# Patient Record
Sex: MALE | Race: WHITE | Employment: OTHER | URBAN - NONMETROPOLITAN AREA
[De-identification: names, ages, dates, MRNs, and addresses within clinical notes are randomized per-mention and may not be internally consistent; named-entity substitution may affect disease eponyms.]

---

## 2017-01-04 RX ORDER — POTASSIUM CHLORIDE 750 MG/1
TABLET, EXTENDED RELEASE ORAL
Qty: 90 TABLET | Refills: 2 | Status: SHIPPED | OUTPATIENT
Start: 2017-01-04 | End: 2017-02-08 | Stop reason: ALTCHOICE

## 2017-02-08 ENCOUNTER — OFFICE VISIT (OUTPATIENT)
Dept: INTERNAL MEDICINE | Age: 77
End: 2017-02-08

## 2017-02-08 VITALS
DIASTOLIC BLOOD PRESSURE: 74 MMHG | SYSTOLIC BLOOD PRESSURE: 134 MMHG | HEIGHT: 69 IN | BODY MASS INDEX: 32.51 KG/M2 | WEIGHT: 219.5 LBS

## 2017-02-08 DIAGNOSIS — E78.49 OTHER HYPERLIPIDEMIA: Chronic | ICD-10-CM

## 2017-02-08 DIAGNOSIS — I25.83 CORONARY ARTERY DISEASE DUE TO LIPID RICH PLAQUE: Primary | Chronic | ICD-10-CM

## 2017-02-08 DIAGNOSIS — C67.9 MALIGNANT NEOPLASM OF URINARY BLADDER, UNSPECIFIED SITE (HCC): ICD-10-CM

## 2017-02-08 DIAGNOSIS — I10 ESSENTIAL HYPERTENSION: Chronic | ICD-10-CM

## 2017-02-08 DIAGNOSIS — E03.9 ACQUIRED HYPOTHYROIDISM: Chronic | ICD-10-CM

## 2017-02-08 DIAGNOSIS — Z86.79 S/P AAA REPAIR: ICD-10-CM

## 2017-02-08 DIAGNOSIS — Z98.890 S/P AAA REPAIR: ICD-10-CM

## 2017-02-08 DIAGNOSIS — N18.30 CKD (CHRONIC KIDNEY DISEASE) STAGE 3, GFR 30-59 ML/MIN (HCC): ICD-10-CM

## 2017-02-08 DIAGNOSIS — Z23 NEED FOR PNEUMOCOCCAL VACCINATION: ICD-10-CM

## 2017-02-08 DIAGNOSIS — I25.10 CORONARY ARTERY DISEASE DUE TO LIPID RICH PLAQUE: Primary | Chronic | ICD-10-CM

## 2017-02-08 DIAGNOSIS — I73.9 CLAUDICATION (HCC): ICD-10-CM

## 2017-02-08 PROCEDURE — G0009 ADMIN PNEUMOCOCCAL VACCINE: HCPCS | Performed by: INTERNAL MEDICINE

## 2017-02-08 PROCEDURE — 99213 OFFICE O/P EST LOW 20 MIN: CPT | Performed by: INTERNAL MEDICINE

## 2017-02-08 PROCEDURE — 93000 ELECTROCARDIOGRAM COMPLETE: CPT | Performed by: INTERNAL MEDICINE

## 2017-02-08 PROCEDURE — 90670 PCV13 VACCINE IM: CPT | Performed by: INTERNAL MEDICINE

## 2017-02-08 RX ORDER — NITROGLYCERIN 0.4 MG/1
0.4 TABLET SUBLINGUAL EVERY 5 MIN PRN
Qty: 25 TABLET | Refills: 1 | Status: SHIPPED | OUTPATIENT
Start: 2017-02-08 | End: 2019-05-14 | Stop reason: SDUPTHER

## 2017-02-08 RX ORDER — ATORVASTATIN CALCIUM 40 MG/1
TABLET, FILM COATED ORAL
Qty: 30 TABLET | Refills: 11 | Status: SHIPPED | OUTPATIENT
Start: 2017-02-08 | End: 2018-02-06 | Stop reason: SDUPTHER

## 2017-02-23 ENCOUNTER — TELEPHONE (OUTPATIENT)
Dept: INTERNAL MEDICINE | Age: 77
End: 2017-02-23

## 2017-03-13 RX ORDER — LEVOTHYROXINE SODIUM 137 UG/1
TABLET ORAL
Qty: 90 TABLET | Refills: 3 | Status: SHIPPED | OUTPATIENT
Start: 2017-03-13 | End: 2017-04-10 | Stop reason: SDUPTHER

## 2017-04-10 ENCOUNTER — OFFICE VISIT (OUTPATIENT)
Dept: FAMILY MEDICINE CLINIC | Age: 77
End: 2017-04-10

## 2017-04-10 VITALS
WEIGHT: 218.4 LBS | RESPIRATION RATE: 20 BRPM | TEMPERATURE: 97.6 F | BODY MASS INDEX: 34.21 KG/M2 | DIASTOLIC BLOOD PRESSURE: 64 MMHG | SYSTOLIC BLOOD PRESSURE: 134 MMHG | HEART RATE: 80 BPM

## 2017-04-10 DIAGNOSIS — H65.03 BILATERAL ACUTE SEROUS OTITIS MEDIA, RECURRENCE NOT SPECIFIED: Primary | ICD-10-CM

## 2017-04-10 PROCEDURE — 99213 OFFICE O/P EST LOW 20 MIN: CPT | Performed by: FAMILY MEDICINE

## 2017-04-10 RX ORDER — GUAIFENESIN 600 MG/1
600 TABLET, EXTENDED RELEASE ORAL 2 TIMES DAILY
Qty: 20 TABLET | Refills: 0 | Status: SHIPPED | OUTPATIENT
Start: 2017-04-10 | End: 2017-05-08 | Stop reason: ALTCHOICE

## 2017-04-11 PROBLEM — Z95.828 S/P FEMORAL-FEMORAL BYPASS SURGERY: Status: ACTIVE | Noted: 2017-04-11

## 2017-04-17 ENCOUNTER — PROCEDURE VISIT (OUTPATIENT)
Dept: UROLOGY | Age: 77
End: 2017-04-17

## 2017-04-17 VITALS
DIASTOLIC BLOOD PRESSURE: 72 MMHG | BODY MASS INDEX: 34.69 KG/M2 | SYSTOLIC BLOOD PRESSURE: 140 MMHG | WEIGHT: 221 LBS | HEIGHT: 67 IN

## 2017-04-17 DIAGNOSIS — C67.9 MALIGNANT NEOPLASM OF URINARY BLADDER, UNSPECIFIED SITE (HCC): Primary | ICD-10-CM

## 2017-04-17 LAB
BILIRUBIN URINE: NEGATIVE
BLOOD URINE, POC: NEGATIVE
CHARACTER, URINE: CLEAR
COLOR, URINE: YELLOW
GLUCOSE URINE: NEGATIVE MG/DL
KETONES, URINE: NEGATIVE
LEUKOCYTE CLUMPS, URINE: NORMAL
NITRITE, URINE: NEGATIVE
PH, URINE: 6
PROTEIN, URINE: NEGATIVE MG/DL
SPECIFIC GRAVITY, URINE: 1.01 (ref 1–1.03)
UROBILINOGEN, URINE: 0.2 EU/DL

## 2017-04-17 PROCEDURE — 52000 CYSTOURETHROSCOPY: CPT | Performed by: UROLOGY

## 2017-04-17 PROCEDURE — 81003 URINALYSIS AUTO W/O SCOPE: CPT | Performed by: UROLOGY

## 2017-04-17 PROCEDURE — 99999 PR OFFICE/OUTPT VISIT,PROCEDURE ONLY: CPT | Performed by: UROLOGY

## 2017-04-24 ENCOUNTER — TELEPHONE (OUTPATIENT)
Dept: FAMILY MEDICINE CLINIC | Age: 77
End: 2017-04-24

## 2017-04-24 DIAGNOSIS — H91.90 HEARING LOSS, UNSPECIFIED LATERALITY: Primary | ICD-10-CM

## 2017-04-26 ASSESSMENT — ENCOUNTER SYMPTOMS
NAUSEA: 0
SHORTNESS OF BREATH: 0
SINUS PRESSURE: 0
RHINORRHEA: 0
EYE PAIN: 0
ABDOMINAL DISTENTION: 0
COUGH: 0
ABDOMINAL PAIN: 0
DIARRHEA: 0
SORE THROAT: 0
CONSTIPATION: 0

## 2017-04-27 ENCOUNTER — OFFICE VISIT (OUTPATIENT)
Dept: AUDIOLOGY | Age: 77
End: 2017-04-27

## 2017-04-27 DIAGNOSIS — H90.5 SENSORINEURAL HEARING LOSS OF RIGHT EAR: Primary | ICD-10-CM

## 2017-04-27 DIAGNOSIS — H90.72 MIXED HEARING LOSS OF LEFT EAR: ICD-10-CM

## 2017-05-08 ENCOUNTER — OFFICE VISIT (OUTPATIENT)
Dept: OTOLARYNGOLOGY | Age: 77
End: 2017-05-08

## 2017-05-08 VITALS
BODY MASS INDEX: 34.64 KG/M2 | HEART RATE: 92 BPM | TEMPERATURE: 97.9 F | HEIGHT: 67 IN | SYSTOLIC BLOOD PRESSURE: 128 MMHG | DIASTOLIC BLOOD PRESSURE: 70 MMHG | WEIGHT: 220.7 LBS | RESPIRATION RATE: 18 BRPM

## 2017-05-08 DIAGNOSIS — H91.93 HEARING LOSS, BILATERAL: Primary | ICD-10-CM

## 2017-05-08 PROCEDURE — 99203 OFFICE O/P NEW LOW 30 MIN: CPT | Performed by: OTOLARYNGOLOGY

## 2017-05-08 ASSESSMENT — ENCOUNTER SYMPTOMS
RECTAL PAIN: 0
WHEEZING: 0
PHOTOPHOBIA: 0
SORE THROAT: 0
EYE ITCHING: 0
CHOKING: 1
VOICE CHANGE: 1
FACIAL SWELLING: 0
RHINORRHEA: 0
BLOOD IN STOOL: 0
SINUS PRESSURE: 0
COLOR CHANGE: 0
STRIDOR: 0
TROUBLE SWALLOWING: 0
CONSTIPATION: 0
VOMITING: 0
DIARRHEA: 0
APNEA: 1
BACK PAIN: 1
SHORTNESS OF BREATH: 1
ABDOMINAL PAIN: 0
ABDOMINAL DISTENTION: 0
NAUSEA: 0
EYE PAIN: 0
EYE DISCHARGE: 0
COUGH: 1
CHEST TIGHTNESS: 0
EYE REDNESS: 0
ANAL BLEEDING: 0

## 2017-05-16 ENCOUNTER — OFFICE VISIT (OUTPATIENT)
Dept: AUDIOLOGY | Age: 77
End: 2017-05-16

## 2017-05-16 DIAGNOSIS — H90.3 SENSORINEURAL HEARING LOSS, BILATERAL: Primary | ICD-10-CM

## 2017-05-30 ENCOUNTER — OFFICE VISIT (OUTPATIENT)
Dept: AUDIOLOGY | Age: 77
End: 2017-05-30

## 2017-05-30 DIAGNOSIS — H90.3 SENSORINEURAL HEARING LOSS, BILATERAL: Primary | ICD-10-CM

## 2017-06-06 PROBLEM — I72.4 PSEUDOANEURYSM OF RIGHT FEMORAL ARTERY (HCC): Status: ACTIVE | Noted: 2017-06-06

## 2017-06-14 ENCOUNTER — OFFICE VISIT (OUTPATIENT)
Dept: AUDIOLOGY | Age: 77
End: 2017-06-14

## 2017-06-14 DIAGNOSIS — H90.3 SENSORINEURAL HEARING LOSS, BILATERAL: Primary | ICD-10-CM

## 2017-06-20 RX ORDER — GABAPENTIN 300 MG/1
CAPSULE ORAL
Qty: 270 CAPSULE | Refills: 1 | Status: SHIPPED | OUTPATIENT
Start: 2017-06-20 | End: 2018-02-06 | Stop reason: ALTCHOICE

## 2017-07-03 ENCOUNTER — OFFICE VISIT (OUTPATIENT)
Dept: AUDIOLOGY | Age: 77
End: 2017-07-03

## 2017-07-03 DIAGNOSIS — H90.3 SENSORINEURAL HEARING LOSS, BILATERAL: Primary | ICD-10-CM

## 2017-07-27 ENCOUNTER — HOSPITAL ENCOUNTER (OUTPATIENT)
Dept: AUDIOLOGY | Age: 77
Discharge: HOME | End: 2017-07-27
Payer: MEDICARE

## 2017-07-27 PROCEDURE — 9990000010 HC NO CHARGE VISIT: Performed by: AUDIOLOGIST

## 2017-08-14 ENCOUNTER — TELEPHONE (OUTPATIENT)
Dept: INTERNAL MEDICINE CLINIC | Age: 77
End: 2017-08-14

## 2017-08-14 ENCOUNTER — HOSPITAL ENCOUNTER (OUTPATIENT)
Age: 77
Discharge: HOME | End: 2017-08-14
Payer: MEDICARE

## 2017-08-14 ENCOUNTER — OFFICE VISIT (OUTPATIENT)
Dept: INTERNAL MEDICINE CLINIC | Age: 77
End: 2017-08-14
Payer: MEDICARE

## 2017-08-14 VITALS
BODY MASS INDEX: 31.4 KG/M2 | HEIGHT: 69 IN | DIASTOLIC BLOOD PRESSURE: 70 MMHG | SYSTOLIC BLOOD PRESSURE: 130 MMHG | WEIGHT: 212 LBS | HEART RATE: 80 BPM

## 2017-08-14 DIAGNOSIS — M48.061 SPINAL STENOSIS OF LUMBAR REGION: ICD-10-CM

## 2017-08-14 DIAGNOSIS — I73.9 CLAUDICATION (HCC): ICD-10-CM

## 2017-08-14 DIAGNOSIS — I73.9 PAD (PERIPHERAL ARTERY DISEASE) (HCC): ICD-10-CM

## 2017-08-14 DIAGNOSIS — I25.10 CORONARY ARTERY DISEASE INVOLVING NATIVE CORONARY ARTERY OF NATIVE HEART WITHOUT ANGINA PECTORIS: Primary | Chronic | ICD-10-CM

## 2017-08-14 DIAGNOSIS — E03.9 ACQUIRED HYPOTHYROIDISM: Chronic | ICD-10-CM

## 2017-08-14 DIAGNOSIS — I10 ESSENTIAL HYPERTENSION: Chronic | ICD-10-CM

## 2017-08-14 DIAGNOSIS — N18.30 CKD (CHRONIC KIDNEY DISEASE) STAGE 3, GFR 30-59 ML/MIN (HCC): ICD-10-CM

## 2017-08-14 LAB — TSH SERPL DL<=0.05 MIU/L-ACNC: 3.23 UIU/ML (ref 0.4–4.2)

## 2017-08-14 PROCEDURE — 84443 ASSAY THYROID STIM HORMONE: CPT

## 2017-08-14 PROCEDURE — 36415 COLL VENOUS BLD VENIPUNCTURE: CPT

## 2017-08-14 PROCEDURE — 99213 OFFICE O/P EST LOW 20 MIN: CPT | Performed by: INTERNAL MEDICINE

## 2017-08-14 RX ORDER — LEVOTHYROXINE SODIUM 137 UG/1
137 TABLET ORAL DAILY
Qty: 90 TABLET | Refills: 3 | Status: SHIPPED | OUTPATIENT
Start: 2017-08-14 | End: 2018-04-23 | Stop reason: SDUPTHER

## 2017-08-14 RX ORDER — FUROSEMIDE 40 MG/1
40 TABLET ORAL DAILY
Qty: 90 TABLET | Refills: 3 | Status: SHIPPED | OUTPATIENT
Start: 2017-08-14 | End: 2018-08-13 | Stop reason: SDUPTHER

## 2017-08-14 RX ORDER — POTASSIUM CHLORIDE 750 MG/1
10 TABLET, EXTENDED RELEASE ORAL DAILY
Qty: 90 TABLET | Refills: 3 | Status: SHIPPED | OUTPATIENT
Start: 2017-08-14 | End: 2018-04-23 | Stop reason: SDUPTHER

## 2017-09-19 ENCOUNTER — HOSPITAL ENCOUNTER (OUTPATIENT)
Dept: AUDIOLOGY | Age: 77
Discharge: HOME | End: 2017-09-19

## 2017-09-19 PROCEDURE — 9990000010 HC NO CHARGE VISIT: Performed by: AUDIOLOGIST

## 2017-10-09 ENCOUNTER — HOSPITAL ENCOUNTER (OUTPATIENT)
Age: 77
Discharge: HOME | End: 2017-10-09
Payer: MEDICARE

## 2017-10-09 ENCOUNTER — HOSPITAL ENCOUNTER (OUTPATIENT)
Dept: CT IMAGING | Age: 77
Discharge: HOME | End: 2017-10-09
Payer: MEDICARE

## 2017-10-09 ENCOUNTER — HOSPITAL ENCOUNTER (OUTPATIENT)
Dept: INTERVENTIONAL RADIOLOGY/VASCULAR | Age: 77
Discharge: HOME | End: 2017-10-09
Payer: MEDICARE

## 2017-10-09 DIAGNOSIS — I71.40 ABDOMINAL AORTIC ANEURYSM (AAA) WITHOUT RUPTURE: ICD-10-CM

## 2017-10-09 DIAGNOSIS — I10 ESSENTIAL HYPERTENSION: Chronic | ICD-10-CM

## 2017-10-09 DIAGNOSIS — I73.9 PAD (PERIPHERAL ARTERY DISEASE) (HCC): ICD-10-CM

## 2017-10-09 DIAGNOSIS — I73.9 CLAUDICATION (HCC): ICD-10-CM

## 2017-10-09 DIAGNOSIS — Z95.828 S/P FEMORAL-FEMORAL BYPASS SURGERY: ICD-10-CM

## 2017-10-09 LAB
BUN BLDV-MCNC: 14 MG/DL (ref 7–22)
CREAT SERPL-MCNC: 1 MG/DL (ref 0.4–1.2)
GFR SERPL CREATININE-BSD FRML MDRD: 72 ML/MIN/1.73M2
POC CREATININE WHOLE BLOOD: 1.2 MG/DL (ref 0.5–1.2)

## 2017-10-09 PROCEDURE — 36415 COLL VENOUS BLD VENIPUNCTURE: CPT

## 2017-10-09 PROCEDURE — 93926 LOWER EXTREMITY STUDY: CPT

## 2017-10-09 PROCEDURE — 82565 ASSAY OF CREATININE: CPT

## 2017-10-09 PROCEDURE — 6360000004 HC RX CONTRAST MEDICATION: Performed by: THORACIC SURGERY (CARDIOTHORACIC VASCULAR SURGERY)

## 2017-10-09 PROCEDURE — 72191 CT ANGIOGRAPH PELV W/O&W/DYE: CPT

## 2017-10-09 PROCEDURE — 84520 ASSAY OF UREA NITROGEN: CPT

## 2017-10-09 RX ADMIN — IOPAMIDOL 95 ML: 755 INJECTION, SOLUTION INTRAVENOUS at 10:20

## 2017-10-16 RX ORDER — ATORVASTATIN CALCIUM 40 MG/1
TABLET, FILM COATED ORAL
Qty: 90 TABLET | Refills: 3 | Status: SHIPPED | OUTPATIENT
Start: 2017-10-16 | End: 2018-04-23 | Stop reason: SDUPTHER

## 2017-10-23 ENCOUNTER — PROCEDURE VISIT (OUTPATIENT)
Dept: UROLOGY | Age: 77
End: 2017-10-23
Payer: MEDICARE

## 2017-10-23 VITALS
HEIGHT: 66 IN | BODY MASS INDEX: 33.77 KG/M2 | DIASTOLIC BLOOD PRESSURE: 80 MMHG | WEIGHT: 210.1 LBS | SYSTOLIC BLOOD PRESSURE: 158 MMHG

## 2017-10-23 DIAGNOSIS — Z85.51 HX OF BLADDER CANCER: Primary | ICD-10-CM

## 2017-10-23 LAB
BILIRUBIN URINE: NEGATIVE
BLOOD URINE, POC: NEGATIVE
CHARACTER, URINE: CLEAR
COLOR, URINE: YELLOW
GLUCOSE URINE: NEGATIVE MG/DL
KETONES, URINE: NEGATIVE
LEUKOCYTE CLUMPS, URINE: NEGATIVE
NITRITE, URINE: NEGATIVE
PH, URINE: 6
PROTEIN, URINE: NEGATIVE MG/DL
SPECIFIC GRAVITY, URINE: 1.01 (ref 1–1.03)
UROBILINOGEN, URINE: 0.2 EU/DL

## 2017-10-23 PROCEDURE — 99999 PR OFFICE/OUTPT VISIT,PROCEDURE ONLY: CPT | Performed by: UROLOGY

## 2017-10-23 PROCEDURE — 81003 URINALYSIS AUTO W/O SCOPE: CPT | Performed by: UROLOGY

## 2017-10-23 PROCEDURE — 52000 CYSTOURETHROSCOPY: CPT | Performed by: UROLOGY

## 2017-10-24 NOTE — PROGRESS NOTES
Mr. Faith Robles was seen in follow up for surveillance cystoscopy. This was completed today without difficulty    Past Medical History:   Diagnosis Date    AAA (abdominal aortic aneurysm) ([address]     BCG ROXANE 5/21/2014    BPH (benign prostatic hypertrophy)     CAD (coronary artery disease)     Carotid artery stenosis     Chronic back pain     Chronic kidney disease     Congenital absence of one kidney     History of lumbar fusion 3/15    mid lower back down    Hyperlipidemia     Hypertension     Hypothyroidism     Obesity     TIA (transient ischemic attack) 1/2008    Unspecified cerebral artery occlusion with cerebral infarction     TIA       Past Surgical History:   Procedure Laterality Date    ABDOMINAL AORTIC ANEURYSM REPAIR  3/2010    Shireen    ABDOMINAL AORTIC ANEURYSM REPAIR  7/24/15    Elmyra Snooks    COLONOSCOPY  12/21/11    RINESMIT    CORONARY ANGIOPLASTY WITH STENT PLACEMENT  2003    2 stents    FEMORAL-FEMORAL BYPASS GRAFT  03/09/2017    Dr. Anthony Valdez    arm    KNEE ARTHROSCOPY  12/2008    meniscus tear    LUMBAR LAMINECTOMY  3/2016    [facility]--Dr. Crason [place]    OTHER SURGICAL HISTORY  4/14/15    melanoma removal from right chest    OTHER SURGICAL HISTORY  5/20/2016    I and D, exicison of posterior neck cyst    SINUS SURGERY      SKIN CANCER EXCISION  10/2016    Dr Shantelle Rg  1990's    TURP  04/16/2014    See note of Dr. Sina Baumgarten for OR procedure details       Current Outpatient Prescriptions on File Prior to Visit   Medication Sig Dispense Refill    atorvastatin (LIPITOR) 40 MG tablet TAKE 1 TABLET BY MOUTH EVERY DAY AT BEDTIME 90 tablet 3    furosemide (LASIX) 40 MG tablet Take 1 tablet by mouth daily TAKE 1 TABLET BY MOUTH DAILY.  90 tablet 3    potassium chloride (KLOR-CON M) 10 MEQ extended release tablet Take 1 tablet by mouth daily 90 tablet 3    levothyroxine (SYNTHROID) 137 MCG tablet Take 1 tablet by mouth daily 90 tablet 3    metoprolol tartrate (LOPRESSOR) 25 MG tablet Take 1 tablet by mouth 2 times daily 180 tablet 3    gabapentin (NEURONTIN) 300 MG capsule TAKE 1 CAPSULE BY MOUTH 3 TIMES DAILY 270 capsule 1    atorvastatin (LIPITOR) 40 MG tablet Take one tab po daily at bed time 30 tablet 11    nitroGLYCERIN (NITROSTAT) 0.4 MG SL tablet Place 1 tablet under the tongue every 5 minutes as needed for Chest pain 25 tablet 1    Elastic Bandages & Supports (JOBST KNEE HIGH COMPRESSION SM) MISC 1 each by Does not apply route daily as needed 1 each 0    Handicap Placard MISC Cannot walk more than 200 feet without stopping to rest or without assistance. Lifetime x 5 years. 1 each 0    aspirin 81 MG tablet Take 81 mg by mouth daily. No current facility-administered medications on file prior to visit. No Known Allergies    Family History   Problem Relation Age of Onset    Diabetes Mother     Cancer Mother     Arthritis Mother     Asthma Father     Heart Disease Father     Cancer Father     Stroke Sister     Heart Disease Sister     Cancer Sister     Stroke Brother     Heart Disease Brother     Cancer Brother        Social History     Social History    Marital status:      Spouse name: N/A    Number of children: N/A    Years of education: N/A     Occupational History    Not on file. Social History Main Topics    Smoking status: Former Smoker     Packs/day: 1.00     Types: Cigarettes     Quit date: 4/1/1979    Smokeless tobacco: Never Used    Alcohol use No    Drug use: No    Sexual activity: Not on file     Other Topics Concern    Not on file     Social History Narrative    No narrative on file       Review of Systems  No problems with ears, nose or throat. No problems with eyes. No chest pain, shortness of breath, abdominal pain, extremity pain or weakness, and no neurological deficits. No rashes. No swollen glands or lymph nodes.  symptoms per HPI.   The remainder of the review of symptoms is negative. Exam  General: alert and oriented. Cooperative. HENT: Normocephalic, Atraumatic. Eyes: No scleral icterus, mucous membranes moist.  Respiratory: Effort normal.   Skin: No rashes or obvious lesions. Labs    Results for POC orders placed in visit on 10/23/17   POCT Urinalysis No Micro (Auto)   Result Value Ref Range    Glucose, Ur Negative NEGATIVE mg/dl    Bilirubin Urine Negative     Ketones, Urine Negative NEGATIVE    Specific Gravity, Urine 1.015 1.002 - 1.03    Blood, UA POC Negative NEGATIVE    pH, Urine 6.00 5.0 - 9.0    Protein, Urine Negative NEGATIVE mg/dl    Urobilinogen, Urine 0.20 0.0 - 1.0 eu/dl    Nitrite, Urine Negative NEGATIVE    Leukocyte Clumps, Urine Negative NEGATIVE    Color, Urine Yellow [id number]    Character, Urine Clear [id number]       Lab Results   Component Value Date    CREATININE 1.0 10/09/2017    BUN 14 10/09/2017     06/02/2017    K 3.6 06/02/2017    CL 97 (L) 06/02/2017    CO2 24 06/02/2017       Lab Results   Component Value Date    PSA 0.70 07/25/2011    PSA 0.83 10/31/2008       Cystoscopy  After obtaining informed consent and prepping the urethral meatus, a 16-Vietnamese flexible cystoscope was passed per urethra into the bladder. The urethra was evaluated on the way in and then again on the way out and was found to be normal.  The prostate was moderately obstructing. The bladder was evaluated in its endoscopic entirety and found to be normal.  There were no tumors, stones, ulcers or foreign bodies. There were no mucosal abnormalities. The ureteral orifices were seen and were normal.  The scope was removed. The patient tolerated the procedure and there were no complications. A dose of 500 mg ciprofloxacin was given for the procedure. Impression: normal cystoscopy        Plan:  Normal cystoscopy today. Follow up in 6 months for next surveillance cystoscopy.

## 2018-01-08 RX ORDER — POTASSIUM CHLORIDE 750 MG/1
TABLET, EXTENDED RELEASE ORAL
Qty: 90 TABLET | Refills: 3 | Status: SHIPPED | OUTPATIENT
Start: 2018-01-08 | End: 2019-01-13 | Stop reason: SDUPTHER

## 2018-01-09 ENCOUNTER — HOSPITAL ENCOUNTER (OUTPATIENT)
Dept: AUDIOLOGY | Age: 78
Discharge: HOME | End: 2018-01-09

## 2018-01-09 PROCEDURE — 9990000010 HC NO CHARGE VISIT: Performed by: AUDIOLOGIST

## 2018-02-06 ENCOUNTER — OFFICE VISIT (OUTPATIENT)
Dept: INTERNAL MEDICINE CLINIC | Age: 78
End: 2018-02-06
Payer: MEDICARE

## 2018-02-06 VITALS
HEART RATE: 62 BPM | DIASTOLIC BLOOD PRESSURE: 72 MMHG | BODY MASS INDEX: 33.59 KG/M2 | SYSTOLIC BLOOD PRESSURE: 138 MMHG | HEIGHT: 67 IN | WEIGHT: 214 LBS

## 2018-02-06 DIAGNOSIS — I10 ESSENTIAL HYPERTENSION: Chronic | ICD-10-CM

## 2018-02-06 DIAGNOSIS — H35.30 MACULAR DEGENERATION: ICD-10-CM

## 2018-02-06 DIAGNOSIS — I73.9 CLAUDICATION (HCC): ICD-10-CM

## 2018-02-06 DIAGNOSIS — I25.10 CORONARY ARTERY DISEASE INVOLVING NATIVE CORONARY ARTERY OF NATIVE HEART WITHOUT ANGINA PECTORIS: Primary | Chronic | ICD-10-CM

## 2018-02-06 DIAGNOSIS — E78.2 MIXED HYPERLIPIDEMIA: Chronic | ICD-10-CM

## 2018-02-06 DIAGNOSIS — Z95.828 S/P FEMORAL-FEMORAL BYPASS SURGERY: ICD-10-CM

## 2018-02-06 PROCEDURE — 99214 OFFICE O/P EST MOD 30 MIN: CPT | Performed by: INTERNAL MEDICINE

## 2018-02-06 RX ORDER — ATORVASTATIN CALCIUM 40 MG/1
TABLET, FILM COATED ORAL
Qty: 30 TABLET | Refills: 11 | Status: SHIPPED | OUTPATIENT
Start: 2018-02-06 | End: 2019-04-06 | Stop reason: SDUPTHER

## 2018-02-06 ASSESSMENT — PATIENT HEALTH QUESTIONNAIRE - PHQ9
SUM OF ALL RESPONSES TO PHQ QUESTIONS 1-9: 1
1. LITTLE INTEREST OR PLEASURE IN DOING THINGS: 0
SUM OF ALL RESPONSES TO PHQ9 QUESTIONS 1 & 2: 1
2. FEELING DOWN, DEPRESSED OR HOPELESS: 1

## 2018-02-06 NOTE — PROGRESS NOTES
YOB: 1940    Chief Complaint   Patient presents with    Coronary Artery Disease     6 month follow up    Hypertension    Hyperlipidemia    Other     ankle swelling bilateral-off and on     NEW SXS: none; no chest pain. Had fem/pop bypass 3/17. Still has pain in buttocks when walks    Has spinal stenosis, had spinal operation. Exsmoker,     Had aortic stent graft; had endo leak repair    Hx RCA stent 2003    This patient presents for medical evaluation. This patient is followed regularly by Dr. Ashly Alvarez. Patient Active Problem List   Diagnosis    CAD (coronary artery disease)    Hyperlipidemia    Hypertension    Hypothyroidism    Claudication ([facility]    CKD (chronic kidney disease) stage 3, GFR 30-59 ml/min    BCG ROXANE    Bladder cancer (HCC)    Subcutaneous cyst    S/P femoral-femoral bypass surgery    Pseudoaneurysm of right femoral artery (HCC)    PAD (peripheral artery disease) (HCC)    Spinal stenosis of lumbar region    Macular degeneration       Current Outpatient Prescriptions   Medication Sig Dispense Refill    atorvastatin (LIPITOR) 40 MG tablet Take one tab po daily at bed time 30 tablet 11    KLOR-CON M10 10 MEQ extended release tablet TAKE 1 TABLET BY MOUTH DAILY 90 tablet 3    atorvastatin (LIPITOR) 40 MG tablet TAKE 1 TABLET BY MOUTH EVERY DAY AT BEDTIME 90 tablet 3    furosemide (LASIX) 40 MG tablet Take 1 tablet by mouth daily TAKE 1 TABLET BY MOUTH DAILY. 90 tablet 3    potassium chloride (KLOR-CON M) 10 MEQ extended release tablet Take 1 tablet by mouth daily 90 tablet 3    levothyroxine (SYNTHROID) 137 MCG tablet Take 1 tablet by mouth daily 90 tablet 3    metoprolol tartrate (LOPRESSOR) 25 MG tablet Take 1 tablet by mouth 2 times daily 180 tablet 3    nitroGLYCERIN (NITROSTAT) 0.4 MG SL tablet Place 1 tablet under the tongue every 5 minutes as needed for Chest pain 25 tablet 1    aspirin 81 MG tablet Take 81 mg by mouth daily.       Elastic Bandages & Supports (JOBST KNEE HIGH COMPRESSION SM) MISC 1 each by Does not apply route daily as needed 1 each 0    [facility] Cannot walk more than 200 feet without stopping to rest or without assistance. Lifetime x 5 years. 1 each 0     No current facility-administered medications for this visit. No Known Allergies    I have reviewed the patient's medications, as well as, their past medical, social, and family history as appropriate. Review of Systems - General ROS: negative  Psychological ROS: negative  Hematological and Lymphatic ROS: No history of blood clots or bleeding disorder. Respiratory ROS: occas sob  Cardiovascular ROS: negative for - chest pain  Gastrointestinal ROS: no abdominal pain, change in bowel habits, or black or bloody stools  Genito-Urinary ROS: HX BLADDER CA; SEES ANTWAN  Musculoskeletal ROS: spinal stenosis  Neurological ROS: no TIA or stroke symptoms  MAY HAVE NEUROLOGIC CLAUDICATION  Dermatological ROS: HAD SKIN CANCER REMOVED    Blood pressure 138/72, pulse 62, height 5' 7\" (1.702 m), weight 214 lb (97.1 kg). Physical Examination: General appearance - alert, well appearing, and in no distress and overweight  Mental status - alert, oriented to person, place, and time  Neck - supple, no significant adenopathy, no JVD, or carotid bruits  Chest - clear to auscultation, no wheezes, rales or rhonchi, symmetric air entry  Heart - normal rate, regular rhythm, normal S1, S2, no murmurs, rubs, clicks or gallops  Abdomen - soft, nontender, nondistended, no masses or organomegaly  protuberant  Neurological - alert, oriented, normal speech, no focal findings or movement disorder noted  Musculoskeletal - no joint tenderness, deformity or swelling  Extremities - legs puffy, do not pit; lower legs red, chronic;  DP RIGHT NP; PT [id number] BY DOPPLER; DP L 128, PT L 130  Skin - legs red; slow return color when blanched         1.  Coronary artery disease involving native coronary artery of native heart without angina pectoris     2. Mixed hyperlipidemia  LDL Cholesterol, Direct   3. Essential hypertension     4. Claudication ([address]     5. S/P femoral-femoral bypass surgery     6. Macular degeneration         Orders Placed This Encounter   Procedures    LDL Cholesterol, Direct     Standing Status:   Future     Standing Expiration Date:   2/6/2019        IMP/PLAN:  1. CAD stable  2.hyperlipemia- will ck ldl  3. PAD, s/p bypass; dopplers close to arm pressures  4.chronic recurrent leg edema- try support hose        Will schedule follow up appointment in 6m. Continue medications at current doses. Signed By:  Patricia Riojas M.D.

## 2018-02-12 ENCOUNTER — HOSPITAL ENCOUNTER (OUTPATIENT)
Age: 78
Discharge: HOME | End: 2018-02-12
Payer: MEDICARE

## 2018-02-12 DIAGNOSIS — E78.2 MIXED HYPERLIPIDEMIA: Chronic | ICD-10-CM

## 2018-02-12 LAB — LDL CHOLESTEROL DIRECT: 112.38 MG/DL

## 2018-02-12 PROCEDURE — 83721 ASSAY OF BLOOD LIPOPROTEIN: CPT

## 2018-02-12 PROCEDURE — 36415 COLL VENOUS BLD VENIPUNCTURE: CPT

## 2018-02-14 ENCOUNTER — TELEPHONE (OUTPATIENT)
Dept: INTERNAL MEDICINE CLINIC | Age: 78
End: 2018-02-14

## 2018-02-14 NOTE — TELEPHONE ENCOUNTER
----- Message from Dorinda Boss MD sent at 2/12/2018  4:13 PM EST -----  Tell pt labs ok   May give values

## 2018-02-14 NOTE — TELEPHONE ENCOUNTER
Notified pts wife, after confirming she is on his hippa list, that LDL is 112 and can continue meds as is. She verbalizes understanding.

## 2018-03-13 ENCOUNTER — HOSPITAL ENCOUNTER (OUTPATIENT)
Dept: AUDIOLOGY | Age: 78
Discharge: HOME | End: 2018-03-13

## 2018-03-13 PROCEDURE — 9990000010 HC NO CHARGE VISIT: Performed by: AUDIOLOGIST

## 2018-03-13 NOTE — PROGRESS NOTES
HEARING AID PROBLEM: The patient states that he is not hearing well, especially since he changed the hearing aid tubing/domes. His hearing aids were not inserted properly at all today when he came in. They were just hanging on his ears and the domes/tubing were not inserted into the ear canal. He must have grabbed the previously used 4 d + tubing, rather than the 4 d tubing that we switched to. Also, he had large closed domes, but it sounded like he previously had large open domes. Changed the tubing to the 4 d, binaurally, today with large open domes. He reported satisfaction. Gave him 4 extra sets of 4 d tubing with large open domes attached. Advised him to get rid of any tubing he had at home so the 4 d + would not get accidentally used again. A listening check of the hearing aids revealed normal output. Otoscopy was WNL- removed slight cerumen from the left EAC due to patient reporting a \"liquid\" that he will notice after wearing the hearing aid for awhile. He complained of some \"echo\" today and still extreme discomfort when multiple people are talking. Decreased G80 and MPO in P1 and P2. Counseled on possible recruitment. Also, when he said that he can watch TV on number 22 (but his son and wife can watch it on volume #8), I explained that he will not be able to hear as soft as those with normal hearing. ..even with his hearing aids. He expressed understanding.

## 2018-03-19 RX ORDER — LEVOTHYROXINE SODIUM 137 UG/1
TABLET ORAL
Qty: 90 TABLET | Refills: 3 | Status: SHIPPED | OUTPATIENT
Start: 2018-03-19 | End: 2019-03-09 | Stop reason: SDUPTHER

## 2018-04-23 ENCOUNTER — PROCEDURE VISIT (OUTPATIENT)
Dept: UROLOGY | Age: 78
End: 2018-04-23
Payer: MEDICARE

## 2018-04-23 VITALS
HEIGHT: 69 IN | SYSTOLIC BLOOD PRESSURE: 136 MMHG | BODY MASS INDEX: 31.49 KG/M2 | WEIGHT: 212.6 LBS | DIASTOLIC BLOOD PRESSURE: 74 MMHG

## 2018-04-23 DIAGNOSIS — R33.9 INCOMPLETE BLADDER EMPTYING: ICD-10-CM

## 2018-04-23 DIAGNOSIS — Z85.51 HISTORY OF BLADDER CANCER: Primary | ICD-10-CM

## 2018-04-23 LAB
BILIRUBIN URINE: NEGATIVE
BLOOD URINE, POC: NORMAL
CHARACTER, URINE: CLEAR
COLOR, URINE: YELLOW
GLUCOSE URINE: NEGATIVE MG/DL
KETONES, URINE: NEGATIVE
LEUKOCYTE CLUMPS, URINE: NEGATIVE
NITRITE, URINE: NEGATIVE
PH, URINE: 6.5
POST VOID RESIDUAL (PVR): 136 ML
PROTEIN, URINE: NEGATIVE MG/DL
SPECIFIC GRAVITY, URINE: 1.01 (ref 1–1.03)
UROBILINOGEN, URINE: 0.2 EU/DL

## 2018-04-23 PROCEDURE — 51798 US URINE CAPACITY MEASURE: CPT | Performed by: UROLOGY

## 2018-04-23 PROCEDURE — 99999 PR OFFICE/OUTPT VISIT,PROCEDURE ONLY: CPT | Performed by: UROLOGY

## 2018-04-23 PROCEDURE — 81003 URINALYSIS AUTO W/O SCOPE: CPT | Performed by: UROLOGY

## 2018-04-23 PROCEDURE — 52000 CYSTOURETHROSCOPY: CPT | Performed by: UROLOGY

## 2018-05-14 ENCOUNTER — HOSPITAL ENCOUNTER (OUTPATIENT)
Dept: GENERAL RADIOLOGY | Age: 78
Discharge: HOME | End: 2018-05-14
Payer: MEDICARE

## 2018-05-14 ENCOUNTER — TELEPHONE (OUTPATIENT)
Dept: FAMILY MEDICINE CLINIC | Age: 78
End: 2018-05-14

## 2018-05-14 ENCOUNTER — HOSPITAL ENCOUNTER (OUTPATIENT)
Age: 78
Discharge: HOME | End: 2018-05-14
Payer: MEDICARE

## 2018-05-14 ENCOUNTER — OFFICE VISIT (OUTPATIENT)
Dept: FAMILY MEDICINE CLINIC | Age: 78
End: 2018-05-14
Payer: MEDICARE

## 2018-05-14 VITALS
DIASTOLIC BLOOD PRESSURE: 80 MMHG | OXYGEN SATURATION: 93 % | SYSTOLIC BLOOD PRESSURE: 138 MMHG | TEMPERATURE: 98 F | WEIGHT: 207 LBS | RESPIRATION RATE: 24 BRPM | HEIGHT: 67 IN | BODY MASS INDEX: 32.49 KG/M2 | HEART RATE: 104 BPM

## 2018-05-14 DIAGNOSIS — R06.02 SOB (SHORTNESS OF BREATH): ICD-10-CM

## 2018-05-14 DIAGNOSIS — J40 BRONCHITIS: ICD-10-CM

## 2018-05-14 DIAGNOSIS — J40 BRONCHITIS: Primary | ICD-10-CM

## 2018-05-14 PROCEDURE — 99213 OFFICE O/P EST LOW 20 MIN: CPT | Performed by: NURSE PRACTITIONER

## 2018-05-14 PROCEDURE — 71046 X-RAY EXAM CHEST 2 VIEWS: CPT

## 2018-05-14 PROCEDURE — 96372 THER/PROPH/DIAG INJ SC/IM: CPT | Performed by: NURSE PRACTITIONER

## 2018-05-14 RX ORDER — DEXTROMETHORPHAN HYDROBROMIDE AND PROMETHAZINE HYDROCHLORIDE 15; 6.25 MG/5ML; MG/5ML
5 SYRUP ORAL 3 TIMES DAILY PRN
Qty: 120 ML | Refills: 0 | Status: SHIPPED | OUTPATIENT
Start: 2018-05-14 | End: 2018-05-21

## 2018-05-14 RX ORDER — BENZONATATE 200 MG/1
100 CAPSULE ORAL 3 TIMES DAILY PRN
Qty: 30 CAPSULE | Refills: 0 | Status: SHIPPED | OUTPATIENT
Start: 2018-05-14 | End: 2018-05-14 | Stop reason: CLARIF

## 2018-05-14 RX ORDER — PREDNISONE 10 MG/1
10 TABLET ORAL DAILY
Qty: 7 TABLET | Refills: 0 | Status: SHIPPED | OUTPATIENT
Start: 2018-05-14 | End: 2018-05-21

## 2018-05-14 RX ORDER — CEFUROXIME AXETIL 250 MG/1
250 TABLET ORAL 2 TIMES DAILY
Qty: 20 TABLET | Refills: 0 | Status: SHIPPED | OUTPATIENT
Start: 2018-05-14 | End: 2018-05-24

## 2018-05-14 RX ORDER — METHYLPREDNISOLONE ACETATE 80 MG/ML
80 INJECTION, SUSPENSION INTRA-ARTICULAR; INTRALESIONAL; INTRAMUSCULAR; SOFT TISSUE ONCE
Status: COMPLETED | OUTPATIENT
Start: 2018-05-14 | End: 2018-05-14

## 2018-05-14 RX ADMIN — METHYLPREDNISOLONE ACETATE 80 MG: 80 INJECTION, SUSPENSION INTRA-ARTICULAR; INTRALESIONAL; INTRAMUSCULAR; SOFT TISSUE at 10:24

## 2018-05-25 ENCOUNTER — APPOINTMENT (OUTPATIENT)
Dept: GENERAL RADIOLOGY | Age: 78
DRG: 194 | End: 2018-05-25
Payer: MEDICARE

## 2018-05-25 ENCOUNTER — HOSPITAL ENCOUNTER (INPATIENT)
Age: 78
LOS: 2 days | Discharge: HOME | DRG: 194 | End: 2018-05-27
Attending: INTERNAL MEDICINE | Admitting: FAMILY MEDICINE
Payer: MEDICARE

## 2018-05-25 ENCOUNTER — APPOINTMENT (OUTPATIENT)
Dept: CT IMAGING | Age: 78
DRG: 194 | End: 2018-05-25
Payer: MEDICARE

## 2018-05-25 DIAGNOSIS — J18.9 PNEUMONIA DUE TO ORGANISM: Primary | ICD-10-CM

## 2018-05-25 PROBLEM — R06.02 SOB (SHORTNESS OF BREATH): Status: ACTIVE | Noted: 2018-05-25

## 2018-05-25 LAB
ALBUMIN SERPL-MCNC: 4 G/DL (ref 3.5–5.1)
ALLEN TEST: POSITIVE
ALP BLD-CCNC: 110 U/L (ref 38–126)
ALT SERPL-CCNC: 27 U/L (ref 11–66)
ANION GAP SERPL CALCULATED.3IONS-SCNC: 12 MEQ/L (ref 8–16)
AST SERPL-CCNC: 23 U/L (ref 5–40)
BASE EXCESS (CALCULATED): 4.1 MMOL/L (ref -2.5–2.5)
BASOPHILS # BLD: 0.4 %
BASOPHILS ABSOLUTE: 0.1 THOU/MM3 (ref 0–0.1)
BILIRUB SERPL-MCNC: 1 MG/DL (ref 0.3–1.2)
BILIRUBIN DIRECT: < 0.2 MG/DL (ref 0–0.3)
BUN BLDV-MCNC: 18 MG/DL (ref 7–22)
CALCIUM SERPL-MCNC: 9.2 MG/DL (ref 8.5–10.5)
CHLORIDE BLD-SCNC: 96 MEQ/L (ref 98–111)
CO2: 31 MEQ/L (ref 23–33)
COLLECTED BY:: ABNORMAL
CREAT SERPL-MCNC: 0.9 MG/DL (ref 0.4–1.2)
D-DIMER QUANTITATIVE: 3563 NG/ML FEU (ref 0–500)
DEVICE: ABNORMAL
EKG ATRIAL RATE: 65 BPM
EKG P AXIS: -6 DEGREES
EKG P-R INTERVAL: 144 MS
EKG Q-T INTERVAL: 398 MS
EKG QRS DURATION: 82 MS
EKG QTC CALCULATION (BAZETT): 413 MS
EKG R AXIS: -4 DEGREES
EKG T AXIS: 18 DEGREES
EKG VENTRICULAR RATE: 65 BPM
EOSINOPHIL # BLD: 1.3 %
EOSINOPHILS ABSOLUTE: 0.2 THOU/MM3 (ref 0–0.4)
GFR SERPL CREATININE-BSD FRML MDRD: 82 ML/MIN/1.73M2
GLUCOSE BLD-MCNC: 112 MG/DL (ref 70–108)
HCO3: 29 MMOL/L (ref 23–28)
HCT VFR BLD CALC: 49.9 % (ref 42–52)
HEMOGLOBIN: 16.8 GM/DL (ref 14–18)
LACTIC ACID: 1.7 MMOL/L (ref 0.5–2.2)
LYMPHOCYTES # BLD: 21.8 %
LYMPHOCYTES ABSOLUTE: 2.9 THOU/MM3 (ref 1–4.8)
MCH RBC QN AUTO: 31 PG (ref 27–31)
MCHC RBC AUTO-ENTMCNC: 33.7 GM/DL (ref 33–37)
MCV RBC AUTO: 92.1 FL (ref 80–94)
MONOCYTES # BLD: 8 %
MONOCYTES ABSOLUTE: 1.1 THOU/MM3 (ref 0.4–1.3)
NUCLEATED RED BLOOD CELLS: 0 /100 WBC
O2 SATURATION: 94 %
OSMOLALITY CALCULATION: 280.2 MOSMOL/KG (ref 275–300)
PCO2: 43 MMHG (ref 35–45)
PDW BLD-RTO: 14.4 % (ref 11.5–14.5)
PH BLOOD GAS: 7.44 (ref 7.35–7.45)
PLATELET # BLD: 235 THOU/MM3 (ref 130–400)
PMV BLD AUTO: 7.8 FL (ref 7.4–10.4)
PO2: 68 MMHG (ref 71–104)
POTASSIUM SERPL-SCNC: 4.4 MEQ/L (ref 3.5–5.2)
PRO-BNP: 122.2 PG/ML (ref 0–1800)
RBC # BLD: 5.42 MILL/MM3 (ref 4.7–6.1)
SEG NEUTROPHILS: 68.5 %
SEGMENTED NEUTROPHILS ABSOLUTE COUNT: 9 THOU/MM3 (ref 1.8–7.7)
SODIUM BLD-SCNC: 139 MEQ/L (ref 135–145)
SOURCE, BLOOD GAS: ABNORMAL
TOTAL PROTEIN: 6.8 G/DL (ref 6.1–8)
TROPONIN T: < 0.01 NG/ML
WBC # BLD: 13.2 THOU/MM3 (ref 4.8–10.8)

## 2018-05-25 PROCEDURE — 6360000002 HC RX W HCPCS: Performed by: INTERNAL MEDICINE

## 2018-05-25 PROCEDURE — G0378 HOSPITAL OBSERVATION PER HR: HCPCS

## 2018-05-25 PROCEDURE — 36415 COLL VENOUS BLD VENIPUNCTURE: CPT

## 2018-05-25 PROCEDURE — 71046 X-RAY EXAM CHEST 2 VIEWS: CPT

## 2018-05-25 PROCEDURE — 87040 BLOOD CULTURE FOR BACTERIA: CPT

## 2018-05-25 PROCEDURE — 83605 ASSAY OF LACTIC ACID: CPT

## 2018-05-25 PROCEDURE — 6360000002 HC RX W HCPCS: Performed by: FAMILY MEDICINE

## 2018-05-25 PROCEDURE — 2580000003 HC RX 258: Performed by: FAMILY MEDICINE

## 2018-05-25 PROCEDURE — 87899 AGENT NOS ASSAY W/OPTIC: CPT

## 2018-05-25 PROCEDURE — 85379 FIBRIN DEGRADATION QUANT: CPT

## 2018-05-25 PROCEDURE — 82248 BILIRUBIN DIRECT: CPT

## 2018-05-25 PROCEDURE — 84484 ASSAY OF TROPONIN QUANT: CPT

## 2018-05-25 PROCEDURE — 82803 BLOOD GASES ANY COMBINATION: CPT

## 2018-05-25 PROCEDURE — 99285 EMERGENCY DEPT VISIT HI MDM: CPT

## 2018-05-25 PROCEDURE — 2580000003 HC RX 258: Performed by: INTERNAL MEDICINE

## 2018-05-25 PROCEDURE — 1200000003 HC TELEMETRY R&B

## 2018-05-25 PROCEDURE — 87449 NOS EACH ORGANISM AG IA: CPT

## 2018-05-25 PROCEDURE — 80053 COMPREHEN METABOLIC PANEL: CPT

## 2018-05-25 PROCEDURE — 85025 COMPLETE CBC W/AUTO DIFF WBC: CPT

## 2018-05-25 PROCEDURE — 93005 ELECTROCARDIOGRAM TRACING: CPT | Performed by: INTERNAL MEDICINE

## 2018-05-25 PROCEDURE — 36600 WITHDRAWAL OF ARTERIAL BLOOD: CPT

## 2018-05-25 PROCEDURE — 83880 ASSAY OF NATRIURETIC PEPTIDE: CPT

## 2018-05-25 PROCEDURE — 96374 THER/PROPH/DIAG INJ IV PUSH: CPT

## 2018-05-25 PROCEDURE — 71275 CT ANGIOGRAPHY CHEST: CPT

## 2018-05-25 PROCEDURE — 99223 1ST HOSP IP/OBS HIGH 75: CPT | Performed by: FAMILY MEDICINE

## 2018-05-25 PROCEDURE — 6360000004 HC RX CONTRAST MEDICATION: Performed by: INTERNAL MEDICINE

## 2018-05-25 PROCEDURE — 6370000000 HC RX 637 (ALT 250 FOR IP): Performed by: FAMILY MEDICINE

## 2018-05-25 PROCEDURE — 94640 AIRWAY INHALATION TREATMENT: CPT

## 2018-05-25 RX ORDER — LEVOTHYROXINE SODIUM 137 UG/1
137 TABLET ORAL DAILY
Status: DISCONTINUED | OUTPATIENT
Start: 2018-05-26 | End: 2018-05-27 | Stop reason: HOSPADM

## 2018-05-25 RX ORDER — NITROGLYCERIN 0.4 MG/1
0.4 TABLET SUBLINGUAL EVERY 5 MIN PRN
Status: DISCONTINUED | OUTPATIENT
Start: 2018-05-25 | End: 2018-05-27 | Stop reason: HOSPADM

## 2018-05-25 RX ORDER — ASPIRIN 81 MG/1
81 TABLET, CHEWABLE ORAL DAILY
Status: DISCONTINUED | OUTPATIENT
Start: 2018-05-25 | End: 2018-05-27 | Stop reason: HOSPADM

## 2018-05-25 RX ORDER — FAMOTIDINE 20 MG/1
20 TABLET, FILM COATED ORAL 2 TIMES DAILY
Status: DISCONTINUED | OUTPATIENT
Start: 2018-05-25 | End: 2018-05-27 | Stop reason: HOSPADM

## 2018-05-25 RX ORDER — SODIUM CHLORIDE 0.9 % (FLUSH) 0.9 %
10 SYRINGE (ML) INJECTION EVERY 12 HOURS SCHEDULED
Status: DISCONTINUED | OUTPATIENT
Start: 2018-05-25 | End: 2018-05-27 | Stop reason: HOSPADM

## 2018-05-25 RX ORDER — FUROSEMIDE 40 MG/1
40 TABLET ORAL DAILY
Status: DISCONTINUED | OUTPATIENT
Start: 2018-05-26 | End: 2018-05-27 | Stop reason: HOSPADM

## 2018-05-25 RX ORDER — ATORVASTATIN CALCIUM 40 MG/1
40 TABLET, FILM COATED ORAL NIGHTLY
Status: DISCONTINUED | OUTPATIENT
Start: 2018-05-25 | End: 2018-05-27 | Stop reason: HOSPADM

## 2018-05-25 RX ORDER — ALBUTEROL SULFATE 2.5 MG/3ML
2.5 SOLUTION RESPIRATORY (INHALATION)
Status: DISCONTINUED | OUTPATIENT
Start: 2018-05-25 | End: 2018-05-27 | Stop reason: HOSPADM

## 2018-05-25 RX ORDER — TRAMADOL HYDROCHLORIDE 50 MG/1
50 TABLET ORAL EVERY 6 HOURS PRN
Status: DISCONTINUED | OUTPATIENT
Start: 2018-05-25 | End: 2018-05-27 | Stop reason: HOSPADM

## 2018-05-25 RX ORDER — SODIUM CHLORIDE 0.9 % (FLUSH) 0.9 %
10 SYRINGE (ML) INJECTION PRN
Status: DISCONTINUED | OUTPATIENT
Start: 2018-05-25 | End: 2018-05-27 | Stop reason: HOSPADM

## 2018-05-25 RX ORDER — ONDANSETRON 2 MG/ML
4 INJECTION INTRAMUSCULAR; INTRAVENOUS EVERY 6 HOURS PRN
Status: DISCONTINUED | OUTPATIENT
Start: 2018-05-25 | End: 2018-05-27 | Stop reason: HOSPADM

## 2018-05-25 RX ORDER — POTASSIUM CHLORIDE 750 MG/1
10 TABLET, FILM COATED, EXTENDED RELEASE ORAL DAILY
Status: DISCONTINUED | OUTPATIENT
Start: 2018-05-26 | End: 2018-05-27 | Stop reason: HOSPADM

## 2018-05-25 RX ADMIN — ASPIRIN 81 MG: 81 TABLET, CHEWABLE ORAL at 21:18

## 2018-05-25 RX ADMIN — ATORVASTATIN CALCIUM 40 MG: 40 TABLET, FILM COATED ORAL at 21:18

## 2018-05-25 RX ADMIN — ALBUTEROL SULFATE 2.5 MG: 2.5 SOLUTION RESPIRATORY (INHALATION) at 21:20

## 2018-05-25 RX ADMIN — Medication 10 ML: at 21:16

## 2018-05-25 RX ADMIN — IOPAMIDOL 80 ML: 755 INJECTION, SOLUTION INTRAVENOUS at 12:04

## 2018-05-25 RX ADMIN — ALBUTEROL SULFATE 2.5 MG: 2.5 SOLUTION RESPIRATORY (INHALATION) at 17:07

## 2018-05-25 RX ADMIN — METOPROLOL TARTRATE 25 MG: 25 TABLET, FILM COATED ORAL at 21:18

## 2018-05-25 RX ADMIN — CEFTRIAXONE 1 G: 1 INJECTION, POWDER, FOR SOLUTION INTRAMUSCULAR; INTRAVENOUS at 14:26

## 2018-05-25 RX ADMIN — FAMOTIDINE 20 MG: 20 TABLET ORAL at 21:16

## 2018-05-25 RX ADMIN — ENOXAPARIN SODIUM 40 MG: 40 INJECTION, SOLUTION INTRAVENOUS; SUBCUTANEOUS at 17:45

## 2018-05-25 RX ADMIN — AZITHROMYCIN MONOHYDRATE 500 MG: 500 INJECTION, POWDER, LYOPHILIZED, FOR SOLUTION INTRAVENOUS at 17:45

## 2018-05-25 ASSESSMENT — PAIN SCALES - GENERAL
PAINLEVEL_OUTOF10: 0
PAINLEVEL_OUTOF10: 0
PAINLEVEL_OUTOF10: 6

## 2018-05-25 ASSESSMENT — ENCOUNTER SYMPTOMS
DIARRHEA: 0
SHORTNESS OF BREATH: 1
NAUSEA: 0
ABDOMINAL PAIN: 0
COUGH: 1
EYE DISCHARGE: 0
SORE THROAT: 0
BACK PAIN: 0
VOMITING: 0
WHEEZING: 0
EYE REDNESS: 0
RHINORRHEA: 0

## 2018-05-25 ASSESSMENT — PAIN DESCRIPTION - PAIN TYPE
TYPE: ACUTE PAIN
TYPE: ACUTE PAIN

## 2018-05-25 ASSESSMENT — PAIN DESCRIPTION - LOCATION
LOCATION: CHEST
LOCATION: CHEST

## 2018-05-25 ASSESSMENT — PAIN DESCRIPTION - ONSET: ONSET: ON-GOING

## 2018-05-25 ASSESSMENT — PAIN DESCRIPTION - DESCRIPTORS: DESCRIPTORS: ACHING;HEAVINESS

## 2018-05-25 ASSESSMENT — PAIN DESCRIPTION - ORIENTATION: ORIENTATION: RIGHT;LEFT;UPPER

## 2018-05-26 PROBLEM — J15.9 BACTERIAL PNEUMONIA: Status: ACTIVE | Noted: 2018-05-26

## 2018-05-26 PROBLEM — J18.9 PNEUMONIA DUE TO ORGANISM: Status: ACTIVE | Noted: 2018-05-26

## 2018-05-26 LAB
ANION GAP SERPL CALCULATED.3IONS-SCNC: 12 MEQ/L (ref 8–16)
BUN BLDV-MCNC: 16 MG/DL (ref 7–22)
CALCIUM SERPL-MCNC: 8.9 MG/DL (ref 8.5–10.5)
CHLORIDE BLD-SCNC: 100 MEQ/L (ref 98–111)
CO2: 30 MEQ/L (ref 23–33)
CREAT SERPL-MCNC: 1 MG/DL (ref 0.4–1.2)
GFR SERPL CREATININE-BSD FRML MDRD: 72 ML/MIN/1.73M2
GLUCOSE BLD-MCNC: 96 MG/DL (ref 70–108)
HCT VFR BLD CALC: 49.1 % (ref 42–52)
HEMOGLOBIN: 16.2 GM/DL (ref 14–18)
MCH RBC QN AUTO: 30.7 PG (ref 27–31)
MCHC RBC AUTO-ENTMCNC: 33 GM/DL (ref 33–37)
MCV RBC AUTO: 93.2 FL (ref 80–94)
PDW BLD-RTO: 13.8 % (ref 11.5–14.5)
PLATELET # BLD: 197 THOU/MM3 (ref 130–400)
PMV BLD AUTO: 7.9 FL (ref 7.4–10.4)
POTASSIUM REFLEX MAGNESIUM: 4.7 MEQ/L (ref 3.5–5.2)
PROCALCITONIN: 0.05 NG/ML (ref 0.01–0.09)
RBC # BLD: 5.27 MILL/MM3 (ref 4.7–6.1)
SODIUM BLD-SCNC: 142 MEQ/L (ref 135–145)
WBC # BLD: 12.2 THOU/MM3 (ref 4.8–10.8)

## 2018-05-26 PROCEDURE — 92610 EVALUATE SWALLOWING FUNCTION: CPT

## 2018-05-26 PROCEDURE — 2580000003 HC RX 258: Performed by: FAMILY MEDICINE

## 2018-05-26 PROCEDURE — G8997 SWALLOW GOAL STATUS: HCPCS

## 2018-05-26 PROCEDURE — 6370000000 HC RX 637 (ALT 250 FOR IP): Performed by: INTERNAL MEDICINE

## 2018-05-26 PROCEDURE — 84145 PROCALCITONIN (PCT): CPT

## 2018-05-26 PROCEDURE — 1200000003 HC TELEMETRY R&B

## 2018-05-26 PROCEDURE — APPSS180 APP SPLIT SHARED TIME > 60 MINUTES: Performed by: NURSE PRACTITIONER

## 2018-05-26 PROCEDURE — 6360000002 HC RX W HCPCS: Performed by: FAMILY MEDICINE

## 2018-05-26 PROCEDURE — 6370000000 HC RX 637 (ALT 250 FOR IP): Performed by: FAMILY MEDICINE

## 2018-05-26 PROCEDURE — 93010 ELECTROCARDIOGRAM REPORT: CPT | Performed by: INTERNAL MEDICINE

## 2018-05-26 PROCEDURE — G8996 SWALLOW CURRENT STATUS: HCPCS

## 2018-05-26 PROCEDURE — 94640 AIRWAY INHALATION TREATMENT: CPT

## 2018-05-26 PROCEDURE — 80048 BASIC METABOLIC PNL TOTAL CA: CPT

## 2018-05-26 PROCEDURE — G8998 SWALLOW D/C STATUS: HCPCS

## 2018-05-26 PROCEDURE — 99233 SBSQ HOSP IP/OBS HIGH 50: CPT | Performed by: INTERNAL MEDICINE

## 2018-05-26 PROCEDURE — 85027 COMPLETE CBC AUTOMATED: CPT

## 2018-05-26 PROCEDURE — 99223 1ST HOSP IP/OBS HIGH 75: CPT | Performed by: INTERNAL MEDICINE

## 2018-05-26 PROCEDURE — 36415 COLL VENOUS BLD VENIPUNCTURE: CPT

## 2018-05-26 RX ORDER — PREDNISONE 20 MG/1
40 TABLET ORAL DAILY
Status: DISCONTINUED | OUTPATIENT
Start: 2018-05-26 | End: 2018-05-27 | Stop reason: HOSPADM

## 2018-05-26 RX ORDER — OXYCODONE HYDROCHLORIDE AND ACETAMINOPHEN 5; 325 MG/1; MG/1
1 TABLET ORAL EVERY 4 HOURS PRN
Status: DISCONTINUED | OUTPATIENT
Start: 2018-05-26 | End: 2018-05-27 | Stop reason: HOSPADM

## 2018-05-26 RX ADMIN — PREDNISONE 40 MG: 20 TABLET ORAL at 16:29

## 2018-05-26 RX ADMIN — ALBUTEROL SULFATE 2.5 MG: 2.5 SOLUTION RESPIRATORY (INHALATION) at 09:10

## 2018-05-26 RX ADMIN — AZITHROMYCIN MONOHYDRATE 500 MG: 500 INJECTION, POWDER, LYOPHILIZED, FOR SOLUTION INTRAVENOUS at 17:15

## 2018-05-26 RX ADMIN — METOPROLOL TARTRATE 25 MG: 25 TABLET, FILM COATED ORAL at 09:45

## 2018-05-26 RX ADMIN — METOPROLOL TARTRATE 25 MG: 25 TABLET, FILM COATED ORAL at 20:24

## 2018-05-26 RX ADMIN — Medication 10 ML: at 09:46

## 2018-05-26 RX ADMIN — ENOXAPARIN SODIUM 40 MG: 40 INJECTION, SOLUTION INTRAVENOUS; SUBCUTANEOUS at 09:48

## 2018-05-26 RX ADMIN — LEVOTHYROXINE SODIUM 137 MCG: 137 TABLET ORAL at 06:13

## 2018-05-26 RX ADMIN — FAMOTIDINE 20 MG: 20 TABLET ORAL at 20:24

## 2018-05-26 RX ADMIN — CEFTRIAXONE SODIUM 1 G: 1 INJECTION, POWDER, FOR SOLUTION INTRAMUSCULAR; INTRAVENOUS at 16:28

## 2018-05-26 RX ADMIN — POTASSIUM CHLORIDE 10 MEQ: 750 TABLET, FILM COATED, EXTENDED RELEASE ORAL at 09:46

## 2018-05-26 RX ADMIN — ATORVASTATIN CALCIUM 40 MG: 40 TABLET, FILM COATED ORAL at 20:24

## 2018-05-26 RX ADMIN — FUROSEMIDE 40 MG: 40 TABLET ORAL at 09:45

## 2018-05-26 RX ADMIN — Medication 10 ML: at 20:24

## 2018-05-26 RX ADMIN — FAMOTIDINE 20 MG: 20 TABLET ORAL at 09:49

## 2018-05-26 RX ADMIN — Medication 10 ML: at 16:28

## 2018-05-26 ASSESSMENT — PAIN SCALES - GENERAL
PAINLEVEL_OUTOF10: 0
PAINLEVEL_OUTOF10: 0

## 2018-05-27 VITALS
BODY MASS INDEX: 30.26 KG/M2 | DIASTOLIC BLOOD PRESSURE: 78 MMHG | WEIGHT: 199.7 LBS | HEIGHT: 68 IN | RESPIRATION RATE: 18 BRPM | OXYGEN SATURATION: 91 % | SYSTOLIC BLOOD PRESSURE: 136 MMHG | HEART RATE: 92 BPM | TEMPERATURE: 98.7 F

## 2018-05-27 LAB
ALBUMIN SERPL-MCNC: 3.9 G/DL (ref 3.5–5.1)
ALP BLD-CCNC: 101 U/L (ref 38–126)
ALT SERPL-CCNC: 21 U/L (ref 11–66)
ANION GAP SERPL CALCULATED.3IONS-SCNC: 14 MEQ/L (ref 8–16)
AST SERPL-CCNC: 19 U/L (ref 5–40)
BASOPHILS # BLD: 0.2 %
BASOPHILS ABSOLUTE: 0 THOU/MM3 (ref 0–0.1)
BILIRUB SERPL-MCNC: 1.2 MG/DL (ref 0.3–1.2)
BUN BLDV-MCNC: 19 MG/DL (ref 7–22)
CALCIUM SERPL-MCNC: 9.1 MG/DL (ref 8.5–10.5)
CHLORIDE BLD-SCNC: 99 MEQ/L (ref 98–111)
CO2: 27 MEQ/L (ref 23–33)
CREAT SERPL-MCNC: 1 MG/DL (ref 0.4–1.2)
EOSINOPHIL # BLD: 0 %
EOSINOPHILS ABSOLUTE: 0 THOU/MM3 (ref 0–0.4)
GFR SERPL CREATININE-BSD FRML MDRD: 72 ML/MIN/1.73M2
GLUCOSE BLD-MCNC: 115 MG/DL (ref 70–108)
HCT VFR BLD CALC: 52 % (ref 42–52)
HEMOGLOBIN: 17.1 GM/DL (ref 14–18)
LYMPHOCYTES # BLD: 16.4 %
LYMPHOCYTES ABSOLUTE: 2.8 THOU/MM3 (ref 1–4.8)
MAGNESIUM: 2.3 MG/DL (ref 1.6–2.4)
MCH RBC QN AUTO: 30.6 PG (ref 27–31)
MCHC RBC AUTO-ENTMCNC: 33 GM/DL (ref 33–37)
MCV RBC AUTO: 92.9 FL (ref 80–94)
MONOCYTES # BLD: 5.3 %
MONOCYTES ABSOLUTE: 0.9 THOU/MM3 (ref 0.4–1.3)
NUCLEATED RED BLOOD CELLS: 0 /100 WBC
PDW BLD-RTO: 14.1 % (ref 11.5–14.5)
PHOSPHORUS: 3.3 MG/DL (ref 2.4–4.7)
PLATELET # BLD: 224 THOU/MM3 (ref 130–400)
PMV BLD AUTO: 8.3 FL (ref 7.4–10.4)
POTASSIUM SERPL-SCNC: 4.7 MEQ/L (ref 3.5–5.2)
RBC # BLD: 5.59 MILL/MM3 (ref 4.7–6.1)
SEG NEUTROPHILS: 78.1 %
SEGMENTED NEUTROPHILS ABSOLUTE COUNT: 13.1 THOU/MM3 (ref 1.8–7.7)
SODIUM BLD-SCNC: 140 MEQ/L (ref 135–145)
TOTAL PROTEIN: 6.8 G/DL (ref 6.1–8)
WBC # BLD: 16.8 THOU/MM3 (ref 4.8–10.8)

## 2018-05-27 PROCEDURE — 80053 COMPREHEN METABOLIC PANEL: CPT

## 2018-05-27 PROCEDURE — 6360000002 HC RX W HCPCS: Performed by: FAMILY MEDICINE

## 2018-05-27 PROCEDURE — 85025 COMPLETE CBC W/AUTO DIFF WBC: CPT

## 2018-05-27 PROCEDURE — 83735 ASSAY OF MAGNESIUM: CPT

## 2018-05-27 PROCEDURE — 84100 ASSAY OF PHOSPHORUS: CPT

## 2018-05-27 PROCEDURE — 99232 SBSQ HOSP IP/OBS MODERATE 35: CPT | Performed by: INTERNAL MEDICINE

## 2018-05-27 PROCEDURE — 2580000003 HC RX 258: Performed by: FAMILY MEDICINE

## 2018-05-27 PROCEDURE — 94640 AIRWAY INHALATION TREATMENT: CPT

## 2018-05-27 PROCEDURE — 87205 SMEAR GRAM STAIN: CPT

## 2018-05-27 PROCEDURE — 6370000000 HC RX 637 (ALT 250 FOR IP): Performed by: INTERNAL MEDICINE

## 2018-05-27 PROCEDURE — 6370000000 HC RX 637 (ALT 250 FOR IP): Performed by: FAMILY MEDICINE

## 2018-05-27 PROCEDURE — 36415 COLL VENOUS BLD VENIPUNCTURE: CPT

## 2018-05-27 PROCEDURE — 99239 HOSP IP/OBS DSCHRG MGMT >30: CPT | Performed by: INTERNAL MEDICINE

## 2018-05-27 RX ORDER — PREDNISONE 20 MG/1
40 TABLET ORAL DAILY
Qty: 10 TABLET | Refills: 0 | Status: SHIPPED | OUTPATIENT
Start: 2018-05-28 | End: 2018-06-01

## 2018-05-27 RX ORDER — ALBUTEROL SULFATE 90 UG/1
2 AEROSOL, METERED RESPIRATORY (INHALATION) EVERY 6 HOURS PRN
Qty: 1 INHALER | Refills: 0 | Status: SHIPPED | OUTPATIENT
Start: 2018-05-27 | End: 2018-10-24

## 2018-05-27 RX ORDER — CEFUROXIME AXETIL 250 MG/1
250 TABLET ORAL 2 TIMES DAILY
Qty: 16 TABLET | Refills: 0 | Status: SHIPPED | OUTPATIENT
Start: 2018-05-27 | End: 2018-05-27

## 2018-05-27 RX ORDER — CEFUROXIME AXETIL 250 MG/1
250 TABLET ORAL 2 TIMES DAILY
Qty: 12 TABLET | Refills: 0 | Status: SHIPPED | OUTPATIENT
Start: 2018-05-27 | End: 2018-06-02

## 2018-05-27 RX ORDER — DOXYCYCLINE HYCLATE 100 MG
100 TABLET ORAL 2 TIMES DAILY
Qty: 14 TABLET | Refills: 0 | Status: SHIPPED | OUTPATIENT
Start: 2018-05-27 | End: 2018-05-27 | Stop reason: HOSPADM

## 2018-05-27 RX ORDER — AZITHROMYCIN 250 MG/1
250 TABLET, FILM COATED ORAL DAILY
Qty: 7 TABLET | Refills: 0 | Status: SHIPPED | OUTPATIENT
Start: 2018-05-27 | End: 2018-05-27

## 2018-05-27 RX ORDER — AZITHROMYCIN 250 MG/1
250 TABLET, FILM COATED ORAL DAILY
Qty: 3 TABLET | Refills: 0 | Status: SHIPPED | OUTPATIENT
Start: 2018-05-27 | End: 2018-05-30

## 2018-05-27 RX ORDER — AMOXICILLIN AND CLAVULANATE POTASSIUM 500; 125 MG/1; MG/1
1 TABLET, FILM COATED ORAL 3 TIMES DAILY
Qty: 21 TABLET | Refills: 0 | Status: SHIPPED | OUTPATIENT
Start: 2018-05-27 | End: 2018-05-27 | Stop reason: HOSPADM

## 2018-05-27 RX ORDER — ONDANSETRON 4 MG/1
4 TABLET, ORALLY DISINTEGRATING ORAL EVERY 8 HOURS PRN
Qty: 15 TABLET | Refills: 0 | Status: SHIPPED | OUTPATIENT
Start: 2018-05-27 | End: 2018-06-01

## 2018-05-27 RX ADMIN — Medication 10 ML: at 09:42

## 2018-05-27 RX ADMIN — METOPROLOL TARTRATE 25 MG: 25 TABLET, FILM COATED ORAL at 09:41

## 2018-05-27 RX ADMIN — POTASSIUM CHLORIDE 10 MEQ: 750 TABLET, FILM COATED, EXTENDED RELEASE ORAL at 09:41

## 2018-05-27 RX ADMIN — PREDNISONE 40 MG: 20 TABLET ORAL at 09:41

## 2018-05-27 RX ADMIN — ENOXAPARIN SODIUM 40 MG: 40 INJECTION, SOLUTION INTRAVENOUS; SUBCUTANEOUS at 09:41

## 2018-05-27 RX ADMIN — FAMOTIDINE 20 MG: 20 TABLET ORAL at 09:41

## 2018-05-27 RX ADMIN — LEVOTHYROXINE SODIUM 137 MCG: 137 TABLET ORAL at 06:37

## 2018-05-27 RX ADMIN — ALBUTEROL SULFATE 2.5 MG: 2.5 SOLUTION RESPIRATORY (INHALATION) at 07:49

## 2018-05-27 RX ADMIN — FUROSEMIDE 40 MG: 40 TABLET ORAL at 09:41

## 2018-05-27 ASSESSMENT — PAIN SCALES - GENERAL
PAINLEVEL_OUTOF10: 0

## 2018-05-28 LAB
LEGIONELLA URINARY AG: NEGATIVE
STREP PNEUMO AG, UR: NEGATIVE

## 2018-05-29 ENCOUNTER — TELEPHONE (OUTPATIENT)
Dept: FAMILY MEDICINE CLINIC | Age: 78
End: 2018-05-29

## 2018-05-29 ASSESSMENT — ENCOUNTER SYMPTOMS
GASTROINTESTINAL NEGATIVE: 1
SHORTNESS OF BREATH: 1
EYES NEGATIVE: 1
COUGH: 1
ALLERGIC/IMMUNOLOGIC NEGATIVE: 1

## 2018-05-30 LAB — BLOOD CULTURE, ROUTINE: NORMAL

## 2018-05-31 LAB — BLOOD CULTURE, ROUTINE: NORMAL

## 2018-06-01 ENCOUNTER — OFFICE VISIT (OUTPATIENT)
Dept: FAMILY MEDICINE CLINIC | Age: 78
End: 2018-06-01
Payer: MEDICARE

## 2018-06-01 VITALS
RESPIRATION RATE: 24 BRPM | SYSTOLIC BLOOD PRESSURE: 126 MMHG | BODY MASS INDEX: 32.18 KG/M2 | HEIGHT: 67 IN | WEIGHT: 205 LBS | HEART RATE: 72 BPM | DIASTOLIC BLOOD PRESSURE: 70 MMHG | TEMPERATURE: 98.1 F

## 2018-06-01 DIAGNOSIS — R09.1 PLEURISY WITHOUT EFFUSION: ICD-10-CM

## 2018-06-01 DIAGNOSIS — J18.9 PNEUMONIA DUE TO ORGANISM: Primary | ICD-10-CM

## 2018-06-01 PROCEDURE — 1111F DSCHRG MED/CURRENT MED MERGE: CPT | Performed by: NURSE PRACTITIONER

## 2018-06-01 PROCEDURE — 99495 TRANSJ CARE MGMT MOD F2F 14D: CPT | Performed by: NURSE PRACTITIONER

## 2018-06-18 ASSESSMENT — ENCOUNTER SYMPTOMS
SHORTNESS OF BREATH: 1
GASTROINTESTINAL NEGATIVE: 1

## 2018-06-21 ENCOUNTER — HOSPITAL ENCOUNTER (OUTPATIENT)
Dept: GENERAL RADIOLOGY | Age: 78
Discharge: HOME | End: 2018-06-21
Payer: MEDICARE

## 2018-06-21 ENCOUNTER — HOSPITAL ENCOUNTER (OUTPATIENT)
Age: 78
Discharge: HOME | End: 2018-06-21
Payer: MEDICARE

## 2018-06-21 DIAGNOSIS — J18.9 PNEUMONIA DUE TO ORGANISM: ICD-10-CM

## 2018-06-21 PROCEDURE — 71046 X-RAY EXAM CHEST 2 VIEWS: CPT

## 2018-08-13 ENCOUNTER — OFFICE VISIT (OUTPATIENT)
Dept: INTERNAL MEDICINE CLINIC | Age: 78
End: 2018-08-13
Payer: MEDICARE

## 2018-08-13 VITALS
WEIGHT: 204.5 LBS | DIASTOLIC BLOOD PRESSURE: 60 MMHG | OXYGEN SATURATION: 96 % | SYSTOLIC BLOOD PRESSURE: 146 MMHG | BODY MASS INDEX: 32.1 KG/M2 | HEIGHT: 67 IN | HEART RATE: 70 BPM

## 2018-08-13 DIAGNOSIS — I25.10 CORONARY ARTERY DISEASE INVOLVING NATIVE CORONARY ARTERY OF NATIVE HEART WITHOUT ANGINA PECTORIS: Primary | Chronic | ICD-10-CM

## 2018-08-13 DIAGNOSIS — N18.30 CKD (CHRONIC KIDNEY DISEASE) STAGE 3, GFR 30-59 ML/MIN (HCC): ICD-10-CM

## 2018-08-13 DIAGNOSIS — I10 ESSENTIAL HYPERTENSION: ICD-10-CM

## 2018-08-13 DIAGNOSIS — I73.9 CLAUDICATION (HCC): ICD-10-CM

## 2018-08-13 DIAGNOSIS — I73.9 PAD (PERIPHERAL ARTERY DISEASE) (HCC): ICD-10-CM

## 2018-08-13 PROCEDURE — 99213 OFFICE O/P EST LOW 20 MIN: CPT | Performed by: INTERNAL MEDICINE

## 2018-08-13 RX ORDER — AMOXICILLIN 500 MG/1
2000 CAPSULE ORAL
Status: ON HOLD | COMMUNITY
End: 2018-11-09

## 2018-08-13 RX ORDER — FUROSEMIDE 40 MG/1
40 TABLET ORAL DAILY
Qty: 90 TABLET | Refills: 3 | Status: SHIPPED | OUTPATIENT
Start: 2018-08-13 | End: 2019-03-15 | Stop reason: ALTCHOICE

## 2018-08-13 NOTE — PROGRESS NOTES
YOB: 1940    Chief Complaint   Patient presents with    Coronary Artery Disease     6 month follow up    Hyperlipidemia    Other     PAD     NEW SXS: none; no chest pain. Had fem/pop bypass 3/17. Still has pain in buttocks when walks    Has spinal stenosis, had spinal operation. Exsmoker,     Had aortic stent graft; had endo leak repair    Hx RCA stent 2003    His walking is limited to 50ft; legs hurt diffusely; no buttock pain. Gets sob with bending over    This patient presents for medical evaluation. This patient is followed regularly by Dr. Rodney Machado. Patient Active Problem List   Diagnosis    CAD (coronary artery disease)    Hyperlipidemia    Essential hypertension    Hypothyroidism    Claudication (HCC)    CKD (chronic kidney disease) stage 3, GFR 30-59 ml/min    BCG ROXANE    Bladder cancer (HCC)    Subcutaneous cyst    S/P femoral-femoral bypass surgery    Pseudoaneurysm of right femoral artery (HCC)    [address] ([facility]ipheral [facility] disease) ([facility]    Spinal stenosis of lumbar region    Macular degeneration    Pneumonia due to organism    Pleurisy without effusion    Abnormal CT of the chest       Current Outpatient Prescriptions   Medication Sig Dispense Refill    amoxicillin (AMOXIL) 500 MG capsule Take 2,000 mg by mouth One hour prior to dental appt      furosemide (LASIX) 40 MG tablet Take 1 tablet by mouth daily TAKE 1 TABLET BY MOUTH DAILY.  90 tablet 3    metoprolol tartrate (LOPRESSOR) 25 MG tablet Take 1 tablet by mouth 2 times daily 180 tablet 3    albuterol sulfate HFA (PROAIR HFA) 108 (90 Base) MCG/ACT inhaler Inhale 2 puffs into the lungs every 6 hours as needed for Wheezing 1 Inhaler 0    levothyroxine (SYNTHROID) 137 MCG tablet TAKE 1 TABLET BY MOUTH DAILY 90 tablet 3    atorvastatin (LIPITOR) 40 MG tablet Take one tab po daily at bed time 30 tablet 11    KLOR-CON M10 10 MEQ extended release tablet TAKE 1 TABLET BY MOUTH DAILY 90 tablet 3  nitroGLYCERIN (NITROSTAT) 0.4 MG SL tablet Place 1 tablet under the tongue every 5 minutes as needed for Chest pain 25 tablet 1    aspirin 81 MG tablet Take 81 mg by mouth daily.  Elastic Bandages & Supports (JOBST KNEE HIGH COMPRESSION SM) MISC 1 each by Does not apply route daily as needed 1 each 0    Handicap Placard MISC Cannot walk more than 200 feet without stopping to rest or without assistance. Lifetime x 5 years. 1 each 0     No current facility-administered medications for this visit. No Known Allergies    I have reviewed the patient's medications, as well as, their past medical, social, and family history as appropriate. Review of Systems - General ROS: negative  Psychological ROS: negative  Hematological and Lymphatic ROS: No history of blood clots or bleeding disorder. Respiratory ROS: occas sob  Cardiovascular ROS: negative for - chest pain  Gastrointestinal ROS: no abdominal pain, change in bowel habits, or black or bloody stools  Genito-Urinary ROS: HX BLADDER CA; SEES ANTWAN  Musculoskeletal ROS: spinal stenosis  Neurological ROS: no TIA or stroke symptoms  MAY HAVE NEUROLOGIC CLAUDICATION  Dermatological ROS: HAD SKIN CANCER REMOVED    Blood pressure (!) 146/60, pulse 70, height 5' 7.01\" (1.702 m), weight 204 lb 8 oz (92.8 kg), SpO2 96 %.     Physical Examination: General appearance - alert, well appearing, and in no distress and overweight  Mental status - alert, oriented to person, place, and time  Neck - supple, no significant adenopathy, no JVD, or carotid bruits  Chest - clear to auscultation, no wheezes, rales or rhonchi, symmetric air entry  Heart - normal rate, regular rhythm, normal S1, S2, no murmurs, rubs, clicks or gallops  Abdomen - soft, nontender, nondistended, no masses or organomegaly  protuberant  Neurological - alert, oriented, normal speech, no focal findings or movement disorder noted  Musculoskeletal - no joint tenderness, deformity or swelling  Extremities - legs puffy, do not pit; lower legs red, chronic;  Distal pulses not palpable  Skin - legs red; slow return color when blanched          Diagnosis Orders   1. Coronary artery disease involving native coronary artery of native heart without angina pectoris     2. CKD (chronic kidney disease) stage 3, GFR 30-59 ml/min     3. Claudication ([facility]     4. Essential hypertension     5. PAD (peripheral artery disease) ([facility] [facility]         No orders of the defined types were placed in this encounter. IMP/PLAN:  1. CAD stable  2.hyperlipemia-stable  3. PAD, s/p bypass; dopplers close to arm pressures  4.chronic recurrent leg edema-  5. Advised to contact Dr Anna Marie Pringle for fu  6. Suspect leg sxs may be due to spinal stenosis    Will schedule follow up appointment in 6m. Continue medications at current doses. Signed By:  Melanie Sidhu M.D.

## 2018-09-18 NOTE — PROGRESS NOTES
[facility]                                                 Pulmonary and sleep medicine clinic initial follow up note. Patient: Shahida Le  : 1940      [place]: Shahida Le is a 66 y. o.oldmale came for further evaluation regarding his abnormal chest Xray. He was recently admitted and discharged from [facility] for left lower lobe pneumonia. He supposed to have a follow up chest Xray to check his pneumonia before today's clinic visit. He forgot to have the chest Xray and came for follow up. He is currently not using any oxygen supplementation at rest, exercise or during sleep/at night time. No recent hospitalizations or emergency room visits after his discharge from [facility] ([facility] He  denies to TB exposure in the past.  He denies any history of exposure to patients with tuberculosis in the past. He denies any recent travel history to endemic places to tuberculosis. He denies any history of pulmonary diseases I.e bronchial asthma, COPD or pulmonary embolism in the past.      Sleeping habits:  Time to go to bed: 9:00  To 9:30    PM  Time to wake up: 6:00        AM    Sleep History:  Pt with history of: snoring and/or hypertension  Morning headache:No,   Dryness of mouth in the morning:No  Hx of snoring:Yes  Witnessed apneas:Yes  Excessive day time sleepiness:Yes- rarely.  See below for [place] score  Hypnogogic Hallucinations:NO  Hypnopompic Hallucinations:NO  Symptoms suggestive of Restless leg syndrome:NO  History of Seizures:NO  Sleep Walking:NO  Sleep Talking:NO  Sleep paralysis: NO  Cataplexy: NO      [place] Sleepiness Score:   Sitting and readin  Watching TV:0  Sitting inactive in a public place:0  Being a passenger in a motor vehicle for an hour or more:0  Lying down in the afternoon:3  Sitting and talking to someone:0  Sitting quietly after lunch (no alcohol):0  Stopped for a few minutes in traffic while drivin  Total Score:3    Neck Circumference - 18.5    Mallampati - 4      Social History:  Occupation:  He is current working: No  Type of profession: retired. He worked for [employer]. Retired: Yes. History of tobacco smoking:Yes  Amount of tobacco smokin.0 PPD. Years of tobacco smokin.                                    Quit smoking: Yes. Quit QHUN:6452   Current smoker: No.     . History of recreational or IV drug use in the past:No     History of exposure to coal mines/coal dust: No  History of exposure to foundry dust/welding: No  History of exposure to quarry/silica/sandblasting: No  History of exposure to asbestos/working with breaks/ships: No  History of exposure to farm dust: No  History of recent travel to long distances: No  History of exposure to birds, pigeons, or chickens in the past:No  Pet animals at home:No    History of pulmonary embolism in the past: No            History of DVT in the past:No            Review of Systems:   General/Constitutional:he lost `10lbs of weight in the last 2months with normal appetite. No fever or chills. HENT: Negative. Eyes: Negative. Upper respiratory tract: No nasal stuffiness or post nasal drip. Lower respiratory tract/ lungs: No cough or sputum production. No hemoptysis. Cardiovascular: No palpitations or chest pain. Gastrointestinal: No nausea or vomiting. Neurological: No focal neurologiacal weakness. Extremities: No edema. Musculoskeletal: No complaints. Genitourinary: No complaints. Hematological: Negative. Psychiatric/Behavioral: Negative. Skin: No itching.       Current Medications:      Past Medical History:   Diagnosis Date    AAA [facility]abdominal aortic aneurysm) ([facility] [address]     BCG ROXANE 2014    BPH (benign prostatic hypertrophy)     CAD (coronary artery disease)     Carotid artery stenosis     Chronic back pain     Chronic kidney disease     Congenital absence of one kidney     History of lumbar fusion 3/15    mid lower back down    Hyperlipidemia     Hypertension     Hypothyroidism     Obesity     TIA (transient ischemic attack) 1/2008    Unspecified cerebral artery occlusion with cerebral infarction     TIA       Past Surgical History:   Procedure Laterality Date    ABDOMINAL AORTIC ANEURYSM REPAIR  3/2010    Shireen    ABDOMINAL AORTIC ANEURYSM REPAIR  7/24/15    Carlos Alberto Rojas    COLONOSCOPY  12/21/11    MOE    CORONARY ANGIOPLASTY WITH STENT PLACEMENT  2003    2 stents    FEMORAL-FEMORAL BYPASS GRAFT  03/09/2017    Dr. Mane [place]    arm    KNEE ARTHROSCOPY  12/2008    meniscus tear    LUMBAR LAMINECTOMY  3/2016    [facility]--Dr. Felder [address] OTHER SURGICAL HISTORY  4/14/15    melanoma removal from right chest    OTHER SURGICAL HISTORY  5/20/2016    I and D, exicison of posterior neck cyst    SINUS SURGERY      SKIN CANCER EXCISION  10/2016    Dr Luis Quiñonez  1990's    TURP  04/16/2014    See note of Dr. Saul Russell for OR procedure details       No Known Allergies    Current Outpatient Prescriptions   Medication Sig Dispense Refill    amoxicillin (AMOXIL) 500 MG capsule Take 2,000 mg by mouth One hour prior to dental appt      furosemide (LASIX) 40 MG tablet Take 1 tablet by mouth daily TAKE 1 TABLET BY MOUTH DAILY.  90 tablet 3    metoprolol tartrate (LOPRESSOR) 25 MG tablet Take 1 tablet by mouth 2 times daily 180 tablet 3    albuterol sulfate HFA (PROAIR HFA) 108 (90 Base) MCG/ACT inhaler Inhale 2 puffs into the lungs every 6 hours as needed for Wheezing 1 Inhaler 0    levothyroxine (SYNTHROID) 137 MCG tablet TAKE 1 TABLET BY MOUTH DAILY 90 tablet 3    atorvastatin (LIPITOR) 40 MG tablet Take one tab po daily at bed time 30 tablet 11    KLOR-CON M10 10 MEQ extended release tablet TAKE 1 TABLET BY MOUTH DAILY 90 tablet 3    nitroGLYCERIN (NITROSTAT) 0.4 MG SL tablet Place 1 tablet under the tongue every 5 minutes as needed for None.      Echocardiogram:  Echo                              Assessment:  -Left lower lobe pneumonia due to CAP vs atypical pneumonia. S/p Therapy with antibiotics- clinically improved. -Hx of chronic tobacco smoking. He quit smoking in 1986.  -Hx of Left side pleuritic chest pain 2/2 infectious process bacterial infection vs viral -Improved  -CHF with grade 1 diastolic dysfunction. He follows with Dr. Bela Kumar.  -CAD (coronary artery disease) S/p Stents placement.  -Hypertension. -TIA (transient ischemic attack). -Hypothyroidism.  -Snoring and witnessed apneas, increased neck size and Class IV mallampati airway- need to evaluate for sleep apnea due to associated co morbidities.  -Hx of AAA repair. Recommendations/Plan:  - Schedule patient for nocturnal polysomnogram (Sleep study) at [facility] sleep lab. Patient educated to not to drive any motor vehicles or operate heavy equipment until sleep symptoms are under control. Patient verbalizes understanding. Patient to follow with my clinic at [address] [place] [address] 1week after sleep study.  -I had a discussion with patient regarding avialable treatment options for his sleep disorder breathing including but not limited to CPAP titration in the sleep lab Vs.Dental appliance placement with referral to a local dentist Vs other available surgical options including Uvulopalatopharyngoplasty, maxillomandibular ostomy and tracheostomy as last option. At the end of discussion, he is not decided on his   treatment if he found to have obstructive sleep apnea at this time. - Patient to have chest X-ray PA and Lateral views in 1 month to follow abnormal chest X-ray with hx of left lower lobe pneumonia. Chest Xray need to be done 2days before clinic visit.  -Schedule patient for full pulmonary function tests before clinic visit. - Schedule patient for follow up with my clinic in 1month with recommended tests. Patient advised to make early appointment if needed.    -He was advised to

## 2018-09-25 ENCOUNTER — OFFICE VISIT (OUTPATIENT)
Dept: PULMONOLOGY | Age: 78
End: 2018-09-25
Payer: MEDICARE

## 2018-09-25 ENCOUNTER — TELEPHONE (OUTPATIENT)
Dept: SLEEP CENTER | Age: 78
End: 2018-09-25

## 2018-09-25 VITALS
HEIGHT: 67 IN | SYSTOLIC BLOOD PRESSURE: 125 MMHG | TEMPERATURE: 97.1 F | OXYGEN SATURATION: 95 % | WEIGHT: 203.8 LBS | DIASTOLIC BLOOD PRESSURE: 73 MMHG | HEART RATE: 78 BPM | BODY MASS INDEX: 31.99 KG/M2

## 2018-09-25 DIAGNOSIS — I10 ESSENTIAL HYPERTENSION: ICD-10-CM

## 2018-09-25 DIAGNOSIS — G45.8 OTHER SPECIFIED TRANSIENT CEREBRAL ISCHEMIAS: ICD-10-CM

## 2018-09-25 DIAGNOSIS — R06.83 SNORING: ICD-10-CM

## 2018-09-25 DIAGNOSIS — R06.81 APNEA: ICD-10-CM

## 2018-09-25 DIAGNOSIS — I25.10 CORONARY ARTERY DISEASE INVOLVING NATIVE CORONARY ARTERY OF NATIVE HEART WITHOUT ANGINA PECTORIS: ICD-10-CM

## 2018-09-25 DIAGNOSIS — Z72.0 TOBACCO ABUSE: Primary | ICD-10-CM

## 2018-09-25 DIAGNOSIS — G47.30 SLEEP APNEA, UNSPECIFIED TYPE: ICD-10-CM

## 2018-09-25 PROCEDURE — 99215 OFFICE O/P EST HI 40 MIN: CPT | Performed by: INTERNAL MEDICINE

## 2018-10-01 ENCOUNTER — HOSPITAL ENCOUNTER (OUTPATIENT)
Dept: PULMONOLOGY | Age: 78
Discharge: HOME | End: 2018-10-01
Payer: MEDICARE

## 2018-10-01 ENCOUNTER — HOSPITAL ENCOUNTER (OUTPATIENT)
Age: 78
Discharge: HOME | End: 2018-10-01
Payer: MEDICARE

## 2018-10-01 ENCOUNTER — HOSPITAL ENCOUNTER (OUTPATIENT)
Dept: GENERAL RADIOLOGY | Age: 78
Discharge: HOME | End: 2018-10-01
Payer: MEDICARE

## 2018-10-01 DIAGNOSIS — G45.8 OTHER SPECIFIED TRANSIENT CEREBRAL ISCHEMIAS: ICD-10-CM

## 2018-10-01 DIAGNOSIS — R06.83 SNORING: ICD-10-CM

## 2018-10-01 DIAGNOSIS — Z72.0 TOBACCO ABUSE: ICD-10-CM

## 2018-10-01 DIAGNOSIS — R06.81 APNEA: ICD-10-CM

## 2018-10-01 DIAGNOSIS — G47.30 SLEEP APNEA, UNSPECIFIED TYPE: ICD-10-CM

## 2018-10-01 DIAGNOSIS — I10 ESSENTIAL HYPERTENSION: ICD-10-CM

## 2018-10-01 DIAGNOSIS — I25.10 CORONARY ARTERY DISEASE INVOLVING NATIVE CORONARY ARTERY OF NATIVE HEART WITHOUT ANGINA PECTORIS: ICD-10-CM

## 2018-10-01 PROCEDURE — 71046 X-RAY EXAM CHEST 2 VIEWS: CPT

## 2018-10-01 PROCEDURE — 94060 EVALUATION OF WHEEZING: CPT

## 2018-10-01 PROCEDURE — 94729 DIFFUSING CAPACITY: CPT

## 2018-10-01 PROCEDURE — 94726 PLETHYSMOGRAPHY LUNG VOLUMES: CPT

## 2018-10-17 ENCOUNTER — OFFICE VISIT (OUTPATIENT)
Dept: PULMONOLOGY | Age: 78
End: 2018-10-17
Payer: MEDICARE

## 2018-10-17 VITALS
DIASTOLIC BLOOD PRESSURE: 70 MMHG | BODY MASS INDEX: 31.99 KG/M2 | SYSTOLIC BLOOD PRESSURE: 139 MMHG | OXYGEN SATURATION: 92 % | HEART RATE: 84 BPM | WEIGHT: 203.8 LBS | HEIGHT: 67 IN | TEMPERATURE: 97.1 F

## 2018-10-17 DIAGNOSIS — Z87.891 FORMER SMOKER, STOPPED SMOKING IN DISTANT PAST: ICD-10-CM

## 2018-10-17 DIAGNOSIS — J43.2 CENTRILOBULAR EMPHYSEMA (HCC): Primary | ICD-10-CM

## 2018-10-17 DIAGNOSIS — G47.30 SLEEP APNEA, UNSPECIFIED TYPE: ICD-10-CM

## 2018-10-17 PROCEDURE — 99214 OFFICE O/P EST MOD 30 MIN: CPT | Performed by: NURSE PRACTITIONER

## 2018-10-17 ASSESSMENT — ENCOUNTER SYMPTOMS
COUGH: 0
CHEST TIGHTNESS: 0
SHORTNESS OF BREATH: 0
ALLERGIC/IMMUNOLOGIC NEGATIVE: 1
NAUSEA: 0
BACK PAIN: 0
DIARRHEA: 0
EYES NEGATIVE: 1
STRIDOR: 0
WHEEZING: 0

## 2018-10-17 NOTE — PROGRESS NOTES
discussion he refused to go for the sleep test at this time. He verbalizes understanding of consequences of his decision including non diagnosis of obstructive sleep apnea resulting in increased morbidity and mortality with cerebro and cardiovascular events including death.  He verbalizes understanding.    -diffusion capacity decreased and c/w emphysema which is seen on last CT chest 5/2018.  -patient feels good, feels that he has other more pressing health issues to focus us, will follow up PRN     Will see Dread kahn PRN   Electronically signed by DEMAR Llanos CNP on 10/17/2018 at 1:02 PM  10/17/2018

## 2018-10-22 ENCOUNTER — PROCEDURE VISIT (OUTPATIENT)
Dept: UROLOGY | Age: 78
End: 2018-10-22
Payer: MEDICARE

## 2018-10-22 ENCOUNTER — TELEPHONE (OUTPATIENT)
Dept: UROLOGY | Age: 78
End: 2018-10-22

## 2018-10-22 VITALS
DIASTOLIC BLOOD PRESSURE: 88 MMHG | WEIGHT: 204.3 LBS | SYSTOLIC BLOOD PRESSURE: 138 MMHG | BODY MASS INDEX: 32.07 KG/M2 | HEIGHT: 67 IN

## 2018-10-22 DIAGNOSIS — Z01.818 PREOP TESTING: ICD-10-CM

## 2018-10-22 DIAGNOSIS — C67.9 MALIGNANT NEOPLASM OF URINARY BLADDER, UNSPECIFIED SITE (HCC): Primary | ICD-10-CM

## 2018-10-22 LAB
BILIRUBIN URINE: NEGATIVE
BLOOD URINE, POC: ABNORMAL
CHARACTER, URINE: CLEAR
COLOR, URINE: YELLOW
GLUCOSE URINE: NEGATIVE MG/DL
KETONES, URINE: NEGATIVE
LEUKOCYTE CLUMPS, URINE: NEGATIVE
NITRITE, URINE: NEGATIVE
PH, URINE: 7
PROTEIN, URINE: ABNORMAL MG/DL
SPECIFIC GRAVITY, URINE: 1.02 (ref 1–1.03)
UROBILINOGEN, URINE: 0.2 EU/DL

## 2018-10-22 PROCEDURE — 81003 URINALYSIS AUTO W/O SCOPE: CPT | Performed by: UROLOGY

## 2018-10-22 PROCEDURE — 52000 CYSTOURETHROSCOPY: CPT | Performed by: UROLOGY

## 2018-10-22 PROCEDURE — 99999 PR OFFICE/OUTPT VISIT,PROCEDURE ONLY: CPT | Performed by: UROLOGY

## 2018-10-22 NOTE — PROGRESS NOTES
Mr. Octaviano Sandifer was seen in follow up for surveillance cystoscopy. This was completed today without difficulty    Past Medical History:   Diagnosis Date    AAA (abdominal aortic aneurysm) ([address]     BCG ROXANE 5/21/2014    BPH (benign prostatic hypertrophy)     CAD (coronary artery disease)     Carotid artery stenosis     Chronic back pain     Chronic kidney disease     Congenital absence of one kidney     History of lumbar fusion 3/15    mid lower back down    Hyperlipidemia     Hypertension     Hypothyroidism     Obesity     TIA (transient ischemic attack) 1/2008    Unspecified cerebral artery occlusion with cerebral infarction     TIA       Past Surgical History:   Procedure Laterality Date    ABDOMINAL AORTIC ANEURYSM REPAIR  3/2010    Shireen    ABDOMINAL AORTIC ANEURYSM REPAIR  7/24/15    Almonte Cookey    COLONOSCOPY  12/21/11    RINESMIT    CORONARY ANGIOPLASTY WITH STENT PLACEMENT  2003    2 stents    FEMORAL-FEMORAL BYPASS GRAFT  03/09/2017    Dr. Gali Melton    arm    KNEE ARTHROSCOPY  12/2008    meniscus tear    LUMBAR LAMINECTOMY  3/2016    [facility]--Dr. Theo Pearce    OTHER SURGICAL HISTORY  4/14/15    melanoma removal from right chest    OTHER SURGICAL HISTORY  5/20/2016    I and D, exicison of posterior neck cyst    SINUS SURGERY      SKIN CANCER EXCISION  10/2016    Dr Baker Grade  1990's    TURP  04/16/2014    See note of Dr. Mirna Collazo for OR procedure details       Current Outpatient Prescriptions on File Prior to Visit   Medication Sig Dispense Refill    amoxicillin (AMOXIL) 500 MG capsule Take 2,000 mg by mouth One hour prior to dental appt      furosemide (LASIX) 40 MG tablet Take 1 tablet by mouth daily TAKE 1 TABLET BY MOUTH DAILY.  90 tablet 3    metoprolol tartrate (LOPRESSOR) 25 MG tablet Take 1 tablet by mouth 2 times daily 180 tablet 3    albuterol sulfate HFA (PROAIR HFA) 108 (90 Base) MCG/ACT inhaler Inhale 2 puffs into the lungs every 6

## 2018-10-23 ENCOUNTER — TELEPHONE (OUTPATIENT)
Dept: UROLOGY | Age: 78
End: 2018-10-23

## 2018-10-23 NOTE — TELEPHONE ENCOUNTER
SURGERY [address]  [address] [place] [address] [address] QING HOLLIDAY AM OFF[place] II.MICHELLE, [address]      Phone *[phone] *[phone]   Surgical Scheduling Direct Line Phone *[phone] Fax *[phone]      Jn Vernon 1940 male    [address] [facility]   Marital Status:          Home Phone: [phone]      Cell Phone:    No relevant phone numbers on file. Surgeon: Dr. Allison Monge  Surgery Date: 11-  Time: Yocasta Dick    Procedure: Transurethral resection of bladder tumor    Diagnosis: Bladder tumor/ History of bladder cancer     Important Medical History: In [facility]    Special Inst/Equip:     CPT Codes:    99542  Latex Allergy:  No     Cardiac Device:  No     Anesthesia:  Anesthesiologist (General/Spinal)          Admission Type:  Same Day                             Admit Prior to Day of Surgery: No    Case Location:  Main OR           Preadmission Testing:Phone Call              PAT Date and Time:______________________________________________________    PAT Confirmation #: ______________________________________________________    Post Op Visit: ___________________________________________________________    Need Preop Cardiac Clearance: No    Does Patient have Cardiologist/physician?      Clearance per Dr. Philip Anaya    Surgery Confirmation #: __________________________________________________    Mylinda Bring: ________________________   Date: __________________________     Insurance Company Name: [facility]

## 2018-10-24 ENCOUNTER — OFFICE VISIT (OUTPATIENT)
Dept: INTERNAL MEDICINE CLINIC | Age: 78
End: 2018-10-24
Payer: MEDICARE

## 2018-10-24 VITALS
DIASTOLIC BLOOD PRESSURE: 60 MMHG | SYSTOLIC BLOOD PRESSURE: 114 MMHG | BODY MASS INDEX: 32.49 KG/M2 | HEIGHT: 67 IN | OXYGEN SATURATION: 97 % | WEIGHT: 207 LBS

## 2018-10-24 DIAGNOSIS — Z01.818 PRE-OP TESTING: ICD-10-CM

## 2018-10-24 DIAGNOSIS — I10 ESSENTIAL HYPERTENSION: ICD-10-CM

## 2018-10-24 DIAGNOSIS — I25.10 CORONARY ARTERY DISEASE INVOLVING NATIVE CORONARY ARTERY OF NATIVE HEART WITHOUT ANGINA PECTORIS: Primary | Chronic | ICD-10-CM

## 2018-10-24 PROCEDURE — 99213 OFFICE O/P EST LOW 20 MIN: CPT | Performed by: INTERNAL MEDICINE

## 2018-10-24 PROCEDURE — 93000 ELECTROCARDIOGRAM COMPLETE: CPT | Performed by: INTERNAL MEDICINE

## 2018-10-27 LAB
ABSOLUTE BASO #: 0.1 K/UL (ref 0–0.1)
ABSOLUTE EOS #: 0.3 K/UL (ref 0.1–0.4)
ABSOLUTE LYMPH #: 3.5 K/UL (ref 0.8–5.2)
ABSOLUTE MONO #: 0.9 K/UL (ref 0.1–0.9)
ABSOLUTE NEUT #: 5.2 K/UL (ref 1.3–9.1)
ANION GAP SERPL CALCULATED.3IONS-SCNC: 12 MEQ/L (ref 10–19)
APPEARANCE: CLEAR
BASOPHILS RELATIVE PERCENT: 0.7 %
BILIRUBIN: NEGATIVE
BUN BLDV-MCNC: 16 MG/DL (ref 8–23)
CALCIUM SERPL-MCNC: 9.4 MG/DL (ref 8.5–10.5)
CHLORIDE BLD-SCNC: 101 MEQ/L (ref 95–107)
CO2: 30 MEQ/L (ref 19–31)
COLOR: YELLOW
CREAT SERPL-MCNC: 1.2 MG/DL (ref 0.8–1.4)
EGFR AFRICAN AMERICAN: 66.7 ML/MIN/1.73 M2
EGFR IF NONAFRICAN AMERICAN: 57.6 ML/MIN/1.73 M2
EOSINOPHILS RELATIVE PERCENT: 3.3 %
GLUCOSE BLD-MCNC: NEGATIVE MG/DL
GLUCOSE: 91 MG/DL (ref 70–99)
HCT VFR BLD CALC: 51.1 % (ref 41.4–51)
HEMOGLOBIN: 16.7 G/DL (ref 13.8–17)
KETONES, URINE: NEGATIVE
LEUKOCYTE ESTERASE, URINE: NEGATIVE
LYMPHOCYTE %: 34.8 %
MCH RBC QN AUTO: 29.3 PG (ref 27–34)
MCHC RBC AUTO-ENTMCNC: 32.7 G/DL (ref 31–36)
MCV RBC AUTO: 89.6 FL (ref 80–100)
MONOCYTES # BLD: 9.3 %
NEUTROPHILS RELATIVE PERCENT: 51.6 %
NITRITE, URINE: NEGATIVE
OCCULT BLOOD,URINE: NEGATIVE
PDW BLD-RTO: 12.8 % (ref 10.8–14.8)
PH: 6.5 (ref 5–9)
PLATELETS: 257 K/UL (ref 150–450)
POTASSIUM SERPL-SCNC: 4.8 MEQ/L (ref 3.5–5.4)
PROTEIN, URINE: NEGATIVE
RBC: 5.7 M/UL (ref 4–5.5)
SODIUM BLD-SCNC: 143 MEQ/L (ref 135–146)
SP GRAVITY MISCELLANEOUS: 1.01 (ref 1–1.03)
UROBILINOGEN, URINE: NORMAL
WBC: 10.1 K/UL (ref 3.7–10.8)

## 2018-10-30 ENCOUNTER — TELEPHONE (OUTPATIENT)
Dept: UROLOGY | Age: 78
End: 2018-10-30

## 2018-11-01 NOTE — PROGRESS NOTES
In preparation for their surgical procedure above patient was screened for Obstructive Sleep Apnea (CHICHI) using the STOP-Bang Questionnaire by the Pre-Admission Testing department. This is a pre-surgical screening tool for patient safety and serves as a recommendation, this WILL NOT cause cancellation of surgery. STOP-Bang Questionnaire  * Do you currently see a pulmonologist?  No     If yes STOP, do not complete. }.    1.  Do you snore loudly (able to be heard in the next room)? Yes    2. Do you often feel tired or sleepy during the daytime? No       3. Has anyone ever told you that you stop breathing during your sleep? No    4. Do you have or are you being treated for high blood pressure? Yes      5. BMI more than 35? BMI (Calculated): 31.4        No    6. Age over 48 years? 66 y.o. Yes    7. Neck Circumference greater than 17 inches for male or 16 inches for female? Measured           (visits only)            Not Applicable    8. Gender Male? Yes      TOTAL SCORE: 4    CHICHI - Low Risk : Yes to 0 - 2 questions  CHICHI - Intermediate Risk : Yes to 3 - 4 questions  CHICHI - High Risk : Yes to 5 - 8 questions    Adapted from:   STOP Questionnaire: A Tool to Screen Patients for Obstructive Sleep Apnea   [facility] Keane[employer] Anali Harrell M.B.B.S., Asim Fraire M.D., Eula Pulido. Venessa Perez, Ph.D., Edy Anderson M.B.B.S., Bigg Draper M.Sc., Hi Carmona M.D., Suman Mccauley. [facility] Lamb[facility]    Anesthesiology 2008; 739:533-90 Copyright 2008, the [address] of Anesthesiologists, [facility].   ----------------------------------------------------------------------------------------------------------------

## 2018-11-05 NOTE — H&P
Social History Narrative    No narrative on file            Review of Systems  No problems with ears, nose or throat. No problems with eyes. No chest pain, shortness of breath, abdominal pain, extremity pain or weakness, and no neurological deficits. No rashes. No swollen glands or lymph nodes.  symptoms per HPI. The remainder of the review of symptoms is negative.     Exam  General: alert and oriented. Cooperative. HENT: Normocephalic, Atraumatic. Eyes: No scleral icterus, mucous membranes moist.  Respiratory: Effort normal.   Skin: No rashes or obvious lesions.     Labs     Results for POC orders placed in visit on 10/22/18   POCT Urinalysis No Micro (Auto)   Result Value Ref Range     Glucose, Ur Negative NEGATIVE mg/dl     Bilirubin Urine Negative       Ketones, Urine Negative NEGATIVE     Specific Gravity, Urine 1.020 1.002 - 1.03     Blood, UA POC Small (A) NEGATIVE     pH, Urine 7.00 5.0 - 9.0     Protein, Urine Trace (A) NEGATIVE mg/dl     Urobilinogen, Urine 0.20 0.0 - 1.0 eu/dl     Nitrite, Urine Negative NEGATIVE     Leukocyte Clumps, Urine Negative NEGATIVE     Color, Urine Yellow YELLOW-STR     Character, Urine Clear CLR-SL.[id number]               Lab Results   Component Value Date     CREATININE 1.0 05/27/2018     BUN 19 05/27/2018      05/27/2018     K 4.7 05/27/2018     CL 99 05/27/2018     CO2 27 05/27/2018               Lab Results   Component Value Date     PSA 0.70 07/25/2011     PSA 0.83 10/31/2008         Cystoscopy  After obtaining informed consent and prepping the urethral meatus, a 16-Danish flexible cystoscope was passed per urethra into the bladder. The urethra was evaluated on the way in and then again on the way out and was found to be normal.  The prostate was moderately  obstructing. The bladder was evaluated in its endoscopic entirety and found to have a large bladder tumor. There were no stones, ulcers or foreign bodies. There were no mucosal abnormalities.   The

## 2018-11-08 ENCOUNTER — ANESTHESIA (OUTPATIENT)
Dept: OPERATING ROOM | Age: 78
End: 2018-11-08
Payer: MEDICARE

## 2018-11-08 ENCOUNTER — HOSPITAL ENCOUNTER (OUTPATIENT)
Age: 78
Discharge: HOME | End: 2018-11-09
Attending: UROLOGY | Admitting: UROLOGY
Payer: MEDICARE

## 2018-11-08 ENCOUNTER — ANESTHESIA EVENT (OUTPATIENT)
Dept: OPERATING ROOM | Age: 78
End: 2018-11-08
Payer: MEDICARE

## 2018-11-08 VITALS
SYSTOLIC BLOOD PRESSURE: 107 MMHG | DIASTOLIC BLOOD PRESSURE: 62 MMHG | OXYGEN SATURATION: 97 % | RESPIRATION RATE: 1 BRPM

## 2018-11-08 DIAGNOSIS — C67.9 MALIGNANT NEOPLASM OF URINARY BLADDER, UNSPECIFIED SITE (HCC): Primary | ICD-10-CM

## 2018-11-08 PROBLEM — D49.4 BLADDER TUMOR: Status: ACTIVE | Noted: 2018-11-08

## 2018-11-08 LAB — POTASSIUM SERPL-SCNC: 3.9 MEQ/L (ref 3.5–5.2)

## 2018-11-08 PROCEDURE — 36415 COLL VENOUS BLD VENIPUNCTURE: CPT

## 2018-11-08 PROCEDURE — 52240 CYSTOSCOPY AND TREATMENT: CPT | Performed by: UROLOGY

## 2018-11-08 PROCEDURE — 6360000002 HC RX W HCPCS: Performed by: ANESTHESIOLOGY

## 2018-11-08 PROCEDURE — 3600000013 HC SURGERY LEVEL 3 ADDTL 15MIN: Performed by: UROLOGY

## 2018-11-08 PROCEDURE — 7100000001 HC PACU RECOVERY - ADDTL 15 MIN: Performed by: UROLOGY

## 2018-11-08 PROCEDURE — 2500000003 HC RX 250 WO HCPCS: Performed by: ANESTHESIOLOGY

## 2018-11-08 PROCEDURE — 2580000003 HC RX 258

## 2018-11-08 PROCEDURE — 6360000002 HC RX W HCPCS: Performed by: NURSE ANESTHETIST, CERTIFIED REGISTERED

## 2018-11-08 PROCEDURE — 3700000000 HC ANESTHESIA ATTENDED CARE: Performed by: UROLOGY

## 2018-11-08 PROCEDURE — 3700000001 HC ADD 15 MINUTES (ANESTHESIA): Performed by: UROLOGY

## 2018-11-08 PROCEDURE — 6360000002 HC RX W HCPCS: Performed by: NURSE PRACTITIONER

## 2018-11-08 PROCEDURE — 3600000003 HC SURGERY LEVEL 3 BASE: Performed by: UROLOGY

## 2018-11-08 PROCEDURE — 2709999900 HC NON-CHARGEABLE SUPPLY

## 2018-11-08 PROCEDURE — 2500000003 HC RX 250 WO HCPCS: Performed by: NURSE ANESTHETIST, CERTIFIED REGISTERED

## 2018-11-08 PROCEDURE — 88307 TISSUE EXAM BY PATHOLOGIST: CPT

## 2018-11-08 PROCEDURE — 6360000002 HC RX W HCPCS

## 2018-11-08 PROCEDURE — 7100000000 HC PACU RECOVERY - FIRST 15 MIN: Performed by: UROLOGY

## 2018-11-08 PROCEDURE — 2709999900 HC NON-CHARGEABLE SUPPLY: Performed by: UROLOGY

## 2018-11-08 PROCEDURE — 6370000000 HC RX 637 (ALT 250 FOR IP): Performed by: NURSE PRACTITIONER

## 2018-11-08 PROCEDURE — 84132 ASSAY OF SERUM POTASSIUM: CPT

## 2018-11-08 RX ORDER — CIPROFLOXACIN 500 MG/1
500 TABLET, FILM COATED ORAL 2 TIMES DAILY
Qty: 10 TABLET | Refills: 0 | Status: SHIPPED | OUTPATIENT
Start: 2018-11-08 | End: 2018-11-13

## 2018-11-08 RX ORDER — FENTANYL CITRATE 50 UG/ML
50 INJECTION, SOLUTION INTRAMUSCULAR; INTRAVENOUS EVERY 5 MIN PRN
Status: DISCONTINUED | OUTPATIENT
Start: 2018-11-08 | End: 2018-11-08 | Stop reason: HOSPADM

## 2018-11-08 RX ORDER — KETOROLAC TROMETHAMINE 30 MG/ML
15 INJECTION, SOLUTION INTRAMUSCULAR; INTRAVENOUS EVERY 6 HOURS PRN
Status: ACTIVE | OUTPATIENT
Start: 2018-11-08 | End: 2018-11-08

## 2018-11-08 RX ORDER — PROPOFOL 10 MG/ML
INJECTION, EMULSION INTRAVENOUS PRN
Status: DISCONTINUED | OUTPATIENT
Start: 2018-11-08 | End: 2018-11-08 | Stop reason: SDUPTHER

## 2018-11-08 RX ORDER — LIDOCAINE HYDROCHLORIDE 20 MG/ML
INJECTION, SOLUTION INFILTRATION; PERINEURAL PRN
Status: DISCONTINUED | OUTPATIENT
Start: 2018-11-08 | End: 2018-11-08 | Stop reason: SDUPTHER

## 2018-11-08 RX ORDER — CIPROFLOXACIN 2 MG/ML
400 INJECTION, SOLUTION INTRAVENOUS
Status: COMPLETED | OUTPATIENT
Start: 2018-11-08 | End: 2018-11-08

## 2018-11-08 RX ORDER — ATROPA BELLADONNA AND OPIUM 16.2; 3 MG/1; MG/1
30 SUPPOSITORY RECTAL EVERY 8 HOURS PRN
Status: DISCONTINUED | OUTPATIENT
Start: 2018-11-08 | End: 2018-11-09 | Stop reason: HOSPADM

## 2018-11-08 RX ORDER — HYDROCODONE BITARTRATE AND ACETAMINOPHEN 5; 325 MG/1; MG/1
1 TABLET ORAL EVERY 4 HOURS PRN
Status: DISCONTINUED | OUTPATIENT
Start: 2018-11-08 | End: 2018-11-09 | Stop reason: HOSPADM

## 2018-11-08 RX ORDER — MORPHINE SULFATE 2 MG/ML
2 INJECTION, SOLUTION INTRAMUSCULAR; INTRAVENOUS
Status: DISCONTINUED | OUTPATIENT
Start: 2018-11-08 | End: 2018-11-09 | Stop reason: HOSPADM

## 2018-11-08 RX ORDER — MORPHINE SULFATE 2 MG/ML
4 INJECTION, SOLUTION INTRAMUSCULAR; INTRAVENOUS
Status: DISCONTINUED | OUTPATIENT
Start: 2018-11-08 | End: 2018-11-09 | Stop reason: HOSPADM

## 2018-11-08 RX ORDER — SODIUM CHLORIDE 9 MG/ML
INJECTION, SOLUTION INTRAVENOUS CONTINUOUS
Status: DISCONTINUED | OUTPATIENT
Start: 2018-11-08 | End: 2018-11-09

## 2018-11-08 RX ORDER — OXYBUTYNIN CHLORIDE 5 MG/1
5 TABLET ORAL 3 TIMES DAILY
Status: DISCONTINUED | OUTPATIENT
Start: 2018-11-09 | End: 2018-11-09 | Stop reason: HOSPADM

## 2018-11-08 RX ORDER — ONDANSETRON 2 MG/ML
4 INJECTION INTRAMUSCULAR; INTRAVENOUS
Status: DISCONTINUED | OUTPATIENT
Start: 2018-11-08 | End: 2018-11-08 | Stop reason: HOSPADM

## 2018-11-08 RX ORDER — ONDANSETRON 2 MG/ML
4 INJECTION INTRAMUSCULAR; INTRAVENOUS EVERY 6 HOURS PRN
Status: DISCONTINUED | OUTPATIENT
Start: 2018-11-08 | End: 2018-11-09 | Stop reason: HOSPADM

## 2018-11-08 RX ORDER — HYDROCODONE BITARTRATE AND ACETAMINOPHEN 5; 325 MG/1; MG/1
2 TABLET ORAL EVERY 4 HOURS PRN
Status: DISCONTINUED | OUTPATIENT
Start: 2018-11-08 | End: 2018-11-09 | Stop reason: HOSPADM

## 2018-11-08 RX ORDER — OXYBUTYNIN CHLORIDE 5 MG/1
5 TABLET ORAL 3 TIMES DAILY PRN
Qty: 30 TABLET | Refills: 0 | Status: SHIPPED | OUTPATIENT
Start: 2018-11-08 | End: 2018-12-13

## 2018-11-08 RX ORDER — LABETALOL HYDROCHLORIDE 5 MG/ML
5 INJECTION, SOLUTION INTRAVENOUS EVERY 10 MIN PRN
Status: DISCONTINUED | OUTPATIENT
Start: 2018-11-08 | End: 2018-11-08 | Stop reason: HOSPADM

## 2018-11-08 RX ORDER — FENTANYL CITRATE 50 UG/ML
INJECTION, SOLUTION INTRAMUSCULAR; INTRAVENOUS PRN
Status: DISCONTINUED | OUTPATIENT
Start: 2018-11-08 | End: 2018-11-08 | Stop reason: SDUPTHER

## 2018-11-08 RX ORDER — ACETAMINOPHEN 325 MG/1
650 TABLET ORAL EVERY 4 HOURS PRN
Status: DISCONTINUED | OUTPATIENT
Start: 2018-11-08 | End: 2018-11-09 | Stop reason: HOSPADM

## 2018-11-08 RX ORDER — HYDROCODONE BITARTRATE AND ACETAMINOPHEN 5; 325 MG/1; MG/1
1 TABLET ORAL EVERY 4 HOURS PRN
Qty: 10 TABLET | Refills: 0 | Status: SHIPPED | OUTPATIENT
Start: 2018-11-08 | End: 2018-11-11

## 2018-11-08 RX ORDER — MEPERIDINE HYDROCHLORIDE 25 MG/ML
12.5 INJECTION INTRAMUSCULAR; INTRAVENOUS; SUBCUTANEOUS EVERY 5 MIN PRN
Status: DISCONTINUED | OUTPATIENT
Start: 2018-11-08 | End: 2018-11-08 | Stop reason: HOSPADM

## 2018-11-08 RX ORDER — ONDANSETRON 2 MG/ML
INJECTION INTRAMUSCULAR; INTRAVENOUS PRN
Status: DISCONTINUED | OUTPATIENT
Start: 2018-11-08 | End: 2018-11-08 | Stop reason: SDUPTHER

## 2018-11-08 RX ADMIN — ATROPA BELLADONNA AND OPIUM 30 MG: 16.2; 3 SUPPOSITORY RECTAL at 19:07

## 2018-11-08 RX ADMIN — CIPROFLOXACIN 400 MG: 2 INJECTION, SOLUTION INTRAVENOUS at 18:10

## 2018-11-08 RX ADMIN — FENTANYL CITRATE 25 MCG: 50 INJECTION INTRAMUSCULAR; INTRAVENOUS at 18:25

## 2018-11-08 RX ADMIN — ONDANSETRON HYDROCHLORIDE 4 MG: 4 INJECTION, SOLUTION INTRAMUSCULAR; INTRAVENOUS at 18:27

## 2018-11-08 RX ADMIN — LIDOCAINE HYDROCHLORIDE 60 MG: 20 INJECTION, SOLUTION INFILTRATION; PERINEURAL at 18:00

## 2018-11-08 RX ADMIN — FENTANYL CITRATE 25 MCG: 50 INJECTION INTRAMUSCULAR; INTRAVENOUS at 18:17

## 2018-11-08 RX ADMIN — PROPOFOL 150 MG: 10 INJECTION, EMULSION INTRAVENOUS at 18:00

## 2018-11-08 RX ADMIN — LABETALOL 20 MG/4 ML (5 MG/ML) INTRAVENOUS SYRINGE 5 MG: at 18:53

## 2018-11-08 RX ADMIN — SODIUM CHLORIDE: 9 INJECTION, SOLUTION INTRAVENOUS at 14:05

## 2018-11-08 RX ADMIN — FENTANYL CITRATE 50 MCG: 50 INJECTION INTRAMUSCULAR; INTRAVENOUS at 19:11

## 2018-11-08 RX ADMIN — FENTANYL CITRATE 50 MCG: 50 INJECTION INTRAMUSCULAR; INTRAVENOUS at 19:25

## 2018-11-08 RX ADMIN — FENTANYL CITRATE 25 MCG: 50 INJECTION INTRAMUSCULAR; INTRAVENOUS at 18:10

## 2018-11-08 RX ADMIN — PROPOFOL 50 MG: 10 INJECTION, EMULSION INTRAVENOUS at 18:02

## 2018-11-08 RX ADMIN — LABETALOL 20 MG/4 ML (5 MG/ML) INTRAVENOUS SYRINGE 5 MG: at 19:03

## 2018-11-08 RX ADMIN — MORPHINE SULFATE 4 MG: 2 INJECTION, SOLUTION INTRAMUSCULAR; INTRAVENOUS at 21:22

## 2018-11-08 RX ADMIN — FENTANYL CITRATE 25 MCG: 50 INJECTION INTRAMUSCULAR; INTRAVENOUS at 18:40

## 2018-11-08 ASSESSMENT — PULMONARY FUNCTION TESTS
PIF_VALUE: 2
PIF_VALUE: 2
PIF_VALUE: 15
PIF_VALUE: 2
PIF_VALUE: 13
PIF_VALUE: 1
PIF_VALUE: 3
PIF_VALUE: 2
PIF_VALUE: 9
PIF_VALUE: 0
PIF_VALUE: 21
PIF_VALUE: 4
PIF_VALUE: 24
PIF_VALUE: 2
PIF_VALUE: 26
PIF_VALUE: 14
PIF_VALUE: 20
PIF_VALUE: 2
PIF_VALUE: 3
PIF_VALUE: 15
PIF_VALUE: 1
PIF_VALUE: 2
PIF_VALUE: 2
PIF_VALUE: 1
PIF_VALUE: 1
PIF_VALUE: 13
PIF_VALUE: 2
PIF_VALUE: 2
PIF_VALUE: 0
PIF_VALUE: 0
PIF_VALUE: 3
PIF_VALUE: 2
PIF_VALUE: 14
PIF_VALUE: 0
PIF_VALUE: 13
PIF_VALUE: 14
PIF_VALUE: 2
PIF_VALUE: 0
PIF_VALUE: 3
PIF_VALUE: 13
PIF_VALUE: 2
PIF_VALUE: 1
PIF_VALUE: 2
PIF_VALUE: 2

## 2018-11-08 ASSESSMENT — PAIN SCALES - GENERAL
PAINLEVEL_OUTOF10: 7
PAINLEVEL_OUTOF10: 0
PAINLEVEL_OUTOF10: 7
PAINLEVEL_OUTOF10: 10

## 2018-11-08 ASSESSMENT — PAIN DESCRIPTION - LOCATION
LOCATION: ABDOMEN
LOCATION: ABDOMEN

## 2018-11-08 ASSESSMENT — PAIN DESCRIPTION - PAIN TYPE
TYPE: SURGICAL PAIN
TYPE: SURGICAL PAIN

## 2018-11-08 NOTE — INTERVAL H&P NOTE
[address] [address]  History and Physical Update    Pt Name: Yelena Stubbs  MRN: [id number]  YOB: 1940  Date of evaluation: 11/8/2018    [] I have examined the patient and reviewed the H&P/Consult and there are no changes to the patient or plans. [x] I have examined the patient and reviewed the H&P/Consult and have noted the following changes: No changes per patient.         Dmitriy Garcia MD  Electronically signed 11/8/2018 at 2:29 PM

## 2018-11-08 NOTE — ANESTHESIA PRE PROCEDURE
Department of Anesthesiology  Preprocedure Note       Name:  Nirmal Lee   Age:  66 y.o.  :  1940                                          MRN:  [id number]         Date:  2018      Surgeon: Leticia Joya):  Eliza Turcios MD    Procedure: TURBT (N/A Bladder)    Medications prior to admission:   Prior to Admission medications    Medication Sig Start Date End Date Taking? Authorizing Provider   ciprofloxacin (CIPRO) 500 MG tablet Take 1 tablet by mouth 2 times daily for 5 days 18 Yes DEMAR Merrill CNP   oxybutynin (DITROPAN) 5 MG tablet Take 1 tablet by mouth 3 times daily as needed (stent pain, bladder spasms) 18  Yes DEMAR Merrill CNP   HYDROcodone-acetaminophen (NORCO) 5-325 MG per tablet Take 1 tablet by mouth every 4 hours as needed for Pain for up to 3 days. . 18 Yes DEMAR Merrill CNP   amoxicillin (AMOXIL) 500 MG capsule Take 2,000 mg by mouth One hour prior to dental appt   Yes Historical Provider, MD   furosemide (LASIX) 40 MG tablet Take 1 tablet by mouth daily TAKE 1 TABLET BY MOUTH DAILY. 18  Yes Jared Rosa MD   metoprolol tartrate (LOPRESSOR) 25 MG tablet Take 1 tablet by mouth 2 times daily 18  Yes Jared Rosa MD   levothyroxine (SYNTHROID) 137 MCG tablet TAKE 1 TABLET BY MOUTH DAILY 3/19/18  Yes Jared Rosa MD   atorvastatin (LIPITOR) 40 MG tablet Take one tab po daily at bed time 18 Yes Jared Rosa MD   KLOR-CON M10 10 MEQ extended release tablet TAKE 1 TABLET BY MOUTH DAILY 18  Yes Jared Rosa MD   nitroGLYCERIN (NITROSTAT) 0.4 MG SL tablet Place 1 tablet under the tongue every 5 minutes as needed for Chest pain 17  Yes Jared Rosa MD   aspirin 81 MG tablet Take 81 mg by mouth daily.    Yes Historical Provider, MD   Elastic Bandages & Supports (JOBST KNEE HIGH COMPRESSION SM) MISC 1 each by Does not apply route daily as needed 16   Buzzruddy BeatDEMAR CNP   Handicap Placard

## 2018-11-09 ENCOUNTER — APPOINTMENT (OUTPATIENT)
Dept: ULTRASOUND IMAGING | Age: 78
End: 2018-11-09
Attending: UROLOGY
Payer: MEDICARE

## 2018-11-09 ENCOUNTER — TELEPHONE (OUTPATIENT)
Dept: FAMILY MEDICINE CLINIC | Age: 78
End: 2018-11-09

## 2018-11-09 VITALS
TEMPERATURE: 96.8 F | BODY MASS INDEX: 31.71 KG/M2 | HEIGHT: 67 IN | OXYGEN SATURATION: 93 % | RESPIRATION RATE: 18 BRPM | HEART RATE: 99 BPM | SYSTOLIC BLOOD PRESSURE: 137 MMHG | WEIGHT: 202 LBS | DIASTOLIC BLOOD PRESSURE: 63 MMHG

## 2018-11-09 PROBLEM — N48.89 PAIN IN PENIS: Status: ACTIVE | Noted: 2018-11-09

## 2018-11-09 LAB
ALBUMIN SERPL-MCNC: 3.5 G/DL (ref 3.5–5.1)
ALP BLD-CCNC: 100 U/L (ref 38–126)
ALT SERPL-CCNC: 19 U/L (ref 11–66)
ANION GAP SERPL CALCULATED.3IONS-SCNC: 12 MEQ/L (ref 8–16)
AST SERPL-CCNC: 24 U/L (ref 5–40)
BILIRUB SERPL-MCNC: 1.6 MG/DL (ref 0.3–1.2)
BUN BLDV-MCNC: 14 MG/DL (ref 7–22)
CALCIUM SERPL-MCNC: 8.6 MG/DL (ref 8.5–10.5)
CHLORIDE BLD-SCNC: 104 MEQ/L (ref 98–111)
CO2: 23 MEQ/L (ref 23–33)
CREAT SERPL-MCNC: 0.9 MG/DL (ref 0.4–1.2)
ERYTHROCYTE [DISTWIDTH] IN BLOOD BY AUTOMATED COUNT: 13.2 % (ref 11.5–14.5)
ERYTHROCYTE [DISTWIDTH] IN BLOOD BY AUTOMATED COUNT: 45.3 FL (ref 35–45)
GFR SERPL CREATININE-BSD FRML MDRD: 81 ML/MIN/1.73M2
GLUCOSE BLD-MCNC: 105 MG/DL (ref 70–108)
HCT VFR BLD CALC: 47.7 % (ref 42–52)
HEMOGLOBIN: 15.6 GM/DL (ref 14–18)
MCH RBC QN AUTO: 30.6 PG (ref 26–33)
MCHC RBC AUTO-ENTMCNC: 32.7 GM/DL (ref 32.2–35.5)
MCV RBC AUTO: 93.7 FL (ref 80–94)
PLATELET # BLD: 190 THOU/MM3 (ref 130–400)
PMV BLD AUTO: 9.7 FL (ref 9.4–12.4)
POTASSIUM SERPL-SCNC: 4.6 MEQ/L (ref 3.5–5.2)
RBC # BLD: 5.09 MILL/MM3 (ref 4.7–6.1)
SODIUM BLD-SCNC: 139 MEQ/L (ref 135–145)
TOTAL PROTEIN: 6.1 G/DL (ref 6.1–8)
TROPONIN T: < 0.01 NG/ML
WBC # BLD: 13.7 THOU/MM3 (ref 4.8–10.8)

## 2018-11-09 PROCEDURE — 84484 ASSAY OF TROPONIN QUANT: CPT

## 2018-11-09 PROCEDURE — 99217 PR OBSERVATION CARE DISCHARGE MANAGEMENT: CPT | Performed by: NURSE PRACTITIONER

## 2018-11-09 PROCEDURE — 6370000000 HC RX 637 (ALT 250 FOR IP): Performed by: NURSE PRACTITIONER

## 2018-11-09 PROCEDURE — 2580000003 HC RX 258: Performed by: NURSE PRACTITIONER

## 2018-11-09 PROCEDURE — 76775 US EXAM ABDO BACK WALL LIM: CPT

## 2018-11-09 PROCEDURE — 2709999900 HC NON-CHARGEABLE SUPPLY

## 2018-11-09 PROCEDURE — 51700 IRRIGATION OF BLADDER: CPT

## 2018-11-09 PROCEDURE — 2580000003 HC RX 258

## 2018-11-09 PROCEDURE — 6360000002 HC RX W HCPCS: Performed by: NURSE PRACTITIONER

## 2018-11-09 PROCEDURE — 80053 COMPREHEN METABOLIC PANEL: CPT

## 2018-11-09 PROCEDURE — 6370000000 HC RX 637 (ALT 250 FOR IP): Performed by: FAMILY MEDICINE

## 2018-11-09 PROCEDURE — 85027 COMPLETE CBC AUTOMATED: CPT

## 2018-11-09 PROCEDURE — 36415 COLL VENOUS BLD VENIPUNCTURE: CPT

## 2018-11-09 PROCEDURE — 51798 US URINE CAPACITY MEASURE: CPT

## 2018-11-09 PROCEDURE — 99204 OFFICE O/P NEW MOD 45 MIN: CPT | Performed by: FAMILY MEDICINE

## 2018-11-09 RX ORDER — FUROSEMIDE 40 MG/1
40 TABLET ORAL DAILY
Status: DISCONTINUED | OUTPATIENT
Start: 2018-11-09 | End: 2018-11-09 | Stop reason: HOSPADM

## 2018-11-09 RX ORDER — NITROGLYCERIN 0.4 MG/1
0.4 TABLET SUBLINGUAL EVERY 5 MIN PRN
Status: DISCONTINUED | OUTPATIENT
Start: 2018-11-09 | End: 2018-11-09 | Stop reason: HOSPADM

## 2018-11-09 RX ORDER — NITROGLYCERIN 0.4 MG/1
0.4 TABLET SUBLINGUAL EVERY 5 MIN PRN
Status: DISCONTINUED | OUTPATIENT
Start: 2018-11-09 | End: 2018-11-09

## 2018-11-09 RX ORDER — LEVOTHYROXINE SODIUM 137 UG/1
137 TABLET ORAL DAILY
Status: DISCONTINUED | OUTPATIENT
Start: 2018-11-09 | End: 2018-11-09 | Stop reason: HOSPADM

## 2018-11-09 RX ORDER — POTASSIUM CHLORIDE 750 MG/1
10 TABLET, FILM COATED, EXTENDED RELEASE ORAL ONCE
Status: COMPLETED | OUTPATIENT
Start: 2018-11-09 | End: 2018-11-09

## 2018-11-09 RX ORDER — ATORVASTATIN CALCIUM 40 MG/1
40 TABLET, FILM COATED ORAL NIGHTLY
Status: DISCONTINUED | OUTPATIENT
Start: 2018-11-09 | End: 2018-11-09 | Stop reason: HOSPADM

## 2018-11-09 RX ADMIN — NITROGLYCERIN 0.4 MG: 0.4 TABLET, ORALLY DISINTEGRATING SUBLINGUAL at 06:48

## 2018-11-09 RX ADMIN — LEVOTHYROXINE SODIUM 137 MCG: 137 TABLET ORAL at 08:43

## 2018-11-09 RX ADMIN — HYDROCODONE BITARTRATE AND ACETAMINOPHEN 1 TABLET: 5; 325 TABLET ORAL at 11:25

## 2018-11-09 RX ADMIN — OXYBUTYNIN CHLORIDE 5 MG: 5 TABLET ORAL at 00:17

## 2018-11-09 RX ADMIN — SODIUM CHLORIDE: 9 INJECTION, SOLUTION INTRAVENOUS at 03:41

## 2018-11-09 RX ADMIN — FUROSEMIDE 40 MG: 40 TABLET ORAL at 11:26

## 2018-11-09 RX ADMIN — OXYBUTYNIN CHLORIDE 5 MG: 5 TABLET ORAL at 14:07

## 2018-11-09 RX ADMIN — ATROPA BELLADONNA AND OPIUM 30 MG: 16.2; 3 SUPPOSITORY RECTAL at 05:15

## 2018-11-09 RX ADMIN — CEFTRIAXONE SODIUM 1 G: 1 INJECTION, POWDER, FOR SOLUTION INTRAMUSCULAR; INTRAVENOUS at 08:45

## 2018-11-09 RX ADMIN — OXYBUTYNIN CHLORIDE 5 MG: 5 TABLET ORAL at 08:44

## 2018-11-09 RX ADMIN — MORPHINE SULFATE 2 MG: 2 INJECTION, SOLUTION INTRAMUSCULAR; INTRAVENOUS at 03:17

## 2018-11-09 RX ADMIN — METOPROLOL TARTRATE 25 MG: 25 TABLET, FILM COATED ORAL at 08:43

## 2018-11-09 RX ADMIN — POTASSIUM CHLORIDE 10 MEQ: 750 TABLET, FILM COATED, EXTENDED RELEASE ORAL at 11:27

## 2018-11-09 ASSESSMENT — PAIN DESCRIPTION - ORIENTATION
ORIENTATION: LOWER
ORIENTATION: LOWER

## 2018-11-09 ASSESSMENT — PAIN SCALES - GENERAL
PAINLEVEL_OUTOF10: 5
PAINLEVEL_OUTOF10: 5
PAINLEVEL_OUTOF10: 3
PAINLEVEL_OUTOF10: 0
PAINLEVEL_OUTOF10: 7
PAINLEVEL_OUTOF10: 3

## 2018-11-09 ASSESSMENT — PAIN DESCRIPTION - PROGRESSION
CLINICAL_PROGRESSION: GRADUALLY WORSENING

## 2018-11-09 ASSESSMENT — PAIN DESCRIPTION - FREQUENCY
FREQUENCY: INTERMITTENT
FREQUENCY: INTERMITTENT

## 2018-11-09 ASSESSMENT — PAIN DESCRIPTION - LOCATION
LOCATION: ABDOMEN
LOCATION: ABDOMEN

## 2018-11-09 ASSESSMENT — PAIN DESCRIPTION - DESCRIPTORS
DESCRIPTORS: SHARP
DESCRIPTORS: SHARP

## 2018-11-09 ASSESSMENT — PAIN DESCRIPTION - PAIN TYPE
TYPE: SURGICAL PAIN

## 2018-11-09 NOTE — CONSULTS
Consult    Patient:  Elliott Ordaz  YOB: 1940  Date of Service: 11/9/2018  MRN: [id number]   Acct:  [de-identified]   Primary Care Physician: Tyrone Almanza MD    Chief Complaint: Bladder spasm and pain    History of Present Illness:   History obtained from the patient and bedside RN. The patient is a 66 y.o. malewith H/O CAD S/P angioplasty with stent, AAA S/P repair, Hypertension, Hyperlipidemia, Hypothyroidism and bladder tumor S/P  Trans Urethra Resection of Bladder Tumor post op day #1 who presents with bladder spasm. He is currently under care of urology team. Patient has been getting CBI since last night due to several small lots he was passing. Since he started complaining of spasm the CBI has been stopped and he was given Morphine and Ditropan but he has had no relief. His BP has been climbing and he was diaphoretic. Patient has had no fever or cough but he is short of breath and tachycardic since his spasm. He has no other complaints.  Hospitalist were consulted to investigate and manage source of patient's discomfort      Past Medical History:        Diagnosis Date    AAA (abdominal aortic aneurysm) ([address]     BCG ROXANE 5/21/2014    BPH (benign prostatic hypertrophy)     CAD (coronary artery disease)     Carotid artery stenosis     Chronic back pain     Chronic kidney disease     Congenital absence of one kidney     History of lumbar fusion 3/15    mid lower back down    Hyperlipidemia     Hypertension     Hypothyroidism     Obesity     TIA (transient ischemic attack) 1/2008    Unspecified cerebral artery occlusion with cerebral infarction     TIA       Past Surgical History:        Procedure Laterality Date    ABDOMINAL [facility]    ABDOMINAL AORTIC ANEURYSM REPAIR  7/24/15    Kiera Good    COLON[facility]    CORONARY ANGIOPLASTY WITH STENT PLACEMENT  2003    2 stents    FEMORAL-FEMORAL BYPASS GRAFT  03/09/2017    Dr. Nathan Woods

## 2018-11-09 NOTE — PROGRESS NOTES
Admitted to [address] room 66. Alert and oriented x4. Belongings with patient. Wife at bedside. IV fluids running and catheter inserted. Patient c/o of urinary fullness and discomfort.

## 2018-11-09 NOTE — PROGRESS NOTES
Discharge teaching and instructions for diagnosis/procedure of turbt completed with patient using teachback method. AVS reviewed. Dynahex soap with home going incision care sheet given. Unused medications, especially pain medications, should be disposed to ensure medications do not end up being misused in the future, as well as helping to ensure drugs are not impacting the environment. [facility] [employer] and many local police agencies have medication take-back bins to properly destroy these medications. Please see the site https://apps. [employer]. [place].gov/pubdispsearch/spring/main?execution=e2s1 for additional take-back bins located in your area. Printed prescriptions given to patient. Patient voiced understanding regarding prescriptions, follow up appointments, and care of self at home.  Discharged in a wheelchair to  home with support per family and home health care

## 2018-11-11 NOTE — BRIEF OP NOTE
Brief Postoperative Note  ______________________________________________________________    Patient: Glo Martel  YOB: 1940  MRN: [id number]  Date of Procedure: 11/8/2018    Pre-Op Diagnosis: BLADDER TUMOR/HX BLADDER CANCER    Post-Op Diagnosis: Same       Procedure(s):  TURBT    Anesthesia: General    Surgeon(s):  Lolly Dupont MD    Staff:  Scrub Person First: Alina Ybarra  Scrub Person Second: Jonathan Solitario     Estimated Blood Loss: 25 cc    Complications: None    Specimens:   ID Type Source Tests Collected by Time Destination   A : Bladder tumor Tissue Bladder SURGICAL PATHOLOGY Lolly Dupont MD 11/8/2018 1824        Implants:  * No implants in log *      Drains:   Urethral Catheter Non-latex 18 fr (Active)   Catheter Indications Perioperative use in selected surgeries including but not limited to urologic, pelvic or need for intraoperative monitoring of urinary output due to prolonged surgery, large volume infusion or need for diuretic therapy in surgery 11/9/2018 12:00 PM   Site Assessment Pink 11/9/2018 12:00 PM   Urine Color Yellow 11/9/2018 12:00 PM   Urine Appearance Clots 11/9/2018 12:00 PM   Output (mL) 100 mL 11/8/2018  7:45 PM       Findings: large tumor anterior wall to right side, resection bed 6 x 7 cm    Lolly Dupont MD

## 2018-11-19 ENCOUNTER — OFFICE VISIT (OUTPATIENT)
Dept: UROLOGY | Age: 78
End: 2018-11-19
Payer: MEDICARE

## 2018-11-19 VITALS
WEIGHT: 197.3 LBS | BODY MASS INDEX: 30.97 KG/M2 | DIASTOLIC BLOOD PRESSURE: 76 MMHG | SYSTOLIC BLOOD PRESSURE: 124 MMHG | HEIGHT: 67 IN

## 2018-11-19 DIAGNOSIS — F41.9 ANXIETY: ICD-10-CM

## 2018-11-19 DIAGNOSIS — D49.4 BLADDER TUMOR: Primary | ICD-10-CM

## 2018-11-19 PROCEDURE — 99215 OFFICE O/P EST HI 40 MIN: CPT | Performed by: UROLOGY

## 2018-11-19 ASSESSMENT — ENCOUNTER SYMPTOMS
NAUSEA: 0
EYE PAIN: 0
EYE REDNESS: 0
SHORTNESS OF BREATH: 0
FACIAL SWELLING: 0
BACK PAIN: 0
CHEST TIGHTNESS: 0
ABDOMINAL PAIN: 0
COLOR CHANGE: 0

## 2018-11-20 ENCOUNTER — TELEPHONE (OUTPATIENT)
Dept: UROLOGY | Age: 78
End: 2018-11-20

## 2018-11-20 RX ORDER — ALPRAZOLAM 0.5 MG/1
0.5 TABLET ORAL 3 TIMES DAILY PRN
Qty: 20 TABLET | Refills: 0 | Status: SHIPPED | OUTPATIENT
Start: 2018-11-20 | End: 2018-12-20

## 2018-11-20 NOTE — TELEPHONE ENCOUNTER
Patients wife Yonas (on hippa) calling in for patient. She said they were seen yesterday afternoon by Dr Kathy Donaldson and they were supposed to get a prescription for an anxiety medication. She said they were given a written RX, and didn't really look at it until they were at the pharmacy, but it was a different patient's prescription. If possible she was asking for the medication to be sent in to [facility] on [address]. Please call her to advise, and to let her know what to do with the incorrect prescription.

## 2018-12-05 ENCOUNTER — HOSPITAL ENCOUNTER (OUTPATIENT)
Dept: INTERVENTIONAL RADIOLOGY/VASCULAR | Age: 78
Discharge: HOME | End: 2018-12-05
Payer: MEDICARE

## 2018-12-05 ENCOUNTER — HOSPITAL ENCOUNTER (OUTPATIENT)
Dept: CT IMAGING | Age: 78
Discharge: HOME | End: 2018-12-05
Payer: MEDICARE

## 2018-12-05 DIAGNOSIS — I71.40 ABDOMINAL AORTIC ANEURYSM WITHOUT RUPTURE: ICD-10-CM

## 2018-12-05 DIAGNOSIS — I73.9 PVD (PERIPHERAL VASCULAR DISEASE) (HCC): ICD-10-CM

## 2018-12-05 PROCEDURE — 74174 CTA ABD&PLVS W/CONTRAST: CPT

## 2018-12-05 PROCEDURE — 6360000004 HC RX CONTRAST MEDICATION: Performed by: THORACIC SURGERY (CARDIOTHORACIC VASCULAR SURGERY)

## 2018-12-05 PROCEDURE — 93926 LOWER EXTREMITY STUDY: CPT

## 2018-12-05 RX ADMIN — IOPAMIDOL 100 ML: 755 INJECTION, SOLUTION INTRAVENOUS at 15:39

## 2018-12-06 ENCOUNTER — TELEPHONE (OUTPATIENT)
Dept: UROLOGY | Age: 78
End: 2018-12-06

## 2018-12-06 DIAGNOSIS — C67.9 MALIGNANT NEOPLASM OF URINARY BLADDER, UNSPECIFIED SITE (HCC): Primary | ICD-10-CM

## 2018-12-07 ENCOUNTER — TELEPHONE (OUTPATIENT)
Dept: UROLOGY | Age: 78
End: 2018-12-07

## 2018-12-13 ENCOUNTER — OFFICE VISIT (OUTPATIENT)
Dept: ONCOLOGY | Age: 78
End: 2018-12-13
Payer: MEDICARE

## 2018-12-13 ENCOUNTER — HOSPITAL ENCOUNTER (OUTPATIENT)
Dept: INFUSION THERAPY | Age: 78
Discharge: HOME | End: 2018-12-13
Payer: MEDICARE

## 2018-12-13 VITALS
DIASTOLIC BLOOD PRESSURE: 76 MMHG | WEIGHT: 194.6 LBS | OXYGEN SATURATION: 96 % | SYSTOLIC BLOOD PRESSURE: 141 MMHG | HEART RATE: 70 BPM | TEMPERATURE: 97.2 F | BODY MASS INDEX: 30.54 KG/M2 | RESPIRATION RATE: 18 BRPM | HEIGHT: 67 IN

## 2018-12-13 DIAGNOSIS — C67.1 MALIGNANT NEOPLASM OF DOME OF BLADDER (HCC): ICD-10-CM

## 2018-12-13 DIAGNOSIS — C67.9 MALIGNANT NEOPLASM OF URINARY BLADDER, UNSPECIFIED SITE (HCC): Primary | ICD-10-CM

## 2018-12-13 PROCEDURE — 99211 OFF/OP EST MAY X REQ PHY/QHP: CPT

## 2018-12-13 PROCEDURE — 99205 OFFICE O/P NEW HI 60 MIN: CPT | Performed by: INTERNAL MEDICINE

## 2018-12-18 ENCOUNTER — TELEPHONE (OUTPATIENT)
Dept: UROLOGY | Age: 78
End: 2018-12-18

## 2018-12-18 DIAGNOSIS — C67.9 MALIGNANT NEOPLASM OF URINARY BLADDER, UNSPECIFIED SITE (HCC): ICD-10-CM

## 2018-12-18 DIAGNOSIS — C67.1 MALIGNANT NEOPLASM OF DOME OF URINARY BLADDER (HCC): Primary | ICD-10-CM

## 2018-12-20 ENCOUNTER — HOSPITAL ENCOUNTER (OUTPATIENT)
Dept: INFUSION THERAPY | Age: 78
Discharge: HOME | End: 2018-12-20
Payer: MEDICARE

## 2018-12-20 ENCOUNTER — HOSPITAL ENCOUNTER (OUTPATIENT)
Dept: PET IMAGING | Age: 78
Discharge: HOME | End: 2018-12-20
Payer: MEDICARE

## 2018-12-20 ENCOUNTER — OFFICE VISIT (OUTPATIENT)
Dept: ONCOLOGY | Age: 78
End: 2018-12-20
Payer: MEDICARE

## 2018-12-20 VITALS
DIASTOLIC BLOOD PRESSURE: 73 MMHG | SYSTOLIC BLOOD PRESSURE: 128 MMHG | OXYGEN SATURATION: 95 % | WEIGHT: 201.6 LBS | HEIGHT: 67 IN | HEART RATE: 74 BPM | BODY MASS INDEX: 31.64 KG/M2 | RESPIRATION RATE: 18 BRPM | TEMPERATURE: 96.8 F

## 2018-12-20 DIAGNOSIS — C67.9 MALIGNANT NEOPLASM OF URINARY BLADDER, UNSPECIFIED SITE (HCC): ICD-10-CM

## 2018-12-20 DIAGNOSIS — C67.1 MALIGNANT NEOPLASM OF DOME OF URINARY BLADDER (HCC): ICD-10-CM

## 2018-12-20 DIAGNOSIS — C67.9 MALIGNANT NEOPLASM OF URINARY BLADDER, UNSPECIFIED SITE (HCC): Primary | ICD-10-CM

## 2018-12-20 DIAGNOSIS — C67.1 MALIGNANT NEOPLASM OF DOME OF BLADDER (HCC): ICD-10-CM

## 2018-12-20 PROCEDURE — 99214 OFFICE O/P EST MOD 30 MIN: CPT | Performed by: INTERNAL MEDICINE

## 2018-12-20 PROCEDURE — 3430000000 HC RX DIAGNOSTIC RADIOPHARMACEUTICAL: Performed by: INTERNAL MEDICINE

## 2018-12-20 PROCEDURE — A9552 F18 FDG: HCPCS | Performed by: INTERNAL MEDICINE

## 2018-12-20 PROCEDURE — 99211 OFF/OP EST MAY X REQ PHY/QHP: CPT

## 2018-12-20 PROCEDURE — 78815 PET IMAGE W/CT SKULL-THIGH: CPT

## 2018-12-20 RX ORDER — FLUDEOXYGLUCOSE F 18 200 MCI/ML
10 INJECTION, SOLUTION INTRAVENOUS
Status: COMPLETED | OUTPATIENT
Start: 2018-12-20 | End: 2018-12-20

## 2018-12-20 RX ADMIN — FLUDEOXYGLUCOSE F 18 14.43 MILLICURIE: 200 INJECTION, SOLUTION INTRAVENOUS at 10:27

## 2018-12-28 ENCOUNTER — HOSPITAL ENCOUNTER (OUTPATIENT)
Dept: INFUSION THERAPY | Age: 78
Discharge: HOME | End: 2018-12-28
Payer: MEDICARE

## 2018-12-28 PROCEDURE — 99212 OFFICE O/P EST SF 10 MIN: CPT

## 2018-12-28 NOTE — PLAN OF CARE
Problem: Intellectual/Education/Knowledge Deficit  Intervention: Verbal/written education provided  Chemotherapy Teaching     What is Chemotherapy   Drug action [x]   Method of Administration [x]   Handouts given []     Side Effects  Nausea/vomiting [x]   Diarrhea [x]   Fatigue [x]   Signs / Symptoms of infection [x]   Neutropenia [x]   Thrombocytopenia [x]   Alopecia [x]   neuropathy [x]   [place] diet &  the importance of fluids [x]       Micellaneous  Importance of nutrition [x]   Importance of oral hygiene [x]   When to call the MD [x]   Monitoring labs [x]   Use of supportive services [x]     Explanation of Drug Regimen / Frequency  Cisplatin and gemzar     Comments  Verbalized understanding to drug,action,side effects and when to call MD       Goal: Teaching initiated upon admission  Outcome: Met This Shift  Patient verbalizes understanding to verbal information given on gemzar and cisplatin,action and possible side effects. Aware to call MD if develop complications. Problem: Discharge Planning  Intervention: Discharge to appropriate level of care  Discuss discharge instructions, follow ups and when to call doctor. Goal: Knowledge of discharge instructions  Knowledge of discharge instructions    Outcome: Met This Shift  Verbalized understanding of discharge instructions, follow ups and when to call doctor    Comments: Care plan reviewed with patient. Patient verbalized understanding of the plan of care and contribute to goal setting.

## 2018-12-28 NOTE — PROGRESS NOTES
Patient and family instructed on chemotherapy specifically the drugs gemzar and cisplatin and  chemotherapy regimen. [employer] folder along with the following - chemotherapy drug information sheets,chemotherapy side effects handouts,Understanding your blood counts, [place] diet,Importance to hydration,when to call physician sheet,reduce risk infection sheet,bleeding precaution,neutropenia precaution,Chemotherapy and You and Eating Hints books was included and reviewed. All questions answered satisfactory and support given. Distress Screening  Survey completed. Patient navigator explained to patient and informed that she may be calling him/her if needs assistance. Patient given a tour of facility. Approximately 70 minutes spent with patient and family.

## 2018-12-31 RX ORDER — CLINDAMYCIN PHOSPHATE 600 MG/50ML
600 INJECTION INTRAVENOUS
Status: CANCELLED | OUTPATIENT
Start: 2018-12-31

## 2018-12-31 RX ORDER — SODIUM CHLORIDE 450 MG/100ML
INJECTION, SOLUTION INTRAVENOUS CONTINUOUS
Status: CANCELLED | OUTPATIENT
Start: 2018-12-31

## 2018-12-31 RX ORDER — FENTANYL CITRATE 50 UG/ML
50 INJECTION, SOLUTION INTRAMUSCULAR; INTRAVENOUS ONCE
Status: CANCELLED | OUTPATIENT
Start: 2018-12-31 | End: 2018-12-31

## 2018-12-31 RX ORDER — MIDAZOLAM HYDROCHLORIDE 1 MG/ML
1 INJECTION INTRAMUSCULAR; INTRAVENOUS ONCE
Status: CANCELLED | OUTPATIENT
Start: 2018-12-31 | End: 2018-12-31

## 2019-01-01 ASSESSMENT — ENCOUNTER SYMPTOMS
DIARRHEA: 0
COLOR CHANGE: 0
NAUSEA: 0
ABDOMINAL DISTENTION: 0
SORE THROAT: 0
EYE DISCHARGE: 0
CONSTIPATION: 0
SHORTNESS OF BREATH: 0
ABDOMINAL PAIN: 0
BLOOD IN STOOL: 0
TROUBLE SWALLOWING: 0
COUGH: 0
FACIAL SWELLING: 0
CHEST TIGHTNESS: 0
RECTAL PAIN: 0
WHEEZING: 0
VOMITING: 0
BACK PAIN: 0

## 2019-01-02 ENCOUNTER — HOSPITAL ENCOUNTER (OUTPATIENT)
Dept: INTERVENTIONAL RADIOLOGY/VASCULAR | Age: 79
Discharge: HOME | End: 2019-01-02
Payer: MEDICARE

## 2019-01-02 VITALS
OXYGEN SATURATION: 94 % | SYSTOLIC BLOOD PRESSURE: 153 MMHG | HEART RATE: 72 BPM | RESPIRATION RATE: 16 BRPM | TEMPERATURE: 97.8 F | DIASTOLIC BLOOD PRESSURE: 71 MMHG

## 2019-01-02 DIAGNOSIS — C67.9 MALIGNANT NEOPLASM OF URINARY BLADDER, UNSPECIFIED SITE (HCC): ICD-10-CM

## 2019-01-02 LAB
ERYTHROCYTE [DISTWIDTH] IN BLOOD BY AUTOMATED COUNT: 13.7 % (ref 11.5–14.5)
ERYTHROCYTE [DISTWIDTH] IN BLOOD BY AUTOMATED COUNT: 46.1 FL (ref 35–45)
HCT VFR BLD CALC: 50.8 % (ref 42–52)
HEMOGLOBIN: 17 GM/DL (ref 14–18)
MCH RBC QN AUTO: 30.6 PG (ref 26–33)
MCHC RBC AUTO-ENTMCNC: 33.5 GM/DL (ref 32.2–35.5)
MCV RBC AUTO: 91.5 FL (ref 80–94)
PLATELET # BLD: 201 THOU/MM3 (ref 130–400)
PMV BLD AUTO: 9.4 FL (ref 9.4–12.4)
RBC # BLD: 5.55 MILL/MM3 (ref 4.7–6.1)
WBC # BLD: 9.8 THOU/MM3 (ref 4.8–10.8)

## 2019-01-02 PROCEDURE — 2709999900 HC NON-CHARGEABLE SUPPLY

## 2019-01-02 PROCEDURE — C1788 PORT, INDWELLING, IMP: HCPCS

## 2019-01-02 PROCEDURE — 2580000003 HC RX 258: Performed by: RADIOLOGY

## 2019-01-02 PROCEDURE — 2500000003 HC RX 250 WO HCPCS: Performed by: RADIOLOGY

## 2019-01-02 PROCEDURE — 36561 INSERT TUNNELED CV CATH: CPT

## 2019-01-02 PROCEDURE — 76937 US GUIDE VASCULAR ACCESS: CPT

## 2019-01-02 PROCEDURE — 2500000003 HC RX 250 WO HCPCS

## 2019-01-02 PROCEDURE — 77001 FLUOROGUIDE FOR VEIN DEVICE: CPT

## 2019-01-02 PROCEDURE — C1894 INTRO/SHEATH, NON-LASER: HCPCS

## 2019-01-02 PROCEDURE — 6360000002 HC RX W HCPCS

## 2019-01-02 PROCEDURE — 36415 COLL VENOUS BLD VENIPUNCTURE: CPT

## 2019-01-02 PROCEDURE — 85027 COMPLETE CBC AUTOMATED: CPT

## 2019-01-02 RX ORDER — MIDAZOLAM HYDROCHLORIDE 1 MG/ML
1 INJECTION INTRAMUSCULAR; INTRAVENOUS ONCE
Status: COMPLETED | OUTPATIENT
Start: 2019-01-02 | End: 2019-01-02

## 2019-01-02 RX ORDER — HEPARIN SODIUM (PORCINE) LOCK FLUSH IV SOLN 100 UNIT/ML 100 UNIT/ML
500 SOLUTION INTRAVENOUS ONCE
Status: COMPLETED | OUTPATIENT
Start: 2019-01-02 | End: 2019-01-02

## 2019-01-02 RX ORDER — SODIUM CHLORIDE 450 MG/100ML
INJECTION, SOLUTION INTRAVENOUS CONTINUOUS
Status: DISCONTINUED | OUTPATIENT
Start: 2019-01-02 | End: 2019-01-03 | Stop reason: HOSPADM

## 2019-01-02 RX ORDER — CLINDAMYCIN PHOSPHATE 600 MG/50ML
600 INJECTION INTRAVENOUS
Status: COMPLETED | OUTPATIENT
Start: 2019-01-02 | End: 2019-01-02

## 2019-01-02 RX ORDER — FENTANYL CITRATE 50 UG/ML
50 INJECTION, SOLUTION INTRAMUSCULAR; INTRAVENOUS ONCE
Status: COMPLETED | OUTPATIENT
Start: 2019-01-02 | End: 2019-01-02

## 2019-01-02 RX ADMIN — MIDAZOLAM HYDROCHLORIDE 1 MG: 1 INJECTION INTRAMUSCULAR; INTRAVENOUS at 10:26

## 2019-01-02 RX ADMIN — HEPARIN SODIUM (PORCINE) LOCK FLUSH IV SOLN 100 UNIT/ML 500 UNITS: 100 SOLUTION at 10:41

## 2019-01-02 RX ADMIN — FENTANYL CITRATE 50 MCG: 50 INJECTION, SOLUTION INTRAMUSCULAR; INTRAVENOUS at 10:27

## 2019-01-02 RX ADMIN — CLINDAMYCIN PHOSPHATE 600 MG: 600 INJECTION, SOLUTION INTRAVENOUS at 09:58

## 2019-01-02 RX ADMIN — SODIUM CHLORIDE 50000 UNITS: 0.9 IRRIGANT IRRIGATION at 10:42

## 2019-01-02 RX ADMIN — SODIUM CHLORIDE: 4.5 INJECTION, SOLUTION INTRAVENOUS at 09:58

## 2019-01-02 ASSESSMENT — PAIN SCALES - GENERAL
PAINLEVEL_OUTOF10: 0

## 2019-01-08 DIAGNOSIS — C67.9 MALIGNANT NEOPLASM OF URINARY BLADDER, UNSPECIFIED SITE (HCC): Primary | ICD-10-CM

## 2019-01-09 ENCOUNTER — HOSPITAL ENCOUNTER (OUTPATIENT)
Dept: INFUSION THERAPY | Age: 79
Discharge: HOME | End: 2019-01-09
Payer: MEDICARE

## 2019-01-09 ENCOUNTER — OFFICE VISIT (OUTPATIENT)
Dept: ONCOLOGY | Age: 79
End: 2019-01-09
Payer: MEDICARE

## 2019-01-09 VITALS
OXYGEN SATURATION: 94 % | SYSTOLIC BLOOD PRESSURE: 137 MMHG | BODY MASS INDEX: 31.86 KG/M2 | HEART RATE: 68 BPM | WEIGHT: 203 LBS | RESPIRATION RATE: 16 BRPM | HEIGHT: 67 IN | TEMPERATURE: 98.1 F | DIASTOLIC BLOOD PRESSURE: 68 MMHG

## 2019-01-09 VITALS
RESPIRATION RATE: 16 BRPM | HEIGHT: 67 IN | DIASTOLIC BLOOD PRESSURE: 72 MMHG | TEMPERATURE: 96.3 F | HEART RATE: 62 BPM | BODY MASS INDEX: 31.86 KG/M2 | WEIGHT: 203 LBS | OXYGEN SATURATION: 95 % | SYSTOLIC BLOOD PRESSURE: 148 MMHG

## 2019-01-09 DIAGNOSIS — C67.9 MALIGNANT NEOPLASM OF URINARY BLADDER, UNSPECIFIED SITE (HCC): ICD-10-CM

## 2019-01-09 DIAGNOSIS — N48.89 PAIN IN PENIS: ICD-10-CM

## 2019-01-09 DIAGNOSIS — C67.9 MALIGNANT NEOPLASM OF URINARY BLADDER, UNSPECIFIED SITE (HCC): Primary | ICD-10-CM

## 2019-01-09 DIAGNOSIS — Z51.11 ENCOUNTER FOR CHEMOTHERAPY MANAGEMENT: ICD-10-CM

## 2019-01-09 DIAGNOSIS — D49.4 BLADDER TUMOR: ICD-10-CM

## 2019-01-09 DIAGNOSIS — N18.30 CKD (CHRONIC KIDNEY DISEASE) STAGE 3, GFR 30-59 ML/MIN (HCC): ICD-10-CM

## 2019-01-09 LAB
ALBUMIN SERPL-MCNC: 3.8 G/DL (ref 3.5–5.1)
ALP BLD-CCNC: 113 U/L (ref 38–126)
ALT SERPL-CCNC: 26 U/L (ref 11–66)
AST SERPL-CCNC: 27 U/L (ref 5–40)
BILIRUB SERPL-MCNC: 1.2 MG/DL (ref 0.3–1.2)
BILIRUBIN DIRECT: < 0.2 MG/DL (ref 0–0.3)
BUN, WHOLE BLOOD: 15 MG/DL (ref 8–26)
CHLORIDE, WHOLE BLOOD: 99 MEQ/L (ref 98–109)
CREATININE, WHOLE BLOOD: 1.1 MG/DL (ref 0.5–1.2)
GFR, ESTIMATED: 69 ML/MIN/1.73M2
GLUCOSE, WHOLE BLOOD: 77 MG/DL (ref 70–108)
HCT VFR BLD CALC: 47.9 % (ref 42–52)
HEMOGLOBIN: 16 GM/DL (ref 14–18)
IONIZED CALCIUM, WHOLE BLOOD: 1.19 MMOL/L (ref 1.12–1.32)
MCH RBC QN AUTO: 29.9 PG (ref 27–31)
MCHC RBC AUTO-ENTMCNC: 33.4 GM/DL (ref 33–37)
MCV RBC AUTO: 90 FL (ref 80–94)
PDW BLD-RTO: 11.9 % (ref 11.5–14.5)
PLATELET # BLD: 170 THOU/MM3 (ref 130–400)
PMV BLD AUTO: 6.9 FL (ref 7.4–10.4)
POTASSIUM, WHOLE BLOOD: 3.9 MEQ/L (ref 3.5–4.9)
RBC # BLD: 5.36 MILL/MM3 (ref 4.7–6.1)
SEG NEUTROPHILS: 58 % (ref 43–75)
SEGMENTED NEUTROPHILS ABSOLUTE COUNT: 6.4 THOU/MM3 (ref 1.8–7.7)
SODIUM, WHOLE BLOOD: 139 MEQ/L (ref 138–146)
TOTAL CO2, WHOLE BLOOD: 28 MEQ/L (ref 23–33)
TOTAL PROTEIN: 6.3 G/DL (ref 6.1–8)
WBC # BLD: 11 THOU/MM3 (ref 4.8–10.8)

## 2019-01-09 PROCEDURE — 85027 COMPLETE CBC AUTOMATED: CPT

## 2019-01-09 PROCEDURE — 96377 APPLICATON ON-BODY INJECTOR: CPT

## 2019-01-09 PROCEDURE — 96417 CHEMO IV INFUS EACH ADDL SEQ: CPT

## 2019-01-09 PROCEDURE — 96413 CHEMO IV INFUSION 1 HR: CPT

## 2019-01-09 PROCEDURE — 99214 OFFICE O/P EST MOD 30 MIN: CPT | Performed by: INTERNAL MEDICINE

## 2019-01-09 PROCEDURE — 96375 TX/PRO/DX INJ NEW DRUG ADDON: CPT

## 2019-01-09 PROCEDURE — 96415 CHEMO IV INFUSION ADDL HR: CPT

## 2019-01-09 PROCEDURE — 96366 THER/PROPH/DIAG IV INF ADDON: CPT

## 2019-01-09 PROCEDURE — 80076 HEPATIC FUNCTION PANEL: CPT

## 2019-01-09 PROCEDURE — 2580000003 HC RX 258: Performed by: INTERNAL MEDICINE

## 2019-01-09 PROCEDURE — G0463 HOSPITAL OUTPT CLINIC VISIT: HCPCS

## 2019-01-09 PROCEDURE — 36591 DRAW BLOOD OFF VENOUS DEVICE: CPT

## 2019-01-09 PROCEDURE — 6360000002 HC RX W HCPCS: Performed by: INTERNAL MEDICINE

## 2019-01-09 PROCEDURE — 96367 TX/PROPH/DG ADDL SEQ IV INF: CPT

## 2019-01-09 PROCEDURE — 80047 BASIC METABLC PNL IONIZED CA: CPT

## 2019-01-09 PROCEDURE — 2709999900 HC NON-CHARGEABLE SUPPLY

## 2019-01-09 PROCEDURE — 36415 COLL VENOUS BLD VENIPUNCTURE: CPT

## 2019-01-09 RX ORDER — HEPARIN SODIUM (PORCINE) LOCK FLUSH IV SOLN 100 UNIT/ML 100 UNIT/ML
500 SOLUTION INTRAVENOUS PRN
Status: CANCELLED | OUTPATIENT
Start: 2019-01-23

## 2019-01-09 RX ORDER — PALONOSETRON 0.05 MG/ML
0.25 INJECTION, SOLUTION INTRAVENOUS ONCE
Status: CANCELLED | OUTPATIENT
Start: 2019-01-09

## 2019-01-09 RX ORDER — METHYLPREDNISOLONE SODIUM SUCCINATE 125 MG/2ML
125 INJECTION, POWDER, LYOPHILIZED, FOR SOLUTION INTRAMUSCULAR; INTRAVENOUS ONCE
Status: CANCELLED | OUTPATIENT
Start: 2019-01-23 | End: 2019-01-23

## 2019-01-09 RX ORDER — HEPARIN SODIUM (PORCINE) LOCK FLUSH IV SOLN 100 UNIT/ML 100 UNIT/ML
500 SOLUTION INTRAVENOUS PRN
Status: CANCELLED | OUTPATIENT
Start: 2019-01-09

## 2019-01-09 RX ORDER — SODIUM CHLORIDE 9 MG/ML
INJECTION, SOLUTION INTRAVENOUS ONCE
Status: CANCELLED | OUTPATIENT
Start: 2019-01-09 | End: 2019-01-09

## 2019-01-09 RX ORDER — DIPHENHYDRAMINE HYDROCHLORIDE 50 MG/ML
50 INJECTION INTRAMUSCULAR; INTRAVENOUS ONCE
Status: CANCELLED | OUTPATIENT
Start: 2019-01-23 | End: 2019-01-23

## 2019-01-09 RX ORDER — SODIUM CHLORIDE 0.9 % (FLUSH) 0.9 %
10 SYRINGE (ML) INJECTION PRN
Status: CANCELLED | OUTPATIENT
Start: 2019-01-23

## 2019-01-09 RX ORDER — SODIUM CHLORIDE 0.9 % (FLUSH) 0.9 %
5 SYRINGE (ML) INJECTION PRN
Status: CANCELLED | OUTPATIENT
Start: 2019-01-09

## 2019-01-09 RX ORDER — SODIUM CHLORIDE 9 MG/ML
INJECTION, SOLUTION INTRAVENOUS ONCE
Status: COMPLETED | OUTPATIENT
Start: 2019-01-09 | End: 2019-01-09

## 2019-01-09 RX ORDER — SODIUM CHLORIDE 0.9 % (FLUSH) 0.9 %
20 SYRINGE (ML) INJECTION PRN
Status: CANCELLED | OUTPATIENT
Start: 2019-01-09

## 2019-01-09 RX ORDER — SODIUM CHLORIDE 9 MG/ML
INJECTION, SOLUTION INTRAVENOUS CONTINUOUS
Status: CANCELLED | OUTPATIENT
Start: 2019-01-23

## 2019-01-09 RX ORDER — HEPARIN SODIUM (PORCINE) LOCK FLUSH IV SOLN 100 UNIT/ML 100 UNIT/ML
500 SOLUTION INTRAVENOUS PRN
Status: DISCONTINUED | OUTPATIENT
Start: 2019-01-09 | End: 2019-01-10 | Stop reason: HOSPADM

## 2019-01-09 RX ORDER — SODIUM CHLORIDE 9 MG/ML
INJECTION, SOLUTION INTRAVENOUS ONCE
Status: CANCELLED | OUTPATIENT
Start: 2019-01-23 | End: 2019-01-23

## 2019-01-09 RX ORDER — PALONOSETRON 0.05 MG/ML
0.25 INJECTION, SOLUTION INTRAVENOUS ONCE
Status: COMPLETED | OUTPATIENT
Start: 2019-01-09 | End: 2019-01-09

## 2019-01-09 RX ORDER — 0.9 % SODIUM CHLORIDE 0.9 %
10 VIAL (ML) INJECTION ONCE
Status: CANCELLED | OUTPATIENT
Start: 2019-01-09 | End: 2019-01-09

## 2019-01-09 RX ORDER — SODIUM CHLORIDE 0.9 % (FLUSH) 0.9 %
5 SYRINGE (ML) INJECTION PRN
Status: CANCELLED | OUTPATIENT
Start: 2019-01-23

## 2019-01-09 RX ORDER — LIDOCAINE AND PRILOCAINE 25; 25 MG/G; MG/G
CREAM TOPICAL
Qty: 30 G | Refills: 2 | Status: SHIPPED | OUTPATIENT
Start: 2019-01-09 | End: 2019-01-11 | Stop reason: SDUPTHER

## 2019-01-09 RX ORDER — DIPHENHYDRAMINE HYDROCHLORIDE 50 MG/ML
50 INJECTION INTRAMUSCULAR; INTRAVENOUS ONCE
Status: CANCELLED | OUTPATIENT
Start: 2019-01-09 | End: 2019-01-09

## 2019-01-09 RX ORDER — SODIUM CHLORIDE 0.9 % (FLUSH) 0.9 %
10 SYRINGE (ML) INJECTION PRN
Status: CANCELLED | OUTPATIENT
Start: 2019-01-09

## 2019-01-09 RX ORDER — PROCHLORPERAZINE MALEATE 5 MG/1
5 TABLET ORAL EVERY 6 HOURS PRN
Qty: 60 TABLET | Refills: 2 | Status: SHIPPED | OUTPATIENT
Start: 2019-01-09 | End: 2019-04-24 | Stop reason: ALTCHOICE

## 2019-01-09 RX ORDER — DEXAMETHASONE 4 MG/1
8 TABLET ORAL
Qty: 6 TABLET | Refills: 3 | Status: SHIPPED | OUTPATIENT
Start: 2019-01-10 | End: 2019-02-06 | Stop reason: SDUPTHER

## 2019-01-09 RX ORDER — SODIUM CHLORIDE 0.9 % (FLUSH) 0.9 %
10 SYRINGE (ML) INJECTION PRN
Status: DISCONTINUED | OUTPATIENT
Start: 2019-01-09 | End: 2019-01-10 | Stop reason: HOSPADM

## 2019-01-09 RX ORDER — SODIUM CHLORIDE 9 MG/ML
INJECTION, SOLUTION INTRAVENOUS CONTINUOUS
Status: CANCELLED | OUTPATIENT
Start: 2019-01-09

## 2019-01-09 RX ORDER — 0.9 % SODIUM CHLORIDE 0.9 %
10 VIAL (ML) INJECTION ONCE
Status: CANCELLED | OUTPATIENT
Start: 2019-01-23 | End: 2019-01-23

## 2019-01-09 RX ORDER — METHYLPREDNISOLONE SODIUM SUCCINATE 125 MG/2ML
125 INJECTION, POWDER, LYOPHILIZED, FOR SOLUTION INTRAMUSCULAR; INTRAVENOUS ONCE
Status: CANCELLED | OUTPATIENT
Start: 2019-01-09 | End: 2019-01-09

## 2019-01-09 RX ORDER — SODIUM CHLORIDE 0.9 % (FLUSH) 0.9 %
20 SYRINGE (ML) INJECTION PRN
Status: DISCONTINUED | OUTPATIENT
Start: 2019-01-09 | End: 2019-01-10 | Stop reason: HOSPADM

## 2019-01-09 RX ADMIN — Medication 10 ML: at 10:05

## 2019-01-09 RX ADMIN — Medication 20 ML: at 10:06

## 2019-01-09 RX ADMIN — Medication 500 UNITS: at 19:02

## 2019-01-09 RX ADMIN — Medication 10 ML: at 19:02

## 2019-01-09 RX ADMIN — POTASSIUM CHLORIDE: 2 INJECTION, SOLUTION, CONCENTRATE INTRAVENOUS at 18:01

## 2019-01-09 RX ADMIN — POTASSIUM CHLORIDE: 2 INJECTION, SOLUTION, CONCENTRATE INTRAVENOUS at 12:10

## 2019-01-09 RX ADMIN — PEGFILGRASTIM 6 MG: KIT SUBCUTANEOUS at 18:07

## 2019-01-09 RX ADMIN — SODIUM CHLORIDE: 9 INJECTION, SOLUTION INTRAVENOUS at 12:02

## 2019-01-09 RX ADMIN — PALONOSETRON 0.25 MG: 0.05 INJECTION, SOLUTION INTRAVENOUS at 12:03

## 2019-01-09 RX ADMIN — MANNITOL: 250 INJECTION, SOLUTION INTRAVENOUS at 15:00

## 2019-01-09 RX ADMIN — DEXAMETHASONE SODIUM PHOSPHATE 12 MG: 4 INJECTION, SOLUTION INTRAMUSCULAR; INTRAVENOUS at 13:50

## 2019-01-09 RX ADMIN — SODIUM CHLORIDE 150 MG: 9 INJECTION, SOLUTION INTRAVENOUS at 13:15

## 2019-01-09 RX ADMIN — GEMCITABINE 2000 MG: 38 INJECTION INTRAVENOUS at 14:24

## 2019-01-09 ASSESSMENT — ENCOUNTER SYMPTOMS
CONSTIPATION: 0
SHORTNESS OF BREATH: 0
BACK PAIN: 0
ABDOMINAL PAIN: 0
DIARRHEA: 0
NAUSEA: 0
WHEEZING: 0
SORE THROAT: 0
VOMITING: 0
CHEST TIGHTNESS: 0
ABDOMINAL DISTENTION: 0
WHEEZING: 0
COLOR CHANGE: 0
CHEST TIGHTNESS: 0
BLOOD IN STOOL: 0
ABDOMINAL DISTENTION: 0
BACK PAIN: 0
NAUSEA: 0
RECTAL PAIN: 0
ABDOMINAL PAIN: 0
EYE DISCHARGE: 0
VOMITING: 0
TROUBLE SWALLOWING: 0
FACIAL SWELLING: 0
SHORTNESS OF BREATH: 0
EYE DISCHARGE: 0
DIARRHEA: 0
FACIAL SWELLING: 0
COUGH: 0
RECTAL PAIN: 0
TROUBLE SWALLOWING: 0
CONSTIPATION: 0
COLOR CHANGE: 0
BLOOD IN STOOL: 0
SORE THROAT: 0
COUGH: 0

## 2019-01-11 DIAGNOSIS — C67.9 MALIGNANT NEOPLASM OF URINARY BLADDER, UNSPECIFIED SITE (HCC): Primary | ICD-10-CM

## 2019-01-13 ASSESSMENT — ENCOUNTER SYMPTOMS
TROUBLE SWALLOWING: 0
DIARRHEA: 0
BACK PAIN: 0
COUGH: 0
BLOOD IN STOOL: 0
COLOR CHANGE: 0
VOMITING: 0
WHEEZING: 0
EYE DISCHARGE: 0
SORE THROAT: 0
SHORTNESS OF BREATH: 0
CONSTIPATION: 0
ABDOMINAL PAIN: 0
RECTAL PAIN: 0
CHEST TIGHTNESS: 0
NAUSEA: 0
FACIAL SWELLING: 0
ABDOMINAL DISTENTION: 0

## 2019-01-14 ENCOUNTER — HOSPITAL ENCOUNTER (OUTPATIENT)
Dept: INFUSION THERAPY | Age: 79
Discharge: HOME | End: 2019-01-14
Payer: MEDICARE

## 2019-01-14 ENCOUNTER — OFFICE VISIT (OUTPATIENT)
Dept: ONCOLOGY | Age: 79
End: 2019-01-14
Payer: MEDICARE

## 2019-01-14 VITALS
OXYGEN SATURATION: 95 % | BODY MASS INDEX: 31.45 KG/M2 | WEIGHT: 200.4 LBS | TEMPERATURE: 97.9 F | SYSTOLIC BLOOD PRESSURE: 108 MMHG | HEART RATE: 66 BPM | DIASTOLIC BLOOD PRESSURE: 58 MMHG | RESPIRATION RATE: 16 BRPM | HEIGHT: 67 IN

## 2019-01-14 VITALS
DIASTOLIC BLOOD PRESSURE: 63 MMHG | WEIGHT: 200.4 LBS | BODY MASS INDEX: 31.45 KG/M2 | HEIGHT: 67 IN | HEART RATE: 67 BPM | SYSTOLIC BLOOD PRESSURE: 126 MMHG | OXYGEN SATURATION: 98 % | RESPIRATION RATE: 16 BRPM | TEMPERATURE: 98 F

## 2019-01-14 DIAGNOSIS — N48.89 PAIN IN PENIS: ICD-10-CM

## 2019-01-14 DIAGNOSIS — R11.0 NAUSEA: ICD-10-CM

## 2019-01-14 DIAGNOSIS — E86.0 DEHYDRATION: ICD-10-CM

## 2019-01-14 DIAGNOSIS — C67.9 MALIGNANT NEOPLASM OF URINARY BLADDER, UNSPECIFIED SITE (HCC): ICD-10-CM

## 2019-01-14 DIAGNOSIS — D49.4 BLADDER TUMOR: ICD-10-CM

## 2019-01-14 DIAGNOSIS — N18.30 CKD (CHRONIC KIDNEY DISEASE) STAGE 3, GFR 30-59 ML/MIN (HCC): ICD-10-CM

## 2019-01-14 DIAGNOSIS — T45.1X5A CHEMOTHERAPY INDUCED NAUSEA AND VOMITING: ICD-10-CM

## 2019-01-14 DIAGNOSIS — C67.9 MALIGNANT NEOPLASM OF URINARY BLADDER, UNSPECIFIED SITE (HCC): Primary | ICD-10-CM

## 2019-01-14 DIAGNOSIS — R11.2 CHEMOTHERAPY INDUCED NAUSEA AND VOMITING: ICD-10-CM

## 2019-01-14 LAB
ALBUMIN SERPL-MCNC: 4 G/DL (ref 3.5–5.1)
ALP BLD-CCNC: 185 U/L (ref 38–126)
ALT SERPL-CCNC: 99 U/L (ref 11–66)
AST SERPL-CCNC: 54 U/L (ref 5–40)
BILIRUB SERPL-MCNC: 1.3 MG/DL (ref 0.3–1.2)
BILIRUBIN DIRECT: < 0.2 MG/DL (ref 0–0.3)
BUN, WHOLE BLOOD: 26 MG/DL (ref 8–26)
CHLORIDE, WHOLE BLOOD: 93 MEQ/L (ref 98–109)
CREATININE, WHOLE BLOOD: 1 MG/DL (ref 0.5–1.2)
GFR, ESTIMATED: 77 ML/MIN/1.73M2
GLUCOSE, WHOLE BLOOD: 74 MG/DL (ref 70–108)
HCT VFR BLD CALC: 50.1 % (ref 42–52)
HEMOGLOBIN: 17 GM/DL (ref 14–18)
IONIZED CALCIUM, WHOLE BLOOD: 1.11 MMOL/L (ref 1.12–1.32)
MCH RBC QN AUTO: 30.6 PG (ref 27–31)
MCHC RBC AUTO-ENTMCNC: 33.9 GM/DL (ref 33–37)
MCV RBC AUTO: 90 FL (ref 80–94)
PDW BLD-RTO: 11.8 % (ref 11.5–14.5)
PLATELET # BLD: 125 THOU/MM3 (ref 130–400)
PMV BLD AUTO: 7.9 FL (ref 7.4–10.4)
POTASSIUM, WHOLE BLOOD: 3.3 MEQ/L (ref 3.5–4.9)
RBC # BLD: 5.54 MILL/MM3 (ref 4.7–6.1)
SEGMENTED NEUTROPHILS ABSOLUTE COUNT: 30.2 THOU/MM3 (ref 1.8–7.7)
SODIUM, WHOLE BLOOD: 135 MEQ/L (ref 138–146)
TOTAL CO2, WHOLE BLOOD: 29 MEQ/L (ref 23–33)
TOTAL PROTEIN: 6.8 G/DL (ref 6.1–8)
WBC # BLD: 36.9 THOU/MM3 (ref 4.8–10.8)

## 2019-01-14 PROCEDURE — 80047 BASIC METABLC PNL IONIZED CA: CPT

## 2019-01-14 PROCEDURE — 85027 COMPLETE CBC AUTOMATED: CPT

## 2019-01-14 PROCEDURE — 80076 HEPATIC FUNCTION PANEL: CPT

## 2019-01-14 PROCEDURE — 36591 DRAW BLOOD OFF VENOUS DEVICE: CPT

## 2019-01-14 PROCEDURE — 99213 OFFICE O/P EST LOW 20 MIN: CPT | Performed by: INTERNAL MEDICINE

## 2019-01-14 PROCEDURE — 2709999900 HC NON-CHARGEABLE SUPPLY

## 2019-01-14 PROCEDURE — 96365 THER/PROPH/DIAG IV INF INIT: CPT

## 2019-01-14 PROCEDURE — G0463 HOSPITAL OUTPT CLINIC VISIT: HCPCS

## 2019-01-14 PROCEDURE — 6360000002 HC RX W HCPCS: Performed by: INTERNAL MEDICINE

## 2019-01-14 PROCEDURE — 2580000003 HC RX 258: Performed by: INTERNAL MEDICINE

## 2019-01-14 PROCEDURE — 96375 TX/PRO/DX INJ NEW DRUG ADDON: CPT

## 2019-01-14 RX ORDER — SODIUM CHLORIDE 0.9 % (FLUSH) 0.9 %
20 SYRINGE (ML) INJECTION PRN
Status: CANCELLED | OUTPATIENT
Start: 2019-01-14

## 2019-01-14 RX ORDER — SODIUM CHLORIDE 0.9 % (FLUSH) 0.9 %
10 SYRINGE (ML) INJECTION PRN
Status: DISCONTINUED | OUTPATIENT
Start: 2019-01-14 | End: 2019-01-15 | Stop reason: HOSPADM

## 2019-01-14 RX ORDER — POTASSIUM CHLORIDE 750 MG/1
TABLET, EXTENDED RELEASE ORAL
Qty: 90 TABLET | Refills: 3 | Status: SHIPPED | OUTPATIENT
Start: 2019-01-14 | End: 2020-01-13

## 2019-01-14 RX ORDER — SODIUM CHLORIDE 0.9 % (FLUSH) 0.9 %
20 SYRINGE (ML) INJECTION PRN
Status: DISCONTINUED | OUTPATIENT
Start: 2019-01-14 | End: 2019-01-15 | Stop reason: HOSPADM

## 2019-01-14 RX ORDER — LIDOCAINE AND PRILOCAINE 25; 25 MG/G; MG/G
30 CREAM TOPICAL PRN
Qty: 30 G | Refills: 3 | Status: SHIPPED | OUTPATIENT
Start: 2019-01-14 | End: 2019-03-15

## 2019-01-14 RX ORDER — ONDANSETRON 2 MG/ML
4 INJECTION INTRAMUSCULAR; INTRAVENOUS ONCE
Status: COMPLETED | OUTPATIENT
Start: 2019-01-14 | End: 2019-01-14

## 2019-01-14 RX ORDER — HEPARIN SODIUM (PORCINE) LOCK FLUSH IV SOLN 100 UNIT/ML 100 UNIT/ML
500 SOLUTION INTRAVENOUS PRN
Status: DISCONTINUED | OUTPATIENT
Start: 2019-01-14 | End: 2019-01-15 | Stop reason: HOSPADM

## 2019-01-14 RX ORDER — SODIUM CHLORIDE 0.9 % (FLUSH) 0.9 %
10 SYRINGE (ML) INJECTION PRN
Status: CANCELLED | OUTPATIENT
Start: 2019-01-14

## 2019-01-14 RX ORDER — HEPARIN SODIUM (PORCINE) LOCK FLUSH IV SOLN 100 UNIT/ML 100 UNIT/ML
500 SOLUTION INTRAVENOUS PRN
Status: CANCELLED | OUTPATIENT
Start: 2019-01-14

## 2019-01-14 RX ADMIN — Medication 10 ML: at 11:32

## 2019-01-14 RX ADMIN — ONDANSETRON 4 MG: 2 INJECTION INTRAMUSCULAR; INTRAVENOUS at 13:05

## 2019-01-14 RX ADMIN — Medication 500 UNITS: at 14:13

## 2019-01-14 RX ADMIN — POTASSIUM CHLORIDE: 2 INJECTION, SOLUTION, CONCENTRATE INTRAVENOUS at 13:07

## 2019-01-14 RX ADMIN — Medication 10 ML: at 14:13

## 2019-01-14 RX ADMIN — Medication 20 ML: at 11:33

## 2019-01-14 RX ADMIN — Medication 10 ML: at 13:04

## 2019-01-14 NOTE — PROGRESS NOTES
[facility]  Ambulated of unit to examination room for appointment with Dr. Lizy Christiansen accompanied by spouse escorted by [facility]

## 2019-01-14 NOTE — PROGRESS NOTES
Patient assessed for the following post hydration fluids/ IV zofran:    Dizziness   No  Lightheadedness  No      Acute nausea/vomiting No  Headache   No  Chest pain/pressure  No  Rash/itching   No  Shortness of breath  No      Patient tolerated hydration fluids without any complications. States stomach feels better after zofran. Had few sips water and up to bathroom to urinate x1. Last vital signs:   /63   Pulse 67   Temp 98 °F (36.7 °C) (Oral)   Resp 16   Ht 5' 7\" (1.702 m)   Wt 200 lb 6.4 oz (90.9 kg)   SpO2 98%   BMI 31.39 kg/m²         Patient instructed if experience any of the above symptoms following today's infusion,he/she is to notify MD immediately or go to the emergency department. Discharge instructions given to patient. Verbalizes understanding. Ambulated off unit per self with belongings accompanied by spouse.

## 2019-01-14 NOTE — PLAN OF CARE
Problem: Infection - Central Venous Catheter-Associated Bloodstream Infection:  Intervention: EDUCATE PATIENT ABOUT SIGNS AND SYMPTOMS OF INFECTION  Instructed to monitor for signs/symptoms of infection at [facility] and call MD if problems develop. Goal: Absence of central venous catheter-associated bloodstream infection  Outcome: Met This Shift  Mediport site with no redness or warmth. Skin over port site intact with no signs of breakdown noted. Patient verbalizes signs/symptoms of port infection and when to notify the physician. Problem: Musculor/Skeletal Functional Status  Intervention: Fall precautions  Verbalized understanding of fall prevention to ask for assistance with ambulation. Call light within reach. ambulates with cane. Goal: Absence of falls  Outcome: Met This Shift  No falls occurred with visit today. Problem: Discharge Planning  Intervention: Discharge to appropriate level of care  Discuss understanding of discharge instructions,follow-up appointments, and when to call the physician. Goal: Knowledge of discharge instructions  Knowledge of discharge instructions     Outcome: Met This Shift  Verbalized understanding of discharge instructions, follow-up appointments, and when to call the physician. Problem: Intellectual/Education/Knowledge Deficit  Intervention: Verbal/written education provided  Administered 500ml . 9Ns 20meq K+ over 1 hour. Stressed importance to drinking fluids. Informed IV zofran for nausea - take medication at home if need. Goal: Teaching initiated upon admission  Outcome: Met This Shift  Will continue to drink more fluids at home. Had few sips water and to to bathroom to urinate x1. Comments: Care plan reviewed with patient and spouse. Patient and spouse verbalize understanding of the plan of care and contribute to goal setting.

## 2019-01-15 DIAGNOSIS — C67.9 MALIGNANT NEOPLASM OF URINARY BLADDER, UNSPECIFIED SITE (HCC): Primary | ICD-10-CM

## 2019-01-16 ENCOUNTER — OFFICE VISIT (OUTPATIENT)
Dept: ONCOLOGY | Age: 79
End: 2019-01-16
Payer: MEDICARE

## 2019-01-16 ENCOUNTER — HOSPITAL ENCOUNTER (OUTPATIENT)
Dept: INFUSION THERAPY | Age: 79
Discharge: HOME | End: 2019-01-16
Payer: MEDICARE

## 2019-01-16 VITALS
HEIGHT: 67 IN | BODY MASS INDEX: 31.01 KG/M2 | DIASTOLIC BLOOD PRESSURE: 68 MMHG | TEMPERATURE: 98.7 F | HEART RATE: 85 BPM | OXYGEN SATURATION: 95 % | SYSTOLIC BLOOD PRESSURE: 136 MMHG | RESPIRATION RATE: 16 BRPM | WEIGHT: 197.6 LBS

## 2019-01-16 VITALS
DIASTOLIC BLOOD PRESSURE: 74 MMHG | HEART RATE: 70 BPM | RESPIRATION RATE: 16 BRPM | OXYGEN SATURATION: 96 % | SYSTOLIC BLOOD PRESSURE: 138 MMHG | TEMPERATURE: 97.9 F | BODY MASS INDEX: 31.01 KG/M2 | HEIGHT: 67 IN | WEIGHT: 197.6 LBS

## 2019-01-16 DIAGNOSIS — G63 POLYNEUROPATHY ASSOCIATED WITH UNDERLYING DISEASE (HCC): ICD-10-CM

## 2019-01-16 DIAGNOSIS — I73.9 CLAUDICATION (HCC): ICD-10-CM

## 2019-01-16 DIAGNOSIS — M48.061 SPINAL STENOSIS OF LUMBAR REGION WITHOUT NEUROGENIC CLAUDICATION: ICD-10-CM

## 2019-01-16 DIAGNOSIS — N18.30 CKD (CHRONIC KIDNEY DISEASE) STAGE 3, GFR 30-59 ML/MIN (HCC): ICD-10-CM

## 2019-01-16 DIAGNOSIS — N48.89 PAIN IN PENIS: ICD-10-CM

## 2019-01-16 DIAGNOSIS — C67.9 MALIGNANT NEOPLASM OF URINARY BLADDER, UNSPECIFIED SITE (HCC): Primary | ICD-10-CM

## 2019-01-16 DIAGNOSIS — D49.4 BLADDER TUMOR: ICD-10-CM

## 2019-01-16 DIAGNOSIS — C67.9 MALIGNANT NEOPLASM OF URINARY BLADDER, UNSPECIFIED SITE (HCC): ICD-10-CM

## 2019-01-16 DIAGNOSIS — E87.6 HYPOKALEMIA: ICD-10-CM

## 2019-01-16 LAB
ALBUMIN SERPL-MCNC: 3.5 G/DL (ref 3.5–5.1)
ALP BLD-CCNC: 200 U/L (ref 38–126)
ALT SERPL-CCNC: 60 U/L (ref 11–66)
AST SERPL-CCNC: 25 U/L (ref 5–40)
BILIRUB SERPL-MCNC: 1 MG/DL (ref 0.3–1.2)
BILIRUBIN DIRECT: < 0.2 MG/DL (ref 0–0.3)
BUN, WHOLE BLOOD: 22 MG/DL (ref 8–26)
CHLORIDE, WHOLE BLOOD: 92 MEQ/L (ref 98–109)
CREATININE, WHOLE BLOOD: 1 MG/DL (ref 0.5–1.2)
GFR, ESTIMATED: 77 ML/MIN/1.73M2
GLUCOSE, WHOLE BLOOD: 120 MG/DL (ref 70–108)
HCT VFR BLD CALC: 48 % (ref 42–52)
HEMOGLOBIN: 16.1 GM/DL (ref 14–18)
IONIZED CALCIUM, WHOLE BLOOD: 1.17 MMOL/L (ref 1.12–1.32)
MCH RBC QN AUTO: 30.2 PG (ref 27–31)
MCHC RBC AUTO-ENTMCNC: 33.5 GM/DL (ref 33–37)
MCV RBC AUTO: 90 FL (ref 80–94)
PDW BLD-RTO: 11.5 % (ref 11.5–14.5)
PLATELET # BLD: 70 THOU/MM3 (ref 130–400)
PMV BLD AUTO: 7.8 FL (ref 7.4–10.4)
POTASSIUM, WHOLE BLOOD: 3.3 MEQ/L (ref 3.5–4.9)
RBC # BLD: 5.32 MILL/MM3 (ref 4.7–6.1)
SEGMENTED NEUTROPHILS ABSOLUTE COUNT: 24 THOU/MM3 (ref 1.8–7.7)
SODIUM, WHOLE BLOOD: 134 MEQ/L (ref 138–146)
TOTAL CO2, WHOLE BLOOD: 28 MEQ/L (ref 23–33)
TOTAL PROTEIN: 6.1 G/DL (ref 6.1–8)
WBC # BLD: 30.2 THOU/MM3 (ref 4.8–10.8)

## 2019-01-16 PROCEDURE — 36591 DRAW BLOOD OFF VENOUS DEVICE: CPT

## 2019-01-16 PROCEDURE — 99214 OFFICE O/P EST MOD 30 MIN: CPT | Performed by: INTERNAL MEDICINE

## 2019-01-16 PROCEDURE — 96365 THER/PROPH/DIAG IV INF INIT: CPT

## 2019-01-16 PROCEDURE — 36415 COLL VENOUS BLD VENIPUNCTURE: CPT

## 2019-01-16 PROCEDURE — 2580000003 HC RX 258: Performed by: INTERNAL MEDICINE

## 2019-01-16 PROCEDURE — 2709999900 HC NON-CHARGEABLE SUPPLY

## 2019-01-16 PROCEDURE — 80047 BASIC METABLC PNL IONIZED CA: CPT

## 2019-01-16 PROCEDURE — 6360000002 HC RX W HCPCS: Performed by: INTERNAL MEDICINE

## 2019-01-16 PROCEDURE — 85027 COMPLETE CBC AUTOMATED: CPT

## 2019-01-16 PROCEDURE — 80076 HEPATIC FUNCTION PANEL: CPT

## 2019-01-16 PROCEDURE — G0463 HOSPITAL OUTPT CLINIC VISIT: HCPCS

## 2019-01-16 RX ORDER — SODIUM CHLORIDE 0.9 % (FLUSH) 0.9 %
20 SYRINGE (ML) INJECTION PRN
Status: CANCELLED | OUTPATIENT
Start: 2019-01-16

## 2019-01-16 RX ORDER — SODIUM CHLORIDE 0.9 % (FLUSH) 0.9 %
10 SYRINGE (ML) INJECTION PRN
Status: DISCONTINUED | OUTPATIENT
Start: 2019-01-16 | End: 2019-01-17 | Stop reason: HOSPADM

## 2019-01-16 RX ORDER — HEPARIN SODIUM (PORCINE) LOCK FLUSH IV SOLN 100 UNIT/ML 100 UNIT/ML
500 SOLUTION INTRAVENOUS PRN
Status: DISCONTINUED | OUTPATIENT
Start: 2019-01-16 | End: 2019-01-17 | Stop reason: HOSPADM

## 2019-01-16 RX ORDER — SODIUM CHLORIDE 0.9 % (FLUSH) 0.9 %
10 SYRINGE (ML) INJECTION PRN
Status: CANCELLED | OUTPATIENT
Start: 2019-01-16

## 2019-01-16 RX ORDER — SODIUM CHLORIDE 0.9 % (FLUSH) 0.9 %
20 SYRINGE (ML) INJECTION PRN
Status: DISCONTINUED | OUTPATIENT
Start: 2019-01-16 | End: 2019-01-17 | Stop reason: HOSPADM

## 2019-01-16 RX ORDER — HEPARIN SODIUM (PORCINE) LOCK FLUSH IV SOLN 100 UNIT/ML 100 UNIT/ML
500 SOLUTION INTRAVENOUS PRN
Status: CANCELLED | OUTPATIENT
Start: 2019-01-16

## 2019-01-16 RX ORDER — PREGABALIN 25 MG/1
25 CAPSULE ORAL 3 TIMES DAILY
Qty: 60 CAPSULE | Refills: 3 | Status: SHIPPED | OUTPATIENT
Start: 2019-01-16 | End: 2019-03-11 | Stop reason: SDUPTHER

## 2019-01-16 RX ADMIN — Medication 20 ML: at 08:46

## 2019-01-16 RX ADMIN — Medication 10 ML: at 11:37

## 2019-01-16 RX ADMIN — Medication 10 ML: at 08:45

## 2019-01-16 RX ADMIN — Medication 500 UNITS: at 11:37

## 2019-01-16 RX ADMIN — POTASSIUM CHLORIDE: 2 INJECTION, SOLUTION, CONCENTRATE INTRAVENOUS at 10:35

## 2019-01-16 ASSESSMENT — ENCOUNTER SYMPTOMS
CHEST TIGHTNESS: 0
EYE DISCHARGE: 0
RECTAL PAIN: 0
VOMITING: 0
FACIAL SWELLING: 0
NAUSEA: 0
BACK PAIN: 0
TROUBLE SWALLOWING: 0
COLOR CHANGE: 0
ABDOMINAL PAIN: 0
ABDOMINAL DISTENTION: 0
DIARRHEA: 0
SHORTNESS OF BREATH: 0
WHEEZING: 0
BLOOD IN STOOL: 0
CONSTIPATION: 0
SORE THROAT: 0
COUGH: 0

## 2019-01-16 NOTE — PROGRESS NOTES
Patient assessed for the following post hydration:    Dizziness   No  Lightheadedness  No     Acute nausea/vomiting No  Headache   No  Chest pain/pressure  No  Rash/itching   No  Shortness of breath  No    Patient tolerated . 9NS 500ml with 20meq potassium infusion over 1hour without any complications. Labs drawn per mediport. No po taken; void x2    Last vital signs:   /74   Pulse 70   Temp 97.9 °F (36.6 °C) (Oral)   Resp 16   Ht 5' 7\" (1.702 m)   Wt 197 lb 9.6 oz (89.6 kg)   SpO2 96%   BMI 30.95 kg/m²     Patient instructed if experience any of the above symptoms following today's infusion,he/she is to notify MD immediately or go to the emergency department. Discharge instructions given to patient. Verbalizes understanding. Ambulated off unit per self with cane with spouse with belongings.

## 2019-01-16 NOTE — PROGRESS NOTES
Labs drawn via central venous catheter, then patient escorted to exam room accompanied by Gretta Segovia

## 2019-01-16 NOTE — PLAN OF CARE
Problem: Musculor/Skeletal Functional Status  Intervention: Provide standby assistance  Discussed the need to use the call light for assistance when getting up to ambulate. Patient uses a cane to assist with ambulation. Call light within reach. Goal: Absence of falls  Outcome: Met This Shift  Free from falls while in O.P. Oncology. Problem: Infection - Central Venous Catheter-Associated Bloodstream Infection:  Intervention: EDUCATE PATIENT ABOUT SIGNS AND SYMPTOMS OF INFECTION  Discussed mediport maintenance, infection prevention, and when to call the physician. Labs drawn. Goal: Absence of central venous catheter-associated bloodstream infection  Outcome: Met This Shift  Mediport site with no redness or warmth. Skin over port intact with no signs of breakdown noted. Patient verbalizes signs/symptoms of port infection and when to notify the physician. Problem: Intellectual/Education/Knowledge Deficit  Intervention: Verbal/written education provided  Patient received . 9NS 500ml with potassium 20meq IV infusion over 1 hour. Patient drank 0 while in OP oncology. Encouraged patient to drink 48 to 64 ounces of fluids everyday. Goal: Teaching initiated upon admission  Outcome: Met This Shift  Verbalizes understanding to signs and symptoms of dehydration and when to call the Physician. Problem: Discharge Planning  Intervention: Discharge to appropriate level of care  Provide discharge instructions. Goal: Knowledge of discharge instructions  Knowledge of discharge instructions     Outcome: Met This Shift  Verbalize understanding of discharge instructions, follow up appointments, and when to call Physician. Comments: Care plan reviewed with patient and spouse. Patient and spouse verbalize understanding of the plan of care and contribute to goal setting.

## 2019-01-22 DIAGNOSIS — C67.9 MALIGNANT NEOPLASM OF URINARY BLADDER, UNSPECIFIED SITE (HCC): Primary | ICD-10-CM

## 2019-01-23 ENCOUNTER — HOSPITAL ENCOUNTER (OUTPATIENT)
Dept: INFUSION THERAPY | Age: 79
Discharge: HOME | End: 2019-01-23
Payer: MEDICARE

## 2019-01-23 ENCOUNTER — OFFICE VISIT (OUTPATIENT)
Dept: ONCOLOGY | Age: 79
End: 2019-01-23
Payer: MEDICARE

## 2019-01-23 VITALS
WEIGHT: 197.4 LBS | BODY MASS INDEX: 30.98 KG/M2 | SYSTOLIC BLOOD PRESSURE: 128 MMHG | HEART RATE: 70 BPM | HEIGHT: 67 IN | TEMPERATURE: 98.5 F | RESPIRATION RATE: 18 BRPM | DIASTOLIC BLOOD PRESSURE: 63 MMHG | OXYGEN SATURATION: 98 %

## 2019-01-23 VITALS
SYSTOLIC BLOOD PRESSURE: 124 MMHG | DIASTOLIC BLOOD PRESSURE: 61 MMHG | TEMPERATURE: 98.4 F | RESPIRATION RATE: 16 BRPM | HEART RATE: 66 BPM | HEIGHT: 67 IN | WEIGHT: 197.4 LBS | OXYGEN SATURATION: 94 % | BODY MASS INDEX: 30.98 KG/M2

## 2019-01-23 DIAGNOSIS — M48.061 SPINAL STENOSIS OF LUMBAR REGION WITHOUT NEUROGENIC CLAUDICATION: ICD-10-CM

## 2019-01-23 DIAGNOSIS — Z51.11 CHEMOTHERAPY MANAGEMENT, ENCOUNTER FOR: ICD-10-CM

## 2019-01-23 DIAGNOSIS — C67.9 MALIGNANT NEOPLASM OF URINARY BLADDER, UNSPECIFIED SITE (HCC): ICD-10-CM

## 2019-01-23 DIAGNOSIS — D49.4 BLADDER TUMOR: ICD-10-CM

## 2019-01-23 DIAGNOSIS — G63 POLYNEUROPATHY ASSOCIATED WITH UNDERLYING DISEASE (HCC): ICD-10-CM

## 2019-01-23 DIAGNOSIS — C67.9 MALIGNANT NEOPLASM OF URINARY BLADDER, UNSPECIFIED SITE (HCC): Primary | ICD-10-CM

## 2019-01-23 DIAGNOSIS — N18.30 CKD (CHRONIC KIDNEY DISEASE) STAGE 3, GFR 30-59 ML/MIN (HCC): ICD-10-CM

## 2019-01-23 DIAGNOSIS — E86.0 DEHYDRATION: ICD-10-CM

## 2019-01-23 DIAGNOSIS — N48.89 PAIN IN PENIS: ICD-10-CM

## 2019-01-23 LAB
ALBUMIN SERPL-MCNC: 3.5 G/DL (ref 3.5–5.1)
ALP BLD-CCNC: 161 U/L (ref 38–126)
ALT SERPL-CCNC: 25 U/L (ref 11–66)
AST SERPL-CCNC: 25 U/L (ref 5–40)
BILIRUB SERPL-MCNC: 0.4 MG/DL (ref 0.3–1.2)
BILIRUBIN DIRECT: < 0.2 MG/DL (ref 0–0.3)
BUN, WHOLE BLOOD: 12 MG/DL (ref 8–26)
CHLORIDE, WHOLE BLOOD: 97 MEQ/L (ref 98–109)
CREATININE, WHOLE BLOOD: 1 MG/DL (ref 0.5–1.2)
GFR, ESTIMATED: 77 ML/MIN/1.73M2
GLUCOSE, WHOLE BLOOD: 93 MG/DL (ref 70–108)
HCT VFR BLD CALC: 44.9 % (ref 42–52)
HEMOGLOBIN: 15.2 GM/DL (ref 14–18)
IONIZED CALCIUM, WHOLE BLOOD: 1.15 MMOL/L (ref 1.12–1.32)
MCH RBC QN AUTO: 30.2 PG (ref 27–31)
MCHC RBC AUTO-ENTMCNC: 33.9 GM/DL (ref 33–37)
MCV RBC AUTO: 89 FL (ref 80–94)
PDW BLD-RTO: 12 % (ref 11.5–14.5)
PLATELET # BLD: 245 THOU/MM3 (ref 130–400)
PMV BLD AUTO: 7 FL (ref 7.4–10.4)
POTASSIUM, WHOLE BLOOD: 3.6 MEQ/L (ref 3.5–4.9)
RBC # BLD: 5.04 MILL/MM3 (ref 4.7–6.1)
SEGMENTED NEUTROPHILS ABSOLUTE COUNT: 11.3 THOU/MM3 (ref 1.8–7.7)
SODIUM, WHOLE BLOOD: 138 MEQ/L (ref 138–146)
TOTAL CO2, WHOLE BLOOD: 27 MEQ/L (ref 23–33)
TOTAL PROTEIN: 6.2 G/DL (ref 6.1–8)
WBC # BLD: 15.7 THOU/MM3 (ref 4.8–10.8)

## 2019-01-23 PROCEDURE — 80076 HEPATIC FUNCTION PANEL: CPT

## 2019-01-23 PROCEDURE — 2580000003 HC RX 258: Performed by: INTERNAL MEDICINE

## 2019-01-23 PROCEDURE — 99214 OFFICE O/P EST MOD 30 MIN: CPT | Performed by: INTERNAL MEDICINE

## 2019-01-23 PROCEDURE — 96367 TX/PROPH/DG ADDL SEQ IV INF: CPT

## 2019-01-23 PROCEDURE — 85027 COMPLETE CBC AUTOMATED: CPT

## 2019-01-23 PROCEDURE — 80047 BASIC METABLC PNL IONIZED CA: CPT

## 2019-01-23 PROCEDURE — 36591 DRAW BLOOD OFF VENOUS DEVICE: CPT

## 2019-01-23 PROCEDURE — 2709999900 HC NON-CHARGEABLE SUPPLY

## 2019-01-23 PROCEDURE — 96413 CHEMO IV INFUSION 1 HR: CPT

## 2019-01-23 PROCEDURE — 6360000002 HC RX W HCPCS: Performed by: INTERNAL MEDICINE

## 2019-01-23 PROCEDURE — G0463 HOSPITAL OUTPT CLINIC VISIT: HCPCS

## 2019-01-23 RX ORDER — HEPARIN SODIUM (PORCINE) LOCK FLUSH IV SOLN 100 UNIT/ML 100 UNIT/ML
500 SOLUTION INTRAVENOUS PRN
Status: DISCONTINUED | OUTPATIENT
Start: 2019-01-23 | End: 2019-01-24 | Stop reason: HOSPADM

## 2019-01-23 RX ORDER — SODIUM CHLORIDE 9 MG/ML
INJECTION, SOLUTION INTRAVENOUS CONTINUOUS
Status: CANCELLED | OUTPATIENT
Start: 2019-01-23

## 2019-01-23 RX ORDER — SODIUM CHLORIDE 0.9 % (FLUSH) 0.9 %
10 SYRINGE (ML) INJECTION PRN
Status: CANCELLED | OUTPATIENT
Start: 2019-01-23

## 2019-01-23 RX ORDER — 0.9 % SODIUM CHLORIDE 0.9 %
10 VIAL (ML) INJECTION ONCE
Status: CANCELLED | OUTPATIENT
Start: 2019-01-23 | End: 2019-01-23

## 2019-01-23 RX ORDER — SODIUM CHLORIDE 0.9 % (FLUSH) 0.9 %
20 SYRINGE (ML) INJECTION PRN
Status: DISCONTINUED | OUTPATIENT
Start: 2019-01-23 | End: 2019-01-24 | Stop reason: HOSPADM

## 2019-01-23 RX ORDER — SODIUM CHLORIDE 0.9 % (FLUSH) 0.9 %
20 SYRINGE (ML) INJECTION PRN
Status: CANCELLED | OUTPATIENT
Start: 2019-01-23

## 2019-01-23 RX ORDER — SODIUM CHLORIDE 0.9 % (FLUSH) 0.9 %
5 SYRINGE (ML) INJECTION PRN
Status: CANCELLED | OUTPATIENT
Start: 2019-01-23

## 2019-01-23 RX ORDER — SODIUM CHLORIDE 0.9 % (FLUSH) 0.9 %
10 SYRINGE (ML) INJECTION PRN
Status: DISCONTINUED | OUTPATIENT
Start: 2019-01-23 | End: 2019-01-24 | Stop reason: HOSPADM

## 2019-01-23 RX ORDER — SODIUM CHLORIDE 9 MG/ML
INJECTION, SOLUTION INTRAVENOUS ONCE
Status: CANCELLED | OUTPATIENT
Start: 2019-01-23 | End: 2019-01-23

## 2019-01-23 RX ORDER — HEPARIN SODIUM (PORCINE) LOCK FLUSH IV SOLN 100 UNIT/ML 100 UNIT/ML
500 SOLUTION INTRAVENOUS PRN
Status: CANCELLED | OUTPATIENT
Start: 2019-01-23

## 2019-01-23 RX ORDER — SODIUM CHLORIDE 9 MG/ML
INJECTION, SOLUTION INTRAVENOUS ONCE
Status: COMPLETED | OUTPATIENT
Start: 2019-01-23 | End: 2019-01-23

## 2019-01-23 RX ORDER — PALONOSETRON 0.05 MG/ML
0.25 INJECTION, SOLUTION INTRAVENOUS ONCE
Status: CANCELLED | OUTPATIENT
Start: 2019-01-23

## 2019-01-23 RX ORDER — DIPHENHYDRAMINE HYDROCHLORIDE 50 MG/ML
50 INJECTION INTRAMUSCULAR; INTRAVENOUS ONCE
Status: CANCELLED | OUTPATIENT
Start: 2019-01-23 | End: 2019-01-23

## 2019-01-23 RX ORDER — METHYLPREDNISOLONE SODIUM SUCCINATE 125 MG/2ML
125 INJECTION, POWDER, LYOPHILIZED, FOR SOLUTION INTRAMUSCULAR; INTRAVENOUS ONCE
Status: CANCELLED | OUTPATIENT
Start: 2019-01-23 | End: 2019-01-23

## 2019-01-23 RX ADMIN — GEMCITABINE 2000 MG: 38 INJECTION INTRAVENOUS at 14:26

## 2019-01-23 RX ADMIN — SODIUM CHLORIDE: 9 INJECTION, SOLUTION INTRAVENOUS at 14:00

## 2019-01-23 RX ADMIN — Medication 20 ML: at 12:01

## 2019-01-23 RX ADMIN — Medication 500 UNITS: at 15:26

## 2019-01-23 RX ADMIN — Medication 10 ML: at 14:00

## 2019-01-23 RX ADMIN — DEXAMETHASONE SODIUM PHOSPHATE 12 MG: 4 INJECTION, SOLUTION INTRAMUSCULAR; INTRAVENOUS at 14:02

## 2019-01-23 RX ADMIN — Medication 10 ML: at 12:00

## 2019-01-23 RX ADMIN — Medication 10 ML: at 15:26

## 2019-01-23 NOTE — PROGRESS NOTES
Pt tolerated lab draw via mediport without issues. Pt ambulated off unit to exam room for appointment with Dr. Ashley Martinez accompanied by wife and LPN.

## 2019-01-23 NOTE — PLAN OF CARE
Problem: Intellectual/Education/Knowledge Deficit  Intervention: Verbal/written education provided  Chemotherapy Teaching     What is Chemotherapy   Drug action [x]   Method of Administration [x]   Handouts given []     Side Effects  Nausea/vomiting [x]   Diarrhea [x]   Fatigue [x]   Signs / Symptoms of infection [x]   Neutropenia [x]   Thrombocytopenia [x]   Alopecia [x]   neuropathy [x]   [place] diet &  the importance of fluids [x]       Micellaneous  Importance of nutrition [x]   Importance of oral hygiene [x]   When to call the MD [x]   Monitoring labs [x]   Use of supportive services []     Explanation of Drug Regimen / Frequency  gemzar     Comments  Verbalized understanding to drug,action,side effects and when to call MD       Goal: Teaching initiated upon admission  Outcome: Met This Shift  Patient verbalizes understanding to verbal information given on gemzar, including action and possible side effects. Aware to call MD if develop complications. Problem: Discharge Planning  Intervention: Discharge to appropriate level of care  Discharge instructions given to pt and reviewed. Follow up appointments discussed. Goal: Knowledge of discharge instructions  Knowledge of discharge instructions     Outcome: Met This Shift  Patient verbalizes understanding of discharge instructions, follow up appointment, and when to call physician if needed    Problem: Musculor/Skeletal Functional Status  Intervention: Provide standby assistance  Fall risks assessed. Precautions reviewed with pt. Call light within reach. Pt ambulated with stand by assist with cane during outpatient visit. Goal: Absence of falls  Outcome: Met This Shift  Pt free of falls this visit. Problem: Infection - Central Venous Catheter-Associated Bloodstream Infection:  Intervention: EDUCATE PATIENT ABOUT SIGNS AND SYMPTOMS OF INFECTION  Discuss port maintenance, infection prevention, signs of infection, and when to call the doctor.      Goal: Absence of central venous catheter-associated bloodstream infection  Outcome: Met This Shift  Mediport site with no redness or warmth. Skin over port site intact with no signs of breakdown noted. Patient verbalizes signs/symptoms of port infection and when to notify the physician. Comments: Care plan reviewed with patient and wife. Patient and wife verbalize understanding of the plan of care and contribute to goal setting.

## 2019-01-23 NOTE — PROGRESS NOTES
Patient assessed for the following post chemotherapy:    Dizziness   No  Lightheadedness  No      Acute nausea/vomiting No  Headache   No  Chest pain/pressure  No  Rash/itching   No  Shortness of breath  No    Patient kept for 20 minutes observation post infusion chemotherapy. Patient tolerated chemotherapy treatment gemzar without any complications. Last vital signs:   /63   Pulse 70   Temp 98.5 °F (36.9 °C) (Oral)   Resp 18   Ht 5' 7\" (1.702 m)   Wt 197 lb 6.4 oz (89.5 kg)   SpO2 98%   BMI 30.92 kg/m²       Patient instructed if experience any of the above symptoms following today's infusion, he is to notify MD immediately or go to the emergency department. Discharge instructions given to patient. Verbalizes understanding. Ambulated off unit with cane accompanied by wife with belongings.

## 2019-01-23 NOTE — ONCOLOGY
Chemotherapy Administration    Pre-assessment Data: Antineoplastic Agents  Other:   See toxicity flow sheet for assessment [x]     Physician Notification of Concerns Related to Chemotherapy Administration:   Physician Notified Vernida Ene / Time of Notification      Interventions:   Lab work assessed  [x]   Height / Weight verified for dose [x]   Current MAR reviewed [x]   Emergency drugs available as appropriate [x]   Anaphylaxis assessment completed [x]   Pre-medications administered as ordered [x]   Blood return noted upon initiation of chemotherapy [x]   Blood return noted each 1-2ml of a vesicant medication if given IV push []   Blood return noted each 2-3ml of a non-vesicant medication if given IV push []   Monitor for signs / symptoms of hypersensitivity reaction [x]   Chemotherapy orders (drug/dose/rate) verified by 2 Chemo certified RNs [x]   Monitor IV site and blood return throughout the infusion of the medication [x]   Document IV site checks on the IV assessment form [x]   Document chemotherapy teaching on the Patient Education tab [x]   Document patient verbalizes understanding of medications being administered [x]   If IV infiltration, see ONS Guidelines []   Other:      []

## 2019-01-24 ENCOUNTER — HOSPITAL ENCOUNTER (OUTPATIENT)
Dept: INFUSION THERAPY | Age: 79
Discharge: HOME | End: 2019-01-24
Payer: MEDICARE

## 2019-01-24 VITALS
OXYGEN SATURATION: 95 % | SYSTOLIC BLOOD PRESSURE: 135 MMHG | WEIGHT: 199.8 LBS | HEART RATE: 77 BPM | RESPIRATION RATE: 18 BRPM | TEMPERATURE: 98.7 F | DIASTOLIC BLOOD PRESSURE: 65 MMHG | BODY MASS INDEX: 31.29 KG/M2

## 2019-01-24 DIAGNOSIS — C67.9 MALIGNANT NEOPLASM OF URINARY BLADDER, UNSPECIFIED SITE (HCC): ICD-10-CM

## 2019-01-24 PROCEDURE — 96413 CHEMO IV INFUSION 1 HR: CPT

## 2019-01-24 PROCEDURE — 96415 CHEMO IV INFUSION ADDL HR: CPT

## 2019-01-24 PROCEDURE — 96366 THER/PROPH/DIAG IV INF ADDON: CPT

## 2019-01-24 PROCEDURE — 96377 APPLICATON ON-BODY INJECTOR: CPT

## 2019-01-24 PROCEDURE — 6360000002 HC RX W HCPCS: Performed by: INTERNAL MEDICINE

## 2019-01-24 PROCEDURE — 96367 TX/PROPH/DG ADDL SEQ IV INF: CPT

## 2019-01-24 PROCEDURE — 2709999900 HC NON-CHARGEABLE SUPPLY

## 2019-01-24 PROCEDURE — 96375 TX/PRO/DX INJ NEW DRUG ADDON: CPT

## 2019-01-24 PROCEDURE — 2580000003 HC RX 258: Performed by: INTERNAL MEDICINE

## 2019-01-24 PROCEDURE — 96361 HYDRATE IV INFUSION ADD-ON: CPT

## 2019-01-24 PROCEDURE — 96360 HYDRATION IV INFUSION INIT: CPT

## 2019-01-24 RX ORDER — 0.9 % SODIUM CHLORIDE 0.9 %
10 VIAL (ML) INJECTION ONCE
Status: CANCELLED | OUTPATIENT
Start: 2019-01-24 | End: 2019-01-24

## 2019-01-24 RX ORDER — SODIUM CHLORIDE 0.9 % (FLUSH) 0.9 %
5 SYRINGE (ML) INJECTION PRN
Status: CANCELLED | OUTPATIENT
Start: 2019-01-24

## 2019-01-24 RX ORDER — SODIUM CHLORIDE 9 MG/ML
INJECTION, SOLUTION INTRAVENOUS ONCE
Status: COMPLETED | OUTPATIENT
Start: 2019-01-24 | End: 2019-01-24

## 2019-01-24 RX ORDER — METHYLPREDNISOLONE SODIUM SUCCINATE 125 MG/2ML
125 INJECTION, POWDER, LYOPHILIZED, FOR SOLUTION INTRAMUSCULAR; INTRAVENOUS ONCE
Status: CANCELLED | OUTPATIENT
Start: 2019-01-24 | End: 2019-01-24

## 2019-01-24 RX ORDER — SODIUM CHLORIDE 0.9 % (FLUSH) 0.9 %
10 SYRINGE (ML) INJECTION PRN
Status: DISCONTINUED | OUTPATIENT
Start: 2019-01-24 | End: 2019-01-25 | Stop reason: HOSPADM

## 2019-01-24 RX ORDER — HEPARIN SODIUM (PORCINE) LOCK FLUSH IV SOLN 100 UNIT/ML 100 UNIT/ML
500 SOLUTION INTRAVENOUS PRN
Status: DISCONTINUED | OUTPATIENT
Start: 2019-01-24 | End: 2019-01-25 | Stop reason: HOSPADM

## 2019-01-24 RX ORDER — DIPHENHYDRAMINE HYDROCHLORIDE 50 MG/ML
50 INJECTION INTRAMUSCULAR; INTRAVENOUS ONCE
Status: CANCELLED | OUTPATIENT
Start: 2019-01-24 | End: 2019-01-24

## 2019-01-24 RX ORDER — PALONOSETRON 0.05 MG/ML
0.25 INJECTION, SOLUTION INTRAVENOUS ONCE
Status: COMPLETED | OUTPATIENT
Start: 2019-01-24 | End: 2019-01-24

## 2019-01-24 RX ORDER — SODIUM CHLORIDE 9 MG/ML
INJECTION, SOLUTION INTRAVENOUS CONTINUOUS
Status: CANCELLED | OUTPATIENT
Start: 2019-01-24

## 2019-01-24 RX ADMIN — SODIUM CHLORIDE 150 MG: 9 INJECTION, SOLUTION INTRAVENOUS at 12:32

## 2019-01-24 RX ADMIN — SODIUM CHLORIDE: 9 INJECTION, SOLUTION INTRAVENOUS at 10:30

## 2019-01-24 RX ADMIN — MAGNESIUM SULFATE HEPTAHYDRATE: 500 INJECTION, SOLUTION INTRAMUSCULAR; INTRAVENOUS at 15:19

## 2019-01-24 RX ADMIN — DEXAMETHASONE SODIUM PHOSPHATE 12 MG: 4 INJECTION, SOLUTION INTRAMUSCULAR; INTRAVENOUS at 11:48

## 2019-01-24 RX ADMIN — Medication 20 ML: at 10:30

## 2019-01-24 RX ADMIN — Medication 500 UNITS: at 16:26

## 2019-01-24 RX ADMIN — MAGNESIUM SULFATE HEPTAHYDRATE: 500 INJECTION, SOLUTION INTRAMUSCULAR; INTRAVENOUS at 10:38

## 2019-01-24 RX ADMIN — MANNITOL: 250 INJECTION, SOLUTION INTRAVENOUS at 13:14

## 2019-01-24 RX ADMIN — PEGFILGRASTIM 6 MG: KIT SUBCUTANEOUS at 15:21

## 2019-01-24 RX ADMIN — PALONOSETRON 0.25 MG: 0.05 INJECTION, SOLUTION INTRAVENOUS at 12:30

## 2019-01-24 RX ADMIN — Medication 10 ML: at 16:26

## 2019-01-24 ASSESSMENT — PAIN DESCRIPTION - PROGRESSION: CLINICAL_PROGRESSION: NOT CHANGED

## 2019-01-24 ASSESSMENT — PAIN SCALES - GENERAL: PAINLEVEL_OUTOF10: 1

## 2019-01-24 ASSESSMENT — PAIN DESCRIPTION - ORIENTATION: ORIENTATION: RIGHT;LEFT

## 2019-01-24 ASSESSMENT — PAIN DESCRIPTION - LOCATION: LOCATION: LEG

## 2019-01-24 ASSESSMENT — PAIN DESCRIPTION - FREQUENCY: FREQUENCY: INTERMITTENT

## 2019-01-24 ASSESSMENT — PAIN DESCRIPTION - ONSET: ONSET: ON-GOING

## 2019-01-24 ASSESSMENT — PAIN DESCRIPTION - PAIN TYPE: TYPE: CHRONIC PAIN

## 2019-01-24 ASSESSMENT — PAIN DESCRIPTION - DESCRIPTORS: DESCRIPTORS: ACHING

## 2019-01-24 NOTE — PROGRESS NOTES
Patient assessed for the following post chemotherapy:    Dizziness   No  Lightheadedness  No      Acute nausea/vomiting No  Headache   No  Chest pain/pressure  No  Rash/itching   No  Shortness of breath  No    Patient kept for 20 minutes observation post infusion chemotherapy. Patient tolerated chemotherapy treatment Cisplatin without any complications. Last vital signs:   /65   Pulse 77   Temp 98.7 °F (37.1 °C) (Oral)   Resp 18   Wt 199 lb 12.8 oz (90.6 kg)   SpO2 95%   BMI 31.29 kg/m²     Patient instructed if experience any of the above symptoms following today's infusion,he is to notify MD immediately or go to the emergency department. Discharge instructions given to patient. Verbalizes understanding. Ambulated off unit per self/cane, accompanied by spouse with belongings.

## 2019-01-24 NOTE — PLAN OF CARE
Problem: Pain:  Intervention: Promote participation in pain management plan  Patient encouraged to take prescribed pain medications and call Physician if pain is not controlled. Goal: Control of chronic pain  Control of chronic pain   Outcome: Met This Shift  Patient rating pain a 1 out of 10 using JHOAN scale, Pain located in legs. Problem: Intellectual/Education/Knowledge Deficit  Intervention: Verbal/written education provided  Chemotherapy Teaching     What is Chemotherapy   Drug action [x]   Method of Administration [x]   Handouts given []     Side Effects  Nausea/vomiting [x]   Diarrhea [x]   Fatigue [x]   Signs / Symptoms of infection [x]   Neutropenia [x]   Thrombocytopenia [x]   Alopecia [x]   neuropathy [x]   [place] diet &  the importance of fluids [x]       Micellaneous  Importance of nutrition [x]   Importance of oral hygiene [x]   When to call the MD [x]   Monitoring labs [x]   Use of supportive services []     Explanation of Drug Regimen / Frequency  Cisplatin     Comments  Verbalized understanding to drug,action,side effects and when to call MD       Goal: Teaching initiated upon admission  Outcome: Met This Shift  Patient verbalizes understanding to verbal information given on Cisplatin ,action and possible side effects. Aware to call MD if develop complications. Problem: Discharge Planning  Intervention: Discharge to appropriate level of care  Discuss understanding of discharge instructions,follow-up appointments, and when to call the physician. Goal: Knowledge of discharge instructions  Knowledge of discharge instructions     Outcome: Met This Shift  Verbalized understanding of discharge instructions, follow-up appointments, and when to call the physician.     Problem: Infection - Central Venous Catheter-Associated Bloodstream Infection:  Intervention: EDUCATE PATIENT ABOUT SIGNS AND SYMPTOMS OF INFECTION  Discuss port maintenance, infection prevention, signs and when to call physician. Goal: Absence of central venous catheter-associated bloodstream infection  Outcome: Met This Shift  Mediport site with no redness or warmth. Skin over port site intact with no signs of breakdown noted. Patient verbalizes signs/symptoms of port infection and when to notify the physician. Comments: Care plan reviewed with patient and spouse. Patient and spouse verbalize understanding of the plan of care and contribute to goal setting.

## 2019-01-24 NOTE — PROGRESS NOTES
On pro neulasta applied as charted. Instructed that after approximately 27 hours,  On-body injector will beep to signal that delivery will begin in 2 minutes. When the dose delivery starts it will take about 45 minutes to complete and during this time, the On-body injector will flash a green light. A series of clicks may be heard. A beep will sound when the dose delivery is complete. Instructed to check to see the status light is Solid Green or has switched off,  dose is complete. Instructed to  look for black line next to EMPTY indicator and place into plastic disposable container. If during the administration of drug, the adhesive becomes wet or leaks instructed to call physician immediately. Patient verbalized understanding.

## 2019-01-24 NOTE — ONCOLOGY
Chemotherapy Administration    Pre-assessment Data: Antineoplastic Agents  Other:   See toxicity flow sheet for assessment [x]     Physician Notification of Concerns Related to Chemotherapy Administration:   Physician Notified Kyle Shields / Time of Notification      Interventions:   Lab work assessed  [x]   Height / Weight verified for dose [x]   Current MAR reviewed [x]   Emergency drugs available as appropriate [x]   Anaphylaxis assessment completed [x]   Pre-medications administered as ordered [x]   Blood return noted upon initiation of chemotherapy [x]   Blood return noted each 1-2ml of a vesicant medication if given IV push []   Blood return noted each 2-3ml of a non-vesicant medication if given IV push []   Monitor for signs / symptoms of hypersensitivity reaction [x]   Chemotherapy orders (drug/dose/rate) verified by 2 Chemo certified RNs [x]   Monitor IV site and blood return throughout the infusion of the medication [x]   Document IV site checks on the IV assessment form [x]   Document chemotherapy teaching on the Patient Education tab [x]   Document patient verbalizes understanding of medications being administered [x]   If IV infiltration, see ONS Guidelines []   Other:     Cisplatin []

## 2019-01-25 ENCOUNTER — HOSPITAL ENCOUNTER (OUTPATIENT)
Dept: INFUSION THERAPY | Age: 79
Discharge: HOME | End: 2019-01-25
Payer: MEDICARE

## 2019-01-25 VITALS
DIASTOLIC BLOOD PRESSURE: 67 MMHG | HEART RATE: 58 BPM | TEMPERATURE: 97.5 F | OXYGEN SATURATION: 98 % | RESPIRATION RATE: 18 BRPM | SYSTOLIC BLOOD PRESSURE: 149 MMHG

## 2019-01-25 DIAGNOSIS — D49.4 BLADDER TUMOR: ICD-10-CM

## 2019-01-25 DIAGNOSIS — C67.9 MALIGNANT NEOPLASM OF URINARY BLADDER, UNSPECIFIED SITE (HCC): ICD-10-CM

## 2019-01-25 DIAGNOSIS — N48.89 PAIN IN PENIS: ICD-10-CM

## 2019-01-25 DIAGNOSIS — N18.30 CKD (CHRONIC KIDNEY DISEASE) STAGE 3, GFR 30-59 ML/MIN (HCC): ICD-10-CM

## 2019-01-25 PROCEDURE — 96360 HYDRATION IV INFUSION INIT: CPT

## 2019-01-25 PROCEDURE — 2709999900 HC NON-CHARGEABLE SUPPLY

## 2019-01-25 PROCEDURE — 6360000002 HC RX W HCPCS: Performed by: INTERNAL MEDICINE

## 2019-01-25 PROCEDURE — 2580000003 HC RX 258: Performed by: INTERNAL MEDICINE

## 2019-01-25 RX ORDER — 0.9 % SODIUM CHLORIDE 0.9 %
500 INTRAVENOUS SOLUTION INTRAVENOUS ONCE
Status: COMPLETED | OUTPATIENT
Start: 2019-01-25 | End: 2019-01-25

## 2019-01-25 RX ORDER — HEPARIN SODIUM (PORCINE) LOCK FLUSH IV SOLN 100 UNIT/ML 100 UNIT/ML
500 SOLUTION INTRAVENOUS PRN
Status: CANCELLED | OUTPATIENT
Start: 2019-01-25

## 2019-01-25 RX ORDER — SODIUM CHLORIDE 0.9 % (FLUSH) 0.9 %
20 SYRINGE (ML) INJECTION PRN
Status: CANCELLED | OUTPATIENT
Start: 2019-01-25

## 2019-01-25 RX ORDER — 0.9 % SODIUM CHLORIDE 0.9 %
500 INTRAVENOUS SOLUTION INTRAVENOUS ONCE
Status: CANCELLED
Start: 2019-01-25 | End: 2019-01-25

## 2019-01-25 RX ORDER — SODIUM CHLORIDE 0.9 % (FLUSH) 0.9 %
10 SYRINGE (ML) INJECTION PRN
Status: CANCELLED | OUTPATIENT
Start: 2019-01-25

## 2019-01-25 RX ORDER — SODIUM CHLORIDE 0.9 % (FLUSH) 0.9 %
10 SYRINGE (ML) INJECTION PRN
Status: DISCONTINUED | OUTPATIENT
Start: 2019-01-25 | End: 2019-01-26 | Stop reason: HOSPADM

## 2019-01-25 RX ORDER — HEPARIN SODIUM (PORCINE) LOCK FLUSH IV SOLN 100 UNIT/ML 100 UNIT/ML
500 SOLUTION INTRAVENOUS PRN
Status: DISCONTINUED | OUTPATIENT
Start: 2019-01-25 | End: 2019-01-26 | Stop reason: HOSPADM

## 2019-01-25 RX ADMIN — Medication 10 ML: at 14:00

## 2019-01-25 RX ADMIN — Medication 10 ML: at 12:55

## 2019-01-25 RX ADMIN — SODIUM CHLORIDE 500 ML: 9 INJECTION, SOLUTION INTRAVENOUS at 12:55

## 2019-01-25 RX ADMIN — Medication 500 UNITS: at 14:00

## 2019-01-25 NOTE — PLAN OF CARE
Problem: Intellectual/Education/Knowledge Deficit  Intervention: Verbal/written education provided  Administer 500ml . 9Ns over 1 hour. Goal: Teaching initiated upon admission  Outcome: Met This Shift  patient understands importance to drinking fluids and monitor for signs of dehydration ands call MD. Had nothing to drink or eat at unit- ate at home prior. Up to bathroom x1 to urinate. Problem: Discharge Planning  Intervention: Discharge to appropriate level of care  Discuss understanding of discharge instructions,follow-up appointments, and when to call the physician. Goal: Knowledge of discharge instructions  Knowledge of discharge instructions     Outcome: Met This Shift  Verbalized understanding of discharge instructions, follow-up appointments, and when to call the physician. Problem: Infection - Central Venous Catheter-Associated Bloodstream Infection:  Intervention: EDUCATE PATIENT ABOUT SIGNS AND SYMPTOMS OF INFECTION  Instructed to monitor for signs/symptoms of infection at mediport and call MD if problems develop. Goal: Absence of central venous catheter-associated bloodstream infection  Outcome: Met This Shift  Mediport site with no redness or warmth. Skin over port site intact with no signs of breakdown noted. Patient verbalizes signs/symptoms of port infection and when to notify the physician. Comments: Care plan reviewed with patient and spouse. Patient and spouse verbalize understanding of the plan of care and contribute to goal setting.

## 2019-01-25 NOTE — PROGRESS NOTES
Patient assessed for the following post hydration infusion:    Dizziness   No  Lightheadedness  No      Acute nausea/vomiting No  Headache   No  Chest pain/pressure  No  Rash/itching   No  Shortness of breath  No        Patient tolerated hydration fluids without any complications. Last vital signs:   BP (!) 149/67   Pulse 58   Temp 97.5 °F (36.4 °C) (Oral)   Resp 18   SpO2 98%     Patient  Nothing to eat or drink during infusion. Ate at home prior. Up to bathroom x1 to urinate. Patient instructed if experience any of the above symptoms following today's infusion,he/she is to notify MD immediately or go to the emergency department. Discharge instructions given to patient. Verbalizes understanding. Ambulated off unit per self with belongings.

## 2019-02-02 ASSESSMENT — ENCOUNTER SYMPTOMS
CHEST TIGHTNESS: 0
DIARRHEA: 0
BLOOD IN STOOL: 0
EYE DISCHARGE: 0
ABDOMINAL PAIN: 0
NAUSEA: 0
SHORTNESS OF BREATH: 0
WHEEZING: 0
RECTAL PAIN: 0
COUGH: 0
SORE THROAT: 0
TROUBLE SWALLOWING: 0
CONSTIPATION: 0
ABDOMINAL DISTENTION: 0
BACK PAIN: 0
FACIAL SWELLING: 0
COLOR CHANGE: 0
VOMITING: 0

## 2019-02-06 ENCOUNTER — HOSPITAL ENCOUNTER (OUTPATIENT)
Dept: INFUSION THERAPY | Age: 79
Discharge: HOME | End: 2019-02-06
Payer: MEDICARE

## 2019-02-06 ENCOUNTER — OFFICE VISIT (OUTPATIENT)
Dept: ONCOLOGY | Age: 79
End: 2019-02-06
Payer: MEDICARE

## 2019-02-06 VITALS
RESPIRATION RATE: 18 BRPM | DIASTOLIC BLOOD PRESSURE: 70 MMHG | WEIGHT: 200.2 LBS | SYSTOLIC BLOOD PRESSURE: 125 MMHG | OXYGEN SATURATION: 97 % | HEART RATE: 70 BPM | TEMPERATURE: 97.7 F | HEIGHT: 67 IN | BODY MASS INDEX: 31.42 KG/M2

## 2019-02-06 VITALS
OXYGEN SATURATION: 95 % | HEART RATE: 66 BPM | HEIGHT: 67 IN | DIASTOLIC BLOOD PRESSURE: 61 MMHG | WEIGHT: 200.2 LBS | TEMPERATURE: 98.3 F | SYSTOLIC BLOOD PRESSURE: 108 MMHG | RESPIRATION RATE: 18 BRPM | BODY MASS INDEX: 31.42 KG/M2

## 2019-02-06 DIAGNOSIS — R53.81 PHYSICAL DECONDITIONING: Primary | ICD-10-CM

## 2019-02-06 DIAGNOSIS — G62.9 PERIPHERAL POLYNEUROPATHY: ICD-10-CM

## 2019-02-06 DIAGNOSIS — Z51.11 CHEMOTHERAPY MANAGEMENT, ENCOUNTER FOR: ICD-10-CM

## 2019-02-06 DIAGNOSIS — C67.9 MALIGNANT NEOPLASM OF URINARY BLADDER, UNSPECIFIED SITE (HCC): ICD-10-CM

## 2019-02-06 DIAGNOSIS — N18.30 CKD (CHRONIC KIDNEY DISEASE) STAGE 3, GFR 30-59 ML/MIN (HCC): ICD-10-CM

## 2019-02-06 DIAGNOSIS — N48.89 PAIN IN PENIS: ICD-10-CM

## 2019-02-06 DIAGNOSIS — D49.4 BLADDER TUMOR: ICD-10-CM

## 2019-02-06 LAB
ALBUMIN SERPL-MCNC: 3.7 G/DL (ref 3.5–5.1)
ALP BLD-CCNC: 245 U/L (ref 38–126)
ALT SERPL-CCNC: 25 U/L (ref 11–66)
AST SERPL-CCNC: 24 U/L (ref 5–40)
BASOPHILS # BLD: 0.7 %
BASOPHILS ABSOLUTE: 0.1 THOU/MM3 (ref 0–0.1)
BILIRUB SERPL-MCNC: 0.4 MG/DL (ref 0.3–1.2)
BILIRUBIN DIRECT: < 0.2 MG/DL (ref 0–0.3)
BUN, WHOLE BLOOD: 15 MG/DL (ref 8–26)
CHLORIDE, WHOLE BLOOD: 98 MEQ/L (ref 98–109)
CREATININE, WHOLE BLOOD: 0.9 MG/DL (ref 0.5–1.2)
EOSINOPHIL # BLD: 0.3 %
EOSINOPHILS ABSOLUTE: 0.1 THOU/MM3 (ref 0–0.4)
GFR, ESTIMATED: 86 ML/MIN/1.73M2
GLUCOSE, WHOLE BLOOD: 109 MG/DL (ref 70–108)
HCT VFR BLD CALC: 43.5 % (ref 42–52)
HEMOGLOBIN: 14.7 GM/DL (ref 14–18)
IMMATURE GRANS (ABS): 0.39 THOU/MM3 (ref 0–0.07)
IMMATURE GRANULOCYTES: 1.8 %
IONIZED CALCIUM, WHOLE BLOOD: 1.16 MMOL/L (ref 1.12–1.32)
LYMPHOCYTES # BLD: 12.3 %
LYMPHOCYTES ABSOLUTE: 2.6 THOU/MM3 (ref 1–4.8)
MCH RBC QN AUTO: 30.3 PG (ref 27–31)
MCHC RBC AUTO-ENTMCNC: 33.9 GM/DL (ref 33–37)
MCV RBC AUTO: 90 FL (ref 80–94)
MONOCYTES # BLD: 7.2 %
MONOCYTES ABSOLUTE: 1.6 THOU/MM3 (ref 0.4–1.3)
NUCLEATED RED BLOOD CELLS: 0 /100 WBC
PDW BLD-RTO: 12.1 % (ref 11.5–14.5)
PLATELET # BLD: 102 THOU/MM3 (ref 130–400)
PMV BLD AUTO: 7.6 FL (ref 7.4–10.4)
POTASSIUM, WHOLE BLOOD: 3.6 MEQ/L (ref 3.5–4.9)
RBC # BLD: 4.85 MILL/MM3 (ref 4.7–6.1)
SEG NEUTROPHILS: 77.7 %
SEGMENTED NEUTROPHILS ABSOLUTE COUNT: 16.7 THOU/MM3 (ref 1.8–7.7)
SODIUM, WHOLE BLOOD: 138 MEQ/L (ref 138–146)
TOTAL CO2, WHOLE BLOOD: 28 MEQ/L (ref 23–33)
TOTAL PROTEIN: 6.2 G/DL (ref 6.1–8)
WBC # BLD: 22.4 THOU/MM3 (ref 4.8–10.8)

## 2019-02-06 PROCEDURE — 80076 HEPATIC FUNCTION PANEL: CPT

## 2019-02-06 PROCEDURE — 96413 CHEMO IV INFUSION 1 HR: CPT

## 2019-02-06 PROCEDURE — 6360000002 HC RX W HCPCS: Performed by: INTERNAL MEDICINE

## 2019-02-06 PROCEDURE — 96377 APPLICATON ON-BODY INJECTOR: CPT

## 2019-02-06 PROCEDURE — 96417 CHEMO IV INFUS EACH ADDL SEQ: CPT

## 2019-02-06 PROCEDURE — 96375 TX/PRO/DX INJ NEW DRUG ADDON: CPT

## 2019-02-06 PROCEDURE — 99214 OFFICE O/P EST MOD 30 MIN: CPT | Performed by: INTERNAL MEDICINE

## 2019-02-06 PROCEDURE — 85025 COMPLETE CBC W/AUTO DIFF WBC: CPT

## 2019-02-06 PROCEDURE — 36591 DRAW BLOOD OFF VENOUS DEVICE: CPT

## 2019-02-06 PROCEDURE — 2709999900 HC NON-CHARGEABLE SUPPLY

## 2019-02-06 PROCEDURE — 96367 TX/PROPH/DG ADDL SEQ IV INF: CPT

## 2019-02-06 PROCEDURE — 80047 BASIC METABLC PNL IONIZED CA: CPT

## 2019-02-06 PROCEDURE — 2580000003 HC RX 258: Performed by: INTERNAL MEDICINE

## 2019-02-06 PROCEDURE — 96366 THER/PROPH/DIAG IV INF ADDON: CPT

## 2019-02-06 PROCEDURE — G0463 HOSPITAL OUTPT CLINIC VISIT: HCPCS

## 2019-02-06 PROCEDURE — 96415 CHEMO IV INFUSION ADDL HR: CPT

## 2019-02-06 RX ORDER — SODIUM CHLORIDE 0.9 % (FLUSH) 0.9 %
10 SYRINGE (ML) INJECTION PRN
Status: DISCONTINUED | OUTPATIENT
Start: 2019-02-06 | End: 2019-02-07 | Stop reason: HOSPADM

## 2019-02-06 RX ORDER — SODIUM CHLORIDE 0.9 % (FLUSH) 0.9 %
5 SYRINGE (ML) INJECTION PRN
Status: CANCELLED | OUTPATIENT
Start: 2019-02-06

## 2019-02-06 RX ORDER — SODIUM CHLORIDE 9 MG/ML
INJECTION, SOLUTION INTRAVENOUS CONTINUOUS
Status: CANCELLED | OUTPATIENT
Start: 2019-02-06

## 2019-02-06 RX ORDER — 0.9 % SODIUM CHLORIDE 0.9 %
10 VIAL (ML) INJECTION ONCE
Status: CANCELLED | OUTPATIENT
Start: 2019-02-06 | End: 2019-02-06

## 2019-02-06 RX ORDER — PALONOSETRON 0.05 MG/ML
0.25 INJECTION, SOLUTION INTRAVENOUS ONCE
Status: CANCELLED | OUTPATIENT
Start: 2019-02-20

## 2019-02-06 RX ORDER — HEPARIN SODIUM (PORCINE) LOCK FLUSH IV SOLN 100 UNIT/ML 100 UNIT/ML
500 SOLUTION INTRAVENOUS PRN
Status: DISCONTINUED | OUTPATIENT
Start: 2019-02-06 | End: 2019-02-07 | Stop reason: HOSPADM

## 2019-02-06 RX ORDER — SODIUM CHLORIDE 9 MG/ML
INJECTION, SOLUTION INTRAVENOUS ONCE
Status: COMPLETED | OUTPATIENT
Start: 2019-02-06 | End: 2019-02-06

## 2019-02-06 RX ORDER — DIPHENHYDRAMINE HYDROCHLORIDE 50 MG/ML
50 INJECTION INTRAMUSCULAR; INTRAVENOUS ONCE
Status: CANCELLED | OUTPATIENT
Start: 2019-02-06 | End: 2019-02-06

## 2019-02-06 RX ORDER — SODIUM CHLORIDE 0.9 % (FLUSH) 0.9 %
5 SYRINGE (ML) INJECTION PRN
Status: CANCELLED | OUTPATIENT
Start: 2019-02-20

## 2019-02-06 RX ORDER — HEPARIN SODIUM (PORCINE) LOCK FLUSH IV SOLN 100 UNIT/ML 100 UNIT/ML
500 SOLUTION INTRAVENOUS PRN
Status: CANCELLED | OUTPATIENT
Start: 2019-02-06

## 2019-02-06 RX ORDER — DIPHENHYDRAMINE HYDROCHLORIDE 50 MG/ML
50 INJECTION INTRAMUSCULAR; INTRAVENOUS ONCE
Status: CANCELLED | OUTPATIENT
Start: 2019-02-20 | End: 2019-02-20

## 2019-02-06 RX ORDER — SODIUM CHLORIDE 9 MG/ML
INJECTION, SOLUTION INTRAVENOUS ONCE
Status: CANCELLED | OUTPATIENT
Start: 2019-02-20 | End: 2019-02-20

## 2019-02-06 RX ORDER — DEXAMETHASONE 4 MG/1
8 TABLET ORAL
Qty: 6 TABLET | Refills: 3 | Status: SHIPPED | OUTPATIENT
Start: 2019-02-06 | End: 2019-02-09

## 2019-02-06 RX ORDER — SODIUM CHLORIDE 0.9 % (FLUSH) 0.9 %
10 SYRINGE (ML) INJECTION PRN
Status: CANCELLED | OUTPATIENT
Start: 2019-02-20

## 2019-02-06 RX ORDER — HEPARIN SODIUM (PORCINE) LOCK FLUSH IV SOLN 100 UNIT/ML 100 UNIT/ML
500 SOLUTION INTRAVENOUS PRN
Status: CANCELLED | OUTPATIENT
Start: 2019-02-20

## 2019-02-06 RX ORDER — SODIUM CHLORIDE 9 MG/ML
INJECTION, SOLUTION INTRAVENOUS CONTINUOUS
Status: CANCELLED | OUTPATIENT
Start: 2019-02-20

## 2019-02-06 RX ORDER — SODIUM CHLORIDE 0.9 % (FLUSH) 0.9 %
10 SYRINGE (ML) INJECTION PRN
Status: CANCELLED | OUTPATIENT
Start: 2019-02-06

## 2019-02-06 RX ORDER — SODIUM CHLORIDE 0.9 % (FLUSH) 0.9 %
20 SYRINGE (ML) INJECTION PRN
Status: DISCONTINUED | OUTPATIENT
Start: 2019-02-06 | End: 2019-02-07 | Stop reason: HOSPADM

## 2019-02-06 RX ORDER — PALONOSETRON 0.05 MG/ML
0.25 INJECTION, SOLUTION INTRAVENOUS ONCE
Status: COMPLETED | OUTPATIENT
Start: 2019-02-06 | End: 2019-02-06

## 2019-02-06 RX ORDER — METHYLPREDNISOLONE SODIUM SUCCINATE 125 MG/2ML
125 INJECTION, POWDER, LYOPHILIZED, FOR SOLUTION INTRAMUSCULAR; INTRAVENOUS ONCE
Status: CANCELLED | OUTPATIENT
Start: 2019-02-20 | End: 2019-02-20

## 2019-02-06 RX ORDER — SODIUM CHLORIDE 0.9 % (FLUSH) 0.9 %
20 SYRINGE (ML) INJECTION PRN
Status: CANCELLED | OUTPATIENT
Start: 2019-02-06

## 2019-02-06 RX ORDER — METHYLPREDNISOLONE SODIUM SUCCINATE 125 MG/2ML
125 INJECTION, POWDER, LYOPHILIZED, FOR SOLUTION INTRAMUSCULAR; INTRAVENOUS ONCE
Status: CANCELLED | OUTPATIENT
Start: 2019-02-06 | End: 2019-02-06

## 2019-02-06 RX ORDER — 0.9 % SODIUM CHLORIDE 0.9 %
10 VIAL (ML) INJECTION ONCE
Status: CANCELLED | OUTPATIENT
Start: 2019-02-20 | End: 2019-02-20

## 2019-02-06 RX ADMIN — PEGFILGRASTIM 6 MG: KIT SUBCUTANEOUS at 14:36

## 2019-02-06 RX ADMIN — MAGNESIUM SULFATE HEPTAHYDRATE: 500 INJECTION, SOLUTION INTRAMUSCULAR; INTRAVENOUS at 14:30

## 2019-02-06 RX ADMIN — Medication 20 ML: at 08:06

## 2019-02-06 RX ADMIN — SODIUM CHLORIDE 150 MG: 9 INJECTION, SOLUTION INTRAVENOUS at 10:50

## 2019-02-06 RX ADMIN — SODIUM CHLORIDE: 9 INJECTION, SOLUTION INTRAVENOUS at 09:40

## 2019-02-06 RX ADMIN — DEXAMETHASONE SODIUM PHOSPHATE 12 MG: 4 INJECTION, SOLUTION INTRA-ARTICULAR; INTRALESIONAL; INTRAMUSCULAR; INTRAVENOUS; SOFT TISSUE at 11:30

## 2019-02-06 RX ADMIN — GEMCITABINE 2000 MG: 38 INJECTION INTRAVENOUS at 11:50

## 2019-02-06 RX ADMIN — MANNITOL: 250 INJECTION, SOLUTION INTRAVENOUS at 12:25

## 2019-02-06 RX ADMIN — Medication 500 UNITS: at 15:33

## 2019-02-06 RX ADMIN — MAGNESIUM SULFATE HEPTAHYDRATE: 500 INJECTION, SOLUTION INTRAMUSCULAR; INTRAVENOUS at 09:45

## 2019-02-06 RX ADMIN — Medication 10 ML: at 08:05

## 2019-02-06 RX ADMIN — Medication 10 ML: at 15:33

## 2019-02-06 RX ADMIN — PALONOSETRON 0.25 MG: 0.05 INJECTION, SOLUTION INTRAVENOUS at 11:27

## 2019-02-06 ASSESSMENT — ENCOUNTER SYMPTOMS
BLOOD IN STOOL: 0
FACIAL SWELLING: 0
RECTAL PAIN: 0
CHEST TIGHTNESS: 0
WHEEZING: 0
DIARRHEA: 0
CONSTIPATION: 0
COUGH: 0
VOMITING: 0
BACK PAIN: 0
COLOR CHANGE: 0
SORE THROAT: 0
NAUSEA: 0
ABDOMINAL DISTENTION: 0
ABDOMINAL PAIN: 0
TROUBLE SWALLOWING: 0
SHORTNESS OF BREATH: 0
EYE DISCHARGE: 0

## 2019-02-06 NOTE — PLAN OF CARE
Problem: Intellectual/Education/Knowledge Deficit  Intervention: Verbal/written education provided  Chemotherapy Teaching     What is Chemotherapy   Drug action [x]   Method of Administration [x]   Handouts given []     Side Effects  Nausea/vomiting [x]   Diarrhea [x]   Fatigue [x]   Signs / Symptoms of infection [x]   Neutropenia [x]   Thrombocytopenia [x]   Alopecia [x]   neuropathy [x]   [place] diet &  the importance of fluids [x]       Micellaneous  Importance of nutrition [x]   Importance of oral hygiene [x]   When to call the MD [x]   Monitoring labs [x]   Use of supportive services []     Explanation of Drug Regimen / Frequency  Gemzar, cisplatin, and neulasta on-pro     Comments  Verbalized understanding to drug,action,side effects and when to call MD       Goal: Teaching initiated upon admission  Outcome: Met This Shift  Patient verbalizes understanding to verbal information given on gemzar, cisplatin, and neulasta on-pro,action and possible side effects. Aware to call MD if develop complications. Problem: Discharge Planning  Intervention: Discharge to appropriate level of care  Provide discharge instructions. Goal: Knowledge of discharge instructions  Knowledge of discharge instructions     Outcome: Met This Shift  Verbalize understanding of discharge instructions, follow up appointments, and when to call Physician. Problem: Infection - Central Venous Catheter-Associated Bloodstream Infection:  Intervention: EDUCATE PATIENT ABOUT SIGNS AND SYMPTOMS OF INFECTION  Discussed mediport maintenance, infection prevention, and when to call the physician. Labs drawn. Goal: Absence of central venous catheter-associated bloodstream infection  Outcome: Met This Shift  Mediport site with no redness or warmth. Skin over port intact with no signs of breakdown noted. Patient verbalizes signs/symptoms of port infection and when to notify the physician.      Problem: Musculor/Skeletal Functional Status  Intervention: Provide standby assistance  Discussed the need to use the call light for assistance when getting up to ambulate. Patient uses a cane to assist with ambulation. Call light within reach. Goal: Absence of falls  Outcome: Met This Shift  Free from falls while in O.P. Oncology. Comments: Care plan reviewed with patient and spouse. Patient and spouse verbalize understanding of the plan of care and contribute to goal setting.

## 2019-02-06 NOTE — ONCOLOGY
Chemotherapy Administration    Pre-assessment Data: Antineoplastic Agents  Other:   See toxicity flow sheet for assessment [x]     Physician Notification of Concerns Related to Chemotherapy Administration:   Physician Notified Criss De Oliveira / Time of Notification Dr Jess Tinajero seen today.      Interventions:   Lab work assessed  [x]   Height / Weight verified for dose [x]   Current MAR reviewed [x]   Emergency drugs available as appropriate [x]   Anaphylaxis assessment completed [x]   Pre-medications administered as ordered [x]   Blood return noted upon initiation of chemotherapy [x]   Blood return noted each 1-2ml of a vesicant medication if given IV push []   Blood return noted each 2-3ml of a non-vesicant medication if given IV push []   Monitor for signs / symptoms of hypersensitivity reaction [x]   Chemotherapy orders (drug/dose/rate) verified by 2 Chemo certified RNs [x]   Monitor IV site and blood return throughout the infusion of the medication [x]   Document IV site checks on the IV assessment form [x]   Document chemotherapy teaching on the Patient Education tab [x]   Document patient verbalizes understanding of medications being administered [x]   If IV infiltration, see ONS Guidelines []   Other: gemzar, cisplatin, and neulasta on-pro     [x]

## 2019-02-06 NOTE — PROGRESS NOTES
Patient assessed for the following post chemotherapy:    Dizziness   No  Lightheadedness  No     Acute nausea/vomiting No  Headache   No  Chest pain/pressure  No  Rash/itching   No  Shortness of breath  No    Patient kept for 20 minutes observation post infusion chemotherapy. Patient tolerated chemotherapy treatment gemzar, cisplatin, and neulasta on-pro without any complications. Labs drawn per mediport. Last vital signs:   /70   Pulse 70   Temp 97.7 °F (36.5 °C) (Oral)   Resp 18   Ht 5' 7\" (1.702 m)   Wt 200 lb 3.2 oz (90.8 kg)   SpO2 97%   BMI 31.36 kg/m²     Patient instructed if experience any of the above symptoms following today's infusion and neulasta, he/she is to notify MD immediately or go to the emergency department. Discharge instructions given to patient. Verbalizes understanding. Ambulated off unit per self with cane and with spouse with belongings.

## 2019-02-14 ENCOUNTER — HOSPITAL ENCOUNTER (OUTPATIENT)
Dept: PHYSICAL THERAPY | Age: 79
Setting detail: THERAPIES SERIES
Discharge: HOME | End: 2019-02-14
Payer: MEDICARE

## 2019-02-14 PROCEDURE — 97162 PT EVAL MOD COMPLEX 30 MIN: CPT

## 2019-02-19 DIAGNOSIS — C67.9 MALIGNANT NEOPLASM OF URINARY BLADDER, UNSPECIFIED SITE (HCC): Primary | ICD-10-CM

## 2019-02-20 ENCOUNTER — HOSPITAL ENCOUNTER (OUTPATIENT)
Dept: INFUSION THERAPY | Age: 79
Discharge: HOME | End: 2019-02-20
Payer: MEDICARE

## 2019-02-20 ENCOUNTER — OFFICE VISIT (OUTPATIENT)
Dept: ONCOLOGY | Age: 79
End: 2019-02-20
Payer: MEDICARE

## 2019-02-20 VITALS
WEIGHT: 201.6 LBS | HEIGHT: 67 IN | TEMPERATURE: 98 F | RESPIRATION RATE: 18 BRPM | HEART RATE: 67 BPM | SYSTOLIC BLOOD PRESSURE: 116 MMHG | DIASTOLIC BLOOD PRESSURE: 59 MMHG | OXYGEN SATURATION: 94 % | BODY MASS INDEX: 31.64 KG/M2

## 2019-02-20 VITALS
TEMPERATURE: 98 F | DIASTOLIC BLOOD PRESSURE: 63 MMHG | HEART RATE: 70 BPM | RESPIRATION RATE: 18 BRPM | SYSTOLIC BLOOD PRESSURE: 143 MMHG | OXYGEN SATURATION: 95 %

## 2019-02-20 DIAGNOSIS — N48.89 PAIN IN PENIS: ICD-10-CM

## 2019-02-20 DIAGNOSIS — D49.4 BLADDER TUMOR: ICD-10-CM

## 2019-02-20 DIAGNOSIS — Z51.11 CHEMOTHERAPY MANAGEMENT, ENCOUNTER FOR: ICD-10-CM

## 2019-02-20 DIAGNOSIS — N18.30 CKD (CHRONIC KIDNEY DISEASE) STAGE 3, GFR 30-59 ML/MIN (HCC): ICD-10-CM

## 2019-02-20 DIAGNOSIS — C67.9 MALIGNANT NEOPLASM OF URINARY BLADDER, UNSPECIFIED SITE (HCC): Primary | ICD-10-CM

## 2019-02-20 DIAGNOSIS — G62.9 PERIPHERAL POLYNEUROPATHY: ICD-10-CM

## 2019-02-20 DIAGNOSIS — C67.9 MALIGNANT NEOPLASM OF URINARY BLADDER, UNSPECIFIED SITE (HCC): ICD-10-CM

## 2019-02-20 LAB
ALBUMIN SERPL-MCNC: 3.5 G/DL (ref 3.5–5.1)
ALP BLD-CCNC: 188 U/L (ref 38–126)
ALT SERPL-CCNC: 24 U/L (ref 11–66)
AST SERPL-CCNC: 22 U/L (ref 5–40)
BILIRUB SERPL-MCNC: 0.5 MG/DL (ref 0.3–1.2)
BILIRUBIN DIRECT: < 0.2 MG/DL (ref 0–0.3)
BUN, WHOLE BLOOD: 9 MG/DL (ref 8–26)
CHLORIDE, WHOLE BLOOD: 97 MEQ/L (ref 98–109)
CREATININE, WHOLE BLOOD: 0.9 MG/DL (ref 0.5–1.2)
GFR, ESTIMATED: 86 ML/MIN/1.73M2
GLUCOSE, WHOLE BLOOD: 129 MG/DL (ref 70–108)
HCT VFR BLD CALC: 39.1 % (ref 42–52)
HEMOGLOBIN: 13.3 GM/DL (ref 14–18)
IONIZED CALCIUM, WHOLE BLOOD: 1.09 MMOL/L (ref 1.12–1.32)
MCH RBC QN AUTO: 30.3 PG (ref 27–31)
MCHC RBC AUTO-ENTMCNC: 34.1 GM/DL (ref 33–37)
MCV RBC AUTO: 89 FL (ref 80–94)
PDW BLD-RTO: 12 % (ref 11.5–14.5)
PLATELET # BLD: 224 THOU/MM3 (ref 130–400)
PMV BLD AUTO: 6.8 FL (ref 7.4–10.4)
POTASSIUM, WHOLE BLOOD: 3.6 MEQ/L (ref 3.5–4.9)
RBC # BLD: 4.41 MILL/MM3 (ref 4.7–6.1)
SEG NEUTROPHILS: 74 % (ref 43–75)
SEGMENTED NEUTROPHILS ABSOLUTE COUNT: 10.3 THOU/MM3 (ref 1.8–7.7)
SODIUM, WHOLE BLOOD: 138 MEQ/L (ref 138–146)
TOTAL CO2, WHOLE BLOOD: 30 MEQ/L (ref 23–33)
TOTAL PROTEIN: 5.9 G/DL (ref 6.1–8)
WBC # BLD: 13.9 THOU/MM3 (ref 4.8–10.8)

## 2019-02-20 PROCEDURE — 85027 COMPLETE CBC AUTOMATED: CPT

## 2019-02-20 PROCEDURE — 99214 OFFICE O/P EST MOD 30 MIN: CPT | Performed by: INTERNAL MEDICINE

## 2019-02-20 PROCEDURE — 6360000002 HC RX W HCPCS: Performed by: INTERNAL MEDICINE

## 2019-02-20 PROCEDURE — 96366 THER/PROPH/DIAG IV INF ADDON: CPT

## 2019-02-20 PROCEDURE — 96375 TX/PRO/DX INJ NEW DRUG ADDON: CPT

## 2019-02-20 PROCEDURE — 96417 CHEMO IV INFUS EACH ADDL SEQ: CPT

## 2019-02-20 PROCEDURE — 2709999900 HC NON-CHARGEABLE SUPPLY

## 2019-02-20 PROCEDURE — 96367 TX/PROPH/DG ADDL SEQ IV INF: CPT

## 2019-02-20 PROCEDURE — 80047 BASIC METABLC PNL IONIZED CA: CPT

## 2019-02-20 PROCEDURE — 96377 APPLICATON ON-BODY INJECTOR: CPT

## 2019-02-20 PROCEDURE — 2580000003 HC RX 258: Performed by: INTERNAL MEDICINE

## 2019-02-20 PROCEDURE — 96413 CHEMO IV INFUSION 1 HR: CPT

## 2019-02-20 PROCEDURE — 80076 HEPATIC FUNCTION PANEL: CPT

## 2019-02-20 PROCEDURE — 36591 DRAW BLOOD OFF VENOUS DEVICE: CPT

## 2019-02-20 PROCEDURE — G0463 HOSPITAL OUTPT CLINIC VISIT: HCPCS

## 2019-02-20 PROCEDURE — 96415 CHEMO IV INFUSION ADDL HR: CPT

## 2019-02-20 RX ORDER — SODIUM CHLORIDE 0.9 % (FLUSH) 0.9 %
10 SYRINGE (ML) INJECTION PRN
Status: DISCONTINUED | OUTPATIENT
Start: 2019-02-20 | End: 2019-02-21 | Stop reason: HOSPADM

## 2019-02-20 RX ORDER — HEPARIN SODIUM (PORCINE) LOCK FLUSH IV SOLN 100 UNIT/ML 100 UNIT/ML
500 SOLUTION INTRAVENOUS PRN
Status: CANCELLED | OUTPATIENT
Start: 2019-02-20

## 2019-02-20 RX ORDER — PALONOSETRON 0.05 MG/ML
0.25 INJECTION, SOLUTION INTRAVENOUS ONCE
Status: COMPLETED | OUTPATIENT
Start: 2019-02-20 | End: 2019-02-20

## 2019-02-20 RX ORDER — SODIUM CHLORIDE 0.9 % (FLUSH) 0.9 %
20 SYRINGE (ML) INJECTION PRN
Status: DISCONTINUED | OUTPATIENT
Start: 2019-02-20 | End: 2019-02-21 | Stop reason: HOSPADM

## 2019-02-20 RX ORDER — SODIUM CHLORIDE 0.9 % (FLUSH) 0.9 %
20 SYRINGE (ML) INJECTION PRN
Status: CANCELLED | OUTPATIENT
Start: 2019-02-20

## 2019-02-20 RX ORDER — SODIUM CHLORIDE 9 MG/ML
INJECTION, SOLUTION INTRAVENOUS ONCE
Status: COMPLETED | OUTPATIENT
Start: 2019-02-20 | End: 2019-02-20

## 2019-02-20 RX ORDER — SODIUM CHLORIDE 0.9 % (FLUSH) 0.9 %
10 SYRINGE (ML) INJECTION PRN
Status: CANCELLED | OUTPATIENT
Start: 2019-02-20

## 2019-02-20 RX ORDER — HEPARIN SODIUM (PORCINE) LOCK FLUSH IV SOLN 100 UNIT/ML 100 UNIT/ML
500 SOLUTION INTRAVENOUS PRN
Status: DISCONTINUED | OUTPATIENT
Start: 2019-02-20 | End: 2019-02-21 | Stop reason: HOSPADM

## 2019-02-20 RX ADMIN — Medication 20 ML: at 15:53

## 2019-02-20 RX ADMIN — Medication 20 ML: at 08:26

## 2019-02-20 RX ADMIN — Medication 10 ML: at 09:35

## 2019-02-20 RX ADMIN — Medication 10 ML: at 08:25

## 2019-02-20 RX ADMIN — PEGFILGRASTIM 6 MG: KIT SUBCUTANEOUS at 14:45

## 2019-02-20 RX ADMIN — GEMCITABINE 2000 MG: 38 INJECTION INTRAVENOUS at 11:50

## 2019-02-20 RX ADMIN — MAGNESIUM SULFATE HEPTAHYDRATE: 500 INJECTION, SOLUTION INTRAMUSCULAR; INTRAVENOUS at 14:42

## 2019-02-20 RX ADMIN — SODIUM CHLORIDE: 9 INJECTION, SOLUTION INTRAVENOUS at 09:35

## 2019-02-20 RX ADMIN — Medication 500 UNITS: at 15:53

## 2019-02-20 RX ADMIN — DEXAMETHASONE SODIUM PHOSPHATE 12 MG: 4 INJECTION, SOLUTION INTRA-ARTICULAR; INTRALESIONAL; INTRAMUSCULAR; INTRAVENOUS; SOFT TISSUE at 10:41

## 2019-02-20 RX ADMIN — PALONOSETRON 0.25 MG: 0.05 INJECTION, SOLUTION INTRAVENOUS at 09:37

## 2019-02-20 RX ADMIN — CISPLATIN: 100 INJECTION, SOLUTION INTRAVENOUS at 12:30

## 2019-02-20 RX ADMIN — SODIUM CHLORIDE 150 MG: 9 INJECTION, SOLUTION INTRAVENOUS at 11:02

## 2019-02-20 RX ADMIN — MAGNESIUM SULFATE HEPTAHYDRATE: 500 INJECTION, SOLUTION INTRAMUSCULAR; INTRAVENOUS at 09:40

## 2019-02-20 ASSESSMENT — ENCOUNTER SYMPTOMS
DIARRHEA: 0
COUGH: 0
NAUSEA: 0
BLOOD IN STOOL: 0
SHORTNESS OF BREATH: 0
RECTAL PAIN: 0
EYE DISCHARGE: 0
WHEEZING: 0
BACK PAIN: 0
TROUBLE SWALLOWING: 0
SORE THROAT: 0
CONSTIPATION: 0
COLOR CHANGE: 0
VOMITING: 0
CHEST TIGHTNESS: 0
FACIAL SWELLING: 0
ABDOMINAL DISTENTION: 0
ABDOMINAL PAIN: 0

## 2019-02-20 NOTE — PROGRESS NOTES
Lab specimen obtained from [facility] without any problems. Ambulated off unit per self to examination room for appointment with Dr. Felecia Gowers accompanied by spouse escorted by [facility].

## 2019-02-20 NOTE — PROGRESS NOTES
Patient assessed for the following post chemotherapy:    Dizziness   No  Lightheadedness  No      Acute nausea/vomiting No  Headache   No  Chest pain/pressure  No  Rash/itching   No  Shortness of breath  No    Patient kept for 20 minutes observation post infusion chemotherapy. Patient tolerated chemotherapy treatment Gemzar/cisplatin without any complications. Last vital signs:   BP (!) 143/63   Pulse 70   Temp 98 °F (36.7 °C) (Oral)   Resp 18   SpO2 95%     Attending physician notified of elevated BP after chemo, orders received: {YES - okay to discharge  Patient instructed if experience any of the above symptoms following today's infusion,he/she is to notify MD immediately or go to the emergency department. Discharge instructions given to patient. Verbalizes understanding. Ambulated off unit per self with belongings.

## 2019-02-20 NOTE — PROGRESS NOTES
Patient and spouse instructed After approximately 27 hours, your On-body injector will beep to let you know your dose delivery will begin in 2 minutes. When the dose delivery starts it will take about 45 minutes to complete. During this time, the On-body injector will flash a green light. You may hear a series of clicks, this is Okay. A beep will sound when the dose delivery is complete. Check to see the status light is Solid Green or has switched off, this means dose is complete. Grab edge of adhesive pad. Slowly peel off the On-body injector and look for black line next to EMPTY indicator. Place into plastic disposable container. If during the administration of drug, the adhesive becomes wet or you notice dripping - call physician immediately.

## 2019-02-20 NOTE — ONCOLOGY
Chemotherapy Administration    Pre-assessment Data: Antineoplastic Agents  Other:   See toxicity flow sheet for assessment [x]     Physician Notification of Concerns Related to Chemotherapy Administration:   Physician Notified Kristopher Phoenix / Time of Notification      Interventions:   Lab work assessed  [x]   Height / Weight verified for dose [x]   Current MAR reviewed [x]   Emergency drugs available as appropriate [x]   Anaphylaxis assessment completed [x]   Pre-medications administered as ordered [x]   Blood return noted upon initiation of chemotherapy [x]   Blood return noted each 1-2ml of a vesicant medication if given IV push []   Blood return noted each 2-3ml of a non-vesicant medication if given IV push []   Monitor for signs / symptoms of hypersensitivity reaction [x]   Chemotherapy orders (drug/dose/rate) verified by 2 Chemo certified RNs [x]   Monitor IV site and blood return throughout the infusion of the medication [x]   Document IV site checks on the IV assessment form [x]   Document chemotherapy teaching on the Patient Education tab [x]   Document patient verbalizes understanding of medications being administered [x]   If IV infiltration, see ONS Guidelines []   Other:      []

## 2019-02-20 NOTE — PLAN OF CARE
Problem: Infection - Central Venous Catheter-Associated Bloodstream Infection:  Intervention: EDUCATE PATIENT ABOUT SIGNS AND SYMPTOMS OF INFECTION  Instructed to monitor for signs/symptoms of infection at mediport and call MD if problems develop. Goal: Absence of central venous catheter-associated bloodstream infection  Outcome: Met This Shift  Mediport site with no redness or warmth. Skin over port site intact with no signs of breakdown noted. Patient verbalizes signs/symptoms of port infection and when to notify the physician. Problem: Musculor/Skeletal Functional Status  Intervention: Fall precautions  Verbalized understanding of fall prevention to ask for assistance with ambulation. Call light within reach. Ambulates with cane    Goal: Absence of falls  Outcome: Met This Shift  No falls occurred with visit today. Problem: Discharge Planning  Intervention: Discharge to appropriate level of care  Discuss understanding of discharge instructions,follow-up appointments, and when to call the physician. Goal: Knowledge of discharge instructions  Knowledge of discharge instructions     Outcome: Met This Shift  Verbalized understanding of discharge instructions, follow-up appointments, and when to call the physician.     Problem: Intellectual/Education/Knowledge Deficit  Intervention: Verbal/written education provided  Chemotherapy Teaching     What is Chemotherapy   Drug action [x]   Method of Administration [x]   Handouts given []     Side Effects  Nausea/vomiting [x]   Diarrhea [x]   Fatigue [x]   Signs / Symptoms of infection [x]   Neutropenia [x]   Thrombocytopenia [x]   Alopecia [x]   neuropathy [x]   [place]hin diet &  the importance of fluids [x]       Micellaneous  Importance of nutrition [x]   Importance of oral hygiene [x]   When to call the MD [x]   Monitoring labs [x]   Use of supportive services []     Explanation of Drug Regimen / Frequency  Cisplatin/Gemzar- [id number]     Comments  Verbalized understanding to drug,action,side effects and when to call MD       Goal: Teaching initiated upon admission  Outcome: Met This Shift  Patient verbalizes understanding to verbal information given on Gemzar/Cisplatin,action and possible side effects. Aware to call MD if develop complications. Comments: Care plan reviewed with patient and spouse. Patient and spouse verbalize understanding of the plan of care and contribute to goal setting.

## 2019-02-26 ENCOUNTER — HOSPITAL ENCOUNTER (OUTPATIENT)
Dept: PHYSICAL THERAPY | Age: 79
Setting detail: THERAPIES SERIES
Discharge: HOME | End: 2019-02-26
Payer: MEDICARE

## 2019-02-26 PROCEDURE — 97110 THERAPEUTIC EXERCISES: CPT

## 2019-02-26 PROCEDURE — 97112 NEUROMUSCULAR REEDUCATION: CPT

## 2019-03-01 ENCOUNTER — HOSPITAL ENCOUNTER (OUTPATIENT)
Dept: PHYSICAL THERAPY | Age: 79
Setting detail: THERAPIES SERIES
Discharge: HOME | End: 2019-03-01
Payer: MEDICARE

## 2019-03-01 PROCEDURE — 97110 THERAPEUTIC EXERCISES: CPT

## 2019-03-05 ENCOUNTER — HOSPITAL ENCOUNTER (OUTPATIENT)
Dept: PHYSICAL THERAPY | Age: 79
Setting detail: THERAPIES SERIES
Discharge: HOME | End: 2019-03-05
Payer: MEDICARE

## 2019-03-05 DIAGNOSIS — C67.9 MALIGNANT NEOPLASM OF URINARY BLADDER, UNSPECIFIED SITE (HCC): Primary | ICD-10-CM

## 2019-03-05 PROCEDURE — 97110 THERAPEUTIC EXERCISES: CPT

## 2019-03-05 PROCEDURE — 97112 NEUROMUSCULAR REEDUCATION: CPT

## 2019-03-05 RX ORDER — DIPHENHYDRAMINE HYDROCHLORIDE 50 MG/ML
50 INJECTION INTRAMUSCULAR; INTRAVENOUS ONCE
Status: CANCELLED | OUTPATIENT
Start: 2019-03-06 | End: 2019-03-06

## 2019-03-05 RX ORDER — 0.9 % SODIUM CHLORIDE 0.9 %
10 VIAL (ML) INJECTION ONCE
Status: CANCELLED | OUTPATIENT
Start: 2019-03-20 | End: 2019-03-20

## 2019-03-05 RX ORDER — SODIUM CHLORIDE 0.9 % (FLUSH) 0.9 %
10 SYRINGE (ML) INJECTION PRN
Status: CANCELLED | OUTPATIENT
Start: 2019-03-20

## 2019-03-05 RX ORDER — SODIUM CHLORIDE 0.9 % (FLUSH) 0.9 %
5 SYRINGE (ML) INJECTION PRN
Status: CANCELLED | OUTPATIENT
Start: 2019-03-06

## 2019-03-05 RX ORDER — SODIUM CHLORIDE 0.9 % (FLUSH) 0.9 %
10 SYRINGE (ML) INJECTION PRN
Status: CANCELLED | OUTPATIENT
Start: 2019-03-06

## 2019-03-05 RX ORDER — PALONOSETRON 0.05 MG/ML
0.25 INJECTION, SOLUTION INTRAVENOUS ONCE
Status: CANCELLED | OUTPATIENT
Start: 2019-03-06

## 2019-03-05 RX ORDER — SODIUM CHLORIDE 9 MG/ML
INJECTION, SOLUTION INTRAVENOUS CONTINUOUS
Status: CANCELLED | OUTPATIENT
Start: 2019-03-06

## 2019-03-05 RX ORDER — METHYLPREDNISOLONE SODIUM SUCCINATE 125 MG/2ML
125 INJECTION, POWDER, LYOPHILIZED, FOR SOLUTION INTRAMUSCULAR; INTRAVENOUS ONCE
Status: CANCELLED | OUTPATIENT
Start: 2019-03-06 | End: 2019-03-06

## 2019-03-05 RX ORDER — SODIUM CHLORIDE 9 MG/ML
INJECTION, SOLUTION INTRAVENOUS ONCE
Status: CANCELLED | OUTPATIENT
Start: 2019-03-06 | End: 2019-03-06

## 2019-03-05 RX ORDER — PALONOSETRON 0.05 MG/ML
0.25 INJECTION, SOLUTION INTRAVENOUS ONCE
Status: CANCELLED | OUTPATIENT
Start: 2019-03-20

## 2019-03-05 RX ORDER — DIPHENHYDRAMINE HYDROCHLORIDE 50 MG/ML
50 INJECTION INTRAMUSCULAR; INTRAVENOUS ONCE
Status: CANCELLED | OUTPATIENT
Start: 2019-03-20 | End: 2019-03-20

## 2019-03-05 RX ORDER — SODIUM CHLORIDE 0.9 % (FLUSH) 0.9 %
5 SYRINGE (ML) INJECTION PRN
Status: CANCELLED | OUTPATIENT
Start: 2019-03-20

## 2019-03-05 RX ORDER — SODIUM CHLORIDE 9 MG/ML
INJECTION, SOLUTION INTRAVENOUS ONCE
Status: CANCELLED | OUTPATIENT
Start: 2019-03-20 | End: 2019-03-20

## 2019-03-05 RX ORDER — 0.9 % SODIUM CHLORIDE 0.9 %
10 VIAL (ML) INJECTION ONCE
Status: CANCELLED | OUTPATIENT
Start: 2019-03-06 | End: 2019-03-06

## 2019-03-05 RX ORDER — HEPARIN SODIUM (PORCINE) LOCK FLUSH IV SOLN 100 UNIT/ML 100 UNIT/ML
500 SOLUTION INTRAVENOUS PRN
Status: CANCELLED | OUTPATIENT
Start: 2019-03-20

## 2019-03-05 RX ORDER — SODIUM CHLORIDE 9 MG/ML
INJECTION, SOLUTION INTRAVENOUS CONTINUOUS
Status: CANCELLED | OUTPATIENT
Start: 2019-03-20

## 2019-03-05 RX ORDER — METHYLPREDNISOLONE SODIUM SUCCINATE 125 MG/2ML
125 INJECTION, POWDER, LYOPHILIZED, FOR SOLUTION INTRAMUSCULAR; INTRAVENOUS ONCE
Status: CANCELLED | OUTPATIENT
Start: 2019-03-20 | End: 2019-03-20

## 2019-03-05 RX ORDER — HEPARIN SODIUM (PORCINE) LOCK FLUSH IV SOLN 100 UNIT/ML 100 UNIT/ML
500 SOLUTION INTRAVENOUS PRN
Status: CANCELLED | OUTPATIENT
Start: 2019-03-06

## 2019-03-05 ASSESSMENT — ENCOUNTER SYMPTOMS
TROUBLE SWALLOWING: 0
ABDOMINAL DISTENTION: 0
CHEST TIGHTNESS: 0
CONSTIPATION: 0
SHORTNESS OF BREATH: 0
BACK PAIN: 0
COUGH: 0
COLOR CHANGE: 0
SORE THROAT: 0
RECTAL PAIN: 0
VOMITING: 0
ABDOMINAL PAIN: 0
NAUSEA: 0
WHEEZING: 0
BLOOD IN STOOL: 0
FACIAL SWELLING: 0
DIARRHEA: 0
EYE DISCHARGE: 0

## 2019-03-06 ENCOUNTER — HOSPITAL ENCOUNTER (OUTPATIENT)
Dept: INFUSION THERAPY | Age: 79
Discharge: HOME | End: 2019-03-06
Payer: MEDICARE

## 2019-03-06 ENCOUNTER — OFFICE VISIT (OUTPATIENT)
Dept: ONCOLOGY | Age: 79
End: 2019-03-06
Payer: MEDICARE

## 2019-03-06 VITALS
OXYGEN SATURATION: 97 % | HEART RATE: 74 BPM | DIASTOLIC BLOOD PRESSURE: 68 MMHG | HEIGHT: 67 IN | TEMPERATURE: 97.8 F | WEIGHT: 200.8 LBS | SYSTOLIC BLOOD PRESSURE: 139 MMHG | BODY MASS INDEX: 31.52 KG/M2 | RESPIRATION RATE: 18 BRPM

## 2019-03-06 VITALS
BODY MASS INDEX: 31.52 KG/M2 | TEMPERATURE: 97.5 F | SYSTOLIC BLOOD PRESSURE: 115 MMHG | HEART RATE: 63 BPM | RESPIRATION RATE: 18 BRPM | OXYGEN SATURATION: 97 % | WEIGHT: 200.8 LBS | HEIGHT: 67 IN | DIASTOLIC BLOOD PRESSURE: 59 MMHG

## 2019-03-06 DIAGNOSIS — Z51.11 CHEMOTHERAPY MANAGEMENT, ENCOUNTER FOR: ICD-10-CM

## 2019-03-06 DIAGNOSIS — C67.9 MALIGNANT NEOPLASM OF URINARY BLADDER, UNSPECIFIED SITE (HCC): Primary | ICD-10-CM

## 2019-03-06 DIAGNOSIS — G62.9 PERIPHERAL POLYNEUROPATHY: ICD-10-CM

## 2019-03-06 DIAGNOSIS — C67.9 MALIGNANT NEOPLASM OF URINARY BLADDER, UNSPECIFIED SITE (HCC): ICD-10-CM

## 2019-03-06 DIAGNOSIS — D49.4 BLADDER TUMOR: ICD-10-CM

## 2019-03-06 DIAGNOSIS — N18.30 CKD (CHRONIC KIDNEY DISEASE) STAGE 3, GFR 30-59 ML/MIN (HCC): ICD-10-CM

## 2019-03-06 DIAGNOSIS — N48.89 PAIN IN PENIS: Primary | ICD-10-CM

## 2019-03-06 LAB
ALBUMIN SERPL-MCNC: 3.7 G/DL (ref 3.5–5.1)
ALP BLD-CCNC: 253 U/L (ref 38–126)
ALT SERPL-CCNC: 26 U/L (ref 11–66)
AST SERPL-CCNC: 25 U/L (ref 5–40)
BILIRUB SERPL-MCNC: 0.4 MG/DL (ref 0.3–1.2)
BILIRUBIN DIRECT: < 0.2 MG/DL (ref 0–0.3)
BUN, WHOLE BLOOD: 11 MG/DL (ref 8–26)
CHLORIDE, WHOLE BLOOD: 98 MEQ/L (ref 98–109)
CREATININE, WHOLE BLOOD: 0.9 MG/DL (ref 0.5–1.2)
GFR, ESTIMATED: 86 ML/MIN/1.73M2
GLUCOSE, WHOLE BLOOD: 106 MG/DL (ref 70–108)
HCT VFR BLD CALC: 37.8 % (ref 42–52)
HEMOGLOBIN: 12.9 GM/DL (ref 14–18)
IONIZED CALCIUM, WHOLE BLOOD: 1.16 MMOL/L (ref 1.12–1.32)
MCH RBC QN AUTO: 30.6 PG (ref 27–31)
MCHC RBC AUTO-ENTMCNC: 34.1 GM/DL (ref 33–37)
MCV RBC AUTO: 90 FL (ref 80–94)
PDW BLD-RTO: 12.3 % (ref 11.5–14.5)
PLATELET # BLD: 113 THOU/MM3 (ref 130–400)
PMV BLD AUTO: 7.7 FL (ref 7.4–10.4)
POTASSIUM, WHOLE BLOOD: 3.7 MEQ/L (ref 3.5–4.9)
RBC # BLD: 4.21 MILL/MM3 (ref 4.7–6.1)
SEGMENTED NEUTROPHILS ABSOLUTE COUNT: 23 THOU/MM3 (ref 1.8–7.7)
SODIUM, WHOLE BLOOD: 138 MEQ/L (ref 138–146)
TOTAL CO2, WHOLE BLOOD: 27 MEQ/L (ref 23–33)
TOTAL PROTEIN: 5.9 G/DL (ref 6.1–8)
WBC # BLD: 29.5 THOU/MM3 (ref 4.8–10.8)

## 2019-03-06 PROCEDURE — 6360000002 HC RX W HCPCS: Performed by: INTERNAL MEDICINE

## 2019-03-06 PROCEDURE — 96367 TX/PROPH/DG ADDL SEQ IV INF: CPT

## 2019-03-06 PROCEDURE — 36591 DRAW BLOOD OFF VENOUS DEVICE: CPT

## 2019-03-06 PROCEDURE — 2580000003 HC RX 258: Performed by: INTERNAL MEDICINE

## 2019-03-06 PROCEDURE — 96375 TX/PRO/DX INJ NEW DRUG ADDON: CPT

## 2019-03-06 PROCEDURE — 96366 THER/PROPH/DIAG IV INF ADDON: CPT

## 2019-03-06 PROCEDURE — 96417 CHEMO IV INFUS EACH ADDL SEQ: CPT

## 2019-03-06 PROCEDURE — 85027 COMPLETE CBC AUTOMATED: CPT

## 2019-03-06 PROCEDURE — 80047 BASIC METABLC PNL IONIZED CA: CPT

## 2019-03-06 PROCEDURE — 96415 CHEMO IV INFUSION ADDL HR: CPT

## 2019-03-06 PROCEDURE — G0463 HOSPITAL OUTPT CLINIC VISIT: HCPCS

## 2019-03-06 PROCEDURE — 96413 CHEMO IV INFUSION 1 HR: CPT

## 2019-03-06 PROCEDURE — 2709999900 HC NON-CHARGEABLE SUPPLY

## 2019-03-06 PROCEDURE — 80076 HEPATIC FUNCTION PANEL: CPT

## 2019-03-06 PROCEDURE — 96377 APPLICATON ON-BODY INJECTOR: CPT

## 2019-03-06 PROCEDURE — 99214 OFFICE O/P EST MOD 30 MIN: CPT | Performed by: INTERNAL MEDICINE

## 2019-03-06 RX ORDER — SODIUM CHLORIDE 9 MG/ML
INJECTION, SOLUTION INTRAVENOUS ONCE
Status: COMPLETED | OUTPATIENT
Start: 2019-03-06 | End: 2019-03-06

## 2019-03-06 RX ORDER — DEXAMETHASONE 4 MG/1
2 TABLET ORAL
Refills: 3 | COMMUNITY
Start: 2019-03-01 | End: 2019-04-11

## 2019-03-06 RX ORDER — SODIUM CHLORIDE 0.9 % (FLUSH) 0.9 %
20 SYRINGE (ML) INJECTION PRN
Status: DISCONTINUED | OUTPATIENT
Start: 2019-03-06 | End: 2019-03-07 | Stop reason: HOSPADM

## 2019-03-06 RX ORDER — SODIUM CHLORIDE 0.9 % (FLUSH) 0.9 %
10 SYRINGE (ML) INJECTION PRN
Status: DISCONTINUED | OUTPATIENT
Start: 2019-03-06 | End: 2019-03-07 | Stop reason: HOSPADM

## 2019-03-06 RX ORDER — HEPARIN SODIUM (PORCINE) LOCK FLUSH IV SOLN 100 UNIT/ML 100 UNIT/ML
500 SOLUTION INTRAVENOUS PRN
Status: CANCELLED | OUTPATIENT
Start: 2019-03-06

## 2019-03-06 RX ORDER — SODIUM CHLORIDE 0.9 % (FLUSH) 0.9 %
10 SYRINGE (ML) INJECTION PRN
Status: CANCELLED | OUTPATIENT
Start: 2019-03-06

## 2019-03-06 RX ORDER — PALONOSETRON 0.05 MG/ML
0.25 INJECTION, SOLUTION INTRAVENOUS ONCE
Status: COMPLETED | OUTPATIENT
Start: 2019-03-06 | End: 2019-03-06

## 2019-03-06 RX ORDER — HEPARIN SODIUM (PORCINE) LOCK FLUSH IV SOLN 100 UNIT/ML 100 UNIT/ML
500 SOLUTION INTRAVENOUS PRN
Status: DISCONTINUED | OUTPATIENT
Start: 2019-03-06 | End: 2019-03-07 | Stop reason: HOSPADM

## 2019-03-06 RX ORDER — SODIUM CHLORIDE 0.9 % (FLUSH) 0.9 %
20 SYRINGE (ML) INJECTION PRN
Status: CANCELLED | OUTPATIENT
Start: 2019-03-06

## 2019-03-06 RX ADMIN — POTASSIUM CHLORIDE: 2 INJECTION, SOLUTION, CONCENTRATE INTRAVENOUS at 09:30

## 2019-03-06 RX ADMIN — SODIUM CHLORIDE 150 MG: 9 INJECTION, SOLUTION INTRAVENOUS at 10:59

## 2019-03-06 RX ADMIN — PALONOSETRON 0.25 MG: 0.05 INJECTION, SOLUTION INTRAVENOUS at 10:37

## 2019-03-06 RX ADMIN — Medication 10 ML: at 15:39

## 2019-03-06 RX ADMIN — GEMCITABINE 2000 MG: 38 INJECTION INTRAVENOUS at 11:39

## 2019-03-06 RX ADMIN — Medication 20 ML: at 08:01

## 2019-03-06 RX ADMIN — Medication 10 ML: at 08:00

## 2019-03-06 RX ADMIN — CISPLATIN: 50 INJECTION, SOLUTION INTRAVENOUS at 12:20

## 2019-03-06 RX ADMIN — DEXAMETHASONE SODIUM PHOSPHATE 12 MG: 4 INJECTION, SOLUTION INTRA-ARTICULAR; INTRALESIONAL; INTRAMUSCULAR; INTRAVENOUS; SOFT TISSUE at 10:38

## 2019-03-06 RX ADMIN — Medication 10 ML: at 09:29

## 2019-03-06 RX ADMIN — POTASSIUM CHLORIDE: 2 INJECTION, SOLUTION, CONCENTRATE INTRAVENOUS at 14:30

## 2019-03-06 RX ADMIN — Medication 500 UNITS: at 15:39

## 2019-03-06 RX ADMIN — PEGFILGRASTIM 6 MG: KIT SUBCUTANEOUS at 15:06

## 2019-03-06 RX ADMIN — SODIUM CHLORIDE: 9 INJECTION, SOLUTION INTRAVENOUS at 09:29

## 2019-03-06 NOTE — PLAN OF CARE
Problem: Intellectual/Education/Knowledge Deficit  Goal: Teaching initiated upon admission  Note:   Patient verbalizes understanding to verbal information given on Gemzar/Cisplatin,action and possible side effects. Aware to call MD if develop complications. Intervention: Verbal/written education provided  Note:   Chemotherapy Teaching     What is Chemotherapy   Drug action ? Method of Administration ? Handouts given ? Side Effects  Nausea/vomiting ? Diarrhea ? Fatigue ? Signs / Symptoms of infection ? Neutropenia ? Thrombocytopenia ? Alopecia ? neuropathy ? [place] diet &  the importance of fluids ? Micellaneous  Importance of nutrition ? Importance of oral hygiene ? When to call the MD ?   Monitoring labs ? Use of supportive services ? Explanation of Drug Regimen / Frequency  Cisplatin/Gemzar- C3D1/ neulasta injector     Comments  Verbalized understanding to drug,action,side effects and when to call MD         Problem: Discharge Planning  Goal: Knowledge of discharge instructions  Description  Knowledge of discharge instructions     Note:   Verbalized understanding of discharge instructions, follow-up appointments, and when to call the physician. Intervention: Discharge to appropriate level of care  Note:   Discuss understanding of discharge instructions,follow-up appointments, and when to call the physician. Problem: Musculor/Skeletal Functional Status  Goal: Absence of falls  Note:   No falls occurred with visit today. Intervention: Provide standby assistance  Note:   Verbalized understanding of fall prevention to ask for assistance with ambulation. Call light within reach. Ambulates with cane     Problem: Infection - Central Venous Catheter-Associated Bloodstream Infection:  Goal: Absence of central venous catheter-associated bloodstream infection  Note:   Mediport site with no redness or warmth. Skin over port site intact with no signs of breakdown noted. Patient verbalizes signs/symptoms of port infection and when to notify the physician. Intervention: EDUCATE PATIENT ABOUT SIGNS AND SYMPTOMS OF INFECTION  Note:   Instructed to monitor for signs/symptoms of infection at [address] [facility] and call MD if problems develop. Care plan reviewed with patient and spouse. Patient and spouse verbalize understanding of the plan of care and contribute to goal setting.

## 2019-03-06 NOTE — PROGRESS NOTES
Name: Eloisa Jackson  : 1940  MRN: [id number]    Oncology Navigation Follow-Up Note    Contact Type:  Medical Oncology- Urothelial Bladder Cancer    Subjective: Due for Chemo    Objective: WBC 29.5  HGB 12.9  Plt 113  BUN 11 Crea 0.9    Using Cane for mobility. Current with ONC Rehab  Peripheral Neuropathy is \"some improved\" with Lyrica, but notes an increase in the josé miguel foot pain x1 week after chemo, but then lessens. +Fatigue beginning on day 2-3 of Chemo cycle, & lasts for approx 1 week. Red rash to josé miguel hands; denies itching. +Swelling BLE  Reports an increase in Hearing Loss. ONC RECOMMENDATIONS:  Proceed with Cycle 5/6 Gemzar & Cisplatin today (dose reduced, as previous). Use Hydrocortisone Cream to José Miguel Hand rash  Audiology evaluation not until AFTER Chemotherapy Cycles are complete. Assistance Needed: Spouse assist prn. Pt remains independent. Receptive to Advanced Care Planning / Palliative Care:  deferred    Referrals: Current with Onc Rehab    Education: Reinforced ONC POC. Notes: Cycle 5 Gemzar & Cisplatin    [place] following to assist if needed.

## 2019-03-06 NOTE — PROGRESS NOTES
Patient assessed for the following post chemotherapy:    Dizziness   No  Lightheadedness  No      Acute nausea/vomiting No  Headache   No  Chest pain/pressure  No  Rash/itching   No  Shortness of breath  No    Patient kept for 20 minutes observation post infusion chemotherapy. Patient tolerated chemotherapy treatment Gemzar/Cisplatin without any complications. Last vital signs:   /68   Pulse 74   Temp 97.8 °F (36.6 °C) (Oral)   Resp 18   Ht 5' 7\" (1.702 m)   Wt 200 lb 12.8 oz (91.1 kg)   SpO2 97%   BMI 31.45 kg/m²         Patient instructed if experience any of the above symptoms following today's infusion,he/she is to notify MD immediately or go to the emergency department. Discharge instructions given to patient. Verbalizes understanding. Ambulated off unit per self with belongings accompanied by spouse.

## 2019-03-06 NOTE — PROGRESS NOTES
[facility] without any problems. Ambulated off unit per self to examination room for appointment with Dr. Jess Tinajero accompanied by spouse escorted by [facility].

## 2019-03-11 ENCOUNTER — HOSPITAL ENCOUNTER (EMERGENCY)
Age: 79
Discharge: HOME | End: 2019-03-12
Attending: FAMILY MEDICINE
Payer: MEDICARE

## 2019-03-11 DIAGNOSIS — I73.9 CLAUDICATION (HCC): ICD-10-CM

## 2019-03-11 DIAGNOSIS — N39.0 URINARY TRACT INFECTION WITH HEMATURIA, SITE UNSPECIFIED: Primary | ICD-10-CM

## 2019-03-11 DIAGNOSIS — G63 POLYNEUROPATHY ASSOCIATED WITH UNDERLYING DISEASE (HCC): ICD-10-CM

## 2019-03-11 DIAGNOSIS — M48.061 SPINAL STENOSIS OF LUMBAR REGION WITHOUT NEUROGENIC CLAUDICATION: ICD-10-CM

## 2019-03-11 DIAGNOSIS — C67.9 MALIGNANT NEOPLASM OF URINARY BLADDER, UNSPECIFIED SITE (HCC): ICD-10-CM

## 2019-03-11 DIAGNOSIS — R31.9 URINARY TRACT INFECTION WITH HEMATURIA, SITE UNSPECIFIED: Primary | ICD-10-CM

## 2019-03-11 LAB
BACTERIA: ABNORMAL /HPF
BILIRUBIN URINE: NEGATIVE
BLOOD, URINE: ABNORMAL
CASTS 2: ABNORMAL /LPF
CASTS UA: ABNORMAL /LPF
CHARACTER, URINE: ABNORMAL
COLOR: YELLOW
CRYSTALS, UA: ABNORMAL
EPITHELIAL CELLS, UA: ABNORMAL /HPF
GLUCOSE URINE: NEGATIVE MG/DL
KETONES, URINE: NEGATIVE
LEUKOCYTE ESTERASE, URINE: ABNORMAL
MISCELLANEOUS 2: ABNORMAL
MUCUS: ABNORMAL
NITRITE, URINE: POSITIVE
PH UA: 6 (ref 5–9)
PROTEIN UA: 30
RBC URINE: ABNORMAL /HPF
RENAL EPITHELIAL, UA: ABNORMAL
SPECIFIC GRAVITY, URINE: 1.02 (ref 1–1.03)
UROBILINOGEN, URINE: 1 EU/DL (ref 0–1)
WBC UA: > 100 /HPF
YEAST: ABNORMAL

## 2019-03-11 PROCEDURE — 36415 COLL VENOUS BLD VENIPUNCTURE: CPT

## 2019-03-11 PROCEDURE — 81001 URINALYSIS AUTO W/SCOPE: CPT

## 2019-03-11 PROCEDURE — 87077 CULTURE AEROBIC IDENTIFY: CPT

## 2019-03-11 PROCEDURE — 80048 BASIC METABOLIC PNL TOTAL CA: CPT

## 2019-03-11 PROCEDURE — 87086 URINE CULTURE/COLONY COUNT: CPT

## 2019-03-11 PROCEDURE — 96365 THER/PROPH/DIAG IV INF INIT: CPT

## 2019-03-11 PROCEDURE — 87186 SC STD MICRODIL/AGAR DIL: CPT

## 2019-03-11 PROCEDURE — 85025 COMPLETE CBC W/AUTO DIFF WBC: CPT

## 2019-03-11 PROCEDURE — 99283 EMERGENCY DEPT VISIT LOW MDM: CPT

## 2019-03-11 PROCEDURE — 87184 SC STD DISK METHOD PER PLATE: CPT

## 2019-03-11 RX ORDER — LEVOTHYROXINE SODIUM 137 UG/1
TABLET ORAL
Qty: 90 TABLET | Refills: 3 | Status: SHIPPED | OUTPATIENT
Start: 2019-03-11 | End: 2020-03-02

## 2019-03-11 RX ORDER — PREGABALIN 25 MG/1
25 CAPSULE ORAL 3 TIMES DAILY
Qty: 270 CAPSULE | Refills: 0 | Status: SHIPPED | OUTPATIENT
Start: 2019-03-11 | End: 2019-04-03 | Stop reason: SDUPTHER

## 2019-03-11 ASSESSMENT — ENCOUNTER SYMPTOMS
VOMITING: 0
EYE REDNESS: 0
RHINORRHEA: 0
ABDOMINAL PAIN: 0
WHEEZING: 0
COUGH: 0
SORE THROAT: 0
DIARRHEA: 0
EYE DISCHARGE: 0
BACK PAIN: 0
NAUSEA: 0
SHORTNESS OF BREATH: 0

## 2019-03-12 ENCOUNTER — APPOINTMENT (OUTPATIENT)
Dept: PHYSICAL THERAPY | Age: 79
End: 2019-03-12
Payer: MEDICARE

## 2019-03-12 VITALS
HEIGHT: 67 IN | SYSTOLIC BLOOD PRESSURE: 126 MMHG | BODY MASS INDEX: 31.08 KG/M2 | TEMPERATURE: 99.1 F | DIASTOLIC BLOOD PRESSURE: 68 MMHG | WEIGHT: 198 LBS | RESPIRATION RATE: 18 BRPM | HEART RATE: 80 BPM | OXYGEN SATURATION: 94 %

## 2019-03-12 LAB
ANION GAP SERPL CALCULATED.3IONS-SCNC: 13 MEQ/L (ref 8–16)
BASOPHILS # BLD: 0 %
BASOPHILS ABSOLUTE: 0 THOU/MM3 (ref 0–0.1)
BUN BLDV-MCNC: 19 MG/DL (ref 7–22)
CALCIUM SERPL-MCNC: 8.7 MG/DL (ref 8.5–10.5)
CHLORIDE BLD-SCNC: 94 MEQ/L (ref 98–111)
CO2: 29 MEQ/L (ref 23–33)
CREAT SERPL-MCNC: 1 MG/DL (ref 0.4–1.2)
DIFFERENTIAL TYPE: ABNORMAL
DOHLE BODIES: PRESENT
EOSINOPHIL # BLD: 0 %
EOSINOPHILS ABSOLUTE: 0 THOU/MM3 (ref 0–0.4)
ERYTHROCYTE [DISTWIDTH] IN BLOOD BY AUTOMATED COUNT: 17.4 % (ref 11.5–14.5)
ERYTHROCYTE [DISTWIDTH] IN BLOOD BY AUTOMATED COUNT: 56.3 FL (ref 35–45)
GFR SERPL CREATININE-BSD FRML MDRD: 72 ML/MIN/1.73M2
GLUCOSE BLD-MCNC: 105 MG/DL (ref 70–108)
HCT VFR BLD CALC: 37.4 % (ref 42–52)
HEMOGLOBIN: 12.5 GM/DL (ref 14–18)
IMMATURE GRANS (ABS): 1.36 THOU/MM3 (ref 0–0.07)
IMMATURE GRANULOCYTES: 2.6 %
INR BLD: 1.07 (ref 0.85–1.13)
LACTIC ACID: 1.2 MMOL/L (ref 0.5–2.2)
LYMPHOCYTES # BLD: 5.1 %
LYMPHOCYTES ABSOLUTE: 2.7 THOU/MM3 (ref 1–4.8)
MCH RBC QN AUTO: 31.1 PG (ref 26–33)
MCHC RBC AUTO-ENTMCNC: 33.4 GM/DL (ref 32.2–35.5)
MCV RBC AUTO: 93 FL (ref 80–94)
MONOCYTES # BLD: 1.5 %
MONOCYTES ABSOLUTE: 0.8 THOU/MM3 (ref 0.4–1.3)
NUCLEATED RED BLOOD CELLS: 0 /100 WBC
OSMOLALITY CALCULATION: 274.6 MOSMOL/KG (ref 275–300)
PATHOLOGIST REVIEW: ABNORMAL
PLATELET # BLD: 146 THOU/MM3 (ref 130–400)
PLATELET ESTIMATE: ADEQUATE
PMV BLD AUTO: 9.3 FL (ref 9.4–12.4)
POIKILOCYTES: ABNORMAL
POTASSIUM SERPL-SCNC: 4.5 MEQ/L (ref 3.5–5.2)
PROCALCITONIN: 0.27 NG/ML (ref 0.01–0.09)
RBC # BLD: 4.02 MILL/MM3 (ref 4.7–6.1)
SCAN OF BLOOD SMEAR: NORMAL
SEG NEUTROPHILS: 90.8 %
SEGMENTED NEUTROPHILS ABSOLUTE COUNT: 47.4 THOU/MM3 (ref 1.8–7.7)
SODIUM BLD-SCNC: 136 MEQ/L (ref 135–145)
TOXIC GRANULATION: PRESENT
WBC # BLD: 52.2 THOU/MM3 (ref 4.8–10.8)

## 2019-03-12 PROCEDURE — 96366 THER/PROPH/DIAG IV INF ADDON: CPT

## 2019-03-12 PROCEDURE — 2580000003 HC RX 258: Performed by: FAMILY MEDICINE

## 2019-03-12 PROCEDURE — 84145 PROCALCITONIN (PCT): CPT

## 2019-03-12 PROCEDURE — 85610 PROTHROMBIN TIME: CPT

## 2019-03-12 PROCEDURE — 36415 COLL VENOUS BLD VENIPUNCTURE: CPT

## 2019-03-12 PROCEDURE — 87040 BLOOD CULTURE FOR BACTERIA: CPT

## 2019-03-12 PROCEDURE — 6360000002 HC RX W HCPCS: Performed by: FAMILY MEDICINE

## 2019-03-12 PROCEDURE — 83605 ASSAY OF LACTIC ACID: CPT

## 2019-03-12 RX ORDER — CIPROFLOXACIN 500 MG/1
500 TABLET, FILM COATED ORAL 2 TIMES DAILY
Qty: 20 TABLET | Refills: 0 | Status: SHIPPED | OUTPATIENT
Start: 2019-03-12 | End: 2019-03-22

## 2019-03-12 RX ADMIN — CEFTRIAXONE SODIUM 1 G: 1 INJECTION, POWDER, FOR SOLUTION INTRAMUSCULAR; INTRAVENOUS at 00:35

## 2019-03-13 LAB
ORGANISM: ABNORMAL
URINE CULTURE REFLEX: ABNORMAL

## 2019-03-15 ENCOUNTER — HOSPITAL ENCOUNTER (OUTPATIENT)
Dept: PHYSICAL THERAPY | Age: 79
Setting detail: THERAPIES SERIES
Discharge: HOME | End: 2019-03-15
Payer: MEDICARE

## 2019-03-15 ENCOUNTER — OFFICE VISIT (OUTPATIENT)
Dept: FAMILY MEDICINE CLINIC | Age: 79
End: 2019-03-15
Payer: MEDICARE

## 2019-03-15 VITALS
BODY MASS INDEX: 29.91 KG/M2 | SYSTOLIC BLOOD PRESSURE: 125 MMHG | WEIGHT: 191 LBS | TEMPERATURE: 97.7 F | RESPIRATION RATE: 16 BRPM | HEART RATE: 72 BPM | DIASTOLIC BLOOD PRESSURE: 71 MMHG

## 2019-03-15 DIAGNOSIS — N30.01 ACUTE CYSTITIS WITH HEMATURIA: Primary | ICD-10-CM

## 2019-03-15 DIAGNOSIS — C67.9 MALIGNANT NEOPLASM OF URINARY BLADDER, UNSPECIFIED SITE (HCC): ICD-10-CM

## 2019-03-15 PROCEDURE — 99213 OFFICE O/P EST LOW 20 MIN: CPT | Performed by: FAMILY MEDICINE

## 2019-03-15 RX ORDER — PROMETHAZINE HYDROCHLORIDE 50 MG/ML
50 INJECTION, SOLUTION INTRAMUSCULAR; INTRAVENOUS EVERY 6 HOURS PRN
COMMUNITY
End: 2019-04-24 | Stop reason: ALTCHOICE

## 2019-03-15 ASSESSMENT — PATIENT HEALTH QUESTIONNAIRE - PHQ9
2. FEELING DOWN, DEPRESSED OR HOPELESS: 2
1. LITTLE INTEREST OR PLEASURE IN DOING THINGS: 0
SUM OF ALL RESPONSES TO PHQ QUESTIONS 1-9: 2
SUM OF ALL RESPONSES TO PHQ9 QUESTIONS 1 & 2: 2
SUM OF ALL RESPONSES TO PHQ QUESTIONS 1-9: 2

## 2019-03-16 ASSESSMENT — ENCOUNTER SYMPTOMS
DIARRHEA: 0
SORE THROAT: 0
EYE PAIN: 0
NAUSEA: 0
ABDOMINAL PAIN: 0
SINUS PRESSURE: 0
COUGH: 0
ABDOMINAL DISTENTION: 0
SHORTNESS OF BREATH: 0
RHINORRHEA: 0
CONSTIPATION: 0

## 2019-03-17 LAB
BLOOD CULTURE, ROUTINE: NORMAL
BLOOD CULTURE, ROUTINE: NORMAL

## 2019-03-19 ENCOUNTER — HOSPITAL ENCOUNTER (OUTPATIENT)
Dept: PHYSICAL THERAPY | Age: 79
Setting detail: THERAPIES SERIES
Discharge: HOME | End: 2019-03-19
Payer: MEDICARE

## 2019-03-19 DIAGNOSIS — C67.9 MALIGNANT NEOPLASM OF URINARY BLADDER, UNSPECIFIED SITE (HCC): Primary | ICD-10-CM

## 2019-03-19 PROCEDURE — 97110 THERAPEUTIC EXERCISES: CPT

## 2019-03-20 ENCOUNTER — OFFICE VISIT (OUTPATIENT)
Dept: ONCOLOGY | Age: 79
End: 2019-03-20
Payer: MEDICARE

## 2019-03-20 ENCOUNTER — HOSPITAL ENCOUNTER (OUTPATIENT)
Dept: INFUSION THERAPY | Age: 79
Discharge: HOME | End: 2019-03-20
Payer: MEDICARE

## 2019-03-20 VITALS
SYSTOLIC BLOOD PRESSURE: 135 MMHG | WEIGHT: 199.2 LBS | HEIGHT: 67 IN | BODY MASS INDEX: 31.27 KG/M2 | TEMPERATURE: 98 F | HEART RATE: 72 BPM | DIASTOLIC BLOOD PRESSURE: 72 MMHG | RESPIRATION RATE: 18 BRPM | OXYGEN SATURATION: 95 %

## 2019-03-20 VITALS
OXYGEN SATURATION: 95 % | DIASTOLIC BLOOD PRESSURE: 56 MMHG | HEART RATE: 66 BPM | TEMPERATURE: 98.5 F | SYSTOLIC BLOOD PRESSURE: 115 MMHG | RESPIRATION RATE: 18 BRPM | WEIGHT: 199.2 LBS | HEIGHT: 67 IN | BODY MASS INDEX: 31.27 KG/M2

## 2019-03-20 DIAGNOSIS — G63 POLYNEUROPATHY ASSOCIATED WITH UNDERLYING DISEASE (HCC): ICD-10-CM

## 2019-03-20 DIAGNOSIS — C67.9 MALIGNANT NEOPLASM OF URINARY BLADDER, UNSPECIFIED SITE (HCC): ICD-10-CM

## 2019-03-20 DIAGNOSIS — N18.30 CKD (CHRONIC KIDNEY DISEASE) STAGE 3, GFR 30-59 ML/MIN (HCC): ICD-10-CM

## 2019-03-20 DIAGNOSIS — C67.9 MALIGNANT NEOPLASM OF URINARY BLADDER, UNSPECIFIED SITE (HCC): Primary | ICD-10-CM

## 2019-03-20 DIAGNOSIS — Z51.11 ENCOUNTER FOR CHEMOTHERAPY MANAGEMENT: Primary | ICD-10-CM

## 2019-03-20 DIAGNOSIS — N48.89 PAIN IN PENIS: ICD-10-CM

## 2019-03-20 DIAGNOSIS — D49.4 BLADDER TUMOR: ICD-10-CM

## 2019-03-20 DIAGNOSIS — R31.9 URINARY TRACT INFECTION WITH HEMATURIA, SITE UNSPECIFIED: ICD-10-CM

## 2019-03-20 DIAGNOSIS — N39.0 URINARY TRACT INFECTION WITH HEMATURIA, SITE UNSPECIFIED: ICD-10-CM

## 2019-03-20 LAB
BASOPHILS # BLD: 0.7 %
BASOPHILS ABSOLUTE: 0.1 THOU/MM3 (ref 0–0.1)
EOSINOPHIL # BLD: 1.1 %
EOSINOPHILS ABSOLUTE: 0.2 THOU/MM3 (ref 0–0.4)
HCT VFR BLD CALC: 35.7 % (ref 42–52)
HEMOGLOBIN: 12.1 GM/DL (ref 14–18)
IMMATURE GRANS (ABS): 0.48 THOU/MM3 (ref 0–0.07)
IMMATURE GRANULOCYTES: 2.4 %
LYMPHOCYTES # BLD: 12.4 %
LYMPHOCYTES ABSOLUTE: 2.6 THOU/MM3 (ref 1–4.8)
MCH RBC QN AUTO: 30.9 PG (ref 27–31)
MCHC RBC AUTO-ENTMCNC: 33.8 GM/DL (ref 33–37)
MCV RBC AUTO: 91 FL (ref 80–94)
MONOCYTES # BLD: 4.6 %
MONOCYTES ABSOLUTE: 1 THOU/MM3 (ref 0.4–1.3)
NUCLEATED RED BLOOD CELLS: 0 /100 WBC
PDW BLD-RTO: 13.9 % (ref 11.5–14.5)
PLATELET # BLD: 206 THOU/MM3 (ref 130–400)
PMV BLD AUTO: 7.2 FL (ref 7.4–10.4)
RBC # BLD: 3.91 MILL/MM3 (ref 4.7–6.1)
SEG NEUTROPHILS: 78.8 %
SEGMENTED NEUTROPHILS ABSOLUTE COUNT: 16.5 THOU/MM3 (ref 1.8–7.7)
WBC # BLD: 21 THOU/MM3 (ref 4.8–10.8)

## 2019-03-20 PROCEDURE — 96375 TX/PRO/DX INJ NEW DRUG ADDON: CPT

## 2019-03-20 PROCEDURE — 2709999900 HC NON-CHARGEABLE SUPPLY

## 2019-03-20 PROCEDURE — 96367 TX/PROPH/DG ADDL SEQ IV INF: CPT

## 2019-03-20 PROCEDURE — 96366 THER/PROPH/DIAG IV INF ADDON: CPT

## 2019-03-20 PROCEDURE — 96377 APPLICATON ON-BODY INJECTOR: CPT

## 2019-03-20 PROCEDURE — 96417 CHEMO IV INFUS EACH ADDL SEQ: CPT

## 2019-03-20 PROCEDURE — 2580000003 HC RX 258: Performed by: INTERNAL MEDICINE

## 2019-03-20 PROCEDURE — G0463 HOSPITAL OUTPT CLINIC VISIT: HCPCS

## 2019-03-20 PROCEDURE — 99214 OFFICE O/P EST MOD 30 MIN: CPT | Performed by: INTERNAL MEDICINE

## 2019-03-20 PROCEDURE — 85025 COMPLETE CBC W/AUTO DIFF WBC: CPT

## 2019-03-20 PROCEDURE — 96415 CHEMO IV INFUSION ADDL HR: CPT

## 2019-03-20 PROCEDURE — 36591 DRAW BLOOD OFF VENOUS DEVICE: CPT

## 2019-03-20 PROCEDURE — 6360000002 HC RX W HCPCS: Performed by: INTERNAL MEDICINE

## 2019-03-20 PROCEDURE — 96413 CHEMO IV INFUSION 1 HR: CPT

## 2019-03-20 RX ORDER — SODIUM CHLORIDE 9 MG/ML
INJECTION, SOLUTION INTRAVENOUS ONCE
Status: COMPLETED | OUTPATIENT
Start: 2019-03-20 | End: 2019-03-20

## 2019-03-20 RX ORDER — SODIUM CHLORIDE 0.9 % (FLUSH) 0.9 %
10 SYRINGE (ML) INJECTION PRN
Status: DISCONTINUED | OUTPATIENT
Start: 2019-03-20 | End: 2019-03-21 | Stop reason: HOSPADM

## 2019-03-20 RX ORDER — PALONOSETRON 0.05 MG/ML
0.25 INJECTION, SOLUTION INTRAVENOUS ONCE
Status: COMPLETED | OUTPATIENT
Start: 2019-03-20 | End: 2019-03-20

## 2019-03-20 RX ORDER — SODIUM CHLORIDE 0.9 % (FLUSH) 0.9 %
20 SYRINGE (ML) INJECTION PRN
Status: CANCELLED | OUTPATIENT
Start: 2019-03-20

## 2019-03-20 RX ORDER — HEPARIN SODIUM (PORCINE) LOCK FLUSH IV SOLN 100 UNIT/ML 100 UNIT/ML
500 SOLUTION INTRAVENOUS PRN
Status: CANCELLED | OUTPATIENT
Start: 2019-03-20

## 2019-03-20 RX ORDER — SODIUM CHLORIDE 0.9 % (FLUSH) 0.9 %
10 SYRINGE (ML) INJECTION PRN
Status: CANCELLED | OUTPATIENT
Start: 2019-03-20

## 2019-03-20 RX ORDER — SODIUM CHLORIDE 0.9 % (FLUSH) 0.9 %
20 SYRINGE (ML) INJECTION PRN
Status: DISCONTINUED | OUTPATIENT
Start: 2019-03-20 | End: 2019-03-21 | Stop reason: HOSPADM

## 2019-03-20 RX ORDER — HEPARIN SODIUM (PORCINE) LOCK FLUSH IV SOLN 100 UNIT/ML 100 UNIT/ML
500 SOLUTION INTRAVENOUS PRN
Status: DISCONTINUED | OUTPATIENT
Start: 2019-03-20 | End: 2019-03-21 | Stop reason: HOSPADM

## 2019-03-20 RX ORDER — NITROFURANTOIN 25; 75 MG/1; MG/1
100 CAPSULE ORAL DAILY
Qty: 14 CAPSULE | Refills: 0 | Status: SHIPPED | OUTPATIENT
Start: 2019-03-20 | End: 2019-03-27

## 2019-03-20 RX ADMIN — PEGFILGRASTIM 6 MG: KIT SUBCUTANEOUS at 15:55

## 2019-03-20 RX ADMIN — Medication 10 ML: at 16:34

## 2019-03-20 RX ADMIN — SODIUM CHLORIDE 150 MG: 9 INJECTION, SOLUTION INTRAVENOUS at 11:10

## 2019-03-20 RX ADMIN — DEXAMETHASONE SODIUM PHOSPHATE 12 MG: 4 INJECTION, SOLUTION INTRAMUSCULAR; INTRAVENOUS at 11:55

## 2019-03-20 RX ADMIN — GEMCITABINE 2000 MG: 38 INJECTION INTRAVENOUS at 12:30

## 2019-03-20 RX ADMIN — Medication 500 UNITS: at 16:34

## 2019-03-20 RX ADMIN — PALONOSETRON 0.25 MG: 0.05 INJECTION, SOLUTION INTRAVENOUS at 12:21

## 2019-03-20 RX ADMIN — POTASSIUM CHLORIDE: 2 INJECTION, SOLUTION, CONCENTRATE INTRAVENOUS at 10:00

## 2019-03-20 RX ADMIN — POTASSIUM CHLORIDE: 2 INJECTION, SOLUTION, CONCENTRATE INTRAVENOUS at 15:30

## 2019-03-20 RX ADMIN — SODIUM CHLORIDE: 9 INJECTION, SOLUTION INTRAVENOUS at 09:58

## 2019-03-20 RX ADMIN — Medication 20 ML: at 08:21

## 2019-03-20 RX ADMIN — Medication 10 ML: at 08:20

## 2019-03-20 RX ADMIN — CISPLATIN: 100 INJECTION, SOLUTION INTRAVENOUS at 13:25

## 2019-03-20 ASSESSMENT — ENCOUNTER SYMPTOMS
CONSTIPATION: 0
FACIAL SWELLING: 0
SHORTNESS OF BREATH: 0
COUGH: 0
BACK PAIN: 0
COLOR CHANGE: 0
RECTAL PAIN: 0
SORE THROAT: 0
VOMITING: 0
ABDOMINAL DISTENTION: 0
CHEST TIGHTNESS: 0
WHEEZING: 0
TROUBLE SWALLOWING: 0
ABDOMINAL PAIN: 0
DIARRHEA: 0
EYE DISCHARGE: 0
NAUSEA: 0
BLOOD IN STOOL: 0

## 2019-03-20 NOTE — PLAN OF CARE
verbalizes signs/symptoms of port infection and when to notify the physician. Intervention: EDUCATE PATIENT ABOUT SIGNS AND SYMPTOMS OF INFECTION  Note:   Discussed mediport maintenance, infection prevention, and when to call the physician. Labs drawn. Care plan reviewed with patient and spouse. Patient and spouse verbalize understanding of the plan of care and contribute to goal setting.

## 2019-03-20 NOTE — PROGRESS NOTES
Patient assessed for the following post chemotherapy:    Dizziness   No  Lightheadedness  No     Acute nausea/vomiting No  Headache   No  Chest pain/pressure  No  Rash/itching   No  Shortness of breath  No    Patient kept for 20 minutes observation post infusion chemotherapy. Patient tolerated chemotherapy treatment gemzar and cisplatin without any complications. Labs drawn per mediport. neulasta on-pro applied    Last vital signs:   /72   Pulse 72   Temp 98 °F (36.7 °C) (Oral)   Resp 18   Ht 5' 7\" (1.702 m)   Wt 199 lb 3.2 oz (90.4 kg)   SpO2 95%   BMI 31.20 kg/m²         Patient instructed if experience any of the above symptoms following today's infusion and injection he/she is to notify MD immediately or go to the emergency department. Discharge instructions given to patient. Verbalizes understanding. Ambulated off unit per self with cane with spouse with belongings.

## 2019-03-20 NOTE — ONCOLOGY
Chemotherapy Administration    Pre-assessment Data: Antineoplastic Agents  Other:   See toxicity flow sheet for assessment [x]     Physician Notification of Concerns Related to Chemotherapy Administration:   Physician Notified Sera Showers / Time of Notification Dr Jacky Stanley seen today.      Interventions:   Lab work assessed  [x]   Height / Weight verified for dose [x]   Current MAR reviewed [x]   Emergency drugs available as appropriate [x]   Anaphylaxis assessment completed [x]   Pre-medications administered as ordered [x]   Blood return noted upon initiation of chemotherapy [x]   Blood return noted each 1-2ml of a vesicant medication if given IV push []   Blood return noted each 2-3ml of a non-vesicant medication if given IV push []   Monitor for signs / symptoms of hypersensitivity reaction [x]   Chemotherapy orders (drug/dose/rate) verified by 2 Chemo certified RNs [x]   Monitor IV site and blood return throughout the infusion of the medication [x]   Document IV site checks on the IV assessment form [x]   Document chemotherapy teaching on the Patient Education tab [x]   Document patient verbalizes understanding of medications being administered [x]   If IV infiltration, see ONS Guidelines []   Other:gemzar and cisplatin      [x]

## 2019-03-20 NOTE — PROGRESS NOTES
Labs drawn via central venous catheter, then patient escorted to exam room accompanied by Madelin Hinton

## 2019-03-25 ENCOUNTER — TELEPHONE (OUTPATIENT)
Dept: ONCOLOGY | Age: 79
End: 2019-03-25

## 2019-03-26 ENCOUNTER — HOSPITAL ENCOUNTER (OUTPATIENT)
Dept: PHYSICAL THERAPY | Age: 79
Setting detail: THERAPIES SERIES
Discharge: HOME | End: 2019-03-26
Payer: MEDICARE

## 2019-03-26 PROCEDURE — 97110 THERAPEUTIC EXERCISES: CPT

## 2019-03-29 ENCOUNTER — HOSPITAL ENCOUNTER (OUTPATIENT)
Dept: PHYSICAL THERAPY | Age: 79
Setting detail: THERAPIES SERIES
Discharge: HOME | End: 2019-03-29
Payer: MEDICARE

## 2019-03-29 PROCEDURE — 97110 THERAPEUTIC EXERCISES: CPT

## 2019-03-29 ASSESSMENT — PAIN SCALES - GENERAL: PAINLEVEL_OUTOF10: 4

## 2019-04-01 ENCOUNTER — HOSPITAL ENCOUNTER (OUTPATIENT)
Dept: PHYSICAL THERAPY | Age: 79
Setting detail: THERAPIES SERIES
Discharge: HOME | End: 2019-04-01
Payer: MEDICARE

## 2019-04-01 PROCEDURE — 97110 THERAPEUTIC EXERCISES: CPT

## 2019-04-01 NOTE — PROGRESS NOTES
[facility]  OUTPATIENT REHABILITATION  PHYSICAL THERAPY   ONCOLOGY REHABILITATION   DAILY NOTE  Time In: 1050  Time Out: 1130  Total timed code minutes: 40  Total treatment minutes: 40          Date: 2019  Patient Name: Justo Romero        CSN: [id number]   : 1940  (78 y.o.)  Gender: male   Referring Practitioner: Dr. Eddy Or  Diagnosis: R53.81 physical deconditioning, C67.9 malignant neoplasm of urinary bladder  Additional Pertinent Hx: Diagnosed with bladder cancer in  with TURBT and BCG therapy completed, 18 cancer recurred in bladder muscle, started Gemzar and cisplatin chemo 19; history of HTN, hypothyroidism, obesity, TIA, CAD with stent in , AAA, fem/fem bypass, carotid artery stenosis and lumbar fusion  Other (Comment): Per script: deconditioning from chemo    Insurance:  Medicare - Approved eval and 12 sessions thru 19; aquatics, modalities and massage approved; $25 copay/visit   Total # Visits Approved: 13 Total # Visits to Date: 8   Certification Date:  Progress Note Counter:8/10 for PN   Date of Physician Follow-Up: [facility] 4/3/19 Chart Reviewed: Yes     Subjective: Subjective: Pt reported he is glad chemo is down. Pt stated he is having increased problems with hearing and has noticed his balance is worse. Pt stated he was on the deck last week and wasn't thinking when stepped backwards and off deck landing on taillbone.      Pain: Pain Assessment  Patient Currently in Pain: Denies      Exercise/Equipment/Modalities Sets    Repetitions Comments   Nustep  L1 x5 minutes Seat 10, arms 13,(387 steps)   Biceps 2 15 Lime t-band   Triceps 2 15 Lime t-band   Horizontal shoulder abduction 2 15 Lime t-band   Marches 1 15 At bar   Heel/toes raises 1 15 At bar   Minisquats 1 15 At bar   3-way hip 1 15 At bar   Long arc quads 1 20 Yellow t-band   Hamstring curls 1 20 Yellow t-band   Hip abduction  1 20 Yellow t-band   Ball squeeze 1 20     Stretches at steps 3 15 seconds Hamstring and hip flexor   static balance on blue foam  2  45sec. Ft. Together, close SBA/CGA (unsteady)         Patient Education/Home Exercise Program:  Continue to increase activity at home and in the community gradually. Plan for reassessment after follow up with Dr. Gabby Russ    Response to Treatment:  Patient tolerated treatment well    Progression Toward Functional Goals: Progressing toward goals    Specific Interventions for Next Treatment:  Strengthening, balance, conditioning, gait    Plan: Continue established plan of care    Goals:  Short term goals  Time Frame for Short term goals: 6 weeks  Short term goal 1: Pt to demo bilateral hip strength improved from 3+/5 to 4+/5 for greater safety with transfers. Short term goal 2: Pt to demo bilateral knee strength improved from 4-/5 to 4+/5 for safety with walking. Short term goal 3: Pt to demo gait with least restrictive device >200 feet with appropriate foot clearance and step length for reduced risk of falling with Mod I.  Long term goals  Time Frame for Long term goals : 12 weeks  Long term goal 1: Pt to demo completion of 6-minute walk test with Nic RPE of 5 for improved participation in community activities. Long term goal 2: Pt to demo Brief Fatigue Inventory improved from 7.3 to <5 for greater ease with ADLs. Long term goal 3: Pt to demo Tinetti score improved from 6/28 to 15/28 for reduced risk of falling.          Young Logan PT, DPT, JONATAN [id number] 4/1/2019

## 2019-04-02 DIAGNOSIS — C67.9 MALIGNANT NEOPLASM OF URINARY BLADDER, UNSPECIFIED SITE (HCC): Primary | ICD-10-CM

## 2019-04-03 ENCOUNTER — OFFICE VISIT (OUTPATIENT)
Dept: ONCOLOGY | Age: 79
End: 2019-04-03
Payer: MEDICARE

## 2019-04-03 ENCOUNTER — HOSPITAL ENCOUNTER (OUTPATIENT)
Dept: INFUSION THERAPY | Age: 79
Discharge: HOME | End: 2019-04-03
Payer: MEDICARE

## 2019-04-03 VITALS
SYSTOLIC BLOOD PRESSURE: 150 MMHG | HEIGHT: 67 IN | RESPIRATION RATE: 18 BRPM | DIASTOLIC BLOOD PRESSURE: 75 MMHG | HEART RATE: 68 BPM | OXYGEN SATURATION: 95 % | BODY MASS INDEX: 31.2 KG/M2 | TEMPERATURE: 97.9 F

## 2019-04-03 VITALS
BODY MASS INDEX: 30.57 KG/M2 | HEIGHT: 67 IN | HEART RATE: 68 BPM | SYSTOLIC BLOOD PRESSURE: 150 MMHG | OXYGEN SATURATION: 95 % | DIASTOLIC BLOOD PRESSURE: 75 MMHG | TEMPERATURE: 97.9 F | RESPIRATION RATE: 18 BRPM | WEIGHT: 194.8 LBS

## 2019-04-03 DIAGNOSIS — N48.89 PAIN IN PENIS: Primary | ICD-10-CM

## 2019-04-03 DIAGNOSIS — C67.9 MALIGNANT NEOPLASM OF URINARY BLADDER, UNSPECIFIED SITE (HCC): ICD-10-CM

## 2019-04-03 DIAGNOSIS — N18.30 CKD (CHRONIC KIDNEY DISEASE) STAGE 3, GFR 30-59 ML/MIN (HCC): ICD-10-CM

## 2019-04-03 DIAGNOSIS — Z51.11 CHEMOTHERAPY MANAGEMENT, ENCOUNTER FOR: Primary | ICD-10-CM

## 2019-04-03 DIAGNOSIS — D49.4 BLADDER TUMOR: ICD-10-CM

## 2019-04-03 LAB
ALBUMIN SERPL-MCNC: 3.7 G/DL (ref 3.5–5.1)
ALP BLD-CCNC: 238 U/L (ref 38–126)
ALT SERPL-CCNC: 19 U/L (ref 11–66)
AST SERPL-CCNC: 19 U/L (ref 5–40)
BILIRUB SERPL-MCNC: 0.6 MG/DL (ref 0.3–1.2)
BILIRUBIN DIRECT: < 0.2 MG/DL (ref 0–0.3)
BUN, WHOLE BLOOD: 8 MG/DL (ref 8–26)
CHLORIDE, WHOLE BLOOD: 98 MEQ/L (ref 98–109)
CREATININE, WHOLE BLOOD: 0.8 MG/DL (ref 0.5–1.2)
GFR, ESTIMATED: > 90 ML/MIN/1.73M2
GLUCOSE, WHOLE BLOOD: 87 MG/DL (ref 70–108)
HCT VFR BLD CALC: 37.7 % (ref 42–52)
HEMOGLOBIN: 12.8 GM/DL (ref 14–18)
IONIZED CALCIUM, WHOLE BLOOD: 1.11 MMOL/L (ref 1.12–1.32)
MCH RBC QN AUTO: 31.3 PG (ref 27–31)
MCHC RBC AUTO-ENTMCNC: 33.8 GM/DL (ref 33–37)
MCV RBC AUTO: 93 FL (ref 80–94)
PDW BLD-RTO: 15.1 % (ref 11.5–14.5)
PLATELET # BLD: 145 THOU/MM3 (ref 130–400)
PMV BLD AUTO: 7.4 FL (ref 7.4–10.4)
POTASSIUM, WHOLE BLOOD: 3.7 MEQ/L (ref 3.5–4.9)
RBC # BLD: 4.08 MILL/MM3 (ref 4.7–6.1)
SEGMENTED NEUTROPHILS ABSOLUTE COUNT: 20.8 THOU/MM3 (ref 1.8–7.7)
SODIUM, WHOLE BLOOD: 137 MEQ/L (ref 138–146)
TOTAL CO2, WHOLE BLOOD: 27 MEQ/L (ref 23–33)
TOTAL PROTEIN: 5.8 G/DL (ref 6.1–8)
WBC # BLD: 28.7 THOU/MM3 (ref 4.8–10.8)

## 2019-04-03 PROCEDURE — 2580000003 HC RX 258: Performed by: INTERNAL MEDICINE

## 2019-04-03 PROCEDURE — 85027 COMPLETE CBC AUTOMATED: CPT

## 2019-04-03 PROCEDURE — 85049 AUTOMATED PLATELET COUNT: CPT

## 2019-04-03 PROCEDURE — 2709999900 HC NON-CHARGEABLE SUPPLY

## 2019-04-03 PROCEDURE — 99213 OFFICE O/P EST LOW 20 MIN: CPT | Performed by: INTERNAL MEDICINE

## 2019-04-03 PROCEDURE — 6360000002 HC RX W HCPCS: Performed by: INTERNAL MEDICINE

## 2019-04-03 PROCEDURE — 80047 BASIC METABLC PNL IONIZED CA: CPT

## 2019-04-03 PROCEDURE — 36591 DRAW BLOOD OFF VENOUS DEVICE: CPT

## 2019-04-03 PROCEDURE — 99211 OFF/OP EST MAY X REQ PHY/QHP: CPT

## 2019-04-03 PROCEDURE — 36415 COLL VENOUS BLD VENIPUNCTURE: CPT

## 2019-04-03 PROCEDURE — 80076 HEPATIC FUNCTION PANEL: CPT

## 2019-04-03 RX ORDER — PREGABALIN 50 MG/1
50 CAPSULE ORAL 3 TIMES DAILY
Qty: 270 CAPSULE | Refills: 3 | Status: SHIPPED | OUTPATIENT
Start: 2019-04-03 | End: 2019-06-24 | Stop reason: CLARIF

## 2019-04-03 RX ORDER — SODIUM CHLORIDE 0.9 % (FLUSH) 0.9 %
20 SYRINGE (ML) INJECTION PRN
Status: DISCONTINUED | OUTPATIENT
Start: 2019-04-03 | End: 2019-04-04 | Stop reason: HOSPADM

## 2019-04-03 RX ORDER — SODIUM CHLORIDE 0.9 % (FLUSH) 0.9 %
10 SYRINGE (ML) INJECTION PRN
Status: CANCELLED | OUTPATIENT
Start: 2019-04-03

## 2019-04-03 RX ORDER — SODIUM CHLORIDE 0.9 % (FLUSH) 0.9 %
10 SYRINGE (ML) INJECTION PRN
Status: DISCONTINUED | OUTPATIENT
Start: 2019-04-03 | End: 2019-04-04 | Stop reason: HOSPADM

## 2019-04-03 RX ORDER — HEPARIN SODIUM (PORCINE) LOCK FLUSH IV SOLN 100 UNIT/ML 100 UNIT/ML
500 SOLUTION INTRAVENOUS PRN
Status: DISCONTINUED | OUTPATIENT
Start: 2019-04-03 | End: 2019-04-04 | Stop reason: HOSPADM

## 2019-04-03 RX ORDER — SODIUM CHLORIDE 0.9 % (FLUSH) 0.9 %
20 SYRINGE (ML) INJECTION PRN
Status: CANCELLED | OUTPATIENT
Start: 2019-04-03

## 2019-04-03 RX ORDER — HEPARIN SODIUM (PORCINE) LOCK FLUSH IV SOLN 100 UNIT/ML 100 UNIT/ML
500 SOLUTION INTRAVENOUS PRN
Status: CANCELLED | OUTPATIENT
Start: 2019-04-03

## 2019-04-03 RX ADMIN — Medication 20 ML: at 12:11

## 2019-04-03 RX ADMIN — Medication 500 UNITS: at 13:37

## 2019-04-03 RX ADMIN — Medication 10 ML: at 13:37

## 2019-04-03 RX ADMIN — Medication 10 ML: at 12:10

## 2019-04-03 ASSESSMENT — ENCOUNTER SYMPTOMS
CHEST TIGHTNESS: 0
SORE THROAT: 0
COLOR CHANGE: 0
DIARRHEA: 0
BLOOD IN STOOL: 0
ABDOMINAL PAIN: 0
SHORTNESS OF BREATH: 0
NAUSEA: 0
COUGH: 0
WHEEZING: 0
BACK PAIN: 0
TROUBLE SWALLOWING: 0
RECTAL PAIN: 0
ABDOMINAL DISTENTION: 0
CONSTIPATION: 0
EYE DISCHARGE: 0
FACIAL SWELLING: 0
VOMITING: 0

## 2019-04-03 NOTE — PLAN OF CARE
Problem: Musculor/Skeletal Functional Status  Goal: Absence of falls  Outcome: Met This Shift  Note:   Free from falls while in O.P. Oncology. Intervention: Provide standby assistance  Note:   Discussed the need to use the call light for assistance when getting up to ambulate. Call light within reach. Problem: Discharge Planning  Goal: Knowledge of discharge instructions  Description  Knowledge of discharge instructions     Outcome: Met This Shift  Note:   Verbalize understanding of discharge instructions, follow up appointments, and when to call Physician. Intervention: Discharge to appropriate level of care  Note:   Provide discharge instructions. Problem: Infection - Central Venous Catheter-Associated Bloodstream Infection:  Goal: Absence of central venous catheter-associated bloodstream infection  Outcome: Met This Shift  Note:   Mediport site with no redness or warmth. Skin over port intact with no signs of breakdown noted. Patient verbalizes signs/symptoms of port infection and when to notify the physician. Intervention: EDUCATE PATIENT ABOUT SIGNS AND SYMPTOMS OF INFECTION  Note:   Discussed mediport maintenance, infection prevention, and when to call the physician. Lab draw. Care plan reviewed with patient and spouse. Patient and spouse verbalize understanding of the plan of care and contribute to goal setting.

## 2019-04-03 NOTE — PROGRESS NOTES
Patient assessed for the following post lab draw:    Dizziness   No  Lightheadedness  No     Acute nausea/vomiting No  Headache   No  Chest pain/pressure  No  Rash/itching   No  Shortness of breath  No    Patient tolerated lab draw per mediport without any complications. Last vital signs:   BP (!) 150/75   Pulse 68   Temp 97.9 °F (36.6 °C) (Oral)   Resp 18   Ht 5' 7\" (1.702 m)   SpO2 95%   BMI 31.20 kg/m²     Patient instructed if experience any of the above symptoms following today's lab draw, he/she is to notify MD immediately or go to the emergency department. Discharge instructions given to patient. Verbalizes understanding. Ambulated off unit per self with cane with spouse with belongings.

## 2019-04-03 NOTE — PROGRESS NOTES
[facility], [address] [facility] [address]  [address] [address]  Dept: [phone]  Dept Fax: [phone]: [phone]    Visit Date:4/3/2019     Bobby Dumont is a 78 y.o. male who presents today for:   Chief Complaint   Patient presents with    Follow-up     Bladder Cancer        HPI:   This is a 79-year-old patient with Hx of noninvasive high grade papillary urothelial carcinoma diagnosed in 2014. He underwent TURBT and BCG therapy. He was doing fine, regular surveillance cystoscopies were negative till most recent one in October 2018 showed a large bladder tumor. The patient underwent TURBT with transurethral resection On November 8, 2018. Final pathology showed:      High-grade invasive papillary urothelial carcinoma involving the      muscularis propria.   Flat carcinoma in situ. He has a hx of cad with RCA stent in 2003. He had a negative stress test in 2016. He currently expresses no sxs of angina. He can climb a flight of stairs without chest pain. .        He has a hx of other vascular disease with prior fem/fem bypass and AAA. He had peripheral vascular studies one year ago showing adequate flow. CTA of the abdomen and pelvis on December 5, 2018 showed:  1. Stable endovascularly repaired fusiform aneurysm of the infrarenal abdominal aorta without evidence of endoleak. 2. Occluded left common and external iliac arteries with opacification of the left common femoral artery from a patent bifemoral bypass graft. 3. Irregular thickening of the anterior right urinary bladder wall which may correspond to known malignancy. 4. Prior right nephrectomy. Patient started neoadjuvant chemotherapy with dose dense dose dense Gemzar and cisplatin on January 9, 2019 and completed on 03/20/2019.     Interim history on 04/03/2019:  The patient presents to the medical oncology clinic for follow up visit after he completed 3 cycles of neoadjuvant chemotherapy with Gemzar and cisplatin. Today, he denies having hematuria, he is afebrile. He reports gradually worsening hearing. Peripheral neuropathy in his toes is improved with Lyrica. The patient  Is undergoing physical therapy.   HPI   Past Medical History:   Diagnosis Date    AAA (abdominal aortic aneurysm) ([address]     BCG ROXANE 5/21/2014    BPH (benign prostatic hypertrophy)     CAD (coronary artery disease)     Carotid artery stenosis     Chronic back pain     Chronic kidney disease     Congenital absence of one kidney     History of lumbar fusion 3/15    mid lower back down    Hyperlipidemia     Hypertension     Hypothyroidism     Obesity     TIA (transient ischemic attack) 1/2008    Unspecified cerebral artery occlusion with cerebral infarction     TIA      Past Surgical History:   Procedure Laterality Date    ABDOMINAL AORTIC ANEURYSM REPAIR  3/2010    Shireen    ABDOMINAL AORTIC ANEURYSM REPAIR  7/24/15    Lequita Ogles    COLONOSCOPY  12/21/11    [facility]    CORONARY ANGIOPLASTY WITH STENT PLACEMENT  2003    2 stents    FEMORAL-FEMORAL BYPASS GRAFT  03/09/2017    Dr. Ahmet Coronel    arm    KNEE ARTHROSCOPY  12/2008    meniscus tear    LUMBAR LAMINECTOMY  3/2016    [facility]--Dr. Juan Turner    OTHER SURGICAL HISTORY  4/14/15    melanoma removal from right chest    OTHER SURGICAL HISTORY  5/20/2016    I and D, exicison of posterior neck cyst    [place] OFFICE/OUTPT VISIT,PROCEDURE ONLY N/A 11/8/2018    TURBT performed by Nikolai Garcia MD at [address]  10/2016    Dr Manuel Leach  1990's    TURP  04/16/2014    See note of Dr. Sandra Sheth for OR procedure details      Family History   Problem Relation Age of Onset    Diabetes Mother     Cancer Mother     Arthritis Mother     Asthma Father     Heart Disease Father     Cancer Father     Stroke Sister     Heart Disease Sister     Cancer Sister     Stroke Brother     Heart Disease Brother discharge. Left eye exhibits no discharge. No scleral icterus. Neck: Normal range of motion. Neck supple. No JVD present. No tracheal deviation present. No thyromegaly present. Cardiovascular: Normal rate and normal heart sounds. Exam reveals no gallop and no friction rub. No murmur heard. Pulmonary/Chest: Effort normal and breath sounds normal. No stridor. No respiratory distress. He has no wheezes. He has no rales. He exhibits no tenderness. Abdominal: Soft. Bowel sounds are normal. He exhibits no distension and no mass. There is no tenderness. There is no rebound. Musculoskeletal: Normal range of motion. He exhibits no edema. Good range of motion in all four extremities. Lymphadenopathy:     He has no cervical adenopathy. Neurological: He is alert and oriented to person, place, and time. He has normal reflexes. No cranial nerve deficit. He exhibits normal muscle tone. Skin: Skin is warm. No rash noted. No erythema. Psychiatric: He has a normal mood and affect. His behavior is normal. Judgment and thought content normal.   Vitals reviewed. BP (!) 150/75 (Site: Right Upper Arm, Position: Sitting, Cuff Size: Large Adult)   Pulse 68   Temp 97.9 °F (36.6 °C) (Oral)   Resp 18   Ht 5' 7.01\" (1.702 m)   Wt 194 lb 12.8 oz (88.4 kg)   SpO2 95%   BMI 30.50 kg/m²      ECOG status is 1. Imaging studies and labs:     Vl Dup Lower Extremity Arteries Left  Result Date: 12/5/2018   Mildly diminished ankle-brachial indices bilaterally. The femoral-femoral bypass graft is widely patent with good pulsatility throughout. There is no significant velocity alteration to suggest significant stenosis. Femorofemoral bypass graft is widely patent. **This report has been created using voice recognition software. It may contain minor errors which are inherent in voice recognition technology. ** Final report electronically signed by Dr. Maribel Franklin on 12/5/2018 6:13 PM    Cta Abdomen Pelvis W Mary Walker Contrast  Result Date: 12/5/2018    1. Stable endovascularly repaired fusiform aneurysm of the infrarenal abdominal aorta without evidence of endoleak. 2. Occluded left common and external iliac arteries with opacification of the left common femoral artery from a patent bifemoral bypass graft. 3. Irregular thickening of the anterior right urinary bladder wall which may correspond to known malignancy. 4. Prior right nephrectomy. Final report electronically signed by Dr. Mindy Jones on 12/5/2018 4:18 PM    Lab Results   Component Value Date    WBC 28.7 (H) 04/03/2019    HGB 12.8 (L) 04/03/2019    HCT 37.7 (L) 04/03/2019    MCV 93 04/03/2019     04/03/2019       Chemistry        Component Value Date/Time     (L) 04/03/2019 1210     03/11/2019 2330    K 3.7 04/03/2019 1210    K 4.5 03/11/2019 2330    K 4.7 05/26/2018 0551    CL 94 (L) 03/11/2019 2330    CO2 29 03/11/2019 2330    BUN 19 03/11/2019 2330    CREATININE 0.8 04/03/2019 1210    CREATININE 1.0 03/11/2019 2330        Component Value Date/Time    CALCIUM 8.7 03/11/2019 2330    ALKPHOS 238 (H) 04/03/2019 1210    AST 19 04/03/2019 1210    ALT 19 04/03/2019 1210    BILITOT 0.6 04/03/2019 1210    BILITOT NEGATIVE 10/26/2018 0935        PET scan on November 20, 2018 showed:  Left ureterectasis at the distal third and at the left ureterovesical junction levels without identifiable obstructing lesion. I do not identify specific evidence for residual or recurrent malignancy, but difficult to exclude.    No  evidence for distant metastatic disease         Assessment/Plan: 1. Muscle invasive bladder cancer. This is a 14-year-old patient with muscle invasive high-grade urothelial carcinoma of the bladder. I discuss with the patient the benefit of neoadjuvant chemotherapy.     Despite radical cystectomy, approximately one-half of patients with muscle-invasive urothelial bladder cancer involving the muscularis propria will develop metastatic disease within two years. Therefore the preferred management of patients with muscle-invasive bladder cancer consists of a multimodal approach comprising neoadjuvant chemotherapy followed by radical cystectomy. This approach is supported by  evidence that neoadjuvant cisplatin-based chemotherapy improves survival compared with locoregional treatment alone. A 2003 meta-analysis of 11 randomized trials that compared cisplatin-based neoadjuvant chemotherapy plus local therapy versus local therapy alone showed an improvement in overall survival (5-year OS 50 versus 45%) and a lower risk of recurrence (HR for recurrence 0.81). Choice of chemotherapy regimens include MVAC for healthy patients younger than 79years of age. In older patients and those unable to tolerate this combination due to medical comorbidities, gemcitabine plus cisplatin (GC) is a reasonable alternative. Platinum agents are associated with significant toxicity, which may increase the complication rate for older patients. Because of the toxicities associated with platinum-based treatment, investigators have defined criteria to be used in older patients that would help to define appropriate candidates for treatment. These criteria generally include:  Creatinine clearance >60 mL/min  [facility] Cooperative Oncology Group (ECOG) performance status ? 2  Less than grade 2 hearing loss or neuropathy  No evidence of congestive heart failure. Mr. Delisa Marcial has creatinine clearance > 60 mL/min. His current ECOG PS is 1, he doesn't have a hearing loss and no neuropathy and no evidence of congestive heart failure. He had PET scan on December 20, 2018 that showed no evidence of distant metastatic disease. 2.  Neoadjuvant chemotherapy with dose dense Gemzar and cisplatin. Cycle #1 given on January 9, 2019. Due to profound fatigue and worsening peripheral neuropathy cycle #1 day 15 of Gemzar/ Cisplatin given with  dose reduction of cisplatin to 35 mg/m² .   He completed 3 of Gemzar and cisplatin on 03/20/2019  3. Peripheral neuropathy in his legs. The patient has chronic peripheral neuropathy that got worse after chemo treatment. He tried gabapentin in the past.  Gabapentin didn't help him. Therefore he was placed on Lyrica 25 mg po TID. Nader Montague He reports improvement in peripheral neuropathy. The patient also has started physical therapy. Port flash every 4-6 weeks. He will return to see me 2 weeks after bladder surgery    . Diagnosis Orders   1. Chemotherapy management, encounter for     2. Malignant neoplasm of urinary bladder, unspecified site (HCC)  pregabalin (LYRICA) 50 MG capsule        Plan:   No follow-ups on file. Orders Placed:   No orders of the defined types were placed in this encounter. Medications Prescribed:   Orders Placed This Encounter   Medications    pregabalin (LYRICA) 50 MG capsule     Sig: Take 1 capsule by mouth 3 times daily for 90 days.      Dispense:  270 capsule     Refill:  3

## 2019-04-03 NOTE — PATIENT INSTRUCTIONS
1.  No treatments today, therapy completed  2. Please call Dr. Aldo Lu office to schedule follow up after neoadjuvant chemotherapy in the next 2 weeks  3. Port flash every 4-6 weeks  4.   Returns to see me 2 weeks after bladder surgery

## 2019-04-04 ENCOUNTER — TELEPHONE (OUTPATIENT)
Dept: ONCOLOGY | Age: 79
End: 2019-04-04

## 2019-04-08 ENCOUNTER — HOSPITAL ENCOUNTER (OUTPATIENT)
Dept: PHYSICAL THERAPY | Age: 79
Setting detail: THERAPIES SERIES
Discharge: HOME | End: 2019-04-08
Payer: MEDICARE

## 2019-04-08 PROCEDURE — 97110 THERAPEUTIC EXERCISES: CPT

## 2019-04-08 RX ORDER — ATORVASTATIN CALCIUM 40 MG/1
TABLET, FILM COATED ORAL
Qty: 30 TABLET | Refills: 11 | Status: SHIPPED | OUTPATIENT
Start: 2019-04-08 | End: 2019-06-17 | Stop reason: SDUPTHER

## 2019-04-08 NOTE — DISCHARGE SUMMARY
[facility]  OUTPATIENT REHABILITATION  PHYSICAL THERAPY   ONCOLOGY REHABILITATION   DISCHARGE NOTE  Time In: 1450  Time Out: 1330  Total timed code minutes: 40  Total treatment minutes: 40  Brief Fatigue Inventory  Score: 1    Date: 2019  Patient Name: Smita Gonzalez        CSN: [id number]   : 1940  (78 y.o.)  Gender: male   Referring Practitioner: Dr. Madison Amaya  Diagnosis: R53.81 physical deconditioning, C67.9 malignant neoplasm of urinary bladder  Additional Pertinent Hx: Diagnosed with bladder cancer in  with TURBT and BCG therapy completed, 18 cancer recurred in bladder muscle, started Gemzar and cisplatin chemo 19; history of HTN, hypothyroidism, obesity, TIA, CAD with stent in , AAA, fem/fem bypass, carotid artery stenosis and lumbar fusion  Other (Comment): Per script: deconditioning from chemo    Insurance:  Medicare - Approved eval and 12 sessions thru 19; aquatics, modalities and massage approved; $25 copay/visit   Total # Visits Approved: 13 Total # Visits to Date: 9   Certification Date:  Progress Note Counter:9/10 for PN   Date of Physician Follow-Up:   Chart Reviewed: Yes     Subjective: Subjective: To see cardiologist and surgeon on Thursday. Plans on discharge from therapy at this time due to upcoming surgery. Admits neuropathy in feet has improved mildly with increase in medication. Pain: Pain Assessment  Patient Currently in Pain: Denies    Manual Treatments/Provider Interaction: Strength at bilateral hips 4/5, knees 4+/5. Completed 6-minute walk test and Tinetti balance testing. Discussed progress to date and benefits of 4-wheeled walker vs. Quad cane. Adjusted pt's cane this date to a more appropriate height. Brief Fatigue Inventory   Throughout our lives, most of us have times when we feel very tired or fatigued. Have you felt unusually tired or fatigued in the last week?  Yes   Scale: 0 (No Fatigue)  <<<<>>>>  10 (As Bad as You Can Imagine) 1. How would you rate your fatigue (weariness, tiredness) right NOW? 0   2. How would you rate your USUAL level of fatigue during the past 24 hours? 0   3. How would you rate your WORST level of fatigue during the past 24 hours? 5   Scale: 0 (Does Not Interfere) <<<<>>>> 10 (Completely Interferes)   4. How would you rate how your fatigue has interfered with your GENERAL ACTIVITY in the past 24 hours? 4   5. How would you rate how your fatigue has interfered with your MOOD in the past 24 hours? 0   6. How would you rate how your fatigue has interfered with your WALKING ABILITY in the past 24 hours? 0   7. How would you rate how your fatigue has interfered with your NORMAL WORK (both outside and inside the home) in the past 24 hours? 0   8. How would you rate how your fatigue has interfered with your RELATIONS WITH OTHER PEOPLE in the past 24 hours? 0   9. How would you rate how your fatigue has interfered with your ENJOYMENT OF LIFE in the past 24 hours? 0   Total Score (sum of points for all 9 items/9): 1   Levels of Fatigue:  0 = None  1 -3 = Mild  4 - 6 = Moderate  7 - 10 = Severe Mild   *Adapted from MD [address] [place] [address]       Patient Education/Home Exercise Program:  Benefits of 4-ww vs. Cane. Follow up with PCP regarding right hip pain and limiting him with ambulation. Discharge today per pt's wishes with upcoming surgery and continue with HEP. Response to Treatment:  Patient tolerated treatment well    Progression Toward Functional Goals: Pt has made excellent progress towards goals with improved strength, balance, stamina and gait since starting therapy. Specific Interventions for Next Treatment:  n/a    Plan: Discharge    Goals:  Short term goals  Short term goal 1: Pt to demo bilateral hip strength improved from 3+/5 to 4+/5 for greater safety with transfers. - GOAL NOT MET. 4/5 bilaterally.   Short term goal 2: Pt to demo bilateral knee strength improved from 4-/5 to 4+/5 for safety with walking. - GOAL MET. 4+/5 bilaterally  Short term goal 3: Pt to demo gait with least restrictive device >200 feet with appropriate foot clearance and step length for reduced risk of falling with Mod I. - GOAL MET. Ambulated 475 feet this date with cane with Mod I/supervision. Discussed benefits of 4-wheeled walker as pt had to stop due to pain in right hip. Long term goals  Long term goal 1: Pt to demo completion of 6-minute walk test with Nic RPE of 5 for improved participation in community activities. - GOAL MET. Completed 6 minutes with 475 feet ambulated with straight cane and RPE of <5. Long term goal 2: Pt to demo Brief Fatigue Inventory improved from 7.3 to <5 for greater ease with ADLs. - GOAL MET. Scored 1 this date. Long term goal 3: Pt to demo Tinetti score improved from 6/28 to 15/28 for reduced risk of falling. - GOAL MET. 19/28 this date. Discussion of benefits of 4-wheeled walker.     PT G-Codes  Functional Assessment Tool Used: Brief Fatigue Inventory  Score: 1    Haroldo Argueta PT, DPT, [facility] [id number] 4/8/2019

## 2019-04-09 ENCOUNTER — HOSPITAL ENCOUNTER (OUTPATIENT)
Dept: AUDIOLOGY | Age: 79
Discharge: HOME | End: 2019-04-09

## 2019-04-09 PROCEDURE — 9990000010 HC NO CHARGE VISIT: Performed by: AUDIOLOGIST

## 2019-04-11 ENCOUNTER — TELEPHONE (OUTPATIENT)
Dept: UROLOGY | Age: 79
End: 2019-04-11

## 2019-04-11 ENCOUNTER — OFFICE VISIT (OUTPATIENT)
Dept: INTERNAL MEDICINE CLINIC | Age: 79
End: 2019-04-11
Payer: MEDICARE

## 2019-04-11 ENCOUNTER — OFFICE VISIT (OUTPATIENT)
Dept: UROLOGY | Age: 79
End: 2019-04-11
Payer: MEDICARE

## 2019-04-11 VITALS
HEART RATE: 70 BPM | SYSTOLIC BLOOD PRESSURE: 140 MMHG | HEIGHT: 67 IN | DIASTOLIC BLOOD PRESSURE: 70 MMHG | WEIGHT: 197.5 LBS | BODY MASS INDEX: 31 KG/M2

## 2019-04-11 VITALS
BODY MASS INDEX: 30.92 KG/M2 | SYSTOLIC BLOOD PRESSURE: 126 MMHG | HEIGHT: 67 IN | DIASTOLIC BLOOD PRESSURE: 70 MMHG | WEIGHT: 197 LBS

## 2019-04-11 DIAGNOSIS — D49.4 BLADDER TUMOR: Primary | ICD-10-CM

## 2019-04-11 DIAGNOSIS — Z01.818 PRE-OP TESTING: ICD-10-CM

## 2019-04-11 DIAGNOSIS — H91.93 BILATERAL HEARING LOSS, UNSPECIFIED HEARING LOSS TYPE: Primary | ICD-10-CM

## 2019-04-11 DIAGNOSIS — H91.09 OTOTOXIC HEARING LOSS, UNSPECIFIED LATERALITY: ICD-10-CM

## 2019-04-11 LAB
BILIRUBIN, POC: NORMAL
BLOOD URINE, POC: NORMAL
CLARITY, POC: CLEAR
COLOR, POC: YELLOW
GLUCOSE URINE, POC: NORMAL
KETONES, POC: NORMAL
LEUKOCYTE EST, POC: NORMAL
NITRITE, POC: NORMAL
PH, POC: 6.5
PROTEIN, POC: NORMAL
SPECIFIC GRAVITY, POC: 1.01
UROBILINOGEN, POC: 0.2

## 2019-04-11 PROCEDURE — 99214 OFFICE O/P EST MOD 30 MIN: CPT | Performed by: INTERNAL MEDICINE

## 2019-04-11 PROCEDURE — 81003 URINALYSIS AUTO W/O SCOPE: CPT | Performed by: UROLOGY

## 2019-04-11 PROCEDURE — 99214 OFFICE O/P EST MOD 30 MIN: CPT | Performed by: UROLOGY

## 2019-04-11 RX ORDER — VITAMIN B COMPLEX
1 CAPSULE ORAL DAILY
COMMUNITY

## 2019-04-11 ASSESSMENT — ENCOUNTER SYMPTOMS
SHORTNESS OF BREATH: 0
CHEST TIGHTNESS: 0
FACIAL SWELLING: 0
ABDOMINAL PAIN: 0
COLOR CHANGE: 0
VOMITING: 0
EYE REDNESS: 0

## 2019-04-11 NOTE — PROGRESS NOTES
Subjective:      Patient ID: Kary Issa 78 y.o. male 1940    Chief Complaint   Patient presents with    Bladder Cancer    Follow-up       Other   This is a new (muscle invasive bladder cancer) problem. The current episode started more than 1 month ago. The problem occurs constantly. The problem has been gradually improving. Pertinent negatives include no abdominal pain, chest pain, chills, fever, neck pain, rash or vomiting. Nothing aggravates the symptoms. Treatments tried: TURBT and neoadjuvant chemotherapy. The treatment provided mild relief.        Past Medical History:   Diagnosis Date    AAA (abdominal aortic aneurysm) ([facility] [address]     intravascular stent    BCG ROXANE 2014    BPH (benign prostatic hypertrophy)     CAD (coronary artery disease)     Carotid artery stenosis     Chronic back pain     Chronic kidney disease     Congenital absence of one kidney     absent right kidney    History of lumbar fusion 3/15    mid lower back down    Hyperlipidemia     Hypertension     Hypothyroidism     Obesity     TIA (transient ischemic attack) 2008    Unspecified cerebral artery occlusion with cerebral infarction     TIA       Social History     Socioeconomic History    Marital status:      Spouse name: Not on file    Number of children: Not on file    Years of education: Not on file    Highest education level: Not on file   Occupational History    Not on file   Social Needs    Financial resource strain: Not on file    Food insecurity:     Worry: Not on file     Inability: Not on file    Transportation needs:     Medical: Not on file     Non-medical: Not on file   Tobacco Use    Smoking status: Former Smoker     Packs/day: 1.00     Years: 24.00     Pack years: 24.00     Types: Cigarettes     Start date:      Last attempt to quit: 1979     Years since quittin.1    Smokeless tobacco: Never Used   Substance and Sexual Activity    Alcohol use: No     Alcohol/week: 0.0 oz  Drug use: No    Sexual activity: Not Currently   Lifestyle    Physical activity:     Days per week: Not on file     Minutes per session: Not on file    Stress: Not on file   Relationships    Social connections:     Talks on phone: Not on file     Gets together: Not on file     Attends Orthodoxy service: Not on file     Active member of club or organization: Not on file     Attends meetings of clubs or organizations: Not on file     Relationship status: Not on file    Intimate partner violence:     Fear of current or ex partner: Not on file     Emotionally abused: Not on file     Physically abused: Not on file     Forced sexual activity: Not on file   Other Topics Concern    Not on file   Social History Narrative    Not on file       Family History   Problem Relation Age of Onset    Diabetes Mother     Cancer Mother     Arthritis Mother     Asthma Father     Heart Disease Father     Cancer Father     Stroke Sister     Heart Disease Sister     Cancer Sister     Stroke Brother     Heart Disease Brother     Cancer Brother        Past Surgical History:   Procedure Laterality Date    ABDOMINAL AORTIC ANEURYSM REPAIR  3/2010    Lentz Peeling ABDOMINAL AORTIC ANEURYSM REPAIR  7/24/15    Hellena Covert    [facility]ONOSCOPY  12/21/[facility]    [facility]    CORONARY ANGIOPLASTY WITH STENT PLACEMENT  2003    2 stents    FEMORAL-FEMORAL BYPASS GRAFT  03/09/2017    Dr. Jc Anderson    arm    KNEE ARTHROSCOPY  12/2008    meniscus tear    LUMBAR LAMINECTOMY  3/2016    [facility] HISTORY  4/14/15    melanoma removal from right chest    OTHER SURGICAL HISTORY  5/20/2016    I and D, exicison of posterior neck cyst    CA OFFICE/OUTPT VISIT,PROCEDURE ONLY N/A 11/8/2018    TURBT performed by Rell Rodney MD at [facility] 5/1/2019    ROBOTIC RADICAL CYSTOPROSTATECTOMY performed by Rell Rodney MD at [address] cold intolerance and heat intolerance. Genitourinary: Negative for difficulty urinating, dysuria, frequency and urgency. Musculoskeletal: Negative for neck pain and neck stiffness. Skin: Negative for color change and rash. Allergic/Immunologic: Negative for environmental allergies and food allergies. Neurological: Negative for dizziness and light-headedness. Hematological: Does not bruise/bleed easily. /70   Ht 5' 7\" (1.702 m)   Wt 197 lb (89.4 kg)   BMI 30.85 kg/m²     Objective:   Physical Exam   Constitutional: He is oriented to person, place, and time. Vital signs are normal. He appears well-developed and well-nourished. He is cooperative. No distress. HENT:   Head: Normocephalic and atraumatic. Mouth/Throat: Oropharynx is clear and moist and mucous membranes are normal. No oropharyngeal exudate. Eyes: Pupils are equal, round, and reactive to light. EOM are normal. Right eye exhibits no discharge. Left eye exhibits no discharge. No scleral icterus. Neck: Trachea normal. No JVD present. No tracheal deviation present. Pulmonary/Chest: Effort normal. No respiratory distress. He has no wheezes. Abdominal: Soft. He exhibits no distension. There is no tenderness. There is no rebound and no CVA tenderness. Musculoskeletal: He exhibits no edema or tenderness. Lymphadenopathy:        Right: No supraclavicular adenopathy present. Left: No supraclavicular adenopathy present. Neurological: He is alert and oriented to person, place, and time. No cranial nerve deficit. Skin: Skin is warm and dry. He is not diaphoretic. Psychiatric: He has a normal mood and affect. His behavior is normal.   Nursing note and vitals reviewed.     Labs    Results for POC orders placed in visit on 04/11/19   POCT Urinalysis No Micro (Auto)   Result Value Ref Range    Color, UA yellow     Clarity, UA clear     Glucose, UA POC NEG     Bilirubin, UA NEG     Ketones, UA NEG     Spec Grav, UA 1.015 Blood, UA POC NEG     pH, UA 6.5     Protein, UA POC NEG     Urobilinogen, UA 0.2     Leukocytes, UA NEG     Nitrite, UA NEG        Lab Results   Component Value Date    CREATININE 1.1 05/11/2019    BUN 17 05/11/2019     05/11/2019    K 4.1 05/11/2019    CL 98 05/11/2019    CO2 29 05/11/2019       Lab Results   Component Value Date    PSA 0.70 07/25/2011    PSA 0.83 10/31/2008           Assessment:       Diagnosis Orders   1. Bladder tumor  POCT Urinalysis No Micro (Auto)    CT ABDOMEN PELVIS W WO CONTRAST Additional Contrast? None    External Referral To Wound Clinic       Mr. Fernanda Breaux presents today in follow-up for Bladder tumor [D49.4]. He has high-grade muscle invasive bladder cancer and has completed his neoadjuvant chemotherapy in preparation for radical cystectomy. We again discussed the procedure today. He is ready to proceed with robot-assisted laparoscopic radical cystoprostatectomy with ileal loop urinary diversion. We will schedule this in the near future. I have reviewed all notes sent along with this referral including notes from a recent visit to his primary care provider. These records demonstrated the following past medical history:  Past Medical History:   Diagnosis Date    AAA (abdominal aortic aneurysm) ([facility] [address]     intravascular stent    BCG ROXANE 5/21/2014    BPH (benign prostatic hypertrophy)     CAD (coronary artery disease)     Carotid artery stenosis     Chronic back pain     Chronic kidney disease     Congenital absence of one kidney     absent right kidney    History of lumbar fusion 3/15    mid lower back down    Hyperlipidemia     Hypertension     Hypothyroidism     Obesity     TIA (transient ischemic attack) 1/2008    Unspecified cerebral artery occlusion with cerebral infarction     TIA          Plan: We will schedule RAL radical cystoprostatectomy with ileal loop urinary diversion.     I described the procedure in detail and also described the associated risks and benefits at length. We discussed possible alternative therapies. We discussed the risks and benefits of not undergoing therapy. Linda Alejandra understands these risks and benefits and desires to proceed. He will be scheduled for the procedure in the very near future.

## 2019-04-11 NOTE — TELEPHONE ENCOUNTER
Patient here to schedule surgery. Patient has been cleared by Dr Abi Bowman. Surgery consent signed. All pre op testing done. Patient will need uranalysis done. Surgery date set for 5/1/19. Arrival of 6:00 am. Patient referred to stoma nurse. Surgery instructions mailed to patient. Will talk to [facility] regarding pre and post op drinks for the patient.

## 2019-04-11 NOTE — PROGRESS NOTES
YOB: 1940     Chief Complaint   Patient presents with    Pre-op Exam     This patient was referred to me by Dr. Steve Gomez for pre-op evaluation prior to  Removal of bladder which is scheduled for  ? at [facility]. The pt has a several yr hx of bladder tumor and recent evaluation has indicated it is invasive. He has had chemotherapy over the winter and removal of his bladder, prostate,and part of his intestine has been proposed. He is being seen for medical evaluation. He has a hx of remote PTCA of the RCA. He had a normal stress test in 2016. He is capable of walking up a flight of stairs without sob or chest pain. He has had an EVAR for an AAA. He does have difficulty walking with weakness in his legs. He denies a consistent pattern of chahal. .       Past Medical History:   Diagnosis Date    AAA (abdominal aortic aneurysm) ([address]     BCG ROXANE 5/21/2014    BPH (benign prostatic hypertrophy)     CAD (coronary artery disease)     Carotid artery stenosis     Chronic back pain     Chronic kidney disease     Congenital absence of one kidney     History of lumbar fusion 3/15    mid lower back down    Hyperlipidemia     Hypertension     Hypothyroidism     Obesity     TIA (transient ischemic attack) 1/2008    Unspecified cerebral artery occlusion with cerebral infarction     TIA       Past Surgical History:   Procedure Laterality Date    ABDOMINAL AORTIC ANEURYSM REPAIR  3/2010    Shireen    ABDOMINAL AORTIC ANEURYSM REPAIR  7/24/15    Eri Miu    [facility]    CORONARY ANGIOPLASTY WITH STENT PLACEMENT  2003    2 stents    FEMORAL-FEMORAL BYPASS GRAFT  03/09/2017    Dr. Lala Bass    arm    KNEE ARTHROSCOPY  12/2008    meniscus tear    LUMBAR LAMINECTOMY  3/2016    [facility]--Dr. Sharita Araujo    OTHER SURGICAL HISTORY  4/14/15    melanoma removal from right chest    OTHER SURGICAL HISTORY  5/20/2016    I and D, exicison of posterior neck mouth daily      docusate sodium (COLACE) 50 MG capsule Take 50 mg by mouth 2 times daily      atorvastatin (LIPITOR) 40 MG tablet TAKE 1 TABLET BY MOUTH EVERY DAY AT BEDTIME 30 tablet 11    pregabalin (LYRICA) 50 MG capsule Take 1 capsule by mouth 3 times daily for 90 days. 270 capsule 3    levothyroxine (SYNTHROID) 137 MCG tablet TAKE 1 TABLET BY MOUTH DAILY 90 tablet 3    KLOR-CON M10 10 MEQ extended release tablet TAKE 1 TABLET BY MOUTH DAILY 90 tablet 3    prochlorperazine (COMPAZINE) 5 MG tablet Take 1 tablet by mouth every 6 hours as needed for Nausea 60 tablet 2    metoprolol tartrate (LOPRESSOR) 25 MG tablet Take 1 tablet by mouth 2 times daily 180 tablet 3    nitroGLYCERIN (NITROSTAT) 0.4 MG SL tablet Place 1 tablet under the tongue every 5 minutes as needed for Chest pain 25 tablet 1    aspirin 81 MG tablet Take 81 mg by mouth daily.  promethazine (PHENERGAN) 50 MG/ML injection Infuse 50 mg intravenously every 6 hours as needed      Elastic Bandages & Supports (JOBST KNEE HIGH COMPRESSION SM) MISC 1 each by Does not apply route daily as needed 1 each 0    Handicap Placard MISC Cannot walk more than 200 feet without stopping to rest or without assistance. Lifetime x 5 years. 1 each 0     No current facility-administered medications for this visit. No Known Allergies    Review of Systems - General ROS: negative  Psychological ROS: negative  Hematological and Lymphatic ROS: No history of blood clots or bleeding disorder. Respiratory ROS: no cough, shortness of breath, or wheezing  Cardiovascular ROS: no chest pain or dyspnea on exertion  Gastrointestinal ROS: no abdominal pain, change in bowel habits, or black or bloody stools  Genito-Urinary ROS: see above  Musculoskeletal ROS: negative  Neurological ROS: no TIA or stroke symptoms  Dermatological ROS: negative    This patient has never experienced difficulty with general anesthetic.     Blood pressure (!) 140/70, pulse 70, height 5' 7.01\" (1.702 m), weight 197 lb 8 oz (89.6 kg). Physical Examination: General appearance - alert, well appearing, and in no distress  Mental status - alert, oriented to person, place, and time  Neck - Neck is supple, no JVD or carotid bruits. No thyromegaly or adenopathy. Chest - clear to auscultation, no wheezes, rales or rhonchi, symmetric air entry  Heart - normal rate, regular rhythm, normal S1, S2, no murmurs, rubs, clicks or gallops  Abdomen - soft, nontender, nondistended, no masses or organomegaly  Neurological - alert, oriented, normal speech, no focal findings or movement disorder noted  Musculoskeletal - no joint tenderness, deformity or swelling  Extremities - legs puffy, pulses nonpalpable  Skin - normal coloration and turgor, no rashes, no suspicious skin lesions noted    I have reviewed recent diagnostic testing including none available. Diagnosis Orders   1. Bilateral hearing loss, unspecified hearing loss type  Audiometry with tympanometry           Orders Placed This Encounter   Procedures    Audiometry with tympanometry     Standing Status:   Future     Standing Expiration Date:   4/11/2020     Scheduling Instructions:      Pt wants to do through [address] [place] [address]. Please fax order to  [phone]         presently the pt appears to be an adequate cardiovascular risk for surgery. He is to increase his metoprolol to 50 mg bid for cardioprotection. .    . Recommend routine DVT/PE prophylaxis as per surgery. Signed by:  Carie Ray M.D.

## 2019-04-12 ENCOUNTER — TELEPHONE (OUTPATIENT)
Dept: UROLOGY | Age: 79
End: 2019-04-12

## 2019-04-12 ENCOUNTER — HOSPITAL ENCOUNTER (OUTPATIENT)
Dept: AUDIOLOGY | Age: 79
Discharge: HOME | End: 2019-04-12
Payer: MEDICARE

## 2019-04-12 PROCEDURE — 92557 COMPREHENSIVE HEARING TEST: CPT | Performed by: AUDIOLOGIST

## 2019-04-12 PROCEDURE — 92567 TYMPANOMETRY: CPT | Performed by: AUDIOLOGIST

## 2019-04-12 NOTE — TELEPHONE ENCOUNTER
Spoke with Lakeisha Andrews at [address]. CPT 95961,22894 is authorized for a 5 day inpatient stay.  Authorization # [id number]

## 2019-04-12 NOTE — TELEPHONE ENCOUNTER
[address] [facility]   GRABIEL Will, [address], [address]  [phone]    START BOWEL PREP ON 4/30/19    Surgery Bowel Preparation    Purchase a 10 ounce bottle of Magnesium Citrate at your pharmacy and drink the afternoon before your scheduled surgery. Suggest drinking it chilled with Sprite or Ginger Ale. Start drinking approximately 2:00pm    Start a clear liquid diet (for dinner) the evening before surgery. You may have the following:   Water   Apple, grape or cranberry juice   Clear, fat free broth   Clear sodas (7-up, Sprite)   Plain gelatin (Jell-o)   Popsicles without bits of fruit or fruit pulp   Tea or coffee without milk or cream    Nothing to eat or drink after midnight before your scheduled surgery. Do a fleets enema the night prior to your surgery between 7:00pm and 8:00pm    Please contact our office at [phone] if you have any questions.

## 2019-04-12 NOTE — PROGRESS NOTES
ACCOUNT #: [de-identified]                                    AUDIOLOGICAL EVALUATION      REASON FOR TESTING:  The patient reports a decline in hearing ability since he started recent chemotherapy treatments. He states that he discussed this with his Oncologists, but that it was explained to him that it is a side effect of the chemotherapy. OTOSCOPY: Partially-occluding cerumen was safely removed with a lighted curette, bilaterally. AUDIOGRAM        Reliability: Good  Audiometer Used:  GSI-61    SPEECH AUDIOMETRY   Right Left Sound Field Aided   PTA 52 57     SRT 50 65     SAT       MASKING       % WRS   QUIET 88% 60%      25 dBSL 10 dBSL     %WRS   NOISE              MCL 75 dBHL 75 dBHL     UCL            Live Voice  []     Recorded  []     List   []     WORD RECOGNITION   RE    LE  []   []  Excellent    [x]   []  Good  []   [] Fair  []   [x] Poor  []   [] Very Poor    TYMPANOGRAMS  RE    LE  [x]   [x]  Normal compliance    []   []  Reduced mobility  []   [] Hyper mobility  [x]   [] Normal middle ear pressure  []   [x] Slight negative middle ear pressure  []   [] Positive middle ear pressure  []   [] Flat w/normal ECV  []   [] Flat w/large ECV  []   [] Patent PE tube  []   [] Non-Patent PE tube  []   [] Could Not Test      COMMENTS: Moderate, 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 Thresholds have declined at 250Hz-1KHz for both ears when compared to the previous audiogram. Speech discrimination ability has significantly declined for the left ear (88% on 4/27/17 audiogram to 60% today). Tympanometry revealed normal middle ear compliance for both ears with normal peak pressure for the right ear and slight negative middle ear pressure for the left ear. RECOMMENDATION(S):   1- Hearing thresholds should be monitored every three months or sooner if any changes are noted in hearing ability.  Counseled the patient and his wife regarding the decline in speech discrimination ability for the left ear. 2- Hearing aids were adjusted on this date according to the noted changes in hearing ability- verification testing was completed (see below). The probe tube may have been occluded with testing completed on the left ear. The hearing aid for the right ear is meeting target.

## 2019-04-12 NOTE — TELEPHONE ENCOUNTER
Robotic Surgery Scheduling Form   [facility] [address]    Phone * [phone]   Surgical Scheduling Direct line Phone * [phone]  Fax * [phone]      Sudha Edy      1940    male    Balbina Martinez  Dariela Parnell   Marital Status:     Home Phone: [phone]   Cell Phone:   Telephone Information:   Mobile [phone]              Surgeon: Dr Ashish Doss   Surgery Date:5/1/19 Time: 7:30 am     Procedure: Robot assisted laparoscopic radical cystoprostatectomy       Inpatient      Diagnosis: Bladder Ca    Important Medical History: In [facility]    Special Inst/Equip: ROBOT    CPT Codes: 60728,87768    Latex Allergy:   No Cardiac Device:  No    Case Location:  Main OR     Preadmission Testing: Phone Call  Visit    PAT Date and Time: ________________________________    PAT Confirmation #: _________________________________    Post Op Visit:  ______________________________________    Need Preop Cardiac Clearance:   No    Does patient have Cardiologist/physician?    Dr. Calhoun Severs cleared    Surgery Conformation #:  ______________________________________________    Leti Simpers: __________________________________ Date:____________________    Firefly:  No    Dual Console: No   Ultrasound:  No    Single Site: No    RNFA (colon resection only): No Assisting Surgeon: Carlos Manuel Hinton Name:  [employer]

## 2019-04-15 ENCOUNTER — TELEPHONE (OUTPATIENT)
Dept: AUDIOLOGY | Age: 79
End: 2019-04-15

## 2019-04-15 NOTE — TELEPHONE ENCOUNTER
Do you want the hearing test order addended? How do we know his hearing loss is from his chemotherapy?

## 2019-04-15 NOTE — TELEPHONE ENCOUNTER
Patient requested a hearing test from Dr. Willard Sidhu on 4-11-19 due to him having hearing loss due to chemotherapy. The patient asked to have that diagnosis on the order for the hearing test.  Can the order for the hearing test be addended? The ICD 10 code for ototoxic hearing loss is H91.09. Please advise.

## 2019-04-18 ENCOUNTER — TELEPHONE (OUTPATIENT)
Dept: UROLOGY | Age: 79
End: 2019-04-18

## 2019-04-18 NOTE — TELEPHONE ENCOUNTER
Called patient made appt for Wednesday to see Michelle Morrow. (lab visit if he has a co pay but I will need to talk to him next Tuesday or Wednesday so I can go over the ERAS protocol with him and give him the bag he needs) Voice understanding

## 2019-04-18 NOTE — TELEPHONE ENCOUNTER
Could you please put patient on for a visit with me (lab visit if he has a co pay but I will need to talk to him) next Tuesday or Wednesday so I can go over the ERAS protocol with him and give him the bag he needs? Thanks. Celestina--post op could you please order Ensure Clear for him? The goal is to start this POD 1.

## 2019-04-22 ENCOUNTER — NURSE ONLY (OUTPATIENT)
Dept: LAB | Age: 79
End: 2019-04-22

## 2019-04-22 ENCOUNTER — TELEPHONE (OUTPATIENT)
Dept: INTERNAL MEDICINE CLINIC | Age: 79
End: 2019-04-22

## 2019-04-22 DIAGNOSIS — D49.4 BLADDER TUMOR: ICD-10-CM

## 2019-04-22 DIAGNOSIS — Z01.818 PRE-OP TESTING: ICD-10-CM

## 2019-04-22 LAB
BACTERIA: ABNORMAL /HPF
BILIRUBIN URINE: NEGATIVE
BLOOD, URINE: NEGATIVE
CASTS 2: ABNORMAL /LPF
CASTS UA: ABNORMAL /LPF
CHARACTER, URINE: CLEAR
COLOR: YELLOW
CRYSTALS, UA: ABNORMAL
EPITHELIAL CELLS, UA: ABNORMAL /HPF
GLUCOSE URINE: NEGATIVE MG/DL
KETONES, URINE: NEGATIVE
LEUKOCYTE ESTERASE, URINE: ABNORMAL
MISCELLANEOUS 2: ABNORMAL
NITRITE, URINE: NEGATIVE
PH UA: 6 (ref 5–9)
PROTEIN UA: NEGATIVE
RBC URINE: ABNORMAL /HPF
RENAL EPITHELIAL, UA: ABNORMAL
SPECIFIC GRAVITY, URINE: 1.02 (ref 1–1.03)
UROBILINOGEN, URINE: 1 EU/DL (ref 0–1)
WBC UA: ABNORMAL /HPF
YEAST: ABNORMAL

## 2019-04-22 NOTE — TELEPHONE ENCOUNTER
Notified Dee (wife) to continue metoprolol 50 mg 2 times daily, that BPs look good and to be sure he takes the medication the day of surgery. She had no other questions.

## 2019-04-23 ENCOUNTER — HOSPITAL ENCOUNTER (OUTPATIENT)
Dept: CT IMAGING | Age: 79
Discharge: HOME | End: 2019-04-23
Payer: MEDICARE

## 2019-04-23 DIAGNOSIS — D49.4 BLADDER TUMOR: ICD-10-CM

## 2019-04-23 PROCEDURE — 6360000004 HC RX CONTRAST MEDICATION: Performed by: UROLOGY

## 2019-04-23 PROCEDURE — 74178 CT ABD&PLV WO CNTR FLWD CNTR: CPT

## 2019-04-23 RX ADMIN — IOPAMIDOL 85 ML: 755 INJECTION, SOLUTION INTRAVENOUS at 15:25

## 2019-04-24 ENCOUNTER — NURSE ONLY (OUTPATIENT)
Dept: UROLOGY | Age: 79
End: 2019-04-24

## 2019-04-24 DIAGNOSIS — C67.9 MALIGNANT NEOPLASM OF URINARY BLADDER, UNSPECIFIED SITE (HCC): ICD-10-CM

## 2019-04-24 DIAGNOSIS — D49.4 BLADDER TUMOR: Primary | ICD-10-CM

## 2019-04-24 PROCEDURE — 99999 PR OFFICE/OUTPT VISIT,PROCEDURE ONLY: CPT | Performed by: NURSE PRACTITIONER

## 2019-04-24 RX ORDER — CALCIUM POLYCARBOPHIL 625 MG 625 MG/1
625 TABLET ORAL DAILY
COMMUNITY
End: 2020-05-21

## 2019-04-24 NOTE — PROGRESS NOTES
In preparation for their surgical procedure above patient was screened for Obstructive Sleep Apnea (CHICHI) using the STOP-Bang Questionnaire by the Pre-Admission Testing department. This is a pre-surgical screening tool for patient safety and serves as a recommendation, this WILL NOT cause cancellation of surgery. STOP-Bang Questionnaire  * Do you currently see a pulmonologist?  No     If yes STOP, do not complete. }.    1.  Do you snore loudly (able to be heard in the next room)? Yes    2. Do you often feel tired or sleepy during the daytime? No       3. Has anyone ever told you that you stop breathing during your sleep? No    4. Do you have or are you being treated for high blood pressure? Yes      5. BMI more than 35? BMI (Calculated): 29.8        No    6. Age over 48 years? 78 y.o. Yes    7. Neck Circumference greater than 17 inches for male or 16 inches for female? Measured           (visits only)            Not Applicable    8. Gender Male? Yes      TOTAL SCORE: 4    CHICHI - Low Risk : Yes to 0 - 2 questions  CHICHI - Intermediate Risk : Yes to 3 - 4 questions  CHICHI - High Risk : Yes to 5 - 8 questions    Adapted from:   STOP Questionnaire: A Tool to Screen Patients for Obstructive Sleep Apnea   BRANDAN Ronquillo.C.P.C., Abhinav Farrar M.B.B.S., Chris Rodriges M.D., Alaina Clark. Dennys Coulter, Ph.D., BOBO Dixon.B.B.S., Farshad Blum, M.Sc., Janeth Pillai M.D., Dennisdo Hinds. ZEE Chavez.P.C.    Anesthesiology 2008; 743:320-47 Copyright 2008, the [address] of Anesthesiologists, [facility] 37.   ----------------------------------------------------------------------------------------------------------------

## 2019-04-24 NOTE — PROGRESS NOTES
NPO after midnight  [employer] and drivers license  Wear comfortable clean clothing  Do not bring jewelry   Shower night before and morning of surgery with a liquid antibacterial soap  Bring medications in original bottles  Follow all instructions given by your physician   needed at discharge  Call [phone] for any questions

## 2019-04-26 ENCOUNTER — TELEPHONE (OUTPATIENT)
Dept: UROLOGY | Age: 79
End: 2019-04-26

## 2019-04-26 ENCOUNTER — HOSPITAL ENCOUNTER (OUTPATIENT)
Dept: WOUND CARE | Age: 79
Discharge: HOME | End: 2019-04-26
Payer: MEDICARE

## 2019-04-26 VITALS
OXYGEN SATURATION: 98 % | HEART RATE: 67 BPM | RESPIRATION RATE: 18 BRPM | DIASTOLIC BLOOD PRESSURE: 68 MMHG | TEMPERATURE: 98.1 F | SYSTOLIC BLOOD PRESSURE: 125 MMHG

## 2019-04-26 DIAGNOSIS — Z71.89 ENCOUNTER FOR OSTOMY CARE EDUCATION: ICD-10-CM

## 2019-04-26 PROCEDURE — 99212 OFFICE O/P EST SF 10 MIN: CPT

## 2019-04-26 PROCEDURE — 99203 OFFICE O/P NEW LOW 30 MIN: CPT | Performed by: NURSE PRACTITIONER

## 2019-04-26 ASSESSMENT — PAIN SCALES - GENERAL: PAINLEVEL_OUTOF10: 0

## 2019-04-26 NOTE — TELEPHONE ENCOUNTER
Patient notified of surgery arrival time. Patient is to be at [address] [place] [address] Same day surgery by  6:00 am   on  5/1/19. Patient reminded to have nothing to eat or drink after midnight. Patient voiced understanding.

## 2019-04-26 NOTE — PROGRESS NOTES
[facility]  Ostomy Consult Note      NAME:  [facility] RECORD NUMBER:  [id number]  AGE: 78 y.o. GENDER:  male  :  1940  TODAY'S DATE:  2019    Subjective       Chief Complaint   Patient presents with    Other     urostomy marking         HISTORY of PRESENT ILLNESS HPI     Smita Gonzalez is a 78 y.o. male New patient referred by Dr. Lainey Guerrero, who presents today for ostomy/stoma evaluation. History of Ostomy Context: Patient is scheduled for a robotic assisted laparoscopic radical cystoprostatectomy with bilateral pelvic lymph node dissection and ileal conduit on 19 due to bladder cancer. His wife is present today for education.    Wound/Ulcer Pain Timing/Severity: none  Quality of pain: N/A  Severity:  0 / 10   Modifying Factors: None  Associated Signs/Symptoms: none    PAST MEDICAL HISTORY        Diagnosis Date    AAA [facility]abdominal aortic aneurysm) ([facility] [address]     BCG ROXANE 2014    BPH (benign prostatic hypertrophy)     CAD (coronary artery disease)     Carotid artery stenosis     Chronic back pain     Chronic kidney disease     Congenital absence of one kidney     History of lumbar fusion 3/15    mid lower back down    Hyperlipidemia     Hypertension     Hypothyroidism     Obesity     TIA (transient ischemic attack) 2008    Unspecified cerebral artery occlusion with cerebral infarction     TIA       PAST SURGICAL HISTORY    Past Surgical History:   Procedure Laterality Date    ABDOMINAL AORTIC ANEURYSM REPAIR  3/2010    Shireen    ABDOMINAL AORTIC ANEURYSM REPAIR  7/24/15    Merrill Garcia    COLONOSCOPY  11    MOE    CORONARY ANGIOPLASTY WITH STENT PLACEMENT      2 stents    FEMORAL-FEMORAL BYPASS GRAFT  2017    Dr. Murray Dural    arm    KNEE ARTHROSCOPY  2008    meniscus tear    LUMBAR LAMINECTOMY  3/2016    [facility]--Dr. Clifford Fuelling    OTHER SURGICAL HISTORY  4/14/15    melanoma removal from right chest    OTHER each by Does not apply route daily as needed 1 each 0    Handicap Placard MISC Cannot walk more than 200 feet without stopping to rest or without assistance. Lifetime x 5 years. 1 each 0    aspirin 81 MG tablet Take 81 mg by mouth daily.  nitroGLYCERIN (NITROSTAT) 0.4 MG SL tablet Place 1 tablet under the tongue every 5 minutes as needed for Chest pain 25 tablet 1     No current facility-administered medications on file prior to encounter.         REVIEW OF SYSTEMS    Constitutional: negative for chills, fevers and sweats  Eyes: negative for irritation and redness  Ears, nose, mouth, throat, and face: negative for hearing loss and hoarseness  Respiratory: negative for cough and shortness of breath  Cardiovascular: negative for chest pain  Gastrointestinal: negative for abdominal pain, nausea and vomiting  Integument/breast: negative for rash  Musculoskeletal:negative for muscle weakness  Neurological: negative for gait problems, memory problems and speech problems  Behavioral/Psych: positive for good mood    Objective    /68   Pulse 67   Temp 98.1 °F (36.7 °C) (Oral)   Resp 18   SpO2 98%     Wt Readings from Last 3 Encounters:   04/11/19 197 lb (89.4 kg)   04/11/19 197 lb 8 oz (89.6 kg)   04/03/19 194 lb 12.8 oz (88.4 kg)       PHYSICAL EXAM    General Appearance: alert and oriented to person, place and time  Skin: warm and dry  Head: normocephalic and atraumatic  Eyes: conjunctivae normal  ENT: hearing grossly normal bilaterally  Pulmonary/Chest: clear to auscultation bilaterally- no wheezes, rales or rhonchi, normal air movement, no respiratory distress  Cardiovascular: normal rate and regular rhythm  Abdomen: soft, non-tender and bowel sounds normal; stoma site marked to the right mid abdomen just above the level of the umbilicus within the rectus abdominus being sure to avoid his waistline and creases  Extremities: no cyanosis and no clubbing  Musculoskeletal: normal range of motion of extremities x 4  Neurologic: gait and coordination normal and speech normal    LABS       CBC:   Lab Results   Component Value Date    WBC 28.7 04/03/2019    HGB 12.8 04/03/2019    HCT 37.7 04/03/2019    MCV 93 04/03/2019     04/03/2019     BMP:   Lab Results   Component Value Date     04/03/2019     03/11/2019    K 3.7 04/03/2019    K 4.5 03/11/2019    K 4.7 05/26/2018    CL 94 03/11/2019    CO2 29 03/11/2019    PHOS 3.3 05/27/2018    BUN 19 03/11/2019    CREATININE 0.8 04/03/2019    CREATININE 1.0 03/11/2019     PT/INR:   Lab Results   Component Value Date    INR 1.07 03/12/2019     Prealbumin: No results found for: PREALBUMIN  Albumin:  Lab Results   Component Value Date    LABALBU 3.7 04/03/2019    LABALBU 4.1 05/19/2012     Sed Rate:No results found for: SEDRATE  Micro:   Lab Results   Component Value Date    BC No growth-preliminary  No growth   03/12/2019        Assessment     Encounter for ostomy care education    Patient Active Problem List   Diagnosis Code    Coronary artery disease involving native coronary artery without angina pectoris I25.10    Hyperlipidemia E78.5    Essential hypertension I10    Hypothyroidism E03.9    Claudication ([facility]) I73.9    CKD (chronic kidney disease) stage 3, GFR 30-59 ml/min ([facility]) N18.3    BCG ROXANE C67.9    Bladder cancer ([facility] [address] C67.9    Subcutaneous cyst L72.9    S/P femoral-femoral bypass surgery Z95.828    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  Reviewed basic ostomy care with the patient and his wife including: skin care, pouching options, bathing, how to obtain a urine sample, diet, use of nighttime drainage bag. He and his wife asked appropriate questions which were answered. Stoma site marked to the right mid abdomen just above the level of the umbilicus within the rectus abdominus being sure to avoid his waistline and creases. Antibiotics: No    Follow up: 2-3 weeks post op    Please see attached Discharge Instructions    Written patient dismissal instructions given to patient and signed by patient or POA.              Electronically signed by DEMAR Strauss CNP on 4/26/2019 at 10:07 AM

## 2019-04-26 NOTE — PLAN OF CARE
Pt presents to wound care clinic for ostomy marking for urostomy. Pt has planned surgical procedure on Wednesday at [address] [place] [address] with Araceli Branch. Pt accompanied with wife. Educated pt on urostomy anatomy, surgical procedure, pouching systems, urostomy care, what to expect after surgery and follow ups. Pt was marked with skin marker for stoma to right upper quadrant in standing and sitting positions. Tegaderm placed over site and extra tegaderm given to pt. Educational booklets and samples of ostomy pouches given to pt. Answered pt's questions. Informed pt to write down any additional questions and ostomy nurse will see after surgery for additional education and to answer any questions. Pt and pt wife escorted to lobby by nurse.

## 2019-05-01 ENCOUNTER — ANESTHESIA (OUTPATIENT)
Dept: OPERATING ROOM | Age: 79
DRG: 653 | End: 2019-05-01
Payer: MEDICARE

## 2019-05-01 ENCOUNTER — APPOINTMENT (OUTPATIENT)
Dept: GENERAL RADIOLOGY | Age: 79
DRG: 653 | End: 2019-05-01
Attending: UROLOGY
Payer: MEDICARE

## 2019-05-01 ENCOUNTER — HOSPITAL ENCOUNTER (INPATIENT)
Age: 79
LOS: 6 days | Discharge: HOME HEALTH | DRG: 653 | End: 2019-05-07
Attending: UROLOGY | Admitting: UROLOGY
Payer: MEDICARE

## 2019-05-01 ENCOUNTER — ANESTHESIA EVENT (OUTPATIENT)
Dept: OPERATING ROOM | Age: 79
DRG: 653 | End: 2019-05-01
Payer: MEDICARE

## 2019-05-01 VITALS
DIASTOLIC BLOOD PRESSURE: 82 MMHG | TEMPERATURE: 92.1 F | RESPIRATION RATE: 1 BRPM | SYSTOLIC BLOOD PRESSURE: 175 MMHG | OXYGEN SATURATION: 100 %

## 2019-05-01 DIAGNOSIS — C67.9 MALIGNANT NEOPLASM OF URINARY BLADDER, UNSPECIFIED SITE (HCC): Primary | ICD-10-CM

## 2019-05-01 LAB
ABO: NORMAL
ANTIBODY SCREEN: NORMAL
RH FACTOR: NORMAL

## 2019-05-01 PROCEDURE — 8E0W4CZ ROBOTIC ASSISTED PROCEDURE OF TRUNK REGION, PERCUTANEOUS ENDOSCOPIC APPROACH: ICD-10-PCS | Performed by: UROLOGY

## 2019-05-01 PROCEDURE — 3700000001 HC ADD 15 MINUTES (ANESTHESIA): Performed by: UROLOGY

## 2019-05-01 PROCEDURE — 86901 BLOOD TYPING SEROLOGIC RH(D): CPT

## 2019-05-01 PROCEDURE — 0VT04ZZ RESECTION OF PROSTATE, PERCUTANEOUS ENDOSCOPIC APPROACH: ICD-10-PCS | Performed by: UROLOGY

## 2019-05-01 PROCEDURE — 55866 LAPS SURG PRST8ECT RPBIC RAD: CPT | Performed by: PHYSICIAN ASSISTANT

## 2019-05-01 PROCEDURE — 0T170ZC BYPASS LEFT URETER TO ILEOCUTANEOUS, OPEN APPROACH: ICD-10-PCS | Performed by: UROLOGY

## 2019-05-01 PROCEDURE — 51999 UNLISTED LAPS PX BLADDER: CPT | Performed by: UROLOGY

## 2019-05-01 PROCEDURE — 2500000003 HC RX 250 WO HCPCS: Performed by: ANESTHESIOLOGY

## 2019-05-01 PROCEDURE — 55866 LAPS SURG PRST8ECT RPBIC RAD: CPT | Performed by: UROLOGY

## 2019-05-01 PROCEDURE — 1200000000 HC SEMI PRIVATE

## 2019-05-01 PROCEDURE — 2580000003 HC RX 258: Performed by: UROLOGY

## 2019-05-01 PROCEDURE — 2500000003 HC RX 250 WO HCPCS: Performed by: PHYSICIAN ASSISTANT

## 2019-05-01 PROCEDURE — 36415 COLL VENOUS BLD VENIPUNCTURE: CPT

## 2019-05-01 PROCEDURE — 2720000010 HC SURG SUPPLY STERILE: Performed by: UROLOGY

## 2019-05-01 PROCEDURE — 7100000000 HC PACU RECOVERY - FIRST 15 MIN: Performed by: UROLOGY

## 2019-05-01 PROCEDURE — 7100000001 HC PACU RECOVERY - ADDTL 15 MIN: Performed by: UROLOGY

## 2019-05-01 PROCEDURE — 07BC4ZZ EXCISION OF PELVIS LYMPHATIC, PERCUTANEOUS ENDOSCOPIC APPROACH: ICD-10-PCS | Performed by: UROLOGY

## 2019-05-01 PROCEDURE — 2709999900 HC NON-CHARGEABLE SUPPLY

## 2019-05-01 PROCEDURE — 3600000019 HC SURGERY ROBOT ADDTL 15MIN: Performed by: UROLOGY

## 2019-05-01 PROCEDURE — 2780000010 HC IMPLANT OTHER: Performed by: UROLOGY

## 2019-05-01 PROCEDURE — 6360000002 HC RX W HCPCS

## 2019-05-01 PROCEDURE — 2500000003 HC RX 250 WO HCPCS: Performed by: UROLOGY

## 2019-05-01 PROCEDURE — 88331 PATH CONSLTJ SURG 1 BLK 1SPC: CPT

## 2019-05-01 PROCEDURE — 86850 RBC ANTIBODY SCREEN: CPT

## 2019-05-01 PROCEDURE — 88307 TISSUE EXAM BY PATHOLOGIST: CPT

## 2019-05-01 PROCEDURE — 0TTB4ZZ RESECTION OF BLADDER, PERCUTANEOUS ENDOSCOPIC APPROACH: ICD-10-PCS | Performed by: UROLOGY

## 2019-05-01 PROCEDURE — 6370000000 HC RX 637 (ALT 250 FOR IP): Performed by: PHYSICIAN ASSISTANT

## 2019-05-01 PROCEDURE — 2709999900 HC NON-CHARGEABLE SUPPLY: Performed by: UROLOGY

## 2019-05-01 PROCEDURE — 88305 TISSUE EXAM BY PATHOLOGIST: CPT

## 2019-05-01 PROCEDURE — 51999 UNLISTED LAPS PX BLADDER: CPT | Performed by: PHYSICIAN ASSISTANT

## 2019-05-01 PROCEDURE — 88309 TISSUE EXAM BY PATHOLOGIST: CPT

## 2019-05-01 PROCEDURE — 74018 RADEX ABDOMEN 1 VIEW: CPT

## 2019-05-01 PROCEDURE — 3700000000 HC ANESTHESIA ATTENDED CARE: Performed by: UROLOGY

## 2019-05-01 PROCEDURE — 2580000003 HC RX 258: Performed by: PHYSICIAN ASSISTANT

## 2019-05-01 PROCEDURE — P9045 ALBUMIN (HUMAN), 5%, 250 ML: HCPCS

## 2019-05-01 PROCEDURE — 99999 PR OFFICE/OUTPT VISIT,PROCEDURE ONLY: CPT | Performed by: UROLOGY

## 2019-05-01 PROCEDURE — C1769 GUIDE WIRE: HCPCS | Performed by: UROLOGY

## 2019-05-01 PROCEDURE — 6360000002 HC RX W HCPCS: Performed by: UROLOGY

## 2019-05-01 PROCEDURE — 3600000009 HC SURGERY ROBOT BASE: Performed by: UROLOGY

## 2019-05-01 PROCEDURE — 6360000002 HC RX W HCPCS: Performed by: ANESTHESIOLOGY

## 2019-05-01 PROCEDURE — 86900 BLOOD TYPING SEROLOGIC ABO: CPT

## 2019-05-01 PROCEDURE — 2500000003 HC RX 250 WO HCPCS

## 2019-05-01 PROCEDURE — S2900 ROBOTIC SURGICAL SYSTEM: HCPCS | Performed by: UROLOGY

## 2019-05-01 PROCEDURE — 2580000003 HC RX 258

## 2019-05-01 DEVICE — Z DUP USE 2641840 CLIP INT L POLYMER LOK LIG HEM O LOK: Type: IMPLANTABLE DEVICE | Status: FUNCTIONAL

## 2019-05-01 RX ORDER — DEXAMETHASONE SODIUM PHOSPHATE 4 MG/ML
INJECTION, SOLUTION INTRA-ARTICULAR; INTRALESIONAL; INTRAMUSCULAR; INTRAVENOUS; SOFT TISSUE PRN
Status: DISCONTINUED | OUTPATIENT
Start: 2019-05-01 | End: 2019-05-01 | Stop reason: SDUPTHER

## 2019-05-01 RX ORDER — HYDROCODONE BITARTRATE AND ACETAMINOPHEN 5; 325 MG/1; MG/1
1 TABLET ORAL EVERY 4 HOURS PRN
Status: DISCONTINUED | OUTPATIENT
Start: 2019-05-01 | End: 2019-05-07 | Stop reason: HOSPADM

## 2019-05-01 RX ORDER — SODIUM CHLORIDE 0.9 % (FLUSH) 0.9 %
10 SYRINGE (ML) INJECTION EVERY 12 HOURS SCHEDULED
Status: DISCONTINUED | OUTPATIENT
Start: 2019-05-01 | End: 2019-05-07 | Stop reason: HOSPADM

## 2019-05-01 RX ORDER — MEPERIDINE HYDROCHLORIDE 25 MG/ML
12.5 INJECTION INTRAMUSCULAR; INTRAVENOUS; SUBCUTANEOUS EVERY 5 MIN PRN
Status: DISCONTINUED | OUTPATIENT
Start: 2019-05-01 | End: 2019-05-01 | Stop reason: HOSPADM

## 2019-05-01 RX ORDER — ONDANSETRON 2 MG/ML
INJECTION INTRAMUSCULAR; INTRAVENOUS PRN
Status: DISCONTINUED | OUTPATIENT
Start: 2019-05-01 | End: 2019-05-01 | Stop reason: SDUPTHER

## 2019-05-01 RX ORDER — MORPHINE SULFATE 2 MG/ML
2 INJECTION, SOLUTION INTRAMUSCULAR; INTRAVENOUS
Status: DISCONTINUED | OUTPATIENT
Start: 2019-05-01 | End: 2019-05-07 | Stop reason: HOSPADM

## 2019-05-01 RX ORDER — POTASSIUM CHLORIDE 750 MG/1
10 TABLET, FILM COATED, EXTENDED RELEASE ORAL DAILY
Status: DISCONTINUED | OUTPATIENT
Start: 2019-05-02 | End: 2019-05-05

## 2019-05-01 RX ORDER — LIDOCAINE HYDROCHLORIDE 20 MG/ML
INJECTION, SOLUTION INFILTRATION; PERINEURAL PRN
Status: DISCONTINUED | OUTPATIENT
Start: 2019-05-01 | End: 2019-05-01 | Stop reason: SDUPTHER

## 2019-05-01 RX ORDER — PROMETHAZINE HYDROCHLORIDE 25 MG/ML
12.5 INJECTION, SOLUTION INTRAMUSCULAR; INTRAVENOUS
Status: DISCONTINUED | OUTPATIENT
Start: 2019-05-01 | End: 2019-05-01 | Stop reason: HOSPADM

## 2019-05-01 RX ORDER — FENTANYL CITRATE 50 UG/ML
25 INJECTION, SOLUTION INTRAMUSCULAR; INTRAVENOUS EVERY 5 MIN PRN
Status: DISCONTINUED | OUTPATIENT
Start: 2019-05-01 | End: 2019-05-01 | Stop reason: HOSPADM

## 2019-05-01 RX ORDER — SODIUM CHLORIDE 0.9 % (FLUSH) 0.9 %
10 SYRINGE (ML) INJECTION PRN
Status: DISCONTINUED | OUTPATIENT
Start: 2019-05-01 | End: 2019-05-07 | Stop reason: HOSPADM

## 2019-05-01 RX ORDER — SODIUM CHLORIDE, SODIUM LACTATE, POTASSIUM CHLORIDE, CALCIUM CHLORIDE 600; 310; 30; 20 MG/100ML; MG/100ML; MG/100ML; MG/100ML
INJECTION, SOLUTION INTRAVENOUS CONTINUOUS PRN
Status: DISCONTINUED | OUTPATIENT
Start: 2019-05-01 | End: 2019-05-01 | Stop reason: SDUPTHER

## 2019-05-01 RX ORDER — HYDROMORPHONE HCL 110MG/55ML
PATIENT CONTROLLED ANALGESIA SYRINGE INTRAVENOUS PRN
Status: DISCONTINUED | OUTPATIENT
Start: 2019-05-01 | End: 2019-05-01 | Stop reason: SDUPTHER

## 2019-05-01 RX ORDER — ROCURONIUM BROMIDE 10 MG/ML
INJECTION, SOLUTION INTRAVENOUS PRN
Status: DISCONTINUED | OUTPATIENT
Start: 2019-05-01 | End: 2019-05-01 | Stop reason: SDUPTHER

## 2019-05-01 RX ORDER — HYDROCODONE BITARTRATE AND ACETAMINOPHEN 5; 325 MG/1; MG/1
2 TABLET ORAL EVERY 4 HOURS PRN
Status: DISCONTINUED | OUTPATIENT
Start: 2019-05-01 | End: 2019-05-07 | Stop reason: HOSPADM

## 2019-05-01 RX ORDER — SODIUM CHLORIDE 9 MG/ML
INJECTION, SOLUTION INTRAVENOUS CONTINUOUS
Status: DISCONTINUED | OUTPATIENT
Start: 2019-05-01 | End: 2019-05-01

## 2019-05-01 RX ORDER — BISACODYL 10 MG
10 SUPPOSITORY, RECTAL RECTAL DAILY
Status: DISCONTINUED | OUTPATIENT
Start: 2019-05-01 | End: 2019-05-07 | Stop reason: HOSPADM

## 2019-05-01 RX ORDER — LEVOTHYROXINE SODIUM 137 UG/1
137 TABLET ORAL DAILY
Status: DISCONTINUED | OUTPATIENT
Start: 2019-05-01 | End: 2019-05-07 | Stop reason: HOSPADM

## 2019-05-01 RX ORDER — ATROPA BELLADONNA AND OPIUM 16.2; 3 MG/1; MG/1
30 SUPPOSITORY RECTAL EVERY 8 HOURS PRN
Status: DISCONTINUED | OUTPATIENT
Start: 2019-05-01 | End: 2019-05-07 | Stop reason: HOSPADM

## 2019-05-01 RX ORDER — NITROGLYCERIN 0.4 MG/1
0.4 TABLET SUBLINGUAL EVERY 5 MIN PRN
Status: DISCONTINUED | OUTPATIENT
Start: 2019-05-01 | End: 2019-05-07 | Stop reason: HOSPADM

## 2019-05-01 RX ORDER — FENTANYL CITRATE 50 UG/ML
INJECTION, SOLUTION INTRAMUSCULAR; INTRAVENOUS PRN
Status: DISCONTINUED | OUTPATIENT
Start: 2019-05-01 | End: 2019-05-01 | Stop reason: SDUPTHER

## 2019-05-01 RX ORDER — PROPOFOL 10 MG/ML
INJECTION, EMULSION INTRAVENOUS PRN
Status: DISCONTINUED | OUTPATIENT
Start: 2019-05-01 | End: 2019-05-01 | Stop reason: SDUPTHER

## 2019-05-01 RX ORDER — SODIUM CHLORIDE 9 MG/ML
INJECTION, SOLUTION INTRAVENOUS CONTINUOUS PRN
Status: DISCONTINUED | OUTPATIENT
Start: 2019-05-01 | End: 2019-05-01 | Stop reason: SDUPTHER

## 2019-05-01 RX ORDER — ONDANSETRON 2 MG/ML
4 INJECTION INTRAMUSCULAR; INTRAVENOUS EVERY 6 HOURS PRN
Status: DISCONTINUED | OUTPATIENT
Start: 2019-05-01 | End: 2019-05-07 | Stop reason: HOSPADM

## 2019-05-01 RX ORDER — NEOSTIGMINE METHYLSULFATE 5 MG/5 ML
SYRINGE (ML) INTRAVENOUS PRN
Status: DISCONTINUED | OUTPATIENT
Start: 2019-05-01 | End: 2019-05-01 | Stop reason: SDUPTHER

## 2019-05-01 RX ORDER — BACITRACIN, NEOMYCIN, POLYMYXIN B 400; 3.5; 5 [USP'U]/G; MG/G; [USP'U]/G
OINTMENT TOPICAL 2 TIMES DAILY
Status: DISCONTINUED | OUTPATIENT
Start: 2019-05-01 | End: 2019-05-07 | Stop reason: HOSPADM

## 2019-05-01 RX ORDER — PREGABALIN 50 MG/1
50 CAPSULE ORAL 3 TIMES DAILY
Status: DISCONTINUED | OUTPATIENT
Start: 2019-05-01 | End: 2019-05-07 | Stop reason: HOSPADM

## 2019-05-01 RX ORDER — BUPIVACAINE HYDROCHLORIDE 5 MG/ML
INJECTION, SOLUTION EPIDURAL; INTRACAUDAL PRN
Status: DISCONTINUED | OUTPATIENT
Start: 2019-05-01 | End: 2019-05-01 | Stop reason: ALTCHOICE

## 2019-05-01 RX ORDER — SODIUM CHLORIDE 9 MG/ML
INJECTION, SOLUTION INTRAVENOUS CONTINUOUS
Status: DISCONTINUED | OUTPATIENT
Start: 2019-05-01 | End: 2019-05-03

## 2019-05-01 RX ORDER — GLYCOPYRROLATE 1 MG/5 ML
SYRINGE (ML) INTRAVENOUS PRN
Status: DISCONTINUED | OUTPATIENT
Start: 2019-05-01 | End: 2019-05-01 | Stop reason: SDUPTHER

## 2019-05-01 RX ORDER — EPHEDRINE SULFATE 50 MG/ML
INJECTION INTRAVENOUS PRN
Status: DISCONTINUED | OUTPATIENT
Start: 2019-05-01 | End: 2019-05-01 | Stop reason: SDUPTHER

## 2019-05-01 RX ORDER — LABETALOL HYDROCHLORIDE 5 MG/ML
5 INJECTION, SOLUTION INTRAVENOUS EVERY 10 MIN PRN
Status: DISCONTINUED | OUTPATIENT
Start: 2019-05-01 | End: 2019-05-01 | Stop reason: HOSPADM

## 2019-05-01 RX ORDER — MORPHINE SULFATE 4 MG/ML
4 INJECTION, SOLUTION INTRAMUSCULAR; INTRAVENOUS
Status: DISCONTINUED | OUTPATIENT
Start: 2019-05-01 | End: 2019-05-07 | Stop reason: HOSPADM

## 2019-05-01 RX ORDER — ALBUMIN, HUMAN INJ 5% 5 %
SOLUTION INTRAVENOUS PRN
Status: DISCONTINUED | OUTPATIENT
Start: 2019-05-01 | End: 2019-05-01 | Stop reason: SDUPTHER

## 2019-05-01 RX ORDER — FENTANYL CITRATE 50 UG/ML
50 INJECTION, SOLUTION INTRAMUSCULAR; INTRAVENOUS EVERY 5 MIN PRN
Status: DISCONTINUED | OUTPATIENT
Start: 2019-05-01 | End: 2019-05-01 | Stop reason: HOSPADM

## 2019-05-01 RX ORDER — MIDAZOLAM HYDROCHLORIDE 1 MG/ML
INJECTION INTRAMUSCULAR; INTRAVENOUS PRN
Status: DISCONTINUED | OUTPATIENT
Start: 2019-05-01 | End: 2019-05-01 | Stop reason: SDUPTHER

## 2019-05-01 RX ORDER — ATORVASTATIN CALCIUM 40 MG/1
40 TABLET, FILM COATED ORAL NIGHTLY
Status: DISCONTINUED | OUTPATIENT
Start: 2019-05-02 | End: 2019-05-07 | Stop reason: HOSPADM

## 2019-05-01 RX ORDER — FAMOTIDINE 20 MG/1
20 TABLET, FILM COATED ORAL 2 TIMES DAILY
Status: DISCONTINUED | OUTPATIENT
Start: 2019-05-01 | End: 2019-05-07 | Stop reason: HOSPADM

## 2019-05-01 RX ADMIN — FAMOTIDINE 20 MG: 20 TABLET ORAL at 20:30

## 2019-05-01 RX ADMIN — Medication 2 G: at 08:12

## 2019-05-01 RX ADMIN — EPHEDRINE SULFATE 25 MG: 50 INJECTION, SOLUTION INTRAVENOUS at 08:11

## 2019-05-01 RX ADMIN — HYDROMORPHONE HYDROCHLORIDE 0.5 MG: 2 INJECTION INTRAMUSCULAR; INTRAVENOUS; SUBCUTANEOUS at 10:04

## 2019-05-01 RX ADMIN — HYDROMORPHONE HYDROCHLORIDE 0.5 MG: 2 INJECTION INTRAMUSCULAR; INTRAVENOUS; SUBCUTANEOUS at 10:35

## 2019-05-01 RX ADMIN — ALBUMIN (HUMAN) 250 ML: 12.5 INJECTION, SOLUTION INTRAVENOUS at 08:01

## 2019-05-01 RX ADMIN — LABETALOL HYDROCHLORIDE 5 MG: 5 INJECTION, SOLUTION INTRAVENOUS at 13:32

## 2019-05-01 RX ADMIN — HYDROCODONE BITARTRATE AND ACETAMINOPHEN 1 TABLET: 5; 325 TABLET ORAL at 20:30

## 2019-05-01 RX ADMIN — SODIUM CHLORIDE: 9 INJECTION, SOLUTION INTRAVENOUS at 07:04

## 2019-05-01 RX ADMIN — ROCURONIUM BROMIDE 50 MG: 10 INJECTION INTRAVENOUS at 07:55

## 2019-05-01 RX ADMIN — PHENYLEPHRINE HYDROCHLORIDE 300 MCG: 10 INJECTION INTRAVENOUS at 08:02

## 2019-05-01 RX ADMIN — FENTANYL CITRATE 25 MCG: 50 INJECTION, SOLUTION INTRAMUSCULAR; INTRAVENOUS at 13:25

## 2019-05-01 RX ADMIN — HYDROMORPHONE HYDROCHLORIDE 0.5 MG: 2 INJECTION INTRAMUSCULAR; INTRAVENOUS; SUBCUTANEOUS at 11:50

## 2019-05-01 RX ADMIN — LABETALOL HYDROCHLORIDE 5 MG: 5 INJECTION, SOLUTION INTRAVENOUS at 13:17

## 2019-05-01 RX ADMIN — FENTANYL CITRATE 100 MCG: 50 INJECTION INTRAMUSCULAR; INTRAVENOUS at 08:31

## 2019-05-01 RX ADMIN — Medication 25 MG: at 20:31

## 2019-05-01 RX ADMIN — PROPOFOL 200 MG: 10 INJECTION, EMULSION INTRAVENOUS at 07:55

## 2019-05-01 RX ADMIN — FENTANYL CITRATE 25 MCG: 50 INJECTION, SOLUTION INTRAMUSCULAR; INTRAVENOUS at 13:19

## 2019-05-01 RX ADMIN — EPHEDRINE SULFATE 25 MG: 50 INJECTION, SOLUTION INTRAVENOUS at 08:06

## 2019-05-01 RX ADMIN — FENTANYL CITRATE 50 MCG: 50 INJECTION, SOLUTION INTRAMUSCULAR; INTRAVENOUS at 13:31

## 2019-05-01 RX ADMIN — LIDOCAINE HYDROCHLORIDE 60 MG: 20 INJECTION, SOLUTION INFILTRATION; PERINEURAL at 07:54

## 2019-05-01 RX ADMIN — ALBUMIN (HUMAN) 250 ML: 12.5 INJECTION, SOLUTION INTRAVENOUS at 09:46

## 2019-05-01 RX ADMIN — Medication 0.8 MG: at 12:26

## 2019-05-01 RX ADMIN — PREGABALIN 50 MG: 50 CAPSULE ORAL at 20:32

## 2019-05-01 RX ADMIN — SODIUM CHLORIDE: 9 INJECTION, SOLUTION INTRAVENOUS at 07:51

## 2019-05-01 RX ADMIN — DEXAMETHASONE SODIUM PHOSPHATE 8 MG: 4 INJECTION, SOLUTION INTRAMUSCULAR; INTRAVENOUS at 11:46

## 2019-05-01 RX ADMIN — SODIUM CHLORIDE: 9 INJECTION, SOLUTION INTRAVENOUS at 15:10

## 2019-05-01 RX ADMIN — CEFOTETAN DISODIUM 1 G: 1 INJECTION, POWDER, FOR SOLUTION INTRAMUSCULAR; INTRAVENOUS at 17:34

## 2019-05-01 RX ADMIN — ONDANSETRON HYDROCHLORIDE 4 MG: 4 INJECTION, SOLUTION INTRAMUSCULAR; INTRAVENOUS at 11:46

## 2019-05-01 RX ADMIN — FENTANYL CITRATE 100 MCG: 50 INJECTION INTRAMUSCULAR; INTRAVENOUS at 07:53

## 2019-05-01 RX ADMIN — ROCURONIUM BROMIDE 50 MG: 10 INJECTION INTRAVENOUS at 09:03

## 2019-05-01 RX ADMIN — HYDROMORPHONE HYDROCHLORIDE 0.5 MG: 2 INJECTION INTRAMUSCULAR; INTRAVENOUS; SUBCUTANEOUS at 09:22

## 2019-05-01 RX ADMIN — MIDAZOLAM HYDROCHLORIDE 2 MG: 1 INJECTION, SOLUTION INTRAMUSCULAR; INTRAVENOUS at 07:51

## 2019-05-01 RX ADMIN — Medication 5 MG: at 12:26

## 2019-05-01 RX ADMIN — SODIUM CHLORIDE, POTASSIUM CHLORIDE, SODIUM LACTATE AND CALCIUM CHLORIDE: 600; 310; 30; 20 INJECTION, SOLUTION INTRAVENOUS at 07:51

## 2019-05-01 ASSESSMENT — PULMONARY FUNCTION TESTS
PIF_VALUE: 34
PIF_VALUE: 18
PIF_VALUE: 35
PIF_VALUE: 18
PIF_VALUE: 34
PIF_VALUE: 34
PIF_VALUE: 20
PIF_VALUE: 20
PIF_VALUE: 33
PIF_VALUE: 34
PIF_VALUE: 20
PIF_VALUE: 19
PIF_VALUE: 34
PIF_VALUE: 33
PIF_VALUE: 20
PIF_VALUE: 33
PIF_VALUE: 34
PIF_VALUE: 34
PIF_VALUE: 20
PIF_VALUE: 34
PIF_VALUE: 34
PIF_VALUE: 20
PIF_VALUE: 34
PIF_VALUE: 20
PIF_VALUE: 34
PIF_VALUE: 32
PIF_VALUE: 20
PIF_VALUE: 1
PIF_VALUE: 1
PIF_VALUE: 20
PIF_VALUE: 21
PIF_VALUE: 34
PIF_VALUE: 19
PIF_VALUE: 18
PIF_VALUE: 34
PIF_VALUE: 20
PIF_VALUE: 34
PIF_VALUE: 28
PIF_VALUE: 19
PIF_VALUE: 18
PIF_VALUE: 29
PIF_VALUE: 1
PIF_VALUE: 0
PIF_VALUE: 34
PIF_VALUE: 27
PIF_VALUE: 34
PIF_VALUE: 34
PIF_VALUE: 22
PIF_VALUE: 32
PIF_VALUE: 20
PIF_VALUE: 19
PIF_VALUE: 20
PIF_VALUE: 21
PIF_VALUE: 34
PIF_VALUE: 18
PIF_VALUE: 19
PIF_VALUE: 19
PIF_VALUE: 34
PIF_VALUE: 20
PIF_VALUE: 17
PIF_VALUE: 20
PIF_VALUE: 18
PIF_VALUE: 1
PIF_VALUE: 34
PIF_VALUE: 20
PIF_VALUE: 26
PIF_VALUE: 34
PIF_VALUE: 18
PIF_VALUE: 20
PIF_VALUE: 20
PIF_VALUE: 19
PIF_VALUE: 20
PIF_VALUE: 32
PIF_VALUE: 20
PIF_VALUE: 33
PIF_VALUE: 20
PIF_VALUE: 19
PIF_VALUE: 20
PIF_VALUE: 20
PIF_VALUE: 26
PIF_VALUE: 20
PIF_VALUE: 19
PIF_VALUE: 0
PIF_VALUE: 20
PIF_VALUE: 20
PIF_VALUE: 21
PIF_VALUE: 34
PIF_VALUE: 32
PIF_VALUE: 34
PIF_VALUE: 32
PIF_VALUE: 20
PIF_VALUE: 34
PIF_VALUE: 34
PIF_VALUE: 20
PIF_VALUE: 2
PIF_VALUE: 34
PIF_VALUE: 1
PIF_VALUE: 20
PIF_VALUE: 28
PIF_VALUE: 20
PIF_VALUE: 20
PIF_VALUE: 34
PIF_VALUE: 20
PIF_VALUE: 34
PIF_VALUE: 18
PIF_VALUE: 20
PIF_VALUE: 17
PIF_VALUE: 20
PIF_VALUE: 20
PIF_VALUE: 2
PIF_VALUE: 34
PIF_VALUE: 34
PIF_VALUE: 2
PIF_VALUE: 35
PIF_VALUE: 27
PIF_VALUE: 19
PIF_VALUE: 34
PIF_VALUE: 0
PIF_VALUE: 35
PIF_VALUE: 21
PIF_VALUE: 34
PIF_VALUE: 19
PIF_VALUE: 20
PIF_VALUE: 33
PIF_VALUE: 19
PIF_VALUE: 20
PIF_VALUE: 20
PIF_VALUE: 21
PIF_VALUE: 33
PIF_VALUE: 20
PIF_VALUE: 34
PIF_VALUE: 18
PIF_VALUE: 1
PIF_VALUE: 20
PIF_VALUE: 19
PIF_VALUE: 20
PIF_VALUE: 33
PIF_VALUE: 19
PIF_VALUE: 33
PIF_VALUE: 32
PIF_VALUE: 33
PIF_VALUE: 33
PIF_VALUE: 34
PIF_VALUE: 28
PIF_VALUE: 34
PIF_VALUE: 18
PIF_VALUE: 19
PIF_VALUE: 28
PIF_VALUE: 33
PIF_VALUE: 21
PIF_VALUE: 18
PIF_VALUE: 33
PIF_VALUE: 20
PIF_VALUE: 27
PIF_VALUE: 34
PIF_VALUE: 32
PIF_VALUE: 34
PIF_VALUE: 18
PIF_VALUE: 19
PIF_VALUE: 19
PIF_VALUE: 34
PIF_VALUE: 33
PIF_VALUE: 34
PIF_VALUE: 19
PIF_VALUE: 19
PIF_VALUE: 20
PIF_VALUE: 19
PIF_VALUE: 34
PIF_VALUE: 20
PIF_VALUE: 20
PIF_VALUE: 19
PIF_VALUE: 18
PIF_VALUE: 20
PIF_VALUE: 33
PIF_VALUE: 34
PIF_VALUE: 20
PIF_VALUE: 17
PIF_VALUE: 20
PIF_VALUE: 33
PIF_VALUE: 34
PIF_VALUE: 33
PIF_VALUE: 20
PIF_VALUE: 20
PIF_VALUE: 32
PIF_VALUE: 20
PIF_VALUE: 32
PIF_VALUE: 34
PIF_VALUE: 0
PIF_VALUE: 34
PIF_VALUE: 19
PIF_VALUE: 34
PIF_VALUE: 33
PIF_VALUE: 33
PIF_VALUE: 19
PIF_VALUE: 21
PIF_VALUE: 20
PIF_VALUE: 33
PIF_VALUE: 33
PIF_VALUE: 17
PIF_VALUE: 34
PIF_VALUE: 19
PIF_VALUE: 28
PIF_VALUE: 20
PIF_VALUE: 34
PIF_VALUE: 18
PIF_VALUE: 20
PIF_VALUE: 20
PIF_VALUE: 33
PIF_VALUE: 19
PIF_VALUE: 34
PIF_VALUE: 32
PIF_VALUE: 20
PIF_VALUE: 21
PIF_VALUE: 33
PIF_VALUE: 20
PIF_VALUE: 33
PIF_VALUE: 19
PIF_VALUE: 33
PIF_VALUE: 19
PIF_VALUE: 34
PIF_VALUE: 33
PIF_VALUE: 34
PIF_VALUE: 19
PIF_VALUE: 34
PIF_VALUE: 20
PIF_VALUE: 34
PIF_VALUE: 30
PIF_VALUE: 34
PIF_VALUE: 20
PIF_VALUE: 34
PIF_VALUE: 20
PIF_VALUE: 34
PIF_VALUE: 20
PIF_VALUE: 20
PIF_VALUE: 32
PIF_VALUE: 18
PIF_VALUE: 20
PIF_VALUE: 18
PIF_VALUE: 28
PIF_VALUE: 34
PIF_VALUE: 18
PIF_VALUE: 20
PIF_VALUE: 33
PIF_VALUE: 34
PIF_VALUE: 20
PIF_VALUE: 33
PIF_VALUE: 21
PIF_VALUE: 34
PIF_VALUE: 34
PIF_VALUE: 0
PIF_VALUE: 19
PIF_VALUE: 18
PIF_VALUE: 18
PIF_VALUE: 34
PIF_VALUE: 33
PIF_VALUE: 35
PIF_VALUE: 20
PIF_VALUE: 21
PIF_VALUE: 34
PIF_VALUE: 20
PIF_VALUE: 0
PIF_VALUE: 33
PIF_VALUE: 20
PIF_VALUE: 34
PIF_VALUE: 20
PIF_VALUE: 18
PIF_VALUE: 18
PIF_VALUE: 20
PIF_VALUE: 33
PIF_VALUE: 34
PIF_VALUE: 2
PIF_VALUE: 20

## 2019-05-01 ASSESSMENT — PAIN DESCRIPTION - DIRECTION: RADIATING_TOWARDS: PENIS

## 2019-05-01 ASSESSMENT — PAIN DESCRIPTION - FREQUENCY: FREQUENCY: CONTINUOUS

## 2019-05-01 ASSESSMENT — PAIN DESCRIPTION - ORIENTATION: ORIENTATION: LOWER

## 2019-05-01 ASSESSMENT — PAIN SCALES - GENERAL
PAINLEVEL_OUTOF10: 3
PAINLEVEL_OUTOF10: 2
PAINLEVEL_OUTOF10: 2
PAINLEVEL_OUTOF10: 4
PAINLEVEL_OUTOF10: 6
PAINLEVEL_OUTOF10: 7
PAINLEVEL_OUTOF10: 3
PAINLEVEL_OUTOF10: 6
PAINLEVEL_OUTOF10: 0

## 2019-05-01 ASSESSMENT — PAIN DESCRIPTION - PROGRESSION: CLINICAL_PROGRESSION: NOT CHANGED

## 2019-05-01 ASSESSMENT — PAIN SCALES - WONG BAKER: WONGBAKER_NUMERICALRESPONSE: 0

## 2019-05-01 ASSESSMENT — PAIN DESCRIPTION - PAIN TYPE
TYPE: SURGICAL PAIN
TYPE: SURGICAL PAIN

## 2019-05-01 ASSESSMENT — PAIN - FUNCTIONAL ASSESSMENT
PAIN_FUNCTIONAL_ASSESSMENT: 0-10
PAIN_FUNCTIONAL_ASSESSMENT: ACTIVITIES ARE NOT PREVENTED

## 2019-05-01 ASSESSMENT — PAIN DESCRIPTION - LOCATION: LOCATION: ABDOMEN

## 2019-05-01 ASSESSMENT — PAIN DESCRIPTION - DESCRIPTORS: DESCRIPTORS: SORE

## 2019-05-01 ASSESSMENT — PAIN DESCRIPTION - ONSET: ONSET: GRADUAL

## 2019-05-01 NOTE — PROGRESS NOTES
RODRÍGUEZ hose applied bilaterally, patient has 2+ pedal edema, wife states he has this all the time. Illeal conduit attached to overnight bag.

## 2019-05-01 NOTE — PROGRESS NOTES
Patient arrived to [id number] [phone] by bed from surgery. Patient alert and oriented. States \"only a littler bit of pain\" -- did not give number. Denies nausea. NG present in right nares -- clamped. Nasal cannula at 2 liters, spo2 100%. 7 sites to abdomen with gauze and tegaderms dry and intact. Stoma dark red, small amount of red urine in bag -- white stent present. Pelvic drain from penis to overnight bag with scant bloody drainage in bag. 0.9NS infusing into left hand at 100ml, emptied upon arriving to unit, new bag hung. SCD's on bilaterally. Oriented to room and call system. Call light within reach. Care board updated and reviewed with patient and family. Bed alarm turned on.

## 2019-05-01 NOTE — INTERVAL H&P NOTE
[address] [address]  History and Physical Update    Pt Name: Andrew Chacon  MRN: [id number]  YOB: 1940  Date of evaluation: 5/1/2019    [] I have examined the patient and reviewed the H&P/Consult and there are no changes to the patient or plans. [x] I have examined the patient and reviewed the H&P/Consult and have noted the following changes: No changes per patient.         Benjie Romero MD  Electronically signed 5/1/2019 at 7:46 AM

## 2019-05-01 NOTE — BRIEF OP NOTE
Brief Postoperative Note  ______________________________________________________________    Patient: Madeleine Penny  YOB: 1940  MRN: [id number]  Date of Procedure: 5/1/2019    Pre-Op Diagnosis: BLADDER CANCER    Post-Op Diagnosis: Same       Procedure(s):  ROBOTIC ASSISTED LAPAROSCOPIC RADICAL CYSTOPROSTATECTOMY WITH BILATERAL PELVIC LYMPH NODE DISSECTION WITH CREATION OF ILEAL CONDUIT    Anesthesia: General    Surgeon(s):  Ashkan Grace MD    Assistant: Levy Garcia PA-C    Estimated Blood Loss (mL): 337 mL    Complications: None    Specimens:   ID Type Source Tests Collected by Time Destination   A : Prostate and Bladder Tissue Prostate SURGICAL PATHOLOGY Ashkan Grace MD 5/1/2019 2048    B : Right and Left Pelvic Lymph Node Tissue Lymph Node SURGICAL PATHOLOGY Ashkan Grace MD 5/1/2019 7684    C : Left Distal Ureter Tissue Ureter SURGICAL PATHOLOGY Ashkan Grace MD 5/1/2019 3651        Implants:  Implant Name Type Inv.  Item Serial No.  Lot No. LRB No. Used   CLIP LG INTERN HEM-O-NATHAN POLYMER ENDO Fastener CLIP LG INTERN HEM-[employer]-NATHAN POLYMER ENDO  [employer]  N/A 1         Drains:   Urostomy Ileal conduit RLQ (Active)       Urethral Catheter Non-latex 18 fr (Active)       Findings: See dictated operative note    Jesse Jamil PA-C  Date: 5/1/2019  Time: 12:58 PM

## 2019-05-01 NOTE — H&P
Mr. Franklyn Shah is a 72-year-old patient with Hx of noninvasive high grade papillary urothelial carcinoma diagnosed in 2014. He underwent TURBT and BCG therapy. Regular surveillance cystoscopies were negative till most recent one in October 2018 showed a large bladder tumor. The patient underwent TURBT with transurethral resection On November 8, 2018. Final pathology revealed high-grade invasive papillary urothelial carcinoma involving the muscularis propria, flat carcinoma in situ. He started neoadjuvant chemotherapy with Gemzar and Cisplatin on January 9, 2019. He completed Cycle #3 in March 2019.        Past Medical History:   Diagnosis Date    AAA [facility]abdominal aortic aneurysm) ([facility] [address]     BCG ROXANE 5/21/2014    BPH (benign prostatic hypertrophy)     CAD (coronary artery disease)     Carotid artery stenosis     Chronic back pain     Chronic kidney disease     Congenital absence of one kidney     History of lumbar fusion 3/15    mid lower back down    Hyperlipidemia     Hypertension     Hypothyroidism     Obesity     TIA (transient ischemic attack) 1/2008    Unspecified cerebral artery occlusion with cerebral infarction     TIA       Past Surgical History:   Procedure Laterality Date    ABDOMINAL AORTIC ANEURYSM REPAIR  3/2010    Shireen    ABDOMINAL AORTIC ANEURYSM REPAIR  7/24/15    Shayy Javier    COLON[facility]/21/[facility]    [facility]    CORONARY ANGIOPLASTY WITH STENT PLACEMENT  2003    2 stents    FEMORAL-FEMORAL BYPASS GRAFT  03/09/2017    Dr. Nico Lind    arm    KNEE ARTHROSCOPY  12/2008    meniscus tear    LUMBAR LAMINECTOMY  3/2016    [facility]--Dr. Mendoza Can    OTHER SURGICAL HISTORY  4/14/15    melanoma removal from right chest    OTHER SURGICAL HISTORY  5/20/2016    I and D, exicison of posterior neck cyst    [place] OFFICE/OUTPT VISIT,PROCEDURE ONLY N/A 11/8/2018    TURBT performed by Dang Bergeron MD at [address]  10/2016    Dr Pedro Fiore Occupational History    Not on file   Social Needs    Financial resource strain: Not on file    Food insecurity:     Worry: Not on file     Inability: Not on file    Transportation needs:     Medical: Not on file     Non-medical: Not on file   Tobacco Use    Smoking status: Former Smoker     Packs/day: 1.00     Years: 24.00     Pack years: 24.00     Types: Cigarettes     Start date:      Last attempt to quit: 1979     Years since quittin.1    Smokeless tobacco: Never Used   Substance and Sexual Activity    Alcohol use: No     Alcohol/week: 0.0 oz    Drug use: No    Sexual activity: Not Currently   Lifestyle    Physical activity:     Days per week: Not on file     Minutes per session: Not on file    Stress: Not on file   Relationships    Social connections:     Talks on phone: Not on file     Gets together: Not on file     Attends Rastafarian service: Not on file     Active member of club or organization: Not on file     Attends meetings of clubs or organizations: Not on file     Relationship status: Not on file    Intimate partner violence:     Fear of current or ex partner: Not on file     Emotionally abused: Not on file     Physically abused: Not on file     Forced sexual activity: Not on file   Other Topics Concern    Not on file   Social History Narrative    Not on file       Review of Systems  Constitutional: Negative for chills and fever. HENT: Positive for hearing loss. Negative for ear pain and facial swelling. Eyes: Negative for redness. Respiratory: Negative for chest tightness and shortness of breath. Cardiovascular: Positive for leg swelling (feet ). Negative for chest pain. Gastrointestinal: Negative for abdominal pain and vomiting. Endocrine: Negative for cold intolerance and heat intolerance. Genitourinary: Negative for difficulty urinating, dysuria, frequency and urgency. Musculoskeletal: Negative for neck pain and neck stiffness.    Skin: Negative for post-op blood clots or death. Patient understands and willingly accepts these risks. Ostomy nurse consultation. Medical/cardiac clearance.

## 2019-05-01 NOTE — ANESTHESIA POSTPROCEDURE EVALUATION
Department of Anesthesiology  Postprocedure Note    Patient: Randy Mendez  MRN: [id number]  YOB: 1940  Date of evaluation: 5/1/2019  Time:  1:43 PM     Procedure Summary     Date:  05/01/19 Room / Location:  [address] [place] [address]    Anesthesia Start:  3718 Anesthesia Stop:  5504    Procedure:  ROBOTIC RADICAL CYSTOPROSTATECTOMY (N/A Abdomen) Diagnosis:  (BLADDER CANCER)    Surgeon:  Jessica Levin MD Responsible Provider:  Deon Tellez DO    Anesthesia Type:  general ASA Status:  3          Anesthesia Type: general    Ishan Phase I: Ishan Score: 7    Ishan Phase II:      Last vitals: Reviewed and per EMR flowsheets.        Anesthesia Post Evaluation    Patient location during evaluation: PACU  Patient participation: complete - patient participated  Level of consciousness: awake and alert  Airway patency: patent  Nausea & Vomiting: no nausea and no vomiting  Complications: no  Cardiovascular status: hemodynamically stable  Respiratory status: acceptable  Hydration status: stable

## 2019-05-01 NOTE — ANESTHESIA PRE PROCEDURE
Department of Anesthesiology  Preprocedure Note       Name:  Andrew Chacon   Age:  78 y.o.  :  1940                                          MRN:  [id number]         Date:  2019      Surgeon: Aniket Mayfield):  Benjie Romero MD    Procedure: ROBOTIC RADICAL CYSTOPROSTATECTOMY (N/A Abdomen)    Medications prior to admission:   Prior to Admission medications    Medication Sig Start Date End Date Taking? Authorizing Provider   polycarbophil (FIBERCON) 625 MG tablet Take 625 mg by mouth daily   Yes Historical Provider, MD   b complex vitamins capsule Take 1 capsule by mouth daily   Yes Historical Provider, MD   atorvastatin (LIPITOR) 40 MG tablet TAKE 1 TABLET BY MOUTH EVERY DAY AT BEDTIME 19  Yes Nichol Ahn MD   pregabalin (LYRICA) 50 MG capsule Take 1 capsule by mouth 3 times daily for 90 days. 4/3/19 7/2/19 Yes Lauren Seo MD   levothyroxine (SYNTHROID) 137 MCG tablet TAKE 1 TABLET BY MOUTH DAILY 3/11/19  Yes Nichol Ahn MD   KLOR-CON M10 10 MEQ extended release tablet TAKE 1 TABLET BY MOUTH DAILY 19  Yes Nichol Ahn MD   metoprolol tartrate (LOPRESSOR) 25 MG tablet Take 1 tablet by mouth 2 times daily  Patient taking differently: Take 50 mg by mouth 2 times daily  18  Yes Nichol Ahn MD   Elastic Bandages & Supports (JOBST KNEE HIGH COMPRESSION SM) MISC 1 each by Does not apply route daily as needed 16  Yes Katiana Marks APRN - CNP   Handicap Placard [facility] Cannot walk more than 200 feet without stopping to rest or without assistance. Lifetime x 5 years. 11/11/15  Yes DEMAR Wynn - CNP   aspirin 81 MG tablet Take 81 mg by mouth daily.    Yes Historical Provider, MD   nitroGLYCERIN (NITROSTAT) 0.4 MG SL tablet Place 1 tablet under the tongue every 5 minutes as needed for Chest pain 17   Nichol Ahn MD       Current medications:    Current Facility-Administered Medications   Medication Dose Route Frequency Provider Last Rate Last Dose    0.9 % sodium chloride FEMORAL-FEMORAL BYPASS GRAFT  2017    Dr. Hinton Left    arm    KNEE ARTHROSCOPY  2008    meniscus tear    LUMBAR LAMINECTOMY  3/2016    [facility]--Dr. Abarca Room    OTHER SURGICAL HISTORY  4/14/15    melanoma removal from right chest    OTHER SURGICAL HISTORY  2016    I and D, exicison of posterior neck cyst    [place] OFFICE/OUTPT VISIT,PROCEDURE ONLY N/A 2018    TURBT performed by Shelbi Johnson MD at [address]  10/2016    Dr Lisbet Barba  1990's    TUNNELED VENOUS PORT PLACEMENT      TURP  2014    See note of Dr. Juliana Cook for OR procedure details       Social History:    Social History     Tobacco Use    Smoking status: Former Smoker     Packs/day: 1.00     Years: 24.00     Pack years: 24.00     Types: Cigarettes     Start date:      Last attempt to quit: 1979     Years since quittin.1    Smokeless tobacco: Never Used   Substance Use Topics    Alcohol use: No     Alcohol/week: 0.0 oz                                Counseling given: Not Answered      Vital Signs (Current):   Vitals:    19 1020 19 0622   BP:  (!) 124/59   Pulse:  65   Resp:  16   Temp:  98.2 °F (36.8 °C)   TempSrc:  Temporal   SpO2:  97%   Weight: 190 lb (86.2 kg) 195 lb 9.6 oz (88.7 kg)   Height: 5' 7\" (1.702 m) 5' 7\" (1.702 m)                                              BP Readings from Last 3 Encounters:   19 (!) 124/59   19 125/68   19 126/70       NPO Status: Time of last liquid consumption: 0500(water with med)                        Time of last solid consumption: 1900                        Date of last liquid consumption: 19                        Date of last solid food consumption: 19    BMI:   Wt Readings from Last 3 Encounters:   19 195 lb 9.6 oz (88.7 kg)   19 197 lb (89.4 kg)   19 197 lb 8 oz (89.6 kg)     Body mass index is 30.64 kg/m².     CBC:   Lab Results   Component Value Date    WBC 28.7 04/03/2019    RBC 4.08 04/03/2019    RBC 5.70 10/26/2018    HGB 12.8 04/03/2019    HCT 37.7 04/03/2019    MCV 93 04/03/2019    RDW 15.1 04/03/2019     04/03/2019       CMP:   Lab Results   Component Value Date     04/03/2019     03/11/2019    K 3.7 04/03/2019    K 4.5 03/11/2019    K 4.7 05/26/2018    CL 94 03/11/2019    CO2 29 03/11/2019    BUN 19 03/11/2019    CREATININE 0.8 04/03/2019    CREATININE 1.0 03/11/2019    LABGLOM 72 03/11/2019    GLUCOSE 105 03/11/2019    GLUCOSE 91 10/26/2018    PROT 5.8 04/03/2019    CALCIUM 8.7 03/11/2019    BILITOT 0.6 04/03/2019    BILITOT NEGATIVE 10/26/2018    ALKPHOS 238 04/03/2019    AST 19 04/03/2019    ALT 19 04/03/2019       POC Tests: No results for input(s): POCGLU, POCNA, POCK, POCCL, POCBUN, POCHEMO, POCHCT in the last 72 hours. Coags:   Lab Results   Component Value Date    INR 1.07 03/12/2019    APTT 36.2 03/07/2017       HCG (If Applicable): No results found for: PREGTESTUR, PREGSERUM, HCG, HCGQUANT     ABGs: No results found for: PHART, PO2ART, DJO1YZV, BSS9GIU, BEART, F0YXFEWY     Type & Screen (If Applicable):  Lab Results   Component Value Date    [address] POS 03/07/2017       Anesthesia Evaluation  Patient summary reviewed and Nursing notes reviewed no history of anesthetic complications:   Airway: Mallampati: II  TM distance: >3 FB   Neck ROM: full  Mouth opening: > = 3 FB Dental:    (+) partials      Pulmonary: breath sounds clear to auscultation  (+) pneumonia:                             Cardiovascular:    (+) hypertension:, CAD:,       ECG reviewed  Rhythm: regular  Rate: normal                    Neuro/Psych:   (+) CVA:, TIA,             GI/Hepatic/Renal:             Endo/Other:    (+) hypothyroidism::., .                 Abdominal:       Abdomen: soft. Vascular:   + PVD, aortic or cerebral, .                                      Anesthesia Plan      general     ASA 3       Induction: intravenous. MIPS: Postoperative opioids intended and Prophylactic antiemetics administered. Anesthetic plan and risks discussed with patient and spouse.       Plan discussed with CRNA, attending and surgical team.                  Spenser Garber DO   5/1/2019

## 2019-05-02 ENCOUNTER — APPOINTMENT (OUTPATIENT)
Dept: GENERAL RADIOLOGY | Age: 79
DRG: 653 | End: 2019-05-02
Attending: UROLOGY
Payer: MEDICARE

## 2019-05-02 LAB
ANION GAP SERPL CALCULATED.3IONS-SCNC: 11 MEQ/L (ref 8–16)
BASOPHILS # BLD: 0.1 %
BASOPHILS ABSOLUTE: 0 THOU/MM3 (ref 0–0.1)
BUN BLDV-MCNC: 17 MG/DL (ref 7–22)
CALCIUM SERPL-MCNC: 7.8 MG/DL (ref 8.5–10.5)
CHLORIDE BLD-SCNC: 111 MEQ/L (ref 98–111)
CO2: 20 MEQ/L (ref 23–33)
CREAT SERPL-MCNC: 1 MG/DL (ref 0.4–1.2)
EOSINOPHIL # BLD: 0 %
EOSINOPHILS ABSOLUTE: 0 THOU/MM3 (ref 0–0.4)
ERYTHROCYTE [DISTWIDTH] IN BLOOD BY AUTOMATED COUNT: 15.1 % (ref 11.5–14.5)
ERYTHROCYTE [DISTWIDTH] IN BLOOD BY AUTOMATED COUNT: 56.5 FL (ref 35–45)
GFR SERPL CREATININE-BSD FRML MDRD: 72 ML/MIN/1.73M2
GLUCOSE BLD-MCNC: 142 MG/DL (ref 70–108)
HCT VFR BLD CALC: 32 % (ref 42–52)
HEMOGLOBIN: 10.1 GM/DL (ref 14–18)
IMMATURE GRANS (ABS): 0.09 THOU/MM3 (ref 0–0.07)
IMMATURE GRANULOCYTES: 0.5 %
LYMPHOCYTES # BLD: 11.4 %
LYMPHOCYTES ABSOLUTE: 1.9 THOU/MM3 (ref 1–4.8)
MCH RBC QN AUTO: 31.8 PG (ref 26–33)
MCHC RBC AUTO-ENTMCNC: 31.6 GM/DL (ref 32.2–35.5)
MCV RBC AUTO: 100.6 FL (ref 80–94)
MONOCYTES # BLD: 9.5 %
MONOCYTES ABSOLUTE: 1.6 THOU/MM3 (ref 0.4–1.3)
NUCLEATED RED BLOOD CELLS: 0 /100 WBC
PLATELET # BLD: 159 THOU/MM3 (ref 130–400)
PMV BLD AUTO: 10.1 FL (ref 9.4–12.4)
POTASSIUM SERPL-SCNC: 4.5 MEQ/L (ref 3.5–5.2)
RBC # BLD: 3.18 MILL/MM3 (ref 4.7–6.1)
REASON FOR REJECTION: NORMAL
REJECTED TEST: NORMAL
SEG NEUTROPHILS: 78.5 %
SEGMENTED NEUTROPHILS ABSOLUTE COUNT: 13.2 THOU/MM3 (ref 1.8–7.7)
SODIUM BLD-SCNC: 142 MEQ/L (ref 135–145)
WBC # BLD: 16.8 THOU/MM3 (ref 4.8–10.8)

## 2019-05-02 PROCEDURE — 6370000000 HC RX 637 (ALT 250 FOR IP): Performed by: PHYSICIAN ASSISTANT

## 2019-05-02 PROCEDURE — 6360000002 HC RX W HCPCS: Performed by: PHYSICIAN ASSISTANT

## 2019-05-02 PROCEDURE — 2580000003 HC RX 258: Performed by: PHYSICIAN ASSISTANT

## 2019-05-02 PROCEDURE — 2580000003 HC RX 258: Performed by: NURSE PRACTITIONER

## 2019-05-02 PROCEDURE — 80048 BASIC METABOLIC PNL TOTAL CA: CPT

## 2019-05-02 PROCEDURE — 6360000002 HC RX W HCPCS: Performed by: NURSE PRACTITIONER

## 2019-05-02 PROCEDURE — 2500000003 HC RX 250 WO HCPCS: Performed by: PHYSICIAN ASSISTANT

## 2019-05-02 PROCEDURE — 71045 X-RAY EXAM CHEST 1 VIEW: CPT

## 2019-05-02 PROCEDURE — 85025 COMPLETE CBC W/AUTO DIFF WBC: CPT

## 2019-05-02 PROCEDURE — 99024 POSTOP FOLLOW-UP VISIT: CPT | Performed by: NURSE PRACTITIONER

## 2019-05-02 PROCEDURE — 6370000000 HC RX 637 (ALT 250 FOR IP): Performed by: NURSE PRACTITIONER

## 2019-05-02 PROCEDURE — APPNB45 APP NON BILLABLE 31-45 MINUTES: Performed by: NURSE PRACTITIONER

## 2019-05-02 PROCEDURE — 2709999900 HC NON-CHARGEABLE SUPPLY

## 2019-05-02 PROCEDURE — 1200000000 HC SEMI PRIVATE

## 2019-05-02 PROCEDURE — 36415 COLL VENOUS BLD VENIPUNCTURE: CPT

## 2019-05-02 RX ORDER — METOPROLOL TARTRATE 50 MG/1
50 TABLET, FILM COATED ORAL 2 TIMES DAILY
Status: DISCONTINUED | OUTPATIENT
Start: 2019-05-02 | End: 2019-05-07 | Stop reason: HOSPADM

## 2019-05-02 RX ADMIN — MORPHINE SULFATE 2 MG: 2 INJECTION, SOLUTION INTRAMUSCULAR; INTRAVENOUS at 21:36

## 2019-05-02 RX ADMIN — SODIUM CHLORIDE: 9 INJECTION, SOLUTION INTRAVENOUS at 22:34

## 2019-05-02 RX ADMIN — NALOXEGOL OXALATE 12.5 MG: 12.5 TABLET, FILM COATED ORAL at 08:32

## 2019-05-02 RX ADMIN — HYDROCODONE BITARTRATE AND ACETAMINOPHEN 1 TABLET: 5; 325 TABLET ORAL at 15:12

## 2019-05-02 RX ADMIN — CEFTRIAXONE SODIUM 1 G: 1 INJECTION, POWDER, FOR SOLUTION INTRAMUSCULAR; INTRAVENOUS at 15:37

## 2019-05-02 RX ADMIN — BACITRACIN ZINC NEOMYCIN SULFATE POLYMYXIN B SULFATE: 400; 3.5; 5 OINTMENT TOPICAL at 08:41

## 2019-05-02 RX ADMIN — HYDROCODONE BITARTRATE AND ACETAMINOPHEN 1 TABLET: 5; 325 TABLET ORAL at 06:12

## 2019-05-02 RX ADMIN — LEVOTHYROXINE SODIUM 137 MCG: 137 TABLET ORAL at 08:33

## 2019-05-02 RX ADMIN — BISACODYL 10 MG: 10 SUPPOSITORY RECTAL at 13:57

## 2019-05-02 RX ADMIN — FAMOTIDINE 20 MG: 20 TABLET ORAL at 19:17

## 2019-05-02 RX ADMIN — SODIUM CHLORIDE: 9 INJECTION, SOLUTION INTRAVENOUS at 00:07

## 2019-05-02 RX ADMIN — HYDROCODONE BITARTRATE AND ACETAMINOPHEN 1 TABLET: 5; 325 TABLET ORAL at 02:14

## 2019-05-02 RX ADMIN — METOPROLOL TARTRATE 50 MG: 50 TABLET, FILM COATED ORAL at 19:20

## 2019-05-02 RX ADMIN — METOPROLOL TARTRATE 50 MG: 50 TABLET, FILM COATED ORAL at 10:30

## 2019-05-02 RX ADMIN — BACITRACIN ZINC NEOMYCIN SULFATE POLYMYXIN B SULFATE: 400; 3.5; 5 OINTMENT TOPICAL at 19:19

## 2019-05-02 RX ADMIN — ATORVASTATIN CALCIUM 40 MG: 40 TABLET, FILM COATED ORAL at 19:21

## 2019-05-02 RX ADMIN — CEFOTETAN DISODIUM 1 G: 1 INJECTION, POWDER, FOR SOLUTION INTRAMUSCULAR; INTRAVENOUS at 04:11

## 2019-05-02 RX ADMIN — SODIUM CHLORIDE: 9 INJECTION, SOLUTION INTRAVENOUS at 15:06

## 2019-05-02 ASSESSMENT — PAIN SCALES - GENERAL
PAINLEVEL_OUTOF10: 1
PAINLEVEL_OUTOF10: 0
PAINLEVEL_OUTOF10: 3
PAINLEVEL_OUTOF10: 0
PAINLEVEL_OUTOF10: 3
PAINLEVEL_OUTOF10: 4
PAINLEVEL_OUTOF10: 0
PAINLEVEL_OUTOF10: 2
PAINLEVEL_OUTOF10: 6

## 2019-05-02 ASSESSMENT — PAIN DESCRIPTION - PAIN TYPE
TYPE: ACUTE PAIN;SURGICAL PAIN
TYPE: SURGICAL PAIN

## 2019-05-02 ASSESSMENT — PAIN DESCRIPTION - LOCATION
LOCATION: ABDOMEN
LOCATION: ABDOMEN

## 2019-05-02 ASSESSMENT — PAIN DESCRIPTION - PROGRESSION: CLINICAL_PROGRESSION: NOT CHANGED

## 2019-05-02 ASSESSMENT — PAIN DESCRIPTION - ONSET: ONSET: PROGRESSIVE

## 2019-05-02 ASSESSMENT — PAIN DESCRIPTION - FREQUENCY: FREQUENCY: INTERMITTENT

## 2019-05-02 ASSESSMENT — PAIN - FUNCTIONAL ASSESSMENT: PAIN_FUNCTIONAL_ASSESSMENT: ACTIVITIES ARE NOT PREVENTED

## 2019-05-02 ASSESSMENT — PAIN DESCRIPTION - ORIENTATION: ORIENTATION: LOWER

## 2019-05-02 NOTE — CARE COORDINATION
DISCHARGE BARRIERS  5/2/19, 11:39 AM    Reason for Referral: Arrange for [facility]  Mental Status: Alert and oriented. Decision Making: Patient makes his own decisions with the support of his wife and son. Family/Social/Home Environment: Patient resides in a 2 story home with his wife and son, who works day shift. Patient states his bedroom and half bath are located on first floor and shower is upstairs. Patient states since receiving chemo getting upstairs takes him a while and uses hand rail. Patient states he uses a cane at home and activity is limited. Wife does all the cooking, cleaning, laundry, and transportation. Current Services: Had [facility] back in Oct./Nov of 2018  Current Equipment: patient has a cane and shower chair. Payment Source: BC/BS Medicare. Concerns or Barriers to Discharge: None reported. Collabrative List of ECF/HH were provided: patient declined list.    Teach Back Method used with patient regarding care plan and discharge planning. Patient verbalized understanding of the plan of care and contribute to goal setting. Anticipated Needs/Discharge Plan: Discharge planned for possible Friday or Saturday. Home with wife and son and new referral to [facility] for RN only, declined therapy.     Electronically signed by KEVIN Duque on 5/2/2019 at 11:39 AM

## 2019-05-02 NOTE — PLAN OF CARE
Problem: Nutrition  Goal: Optimal nutrition therapy  5/2/2019 1015 by Phylicia Park RD, LD  Outcome: Ongoing  Nutrition Problem: Inadequate oral intake  Intervention: Food and/or Nutrient Delivery: Continue current diet, Start ONS  Nutritional Goals: Pt. will receive adequate nutrition (FL diet or greater) in next 1-4 days.

## 2019-05-02 NOTE — PLAN OF CARE
Problem: Falls - Risk of:  Goal: Will remain free from falls  Description  Will remain free from falls  5/2/2019 1445 by Malena Escalona RN  Outcome: Met This Shift  Note:   No falls noted this shift. Patient ambulates with x1 staff assistance without difficulty. Family member at bedside, spent the night. Bed kept in low position. Safe environment maintained. Bedside table & call light in reach. Uses call light appropriately when needing assistance. Fall band and sign posted for safety     Problem: Falls - Risk of:  Goal: Absence of physical injury  Description  Absence of physical injury  5/2/2019 0116 by Ashutosh Rg RN  Outcome: Met This Shift     Problem: Falls - Risk of:  Goal: Will remain free from falls  Description  Will remain free from falls  5/2/2019 1445 by Malena Escalona RN  Outcome: Met This Shift  Note:   No falls noted this shift. Patient ambulates with x1 staff assistance without difficulty. Family member at bedside, spent the night. Bed kept in low position. Safe environment maintained. Bedside table & call light in reach. Uses call light appropriately when needing assistance. Fall band and sign posted for safety     Problem: Respiratory  Goal: No pulmonary complications  9/5/4038 4600 by Malena Escalona RN  Outcome: Met This Shift  Note:   Lungs clear/diminished in bases. Enc to use incentive spirometer as instructed. No supplemental oxygen required as sats are 95% on room air     Problem: Respiratory  Goal: O2 Sat > 90%  5/2/2019 0116 by Ashutosh Rg RN  Outcome: Met This Shift     Problem: Pain Control  Goal: Maintain pain level at or below patient's acceptable level (or 5 if patient is unable to determine acceptable level)  5/2/2019 1445 by Malena Escalona RN  Outcome: Ongoing  Note:   Pain goal stated at 3. Post op pain at times is 4-5 when pt moves. It goes down to 0 when sitting. May have morphine or percocet for pain as needed.  Pain goal met at this time     Problem: Cardiovascular  Goal: No DVT, peripheral vascular complications  9/6/3926 7577 by David Coy RN  Outcome: Ongoing  Note:   Has generalized edema noted 2+ pitting to ankit feet and hands ar edematous as well. No chest pain, shortness of breath noted. Pcds for dvt prophylaxis and ambulatory as well. Problem: GI  Goal: No bowel complications  9/8/2420 1365 by David Coy RN  Outcome: Ongoing  Note:   Abdomen slightly distended. Starting to pass some gas. Suppository given today. Diminished bowel sounds. NG tube in place to right nares to be clamped. Problem:   Goal: Adequate urinary output  5/2/2019 1445 by David Coy RN  Outcome: Ongoing  Note:   Urine output 325 for 8 hours light red-tinged from ileoconduit     Problem: Nutrition  Goal: Optimal nutrition therapy  5/2/2019 1445 by David Coy RN  Outcome: Ongoing  Note:   Clear liquid diet as tolerated. Problem: Skin Integrity/Risk  Goal: Wound healing  5/2/2019 1445 by David Coy RN  Outcome: Ongoing  Note:   Abdominal dressings removed x 4. Far right surgical area redness from tape. All staples clean, dry and intact. Cleansed with chlorhexidine soap     Problem: Discharge Planning  Intervention: Interaction with patient/family and care team  Note:   Plans to return home with home health   Care plan reviewed with patient   Patient  verbalize understanding of the plan of care and contribute to goal setting.

## 2019-05-02 NOTE — CARE COORDINATION
19, 7:53 AM      Malvin Mandujano       Admitted from: OR   555 Hospital day: 1   Location: [facility]/058-A Reason for admit: Bladder cancer [facility]) [C67.9]   Status: Inpatient  Admit order signed?: yes  PMH:  has a past medical history of AAA (abdominal aortic aneurysm) ([address] BCG ROXANE, BPH (benign prostatic hypertrophy), CAD (coronary artery disease), Carotid artery stenosis, Chronic back pain, Chronic kidney disease, Congenital absence of one kidney, History of lumbar fusion, Hyperlipidemia, Hypertension, Hypothyroidism, Obesity, TIA (transient ischemic attack), and Unspecified cerebral artery occlusion with cerebral infarction. Procedure: 19  By Dr. Tory Currie CYSTOPROSTATECTOMY WITH BILATERAL PELVIC LYMPH NODE DISSECTION WITH CREATION OF ILEAL CONDUIT      Pertinent abnormal Imagin19  KUB  1. Free air is demonstrated within the peritoneal cavity. This is most consistent with recent abdominal surgery. However, recommend continued follow-up. 2. Enteric tube tip and distal side-port project over the gastric body. 3.  Indeterminate bowel gas pattern with indeterminate mildly distended gas-filled small bowel overlying the mid abdomen. 19  CXR  Resolved interstitial infiltrates compared to yesterday's KUB. Mild bibasilar atelectasis. Tiny left pleural effusion. Satisfactory positions of the NG tube and right jugular Port-A-Cath. Moderate gaseous distention of the stomach.       Medications:  Scheduled Meds:   metoprolol tartrate  50 mg Oral BID    cefTRIAXone (ROCEPHIN) IV  1 g Intravenous Q24H    atorvastatin  40 mg Oral Nightly    potassium chloride  10 mEq Oral Daily    levothyroxine  137 mcg Oral Daily    pregabalin  50 mg Oral TID    sodium chloride flush  10 mL Intravenous 2 times per day    famotidine  20 mg Oral BID    neomycin-bacitracin-polymyxin   Topical BID    bisacodyl  10 mg Rectal Daily    naloxegol  12.5 mg Oral QAM     Continuous Infusions:   sodium chloride 150 mL/hr at 05/02/19 0248      Pertinent Info/Orders/Treatment Plan: POD#1-  IVF, IPwound and ostomy consult for new ileal conduit, RODRÍGUEZ hose, pain and nausea control, strict I/O, N/G tube LIWS, supplemental oxygen, incision care to abdomen, pelvic drain, IV antibiotics, ice chips only, Dulcolax supp,Movantik, ambulate. Diet: DIET CLEAR LIQUID;  Dietary Nutrition Supplements: Clear Liquid Oral Supplement   Smoking status:  reports that he quit smoking about 40 years ago. His smoking use included cigarettes. He started smoking about 64 years ago. He has a 24.00 pack-year smoking history. He has never used smokeless tobacco.   PCP: Mark Villa MD  Readmission: no  Readmission Risk Score: 16%    Discharge Planning  Current Residence:  Private Residence  Living Arrangements:  Spouse/Significant Other   Support Systems:  Spouse/Significant Other  Current Services PTA:     Potential Assistance Needed:  Home Care  Potential Assistance Purchasing Medications:  No  Does patient want to participate in local refill/ meds to beds program?  No  Type of Home Care Services:  Nursing Services  Patient expects to be discharged to:  home with spouse  Expected Discharge date:  05/04/19  Follow Up Appointment: Best Day/ Time: Friday AM    Discharge Plan: Met with Zully Ontiveros; he lives home with his spouse. Patient does not drive, he has current PCP, is able to afford medicines at discharge, uses a cane, and  is interested in [facility] for new ileal conduit care and teaching. Patient and wife chose - who they had some time back. Will consult [facility].          Evaluation: yes

## 2019-05-02 NOTE — PROGRESS NOTES
Nutrition Assessment    Type and Reason for Visit: Initial, Consult(supplement recommendations - Patient to start Ensure Clear immediately post-op. Status post RAL Cystoprostatectomy with Ileal Loop Conduit.)    Nutrition Recommendations: Recommend advance diet as GI function allows. ONS initiated. Nutrition Assessment:   Pt. nutritionally compromised AEB inadequate oral intake (CL diet). At risk for further nutrition compromise r/t increased nutrient needs for post-operative healing; underlying medical condition (bladder cancer, chemotherapy (completed 3/19); hx CVA, CAD, CKD). Will send Ensure Clear TID as pt agreeable. Discussed appropriate use of ONS at discharge (drank Boost during chemotherapy; Ensure surgery drink BID for 5 days prior to OR). Malnutrition Assessment:  · Malnutrition Status:  At risk for malnutrition    Nutrition Risk Level: High    Nutrient Needs:  · Estimated Daily Total Kcal: 9758-4860 kcals (20-25 kcals/kgm wt of 89 kgm)  · Estimated Daily Protein (g): 80-94 gm (1.2-1.4 gm/kgm IBW of 67 kgm)    Nutrition Diagnosis:   · Problem: Inadequate oral intake  · Etiology: related to Acute injury/trauma, Alteration in GI function(surgery)     Signs and symptoms:  as evidenced by (CL diet)    Objective Information:  · Nutrition-Focused Physical Findings: NG tube in place - currently clamped; denies nausea; rates abdominal pain as 0-1; not passing gas per pt; Rx includes Zofran, Movantik  · Wound Type: (abdominal incision (5/1): ROBOTIC ASSISTED LAPAROSCOPIC RADICAL CYSTOPROSTATECTOMY WITH BILATERAL PELVIC LYMPH NODE DISSECTION WITH CREATION OF ILEAL CONDUIT)  · Current Nutrition Therapies:  · Oral Diet Orders: Clear Liquid   · Oral Diet intake: Unable to assess  · Oral Nutrition Supplement (ONS) Orders: Clear Liquid Oral Supplement(TID)  · ONS intake: (initiated)  · Anthropometric Measures:  · Ht: 5' 7\" (170.2 cm)   · Current Body Wt: 195 lb 9.6 oz (88.7 kg)(5/1, +2 edema)  · Admission Body Wt: 195 lb 9.6 oz (88.7 kg)(5/1, +2 edema )  · Usual Body Wt: (per pt fluctuates: 198-206#; lost weight during chemo (1/19-3/19); 2/6/19: 200# 3.2 oz)  · % Weight Change:  ,  -2.2% in 3 months (however difficult to eval with edema)  · Ideal Body Wt: 148 lb (67.1 kg),   · BMI Classification: BMI 30.0 - 34.9 Obese Class I    Nutrition Interventions:   Continue current diet, Start ONS  Continued Inpatient Monitoring, Education Initiated, Coordination of Care(Encouraged good nutrition at best efforts when diet advanced. Discussed appropriate use of ONS post-op.)    Nutrition Evaluation:   · Evaluation: Goals set   · Goals: Pt. will receive adequate nutrition (FL diet or greater) in next 1-4 days.     · Monitoring: Nutrition Progression, Meal Intake, Supplement Intake, Diet Tolerance, Skin Integrity, Weight, Pertinent Labs, Nausea or Vomiting, Patient/Family Education, Monitor Bowel Function      Electronically signed by Phylicia Park RD, LD on 5/2/19 at 10:16 AM    Contact Number: [phone]

## 2019-05-02 NOTE — PROGRESS NOTES
Patient c/o sharp pain 3/10 right side under breast area, worsening with inspiration Subcutaneous air noted on palpation. Resource nurse called to evaluate, call placed to [facility] with details and requesting return call. Kirill Rodriguez returning call, updated on right sided pain worsening with inspiration and subcutaneous air from bottom of rib cage on the right to the abdomen, slight abdominal distention with abdomen remaining soft, decreased urine output. Orders received.

## 2019-05-02 NOTE — PROGRESS NOTES
[address]  [address] [address]  Dept: [phone]  Loc: [phone]  Visit Date: 2019  Urology Progress Note    Chief Complaint: Bladder cancer    Subjective:   Patient is sitting upright in chair at side of bed, urostomy draining clear, light blood tinged urine, -flatus, -BM, pelvic drain via vann catheter draining moderate amount bloody drainage, ambulating with assistance, NG tube currently clamped, denies any nausea or vomiting. There are complaints of mild pain at this time which he states is well-controlled with oral pain medication. He has been chewing gum regularly.   Tolerating small amounts of clear liquids this AM.        Vitals:  /66   Pulse 100   Temp 98.5 °F (36.9 °C) (Oral)   Resp 18   Ht 5' 7\" (1.702 m)   Wt 195 lb 9.6 oz (88.7 kg)   SpO2 95%   BMI 30.64 kg/m²   Temp  Av.3 °F (34.1 °C)  Min: 73.9 °F (23.3 °C)  Max: 99 °F (37.2 °C)    Intake/Output Summary (Last 24 hours) at 2019 0854  Last data filed at 2019 0650  Gross per 24 hour   Intake 2855.14 ml   Output 1475 ml   Net 1380.14 ml       Social History     Socioeconomic History    Marital status:      Spouse name: Not on file    Number of children: Not on file    Years of education: Not on file    Highest education level: Not on file   Occupational History    Not on file   Social Needs    Financial resource strain: Not on file    Food insecurity:     Worry: Not on file     Inability: Not on file    Transportation needs:     Medical: Not on file     Non-medical: Not on file   Tobacco Use    Smoking status: Former Smoker     Packs/day: 1.00     Years: 24.00     Pack years: 24.00     Types: Cigarettes     Start date:      Last attempt to quit: 1979     Years since quittin.1    Smokeless tobacco: Never Used   Substance and Sexual Activity    Alcohol use: No     Alcohol/week: 0.0 oz    Drug use: No    Sexual activity: Not Currently   Lifestyle    Physical activity:     Days per week: Not on file     Minutes per session: Not on file    Stress: Not on file   Relationships    Social connections:     Talks on phone: Not on file     Gets together: Not on file     Attends Jain service: Not on file     Active member of club or organization: Not on file     Attends meetings of clubs or organizations: Not on file     Relationship status: Not on file    Intimate partner violence:     Fear of current or ex partner: Not on file     Emotionally abused: Not on file     Physically abused: Not on file     Forced sexual activity: Not on file   Other Topics Concern    Not on file   Social History Narrative    Not on file     Family History   Problem Relation Age of Onset    Diabetes Mother     Cancer Mother     Arthritis Mother     Asthma Father     Heart Disease Father     Cancer Father     Stroke Sister     Heart Disease Sister     Cancer Sister     Stroke Brother     Heart Disease Brother     Cancer Brother      No Known Allergies    Objective:    Constitutional: Alert and oriented times x3, no acute distress, and cooperative to examination with appropriate mood and affect. HEENT:   Head:         Normocephalic and atraumatic. Mucous membranes are normal.   Eyes:         EOM are normal. No scleral icterus. Nose:    The external appearance of the nose is normal  Ears: The ears appear normal to external inspection. Cardiovascular:       Normal rate, regular rhythm. Pulmonary/Chest:  Normal respiratory rate and rhthym. No use of accessory muscles. Lungs clear bilaterally. SubQ air noted on right lower chest to palpation. Abdominal:          Soft. Mild tenderness around incisional sites. Negative bowel sounds on auscultation. Incisions well-approximated and healing. Staples intact. Scant drainage. Urostomy stoma pink, moist with stent intact draining light blood tinged urine.   Output 1225 ml since surgery. Genitalia:    Pelvic drain vann catheter draining bright bloody drainage. Normal circumcised penis without erythema   Musculoskeletal:    Normal range of motion. He exhibits moderate 2+ edema to bilateral upper and lower extremities. Extremities:    No cyanosis, clubbing. 2+ edema present. Neurological:    Alert and oriented. Labs:  WBC:    Lab Results   Component Value Date    WBC 16.8 05/02/2019     Hemoglobin/Hematocrit:    Lab Results   Component Value Date    HGB 10.1 05/02/2019    HCT 32.0 05/02/2019     BMP:    Lab Results   Component Value Date     05/02/2019    K 4.5 05/02/2019    K 4.7 05/26/2018     05/02/2019    CO2 20 05/02/2019    BUN 17 05/02/2019    LABALBU 3.7 04/03/2019    LABALBU 4.1 05/19/2012    CREATININE 1.0 05/02/2019    CALCIUM 7.8 05/02/2019    LABGLOM 72 05/02/2019       Impression:    Muscle invasive bladder cancer  S/p robotic cystoprostatectomy    Plan:    POD # 1 ROBOTIC ASSISTED LAPAROSCOPIC RADICAL CYSTOPROSTATECTOMY WITH BILATERAL PELVIC LYMPH NODE DISSECTION WITH CREATION OF ILEAL CONDUIT    Encourage IS. Encourage ambulation. Merrill hose and SCDs intact while dependent  Gradual introduction of clear fluids today. May attempt small amount of Ensure clear  Rocephin 1 gm every 24 hour - WBC 16.8  Hbg stable 10.1   CBC, BMP w/Mag tomorrow  NG tube to right nares currently clamped. Leave clamped. If should develop nausea or vomiting, may return to LIWS. Output last 8 hours 150 ml. Ostomy draining 1225 ml since surgery. Notify if output less than 200 ml in 8 hours. Remove abdomen incision dressing and leave staples MARAH  Leave pelvic drain vann intact. Do not remove. Bilateral extremity edema. Decrease IVF to 100 ml/hr. Continue to monitor free subcutaneous air.   Notify if significantly worsening  OK for dulcolax suppository for bowel stimulation      DEMAR Berman  05/02/19 8:54 AM  Urology

## 2019-05-02 NOTE — PROGRESS NOTES
Abdomen appears more distended and firm. NG to low intermittent suction as ordered, immediately noted 150 ml brown secretions with sediment.

## 2019-05-02 NOTE — DISCHARGE INSTR - COC
Continuity of Care Form    Patient Name: Mario López   :  1940  MRN:  [id number]    Admit date:  2019  Discharge date:  ***    Code Status Order: Full Code   Advance Directives:   Advance Care Flowsheet Documentation     Date/Time Healthcare Directive Type of Healthcare Directive Copy in [address] Agent's Name Healthcare Agent's Phone Number    19 [phone]  Yes, patient has an advance directive for healthcare treatment  --  Yes, copy in chart -- -- --    19 1024  Yes, patient has an advance directive for healthcare treatment  Living will  No, copy requested from medical records -- -- --          Admitting Physician:  Petar Chinchilla MD  PCP: Lexi Apple MD    Discharging Nurse: [facility] Unit/Room#: 5E-58/058-A  Discharging Unit Phone Number: ***    Emergency Contact:   Extended Emergency Contact Information  Primary Emergency Contact: Sada Stein  Address: CELL [phone]           [address] [place] [address], [address] [place] [address] Phone: [phone]  Relation: Spouse  Secondary Emergency Contact: [address] [place] [address] Phone: [phone]  Relation: Child    Past Surgical History:  Past Surgical History:   Procedure Laterality Date    ABDOMINAL AORTIC ANEURYSM REPAIR  3/2010    Caryn Dooley ABDOMINAL AORTIC ANEURYSM REPAIR  7/24/15    Mariana Cue    COLONOSCOPY  11    [facility]    CORONARY ANGIOPLASTY WITH STENT PLACEMENT      2 stents    FEMORAL-FEMORAL BYPASS GRAFT  2017    Dr. Susy Gomez    arm    KNEE ARTHROSCOPY  2008    meniscus tear    LUMBAR LAMINECTOMY  3/2016    [facility]--Dr. Isabel Starr    OTHER SURGICAL HISTORY  4/14/15    melanoma removal from right chest    OTHER SURGICAL HISTORY  2016    I and D, exicison of posterior neck cyst    IN OFFICE/OUTPT VISIT,PROCEDURE ONLY N/A 2018    TURBT performed by Petar Chinchilla MD at [address] [place] [address] N/A 5/1/2019    ROBOTIC RADICAL CYSTOPROSTATECTOMY performed by Wallace Chu MD at [address] [facility], [facility] SKIN CANCER EXCISION  10/2016    Dr Savanna White  1990's    TUNNELED VENOUS PORT PLACEMENT      TURP  04/16/2014    See note of Dr. Chadwick Pham for OR procedure details       Immunization History:   Immunization History   Administered Date(s) Administered    Influenza Virus Vaccine 11/14/2011, 09/30/2013, 10/20/2015, 10/01/2016, 11/01/2017    Influenza Whole 10/01/2008    Influenza, High Dose (Fluzone 65 yrs and older) 10/02/2014    Pneumococcal 13-valent Conjugate ([id number]) 02/08/2017    Pneumococcal Conjugate 7-valent 01/01/2006       Active Problems:  Patient Active Problem List   Diagnosis Code    Coronary artery disease involving native coronary artery without angina pectoris I25.10    Hyperlipidemia E78.5    Essential hypertension I10    Hypothyroidism E03.9    Claudication ([facility]) I73.9    CKD (chronic kidney disease) stage 3, GFR 30-59 ml/min ([facility]) N18.3    BCG ROXANE C67.9    Bladder cancer ([facility] C67.9    Subcutaneous cyst L72.9    S/P femoral-femoral bypass surgery Z95.828    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    PAD (peripheral artery disease) ([facility] I73.9    Spinal stenosis of lumbar region M48.061    Macular degeneration H35.30    Pneumonia due to organism J18.9    Pleurisy without effusion R09.1    Abnormal CT of the chest R93.89    Bladder tumor D49.4    Pain in penis and bladder N48.89    Encounter for ostomy care education Z71.89       Isolation/Infection:   Isolation          No Isolation            Nurse Assessment:  Last Vital Signs: /66   Pulse 100   Temp 98.5 °F (36.9 °C) (Oral)   Resp 18   Ht 5' 7\" (1.702 m)   Wt 195 lb 9.6 oz (88.7 kg)   SpO2 95%   BMI 30.64 kg/m²     Last documented pain score (0-10 scale): Pain Level: 0  Last Weight:   Wt Readings from Last 1 Encounters: 19 195 lb 9.6 oz (88.7 kg)     Mental Status:  {IP PT MENTAL STATUS:39392}    IV Access:  { ROBYN IV ACCESS:527524516}    Nursing Mobility/ADLs:  Walking   {CHP DME UHAW:874985954}  Transfer  {CHP DME NREE:232307741}  Bathing  {CHP DME YPCF:260994613}  Dressing  {CHP DME NNPI:546292340}  Toileting  {CHP DME KDQ}  Feeding  {CHP DME MHFM:211477539}  Med Admin  {CHP DME KVNK:237658724}  Med Delivery   { ROBYN MED Delivery:697555746}    Wound Care Documentation and Therapy:        Elimination:  Continence:   · Bowel: {YES / JI:14538}  · Bladder: {YES / HH:38318}  Urinary Catheter: {Urinary Catheter:039288135}   Colostomy/Ileostomy/Ileal Conduit: {YES / ZT:35798}  Urostomy Ileal conduit RLQ-Stomal Appliance: 2 piece  Urostomy Ileal conduit RLQ-Stoma  Assessment: Moist, Red  Urostomy Ileal conduit RLQ-Peristomal Assessment: Intact    Date of Last BM: ***    Intake/Output Summary (Last 24 hours) at 2019 1147  Last data filed at 2019 0650  Gross per 24 hour   Intake 2855.14 ml   Output 1475 ml   Net 1380.14 ml     I/O last 3 completed shifts:   In: 2855.1 [P.O.:360; I.V.:2495.1]  Out: 3754 [Urine:1225; Emesis/NG output:150; Blood:100]    Safety Concerns:     508 0xdata Safety Concerns:359516911}    Impairments/Disabilities:      508 0xdata Impairments/Disabilities:939542144}    Nutrition Therapy:  Current Nutrition Therapy:   508 0xdata Diet List:495266034}    Routes of Feeding: {CHP DME Other Feedings:490829388}  Liquids: {Slp liquid thickness:73948}  Daily Fluid Restriction: {CHP DME Yes amt example:136902065}  Last Modified Barium Swallow with Video (Video Swallowing Test): {Done Not Done BGPN:486475283}    Treatments at the Time of Hospital Discharge:   Respiratory Treatments: ***  Oxygen Therapy:  {Therapy; copd oxygen:89477}  Ventilator:    {VERENA ZAMORANO Vent UOLB:700102748}    Rehab Therapies: {THERAPEUTIC INTERVENTION:8571683179}  Weight Bearing Status/Restrictions: {VERENA ZAMORANO Weight Bearin}  Other Medical Equipment (for information only, NOT a DME order):  {EQUIPMENT:508087257}  Other Treatments: ***    Patient's personal belongings (please select all that are sent with patient):  {CHP DME Belongings:240769525}    RN SIGNATURE:  {Esignature:492473053}    CASE MANAGEMENT/SOCIAL WORK SECTION    Inpatient Status Date: 5-1-19    Readmission Risk Assessment Score:  Readmission Risk              Risk of Unplanned Readmission:        16           Discharging to Facility/ Agency   · Name: [facility]  · Address:  · Phone: [phone]  · Fax:    Dialysis Facility (if applicable)   · Name:  · Address:  · Dialysis Schedule:  · Phone:  · Fax:    / signature: Electronically signed by KEVIN Pacheco on 5/2/19 at 11:48 AM    PHYSICIAN SECTION    Prognosis: {Prognosis:1749775609}    Condition at Discharge: [address] [place] [address] Patient Condition:563431064}    Rehab Potential (if transferring to Rehab): {Prognosis:5302821709}    Recommended Labs or Other Treatments After Discharge: ***    Physician Certification: I certify the above information and transfer of Bobby Dumont  is necessary for the continuing treatment of the diagnosis listed and that he requires {Admit to Appropriate Level of Care:46380} for {GREATER/LESS:557160908} 30 days.      Update Admission H&P: {CHP DME Changes in VKOOJ:681720553}    PHYSICIAN SIGNATURE:  {Esignature:410479800}

## 2019-05-03 LAB
ANION GAP SERPL CALCULATED.3IONS-SCNC: 11 MEQ/L (ref 8–16)
BUN BLDV-MCNC: 15 MG/DL (ref 7–22)
CALCIUM IONIZED: 1.13 MMOL/L (ref 1.12–1.32)
CALCIUM SERPL-MCNC: 7.9 MG/DL (ref 8.5–10.5)
CHLORIDE BLD-SCNC: 110 MEQ/L (ref 98–111)
CO2: 20 MEQ/L (ref 23–33)
CREAT SERPL-MCNC: 0.9 MG/DL (ref 0.4–1.2)
ERYTHROCYTE [DISTWIDTH] IN BLOOD BY AUTOMATED COUNT: 15.1 % (ref 11.5–14.5)
ERYTHROCYTE [DISTWIDTH] IN BLOOD BY AUTOMATED COUNT: 55.6 FL (ref 35–45)
GFR SERPL CREATININE-BSD FRML MDRD: 81 ML/MIN/1.73M2
GLUCOSE BLD-MCNC: 113 MG/DL (ref 70–108)
HCT VFR BLD CALC: 31.2 % (ref 42–52)
HEMOGLOBIN: 9.8 GM/DL (ref 14–18)
LV EF: 60 %
LVEF MODALITY: NORMAL
MCH RBC QN AUTO: 31.4 PG (ref 26–33)
MCHC RBC AUTO-ENTMCNC: 31.4 GM/DL (ref 32.2–35.5)
MCV RBC AUTO: 100 FL (ref 80–94)
PLATELET # BLD: 144 THOU/MM3 (ref 130–400)
PMV BLD AUTO: 9.8 FL (ref 9.4–12.4)
POTASSIUM REFLEX MAGNESIUM: 3.6 MEQ/L (ref 3.5–5.2)
PRO-BNP: 362.6 PG/ML (ref 0–1800)
RBC # BLD: 3.12 MILL/MM3 (ref 4.7–6.1)
SODIUM BLD-SCNC: 141 MEQ/L (ref 135–145)
WBC # BLD: 11 THOU/MM3 (ref 4.8–10.8)

## 2019-05-03 PROCEDURE — 1200000000 HC SEMI PRIVATE

## 2019-05-03 PROCEDURE — 2580000003 HC RX 258: Performed by: PHYSICIAN ASSISTANT

## 2019-05-03 PROCEDURE — 99024 POSTOP FOLLOW-UP VISIT: CPT | Performed by: NURSE PRACTITIONER

## 2019-05-03 PROCEDURE — 93306 TTE W/DOPPLER COMPLETE: CPT

## 2019-05-03 PROCEDURE — 2580000003 HC RX 258: Performed by: NURSE PRACTITIONER

## 2019-05-03 PROCEDURE — 6360000002 HC RX W HCPCS: Performed by: PHYSICIAN ASSISTANT

## 2019-05-03 PROCEDURE — 99231 SBSQ HOSP IP/OBS SF/LOW 25: CPT | Performed by: PHYSICIAN ASSISTANT

## 2019-05-03 PROCEDURE — 6370000000 HC RX 637 (ALT 250 FOR IP): Performed by: NURSE PRACTITIONER

## 2019-05-03 PROCEDURE — APPNB30 APP NON BILLABLE TIME 0-30 MINS: Performed by: NURSE PRACTITIONER

## 2019-05-03 PROCEDURE — 36415 COLL VENOUS BLD VENIPUNCTURE: CPT

## 2019-05-03 PROCEDURE — 6370000000 HC RX 637 (ALT 250 FOR IP): Performed by: PHYSICIAN ASSISTANT

## 2019-05-03 PROCEDURE — 85027 COMPLETE CBC AUTOMATED: CPT

## 2019-05-03 PROCEDURE — 2709999900 HC NON-CHARGEABLE SUPPLY

## 2019-05-03 PROCEDURE — 82330 ASSAY OF CALCIUM: CPT

## 2019-05-03 PROCEDURE — 80048 BASIC METABOLIC PNL TOTAL CA: CPT

## 2019-05-03 PROCEDURE — 6360000002 HC RX W HCPCS: Performed by: NURSE PRACTITIONER

## 2019-05-03 PROCEDURE — 83880 ASSAY OF NATRIURETIC PEPTIDE: CPT

## 2019-05-03 RX ORDER — FUROSEMIDE 10 MG/ML
40 INJECTION INTRAMUSCULAR; INTRAVENOUS 2 TIMES DAILY
Status: DISCONTINUED | OUTPATIENT
Start: 2019-05-03 | End: 2019-05-03

## 2019-05-03 RX ORDER — FUROSEMIDE 10 MG/ML
40 INJECTION INTRAMUSCULAR; INTRAVENOUS ONCE
Status: COMPLETED | OUTPATIENT
Start: 2019-05-03 | End: 2019-05-03

## 2019-05-03 RX ORDER — FUROSEMIDE 10 MG/ML
40 INJECTION INTRAMUSCULAR; INTRAVENOUS 2 TIMES DAILY
Status: DISCONTINUED | OUTPATIENT
Start: 2019-05-03 | End: 2019-05-04

## 2019-05-03 RX ADMIN — Medication 10 ML: at 09:00

## 2019-05-03 RX ADMIN — FUROSEMIDE 40 MG: 10 INJECTION, SOLUTION INTRAMUSCULAR; INTRAVENOUS at 06:17

## 2019-05-03 RX ADMIN — NALOXEGOL OXALATE 12.5 MG: 12.5 TABLET, FILM COATED ORAL at 08:58

## 2019-05-03 RX ADMIN — MORPHINE SULFATE 2 MG: 2 INJECTION, SOLUTION INTRAMUSCULAR; INTRAVENOUS at 02:39

## 2019-05-03 RX ADMIN — METOPROLOL TARTRATE 50 MG: 50 TABLET, FILM COATED ORAL at 22:22

## 2019-05-03 RX ADMIN — FUROSEMIDE 40 MG: 10 INJECTION, SOLUTION INTRAMUSCULAR; INTRAVENOUS at 17:35

## 2019-05-03 RX ADMIN — CEFTRIAXONE SODIUM 1 G: 1 INJECTION, POWDER, FOR SOLUTION INTRAMUSCULAR; INTRAVENOUS at 15:56

## 2019-05-03 RX ADMIN — Medication 10 ML: at 22:24

## 2019-05-03 RX ADMIN — SODIUM CHLORIDE: 9 INJECTION, SOLUTION INTRAVENOUS at 05:24

## 2019-05-03 RX ADMIN — LEVOTHYROXINE SODIUM 137 MCG: 137 TABLET ORAL at 08:57

## 2019-05-03 RX ADMIN — ONDANSETRON 4 MG: 2 INJECTION INTRAMUSCULAR; INTRAVENOUS at 05:50

## 2019-05-03 RX ADMIN — BISACODYL 10 MG: 10 SUPPOSITORY RECTAL at 08:56

## 2019-05-03 RX ADMIN — FAMOTIDINE 20 MG: 20 TABLET ORAL at 08:56

## 2019-05-03 RX ADMIN — HYDROCODONE BITARTRATE AND ACETAMINOPHEN 1 TABLET: 5; 325 TABLET ORAL at 13:41

## 2019-05-03 RX ADMIN — BACITRACIN ZINC NEOMYCIN SULFATE POLYMYXIN B SULFATE: 400; 3.5; 5 OINTMENT TOPICAL at 08:59

## 2019-05-03 RX ADMIN — BACITRACIN ZINC NEOMYCIN SULFATE POLYMYXIN B SULFATE: 400; 3.5; 5 OINTMENT TOPICAL at 22:22

## 2019-05-03 RX ADMIN — FAMOTIDINE 20 MG: 20 TABLET ORAL at 22:22

## 2019-05-03 RX ADMIN — METOPROLOL TARTRATE 50 MG: 50 TABLET, FILM COATED ORAL at 08:57

## 2019-05-03 ASSESSMENT — PAIN DESCRIPTION - LOCATION
LOCATION_2: THROAT
LOCATION: ABDOMEN

## 2019-05-03 ASSESSMENT — PAIN DESCRIPTION - INTENSITY: RATING_2: 3

## 2019-05-03 ASSESSMENT — PAIN SCALES - GENERAL
PAINLEVEL_OUTOF10: 0
PAINLEVEL_OUTOF10: 6
PAINLEVEL_OUTOF10: 0
PAINLEVEL_OUTOF10: 3
PAINLEVEL_OUTOF10: 0
PAINLEVEL_OUTOF10: 0

## 2019-05-03 ASSESSMENT — PAIN DESCRIPTION - ORIENTATION: ORIENTATION: LOWER

## 2019-05-03 ASSESSMENT — PAIN DESCRIPTION - DESCRIPTORS: DESCRIPTORS_2: SORE

## 2019-05-03 ASSESSMENT — PAIN DESCRIPTION - PAIN TYPE
TYPE_2: SURGICAL
TYPE: SURGICAL PAIN

## 2019-05-03 NOTE — PLAN OF CARE
Problem: Respiratory  Goal: No pulmonary complications  2/0/3469 5116 by Olegario Haines RN  Outcome: Met This Shift  Note:   Lungs clear/diminished in bases. Enc to use incentive spirometer as instructed. No supplemental oxygen required as sats are 95% on room air     Problem: Falls - Risk of:  Goal: Will remain free from falls  Description  Will remain free from falls  5/3/2019 0123 by Anant Taylor RN  Outcome: Ongoing  Note:   Bed alarm set. Patient has remained free from falls this shift. Patient is alert and oriented times four. Bed to lowest position with door open. Patient care items and call light in reach. Patient uses call light appropriately for assist. Will continue to monitor. Please see fall assessment. 5/2/2019 1445 by Olegario Haines RN  Outcome: Met This Shift  Note:   No falls noted this shift. Patient ambulates with x1 staff assistance without difficulty. Family member at bedside, spent the night. Bed kept in low position. Safe environment maintained. Bedside table & call light in reach. Uses call light appropriately when needing assistance. Fall band and sign posted for safety     Problem: Pain Control  Goal: Maintain pain level at or below patient's acceptable level (or 5 if patient is unable to determine acceptable level)  5/3/2019 0123 by Anant Taylor RN  Outcome: Ongoing  Flowsheets (Taken 5/3/2019 0123)  Patient's Stated Pain Goal: 3  Note:   Patient states his  pain level at a 6 out of 10 on a 0-10 pain rating scale. Patient states pain is relieved by medication. Applied warm blankets for comfort. Will continue to monitor. Pt. States pain is in good control with current medication. 5/2/2019 1445 by Olegario Haines RN  Outcome: Ongoing  Note:   Pain goal stated at 3. Post op pain at times is 4-5 when pt moves. It goes down to 0 when sitting. May have morphine or percocet for pain as needed.  Pain goal met at this time     Problem: Cardiovascular  Goal: No DVT, peripheral vascular complications  3/9/8758 8234 by Nelsy Moon RN  Outcome: Ongoing  Note:   RODRÍGUEZ hose and SCD's on patient. No signs or symptoms of DVT noted. 5/2/2019 1445 by Malena Escalona RN  Outcome: Ongoing  Note:   Has generalized edema noted 2+ pitting to ankit feet and hands ar edematous as well. No chest pain, shortness of breath noted. Pcds for dvt prophylaxis and ambulatory as well. Problem: Respiratory  Goal: O2 Sat > 90%  Outcome: Ongoing  Note:   Lungs clear diminished in bases. O2 > 90%. Encourage incentive spirometer use as well as cough and deep breathing. Problem: GI  Goal: No bowel complications  5/5/1138 5719 by Nelsy Moon RN  Outcome: Ongoing  Note:   NG tube clamped. Hypoactive bowel sounds x4 quadrants. One mucous stool. 5/2/2019 1445 by Malena Escalona RN  Outcome: Ongoing  Note:   Abdomen slightly distended. Starting to pass some gas. Suppository given today. Diminished bowel sounds. NG tube in place to right nares to be clamped. Problem:   Goal: Adequate urinary output  5/3/2019 0123 by Nelsy Moon RN  Outcome: Ongoing  Note:   Pelvic drain to vann bag. Ileo-conduit draining adequate amounts of xochitl colored urine. >30 ml's an hour. Vann care care given. 5/2/2019 1445 by Malena Escalona RN  Outcome: Ongoing  Note:   Urine output 325 for 8 hours light red-tinged from ileoconduit     Problem: Nutrition  Goal: Optimal nutrition therapy  5/2/2019 1445 by Malena Escalona RN  Outcome: Ongoing  Note:   Clear liquid diet as tolerated. Problem: Skin Integrity/Risk  Goal: Wound healing  5/3/2019 0123 by Nelsy Moon RN  Outcome: Ongoing  Note:   X7 surgical sites all clean, dry and intact. Medi-port side covered with clean dry dressing. Proper handwashing after bathroom use, when hands are soiled and before wound care. Vitals and labs monitored for signs and symptoms of infection. Patient compliant. Encouraged pt. To turn in bed. Encouraged to ambulate. Heels elevated. Encouraged fluids. Will continue to assess needs. No skin breakdown issues at this time. 5/2/2019 1445 by Og Reid RN  Outcome: Ongoing  Note:   Abdominal dressings removed x 4. Far right surgical area redness from tape. All staples clean, dry and intact. Cleansed with chlorhexidine soap     Problem: Discharge Planning  Intervention: Discharge to appropriate level of care  Note:   Patient plans to be discharged home. Patient informed of and included in their plan of care.

## 2019-05-03 NOTE — CONSULTS
Consult History & Physical      Patient:  Maria Pastor  YOB: 1940    MRN: [id number]     Acct: [de-identified]      Chief Complaint:  Edema     Date of Service: Pt seen/examined in consultation on 5/3/2019    History Of Present Illness:      78 y.o. male who we are asked to see/evaluate by Herb Caruso MD for medical management of fluid overload. Pt states that he started to fill up with fluid yesterday (5/2). He said that sometimes this happens at home and he has taken Lasix before. Lasix is not currently on his home med list. Pt is not aware if he has heart failure or not. Per chart review, last echo was 2012 with no online record available. Pt states that he sometimes get short of breath walking but this is a chronic issue. Pt denies SOB, chest pain, heart palpitations. During this admission, patient had a cystectomy. Pt has not felt short of breath to the point of needing supplemental oxygen. Pt is currently up 3L.      Past Medical History:        Diagnosis Date    AAA (abdominal aortic aneurysm) ([address]     intravascular stent    BCG ROXANE 5/21/2014    BPH (benign prostatic hypertrophy)     CAD (coronary artery disease)     Carotid artery stenosis     Chronic back pain     Chronic kidney disease     Congenital absence of one kidney     absent right kidney    History of lumbar fusion 3/15    mid lower back down    Hyperlipidemia     Hypertension     Hypothyroidism     Obesity     TIA (transient ischemic attack) 1/2008    Unspecified cerebral artery occlusion with cerebral infarction     TIA       Past Surgical History:        Procedure Laterality Date    ABDOMINAL AORTIC ANEURYSM REPAIR  3/2010    Shireen    ABDOMINAL AORTIC ANEURYSM REPAIR  7/24/15    Jaswinder Spurling    [facility]    CORONARY ANGIOPLASTY WITH STENT PLACEMENT  2003    2 stents    FEMORAL-FEMORAL BYPASS GRAFT  03/09/2017    Dr. Stewart Myers Right 1986    arm    KNEE ARTHROSCOPY 12/2008    meniscus tear    LUMBAR LAMINECTOMY  3/2016    [facility]--Dr. Barry Rosado    OTHER SURGICAL HISTORY  4/14/15    melanoma removal from right chest    OTHER SURGICAL HISTORY  5/20/2016    I and D, exicison of posterior neck cyst    DE OFFICE/OUTPT VISIT,PROCEDURE ONLY N/A 11/8/2018    TURBT performed by Cayetano Osler, MD at [facility] 5/1/2019    ROBOTIC RADICAL CYSTOPROSTATECTOMY performed by Cayetano Osler, MD at [address]  10/2016    Dr Sanket Light  1990's    TUNNELED VENOUS PORT PLACEMENT      TURP  04/16/2014    See note of Dr. Charles Spine for OR procedure details       Medications Prior to Admission:    Prior to Admission medications    Medication Sig Start Date End Date Taking? Authorizing Provider   polycarbophil (FIBERCON) 625 MG tablet Take 625 mg by mouth daily   Yes Historical Provider, MD   b complex vitamins capsule Take 1 capsule by mouth daily   Yes Historical Provider, MD   atorvastatin (LIPITOR) 40 MG tablet TAKE 1 TABLET BY MOUTH EVERY DAY AT BEDTIME 4/8/19  Yes Acacia Rodriguez MD   pregabalin (LYRICA) 50 MG capsule Take 1 capsule by mouth 3 times daily for 90 days. 4/3/19 7/2/19 Yes Autumn Root MD   levothyroxine (SYNTHROID) 137 MCG tablet TAKE 1 TABLET BY MOUTH DAILY 3/11/19  Yes Acacia Rodriguez MD   KLOR-CON M10 10 MEQ extended release tablet TAKE 1 TABLET BY MOUTH DAILY 1/14/19  Yes Acacia Rodriguez MD   metoprolol tartrate (LOPRESSOR) 25 MG tablet Take 1 tablet by mouth 2 times daily  Patient taking differently: Take 50 mg by mouth 2 times daily  8/13/18  Yes Acacia Rodriguez MD   Elastic Bandages & Supports (JOBST KNEE HIGH COMPRESSION SM) MISC 1 each by Does not apply route daily as needed 2/16/16  Yes DEMAR Alegre CNP   Handicap Placard MISC Cannot walk more than 200 feet without stopping to rest or without assistance. Lifetime x 5 years.  11/11/15  Yes DEMAR Alegre CNP   aspirin 81 MG tablet Take 81 mg by mouth daily. Yes Historical Provider, MD   nitroGLYCERIN (NITROSTAT) 0.4 MG SL tablet Place 1 tablet under the tongue every 5 minutes as needed for Chest pain 2/8/17   Tyra Hernandez MD       Allergies:  Patient has no known allergies. Social History:      The patient currently lives at home with wife. TOBACCO:   reports that he quit smoking about 40 years ago. His smoking use included cigarettes. He started smoking about 64 years ago. He has a 24.00 pack-year smoking history. He has never used smokeless tobacco.  ETOH:   reports that he does not drink alcohol. Family History:      Reviewed in detail and negative for DM, CAD, Cancer, CVA. Positive as follows:        Problem Relation Age of Onset    Diabetes Mother     Cancer Mother     Arthritis Mother     Asthma Father     Heart Disease Father     Cancer Father     Stroke Sister     Heart Disease Sister     Cancer Sister     Stroke Brother     Heart Disease Brother     Cancer Brother        Diet:  DIET CLEAR LIQUID;  Dietary Nutrition Supplements: Clear Liquid Oral Supplement    REVIEW OF SYSTEMS:   Pertinent positives as noted in the HPI. All other systems reviewed and negative. PHYSICAL EXAM:  BP (!) 142/66   Pulse 100   Temp 98.9 °F (37.2 °C) (Oral)   Resp 20   Ht 5' 7\" (1.702 m)   Wt 195 lb 9.6 oz (88.7 kg)   SpO2 98%   BMI 30.64 kg/m²      General appearance: No apparent distress, appears stated age and cooperative. HEENT: Normal cephalic, atraumatic without obvious deformity. Pupils equal, round, and reactive to light. Extra ocular muscles intact. Conjunctivae/corneas clear. Neck: Supple, with full range of motion. No jugular venous distention. Trachea midline. Respiratory:  Normal respiratory effort on RA. Clear to auscultation, bilaterally without Rales/Wheezes/Rhonchi. Cardiovascular: Regular rate and rhythm with normal S1/S2 without murmurs, rubs or gallops.   Abdomen: Soft, non-tender, the peritoneal cavity. This is most consistent with recent abdominal surgery. However, recommend continued follow-up. 2. Enteric tube tip and distal side-port project over the gastric body. 3.  Indeterminate bowel gas pattern with indeterminate mildly distended gas-filled small bowel overlying the mid abdomen. **This report has been created using voice recognition software. It may contain minor errors which are inherent in voice recognition technology. **      Final report electronically signed by Dr. Kings Villareal on 5/1/2019 3:54 PM            DVT prophylaxis: [] Lovenox                                 [x] SCDs                                 [] SQ Heparin                                 [] Encourage ambulation           [] Already on Anticoagulation      Code Status: Full Code    PT/OT Eval Status: Active and ongoing      ASSESSMENT & PLAN:    1. Fluid Overload 2/2 Unknown Etiology   - BNP was normal at ~350. Echo pending.    - CMP tomorrow to check Albumin level    - IV Lasix 40mg BID      2. CAD s/p PCI 2003   - Continue home medication: Metoprolol    - ASA is being held since surgery     3. Essential HTN    - Continue home medication: Metoprolol     4. HLD    - Continue home medication: Statin     5. Hypothyroidism    - Continue home medication: Synthroid     6. Peripheral Neuropathy in his legs    - Continue home medication: Lyrica     7. History of AAA and Fem-Fem Bypass      Thank you for the consultation.     Electronically signed by JIM Michaels on 5/3/2019 at 10:59 AM

## 2019-05-03 NOTE — CARE COORDINATION
5/3/19, 2:39 PM      [place] Draft day: 2  Location: [address] [place] [address]- Reason for admit: Bladder cancer [facility]) [C67.9]       Procedure: 05/01/19  By Dr. Emeka Caro CYSTOPROSTATECTOMY WITH BILATERAL PELVIC LYMPH NODE DISSECTION WITH CREATION OF ILEAL CONDUIT             Treatment Plan of Care: consult to IP hospitalist for fluid overload, K+ and Ca+ replacement, Rocephin IV continues, N/G intact, started clear liquids, advanced to full liquids, Pro-BNP, Echo, Lasix with daily weights and strict I/O, IS, ostomy RN assisting with new ileal conduit teaching, ileal conduit, afebrile. PCP: Ronni Dawn MD     Readmission Risk Score: 19%    Discharge Plan: plans home with spouse and new referral to [facility].

## 2019-05-03 NOTE — PROGRESS NOTES
Present to pt room for new urostomy education and pouching system change. Pt sitting in chair with wife to side. Urology CNP Natalie Silverio finishes assessment with pt. Asked CNP if okay to trim stent as noted to be very long in pouching system. Natalie Silverio states yes stent can be trimmed. Pt noted to have NG in place and plans to advance diet per CNP discussed. Towel placed under pt's current pouching system. Pt has drainage bag in place. Educated pt and wife how to open and close urostomy pouch as well as how to connect to drainage bag. Removed old pouching system. Tegaderm and 2x2 gauze over staples comes off with pouching system. Sites are dry and stable. Intact serous filled blisters noted to right side of abdomen probable from reaction to tegaderm. Areas intact and open to air. Pt stoma red, moist and protrudes slightly. Sutures intact to mucocutaneous junction. Cleansed stoma and raghavendra stomal skin with warm wash cloth and pat dry. Stoma measured between 7/8\" and 1\". Janna flat cut to fit 1 piece urostomy pouch applied. Will start without paste and see how well pouch stays intact, informed pt and wife that if needed or leaking, we will add paste. Educated pt and wife on how to change pouching system and raghavendra stomal skin care, how to order supplies, what to expect from home health, and different pouching sytem options. Hooked pt back up to drainage bag. Pt seems tearful after education. Encouraged pt that ostomy care will become more comfortable as he continues to learn and change his pouch. Pt did state that he has been reading urostomy educational information that he received at his stoma marking appt. Encouraged pt to continue to educate self and gave patient a few extra materials to read regarding urostomies in his free time. Pt call light in reach. Wife at bedside.  Will plan urostomy lesson on 5/6/19 at 11:00am.

## 2019-05-03 NOTE — PLAN OF CARE
Problem:   Goal: Adequate urinary output  Outcome: Ongoing  Note:   Patient has ileoconduit draining clear light red urine. Patient received lasix this am and returned large amounts urine.  Lasix given again this pm

## 2019-05-03 NOTE — PROGRESS NOTES
Urology Progress Note    Chief Complaint: Bladder Cancer    Subjective:     Patient is resting in chair, urostomy draining light blood tinged urine, +flatus, -BM, ambulating with assistance, tolerating clear liquid diet, did have some nausea early this morning. Pelvic drain with bloody drainage. There are complaints of pain from the NG tube at this time. Says his incisional pain is well controlled. Denies fever, chills,  chest pain, or shortness of breath. Negative Homans.             Vitals:  /63   Pulse 83   Temp 97.6 °F (36.4 °C) (Oral)   Resp 16   Ht 5' 7\" (1.702 m)   Wt 195 lb 9.6 oz (88.7 kg)   SpO2 97%   BMI 30.64 kg/m²   Temp  Av.4 °F (36.3 °C)  Min: 97.1 °F (36.2 °C)  Max: 98 °F (36.7 °C)    Intake/Output Summary (Last 24 hours) at 5/3/2019 0804  Last data filed at 5/3/2019 3374  Gross per 24 hour   Intake 4842.07 ml   Output 3100 ml   Net 1742.07 ml       Social History     Socioeconomic History    Marital status:      Spouse name: Not on file    Number of children: Not on file    Years of education: Not on file    Highest education level: Not on file   Occupational History    Not on file   Social Needs    Financial resource strain: Not on file    Food insecurity:     Worry: Not on file     Inability: Not on file    Transportation needs:     Medical: Not on file     Non-medical: Not on file   Tobacco Use    Smoking status: Former Smoker     Packs/day: 1.00     Years: 24.00     Pack years: 24.00     Types: Cigarettes     Start date:      Last attempt to quit: 1979     Years since quittin.1    Smokeless tobacco: Never Used   Substance and Sexual Activity    Alcohol use: No     Alcohol/week: 0.0 oz    Drug use: No    Sexual activity: Not Currently   Lifestyle    Physical activity:     Days per week: Not on file     Minutes per session: Not on file    Stress: Not on file   Relationships    Social connections:     Talks on phone: Not on file     Gets together: Not on file     Attends Jewish service: Not on file     Active member of club or organization: Not on file     Attends meetings of clubs or organizations: Not on file     Relationship status: Not on file    Intimate partner violence:     Fear of current or ex partner: Not on file     Emotionally abused: Not on file     Physically abused: Not on file     Forced sexual activity: Not on file   Other Topics Concern    Not on file   Social History Narrative    Not on file     Family History   Problem Relation Age of Onset    Diabetes Mother     Cancer Mother     Arthritis Mother     Asthma Father     Heart Disease Father     Cancer Father     Stroke Sister     Heart Disease Sister     Cancer Sister     Stroke Brother     Heart Disease Brother     Cancer Brother      No Known Allergies      Constitutional: Alert and oriented times x3, no acute distress, and cooperative to examination with appropriate mood and affect. HEENT:   Head:         Normocephalic and atraumatic. Mucous membranes are normal.   Eyes:         EOM are normal. No scleral icterus. Nose:    The external appearance of the nose is normal  Ears: The ears appear normal to external inspection. Cardiovascular:       Normal rate, regular rhythm. Pulmonary/Chest:  Normal respiratory rate and rhthym. No use of accessory muscles. Lungs clear bilaterally. SubQ air noted on right lower chest to palpation. Abdominal:          Soft. Generalized abdominal tenderness. Hypoactive bowel sounds. Abdominal incisions MARAH, healing, well approximated, staples intact, no redness or drainage noted. Urostomy bag intact, stoma beefy, red, and moist, stent in bag. One dressing, just below the ostomy bag, intact, with small amount of old drainage. Genitalia:    Pelvic drain vann catheter draining bright bloody drainage. Normal circumcised penis without erythema   Musculoskeletal:    Normal range of motion.    Extremities:    No cyanosis or clubbing present. Neurological:    Alert and oriented. Labs:  WBC:    Lab Results   Component Value Date    WBC 11.0 05/03/2019     Hemoglobin/Hematocrit:    Lab Results   Component Value Date    HGB 9.8 05/03/2019    HCT 31.2 05/03/2019     BMP:    Lab Results   Component Value Date     05/03/2019    K 3.6 05/03/2019     05/03/2019    CO2 20 05/03/2019    BUN 15 05/03/2019    LABALBU 3.7 04/03/2019    LABALBU 4.1 05/19/2012    CREATININE 0.9 05/03/2019    CALCIUM 7.9 05/03/2019    LABGLOM 81 05/03/2019       Impression:  Muscle invasive bladder cancer  S/p robotic cystoprostatectomy 5/1/19      Plan:  POD # 2 ROBOTIC ASSISTED LAPAROSCOPIC RADICAL CYSTOPROSTATECTOMY WITH BILATERAL PELVIC LYMPH NODE DISSECTION WITH CREATION OF ILEAL CONDUIT per Dr. Ashu Jamil.         WBC coming down, continue Rocephin   H&H stable  K replacement protocol  Ionized Ca+ this am, replacement protocol  Repeat labs in am  Increase ambulation  Encourage IS  Passing gas  NG clamped yesterday, tolerating clears  Advance to full liquids for lunch, if tolerates may possibly remove NG later this afternoon  Encourage gum chewing  Continue Dulcolax daily  Call Urology with an update later this afternoon     IFEANYI Contreras  05/03/19 8:04 AM  Urology

## 2019-05-03 NOTE — OP NOTE
Winter Gavin 60  RECORD OF OPERATION     PATIENT NAME: Smita Gonzalez               MEDICAL RECORD NO. [id number]                DATE OF PROCEDURE: 05/01/2019  SURGEON: Kevin Guerrero MD  PRIMARY CARE PHYSICIAN: Jovanny Altamirano MD        PREOPERATIVE DIAGNOSIS: muscle invasive bladder cancer     POSTOPERATIVE DIAGNOSIS: same     PROCEDURE PERFORMED: RAL radical cystoprostatectomy with extended lymph node dissection and ileal loop urinary diversion     SURGEON: Kevin Guerrero MD    ASSISTANT(S): Carolyne Mancia PA-C     ANESTHESIA: general     BLOOD LOSS:  100 cc     SPECIMENS: bladder and prostate and extended pelvic lymph nodes     COMPLICATIONS:  None immediately appreciated. DISCUSSION:  Poonam Covington is a 78y.o.-year-old male who has a diagnosis of muscle invasive bladder cancer. After a history and physical examination was performed, potential diagnostic and therapeutic modalities were discussed with the patient. Radical cystectomy was recommended and a discussion of the risks, possible complications, possible side effects, along with the anticipated benefits were reviewed. He was given the opportunity to ask questions. Once answered, informed consent was obtained. He was brought to the operating room on 05/01/2019 for this procedure. DESCRIPTION OF PROCEDURE: The patient was brought to the Operating Room and placed supine on the operating room table. After initiation of anesthesia, he was positioned on the beanbag and placed in a lithotomy position. He was well-padded and secured to the table with the beanbag and then his abdomen was clipped and cleaned. He was prepped and draped in the standard fashion for surgery. We began by obtaining pneumoperitoneum through a 1 cm supraumbilical incision. Once we had pneumoperitoneum up to 20 cm of water, a 10-12 port was placed through this incision and the abdomen examined.  Three robotic ports and two assistant ports were placed under direct visualization via the robotic camera. There was a robotic port on the left and 2 assistant ports on the left side. There were two robotic ports on the right side. With all ports in place, the camera was removed and the patient was placed in Trendelenburg. The robot was then wheeled in and docked and the procedure began after instruments were passed. Instrumentation included a Prograsp in the fourth arm, a scissor in the right and a [place] in the left. We began by dissecting out the left ureter. The left ureter was taken down to the level of the bladder. The ureter was quite dilated. The ureter was clipped at the bladder and again, slightly more proximally, and then the ureter was then cut and a full segment of ureter sent for frozen section. The ureter was dissected up and placed in the left abdomen to be transferred under the sigmoid mesentery at the time of the lymph node dissection. The distal ureter was clean. We then began on the posterior dissection. A space was made under the bladder. We dissected under the seminal vesicles and laterally cut the vas deferens bilaterally. Once we had gone as distally under the bladder as we could, we went lateral to the median umbilical ligaments on each side, dissecting down the pelvic side wall. At this point the bladder pedicles were left and these were stapled with vascular staples, two loads on each side. At this point the bladder was freed to the bladder neck and to the lateral aspects of the prostate. The urachus and the median umbilical ligaments were then cut as close to the umbilicus as we could get and the bladder was dropped from the abdominal wall by going lateral to the median umbilical ligaments. The prostate was isolated and the endopelvic fascia was opened, connecting back to our lateral bladder dissections. The dorsal vein was then sutured ligated after clearing both sides of the endopelvic fascia.  A 0 Vicryl on a CT 1 needle was used to place a figure-of-eight around the dorsal vein and this was then tied down with first a surgeon's knot and then 2 additional throws. The needle was cut and removed. The dorsal vein was cut with cautery current freeing up the underlying urethra and allowing the prostate to fall even further from the distal pelvis. This provided some length to the urethra and the urethra was carefully cut making sure that we were a few millimeters from the prostate. The cathter was located and clipped to prevent the balloon from deflating and then the catheter was cut, leaving the catheter balloon with a catheter stump to help with manipulation fo the apex of the prostate. The underlying rectourethralis muscle was then cut and the remaining prostate lateral connections were completely freed. The bladder and prostate was placed in the left abdomen awaiting removal through the later incision. We then turned out attention to a pelvic lymph node dissection, bilateral pelvic lymph node dissection was performed. Lymph nodes were taken from approximately the circumflex iliac vein and then down into the obturator fossa all the way up to almost the level of the bifurcation of the aorta. Pre-sacral nodes were also taken. An Endo-Catch bag was used for the lymph nodes. The EndoCatch bag was then transferred to our screw port, medial port site to facilitate removal.  We found the terminal ileum and placed two stay sutures to radha the location of our ileal loop segment. The ureter had a tie placed on it after first transferring the left ureter underneath the sigmoid colon mesentery. We clipped all the ties together to help facilitate finding everything for the open portion of the procedure. The robotic instruments were then all removed and the robot was undocked and wheeled away. The patient was taken out of Trendelenburg position.    An 5 cm incision was made below the umbilicus and the ureters and segment of there were no complications

## 2019-05-04 LAB
ALBUMIN SERPL-MCNC: 2.7 G/DL (ref 3.5–5.1)
ALP BLD-CCNC: 68 U/L (ref 38–126)
ALT SERPL-CCNC: 10 U/L (ref 11–66)
ANION GAP SERPL CALCULATED.3IONS-SCNC: 8 MEQ/L (ref 8–16)
AST SERPL-CCNC: 12 U/L (ref 5–40)
BILIRUB SERPL-MCNC: 1 MG/DL (ref 0.3–1.2)
BUN BLDV-MCNC: 12 MG/DL (ref 7–22)
CALCIUM SERPL-MCNC: 7.9 MG/DL (ref 8.5–10.5)
CHLORIDE BLD-SCNC: 107 MEQ/L (ref 98–111)
CO2: 25 MEQ/L (ref 23–33)
CREAT SERPL-MCNC: 0.9 MG/DL (ref 0.4–1.2)
ERYTHROCYTE [DISTWIDTH] IN BLOOD BY AUTOMATED COUNT: 14.6 % (ref 11.5–14.5)
ERYTHROCYTE [DISTWIDTH] IN BLOOD BY AUTOMATED COUNT: 52.4 FL (ref 35–45)
GFR SERPL CREATININE-BSD FRML MDRD: 81 ML/MIN/1.73M2
GLUCOSE BLD-MCNC: 97 MG/DL (ref 70–108)
HCT VFR BLD CALC: 29.2 % (ref 42–52)
HEMOGLOBIN: 9.6 GM/DL (ref 14–18)
MAGNESIUM: 1.8 MG/DL (ref 1.6–2.4)
MCH RBC QN AUTO: 32 PG (ref 26–33)
MCHC RBC AUTO-ENTMCNC: 32.9 GM/DL (ref 32.2–35.5)
MCV RBC AUTO: 97.3 FL (ref 80–94)
PLATELET # BLD: 142 THOU/MM3 (ref 130–400)
PMV BLD AUTO: 9.8 FL (ref 9.4–12.4)
POTASSIUM REFLEX MAGNESIUM: 3.3 MEQ/L (ref 3.5–5.2)
RBC # BLD: 3 MILL/MM3 (ref 4.7–6.1)
SODIUM BLD-SCNC: 140 MEQ/L (ref 135–145)
TOTAL PROTEIN: 4.9 G/DL (ref 6.1–8)
WBC # BLD: 10.9 THOU/MM3 (ref 4.8–10.8)

## 2019-05-04 PROCEDURE — 85027 COMPLETE CBC AUTOMATED: CPT

## 2019-05-04 PROCEDURE — 6370000000 HC RX 637 (ALT 250 FOR IP): Performed by: NURSE PRACTITIONER

## 2019-05-04 PROCEDURE — 99024 POSTOP FOLLOW-UP VISIT: CPT | Performed by: NURSE PRACTITIONER

## 2019-05-04 PROCEDURE — 36415 COLL VENOUS BLD VENIPUNCTURE: CPT

## 2019-05-04 PROCEDURE — 6360000002 HC RX W HCPCS: Performed by: PHYSICIAN ASSISTANT

## 2019-05-04 PROCEDURE — 83735 ASSAY OF MAGNESIUM: CPT

## 2019-05-04 PROCEDURE — 2580000003 HC RX 258: Performed by: PHYSICIAN ASSISTANT

## 2019-05-04 PROCEDURE — 6370000000 HC RX 637 (ALT 250 FOR IP): Performed by: PHYSICIAN ASSISTANT

## 2019-05-04 PROCEDURE — 80053 COMPREHEN METABOLIC PANEL: CPT

## 2019-05-04 PROCEDURE — 6370000000 HC RX 637 (ALT 250 FOR IP): Performed by: INTERNAL MEDICINE

## 2019-05-04 PROCEDURE — 2709999900 HC NON-CHARGEABLE SUPPLY

## 2019-05-04 PROCEDURE — 99232 SBSQ HOSP IP/OBS MODERATE 35: CPT | Performed by: INTERNAL MEDICINE

## 2019-05-04 PROCEDURE — 1200000000 HC SEMI PRIVATE

## 2019-05-04 RX ORDER — FUROSEMIDE 10 MG/ML
40 INJECTION INTRAMUSCULAR; INTRAVENOUS DAILY
Status: DISCONTINUED | OUTPATIENT
Start: 2019-05-05 | End: 2019-05-05

## 2019-05-04 RX ORDER — POTASSIUM CHLORIDE 20 MEQ/1
40 TABLET, EXTENDED RELEASE ORAL ONCE
Status: COMPLETED | OUTPATIENT
Start: 2019-05-04 | End: 2019-05-04

## 2019-05-04 RX ADMIN — PREGABALIN 50 MG: 50 CAPSULE ORAL at 15:15

## 2019-05-04 RX ADMIN — PREGABALIN 50 MG: 50 CAPSULE ORAL at 06:09

## 2019-05-04 RX ADMIN — FUROSEMIDE 40 MG: 10 INJECTION, SOLUTION INTRAMUSCULAR; INTRAVENOUS at 08:58

## 2019-05-04 RX ADMIN — NALOXEGOL OXALATE 12.5 MG: 12.5 TABLET, FILM COATED ORAL at 08:59

## 2019-05-04 RX ADMIN — PREGABALIN 50 MG: 50 CAPSULE ORAL at 00:45

## 2019-05-04 RX ADMIN — HYDROCODONE BITARTRATE AND ACETAMINOPHEN 1 TABLET: 5; 325 TABLET ORAL at 15:17

## 2019-05-04 RX ADMIN — FAMOTIDINE 20 MG: 20 TABLET ORAL at 20:11

## 2019-05-04 RX ADMIN — ATORVASTATIN CALCIUM 40 MG: 40 TABLET, FILM COATED ORAL at 00:45

## 2019-05-04 RX ADMIN — ATORVASTATIN CALCIUM 40 MG: 40 TABLET, FILM COATED ORAL at 20:11

## 2019-05-04 RX ADMIN — ONDANSETRON 4 MG: 2 INJECTION INTRAMUSCULAR; INTRAVENOUS at 07:07

## 2019-05-04 RX ADMIN — Medication 10 ML: at 07:08

## 2019-05-04 RX ADMIN — BACITRACIN ZINC NEOMYCIN SULFATE POLYMYXIN B SULFATE: 400; 3.5; 5 OINTMENT TOPICAL at 09:01

## 2019-05-04 RX ADMIN — LEVOTHYROXINE SODIUM 137 MCG: 137 TABLET ORAL at 06:09

## 2019-05-04 RX ADMIN — POTASSIUM CHLORIDE 40 MEQ: 20 TABLET, EXTENDED RELEASE ORAL at 08:58

## 2019-05-04 RX ADMIN — BACITRACIN ZINC NEOMYCIN SULFATE POLYMYXIN B SULFATE: 400; 3.5; 5 OINTMENT TOPICAL at 20:13

## 2019-05-04 RX ADMIN — FAMOTIDINE 20 MG: 20 TABLET ORAL at 08:58

## 2019-05-04 RX ADMIN — METOPROLOL TARTRATE 50 MG: 50 TABLET, FILM COATED ORAL at 09:01

## 2019-05-04 RX ADMIN — Medication 10 ML: at 08:59

## 2019-05-04 RX ADMIN — METOPROLOL TARTRATE 50 MG: 50 TABLET, FILM COATED ORAL at 20:12

## 2019-05-04 RX ADMIN — PREGABALIN 50 MG: 50 CAPSULE ORAL at 20:13

## 2019-05-04 ASSESSMENT — PAIN DESCRIPTION - PAIN TYPE: TYPE: ACUTE PAIN

## 2019-05-04 ASSESSMENT — PAIN SCALES - GENERAL
PAINLEVEL_OUTOF10: 4
PAINLEVEL_OUTOF10: 0

## 2019-05-04 ASSESSMENT — PAIN DESCRIPTION - LOCATION: LOCATION: BUTTOCKS

## 2019-05-04 NOTE — PLAN OF CARE
and wife. Patient and wife verbalize understanding of the plan of care and contribute to goal setting.

## 2019-05-04 NOTE — PROGRESS NOTES
Urology Progress Note    Chief Complaint: Bladder Cancer    Subjective: \"Im a little queasy. \"    Patient is resting in bed, urostomy draining blood tinged urine, pelvic drain with minimal output, +flatus, +small mucous BM, ambulating with assistance, tolerating full liquid diet. Having some nausea this morning, but it is improving with Zofran. There are complaints of no pain at this time. Patient denies chest pain, shortness of breath or leg pain. Patient still with bilateral lower extremity edema.           Vitals:  /63   Pulse 76   Temp 98 °F (36.7 °C)   Resp 16   Ht 5' 7\" (1.702 m)   Wt 195 lb 9.6 oz (88.7 kg)   SpO2 94%   BMI 30.64 kg/m²   Temp  Av.6 °F (37 °C)  Min: 98 °F (36.7 °C)  Max: 98.9 °F (37.2 °C)    Intake/Output Summary (Last 24 hours) at 2019 0806  Last data filed at 2019 0600  Gross per 24 hour   Intake 990 ml   Output 4160 ml   Net -3170 ml       Social History     Socioeconomic History    Marital status:      Spouse name: Not on file    Number of children: Not on file    Years of education: Not on file    Highest education level: Not on file   Occupational History    Not on file   Social Needs    Financial resource strain: Not on file    Food insecurity:     Worry: Not on file     Inability: Not on file    Transportation needs:     Medical: Not on file     Non-medical: Not on file   Tobacco Use    Smoking status: Former Smoker     Packs/day: 1.00     Years: 24.00     Pack years: 24.00     Types: Cigarettes     Start date:      Last attempt to quit: 1979     Years since quittin.1    Smokeless tobacco: Never Used   Substance and Sexual Activity    Alcohol use: No     Alcohol/week: 0.0 oz    Drug use: No    Sexual activity: Not Currently   Lifestyle    Physical activity:     Days per week: Not on file     Minutes per session: Not on file    Stress: Not on file   Relationships    Social connections:     Talks on phone: Not on file Gets together: Not on file     Attends Sabianist service: Not on file     Active member of club or organization: Not on file     Attends meetings of clubs or organizations: Not on file     Relationship status: Not on file    Intimate partner violence:     Fear of current or ex partner: Not on file     Emotionally abused: Not on file     Physically abused: Not on file     Forced sexual activity: Not on file   Other Topics Concern    Not on file   Social History Narrative    Not on file     Family History   Problem Relation Age of Onset    Diabetes Mother     Cancer Mother     Arthritis Mother     Asthma Father     Heart Disease Father     Cancer Father     Stroke Sister     Heart Disease Sister     Cancer Sister     Stroke Brother     Heart Disease Brother     Cancer Brother      No Known Allergies      Constitutional: Alert and oriented times x3, no acute distress, and cooperative to examination with appropriate mood and affect. HEENT:   Head:         Normocephalic and atraumatic. Mucous membranes are normal.   Eyes:         EOM are normal. No scleral icterus. Nose:    The external appearance of the nose is normal  Ears: The ears appear normal to external inspection. Cardiovascular:       Normal rate, regular rhythm. Pulmonary/Chest:  Normal respiratory rate and rhthym. No use of accessory muscles. Lungs clear bilaterally. Abdominal:          Soft. No tenderness. Hypoactive bowel sounds. Abdominal incisions MARAH, healing, well approximated, staples intact, no redness or drainage noted. Right lateral incision with tape reaction. Urostomy bag intact, stoma beefy, red, and moist, stent in bag. Musculoskeletal:    Normal range of motion. Bilateral lower extremity edema +2   Extremities:  Bilat. Lower extremity edema  Neurological:    Alert and oriented.      Labs:  WBC:    Lab Results   Component Value Date    WBC 10.9 05/04/2019     Hemoglobin/Hematocrit:    Lab Results Component Value Date    HGB 9.6 05/04/2019    HCT 29.2 05/04/2019     BMP:    Lab Results   Component Value Date     05/04/2019    K 3.3 05/04/2019     05/04/2019    CO2 25 05/04/2019    BUN 12 05/04/2019    LABALBU 2.7 05/04/2019    LABALBU 4.1 05/19/2012    CREATININE 0.9 05/04/2019    CALCIUM 7.9 05/04/2019    LABGLOM 81 05/04/2019       Impression:    Muscle invasive bladder cancer  Acute post op anemia  Hypokalemia  S/p robotic cystoprostatectomy 5/1/19      Plan:    POD # 3 ROBOTIC ASSISTED LAPAROSCOPIC RADICAL CYSTOPROSTATECTOMY WITH BILATERAL PELVIC LYMPH NODE DISSECTION WITH CREATION OF ILEAL CONDUIT per Dr. Carine Casiano on 5-1-19     Pelvic drain with 325, if continues to decrease, can remove tomorrow. Replace potassium today    Hold Lovenox, hgb 9.6    Nausea this morning, zofran is helping. Continue with full liquid diet, if nausea continues, will obtain KUB. Patient is passing flatus, small mucous BMs    Encourage ambulation, and gum chewing. Repeat labs tomorrow. Appreciate Hospitalist input.   DEMAR Lopez  05/04/19 8:06 AM  Urology

## 2019-05-04 NOTE — PROGRESS NOTES
Hospitalist Progress Note    Patient:  Mario López  YOB: 1940  MRN: [id number]   PCP: Lexi Apple MD         Acct: [de-identified]  Unit/Bed: [address] [place] [address]-    Date of Admission: 5/1/2019      ASSESSMENT     1. S/p robotic assisted laparoscopic radical cystoprostatectomy with bilateral pelvic node dissection with creation of ileal conduit on 5/1, management per Urology  2. Hypervolemia, Acute respiratory failure with hypoxia 2/2 post-op acute pulmonary edema  1. CXR showed increased pulmonary infiltrates  2. Required up to 10 liters of oxygen post-op  3. Echocardiogram showed EF of 60% with somewhat calcified aortic valve  4. Started on Lasix 40 mg IV bid  5. Transitioned to Lasix 40 mg IV daily on 5/4  6. Diuresing well  7. Now on RA  3. Hypokalemia  4. Uncontrolled HTN  1. Improved  5. Chronic co-morbidities  1. CAD   2. HLD  3. Essential HTN  4. Acquired hypothyroidism  5. PVD- hx of AA and fem/fem bypass  6. Peripheral neuropathy  7. BPH  8. Bladder cancer    PLAN     1. Decrease Lasix to 40 mg IV daily as patient has been diuresing a lot and   2. Continue to monitor daily weights  3. Possible that patient may have post-op ileus- so will continue to monitor closely  4. Replace electrolytes as necessary      Anticipated Discharge in : Per URology    Code Status: Full Code    Electronically signed by Maki Santiago MD on 5/4/2019 at 2:34 PM      Chief Complaint     Edema, post-op respiratory failure    SUBJECTIVE     The patient is a 78 y.o. [facility] male who was admitted for robotic assisted prostatectomy. We were consulted 2/2 evidence for hypervolemia post-op. - Patient slightly nauseous this a.m. States that he has mild abdominal pain when he coughs intermittently only. Denies CP or SOB  - Patient mentions that he used to take Lasix.  Furosemide 40 mg daily appears to have been on his medication list in May 2018    OBJECTIVE     Medications:  Reviewed    Infusion Medications   Scheduled Medications    potassium replacement protocol   Other RX Placeholder    [START ON 5/5/2019] furosemide  40 mg Intravenous Daily    calcium replacement protocol   Other RX Placeholder    metoprolol tartrate  50 mg Oral BID    atorvastatin  40 mg Oral Nightly    potassium chloride  10 mEq Oral Daily    levothyroxine  137 mcg Oral Daily    pregabalin  50 mg Oral TID    sodium chloride flush  10 mL Intravenous 2 times per day    famotidine  20 mg Oral BID    neomycin-bacitracin-polymyxin   Topical BID    bisacodyl  10 mg Rectal Daily    naloxegol  12.5 mg Oral QAM     PRN Meds: nitroGLYCERIN, sodium chloride flush, ondansetron, HYDROcodone 5 mg - acetaminophen **OR** HYDROcodone 5 mg - acetaminophen, morphine **OR** morphine, opium-belladonna    Ins and outs:      Intake/Output Summary (Last 24 hours) at 5/4/2019 1434  Last data filed at 5/4/2019 1131  Gross per 24 hour   Intake 990 ml   Output 5160 ml   Net -4170 ml       Physical Examination     BP (!) 106/59   Pulse 76   Temp 96.3 °F (35.7 °C) (Oral)   Resp 16   Ht 5' 7\" (1.702 m)   Wt 195 lb (88.5 kg)   SpO2 96%   BMI 30.54 kg/m²     General appearance: No apparent distress. Sitting in chair  HEENT: Extraocular motion intact. Neck: Supple  Respiratory:  CTA bilaterally without rales/wheezes/rhonchi. Cardiovascular: RRR with normal S1/S2. 2/6 LEAH. No rubs or gallops. Abdomen: Soft, mild distention. Abdominal binder in place  Musculoskeletal: Patient is moving extremities x 4 spontaneously  Neurologic: Grossly non focal. CN: II-XII intact  Psychiatric: Alert and oriented  Vascular: Dorsalis pedis palpable bilaterally. Radial pulses palpable bilaterally. Bilateral LE edema  Skin:  No visible rashes or lesions. : Velasquez catheter in place with bloody urine.      Labs     Recent Labs     05/02/19  0456 05/03/19  0530 05/04/19  0405   WBC 16.8* 11.0* 10.9*   HGB 10.1* 9.8* 9.6*   HCT 32.0* 31.2* 29.2*    144 142     Recent Labs (single Ap View)    Result Date: 5/1/2019  PROCEDURE: XR ABDOMEN (KUB) (SINGLE AP VIEW) CLINICAL INFORMATION: NG tube placement COMPARISON: CT dated 4/23/2019 TECHNIQUE:  AP supine single view  FINDINGS: There is an enteric tube present with the tip overlying the gastric body. The distal side port also projects over the gastric body the on the GE junction. There is an indeterminate gas-filled small bowel loop overlying the mid abdomen. There is free air within the abdomen which is compatible with recent abdominal surgery. There is chronic elevation of the left hemidiaphragm. 1. Free air is demonstrated within the peritoneal cavity. This is most consistent with recent abdominal surgery. However, recommend continued follow-up. 2. Enteric tube tip and distal side-port project over the gastric body. 3.  Indeterminate bowel gas pattern with indeterminate mildly distended gas-filled small bowel overlying the mid abdomen. **This report has been created using voice recognition software. It may contain minor errors which are inherent in voice recognition technology. ** Final report electronically signed by Dr. Walker Hays on 5/1/2019 3:54 PM    Xr Chest Portable    Result Date: 5/2/2019  PROCEDURE: XR CHEST PORTABLE CLINICAL INFORMATION: right sided chest discomfort and subcutaneous air. COMPARISON: Chest x-ray dated 10/1/2018 and the KUB dated 5/1/2019 TECHNIQUE: AP Portable chest xray FINDINGS: Lungs/pleura: The interstitial infiltrates throughout both lungs noted on yesterday's KUB have resolved. There is mild bibasilar atelectasis. There is no pneumonia. There is a tiny left pleural effusion blunting the left lateral costophrenic angle. There is no pneumothorax. Heart: Heart size is mildly accentuated likely due to the portable technique. Mediastinum/toro: No obvious mass or adenopathy. Skeleton: There is multilevel vertebral endplate spondylosis.  Lines/tubes/devices: Right jugular Port-A-Cath tip lies at the level of the central SVC. NG tube tip lies in the region of the proximal stomach in satisfactory position. Upper abdomen: There is moderate gaseous distention of the stomach. There is minimal subcutaneous emphysema along the lateral upper right abdominal wall, better seen on yesterday's KUB. Resolved interstitial infiltrates compared to yesterday's KUB. Mild bibasilar atelectasis. Tiny left pleural effusion. Satisfactory positions of the NG tube and right jugular Port-A-Cath. Moderate gaseous distention of the stomach. **This report has been created using voice recognition software. It may contain minor errors which are inherent in voice recognition technology. ** Final report electronically signed by Dr. Harris Young on 5/2/2019 3:11 AM      DVT prophylaxis: ? Per Urology                                 ? SQ Heparin                                 ? Encourage ambulation           ?  Already on Anticoagulation

## 2019-05-05 LAB
ANION GAP SERPL CALCULATED.3IONS-SCNC: 9 MEQ/L (ref 8–16)
BUN BLDV-MCNC: 13 MG/DL (ref 7–22)
CALCIUM SERPL-MCNC: 8 MG/DL (ref 8.5–10.5)
CHLORIDE BLD-SCNC: 104 MEQ/L (ref 98–111)
CO2: 26 MEQ/L (ref 23–33)
CREAT SERPL-MCNC: 0.9 MG/DL (ref 0.4–1.2)
ERYTHROCYTE [DISTWIDTH] IN BLOOD BY AUTOMATED COUNT: 14.4 % (ref 11.5–14.5)
ERYTHROCYTE [DISTWIDTH] IN BLOOD BY AUTOMATED COUNT: 51.8 FL (ref 35–45)
GFR SERPL CREATININE-BSD FRML MDRD: 81 ML/MIN/1.73M2
GLUCOSE BLD-MCNC: 101 MG/DL (ref 70–108)
HCT VFR BLD CALC: 27.8 % (ref 42–52)
HCT VFR BLD CALC: 29.5 % (ref 42–52)
HEMOGLOBIN: 8.9 GM/DL (ref 14–18)
HEMOGLOBIN: 9.8 GM/DL (ref 14–18)
MCH RBC QN AUTO: 31.3 PG (ref 26–33)
MCHC RBC AUTO-ENTMCNC: 32 GM/DL (ref 32.2–35.5)
MCV RBC AUTO: 97.9 FL (ref 80–94)
PLATELET # BLD: 144 THOU/MM3 (ref 130–400)
PMV BLD AUTO: 10 FL (ref 9.4–12.4)
POTASSIUM SERPL-SCNC: 3.6 MEQ/L (ref 3.5–5.2)
RBC # BLD: 2.84 MILL/MM3 (ref 4.7–6.1)
SODIUM BLD-SCNC: 139 MEQ/L (ref 135–145)
WBC # BLD: 10 THOU/MM3 (ref 4.8–10.8)

## 2019-05-05 PROCEDURE — 6370000000 HC RX 637 (ALT 250 FOR IP): Performed by: INTERNAL MEDICINE

## 2019-05-05 PROCEDURE — 2580000003 HC RX 258: Performed by: PHYSICIAN ASSISTANT

## 2019-05-05 PROCEDURE — 85014 HEMATOCRIT: CPT

## 2019-05-05 PROCEDURE — 85018 HEMOGLOBIN: CPT

## 2019-05-05 PROCEDURE — 80048 BASIC METABOLIC PNL TOTAL CA: CPT

## 2019-05-05 PROCEDURE — 1200000000 HC SEMI PRIVATE

## 2019-05-05 PROCEDURE — 99024 POSTOP FOLLOW-UP VISIT: CPT | Performed by: NURSE PRACTITIONER

## 2019-05-05 PROCEDURE — 85027 COMPLETE CBC AUTOMATED: CPT

## 2019-05-05 PROCEDURE — 6360000002 HC RX W HCPCS: Performed by: INTERNAL MEDICINE

## 2019-05-05 PROCEDURE — 6370000000 HC RX 637 (ALT 250 FOR IP): Performed by: PHYSICIAN ASSISTANT

## 2019-05-05 PROCEDURE — 99232 SBSQ HOSP IP/OBS MODERATE 35: CPT | Performed by: INTERNAL MEDICINE

## 2019-05-05 PROCEDURE — 36415 COLL VENOUS BLD VENIPUNCTURE: CPT

## 2019-05-05 PROCEDURE — APPNB30 APP NON BILLABLE TIME 0-30 MINS: Performed by: NURSE PRACTITIONER

## 2019-05-05 PROCEDURE — 6370000000 HC RX 637 (ALT 250 FOR IP): Performed by: NURSE PRACTITIONER

## 2019-05-05 RX ORDER — POLYETHYLENE GLYCOL 3350 17 G/17G
17 POWDER, FOR SOLUTION ORAL DAILY
Status: DISCONTINUED | OUTPATIENT
Start: 2019-05-05 | End: 2019-05-07 | Stop reason: HOSPADM

## 2019-05-05 RX ORDER — DOCUSATE SODIUM 100 MG/1
100 CAPSULE, LIQUID FILLED ORAL 2 TIMES DAILY
Status: DISCONTINUED | OUTPATIENT
Start: 2019-05-05 | End: 2019-05-07 | Stop reason: HOSPADM

## 2019-05-05 RX ORDER — FUROSEMIDE 40 MG/1
40 TABLET ORAL DAILY
Status: DISCONTINUED | OUTPATIENT
Start: 2019-05-06 | End: 2019-05-07 | Stop reason: HOSPADM

## 2019-05-05 RX ORDER — POTASSIUM CHLORIDE 20 MEQ/1
20 TABLET, EXTENDED RELEASE ORAL
Status: DISCONTINUED | OUTPATIENT
Start: 2019-05-06 | End: 2019-05-07 | Stop reason: HOSPADM

## 2019-05-05 RX ORDER — POTASSIUM CHLORIDE 20 MEQ/1
40 TABLET, EXTENDED RELEASE ORAL ONCE
Status: COMPLETED | OUTPATIENT
Start: 2019-05-05 | End: 2019-05-05

## 2019-05-05 RX ADMIN — Medication 10 ML: at 21:46

## 2019-05-05 RX ADMIN — Medication 10 ML: at 08:06

## 2019-05-05 RX ADMIN — FAMOTIDINE 20 MG: 20 TABLET ORAL at 08:04

## 2019-05-05 RX ADMIN — POTASSIUM CHLORIDE 10 MEQ: 750 TABLET, FILM COATED, EXTENDED RELEASE ORAL at 07:58

## 2019-05-05 RX ADMIN — FUROSEMIDE 40 MG: 10 INJECTION, SOLUTION INTRAMUSCULAR; INTRAVENOUS at 08:04

## 2019-05-05 RX ADMIN — PREGABALIN 50 MG: 50 CAPSULE ORAL at 21:45

## 2019-05-05 RX ADMIN — PREGABALIN 50 MG: 50 CAPSULE ORAL at 07:57

## 2019-05-05 RX ADMIN — PREGABALIN 50 MG: 50 CAPSULE ORAL at 14:14

## 2019-05-05 RX ADMIN — HYDROCODONE BITARTRATE AND ACETAMINOPHEN 1 TABLET: 5; 325 TABLET ORAL at 03:30

## 2019-05-05 RX ADMIN — HYDROCODONE BITARTRATE AND ACETAMINOPHEN 1 TABLET: 5; 325 TABLET ORAL at 17:41

## 2019-05-05 RX ADMIN — DOCUSATE SODIUM 100 MG: 100 CAPSULE, LIQUID FILLED ORAL at 21:44

## 2019-05-05 RX ADMIN — NALOXEGOL OXALATE 12.5 MG: 12.5 TABLET, FILM COATED ORAL at 07:56

## 2019-05-05 RX ADMIN — POTASSIUM CHLORIDE 40 MEQ: 20 TABLET, EXTENDED RELEASE ORAL at 14:17

## 2019-05-05 RX ADMIN — ATORVASTATIN CALCIUM 40 MG: 40 TABLET, FILM COATED ORAL at 21:44

## 2019-05-05 RX ADMIN — POLYETHYLENE GLYCOL 3350 17 G: 17 POWDER, FOR SOLUTION ORAL at 08:04

## 2019-05-05 RX ADMIN — BACITRACIN ZINC NEOMYCIN SULFATE POLYMYXIN B SULFATE: 400; 3.5; 5 OINTMENT TOPICAL at 07:56

## 2019-05-05 RX ADMIN — LEVOTHYROXINE SODIUM 137 MCG: 137 TABLET ORAL at 08:01

## 2019-05-05 RX ADMIN — METOPROLOL TARTRATE 50 MG: 50 TABLET, FILM COATED ORAL at 21:47

## 2019-05-05 RX ADMIN — FAMOTIDINE 20 MG: 20 TABLET ORAL at 21:44

## 2019-05-05 RX ADMIN — BACITRACIN ZINC NEOMYCIN SULFATE POLYMYXIN B SULFATE: 400; 3.5; 5 OINTMENT TOPICAL at 21:44

## 2019-05-05 RX ADMIN — DOCUSATE SODIUM 100 MG: 100 CAPSULE, LIQUID FILLED ORAL at 08:04

## 2019-05-05 ASSESSMENT — PAIN SCALES - GENERAL
PAINLEVEL_OUTOF10: 0
PAINLEVEL_OUTOF10: 4
PAINLEVEL_OUTOF10: 0
PAINLEVEL_OUTOF10: 4
PAINLEVEL_OUTOF10: 0
PAINLEVEL_OUTOF10: 1

## 2019-05-05 NOTE — PROGRESS NOTES
Urology Progress Note    Chief Complaint: bladder cancer    Subjective: \"Im feeling good. \"    Patient is resting in bed, pelvic drain with minimal bloody drainage, urostomy draining blood tinged urine with one stent in place, +flatus, +BM, ambulating with assistance, tolerating full liquid diet, denies any nausea or vomiting. There are complaints of mild pain at this time. Patient ambulated in the halls 3 times yesterday. He denies fever, chills, chest pain or shortness of breath.              Vitals:  BP (!) 107/58   Pulse 74   Temp 97.5 °F (36.4 °C) (Oral)   Resp 16   Ht 5' 7\" (1.702 m)   Wt 195 lb (88.5 kg)   SpO2 97%   BMI 30.54 kg/m²   Temp  Av.3 °F (36.3 °C)  Min: 96.3 °F (35.7 °C)  Max: 97.7 °F (36.5 °C)    Intake/Output Summary (Last 24 hours) at 2019 0648  Last data filed at 2019 0530  Gross per 24 hour   Intake 700 ml   Output 2175 ml   Net -1475 ml       Social History     Socioeconomic History    Marital status:      Spouse name: Not on file    Number of children: Not on file    Years of education: Not on file    Highest education level: Not on file   Occupational History    Not on file   Social Needs    Financial resource strain: Not on file    Food insecurity:     Worry: Not on file     Inability: Not on file    Transportation needs:     Medical: Not on file     Non-medical: Not on file   Tobacco Use    Smoking status: Former Smoker     Packs/day: 1.00     Years: 24.00     Pack years: 24.00     Types: Cigarettes     Start date:      Last attempt to quit: 1979     Years since quittin.1    Smokeless tobacco: Never Used   Substance and Sexual Activity    Alcohol use: No     Alcohol/week: 0.0 oz    Drug use: No    Sexual activity: Not Currently   Lifestyle    Physical activity:     Days per week: Not on file     Minutes per session: Not on file    Stress: Not on file   Relationships    Social connections:     Talks on phone: Not on file     Gets together: Not on file     Attends Gnosticist service: Not on file     Active member of club or organization: Not on file     Attends meetings of clubs or organizations: Not on file     Relationship status: Not on file    Intimate partner violence:     Fear of current or ex partner: Not on file     Emotionally abused: Not on file     Physically abused: Not on file     Forced sexual activity: Not on file   Other Topics Concern    Not on file   Social History Narrative    Not on file     Family History   Problem Relation Age of Onset    Diabetes Mother     Cancer Mother     Arthritis Mother     Asthma Father     Heart Disease Father     Cancer Father     Stroke Sister     Heart Disease Sister     Cancer Sister     Stroke Brother     Heart Disease Brother     Cancer Brother      No Known Allergies      Constitutional: Alert and oriented times x3, no acute distress, and cooperative to examination with appropriate mood and affect. HEENT:   Head:         Normocephalic and atraumatic. Mucous membranes are normal.   Eyes:         EOM are normal. No scleral icterus. Nose:    The external appearance of the nose is normal  Ears: The ears appear normal to external inspection. Cardiovascular:       Normal rate, regular rhythm. Pulmonary/Chest:  Normal respiratory rate and rhthym. No use of accessory muscles. Lungs clear bilaterally. Abdominal:          Soft. No tenderness. Active bowel sounds.  Abdominal incisions MARAH, healing, well approximated, staples intact, no redness or drainage noted.    Right lateral incision with tape reaction. Urostomy bag intact, stoma beefy, red, and moist, stent in bag.    Musculoskeletal:    Normal range of motion. +1 edema in bilateral hands and legs  Extremities:    +1 bilateral lower extremity edema present. Neurological:    Alert and oriented.      Labs:  WBC:    Lab Results   Component Value Date    WBC 10.0 05/05/2019     Hemoglobin/Hematocrit:    Lab Results Component Value Date    HGB 8.9 05/05/2019    HCT 27.8 05/05/2019     BMP:    Lab Results   Component Value Date     05/05/2019    K 3.6 05/05/2019    K 3.3 05/04/2019     05/05/2019    CO2 26 05/05/2019    BUN 13 05/05/2019    LABALBU 2.7 05/04/2019    LABALBU 4.1 05/19/2012    CREATININE 0.9 05/05/2019    CALCIUM 8.0 05/05/2019    LABGLOM 81 05/05/2019       Impression:    Muscle invasive bladder cancer  Acute post op anemia  Hypokalemia- resolved  S/p robotic cystoprostatectomy 5/1/19      Plan:    POD # 4 Ricardo Scriver Dr. Sandra Sheth on 5-1-19      Pelvic drain with 175 in last 24 hours. Ok to remove today.     Hold Lovenox, hgb 8.9, was 9.6. Recheck this afternoon. No nausea through the night. Tolerating full liquid diet yesterday. Had small bm yesterday. Passing flatus. Will start on soft diet.      Encourage ambulation, and gum chewing.     Discharge planning in 1-2 days.     DEMAR Valero  05/05/19 6:48 AM  Urology

## 2019-05-05 NOTE — PROGRESS NOTES
Hospitalist Progress Note    Patient:  Mario López  YOB: 1940  MRN: [id number]   PCP: Lexi Apple MD         Acct: [de-identified]  Unit/Bed: [address] [place] [address]-    Date of Admission: 5/1/2019      ASSESSMENT     1. S/p robotic assisted laparoscopic radical cystoprostatectomy with bilateral pelvic node dissection with creation of ileal conduit on 5/1, management per Urology  2. Hypervolemia, Acute respiratory failure with hypoxia 2/2 post-op acute pulmonary edema  1. CXR showed increased pulmonary infiltrates  2. Required up to 10 liters of oxygen post-op  3. Echocardiogram showed EF of 60% with somewhat calcified aortic valve  4. Started on Lasix 40 mg IV bid  5. Transitioned to Lasix 40 mg IV daily on 5/4  6. Will transition to Lasix 40 mg po daily starting tomorrow  7. Diuresing well  8. Now on RA  3. Hypokalemia  4. Uncontrolled HTN  1. Improved  5. Chronic co-morbidities  1. CAD   2. HLD  3. Essential HTN  4. Acquired hypothyroidism  5. PVD- hx of AA and fem/fem bypass  6. Peripheral neuropathy  7. BPH  8. Bladder cancer    PLAN     1. Transition to Lasix 40 mg po daily. Potassium supplementation to be ordered at the same time  2. Continue to monitor daily weights  3. Patient likely does not have post-op ileus because he tolerated a diet this am  4. Will continue to monitor  5. Replace electrolytes as necessary      Anticipated Discharge in : Per URology    Code Status: Full Code    Electronically signed by Maki Santiago MD on 5/5/2019 at 10:41 AM      Chief Complaint     Edema, post-op respiratory failure    SUBJECTIVE     The patient is a 78 [facility] who was admitted for robotic assisted prostatectomy. We were consulted 2/2 evidence for hypervolemia post-op.     - Patient tolerated a general diet this am  - Peripheral edema and respiratory status continues to improve  - Denies CP, SOB, abdominal pain  - Penile drain removed overnight   - WBC within normal limits    OBJECTIVE Medications:  Reviewed    Infusion Medications   Scheduled Medications    docusate sodium  100 mg Oral BID    polyethylene glycol  17 g Oral Daily    potassium chloride  40 mEq Oral Once    [START ON 5/6/2019] potassium chloride  20 mEq Oral Daily with breakfast    [START ON 5/6/2019] furosemide  40 mg Oral Daily    potassium replacement protocol   Other RX Placeholder    calcium replacement protocol   Other RX Placeholder    metoprolol tartrate  50 mg Oral BID    atorvastatin  40 mg Oral Nightly    potassium chloride  10 mEq Oral Daily    levothyroxine  137 mcg Oral Daily    pregabalin  50 mg Oral TID    sodium chloride flush  10 mL Intravenous 2 times per day    famotidine  20 mg Oral BID    neomycin-bacitracin-polymyxin   Topical BID    bisacodyl  10 mg Rectal Daily    naloxegol  12.5 mg Oral QAM     PRN Meds: nitroGLYCERIN, sodium chloride flush, ondansetron, HYDROcodone 5 mg - acetaminophen **OR** HYDROcodone 5 mg - acetaminophen, morphine **OR** morphine, opium-belladonna    Ins and outs:      Intake/Output Summary (Last 24 hours) at 5/5/2019 1041  Last data filed at 5/5/2019 0530  Gross per 24 hour   Intake 700 ml   Output 2175 ml   Net -1475 ml       Physical Examination     /73   Pulse 76   Temp 98.3 °F (36.8 °C) (Oral)   Resp 16   Ht 5' 7\" (1.702 m)   Wt 193 lb 12.8 oz (87.9 kg)   SpO2 95%   BMI 30.35 kg/m²     General appearance: No apparent distress. Sitting in chair  HEENT: Extraocular motion intact. Neck: Supple  Respiratory:  CTA bilaterally without rales/wheezes/rhonchi. Cardiovascular: RRR with normal S1/S2. 2/6 LEAH. No rubs or gallops. Abdomen: Soft, non-distended. Abdominal binder in place. Staples look C/D/I. Ostomy in place   Musculoskeletal: Patient is moving extremities x 4 spontaneously  Neurologic: Grossly non focal. CN: II-XII intact  Psychiatric: Alert and oriented  Vascular: Dorsalis pedis palpable bilaterally. Radial pulses palpable bilaterally. Bilateral LE edema improved  Skin:  No visible rashes or lesions. : Velasquez catheter in place with bloody urine. Labs     Recent Labs     05/03/19  0530 05/04/19  0405 05/05/19  0438   WBC 11.0* 10.9* 10.0   HGB 9.8* 9.6* 8.9*   HCT 31.2* 29.2* 27.8*    142 144     Recent Labs     05/03/19  0530 05/04/19  0405 05/05/19  0438    140 139   K 3.6 3.3* 3.6    107 104   CO2 20* 25 26   BUN 15 12 13   CREATININE 0.9 0.9 0.9   CALCIUM 7.9* 7.9* 8.0*     Recent Labs     05/04/19  0405   AST 12   ALT 10*   BILITOT 1.0   ALKPHOS 68     No results for input(s): INR in the last 72 hours. No results for input(s): Clarance Licona in the last 72 hours. Urinalysis:      Lab Results   Component Value Date    NITRU NEGATIVE 04/22/2019    WBCUA 5-10 04/22/2019    BACTERIA NONE 04/22/2019    RBCUA 3-5 04/22/2019    BLOODU NEGATIVE 04/22/2019    SPECGRAV 1.015 04/11/2019    SPECGRAV 1.015 03/07/2017    GLUCOSEU NEGATIVE 04/22/2019       Diagnostic imaging/procedures     Ct Abdomen Pelvis W Wo Contrast Additional Contrast? None    Result Date: 4/23/2019  CT ABDOMEN AND PELVIS WITHOUT AND WITH CONTRAST ENHANCEMENT: CLINICAL INFORMATION: Bladder tumor COMPARISON: 12/5/2018 TECHNIQUE: Multiple axial 5 mm images of the abdomen, pelvis, and lung bases were obtained before and after the administration of intravenous contrast material (ISOVUE). Postcontrast images were obtained at 2 minutes 8 minutes postinjection. Oral contrast material was not administered. Computer generated sagittal and coronal coronal images of the abdomen and pelvis were also reconstructed. ALL CT SCANS AT THIS FACILITY use dose modulation, iterative reconstruction, and/or weight-based dosing when appropriate to reduce radiation dose to as low as reasonably achievable. FINDINGS: Lung bases: Unremarkable. No infiltrates. No effusions. No lung nodules. Liver: Liver unremarkable.  Gallbladder unremarkable Pancreas, spleen and adrenal glands: recurrent malignancy or metastatic disease. **This report has been created using voice recognition software. It may contain minor errors which are inherent in voice recognition technology. ** Final report electronically signed by Dr. Anahi Vega on 4/23/2019 4:24 PM    Xr Abdomen (kub) (single Ap View)    Result Date: 5/1/2019  PROCEDURE: XR ABDOMEN (KUB) (SINGLE AP VIEW) CLINICAL INFORMATION: NG tube placement COMPARISON: CT dated 4/23/2019 TECHNIQUE:  AP supine single view  FINDINGS: There is an enteric tube present with the tip overlying the gastric body. The distal side port also projects over the gastric body the on the GE junction. There is an indeterminate gas-filled small bowel loop overlying the mid abdomen. There is free air within the abdomen which is compatible with recent abdominal surgery. There is chronic elevation of the left hemidiaphragm. 1. Free air is demonstrated within the peritoneal cavity. This is most consistent with recent abdominal surgery. However, recommend continued follow-up. 2. Enteric tube tip and distal side-port project over the gastric body. 3.  Indeterminate bowel gas pattern with indeterminate mildly distended gas-filled small bowel overlying the mid abdomen. **This report has been created using voice recognition software. It may contain minor errors which are inherent in voice recognition technology. ** Final report electronically signed by Dr. Lottie Valera on 5/1/2019 3:54 PM    Xr Chest Portable    Result Date: 5/2/2019  PROCEDURE: XR CHEST PORTABLE CLINICAL INFORMATION: right sided chest discomfort and subcutaneous air. COMPARISON: Chest x-ray dated 10/1/2018 and the KUB dated 5/1/2019 TECHNIQUE: AP Portable chest xray FINDINGS: Lungs/pleura: The interstitial infiltrates throughout both lungs noted on yesterday's KUB have resolved. There is mild bibasilar atelectasis. There is no pneumonia.  There is a tiny left pleural effusion blunting the left lateral costophrenic angle. There is no pneumothorax. Heart: Heart size is mildly accentuated likely due to the portable technique. Mediastinum/toro: No obvious mass or adenopathy. Skeleton: There is multilevel vertebral endplate spondylosis. Lines/tubes/devices: Right jugular Port-A-Cath tip lies at the level of the central SVC. NG tube tip lies in the region of the proximal stomach in satisfactory position. Upper abdomen: There is moderate gaseous distention of the stomach. There is minimal subcutaneous emphysema along the lateral upper right abdominal wall, better seen on yesterday's KUB. Resolved interstitial infiltrates compared to yesterday's KUB. Mild bibasilar atelectasis. Tiny left pleural effusion. Satisfactory positions of the NG tube and right jugular Port-A-Cath. Moderate gaseous distention of the stomach. **This report has been created using voice recognition software. It may contain minor errors which are inherent in voice recognition technology. ** Final report electronically signed by Dr. Ralph Wagner on 5/2/2019 3:11 AM      DVT prophylaxis: ? Per Urology                                 ? SQ Heparin                                 ? Encourage ambulation           ?  Already on Anticoagulation

## 2019-05-06 LAB
ANION GAP SERPL CALCULATED.3IONS-SCNC: 10 MEQ/L (ref 8–16)
BUN BLDV-MCNC: 16 MG/DL (ref 7–22)
CALCIUM SERPL-MCNC: 8.1 MG/DL (ref 8.5–10.5)
CHLORIDE BLD-SCNC: 103 MEQ/L (ref 98–111)
CO2: 26 MEQ/L (ref 23–33)
CREAT SERPL-MCNC: 0.9 MG/DL (ref 0.4–1.2)
GFR SERPL CREATININE-BSD FRML MDRD: 81 ML/MIN/1.73M2
GLUCOSE BLD-MCNC: 91 MG/DL (ref 70–108)
HCT VFR BLD CALC: 28.3 % (ref 42–52)
HEMOGLOBIN: 9.3 GM/DL (ref 14–18)
POTASSIUM SERPL-SCNC: 3.5 MEQ/L (ref 3.5–5.2)
SODIUM BLD-SCNC: 139 MEQ/L (ref 135–145)

## 2019-05-06 PROCEDURE — 99024 POSTOP FOLLOW-UP VISIT: CPT | Performed by: NURSE PRACTITIONER

## 2019-05-06 PROCEDURE — 85018 HEMOGLOBIN: CPT

## 2019-05-06 PROCEDURE — 80048 BASIC METABOLIC PNL TOTAL CA: CPT

## 2019-05-06 PROCEDURE — 6370000000 HC RX 637 (ALT 250 FOR IP): Performed by: NURSE PRACTITIONER

## 2019-05-06 PROCEDURE — 6370000000 HC RX 637 (ALT 250 FOR IP): Performed by: INTERNAL MEDICINE

## 2019-05-06 PROCEDURE — 1200000000 HC SEMI PRIVATE

## 2019-05-06 PROCEDURE — 2709999900 HC NON-CHARGEABLE SUPPLY

## 2019-05-06 PROCEDURE — 85014 HEMATOCRIT: CPT

## 2019-05-06 PROCEDURE — 99232 SBSQ HOSP IP/OBS MODERATE 35: CPT | Performed by: INTERNAL MEDICINE

## 2019-05-06 PROCEDURE — APPNB30 APP NON BILLABLE TIME 0-30 MINS: Performed by: NURSE PRACTITIONER

## 2019-05-06 PROCEDURE — 6370000000 HC RX 637 (ALT 250 FOR IP): Performed by: PHYSICIAN ASSISTANT

## 2019-05-06 PROCEDURE — 36415 COLL VENOUS BLD VENIPUNCTURE: CPT

## 2019-05-06 RX ADMIN — POLYETHYLENE GLYCOL 3350 17 G: 17 POWDER, FOR SOLUTION ORAL at 09:53

## 2019-05-06 RX ADMIN — NALOXEGOL OXALATE 12.5 MG: 12.5 TABLET, FILM COATED ORAL at 09:56

## 2019-05-06 RX ADMIN — METOPROLOL TARTRATE 50 MG: 50 TABLET, FILM COATED ORAL at 09:57

## 2019-05-06 RX ADMIN — PREGABALIN 50 MG: 50 CAPSULE ORAL at 09:55

## 2019-05-06 RX ADMIN — DOCUSATE SODIUM 100 MG: 100 CAPSULE, LIQUID FILLED ORAL at 20:28

## 2019-05-06 RX ADMIN — BACITRACIN ZINC NEOMYCIN SULFATE POLYMYXIN B SULFATE: 400; 3.5; 5 OINTMENT TOPICAL at 09:57

## 2019-05-06 RX ADMIN — PREGABALIN 50 MG: 50 CAPSULE ORAL at 20:28

## 2019-05-06 RX ADMIN — METOPROLOL TARTRATE 50 MG: 50 TABLET, FILM COATED ORAL at 20:28

## 2019-05-06 RX ADMIN — LEVOTHYROXINE SODIUM 137 MCG: 137 TABLET ORAL at 07:27

## 2019-05-06 RX ADMIN — ATORVASTATIN CALCIUM 40 MG: 40 TABLET, FILM COATED ORAL at 20:28

## 2019-05-06 RX ADMIN — POTASSIUM CHLORIDE 20 MEQ: 20 TABLET, EXTENDED RELEASE ORAL at 09:53

## 2019-05-06 RX ADMIN — DOCUSATE SODIUM 100 MG: 100 CAPSULE, LIQUID FILLED ORAL at 09:53

## 2019-05-06 RX ADMIN — FAMOTIDINE 20 MG: 20 TABLET ORAL at 20:28

## 2019-05-06 RX ADMIN — FUROSEMIDE 40 MG: 40 TABLET ORAL at 10:00

## 2019-05-06 RX ADMIN — FAMOTIDINE 20 MG: 20 TABLET ORAL at 09:53

## 2019-05-06 RX ADMIN — HYDROCODONE BITARTRATE AND ACETAMINOPHEN 1 TABLET: 5; 325 TABLET ORAL at 15:15

## 2019-05-06 RX ADMIN — PREGABALIN 50 MG: 50 CAPSULE ORAL at 14:06

## 2019-05-06 ASSESSMENT — PAIN SCALES - GENERAL
PAINLEVEL_OUTOF10: 0
PAINLEVEL_OUTOF10: 4
PAINLEVEL_OUTOF10: 0
PAINLEVEL_OUTOF10: 0

## 2019-05-06 NOTE — PLAN OF CARE
Problem: Nutrition  Goal: Optimal nutrition therapy  5/6/2019 0906 by Mariana Jacobo RD, LD  Outcome: Ongoing  Nutrition Problem: Inadequate oral intake  Intervention: Food and/or Nutrient Delivery: Continue current diet, Modify current ONS  Nutritional Goals: Pt. will tolerate and consume 75% or more of meals during LOS.

## 2019-05-06 NOTE — PROGRESS NOTES
Urology Progress Note    Chief Complaint: bladder cancer    Subjective: \"im feeling good. \"    Patient is resting in chair, urostomy draining yellow urine, +flatus, +BM, ambulating with assistance, tolerating regular diet, denies any nausea or vomiting. There are complaints of no pain at this time. Patient had bm last night he reports. He denies chest pain, shortness of breath, or calf pain. Does have chronic numbness in feet.              Vitals:  BP (!) 146/68   Pulse 96   Temp 97.6 °F (36.4 °C) (Oral)   Resp 18   Ht 5' 7\" (1.702 m)   Wt 191 lb (86.6 kg)   SpO2 96%   BMI 29.91 kg/m²   Temp  Av °F (36.7 °C)  Min: 97.6 °F (36.4 °C)  Max: 98.4 °F (36.9 °C)    Intake/Output Summary (Last 24 hours) at 2019 1037  Last data filed at 2019 0600  Gross per 24 hour   Intake 100 ml   Output 200 ml   Net -100 ml       Social History     Socioeconomic History    Marital status:      Spouse name: Not on file    Number of children: Not on file    Years of education: Not on file    Highest education level: Not on file   Occupational History    Not on file   Social Needs    Financial resource strain: Not on file    Food insecurity:     Worry: Not on file     Inability: Not on file    Transportation needs:     Medical: Not on file     Non-medical: Not on file   Tobacco Use    Smoking status: Former Smoker     Packs/day: 1.00     Years: 24.00     Pack years: 24.00     Types: Cigarettes     Start date:      Last attempt to quit: 1979     Years since quittin.1    Smokeless tobacco: Never Used   Substance and Sexual Activity    Alcohol use: No     Alcohol/week: 0.0 oz    Drug use: No    Sexual activity: Not Currently   Lifestyle    Physical activity:     Days per week: Not on file     Minutes per session: Not on file    Stress: Not on file   Relationships    Social connections:     Talks on phone: Not on file     Gets together: Not on file     Attends Restorationism service: Not on file     Active member of club or organization: Not on file     Attends meetings of clubs or organizations: Not on file     Relationship status: Not on file    Intimate partner violence:     Fear of current or ex partner: Not on file     Emotionally abused: Not on file     Physically abused: Not on file     Forced sexual activity: Not on file   Other Topics Concern    Not on file   Social History Narrative    Not on file     Family History   Problem Relation Age of Onset    Diabetes Mother     Cancer Mother     Arthritis Mother     Asthma Father     Heart Disease Father     Cancer Father     Stroke Sister     Heart Disease Sister     Cancer Sister     Stroke Brother     Heart Disease Brother     Cancer Brother      No Known Allergies      Constitutional: Alert and oriented times x3, no acute distress, and cooperative to examination with appropriate mood and affect. HEENT:   Head:         Normocephalic and atraumatic. Mucous membranes are normal.   Eyes:         EOM are normal. No scleral icterus. Nose:    The external appearance of the nose is normal  Ears: The ears appear normal to external inspection. Cardiovascular:       Normal rate, regular rhythm. Pulmonary/Chest:  Normal respiratory rate and rhthym. No use of accessory muscles. Lungs clear bilaterally. Abdominal:          Soft. No tenderness. Active bowel sounds. Ras approximated, tape reaction at right sided abdominal incision. Blister noted. Minimal ecchymosis of left lower flank, and below midline incision.  Urostomy bag intact, stoma beefy, red, and moist, stent in bag, draining yellow urine.      Musculoskeletal:    Normal range of motion. Minimal edema to feet  Extremities:    No cyanosis, clubbing, or edema present. Neurological:    Alert and oriented.      Labs:  WBC:    Lab Results   Component Value Date    WBC 10.0 05/05/2019     Hemoglobin/Hematocrit:    Lab Results   Component Value Date    HGB 9.3 05/06/2019 HCT 28.3 05/06/2019     BMP:    Lab Results   Component Value Date     05/06/2019    K 3.5 05/06/2019    K 3.3 05/04/2019     05/06/2019    CO2 26 05/06/2019    BUN 16 05/06/2019    LABALBU 2.7 05/04/2019    LABALBU 4.1 05/19/2012    CREATININE 0.9 05/06/2019    CALCIUM 8.1 05/06/2019    LABGLOM 81 05/06/2019     Clinical Information: BLADDER CANCER    FINAL DIAGNOSIS:  A: Left distal ureter, biopsy:   Cross-section of ureter with no significant pathologic findings.   Negative for dysplasia and invasive malignancy. B: Prostate and bladder, radical cystoprostatectomy:   Bladder:    Stromal changes indicative of previous therapy effects.    One lymph node with no evidence of malignancy.  (0/1)    Overlying urothelial dysplasia.    Edematous mucosa with mixed inflammation and reactive changes.    Negative for residual invasive malignancy.   Prostate:    Nodular prostatic hyperplasia with acute and chronic inflammation.    Necrotizing granulomatous inflammation.    Negative for prostatic malignancy. C: Right and left pelvic lymph nodes, resection:   Fourteen lymph node with no evidence of malignancy.  (0/14)     (y)pT0, pN0        Impression:    Muscle invasive bladder cancer  Acute post op anemia  Hypokalemia- resolved  S/p robotic cystoprostatectomy 5/1/19       Plan:    POD # 5  Keenan You 5-1-19        Pathology was discussed today with patient and wife. [facility] tomorrow if patient still admitted. Ref. Range 5/3/2019 05:30 5/4/2019 04:05 5/5/2019 04:38 5/5/2019 15:45 5/6/2019 04:33   Hemoglobin Quant Latest Ref Range: 14.0 - 18.0 gm/dl 9.8 (L) 9.6 (L) 8.9 (L) 9.8 (L) 9.3 (L)        No nausea through the night. Tolerating regular diet.  Had good BM yesterday.     Encourage ambulation, and gum chewing.     Ok for discharge today or tomorrow morning if ok with hospitalist. Patient meeting with wound and ostomy nurse today. Home health referral for wound monitoring, and ostomy education and ostomy changes.        Follow up 5-9-19 with Dr. Laurita Card for staple removal    DEMAR Raygoza  05/06/19 10:37 AM  Urology

## 2019-05-06 NOTE — PROGRESS NOTES
Present to pt room for urostomy lesson and pouching system change. Pt wife in room with pt. Pt wife does hands on pouching system change while prompted by ostomy nurse. Pt stoma measures 1 inch. Pt wife does have trouble at this time with cutting of the flange, however, is certain that she will be able to better cut out pouches with her smaller curved scissors at home. Ostomy nurse pre cuts flange for pt and pre cuts additional pouch for home. Pt did not have any trouble with pouching system or leaking over weekend and chose to not use paste at this time. Sent paste with pt and wife and informed that it may be needed if noted to leak when pt is more active. Pt will be d/c with home health for additional assistance and ostomy teaching needs until wife feels more confident with pouching system change. Pt follows up in outpt wound ostomy clinic with Lisa Brown. Pt wife practices opening and closing pouch as well as applying drainage bag. Pt does not seem motivated at this time to do hands on with his ostomy pouching system changes. Pt laying in bed, call light in reach.

## 2019-05-06 NOTE — PLAN OF CARE
Problem: Falls - Risk of:  Goal: Will remain free from falls  Description  Will remain free from falls  5/6/2019 1143 by Olegario Haines RN  Outcome: Met This Shift  Note:   No falls noted this shift. Patient ambulates with x1 staff assistance without difficulty. Family member at bedside, spent the night. Bed kept in low position. Safe environment maintained. Bedside table & call light in reach. Uses call light appropriately when needing assistance. Fall band and sign posted for safety. Bed and chair alarms in place     Problem: Pain Control  Goal: Maintain pain level at or below patient's acceptable level (or 5 if patient is unable to determine acceptable level)  5/6/2019 1143 by Olegario Haines RN  Outcome: Met This Shift  Note:   Pain goal stated at 3. Pt denies any pain. May have norco or tylenol for pain as needed. Pain goal met at this time     Problem: Respiratory  Goal: No pulmonary complications  3/6/9618 3349 by Olegario Haines RN  Outcome: Met This Shift  Note:   Enc to continue using incentive spirometer as instructed. Lungs clear. diminished. No supplemental oxygen     Problem: GI  Goal: No bowel complications  3/1/4254 7042 by Olegario Haines RN  Outcome: Met This Shift  Note:   Abdomen soft, active bowel sounds, passing gas and had bowel movement today. On colace and miralax as well. Problem:   Goal: Adequate urinary output  5/6/2019 1143 by Olegario Haines RN  Outcome: Met This Shift  Note:   Ileoconduit draining maria elena colored urine. Enc to drink more water to help clear up urine     Problem: Nutrition  Goal: Optimal nutrition therapy  5/6/2019 1143 by Olegairo Haines RN  Outcome: Met This Shift  Note:   Low fiber diet and tolerates well. Eats % of meals     Problem: Pain:  Goal: Pain level will decrease  Description  Pain level will decrease  5/6/2019 1143 by Olegario Haines RN  Outcome: Met This Shift  Note:   Pain goal stated at 3. Pt denies any pain. May have norco for pain as needed.  Pain goal met at this time     Problem: Falls - Risk of:  Goal: Absence of physical injury  Description  Absence of physical injury  5/6/2019 0303 by Alaina Garcia RN  Outcome: Ongoing  Note:   Patient free from physical injury this shift     Problem: Cardiovascular  Goal: No DVT, peripheral vascular complications  7/6/7850 5953 by Mariam Angelucci, RN  Outcome: Ongoing  Note:   Swelling to feet continues, as this is chronic for patient. Hand are less edematous today. On lasix oral daily. Daily weights. No chest pain or shortness of breath. Up with assist to ambulate in halls     Problem: Skin Integrity/Risk  Goal: Wound healing  5/6/2019 1143 by Mariam Angelucci, RN  Outcome: Ongoing  Note:   Abdominal incisions x 7 with staples intact. Far right incisions with tape burns from tegaderm. No redness edema or drainage noted to incisions     Problem: Discharge Planning  Intervention: Interaction with patient/family and care team  Note:   Discharge home possibly tomorrow with home health   Care plan reviewed with patient   Patient  verbalize understanding of the plan of care and contribute to goal setting.

## 2019-05-06 NOTE — PROGRESS NOTES
Hospitalist Progress Note    Patient:  Oralia Busch  YOB: 1940  MRN: [id number]   PCP: Mandeep Wills MD         Acct: [de-identified]  Unit/Bed: [address] [place] [address]    Date of Admission: 5/1/2019      ASSESSMENT     1. S/p robotic assisted laparoscopic radical cystoprostatectomy with bilateral pelvic node dissection with creation of ileal conduit on 5/1, management per Urology  2. Hypervolemia, Acute respiratory failure with hypoxia 2/2 post-op acute pulmonary edema  1. CXR showed increased pulmonary infiltrates  2. Required up to 10 liters of oxygen post-op  3. Echocardiogram showed EF of 60% with somewhat calcified aortic valve  4. Started on Lasix 40 mg IV bid  5. Transitioned to Lasix 40 mg IV daily on 5/4  6. Will transition to Lasix 40 mg po daily starting tomorrow  7. Diuresing well  8. Now on RA  3. Hypokalemia  4. Uncontrolled HTN  1. Improved  5. Chronic co-morbidities  1. CAD   2. HLD  3. Essential HTN  4. Acquired hypothyroidism  5. PVD- hx of AA and fem/fem bypass  6. Peripheral neuropathy  7. BPH  8. Bladder cancer    PLAN     1. Continue to monitor urine output while on Lasix 40 mg daily  2. Continue to monitor daily weights  3. Continue to monitor hemoglobin  4. Ok to discharge from hospitalist standpoint when cleared from Urology stand point    Anticipated Discharge in : Per Urology    Code Status: Full Code    Electronically signed by Madhuri Bonilla MD on 5/6/2019 at 4:00 PM      Chief Complaint     Edema, post-op respiratory failure    SUBJECTIVE     The patient is a 78 y.o. [place] Justice yo male who was admitted for robotic assisted prostatectomy. We were consulted 2/2 evidence for hypervolemia post-op.     - Nurse concerned that urine output slowed down a little overnight, however patient continues to lose weight and 300 ml recorded over last 24 hours  -    OBJECTIVE     Medications:  Reviewed    Infusion Medications   Scheduled Medications    docusate sodium  100 mg Oral BID    polyethylene glycol  17 g Oral Daily    potassium chloride  20 mEq Oral Daily with breakfast    furosemide  40 mg Oral Daily    potassium replacement protocol   Other RX Placeholder    calcium replacement protocol   Other RX Placeholder    metoprolol tartrate  50 mg Oral BID    atorvastatin  40 mg Oral Nightly    levothyroxine  137 mcg Oral Daily    pregabalin  50 mg Oral TID    sodium chloride flush  10 mL Intravenous 2 times per day    famotidine  20 mg Oral BID    neomycin-bacitracin-polymyxin   Topical BID    bisacodyl  10 mg Rectal Daily    naloxegol  12.5 mg Oral QAM     PRN Meds: nitroGLYCERIN, sodium chloride flush, ondansetron, HYDROcodone 5 mg - acetaminophen **OR** HYDROcodone 5 mg - acetaminophen, morphine **OR** morphine, opium-belladonna    Ins and outs:      Intake/Output Summary (Last 24 hours) at 5/6/2019 1600  Last data filed at 5/6/2019 1412  Gross per 24 hour   Intake 450 ml   Output 501 ml   Net -51 ml       Physical Examination     BP (!) 120/57   Pulse 71   Temp 98.2 °F (36.8 °C) (Oral)   Resp 16   Ht 5' 7\" (1.702 m)   Wt 191 lb (86.6 kg)   SpO2 97%   BMI 29.91 kg/m²     General appearance: No apparent distress. Sitting in chair  HEENT: Extraocular motion intact. Neck: Supple  Respiratory:  CTA bilaterally without rales/wheezes/rhonchi. Cardiovascular: RRR with normal S1/S2. 2/6 LEAH. No rubs or gallops. Abdomen: Soft, non-distended. Staples look C/D/I. Ostomy in place   Musculoskeletal: Patient is moving extremities x 4 spontaneously  Neurologic: Grossly non focal. CN: II-XII intact  Psychiatric: Alert and oriented  Vascular: Dorsalis pedis palpable bilaterally. Radial pulses palpable bilaterally. Bilateral LE edema continues to improve  Skin:  No visible rashes or lesions. : Velasquez catheter in place with bloody urine.      Labs     Recent Labs     05/04/19  0405 05/05/19  0438 05/05/19  1545 05/06/19  0433   WBC 10.9* 10.0  --   --    HGB 9.6* 8.9* 9.8* 9.3*   HCT 29.2* 27.8* 29.5* 28.3*    144  --   --      Recent Labs     05/04/19  0405 05/05/19  0438 05/06/19  0433    139 139   K 3.3* 3.6 3.5    104 103   CO2 25 26 26   BUN 12 13 16   CREATININE 0.9 0.9 0.9   CALCIUM 7.9* 8.0* 8.1*     Recent Labs     05/04/19  0405   AST 12   ALT 10*   BILITOT 1.0   ALKPHOS 68     No results for input(s): INR in the last 72 hours. No results for input(s): Delcie Kimball in the last 72 hours. Urinalysis:      Lab Results   Component Value Date    NITRU NEGATIVE 04/22/2019    WBCUA 5-10 04/22/2019    BACTERIA NONE 04/22/2019    RBCUA 3-5 04/22/2019    BLOODU NEGATIVE 04/22/2019    SPECGRAV 1.015 04/11/2019    SPECGRAV 1.015 03/07/2017    GLUCOSEU NEGATIVE 04/22/2019       Diagnostic imaging/procedures     Ct Abdomen Pelvis W Wo Contrast Additional Contrast? None    Result Date: 4/23/2019  CT ABDOMEN AND PELVIS WITHOUT AND WITH CONTRAST ENHANCEMENT: CLINICAL INFORMATION: Bladder tumor COMPARISON: 12/5/2018 TECHNIQUE: Multiple axial 5 mm images of the abdomen, pelvis, and lung bases were obtained before and after the administration of intravenous contrast material (ISOVUE). Postcontrast images were obtained at 2 minutes 8 minutes postinjection. Oral contrast material was not administered. Computer generated sagittal and coronal coronal images of the abdomen and pelvis were also reconstructed. ALL CT SCANS AT THIS FACILITY use dose modulation, iterative reconstruction, and/or weight-based dosing when appropriate to reduce radiation dose to as low as reasonably achievable. FINDINGS: Lung bases: Unremarkable. No infiltrates. No effusions. No lung nodules. Liver: Liver unremarkable. Gallbladder unremarkable Pancreas, spleen and adrenal glands: Unremarkable Kidneys: There has been a prior right nephrectomy. There is a small benign-appearing cyst mid body left kidney posteriorly. No renal calculi are seen. There are a few vascular calcifications in the central aspect left kidney. 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 The aneurysm has not changed in size since the prior study. The celiac artery and SMA are widely patent. The left renal artery is also patent but somewhat obscured by artifacts from the endograft. No endoleak is seen. . The left iliac limb of the endograft is occluded and there is a right-2-left femorofemoral bypass graft. Bowel/Peritoneum: No abnormally dilated bowel loops are seen. There are a few diverticula in the sigmoid colon. No evidence for diverticulitis. No free air. No free fluid in the abdomen. No abnormally enlarged para-aortic lymph nodes are seen. Bones : Multilevel laminectomy defects are present lower lumbar spine. There is multifocal moderate disc space narrowing and degenerative disc disease throughout the lumbar spine as well as mild retrolisthesis L2 upon L3, L3 upon L4, and L4 upon L5. Mild  scoliotic curvatures are again seen in the lower thoracic and lumbar spine. Pelvis: Urinary bladder is unremarkable. No evidence for residual or recurrent mass in the bladder. There is mild persistent thickening of the wall the urinary bladder on the right side anteriorly, likely postsurgical. Appearance unchanged from prior study. Artifacts are present from embolization coils in both internal iliac arteries. 1. Mild stable thickening of the urinary bladder right side anteriorly, likely postsurgical. 2. Prior right nephrectomy. 7.3 cm fusiform aneurysm infrarenal abdominal aorta with endograft in place. No endoleak seen. Left iliac limb of the endograft is occluded. There is a femoral-femoral bypass graft which is presumably patent. 3. No evidence for residual recurrent malignancy or metastatic disease. **This report has been created using voice recognition software. It may contain minor errors which are inherent in voice recognition technology. ** Final report electronically signed by  Fawn Chetan on 4/23/2019 4:24 PM    Xr Abdomen (kub) (single Ap View)    Result Date: 5/1/2019  PROCEDURE: XR ABDOMEN (KUB) (SINGLE AP VIEW) CLINICAL INFORMATION: NG tube placement COMPARISON: CT dated 4/23/2019 TECHNIQUE:  AP supine single view  FINDINGS: There is an enteric tube present with the tip overlying the gastric body. The distal side port also projects over the gastric body the on the GE junction. There is an indeterminate gas-filled small bowel loop overlying the mid abdomen. There is free air within the abdomen which is compatible with recent abdominal surgery. There is chronic elevation of the left hemidiaphragm. 1. Free air is demonstrated within the peritoneal cavity. This is most consistent with recent abdominal surgery. However, recommend continued follow-up. 2. Enteric tube tip and distal side-port project over the gastric body. 3.  Indeterminate bowel gas pattern with indeterminate mildly distended gas-filled small bowel overlying the mid abdomen. **This report has been created using voice recognition software. It may contain minor errors which are inherent in voice recognition technology. ** Final report electronically signed by Dr. Fenton Boeck on 5/1/2019 3:54 PM    Xr Chest Portable    Result Date: 5/2/2019  PROCEDURE: XR CHEST PORTABLE CLINICAL INFORMATION: right sided chest discomfort and subcutaneous air. COMPARISON: Chest x-ray dated 10/1/2018 and the KUB dated 5/1/2019 TECHNIQUE: AP Portable chest xray FINDINGS: Lungs/pleura: The interstitial infiltrates throughout both lungs noted on yesterday's KUB have resolved. There is mild bibasilar atelectasis. There is no pneumonia. There is a tiny left pleural effusion blunting the left lateral costophrenic angle. There is no pneumothorax. Heart: Heart size is mildly accentuated likely due to the portable technique. Mediastinum/toro: No obvious mass or adenopathy. Skeleton: There is multilevel vertebral endplate spondylosis.

## 2019-05-06 NOTE — CARE COORDINATION
5/6/19, 3:18 PM      [place] Draft day: 5  Location: [address] [place] [address]- Reason for admit: Bladder cancer [facility]) [C67.9]       Procedure: 05/01/19  By Dr. Emeka Caro CYSTOPROSTATECTOMY WITH BILATERAL PELVIC LYMPH NODE DISSECTION WITH CREATION OF ILEAL CONDUIT            Treatment Plan of Care: POD #5, low fiber diet, ambulated in hallway, vann intact, on Lasix with daily weights, continue ostomy teaching for new ileal conduit, plans home Tuesday. PCP: Ronni Dawn MD  Readmission Risk Score: 20%  Discharge Plan: plans home with wife and new HH. SW assisting.

## 2019-05-06 NOTE — PLAN OF CARE
Problem: Falls - Risk of:  Goal: Will remain free from falls  Description  Will remain free from falls  Outcome: Ongoing  Note:   Patient free from falls this shift. Up with assistance, skid proof footwear, walker and gait belt     Problem: Falls - Risk of:  Goal: Absence of physical injury  Description  Absence of physical injury  Outcome: Ongoing  Note:   Patient free from physical injury this shift     Problem: Pain Control  Goal: Maintain pain level at or below patient's acceptable level (or 5 if patient is unable to determine acceptable level)  Outcome: Ongoing  Flowsheets (Taken 5/6/2019 0303)  Patient's Stated Pain Goal: 3  Note:   Patient is currently denying pain while resting in bed     Problem: Pain Control  Goal: Improvement in pain related behaviors BP/HR WNL  Outcome: Ongoing  Note:   Patient vital signs are within normal limits     Problem: Cardiovascular  Goal: No DVT, peripheral vascular complications  Outcome: Ongoing  Note:   Patient has no signs or symptoms of dvt present     Problem: Respiratory  Goal: No pulmonary complications  Outcome: Ongoing  Note:   Patient lungs are clear.  Oxygen saturation is 94% on room air     Problem: GI  Goal: No bowel complications  Outcome: Ongoing  Note:   Patient is passing gas, active bowel sounds     Problem:   Goal: Adequate urinary output  Outcome: Ongoing  Note:   Patient has adequate urinary out put in vann catheter     Problem:   Goal: No urinary complication  Outcome: Ongoing  Note:   Patient has pink tinged blood from urostomy     Problem: Nutrition  Goal: Optimal nutrition therapy  Outcome: Ongoing     Problem: Skin Integrity/Risk  Goal: Wound healing  Outcome: Ongoing  Note:   Surgical stab sites are well approximated and open to air with staples     Problem: Pain:  Goal: Pain level will decrease  Description  Pain level will decrease  Outcome: Ongoing     Problem: Pain:  Goal: Control of acute pain  Description  Control of acute pain  Outcome:

## 2019-05-06 NOTE — PROGRESS NOTES
Nutrition Assessment    Type and Reason for Visit: Reassess    Nutrition Recommendations: Continue diet as tolerated. Modified ONS to Ensure Surgery Immunonutrition shake BID. Nutrition Assessment:   Pt improving from a nutritional standpoint AEB tolerating diet with advancement. Remains at risk for further nutritional compromise r/t increased nutrient needs for post operative healing, underlying medical condition (bladded cancer, chemotherapy (completed 3/19); hx CVA, CAD, CKD). Will modify ONS to Ensure Immunonutrition Shake BID (pt dislikes Ensure Clear). Discussed appropriate home use of ONS. Malnutrition Assessment:  · Malnutrition Status: At risk for malnutrition    Nutrition Risk Level:  Moderate    Nutrient Needs:  · Estimated Daily Total Kcal: 7257-4673 kcals (20-25 kcals/kgm wt of 89 kgm)  · Estimated Daily Protein (g): 80-94 gm (1.2-1.4 gm/kgm IBW of 67 kgm)    Nutrition Diagnosis:   · Problem: Inadequate oral intake  · Etiology: related to Acute injury/trauma, Alteration in GI function(surgery)     Signs and symptoms:  as evidenced by Diet history of poor intake(since surgery)    Objective Information:  · Nutrition-Focused Physical Findings: 1 BM noted past 24 hours; denies nausea or abdominal pain; Rx includes Lasix, Glycolax  · Wound Type: (abdominal incision (5/1): ROBOTIC ASSISTED LAPAROSCOPIC RADICAL CYSTOPROSTATECTOMY WITH BILATERAL PELVIC LYMPH NODE DISSECTION WITH CREATION OF ILEAL CONDUIT)  · Current Nutrition Therapies:  · Oral Diet Orders: Low Fiber   · Oral Diet intake: Unable to assess  · Oral Nutrition Supplement (ONS) Orders: Clear Liquid Oral Supplement(TID)  · ONS intake: (drinking Ensure Clear but dislikes)  · Anthropometric Measures:  · Ht: 5' 7\" (170.2 cm)   · Current Body Wt: 191 lb (86.6 kg)(5/6, +1, +2 edema)  · Admission Body Wt: 195 lb 9.6 oz (88.7 kg)(5/1, +2 edema )  · Usual Body Wt: (per pt fluctuates: 198-206#; lost weight during chemo (1/19-3/19); 2/6/19: 200# 3.2 oz)  · % Weight Change:  ,  -2.2% in 3 months (however difficult to eval with edema)  · Ideal Body Wt: 148 lb (67.1 kg),   · BMI Classification: BMI 30.0 - 34.9 Obese Class I    Nutrition Interventions:   Continue current diet, Modify current ONS  Continued Inpatient Monitoring, Education Initiated, Coordination of Care(Encouraged good nutrition at best efforts for healing. Discussed appropriate use of ONS post-op.)    Nutrition Evaluation:   · Evaluation: Goals set   · Goals: Pt. will tolerate and consume 75% or more of meals during LOS.     · Monitoring: Nutrition Progression, Meal Intake, Supplement Intake, Diet Tolerance, Skin Integrity, Weight, Pertinent Labs, Nausea or Vomiting, Patient/Family Education, Monitor Bowel Function      Electronically signed by Wendy Burciaga RD, LD on 5/6/19 at 9:07 AM    Contact Number: [phone]

## 2019-05-07 ENCOUNTER — TELEPHONE (OUTPATIENT)
Dept: FAMILY MEDICINE CLINIC | Age: 79
End: 2019-05-07

## 2019-05-07 VITALS
BODY MASS INDEX: 29.91 KG/M2 | HEIGHT: 67 IN | SYSTOLIC BLOOD PRESSURE: 125 MMHG | HEART RATE: 72 BPM | DIASTOLIC BLOOD PRESSURE: 71 MMHG | OXYGEN SATURATION: 97 % | TEMPERATURE: 98 F | RESPIRATION RATE: 18 BRPM | WEIGHT: 190.6 LBS

## 2019-05-07 LAB
ANION GAP SERPL CALCULATED.3IONS-SCNC: 14 MEQ/L (ref 8–16)
BUN BLDV-MCNC: 17 MG/DL (ref 7–22)
CALCIUM SERPL-MCNC: 8.5 MG/DL (ref 8.5–10.5)
CHLORIDE BLD-SCNC: 101 MEQ/L (ref 98–111)
CO2: 25 MEQ/L (ref 23–33)
CREAT SERPL-MCNC: 1 MG/DL (ref 0.4–1.2)
ERYTHROCYTE [DISTWIDTH] IN BLOOD BY AUTOMATED COUNT: 13.8 % (ref 11.5–14.5)
ERYTHROCYTE [DISTWIDTH] IN BLOOD BY AUTOMATED COUNT: 49.4 FL (ref 35–45)
GFR SERPL CREATININE-BSD FRML MDRD: 72 ML/MIN/1.73M2
GLUCOSE BLD-MCNC: 105 MG/DL (ref 70–108)
HCT VFR BLD CALC: 32.4 % (ref 42–52)
HEMOGLOBIN: 10.3 GM/DL (ref 14–18)
MCH RBC QN AUTO: 31 PG (ref 26–33)
MCHC RBC AUTO-ENTMCNC: 31.8 GM/DL (ref 32.2–35.5)
MCV RBC AUTO: 97.6 FL (ref 80–94)
PLATELET # BLD: 215 THOU/MM3 (ref 130–400)
PMV BLD AUTO: 9.5 FL (ref 9.4–12.4)
POTASSIUM SERPL-SCNC: 3.4 MEQ/L (ref 3.5–5.2)
RBC # BLD: 3.32 MILL/MM3 (ref 4.7–6.1)
SODIUM BLD-SCNC: 140 MEQ/L (ref 135–145)
WBC # BLD: 9.8 THOU/MM3 (ref 4.8–10.8)

## 2019-05-07 PROCEDURE — 36415 COLL VENOUS BLD VENIPUNCTURE: CPT

## 2019-05-07 PROCEDURE — 99024 POSTOP FOLLOW-UP VISIT: CPT | Performed by: NURSE PRACTITIONER

## 2019-05-07 PROCEDURE — 6370000000 HC RX 637 (ALT 250 FOR IP): Performed by: INTERNAL MEDICINE

## 2019-05-07 PROCEDURE — 99232 SBSQ HOSP IP/OBS MODERATE 35: CPT | Performed by: INTERNAL MEDICINE

## 2019-05-07 PROCEDURE — 85027 COMPLETE CBC AUTOMATED: CPT

## 2019-05-07 PROCEDURE — 6370000000 HC RX 637 (ALT 250 FOR IP): Performed by: PHYSICIAN ASSISTANT

## 2019-05-07 PROCEDURE — 80048 BASIC METABOLIC PNL TOTAL CA: CPT

## 2019-05-07 RX ORDER — FUROSEMIDE 40 MG/1
40 TABLET ORAL DAILY
Qty: 90 TABLET | Refills: 1 | Status: SHIPPED | OUTPATIENT
Start: 2019-05-07 | End: 2019-05-14

## 2019-05-07 RX ORDER — HYDROCODONE BITARTRATE AND ACETAMINOPHEN 5; 325 MG/1; MG/1
1 TABLET ORAL EVERY 4 HOURS PRN
Qty: 12 TABLET | Refills: 0 | Status: SHIPPED | OUTPATIENT
Start: 2019-05-07 | End: 2019-05-10

## 2019-05-07 RX ORDER — PSEUDOEPHEDRINE HCL 30 MG
100 TABLET ORAL 2 TIMES DAILY
Qty: 30 CAPSULE | Refills: 0 | Status: SHIPPED | OUTPATIENT
Start: 2019-05-07 | End: 2020-11-19

## 2019-05-07 RX ORDER — FUROSEMIDE 40 MG/1
40 TABLET ORAL DAILY
Qty: 30 TABLET | Refills: 3 | Status: SHIPPED | OUTPATIENT
Start: 2019-05-07 | End: 2019-05-14 | Stop reason: SDUPTHER

## 2019-05-07 RX ADMIN — FUROSEMIDE 40 MG: 40 TABLET ORAL at 07:42

## 2019-05-07 RX ADMIN — NALOXEGOL OXALATE 12.5 MG: 12.5 TABLET, FILM COATED ORAL at 07:42

## 2019-05-07 RX ADMIN — LEVOTHYROXINE SODIUM 137 MCG: 137 TABLET ORAL at 07:39

## 2019-05-07 RX ADMIN — PREGABALIN 50 MG: 50 CAPSULE ORAL at 07:39

## 2019-05-07 RX ADMIN — POTASSIUM CHLORIDE 20 MEQ: 20 TABLET, EXTENDED RELEASE ORAL at 07:39

## 2019-05-07 RX ADMIN — FAMOTIDINE 20 MG: 20 TABLET ORAL at 07:43

## 2019-05-07 RX ADMIN — METOPROLOL TARTRATE 50 MG: 50 TABLET, FILM COATED ORAL at 07:39

## 2019-05-07 RX ADMIN — BACITRACIN ZINC NEOMYCIN SULFATE POLYMYXIN B SULFATE: 400; 3.5; 5 OINTMENT TOPICAL at 07:42

## 2019-05-07 RX ADMIN — HYDROCODONE BITARTRATE AND ACETAMINOPHEN 1 TABLET: 5; 325 TABLET ORAL at 07:43

## 2019-05-07 ASSESSMENT — PAIN DESCRIPTION - DESCRIPTORS
DESCRIPTORS: BURNING
DESCRIPTORS: BURNING

## 2019-05-07 ASSESSMENT — PAIN DESCRIPTION - PAIN TYPE
TYPE: ACUTE PAIN
TYPE: ACUTE PAIN

## 2019-05-07 ASSESSMENT — PAIN DESCRIPTION - PROGRESSION: CLINICAL_PROGRESSION: GRADUALLY IMPROVING

## 2019-05-07 ASSESSMENT — PAIN DESCRIPTION - ORIENTATION
ORIENTATION: RIGHT
ORIENTATION: RIGHT

## 2019-05-07 ASSESSMENT — PAIN DESCRIPTION - FREQUENCY
FREQUENCY: INTERMITTENT
FREQUENCY: INTERMITTENT

## 2019-05-07 ASSESSMENT — PAIN DESCRIPTION - ONSET
ONSET: GRADUAL
ONSET: ON-GOING

## 2019-05-07 ASSESSMENT — PAIN SCALES - GENERAL
PAINLEVEL_OUTOF10: 1
PAINLEVEL_OUTOF10: 4
PAINLEVEL_OUTOF10: 5
PAINLEVEL_OUTOF10: 4

## 2019-05-07 ASSESSMENT — PAIN DESCRIPTION - LOCATION
LOCATION: FOOT
LOCATION: FOOT

## 2019-05-07 ASSESSMENT — PAIN - FUNCTIONAL ASSESSMENT
PAIN_FUNCTIONAL_ASSESSMENT: ACTIVITIES ARE NOT PREVENTED
PAIN_FUNCTIONAL_ASSESSMENT: ACTIVITIES ARE NOT PREVENTED

## 2019-05-07 NOTE — PROGRESS NOTES
Hospitalist Progress Note    Patient:  Bobby Breech  YOB: 1940  MRN: [id number]   PCP: Haydee Ward MD         Acct: [de-identified]  Unit/Bed: [address] [place] [address]    Date of Admission: 5/1/2019      ASSESSMENT     1. S/p robotic assisted laparoscopic radical cystoprostatectomy with bilateral pelvic node dissection with creation of ileal conduit on 5/1, management per Urology  2. Hypervolemia, Acute respiratory failure with hypoxia 2/2 post-op acute pulmonary edema  1. CXR showed increased pulmonary infiltrates  2. Required up to 10 liters of oxygen post-op  3. Echocardiogram showed EF of 60% with somewhat calcified aortic valve  4. Started on Lasix 40 mg IV bid  5. Transitioned to Lasix 40 mg IV daily on 5/4  6. Will transition to Lasix 40 mg po daily starting tomorrow  7. Diuresing well  8. Now on RA  3. Hypokalemia  4. Uncontrolled HTN  1. Improved  5. Chronic co-morbidities  1. CAD   2. HLD  3. Essential HTN  4. Acquired hypothyroidism  5. PVD- hx of AA and fem/fem bypass  6. Peripheral neuropathy  7. BPH  8. Bladder cancer    PLAN     1. Discharge on Lasix 40 mg daily  2. BMP in 1 week  3. F/u with PCP in 1 week  4. Continue to monitor daily weights  5. OK to discharge from hospitalist standpoint    Anticipated Discharge in : Per Urology    Code Status: Prior    Electronically signed by Guillermina Quintana MD on 5/7/2019 at 6:35 PM      Chief Complaint     Edema, post-op respiratory failure    SUBJECTIVE     The patient is a 78 y.o. [place] yo male who was admitted for robotic assisted prostatectomy. We were consulted 2/2 evidence for hypervolemia post-op.     - Nurse concerned that urine output slowed down a little overnight, however patient continues to lose weight and 300 ml recorded over last 24 hours  -    OBJECTIVE     Medications:  Reviewed    Infusion Medications   Scheduled Medications     PRN Meds:     Ins and outs:      Intake/Output Summary (Last 24 hours) at 5/7/2019 7881  Last data filed at 5/7/2019 0344  Gross per 24 hour   Intake 450 ml   Output 1225 ml   Net -775 ml       Physical Examination     /71   Pulse 72   Temp 98 °F (36.7 °C) (Oral)   Resp 18   Ht 5' 7\" (1.702 m)   Wt 190 lb 9.6 oz (86.5 kg)   SpO2 97%   BMI 29.85 kg/m²     General appearance: No apparent distress. Sitting in chair  HEENT: Extraocular motion intact. Neck: Supple  Respiratory:  CTA bilaterally without rales/wheezes/rhonchi. Cardiovascular: RRR with normal S1/S2. 2/6 LEAH. No rubs or gallops. Abdomen: Soft, non-distended. Staples look C/D/I. Ostomy in place   Musculoskeletal: Patient is moving extremities x 4 spontaneously  Neurologic: Grossly non focal. CN: II-XII intact  Psychiatric: Alert and oriented  Vascular: Dorsalis pedis palpable bilaterally. Radial pulses palpable bilaterally. Bilateral LE edema continues to improve  Skin:  No visible rashes or lesions. : Velasquez catheter in place with bloody urine. Labs     Recent Labs     05/05/19  0438 05/05/19  1545 05/06/19  0433 05/07/19  0437   WBC 10.0  --   --  9.8   HGB 8.9* 9.8* 9.3* 10.3*   HCT 27.8* 29.5* 28.3* 32.4*     --   --  215     Recent Labs     05/05/19  0438 05/06/19  0433 05/07/19  0437    139 140   K 3.6 3.5 3.4*    103 101   CO2 26 26 25   BUN 13 16 17   CREATININE 0.9 0.9 1.0   CALCIUM 8.0* 8.1* 8.5     No results for input(s): AST, ALT, BILIDIR, BILITOT, ALKPHOS in the last 72 hours. No results for input(s): INR in the last 72 hours. No results for input(s): [place] in the last 72 hours.     Urinalysis:      Lab Results   Component Value Date    NITRU NEGATIVE 04/22/2019    WBCUA 5-10 04/22/2019    BACTERIA NONE 04/22/2019    RBCUA 3-5 04/22/2019    BLOODU NEGATIVE 04/22/2019    SPECGRAV 1.015 04/11/2019    SPECGRAV 1.015 03/07/2017    GLUCOSEU NEGATIVE 04/22/2019       Diagnostic imaging/procedures     Ct Abdomen Pelvis W Wo Contrast Additional Contrast? None    Result Date: 4/23/2019  CT ABDOMEN AND PELVIS WITHOUT AND WITH CONTRAST ENHANCEMENT: CLINICAL INFORMATION: Bladder tumor COMPARISON: 12/5/2018 TECHNIQUE: Multiple axial 5 mm images of the abdomen, pelvis, and lung bases were obtained before and after the administration of intravenous contrast material (ISOVUE). Postcontrast images were obtained at 2 minutes 8 minutes postinjection. Oral contrast material was not administered. Computer generated sagittal and coronal coronal images of the abdomen and pelvis were also reconstructed. ALL CT SCANS AT THIS FACILITY use dose modulation, iterative reconstruction, and/or weight-based dosing when appropriate to reduce radiation dose to as low as reasonably achievable. FINDINGS: Lung bases: Unremarkable. No infiltrates. No effusions. No lung nodules. Liver: Liver unremarkable. Gallbladder unremarkable Pancreas, spleen and adrenal glands: Unremarkable Kidneys: There has been a prior right nephrectomy. There is a small benign-appearing cyst mid body left kidney posteriorly. No renal calculi are seen. There are a few vascular calcifications in the central aspect left kidney. 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 The aneurysm has not changed in size since the prior study. The celiac artery and SMA are widely patent. The left renal artery is also patent but somewhat obscured by artifacts from the endograft. No endoleak is seen. . The left iliac limb of the endograft is occluded and there is a right-2-left femorofemoral bypass graft. Bowel/Peritoneum: No abnormally dilated bowel loops are seen. There are a few diverticula in the sigmoid colon. No evidence for diverticulitis. No free air. No free fluid in the abdomen. No abnormally enlarged para-aortic lymph nodes are seen. Bones : Multilevel laminectomy defects are present lower lumbar spine.  There is multifocal moderate disc space narrowing and degenerative disc disease throughout the lumbar spine as well as mild retrolisthesis L2 upon L3, L3 upon L4, and L4 upon L5. Mild  scoliotic curvatures are again seen in the lower thoracic and lumbar spine. Pelvis: Urinary bladder is unremarkable. No evidence for residual or recurrent mass in the bladder. There is mild persistent thickening of the wall the urinary bladder on the right side anteriorly, likely postsurgical. Appearance unchanged from prior study. Artifacts are present from embolization coils in both internal iliac arteries. 1. Mild stable thickening of the urinary bladder right side anteriorly, likely postsurgical. 2. Prior right nephrectomy. 7.3 cm fusiform aneurysm infrarenal abdominal aorta with endograft in place. No endoleak seen. Left iliac limb of the endograft is occluded. There is a femoral-femoral bypass graft which is presumably patent. 3. No evidence for residual recurrent malignancy or metastatic disease. **This report has been created using voice recognition software. It may contain minor errors which are inherent in voice recognition technology. ** Final report electronically signed by Dr. Maribel Franklin on 4/23/2019 4:24 PM    Xr Abdomen (kub) (single Ap View)    Result Date: 5/1/2019  PROCEDURE: XR ABDOMEN (KUB) (SINGLE AP VIEW) CLINICAL INFORMATION: NG tube placement COMPARISON: CT dated 4/23/2019 TECHNIQUE:  AP supine single view  FINDINGS: There is an enteric tube present with the tip overlying the gastric body. The distal side port also projects over the gastric body the on the GE junction. There is an indeterminate gas-filled small bowel loop overlying the mid abdomen. There is free air within the abdomen which is compatible with recent abdominal surgery. There is chronic elevation of the left hemidiaphragm. 1. Free air is demonstrated within the peritoneal cavity. This is most consistent with recent abdominal surgery. However, recommend continued follow-up.  2. Enteric tube tip and distal side-port project over the gastric body. 3.  Indeterminate bowel gas pattern with indeterminate mildly distended gas-filled small bowel overlying the mid abdomen. **This report has been created using voice recognition software. It may contain minor errors which are inherent in voice recognition technology. ** Final report electronically signed by Dr. Jessi Goncalves on 5/1/2019 3:54 PM    Xr Chest Portable    Result Date: 5/2/2019  PROCEDURE: XR CHEST PORTABLE CLINICAL INFORMATION: right sided chest discomfort and subcutaneous air. COMPARISON: Chest x-ray dated 10/1/2018 and the KUB dated 5/1/2019 TECHNIQUE: AP Portable chest xray FINDINGS: Lungs/pleura: The interstitial infiltrates throughout both lungs noted on yesterday's KUB have resolved. There is mild bibasilar atelectasis. There is no pneumonia. There is a tiny left pleural effusion blunting the left lateral costophrenic angle. There is no pneumothorax. Heart: Heart size is mildly accentuated likely due to the portable technique. Mediastinum/toro: No obvious mass or adenopathy. Skeleton: There is multilevel vertebral endplate spondylosis. Lines/tubes/devices: Right jugular Port-A-Cath tip lies at the level of the central SVC. NG tube tip lies in the region of the proximal stomach in satisfactory position. Upper abdomen: There is moderate gaseous distention of the stomach. There is minimal subcutaneous emphysema along the lateral upper right abdominal wall, better seen on yesterday's KUB. Resolved interstitial infiltrates compared to yesterday's KUB. Mild bibasilar atelectasis. Tiny left pleural effusion. Satisfactory positions of the NG tube and right jugular Port-A-Cath. Moderate gaseous distention of the stomach. **This report has been created using voice recognition software. It may contain minor errors which are inherent in voice recognition technology. ** Final report electronically signed by Dr. Beronica Hernandez on 5/2/2019 3:11 AM      DVT prophylaxis: ? Per Urology ? SQ Heparin                                 ? Encourage ambulation           ?  Already on Anticoagulation

## 2019-05-07 NOTE — PROGRESS NOTES
Discharge teaching and instructions for diagnosis/procedure of ileoconduit completed with patient using teachback method. AVS reviewed. Dynahex soap with home going incision care sheet given. Unused medications, especially pain medications, should be disposed to ensure medications do not end up being misused in the future, as well as helping to ensure drugs are not impacting the environment. [facility] [employer] and many local police agencies have medication take-back bins to properly destroy these medications. Please see the site https://apps. [employer]iversion. [employer].gov/pubdispsearch/spring/main?execution=e2s1 for additional take-back bins located in your area. Printed prescriptions given to patient. Patient voiced understanding regarding prescriptions, follow up appointments, and care of self at home.  Discharged in a wheelchair to  home with support per family

## 2019-05-07 NOTE — DISCHARGE SUMMARY
Urology Progress Note    Chief Complaint: bladder cancer    Subjective: \"The care has been great. I am ready to go home. \"    Patient is resting in chair, urostomy draining yellow urine, +flatus, +BM, ambulating with assistance, tolerating regular diet, denies any nausea or vomiting. He denies any pain at this time. He is feeling well. He denies chest pain, shortness of breath, or calf pain. Does have chronic numbness in feet.            Vitals:  /71   Pulse 72   Temp 98 °F (36.7 °C) (Oral)   Resp 18   Ht 5' 7\" (1.702 m)   Wt 190 lb 9.6 oz (86.5 kg)   SpO2 97%   BMI 29.85 kg/m²   Temp  Av.3 °F (36.8 °C)  Min: 97.6 °F (36.4 °C)  Max: 98.9 °F (37.2 °C)    Intake/Output Summary (Last 24 hours) at 2019 0831  Last data filed at 2019 0344  Gross per 24 hour   Intake 800 ml   Output 1527 ml   Net -727 ml       Social History     Socioeconomic History    Marital status:      Spouse name: Not on file    Number of children: Not on file    Years of education: Not on file    Highest education level: Not on file   Occupational History    Not on file   Social Needs    Financial resource strain: Not on file    Food insecurity:     Worry: Not on file     Inability: Not on file    Transportation needs:     Medical: Not on file     Non-medical: Not on file   Tobacco Use    Smoking status: Former Smoker     Packs/day: 1.00     Years: 24.00     Pack years: 24.00     Types: Cigarettes     Start date:      Last attempt to quit: 1979     Years since quittin.1    Smokeless tobacco: Never Used   Substance and Sexual Activity    Alcohol use: No     Alcohol/week: 0.0 oz    Drug use: No    Sexual activity: Not Currently   Lifestyle    Physical activity:     Days per week: Not on file     Minutes per session: Not on file    Stress: Not on file   Relationships    Social connections:     Talks on phone: Not on file     Gets together: Not on file     Attends [employer] service: Not on file     Active member of club or organization: Not on file     Attends meetings of clubs or organizations: Not on file     Relationship status: Not on file    Intimate partner violence:     Fear of current or ex partner: Not on file     Emotionally abused: Not on file     Physically abused: Not on file     Forced sexual activity: Not on file   Other Topics Concern    Not on file   Social History Narrative    Not on file     Family History   Problem Relation Age of Onset    Diabetes Mother     Cancer Mother     Arthritis Mother     Asthma Father     Heart Disease Father     Cancer Father     Stroke Sister     Heart Disease Sister     Cancer Sister     Stroke Brother     Heart Disease Brother     Cancer Brother      No Known Allergies      Constitutional: Alert and oriented times x3, no acute distress, and cooperative to examination with appropriate mood and affect. HEENT:   Head:         Normocephalic and atraumatic. Mucous membranes are normal.   Eyes:         EOM are normal. No scleral icterus. Nose:    The external appearance of the nose is normal  Ears: The ears appear normal to external inspection. Cardiovascular:       Normal rate, regular rhythm. Pulmonary/Chest:  Normal respiratory rate and rhthym. No use of accessory muscles. Lungs clear bilaterally. Abdominal:          Soft. No tenderness. Active bowel sounds. Ras approximated, tape reaction at right sided abdominal incision. Blister noted. Minimal ecchymosis of left lower flank, and below midline incision.  Urostomy bag intact, stoma beefy, red, and moist, stent in bag, draining yellow urine.    Musculoskeletal:    Normal range of motion. Minimal edema to feet  Extremities:    No cyanosis, clubbing, or edema present. Neurological:    Alert and oriented.      Labs:  WBC:    Lab Results   Component Value Date    WBC 9.8 05/07/2019     Hemoglobin/Hematocrit:    Lab Results   Component Value Date    HGB 10.3 2019    HCT 32.4 2019     BMP:    Lab Results   Component Value Date     2019    K 3.4 2019    K 3.3 2019     2019    CO2 25 2019    BUN 17 2019    LABALBU 2.7 2019    LABALBU 4.1 2012    CREATININE 1.0 2019    CALCIUM 8.5 2019    LABGLOM 72 2019     Clinical Information: BLADDER CANCER    FINAL DIAGNOSIS:  A: Left distal ureter, biopsy:   Cross-section of ureter with no significant pathologic findings.   Negative for dysplasia and invasive malignancy. B: Prostate and bladder, radical cystoprostatectomy:   Bladder:    Stromal changes indicative of previous therapy effects.    One lymph node with no evidence of malignancy.  (0/1)    Overlying urothelial dysplasia.    Edematous mucosa with mixed inflammation and reactive changes.    Negative for residual invasive malignancy.   Prostate:    Nodular prostatic hyperplasia with acute and chronic inflammation.    Necrotizing granulomatous inflammation.    Negative for prostatic malignancy. C: Right and left pelvic lymph nodes, resection:   Fourteen lymph node with no evidence of malignancy.  (0/14)     (y)pT0, pN0        Impression:    Muscle invasive bladder cancer  Acute post op anemia  Hypokalemia- resolved  S/p robotic cystoprostatectomy 19  Solitary Left Kidney       Plan:    POD # 6  ROBOTIC ASSISTED LAPAROSCOPIC RADICAL CYSTOPROSTATECTOMY WITH BILATERAL PELVIC Bri Chavira Dr. Joyce Mantilla 19        Start Aspirin 81 mg daily tomorrow. Dr. Araceli Menard can assess ecchymosis on Thursday and decide about Lovenox then. Hemoglobin improved today. Follow up 19 with Dr. Araceli Menard for staple removal    Eyal Vergara, APRN  19 8:31 AM  Urology        Patient Identification  Ingris Paul is a 78 y.o. male.   :  1940  Admit Date:  2019    Discharge date: 19 Disposition: home    Discharge Diagnoses:   Patient Active Problem List   Diagnosis    Coronary artery disease involving native heart    Hyperlipidemia    Essential hypertension    Hypothyroidism    Claudication (HCC)    CKD (chronic kidney disease) stage 3, GFR 30-59 ml/min (HCC)    BCG ROXANE    Bladder cancer (HCC)    Subcutaneous cyst    S/P femoral-femoral bypass surgery    Pseudoaneurysm of right femoral artery (HCC)    PVD (peripheral vascular disease) ([address]    Spinal stenosis of lumbar region    Macular degeneration    Pneumonia due to organism    Pleurisy without effusion    Abnormal CT of the chest    Bladder tumor    Pain in penis and bladder    Encounter for ostomy care education    Hypervolemia    Acute respiratory failure with hypoxia ([address]    Acute pulmonary edema ([address]    Hypokalemia    Uncontrolled hypertension    Peripheral polyneuropathy    Benign prostatic hyperplasia with lower urinary tract symptoms       Consults: Hospitalist, Dietary, Wound & Ostomy    Surgery: Samir Tinajero 5-1-19       Patient Instructions: Activity: no heavy lifting, pushing, pulling for 6 weeks, no driving while on analgesics. Diet: As tolerated  Follow-up with Dr. Chadwick Pham on 5/9/19 as scheduled. Start Aspirin 81 mg daily tomorrow. Hospital course: Patient underwent Samir Tinajero 5-1-19      with no complications. Post-operatively he remained stable. Patient is tolerating diet, urostomy draining clear/yellow urine, stoma beefy red, incisions well approximated with staples intact, pain is minimal, ambulating well with assistance, having flatus and BM.       Time spent for discharge: 42 minutes

## 2019-05-08 ENCOUNTER — TELEPHONE (OUTPATIENT)
Dept: FAMILY MEDICINE CLINIC | Age: 79
End: 2019-05-08

## 2019-05-08 NOTE — TELEPHONE ENCOUNTER
[facility] Trans[facility] Initial Follow Up Call    Call within 2 business days of discharge: Yes     Patient: Smita Gonzalez Patient : 1940   MRN: [id number]  Reason for Admission: robotic radical cystoprostatectomy, bladder cancer  Discharge Date: 19 RARS: Readmission Risk Score: 25       Spoke with: wife Dee-states he has filled the medications and taking as prescribed. States he is doing pretty good and denies any pain. Discharge department/facility: [facility]    Non-face-to-face services provided:  Confirmed 's appt below.      Follow Up  Future Appointments   Date Time Provider Devon Luevano   2019 10:15 AM Nancy Mead MD [facility] Urology [facility]   2019 10:20 AM Jovanny Altamirano MD [facility]   5/15/2019 11:40 AM Yusuf Ybarra MD Oncology [facility]   5/15/2019 12:40 PM STR OUT PT ONC INJ RM 11 [facility] OP ONC None   2019  1:00 PM DEMAR Randhawa - CNP [facility] WOUND None   2019  9:00 AM Mercedez Frye MD [facility]   2019  1:00 PM DEMAR Zavala - CNP [facility] WOUND None       Deo Bermudez RN

## 2019-05-09 ENCOUNTER — OFFICE VISIT (OUTPATIENT)
Dept: UROLOGY | Age: 79
End: 2019-05-09

## 2019-05-09 VITALS
HEIGHT: 67 IN | BODY MASS INDEX: 29.98 KG/M2 | DIASTOLIC BLOOD PRESSURE: 68 MMHG | WEIGHT: 191 LBS | SYSTOLIC BLOOD PRESSURE: 114 MMHG

## 2019-05-09 DIAGNOSIS — C67.9 MALIGNANT NEOPLASM OF URINARY BLADDER, UNSPECIFIED SITE (HCC): Primary | ICD-10-CM

## 2019-05-09 PROCEDURE — 99024 POSTOP FOLLOW-UP VISIT: CPT | Performed by: UROLOGY

## 2019-05-09 NOTE — PROGRESS NOTES
Mr. Jose Starks was seen in follow up after recent cystoprostatectomy. He is doing well. He is regaining his energy. His stoma looks healthy. He is having minimal pain.       Past Medical History:   Diagnosis Date    AAA (abdominal aortic aneurysm) ([address]     intravascular stent    BCG ROXANE 5/21/2014    BPH (benign prostatic hypertrophy)     CAD (coronary artery disease)     Carotid artery stenosis     Chronic back pain     Chronic kidney disease     Congenital absence of one kidney     absent right kidney    History of lumbar fusion 3/15    mid lower back down    Hyperlipidemia     Hypertension     Hypothyroidism     Obesity     TIA (transient ischemic attack) 1/2008    Unspecified cerebral artery occlusion with cerebral infarction     TIA       Past Surgical History:   Procedure Laterality Date    ABDOMINAL AORTIC ANEURYSM REPAIR  3/2010    Shireen    ABDOMINAL AORTIC ANEURYSM REPAIR  7/24/15    Albertine Knack    COLONOSCOPY  12/21/11    [facility]    CORONARY ANGIOPLASTY WITH STENT PLACEMENT  2003    2 stents    FEMORAL-FEMORAL BYPASS GRAFT  03/09/2017    Dr. Tafoya Medico    arm    KNEE ARTHROSCOPY  12/2008    meniscus tear    LUMBAR LAMINECTOMY  3/2016    [facility]--Dr. Galicia Books    OTHER SURGICAL HISTORY  4/14/15    melanoma removal from right chest    OTHER SURGICAL HISTORY  5/20/2016    I and D, exicison of posterior neck cyst    UT OFFICE/OUTPT VISIT,PROCEDURE ONLY N/A 11/8/2018    TURBT performed by Dionisio Townsend MD at [facility] 5/1/2019    ROBOTIC RADICAL CYSTOPROSTATECTOMY performed by Dionisio Townsend MD at [facility] [address]  10/2016    Dr Johnathan Lal  1990's    TUNNELED VENOUS PORT PLACEMENT      TURP  04/16/2014    See note of Dr. Jonah Younger for OR procedure details       Current Outpatient Medications on File Prior to Visit   Medication Sig Dispense Refill    docusate sodium (COLACE, DULCOLAX) 100 MG CAPS Take 100 mg by mouth 2 times daily 30 capsule 0    furosemide (LASIX) 40 MG tablet Take 1 tablet by mouth daily 90 tablet 1    furosemide (LASIX) 40 MG tablet Take 1 tablet by mouth daily 30 tablet 3    polycarbophil (FIBERCON) 625 MG tablet Take 625 mg by mouth daily      b complex vitamins capsule Take 1 capsule by mouth daily      atorvastatin (LIPITOR) 40 MG tablet TAKE 1 TABLET BY MOUTH EVERY DAY AT BEDTIME 30 tablet 11    pregabalin (LYRICA) 50 MG capsule Take 1 capsule by mouth 3 times daily for 90 days. 270 capsule 3    levothyroxine (SYNTHROID) 137 MCG tablet TAKE 1 TABLET BY MOUTH DAILY 90 tablet 3    KLOR-CON M10 10 MEQ extended release tablet TAKE 1 TABLET BY MOUTH DAILY 90 tablet 3    metoprolol tartrate (LOPRESSOR) 25 MG tablet Take 1 tablet by mouth 2 times daily (Patient taking differently: Take 50 mg by mouth 2 times daily ) 180 tablet 3    nitroGLYCERIN (NITROSTAT) 0.4 MG SL tablet Place 1 tablet under the tongue every 5 minutes as needed for Chest pain 25 tablet 1    Elastic Bandages & Supports (JOBST KNEE HIGH COMPRESSION SM) MISC 1 each by Does not apply route daily as needed 1 each 0    Handicap Placard MISC Cannot walk more than 200 feet without stopping to rest or without assistance. Lifetime x 5 years. 1 each 0    aspirin 81 MG tablet Take 81 mg by mouth daily.  HYDROcodone-acetaminophen (NORCO) 5-325 MG per tablet Take 1 tablet by mouth every 4 hours as needed for Pain for up to 3 days. 12 tablet 0     No current facility-administered medications on file prior to visit.         No Known Allergies    Family History   Problem Relation Age of Onset    Diabetes Mother     Cancer Mother     Arthritis Mother     Asthma Father     Heart Disease Father     Cancer Father     Stroke Sister     Heart Disease Sister     Cancer Sister     Stroke Brother     Heart Disease Brother     Cancer Brother        Social History     Socioeconomic History    Marital status:      Spouse name: Not on file    Number of children: Not on file    Years of education: Not on file    Highest education level: Not on file   Occupational History    Not on file   Social Needs    Financial resource strain: Not on file    Food insecurity:     Worry: Not on file     Inability: Not on file    Transportation needs:     Medical: Not on file     Non-medical: Not on file   Tobacco Use    Smoking status: Former Smoker     Packs/day: 1.00     Years: 24.00     Pack years: 24.00     Types: Cigarettes     Start date:      Last attempt to quit: 1979     Years since quittin.1    Smokeless tobacco: Never Used   Substance and Sexual Activity    Alcohol use: No     Alcohol/week: 0.0 oz    Drug use: No    Sexual activity: Not Currently   Lifestyle    Physical activity:     Days per week: Not on file     Minutes per session: Not on file    Stress: Not on file   Relationships    Social connections:     Talks on phone: Not on file     Gets together: Not on file     Attends [employer] service: Not on file     Active member of club or organization: Not on file     Attends meetings of clubs or organizations: Not on file     Relationship status: Not on file    Intimate partner violence:     Fear of current or ex partner: Not on file     Emotionally abused: Not on file     Physically abused: Not on file     Forced sexual activity: Not on file   Other Topics Concern    Not on file   Social History Narrative    Not on file       Review of Systems  No problems with ears, nose or throat. No problems with eyes. No chest pain, shortness of breath, abdominal pain, extremity pain or weakness, and no neurological deficits. No rashes. No swollen glands or lymph nodes.  symptoms per HPI. The remainder of the review of symptoms is negative. Exam  Constitutional: oriented to person, place, and time. Vital signs are normal. appears well-developed and well-nourished. cooperative. No distress. HENT:    Head: Normocephalic and atraumatic.    Mouth/Throat: Oropharynx is clear and moist and mucous membranes are normal. No oropharyngeal exudate. Eyes: EOM are normal. Pupils are equal, round, and reactive to light. Right eye exhibits no discharge. Left eye exhibits no discharge. No scleral icterus. Neck: Trachea normal. No JVD present. No tracheal deviation present. Cardiovascular: Normal rate and regular rhythm. Pulmonary/Chest: Effort normal. No respiratory distress. no wheezes. Abdominal: Soft. exhibits no distension. There is no tenderness. There is no rebound and no CVA tenderness. Musculoskeletal: no edema or tenderness. Lymphadenopathy:   Right: No supraclavicular adenopathy present. Left: No supraclavicular adenopathy present. Neurological: alert and oriented to person, place, and time. No cranial nerve deficit. Skin: Skin is warm and dry. not diaphoretic. Psychiatric: normal mood and affect. behavior is normal.   Nursing note and vitals reviewed. Labs    No results found for this visit on 05/09/19. Lab Results   Component Value Date    CREATININE 1.0 05/07/2019    BUN 17 05/07/2019     05/07/2019    K 3.4 (L) 05/07/2019     05/07/2019    CO2 25 05/07/2019       Lab Results   Component Value Date    PSA 0.70 07/25/2011    PSA 0.83 10/31/2008     PATHOLOGY REPORT                      ATTN: FACUNDO MONCADA                      REQ: Melanie Collado        Clinical Information: BLADDER CANCER    FINAL DIAGNOSIS:  A: Left distal ureter, biopsy:   Cross-section of ureter with no significant pathologic findings.   Negative for dysplasia and invasive malignancy. B: Prostate and bladder, radical cystoprostatectomy:   Bladder:    Stromal changes indicative of previous therapy effects.    One lymph node with no evidence of malignancy.   (0/1)    Overlying urothelial dysplasia.    Edematous mucosa with mixed inflammation and reactive changes.    Negative for residual

## 2019-05-11 ENCOUNTER — HOSPITAL ENCOUNTER (OUTPATIENT)
Age: 79
Setting detail: SPECIMEN
Discharge: HOME | End: 2019-05-11
Payer: MEDICARE

## 2019-05-11 LAB
ANION GAP SERPL CALCULATED.3IONS-SCNC: 14 MEQ/L (ref 8–16)
BUN BLDV-MCNC: 17 MG/DL (ref 7–22)
CALCIUM SERPL-MCNC: 9.1 MG/DL (ref 8.5–10.5)
CHLORIDE BLD-SCNC: 98 MEQ/L (ref 98–111)
CO2: 29 MEQ/L (ref 23–33)
CREAT SERPL-MCNC: 1.1 MG/DL (ref 0.4–1.2)
ERYTHROCYTE [DISTWIDTH] IN BLOOD BY AUTOMATED COUNT: 13.9 % (ref 11.5–14.5)
ERYTHROCYTE [DISTWIDTH] IN BLOOD BY AUTOMATED COUNT: 49.6 FL (ref 35–45)
GFR SERPL CREATININE-BSD FRML MDRD: 65 ML/MIN/1.73M2
GLUCOSE BLD-MCNC: 105 MG/DL (ref 70–108)
HCT VFR BLD CALC: 35.3 % (ref 42–52)
HEMOGLOBIN: 11.5 GM/DL (ref 14–18)
MCH RBC QN AUTO: 31.7 PG (ref 26–33)
MCHC RBC AUTO-ENTMCNC: 32.6 GM/DL (ref 32.2–35.5)
MCV RBC AUTO: 97.2 FL (ref 80–94)
PLATELET # BLD: 291 THOU/MM3 (ref 130–400)
PMV BLD AUTO: 9.7 FL (ref 9.4–12.4)
POTASSIUM SERPL-SCNC: 4.1 MEQ/L (ref 3.5–5.2)
RBC # BLD: 3.63 MILL/MM3 (ref 4.7–6.1)
SODIUM BLD-SCNC: 141 MEQ/L (ref 135–145)
WBC # BLD: 10.4 THOU/MM3 (ref 4.8–10.8)

## 2019-05-11 PROCEDURE — 85027 COMPLETE CBC AUTOMATED: CPT

## 2019-05-11 PROCEDURE — 80048 BASIC METABOLIC PNL TOTAL CA: CPT

## 2019-05-14 ENCOUNTER — OFFICE VISIT (OUTPATIENT)
Dept: FAMILY MEDICINE CLINIC | Age: 79
End: 2019-05-14
Payer: MEDICARE

## 2019-05-14 VITALS
WEIGHT: 186 LBS | DIASTOLIC BLOOD PRESSURE: 56 MMHG | SYSTOLIC BLOOD PRESSURE: 114 MMHG | HEART RATE: 68 BPM | TEMPERATURE: 97.6 F | BODY MASS INDEX: 29.13 KG/M2 | RESPIRATION RATE: 20 BRPM

## 2019-05-14 DIAGNOSIS — N18.30 CKD (CHRONIC KIDNEY DISEASE) STAGE 3, GFR 30-59 ML/MIN (HCC): ICD-10-CM

## 2019-05-14 DIAGNOSIS — I10 ESSENTIAL HYPERTENSION: ICD-10-CM

## 2019-05-14 DIAGNOSIS — E03.9 ACQUIRED HYPOTHYROIDISM: Chronic | ICD-10-CM

## 2019-05-14 DIAGNOSIS — C67.9 MALIGNANT NEOPLASM OF URINARY BLADDER, UNSPECIFIED SITE (HCC): Primary | ICD-10-CM

## 2019-05-14 DIAGNOSIS — G62.9 PERIPHERAL POLYNEUROPATHY: ICD-10-CM

## 2019-05-14 PROCEDURE — 99495 TRANSJ CARE MGMT MOD F2F 14D: CPT | Performed by: FAMILY MEDICINE

## 2019-05-14 PROCEDURE — 1111F DSCHRG MED/CURRENT MED MERGE: CPT | Performed by: FAMILY MEDICINE

## 2019-05-14 RX ORDER — NITROGLYCERIN 0.4 MG/1
0.4 TABLET SUBLINGUAL EVERY 5 MIN PRN
Qty: 25 TABLET | Refills: 1 | Status: SHIPPED | OUTPATIENT
Start: 2019-05-14

## 2019-05-15 ENCOUNTER — HOSPITAL ENCOUNTER (OUTPATIENT)
Dept: INFUSION THERAPY | Age: 79
Discharge: HOME | End: 2019-05-15
Payer: MEDICARE

## 2019-05-15 ENCOUNTER — OFFICE VISIT (OUTPATIENT)
Dept: ONCOLOGY | Age: 79
End: 2019-05-15
Payer: MEDICARE

## 2019-05-15 VITALS
HEIGHT: 67 IN | HEART RATE: 65 BPM | WEIGHT: 192 LBS | BODY MASS INDEX: 30.13 KG/M2 | DIASTOLIC BLOOD PRESSURE: 61 MMHG | OXYGEN SATURATION: 100 % | TEMPERATURE: 98.2 F | RESPIRATION RATE: 18 BRPM | SYSTOLIC BLOOD PRESSURE: 128 MMHG

## 2019-05-15 DIAGNOSIS — N48.89 PAIN IN PENIS: Primary | ICD-10-CM

## 2019-05-15 DIAGNOSIS — C67.9 MALIGNANT NEOPLASM OF URINARY BLADDER, UNSPECIFIED SITE (HCC): Primary | ICD-10-CM

## 2019-05-15 DIAGNOSIS — Z92.21 STATUS POST CHEMOTHERAPY, TIME SINCE GREATER THAN 12 WEEKS: ICD-10-CM

## 2019-05-15 DIAGNOSIS — C67.9 MALIGNANT NEOPLASM OF URINARY BLADDER, UNSPECIFIED SITE (HCC): ICD-10-CM

## 2019-05-15 DIAGNOSIS — N18.30 CKD (CHRONIC KIDNEY DISEASE) STAGE 3, GFR 30-59 ML/MIN (HCC): ICD-10-CM

## 2019-05-15 DIAGNOSIS — G63 POLYNEUROPATHY ASSOCIATED WITH UNDERLYING DISEASE (HCC): ICD-10-CM

## 2019-05-15 DIAGNOSIS — D49.4 BLADDER TUMOR: ICD-10-CM

## 2019-05-15 PROCEDURE — 99214 OFFICE O/P EST MOD 30 MIN: CPT | Performed by: INTERNAL MEDICINE

## 2019-05-15 PROCEDURE — 99213 OFFICE O/P EST LOW 20 MIN: CPT

## 2019-05-15 PROCEDURE — 6360000002 HC RX W HCPCS: Performed by: INTERNAL MEDICINE

## 2019-05-15 PROCEDURE — 2709999900 HC NON-CHARGEABLE SUPPLY

## 2019-05-15 PROCEDURE — 2580000003 HC RX 258: Performed by: INTERNAL MEDICINE

## 2019-05-15 RX ORDER — SODIUM CHLORIDE 0.9 % (FLUSH) 0.9 %
20 SYRINGE (ML) INJECTION PRN
Status: CANCELLED | OUTPATIENT
Start: 2019-05-15

## 2019-05-15 RX ORDER — SODIUM CHLORIDE 0.9 % (FLUSH) 0.9 %
20 SYRINGE (ML) INJECTION PRN
Status: DISCONTINUED | OUTPATIENT
Start: 2019-05-15 | End: 2019-05-16 | Stop reason: HOSPADM

## 2019-05-15 RX ORDER — SODIUM CHLORIDE 0.9 % (FLUSH) 0.9 %
10 SYRINGE (ML) INJECTION PRN
Status: CANCELLED | OUTPATIENT
Start: 2019-05-15

## 2019-05-15 RX ORDER — HEPARIN SODIUM (PORCINE) LOCK FLUSH IV SOLN 100 UNIT/ML 100 UNIT/ML
500 SOLUTION INTRAVENOUS PRN
Status: DISCONTINUED | OUTPATIENT
Start: 2019-05-15 | End: 2019-05-16 | Stop reason: HOSPADM

## 2019-05-15 RX ORDER — SODIUM CHLORIDE 0.9 % (FLUSH) 0.9 %
10 SYRINGE (ML) INJECTION PRN
Status: DISCONTINUED | OUTPATIENT
Start: 2019-05-15 | End: 2019-05-16 | Stop reason: HOSPADM

## 2019-05-15 RX ORDER — HEPARIN SODIUM (PORCINE) LOCK FLUSH IV SOLN 100 UNIT/ML 100 UNIT/ML
500 SOLUTION INTRAVENOUS PRN
Status: CANCELLED | OUTPATIENT
Start: 2019-05-15

## 2019-05-15 RX ADMIN — Medication 10 ML: at 12:34

## 2019-05-15 RX ADMIN — Medication 500 UNITS: at 12:35

## 2019-05-15 RX ADMIN — Medication 10 ML: at 12:35

## 2019-05-15 ASSESSMENT — ENCOUNTER SYMPTOMS
COUGH: 0
ABDOMINAL DISTENTION: 0
FACIAL SWELLING: 0
BACK PAIN: 0
BLOOD IN STOOL: 0
VOMITING: 0
CONSTIPATION: 0
DIARRHEA: 0
COLOR CHANGE: 0
WHEEZING: 0
ABDOMINAL PAIN: 0
SHORTNESS OF BREATH: 0
NAUSEA: 0
CHEST TIGHTNESS: 0
TROUBLE SWALLOWING: 0
SORE THROAT: 0
RECTAL PAIN: 0
EYE DISCHARGE: 0

## 2019-05-15 NOTE — PLAN OF CARE
Problem: Discharge Planning  Goal: Knowledge of discharge instructions  Description  Knowledge of discharge instructions     Outcome: Met This Shift  Note:   Verbalize understanding of discharge instructions, follow up appointments, and when to call Physician. Intervention: Discharge to appropriate level of care  Note:   Discuss understanding of discharge instructions, follow up appointments and when to call Physician. Problem: Infection - Central Venous Catheter-Associated Bloodstream Infection:  Goal: Absence of central venous catheter-associated bloodstream infection  Outcome: Met This Shift  Note:   Instructed to monitor for signs/symptoms of infection at medi-port site and call MD if problems develop. Intervention: EDUCATE PATIENT ABOUT SIGNS AND SYMPTOMS OF INFECTION  Note:   Mediport site with no redness or warmth. Skin over port site intact with no signs of breakdown noted. Patient verbalizes signs/symptoms of port infection and when to notify the physician. Care plan reviewed with patient and spouse. Patient and spouse verbalize understanding of the plan of care and contribute to goal setting.

## 2019-05-15 NOTE — PROGRESS NOTES
[facility] [address]  [address] [address]  Dept: [phone]  Dept Fax: [phone]: [phone]    Visit Date:5/15/2019     Ryan Lincoln is a 78 y.o. male who presents today for:   Chief Complaint   Patient presents with    Follow-up     Bladder Cancer        HPI:   This is a 77-year-old patient with Hx of noninvasive high grade papillary urothelial carcinoma diagnosed in 2014. He underwent TURBT and BCG therapy. He was doing fine, regular surveillance cystoscopies were negative till most recent one in October 2018 showed a large bladder tumor. The patient underwent TURBT with transurethral resection On November 8, 2018. Final pathology showed:      High-grade invasive papillary urothelial carcinoma involving the      muscularis propria.   Flat carcinoma in situ. He has a hx of cad with RCA stent in 2003. He had a negative stress test in 2016. He currently expresses no sxs of angina. He can climb a flight of stairs without chest pain. .        He has a hx of other vascular disease with prior fem/fem bypass and AAA. He had peripheral vascular studies one year ago showing adequate flow. CTA of the abdomen and pelvis on December 5, 2018 showed:  1. Stable endovascularly repaired fusiform aneurysm of the infrarenal abdominal aorta without evidence of endoleak. 2. Occluded left common and external iliac arteries with opacification of the left common femoral artery from a patent bifemoral bypass graft. 3. Irregular thickening of the anterior right urinary bladder wall which may correspond to known malignancy. 4. Prior right nephrectomy. Patient started neoadjuvant chemotherapy with dose dense dose dense Gemzar and cisplatin on January 9, 2019 and completed on 03/20/2019. Interim history on 05/15/2019:  The patient presents to the medical oncology clinic for follow up visit after bladder surgery.   Final pathology report showed  : Left distal ureter, biopsy:   Cross-section of ureter with no significant pathologic findings.   Negative for dysplasia and invasive malignancy. B: Prostate and bladder, radical cystoprostatectomy:   Bladder:    Stromal changes indicative of previous therapy effects.    One lymph node with no evidence of malignancy.  (0/1)    Overlying urothelial dysplasia.    Edematous mucosa with mixed inflammation and reactive changes.    Negative for residual invasive malignancy.   Prostate:    Nodular prostatic hyperplasia with acute and chronic inflammation.    Necrotizing granulomatous inflammation.    Negative for prostatic malignancy. C: Right and left pelvic lymph nodes, resection:   Fourteen lymph node with no evidence of malignancy.  (0/14)     (y)pT0, pN0he completed 3 cycles of neoadjuvant chemotherapy with Gemzar and cisplatin. Today, he denies having hematuria, he is afebrile. He reports that his eripheral neuropathy in his toes is improved with Lyrica.   HPI   Past Medical History:   Diagnosis Date    [address] (abdominal aortic aneurysm) ([facility] [address]     intravascular stent    BCG ROXANE 5/21/2014    BPH (benign prostatic hypertrophy)     CAD (coronary artery disease)     Carotid artery stenosis     Chronic back pain     Chronic kidney disease     Congenital absence of one kidney     absent right kidney    History of lumbar fusion 3/15    mid lower back down    Hyperlipidemia     Hypertension     Hypothyroidism     Obesity     TIA (transient ischemic attack) 1/2008    Unspecified cerebral artery occlusion with cerebral infarction     TIA      Past Surgical History:   Procedure Laterality Date    ABDOMINAL [facility]    ABDOMINAL AORTIC ANEURYSM REPAIR  7/24/15    [facility]    [facility]    CORONARY ANGIOPLASTY WITH STENT PLACEMENT  2003    2 stents    FEMORAL-FEMORAL BYPASS GRAFT  03/09/2017    Dr. Smita Nicholson    arm    KNEE ARTHROSCOPY  12/2008 meniscus tear    LUMBAR LAMINECTOMY  3/2016    [facility]--Dr. Felder N Jose Avcelena OTHER SURGICAL HISTORY  4/14/15    melanoma removal from right chest    OTHER SURGICAL HISTORY  2016    I and D, exicison of posterior neck cyst    [place] OFFICE/OUTPT VISIT,PROCEDURE ONLY N/A 2018    TURBT performed by Petar Chinchilla MD at [address] [place] [address] N/A 2019    ROBOTIC RADICAL CYSTOPROSTATECTOMY performed by Petar Chinchilla MD at [address]  10/2016    Dr Kiya Basilio  's    TUNNELED VENOUS PORT PLACEMENT      TURP  2014    See note of Dr. Angeli Jose for OR procedure details      Family History   Problem Relation Age of Onset    Diabetes Mother     Cancer Mother     Arthritis Mother     Asthma Father     Heart Disease Father     Cancer Father     Stroke Sister     Heart Disease Sister     Cancer Sister     Stroke Brother     Heart Disease Brother     Cancer Brother       Social History     Tobacco Use    Smoking status: Former Smoker     Packs/day: 1.00     Years: 24.00     Pack years: 24.00     Types: Cigarettes     Start date:      Last attempt to quit: 1979     Years since quittin.2    Smokeless tobacco: Never Used   Substance Use Topics    Alcohol use: No     Alcohol/week: 0.0 oz      Current Outpatient Medications   Medication Sig Dispense Refill    nitroGLYCERIN (NITROSTAT) 0.4 MG SL tablet Place 1 tablet under the tongue every 5 minutes as needed for Chest pain 25 tablet 1    docusate sodium (COLACE, DULCOLAX) 100 MG CAPS Take 100 mg by mouth 2 times daily 30 capsule 0    polycarbophil (FIBERCON) 625 MG tablet Take 625 mg by mouth daily      b complex vitamins capsule Take 1 capsule by mouth daily      pregabalin (LYRICA) 50 MG capsule Take 1 capsule by mouth 3 times daily for 90 days.  270 capsule 3    levothyroxine (SYNTHROID) 137 MCG tablet TAKE 1 TABLET BY MOUTH DAILY 90 tablet 3    KLOR-CON M10 diarrhea, nausea, rectal pain and vomiting. Endocrine: Negative for cold intolerance, polydipsia and polyuria. Genitourinary: Positive for difficulty urinating and frequency. Negative for decreased urine volume, dysuria, flank pain, hematuria and urgency. Musculoskeletal: Positive for arthralgias, joint swelling and myalgias. Negative for back pain, gait problem and neck stiffness. Skin: Negative for color change, rash and wound. Neurological: Negative for dizziness, tremors, seizures, speech difficulty, weakness, light-headedness, numbness and headaches. Hematological: Negative for adenopathy. Does not bruise/bleed easily. Psychiatric/Behavioral: Negative for confusion and sleep disturbance. The patient is nervous/anxious. Objective:   Physical Exam   Constitutional: He is oriented to person, place, and time. He appears well-developed and well-nourished. No distress. HENT:   Head: Normocephalic. Mouth/Throat: Oropharynx is clear and moist. No oropharyngeal exudate. Eyes: Pupils are equal, round, and reactive to light. EOM are normal. Right eye exhibits no discharge. Left eye exhibits no discharge. No scleral icterus. Neck: Normal range of motion. Neck supple. No JVD present. No tracheal deviation present. No thyromegaly present. Cardiovascular: Normal rate and normal heart sounds. Exam reveals no gallop and no friction rub. No murmur heard. Pulmonary/Chest: Effort normal and breath sounds normal. No stridor. No respiratory distress. He has no wheezes. He has no rales. He exhibits no tenderness. Abdominal: Soft. Bowel sounds are normal. He exhibits no distension and no mass. There is no tenderness. There is no rebound. Musculoskeletal: Normal range of motion. He exhibits no edema. Good range of motion in all four extremities. Lymphadenopathy:     He has no cervical adenopathy. Neurological: He is alert and oriented to person, place, and time. He has normal reflexes.  No cranial nerve deficit. He exhibits normal muscle tone. Skin: Skin is warm. No rash noted. No erythema. Psychiatric: He has a normal mood and affect. His behavior is normal. Judgment and thought content normal.   Vitals reviewed. /61 (Site: Left Upper Arm, Position: Sitting, Cuff Size: Large Adult)   Pulse 65   Temp 98.2 °F (36.8 °C) (Oral)   Resp 18   Ht 5' 7.01\" (1.702 m)   Wt 192 lb (87.1 kg)   SpO2 100%   BMI 30.06 kg/m²      ECOG status is 1. Imaging studies and labs:     Vl Dup Lower Extremity Arteries Left  Result Date: 12/5/2018   Mildly diminished ankle-brachial indices bilaterally. The femoral-femoral bypass graft is widely patent with good pulsatility throughout. There is no significant velocity alteration to suggest significant stenosis. Femorofemoral bypass graft is widely patent. **This report has been created using voice recognition software. It may contain minor errors which are inherent in voice recognition technology. ** Final report electronically signed by Dr. Maribel Franklin on 12/5/2018 6:13 PM    Cta Abdomen Pelvis W Wo Contrast  Result Date: 12/5/2018    1. Stable endovascularly repaired fusiform aneurysm of the infrarenal abdominal aorta without evidence of endoleak. 2. Occluded left common and external iliac arteries with opacification of the left common femoral artery from a patent bifemoral bypass graft. 3. Irregular thickening of the anterior right urinary bladder wall which may correspond to known malignancy. 4. Prior right nephrectomy.  Final report electronically signed by Dr. Elaina Nunes on 12/5/2018 4:18 PM    Lab Results   Component Value Date    WBC 10.4 05/11/2019    HGB 11.5 (L) 05/11/2019    HCT 35.3 (L) 05/11/2019    MCV 97.2 (H) 05/11/2019     05/11/2019       Chemistry        Component Value Date/Time     05/11/2019 0945    K 4.1 05/11/2019 0945    K 3.3 (L) 05/04/2019 0405    CL 98 05/11/2019 0945    CO2 29 05/11/2019 0945    BUN 17 05/11/2019 0945    CREATININE 1.1 05/11/2019 0945        Component Value Date/Time    CALCIUM 9.1 05/11/2019 0945    ALKPHOS 68 05/04/2019 0405    AST 12 05/04/2019 0405    ALT 10 (L) 05/04/2019 0405    BILITOT 1.0 05/04/2019 0405    BILITOT NEGATIVE 10/26/2018 0935        PET scan on November 20, 2018 showed:  Left ureterectasis at the distal third and at the left ureterovesical junction levels without identifiable obstructing lesion. I do not identify specific evidence for residual or recurrent malignancy, but difficult to exclude.    No  evidence for distant metastatic disease         Assessment/Plan: 1. Muscle invasive bladder cancer. This is a 70-year-old patient with muscle invasive high-grade urothelial carcinoma of the bladder. I discuss with the patient the benefit of neoadjuvant chemotherapy. Despite radical cystectomy, approximately one-half of patients with muscle-invasive urothelial bladder cancer involving the muscularis propria will develop metastatic disease within two years. Therefore the preferred management of patients with muscle-invasive bladder cancer consists of a multimodal approach comprising neoadjuvant chemotherapy followed by radical cystectomy. This approach is supported by  evidence that neoadjuvant cisplatin-based chemotherapy improves survival compared with locoregional treatment alone. A 2003 meta-analysis of 11 randomized trials that compared cisplatin-based neoadjuvant chemotherapy plus local therapy versus local therapy alone showed an improvement in overall survival (5-year OS 50 versus 45%) and a lower risk of recurrence (HR for recurrence 0.81). Choice of chemotherapy regimens include MVAC for healthy patients younger than 79years of age. In older patients and those unable to tolerate this combination due to medical comorbidities, gemcitabine plus cisplatin (GC) is a reasonable alternative.  Platinum agents are associated with significant toxicity, which may increase the complication rate for older patients. Because of the toxicities associated with platinum-based treatment, investigators have defined criteria to be used in older patients that would help to define appropriate candidates for treatment. These criteria generally include:  Creatinine clearance >60 mL/min  [place] Cooperative Oncology Group (ECOG) performance status ? 2  Less than grade 2 hearing loss or neuropathy  No evidence of congestive heart failure. Mr. Silver Aguila has creatinine clearance > 60 mL/min. His current ECOG PS is 1, he doesn't have a hearing loss and no neuropathy and no evidence of congestive heart failure. He had PET scan on December 20, 2018 that showed no evidence of distant metastatic disease. 2.  Neoadjuvant chemotherapy with dose dense Gemzar and cisplatin. Cycle #1 given on January 9, 2019. Due to profound fatigue and worsening peripheral neuropathy cycle #1 day 15 of Gemzar/ Cisplatin given with  dose reduction of cisplatin to 35 mg/m² . He completed 3 of Gemzar and cisplatin on 03/20/2019. On May 1, 2019 he underwent  Radical cystoprostatectomy showed complete pathologic response to neoadjuvant chemotherapy. He will continue further surveillance with Dr. Glenny Robles and his staff. 3. Peripheral neuropathy in his legs. The patient has chronic peripheral neuropathy that got worse after chemo treatment, stable on Lyrica 25 mg po TID. Ines Damon Diagnosis Orders   1. Malignant neoplasm of urinary bladder, unspecified site ([facility] [address]     2. Polyneuropathy associated with underlying disease ([facility] [address]     3. Status post chemotherapy, time since greater than 12 weeks          Plan:   Return if symptoms worsen or fail to improve. Orders Placed:   No orders of the defined types were placed in this encounter. Medications Prescribed:   No orders of the defined types were placed in this encounter.

## 2019-05-15 NOTE — PROGRESS NOTES
Patient tolerated medi-port flush only without any complications. Discharge instructions given to patient-verbalizes understanding. Ambulated off unit per self in stable condition accompanied by his spouse with belongings.

## 2019-05-16 ENCOUNTER — OFFICE VISIT (OUTPATIENT)
Dept: FAMILY MEDICINE CLINIC | Age: 79
End: 2019-05-16
Payer: MEDICARE

## 2019-05-16 VITALS
SYSTOLIC BLOOD PRESSURE: 105 MMHG | TEMPERATURE: 97.8 F | RESPIRATION RATE: 16 BRPM | WEIGHT: 195.7 LBS | HEART RATE: 66 BPM | BODY MASS INDEX: 30.64 KG/M2 | DIASTOLIC BLOOD PRESSURE: 58 MMHG

## 2019-05-16 DIAGNOSIS — R36.9 DRAINAGE FROM PENIS: ICD-10-CM

## 2019-05-16 DIAGNOSIS — Z90.79 STATUS POST RADICAL CYSTOPROSTATECTOMY: ICD-10-CM

## 2019-05-16 DIAGNOSIS — Z90.6 STATUS POST RADICAL CYSTOPROSTATECTOMY: ICD-10-CM

## 2019-05-16 DIAGNOSIS — Z85.51 HISTORY OF MALIGNANT NEOPLASM OF BLADDER: Primary | ICD-10-CM

## 2019-05-16 DIAGNOSIS — N50.89 SCROTAL SWELLING: ICD-10-CM

## 2019-05-16 PROBLEM — J18.9 PNEUMONIA DUE TO ORGANISM: Status: RESOLVED | Noted: 2018-05-26 | Resolved: 2019-05-16

## 2019-05-16 PROBLEM — Z95.828 S/P FEMORAL-FEMORAL BYPASS SURGERY: Status: RESOLVED | Noted: 2017-04-11 | Resolved: 2019-05-16

## 2019-05-16 PROCEDURE — 99214 OFFICE O/P EST MOD 30 MIN: CPT | Performed by: NURSE PRACTITIONER

## 2019-05-16 ASSESSMENT — ENCOUNTER SYMPTOMS
ABDOMINAL PAIN: 0
DIARRHEA: 0
STRIDOR: 0
COUGH: 0
RHINORRHEA: 0
COLOR CHANGE: 0
EYE DISCHARGE: 0
VOMITING: 0
BACK PAIN: 0
SORE THROAT: 0
WHEEZING: 0
SHORTNESS OF BREATH: 0
EYE PAIN: 0
CONSTIPATION: 0
NAUSEA: 0

## 2019-05-16 NOTE — PROGRESS NOTES
[address] [address]  Dept: [phone]  Dept Fax: [phone]: [phone]     Visit Date:  5/16/2019      Patient:  Andrew Chacon  YOB: 1940    HPI:     Chief Complaint   Patient presents with    Testicle Pain     left side, swollen, painful-just noticed ;s/p ROBOTIC ASSISTED LAPAROSCOPIC RADICAL CYSTOPROSTATECTOMY WITH BILATERAL PELVIC LYMPH NODE DISSECTION WITH CREATION OF ILEAL CONDUIT 5/1 with Parth Wellington       Patient is here after surgery 2 weeks ago by Dr. Parth Wellington for removal of his bladder and prostate due to bladder cancer. Patient now has a colostomy style urinary system which drains into a bag. Patient noticed today a sudden swelling of his scrotum with increased tenderness and pain and fluid dripping out of his penis. He is very concerned that something was wrong with the surgery and has not had any problem like this since he was discharged hospital.  He denies fever or constitutional symptoms of infection. He has not noticed any hematuria. His urine bag is been draining well and he has not been having any problem managing it.       Medications    Current Outpatient Medications:     metoprolol tartrate (LOPRESSOR) 25 MG tablet, Take 50 mg by mouth 2 times daily, Disp: , Rfl:     nitroGLYCERIN (NITROSTAT) 0.4 MG SL tablet, Place 1 tablet under the tongue every 5 minutes as needed for Chest pain, Disp: 25 tablet, Rfl: 1    docusate sodium (COLACE, DULCOLAX) 100 MG CAPS, Take 100 mg by mouth 2 times daily, Disp: 30 capsule, Rfl: 0    polycarbophil (FIBERCON) 625 MG tablet, Take 625 mg by mouth daily, Disp: , Rfl:     b complex vitamins capsule, Take 1 capsule by mouth daily, Disp: , Rfl:     atorvastatin (LIPITOR) 40 MG tablet, TAKE 1 TABLET BY MOUTH EVERY DAY AT BEDTIME, Disp: 30 tablet, Rfl: 11    pregabalin (LYRICA) 50 MG capsule, Take 1 capsule by mouth 3 times daily for 90 days., Disp: 270 capsule, Rfl: 3    levothyroxine (SYNTHROID) 137 MCG tablet, TAKE 1 TABLET BY MOUTH DAILY, Disp: 90 tablet, Rfl: 3    KLOR-CON M10 10 MEQ extended release tablet, TAKE 1 TABLET BY MOUTH DAILY, Disp: 90 tablet, Rfl: 3    Elastic Bandages & Supports (JOBST KNEE HIGH COMPRESSION SM) MISC, 1 each by Does not apply route daily as needed, Disp: 1 each, Rfl: 0    Handicap Placard MISC, Cannot walk more than 200 feet without stopping to rest or without assistance. Lifetime x 5 years. , Disp: 1 each, Rfl: 0    aspirin 81 MG tablet, Take 81 mg by mouth daily. , Disp: , Rfl:     The patient is allergic to tape [adhesive tape]. Past Medical History  Kalpesh Gross  has a past medical history of AAA (abdominal aortic aneurysm) ([facility] [address] BCG ROXANE, BPH (benign prostatic hypertrophy), CAD (coronary artery disease), Carotid artery stenosis, Chronic back pain, Chronic kidney disease, Congenital absence of one kidney, History of lumbar fusion, Hyperlipidemia, Hypertension, Hypothyroidism, Obesity, TIA (transient ischemic attack), and Unspecified cerebral artery occlusion with cerebral infarction. Subjective:      Review of Systems   Constitutional: Negative for activity change, appetite change, chills, fatigue and fever. HENT: Negative for congestion, ear pain, rhinorrhea and sore throat. Eyes: Negative for pain and discharge. Respiratory: Negative for cough, shortness of breath, wheezing and stridor. Cardiovascular: Negative for chest pain. Gastrointestinal: Negative for abdominal pain, constipation, diarrhea, nausea and vomiting. Genitourinary: Positive for scrotal swelling and testicular pain. Negative for decreased urine volume, discharge, dysuria, flank pain, frequency, genital sores, hematuria, penile pain, penile swelling and urgency. Musculoskeletal: Negative for arthralgias, back pain, myalgias and neck pain. Skin: Negative for color change and rash.    Allergic/Immunologic: Negative for immunocompromised state. Neurological: Negative for dizziness, weakness and headaches. Psychiatric/Behavioral: Negative. Objective:     BP (!) 105/58 (Site: Right Upper Arm)   Pulse 66   Temp 97.8 °F (36.6 °C) (Oral)   Resp 16   Wt 195 lb 11.2 oz (88.8 kg)   BMI 30.64 kg/m²     Physical Exam   Constitutional: He is oriented to person, place, and time. He appears well-developed and well-nourished. No distress. Eyes: Conjunctivae are normal. Right eye exhibits no discharge. Left eye exhibits no discharge. Cardiovascular: Normal rate. Pulmonary/Chest: Effort normal. No respiratory distress. Abdominal: Soft. Bowel sounds are normal. He exhibits no distension. There is tenderness. Genitourinary: No penile tenderness. Genitourinary Comments: Penis draining clear fluid   Musculoskeletal: Normal range of motion. He exhibits no edema or tenderness. Neurological: He is alert and oriented to person, place, and time. Skin: Skin is warm and dry. No rash noted. He is not diaphoretic. No erythema. No pallor. Psychiatric: He has a normal mood and affect. His behavior is normal. Judgment and thought content normal.   Nursing note and vitals reviewed. Assessment/Plan:      Lisa Jacob was seen today for testicle pain. Exam reveals the patient has a fairly constant dripping of clear drainage from his penis, with mild scrotal edema and tenderness. He is afebrile with no abdominal pain and his surgical sites look to be well healing. A call was placed to the urology office and I discussed the case with the nurse practitioner, who stated that this is a normal postoperative finding. She states this is not urine but serosanguineous fluid, and if we wish to check it we can send a specimen to the lab and look for creatinine. Otherwise the patient will continue to need to use pads or diapers to manage the fluid.     I also evaluated the patient carefully for exacerbation of his water retention and dependent edema problem, for which he has been on Lasix in the past.  He has currently been removed from Lasix postoperatively. He does have mild swelling of his hands, and left ankle, lungs are clear to auscultation. He states his Lasix 40 mg daily dose really tied him to the house for several hours a day with urination problem prior to his surgery. He states it is also nearly the same now when he takes Lasix, even though now he has a bag management system for his urine. After detailed discussion with the patient and his wife it was decided that we would put the patient back on Lasix 20 mg daily for the next several days and he would continue to monitor his drainage issue. If it continues we will collect a sample for creatinine. He is to come back to the office and for 5 days this problem persists or sooner if worsening. Diagnoses and all orders for this visit:    History of malignant neoplasm of bladder    Status post radical cystoprostatectomy    Scrotal swelling    Drainage from penis        No follow-ups on file. Patient instructions given andreviewed.         Electronically signed by DEMAR Alberts CNP on 5/17/2019 at 8:02 AM

## 2019-05-19 NOTE — PROGRESS NOTES
Post-Discharge Transitional Care Management Services or Hospital Follow Up      Maria Pastor   YOB: 1940    Date of Office Visit:  5/14/2019  Date of Hospital Admission: 5/1/19  Date of Hospital Discharge: 5/7/19  Risk of hospital readmission (high >=14%. Medium >=10%) :Readmission Risk Score: 18      Care management risk score Rising risk (score 2-5) and Complex Care (Scores >=6): 6     Non face to face  following discharge, date last encounter closed (first attempt may have been earlier): 5/8/2019  4:07 PM    Call initiated 2 business days of discharge: Yes    Patient Active Problem List   Diagnosis    Coronary artery disease involving native heart    Hyperlipidemia    Essential hypertension    Hypothyroidism    Claudication ([address]    CKD (chronic kidney disease) stage 3, GFR 30-59 ml/min ([facility])    BCG ROXANE    Bladder cancer ([address]    Subcutaneous cyst    Pseudoaneurysm of right femoral artery ([address]    PVD (peripheral vascular disease) ([address]    Spinal stenosis of lumbar region    Macular degeneration    Abnormal CT of the chest    Bladder tumor    Pain in penis and bladder    Encounter for ostomy care education    Peripheral polyneuropathy    History of malignant neoplasm of bladder       Allergies   Allergen Reactions    Tape [Adhesive Tape]        Medications listed as ordered at the time of discharge from hospital   [facility], [address] [place] [address] [address] Medication Instructions [place]:    Printed on:05/19/19 3563   Medication Information                      aspirin 81 MG tablet  Take 81 mg by mouth daily.              atorvastatin (LIPITOR) 40 MG tablet  TAKE 1 TABLET BY MOUTH EVERY DAY AT BEDTIME             b complex vitamins capsule  Take 1 capsule by mouth daily             docusate sodium (COLACE, DULCOLAX) 100 MG CAPS  Take 100 mg by mouth 2 times daily             Elastic Bandages & Supports (JOBST KNEE HIGH COMPRESSION SM) MISC  1 each by Does not apply route daily as needed Handicap Placard MISC  Cannot walk more than 200 feet without stopping to rest or without assistance. Lifetime x 5 years. KLOR-CON M10 10 MEQ extended release tablet  TAKE 1 TABLET BY MOUTH DAILY             levothyroxine (SYNTHROID) 137 MCG tablet  TAKE 1 TABLET BY MOUTH DAILY             metoprolol tartrate (LOPRESSOR) 25 MG tablet  Take 50 mg by mouth 2 times daily             nitroGLYCERIN (NITROSTAT) 0.4 MG SL tablet  Place 1 tablet under the tongue every 5 minutes as needed for Chest pain             polycarbophil (FIBERCON) 625 MG tablet  Take 625 mg by mouth daily             pregabalin (LYRICA) 50 MG capsule  Take 1 capsule by mouth 3 times daily for 90 days. Medications marked \"taking\" at this time  Outpatient Medications Marked as Taking for the 5/14/19 encounter (Office Visit) with Lizy Finley MD   Medication Sig Dispense Refill    metoprolol tartrate (LOPRESSOR) 25 MG tablet Take 50 mg by mouth 2 times daily      nitroGLYCERIN (NITROSTAT) 0.4 MG SL tablet Place 1 tablet under the tongue every 5 minutes as needed for Chest pain 25 tablet 1    docusate sodium (COLACE, DULCOLAX) 100 MG CAPS Take 100 mg by mouth 2 times daily 30 capsule 0    polycarbophil (FIBERCON) 625 MG tablet Take 625 mg by mouth daily      b complex vitamins capsule Take 1 capsule by mouth daily      atorvastatin (LIPITOR) 40 MG tablet TAKE 1 TABLET BY MOUTH EVERY DAY AT BEDTIME 30 tablet 11    pregabalin (LYRICA) 50 MG capsule Take 1 capsule by mouth 3 times daily for 90 days. 270 capsule 3    levothyroxine (SYNTHROID) 137 MCG tablet TAKE 1 TABLET BY MOUTH DAILY 90 tablet 3    KLOR-CON M10 10 MEQ extended release tablet TAKE 1 TABLET BY MOUTH DAILY 90 tablet 3    Elastic Bandages & Supports (JOBST KNEE HIGH COMPRESSION SM) MISC 1 each by Does not apply route daily as needed 1 each 0    Handicap Placard MISC Cannot walk more than 200 feet without stopping to rest or without assistance. Lifetime x 5 years. 1 each 0    aspirin 81 MG tablet Take 81 mg by mouth daily. Medications patient taking as of now reconciled against medications ordered at time of hospital discharge: Yes    Chief Complaint   Patient presents with    Follow[facility]Up from [facility] 5/1-5/7/19 s/p robotic radical cystoprostatectomy, bladder cancer. States he had chemo 3 months prior to surgery.  Medication Refill       History of Present illness - Follow up of Hospital diagnosis(es):    Diagnosis Orders   1. Malignant neoplasm of urinary bladder, unspecified site ([address]     2. Peripheral polyneuropathy     3. CKD (chronic kidney disease) stage 3, GFR 30-59 ml/min ([facility])     4. Acquired hypothyroidism     5. Essential hypertension           Inpatient course: Discharge summary reviewed- see chart. Interval history/Current status: stable    A comprehensive review of systems was negative except for what was noted in the HPI. Vitals:    05/14/19 1037   BP: (!) 114/56   Pulse: 68   Resp: 20   Temp: 97.6 °F (36.4 °C)   TempSrc: Oral   Weight: 186 lb (84.4 kg)     Body mass index is 29.13 kg/m².    Wt Readings from Last 3 Encounters:   05/16/19 195 lb 11.2 oz (88.8 kg)   05/15/19 192 lb (87.1 kg)   05/14/19 186 lb (84.4 kg)     BP Readings from Last 3 Encounters:   05/16/19 (!) 105/58   05/15/19 128/61   05/14/19 (!) 114/56        Physical Exam:  General Appearance: alert and oriented to person, place and time, well developed and well- nourished, in no acute distress  Skin: warm and dry, no rash or erythema  Head: normocephalic and atraumatic  Eyes: pupils equal, round, and reactive to light, extraocular eye movements intact, conjunctivae normal  ENT: tympanic membrane, external ear and ear canal normal bilaterally, nose without deformity, nasal mucosa and turbinates normal without polyps  Neck: supple and non-tender without mass, no thyromegaly or thyroid nodules, no cervical lymphadenopathy  Pulmonary/Chest: clear to

## 2019-05-22 ENCOUNTER — TELEPHONE (OUTPATIENT)
Dept: INTERNAL MEDICINE CLINIC | Age: 79
End: 2019-05-22

## 2019-05-22 ENCOUNTER — TELEPHONE (OUTPATIENT)
Dept: UROLOGY | Age: 79
End: 2019-05-22

## 2019-05-22 ENCOUNTER — HOSPITAL ENCOUNTER (OUTPATIENT)
Dept: WOUND CARE | Age: 79
Discharge: HOME | End: 2019-05-22
Payer: MEDICARE

## 2019-05-22 VITALS
HEIGHT: 67 IN | SYSTOLIC BLOOD PRESSURE: 137 MMHG | DIASTOLIC BLOOD PRESSURE: 65 MMHG | WEIGHT: 185 LBS | HEART RATE: 75 BPM | BODY MASS INDEX: 29.03 KG/M2 | TEMPERATURE: 98.1 F | OXYGEN SATURATION: 100 % | RESPIRATION RATE: 18 BRPM

## 2019-05-22 DIAGNOSIS — Z71.89 ENCOUNTER FOR OSTOMY CARE EDUCATION: ICD-10-CM

## 2019-05-22 DIAGNOSIS — Z43.6 ATTENTION TO UROSTOMY (HCC): ICD-10-CM

## 2019-05-22 DIAGNOSIS — D49.4 BLADDER TUMOR: ICD-10-CM

## 2019-05-22 DIAGNOSIS — Z85.51 HISTORY OF MALIGNANT NEOPLASM OF BLADDER: Primary | ICD-10-CM

## 2019-05-22 PROCEDURE — 2709999900 HC NON-CHARGEABLE SUPPLY

## 2019-05-22 PROCEDURE — 99213 OFFICE O/P EST LOW 20 MIN: CPT

## 2019-05-22 PROCEDURE — 99214 OFFICE O/P EST MOD 30 MIN: CPT | Performed by: NURSE PRACTITIONER

## 2019-05-22 RX ORDER — FUROSEMIDE 20 MG/1
40 TABLET ORAL DAILY
COMMUNITY
End: 2019-06-24 | Stop reason: ALTCHOICE

## 2019-05-22 ASSESSMENT — PAIN SCALES - GENERAL
PAINLEVEL_OUTOF10: 0
PAINLEVEL_OUTOF10: 6

## 2019-05-22 ASSESSMENT — PAIN DESCRIPTION - PROGRESSION: CLINICAL_PROGRESSION: NOT CHANGED

## 2019-05-22 ASSESSMENT — PAIN DESCRIPTION - DESCRIPTORS: DESCRIPTORS: DISCOMFORT

## 2019-05-22 ASSESSMENT — PAIN DESCRIPTION - ORIENTATION: ORIENTATION: LEFT

## 2019-05-22 ASSESSMENT — PAIN DESCRIPTION - PAIN TYPE: TYPE: ACUTE PAIN

## 2019-05-22 ASSESSMENT — PAIN DESCRIPTION - LOCATION: LOCATION: SCROTUM

## 2019-05-22 ASSESSMENT — PAIN DESCRIPTION - FREQUENCY: FREQUENCY: INTERMITTENT

## 2019-05-22 ASSESSMENT — PAIN DESCRIPTION - ONSET: ONSET: ON-GOING

## 2019-05-22 NOTE — PLAN OF CARE
Pt presented to wound clinic for urostomy post op follow up. Pt presented to wound clinic with towel wrapped around ostomy and c/o of leaking. Stoma appears red and intact. 1 stent present. Per [place] and Alex from Dr. Qamar Bee office Billie REYES can pull stent so bag will not have to be removed at appt tomorrow. Stent removed by Billie REYES. No complications noted. Next appt in 1 months. Faxed/emailed new orders to STR . Samples ordered through [facility]. Supplies Size Order #   [place] one piece urostomy bag convex Pre cut 7/8 inch   [id number]   Adapt Paste 2.1oz 24626   Adapt belt   7300   Bedside drainage bag   9839       Change your pouch  1-2  times / week. PROCEDURE:  Assemble above supplies in order of application before removing pouch. If your pouch is not pre-cut, cut out your size on the back of the pouch. Remove the paper backing. If using paste, apply it around the cut out edge. Remove your worn appliance by gently pulling away from skin and discard. Wash skin with soap and water, rinse and pat dry. Hold a rolled gauze pad or tampon to stoma while cleaning skin to absorb urine. This will act as a wick. Inspect your skin for redness or irritation. If present, apply a small amount of powder to skin around stoma. Brush off excess powder with a Kleenex. Do not use ointments. 20180 [employer]  (for wet, weepy skin)  ___  Desenex Powder  ([employer])  (itches, rash)  Center the pouch around your stoma. Smooth the adhesive collar to your abdomen. Close the valve at the bottom of pouch. Apply belt to fit snuggly   Empty when 1/3 full  Connect to night time drainage bag if needed      Basic Ostomy Info:   New ostomies/stomas shrink for the first 6-8 weeks. You will need to adjust the opening in the pouch/flange to fit as the stoma shrinks and heals.    Pouch/flange should fit up to the stoma within 1/8 inch to protect the skin   Be sure skin is clean and dry before applying a new flange/pouch  Pouch/flange will need to be changed 1-2 times a week or if it is leaking  Pouch should be emptied when 1/3 full  Skin should be cleansed with warm water and a washcloth or paper towel. If soap is used? recommend Kole Arroyo to avoid moisturizers interfering with the flange sticking. Be sure to rinse well afterwards. Avoid baby wipes to cleanse the stoma due to the moisturizers and additives  You can shower with your pouch on or off. Be sure to rinse your skin well after showering to remove any soap residue on the skin, pat the skin dry and then apply your pouch. If the pouch/flange is leaking: remove it, cleanse the skin, and reapply a new one. Never tape the edges down if leaking as this will cause skin irritation. If you have a hairy abdomen you may need to clip your hair periodically. Use a dedicated clipper for the skin around the stoma?don?t use the same clippers on your face as around your stoma. Once you are healed from your surgery (approx. 6-8 weeks) you should be able to resume your normal activities once cleared by your surgeon. If your do a lot of heavy lifting for work etc. may need to consider being fitted for a hernia prevention belt. A full-length mirror in your bathroom is helpful to make it easier when applying your flange/pouch    Urostomy:  You will typically need to empty your pouch 4-6 times per day or when 1/3 full  Holding a tampon or rolled up paper towel on the stoma helps to wick up the urine and keep the skin dry when changing the pouch  May need to hook to a vann catheter bag a nighttime to prevent your pouch from getting too full and leaking, you can unhook it in the morning  Nighttime vann bag should be cleansed each morning to prevent odor and bacteria from growing:  Cleanse bedside vann bag each morning with diluted bleach solution (1-part bleach to 10 parts water or 4 ounces of bleach to 1 gallon of water) helps with bacteria build up.  May also use vinegar solution (1-part vinegar and 3 parts water) this helps with odor control. You will be discharged home from the hospital with stents in your stoma for up to 2 weeks. Be careful when changing your pouch so your do not pull the stents out. Never take a urine sample out of your urostomy pouch, not even a new pouch. If specimen is needed:  Remove your pouch. Cleanse skin and stoma with warm water. Next cleanse stoma with betadine or antiseptic, dab off with gauze gently. Allow urine to drip into specimen cup. This will take a while, maybe up to 30 minutes. Will need at least 10ml. Care plan reviewed with patient and wife. Patient and wife verbalize understanding of the plan of care and contribute to goal setting.

## 2019-05-22 NOTE — PROGRESS NOTES
Madelyn PFEIFFER has reviewed and agrees with MICHELLE Bills LPN's shift  Assessment.     [facility]  5/22/2019

## 2019-05-22 NOTE — TELEPHONE ENCOUNTER
Patient's wife called stating at Tony's pre-op appointment in April you increased his Metoprolol from 25 mg twice a day to 50 mg twice a day . Wife states that Miri Long has had the bladder surgery and she was asking if patient is to continue the Metoprolol at 50 mg twice a day . Wife states that they had called in some blood pressure prior to the surgery and his BP has continued to be okay . Please advise as patient need a the new script .  ([facility] [address] )

## 2019-05-23 ENCOUNTER — PROCEDURE VISIT (OUTPATIENT)
Dept: UROLOGY | Age: 79
End: 2019-05-23

## 2019-05-23 VITALS
HEIGHT: 67 IN | BODY MASS INDEX: 29.35 KG/M2 | DIASTOLIC BLOOD PRESSURE: 60 MMHG | WEIGHT: 187 LBS | SYSTOLIC BLOOD PRESSURE: 110 MMHG

## 2019-05-23 DIAGNOSIS — C67.8 MALIGNANT NEOPLASM OF OVERLAPPING SITES OF BLADDER (HCC): Primary | ICD-10-CM

## 2019-05-23 PROCEDURE — 99024 POSTOP FOLLOW-UP VISIT: CPT | Performed by: UROLOGY

## 2019-05-23 RX ORDER — METOPROLOL TARTRATE 50 MG/1
50 TABLET, FILM COATED ORAL 2 TIMES DAILY
COMMUNITY
End: 2019-05-23 | Stop reason: SDUPTHER

## 2019-05-23 RX ORDER — METOPROLOL TARTRATE 50 MG/1
50 TABLET, FILM COATED ORAL 2 TIMES DAILY
Qty: 180 TABLET | Refills: 3 | Status: SHIPPED | OUTPATIENT
Start: 2019-05-23 | End: 2020-04-02 | Stop reason: SDUPTHER

## 2019-05-23 ASSESSMENT — ENCOUNTER SYMPTOMS
EYE PAIN: 0
CHEST TIGHTNESS: 0
EYE DISCHARGE: 0
CONSTIPATION: 0
BACK PAIN: 0
ABDOMINAL PAIN: 0
COUGH: 0

## 2019-05-23 NOTE — PROGRESS NOTES
 Drug use: No    Sexual activity: Not Currently   Lifestyle    Physical activity:     Days per week: Not on file     Minutes per session: Not on file    Stress: Not on file   Relationships    Social connections:     Talks on phone: Not on file     Gets together: Not on file     Attends Jainism service: Not on file     Active member of club or organization: Not on file     Attends meetings of clubs or organizations: Not on file     Relationship status: Not on file    Intimate partner violence:     Fear of current or ex partner: Not on file     Emotionally abused: Not on file     Physically abused: Not on file     Forced sexual activity: Not on file   Other Topics Concern    Not on file   Social History Narrative    Not on file       Family History   Problem Relation Age of Onset    Diabetes Mother     Cancer Mother     Arthritis Mother     Asthma Father     Heart Disease Father     Cancer Father     Stroke Sister     Heart Disease Sister     Cancer Sister     Stroke Brother     Heart Disease Brother     Cancer Brother        Past Surgical History:   Procedure Laterality Date    ABDOMINAL AORTIC ANEURYSM REPAIR  3/2010    Blanquita Alexander ABDOMINAL AORTIC ANEURYSM REPAIR  7/24/15    Linda Dillon    [facility]  12/21/11    [facility]    CORONARY ANGIOPLASTY WITH STENT PLACEMENT  2003    2 stents    FEMORAL-FEMORAL BYPASS GRAFT  03/09/2017    Dr. Fabiola Ashford    arm    KNEE ARTHROSCOPY  12/2008    meniscus tear    LUMBAR LAMINECTOMY  3/2016    [facility] HISTORY  4/14/15    melanoma removal from right chest    OTHER SURGICAL HISTORY  5/20/2016    I and D, exicison of posterior neck cyst    CO OFFICE/OUTPT VISIT,PROCEDURE ONLY N/A 11/8/2018    TURBT performed by Sada Cordon MD at [facility] 5/1/2019    ROBOTIC RADICAL CYSTOPROSTATECTOMY performed by Sada Cordon MD at [address] 10/2016    Dr Manuel Leach  1990's    TUNNELED VENOUS PORT PLACEMENT      TURP  04/16/2014    See note of Dr. Sandra Sheth for OR procedure details       Allergies   Allergen Reactions    Tape [facility]]          Current Outpatient Medications:     furosemide (LASIX) 20 MG tablet, Take 20 mg by mouth 2 times daily, Disp: , Rfl:     nitroGLYCERIN (NITROSTAT) 0.4 MG SL tablet, Place 1 tablet under the tongue every 5 minutes as needed for Chest pain, Disp: 25 tablet, Rfl: 1    docusate sodium (COLACE, DULCOLAX) 100 MG CAPS, Take 100 mg by mouth 2 times daily, Disp: 30 capsule, Rfl: 0    polycarbophil (FIBERCON) 625 MG tablet, Take 625 mg by mouth daily, Disp: , Rfl:     b complex vitamins capsule, Take 1 capsule by mouth daily, Disp: , Rfl:     atorvastatin (LIPITOR) 40 MG tablet, TAKE 1 TABLET BY MOUTH EVERY DAY AT BEDTIME, Disp: 30 tablet, Rfl: 11    pregabalin (LYRICA) 50 MG capsule, Take 1 capsule by mouth 3 times daily for 90 days. , Disp: 270 capsule, Rfl: 3    levothyroxine (SYNTHROID) 137 MCG tablet, TAKE 1 TABLET BY MOUTH DAILY, Disp: 90 tablet, Rfl: 3    KLOR-CON M10 10 MEQ extended release tablet, TAKE 1 TABLET BY MOUTH DAILY, Disp: 90 tablet, Rfl: 3    Elastic Bandages & Supports (JOBST KNEE HIGH COMPRESSION SM) MISC, 1 each by Does not apply route daily as needed, Disp: 1 each, Rfl: 0    Handicap Placard MISC, Cannot walk more than 200 feet without stopping to rest or without assistance. Lifetime x 5 years. , Disp: 1 each, Rfl: 0    aspirin 81 MG tablet, Take 81 mg by mouth daily. , Disp: , Rfl:     metoprolol tartrate (LOPRESSOR) 50 MG tablet, Take 1 tablet by mouth 2 times daily New dose., Disp: 180 tablet, Rfl: 3    Review of Systems   Constitutional: Positive for appetite change. Negative for activity change. HENT: Negative for congestion and ear pain. Eyes: Negative for pain and discharge. Respiratory: Negative for cough and chest tightness.     Cardiovascular: Negative for chest pain and leg swelling. Gastrointestinal: Negative for abdominal pain and constipation. Endocrine: Negative for cold intolerance and heat intolerance. Genitourinary: Positive for testicular pain. Negative for flank pain, penile pain and penile swelling. Musculoskeletal: Negative for back pain and joint swelling. Skin: Negative for pallor and rash. Allergic/Immunologic: Negative for environmental allergies and food allergies. Neurological: Negative for dizziness and light-headedness. Hematological: Bruises/bleeds easily. /60   Ht 5' 7\" (1.702 m)   Wt 187 lb (84.8 kg)   BMI 29.29 kg/m²     Objective:   Physical Exam   Constitutional: He is oriented to person, place, and time. Vital signs are normal. He appears well-developed and well-nourished. He is cooperative. No distress. HENT:   Head: Normocephalic and atraumatic. Mouth/Throat: Oropharynx is clear and moist and mucous membranes are normal. No oropharyngeal exudate. Eyes: Pupils are equal, round, and reactive to light. EOM are normal. Right eye exhibits no discharge. Left eye exhibits no discharge. No scleral icterus. Neck: Trachea normal. No JVD present. No tracheal deviation present. Pulmonary/Chest: Effort normal. No respiratory distress. He has no wheezes. Abdominal: Soft. He exhibits no distension. There is no tenderness. There is no rebound and no CVA tenderness. Genitourinary:   Genitourinary Comments: Scrotum relatively normal at this point. Musculoskeletal: He exhibits no edema or tenderness. Lymphadenopathy:        Right: No supraclavicular adenopathy present. Left: No supraclavicular adenopathy present. Neurological: He is alert and oriented to person, place, and time. No cranial nerve deficit. Skin: Skin is warm and dry. He is not diaphoretic. Psychiatric: He has a normal mood and affect. His behavior is normal.   Nursing note and vitals reviewed.     Labs    No results found for this visit on 05/23/19. Lab Results   Component Value Date    CREATININE 1.1 05/11/2019    BUN 17 05/11/2019     05/11/2019    K 4.1 05/11/2019    CL 98 05/11/2019    CO2 29 05/11/2019       Lab Results   Component Value Date    PSA 0.70 07/25/2011    PSA 0.83 10/31/2008       Assessment:       Diagnosis Orders   1. Malignant neoplasm of overlapping sites of bladder [facility])         Mr. Ravin Martínez presents today in follow-up for Malignant neoplasm of overlapping sites of bladder ([facility] [address] [C67.8]. Overall he is doing well but he has had some scrotal swelling that has been waxing and waning. He has been on and off diuretics. He is having drainage from his penis still but this appears to be slowing down. This likely is ascites. He will continue to do PT and get his strength back. We will continue to follow him as scheduled. I have reviewed all notes sent along with this referral including notes from a recent visit to his primary care provider. These records demonstrated the following past medical history:  Past Medical History:   Diagnosis Date    AAA (abdominal aortic aneurysm) ([facility] [address]     intravascular stent    BCG ROXANE 5/21/2014    BPH (benign prostatic hypertrophy)     CAD (coronary artery disease)     Carotid artery stenosis     Chronic back pain     Chronic kidney disease     Congenital absence of one kidney     absent right kidney    History of lumbar fusion 3/15    mid lower back down    Hyperlipidemia     Hypertension     Hypothyroidism     Obesity     TIA (transient ischemic attack) 1/2008    Unspecified cerebral artery occlusion with cerebral infarction     TIA          Plan:        Follow up as scheduled.

## 2019-06-10 ENCOUNTER — TELEPHONE (OUTPATIENT)
Dept: UROLOGY | Age: 79
End: 2019-06-10

## 2019-06-10 NOTE — TELEPHONE ENCOUNTER
Patient's wife called the office this morning and left message asking if he could have his port removed since he is done with chemotherapy now?   Please advise --She also wanted to know who could do this and if we could make referral?

## 2019-06-11 NOTE — TELEPHONE ENCOUNTER
We need to check with Dr. Yovani Manrique. If she is okay with it being removed he should see the same surgeon that put it in.     Thanks,    C

## 2019-06-11 NOTE — TELEPHONE ENCOUNTER
Patient says that  Dr Terence Rodney told him she was done and he could keep the port and have it flushed out every 4- 6 wks or he could have it removed. Looks like Dr Mason Arce put it in-should I call and get patient appointment with Dr Mason Arce?  Please advise

## 2019-06-12 NOTE — TELEPHONE ENCOUNTER
Spoke to patient they are calling Dr Reji Andrew office to have the port removed as they are the ones that had the port put in. Patient voiced understanding .

## 2019-06-12 NOTE — TELEPHONE ENCOUNTER
Not sure if they also take them out. Please refer to him to find out. If he does not then you can sent to Dr. Dorinda Garcia.     Thanks,    C

## 2019-06-13 ENCOUNTER — TELEPHONE (OUTPATIENT)
Dept: ONCOLOGY | Age: 79
End: 2019-06-13

## 2019-06-14 DIAGNOSIS — C67.9 MALIGNANT NEOPLASM OF URINARY BLADDER, UNSPECIFIED SITE (HCC): Primary | ICD-10-CM

## 2019-06-17 ENCOUNTER — OFFICE VISIT (OUTPATIENT)
Dept: INTERNAL MEDICINE CLINIC | Age: 79
End: 2019-06-17
Payer: MEDICARE

## 2019-06-17 VITALS
WEIGHT: 191 LBS | OXYGEN SATURATION: 97 % | HEIGHT: 67 IN | SYSTOLIC BLOOD PRESSURE: 110 MMHG | HEART RATE: 76 BPM | BODY MASS INDEX: 29.98 KG/M2 | DIASTOLIC BLOOD PRESSURE: 68 MMHG

## 2019-06-17 DIAGNOSIS — D49.4 BLADDER TUMOR: ICD-10-CM

## 2019-06-17 DIAGNOSIS — G62.9 PERIPHERAL POLYNEUROPATHY: Primary | ICD-10-CM

## 2019-06-17 DIAGNOSIS — I10 ESSENTIAL HYPERTENSION: ICD-10-CM

## 2019-06-17 PROCEDURE — 99213 OFFICE O/P EST LOW 20 MIN: CPT | Performed by: INTERNAL MEDICINE

## 2019-06-17 RX ORDER — DULOXETIN HYDROCHLORIDE 20 MG/1
20 CAPSULE, DELAYED RELEASE ORAL DAILY
Qty: 30 CAPSULE | Refills: 3 | Status: SHIPPED | OUTPATIENT
Start: 2019-06-17 | End: 2019-08-19

## 2019-06-17 RX ORDER — ATORVASTATIN CALCIUM 40 MG/1
TABLET, FILM COATED ORAL
Qty: 90 TABLET | Refills: 3 | Status: SHIPPED | OUTPATIENT
Start: 2019-06-17 | End: 2020-04-02 | Stop reason: SDUPTHER

## 2019-06-17 NOTE — PROGRESS NOTES
Subjective    Seen in followup after  hosp for cystectomy, ileal diversion. He is having emotional issues with dealing with his change in body status. He is having no chest pain. He comments on longstanding pain in his feet. It sounds as if he has seen neurologist in past and had NCV studies. He is currently on Lyrica, feels it is not helping. Does not sleep well. He has chronic swelling of his legs, does not feel diuretic helps.       Past Medical History:   Diagnosis Date    AAA (abdominal aortic aneurysm) ([address]     intravascular stent    BCG ROXANE 5/21/2014    BPH (benign prostatic hypertrophy)     CAD (coronary artery disease)     Carotid artery stenosis     Chronic back pain     Chronic kidney disease     Congenital absence of one kidney     absent right kidney    History of lumbar fusion 3/15    mid lower back down    Hyperlipidemia     Hypertension     Hypothyroidism     Obesity     TIA (transient ischemic attack) 1/2008    Unspecified cerebral artery occlusion with cerebral infarction     TIA     Patient Active Problem List   Diagnosis    Coronary artery disease involving native heart    Hyperlipidemia    Essential hypertension    Hypothyroidism    Claudication (HCC)    CKD (chronic kidney disease) stage 3, GFR 30-59 ml/min ([facility])    BCG ROXANE    Bladder cancer ([address]    Subcutaneous cyst    Pseudoaneurysm of right femoral artery ([address]    PVD (peripheral vascular disease) ([address]    Spinal stenosis of lumbar region    Macular degeneration    Abnormal CT of the chest    Bladder tumor    Pain in penis and bladder    Peripheral polyneuropathy    History of malignant neoplasm of bladder    Att[facility]rostomy [facility])    Peristomal skin complication     Past Surgical History:   Procedure Laterality Date    ABDOMINAL AORTIC ANEURYSM REPAIR  3/2010    [facility] ABDOMINAL AORTIC ANEURYSM REPAIR  7/24/15    Eri Greenfield    COLONOSCOPY  12/21/11    [facility] ANGIOPLASTY WITH STENT PLACEMENT      2 stents    FEMORAL-FEMORAL BYPASS GRAFT  2017    Dr. Rasheed Koch    arm    KNEE ARTHROSCOPY  2008    meniscus tear    LUMBAR LAMINECTOMY  3/2016    [facility]--Dr. Micah Kendall    OTHER SURGICAL HISTORY  4/14/15    melanoma removal from right chest    OTHER SURGICAL HISTORY  2016    I and D, exicison of posterior neck cyst    AR OFFICE/OUTPT VISIT,PROCEDURE ONLY N/A 2018    TURBT performed by Yadira Rivera MD at [address] [facility] 2019    ROBOTIC RADICAL CYSTOPROSTATECTOMY performed by Yadira Rivera MD at [address]  10/2016    Dr Ximena Castro  's    TUNNELED VENOUS PORT PLACEMENT      TURP  2014    See note of Dr. Melodie Chen for OR procedure details     Family History   Problem Relation Age of Onset    Diabetes Mother     Cancer Mother     Arthritis Mother     Asthma Father     Heart Disease Father     Cancer Father     Stroke Sister     Heart Disease Sister     Cancer Sister     Stroke Brother     Heart Disease Brother     Cancer Brother      Social History     Socioeconomic History    Marital status:      Spouse name: Not on file    Number of children: Not on file    Years of education: Not on file    Highest education level: Not on file   Occupational History    Not on file   Social Needs    Financial resource strain: Not on file    Food insecurity:     Worry: Not on file     Inability: Not on file    Transportation needs:     Medical: Not on file     Non-medical: Not on file   Tobacco Use    Smoking status: Former Smoker     Packs/day: 1.00     Years: 24.00     Pack years: 24.00     Types: Cigarettes     Start date:      Last attempt to quit: 1979     Years since quittin.2    Smokeless tobacco: Never Used   Substance and Sexual Activity    Alcohol use: No     Alcohol/week: 0.0 oz    Drug use:  No  Sexual activity: Not Currently   Lifestyle    Physical activity:     Days per week: Not on file     Minutes per session: Not on file    Stress: Not on file   Relationships    Social connections:     Talks on phone: Not on file     Gets together: Not on file     Attends Taoist service: Not on file     Active member of club or organization: Not on file     Attends meetings of clubs or organizations: Not on file     Relationship status: Not on file    Intimate partner violence:     Fear of current or ex partner: Not on file     Emotionally abused: Not on file     Physically abused: Not on file     Forced sexual activity: Not on file   Other Topics Concern    Not on file   Social History Narrative    Not on file       Current Outpatient Medications:     atorvastatin (LIPITOR) 40 MG tablet, TAKE 1 TABLET BY MOUTH EVERY DAY AT BEDTIME, Disp: 90 tablet, Rfl: 3    DULoxetine (CYMBALTA) 20 MG extended release capsule, Take 1 capsule by mouth daily, Disp: 30 capsule, Rfl: 3    metoprolol tartrate (LOPRESSOR) 50 MG tablet, Take 1 tablet by mouth 2 times daily New dose., Disp: 180 tablet, Rfl: 3    furosemide (LASIX) 20 MG tablet, Take 20 mg by mouth daily , Disp: , Rfl:     nitroGLYCERIN (NITROSTAT) 0.4 MG SL tablet, Place 1 tablet under the tongue every 5 minutes as needed for Chest pain, Disp: 25 tablet, Rfl: 1    docusate sodium (COLACE, DULCOLAX) 100 MG CAPS, Take 100 mg by mouth 2 times daily, Disp: 30 capsule, Rfl: 0    polycarbophil (FIBERCON) 625 MG tablet, Take 625 mg by mouth daily, Disp: , Rfl:     b complex vitamins capsule, Take 1 capsule by mouth daily, Disp: , Rfl:     pregabalin (LYRICA) 50 MG capsule, Take 1 capsule by mouth 3 times daily for 90 days. , Disp: 270 capsule, Rfl: 3    levothyroxine (SYNTHROID) 137 MCG tablet, TAKE 1 TABLET BY MOUTH DAILY, Disp: 90 tablet, Rfl: 3    KLOR-CON M10 10 MEQ extended release tablet, TAKE 1 TABLET BY MOUTH DAILY, Disp: 90 tablet, Rfl: 3    aspirin 81 MG tablet, Take 81 mg by mouth daily. , Disp: , Rfl:     Elastic Bandages & Supports (JOBST KNEE HIGH COMPRESSION SM) MISC, 1 each by Does not apply route daily as needed, Disp: 1 each, Rfl: 0    Handicap Placard MISC, Cannot walk more than 200 feet without stopping to rest or without assistance. Lifetime x 5 years. , Disp: 1 each, Rfl: 0  Allergies   Allergen Reactions    Tape [Adhesive Tape]      Surgical tape-rash       Objective  Vitals:    06/17/19 0927   BP: 110/68   Pulse: 76   SpO2: 97%     General Appearance:  awake, alert, oriented, in no acute distress  Lungs:  Normal expansion. Clear to auscultation. No rales, rhonchi, or wheezing. Heart:  Heart sounds are normal.  Regular rate and rhythm without murmur, gallop or rub. Abdomen:  Protuberant, ileal conduit bag in place with urine  Legs: puffy, do not pit    Assessment  1. S/p cystectomy, having difficulty dealiing with it  2. Probable neuropathy, have no objective info  3. Cad stable    Plan  1given written rx for cymbalta; for pricing  2.  Discussed leg swelling, likely lymphedema; they will try other compression hose; consider referral lymphedema clinic

## 2019-06-17 NOTE — PATIENT INSTRUCTIONS
TAKE YOUR LASIX EVERY OTHER DAY; IF YOU NOTICE NO DIFFERENCE OR WEIGHT GAIN AFTER A WEEK, STOP IT    TRY LIDOCAINE FOR YOUR FEET    CHECK WITH YOUR PHARMACY RE PRICING FOR CYMBALTA

## 2019-06-19 ENCOUNTER — HOSPITAL ENCOUNTER (OUTPATIENT)
Dept: WOUND CARE | Age: 79
Discharge: HOME | End: 2019-06-19
Payer: MEDICARE

## 2019-06-19 VITALS
BODY MASS INDEX: 29.98 KG/M2 | DIASTOLIC BLOOD PRESSURE: 65 MMHG | RESPIRATION RATE: 18 BRPM | SYSTOLIC BLOOD PRESSURE: 125 MMHG | HEART RATE: 67 BPM | OXYGEN SATURATION: 94 % | TEMPERATURE: 97.9 F | WEIGHT: 191 LBS | HEIGHT: 67 IN

## 2019-06-19 PROCEDURE — 99214 OFFICE O/P EST MOD 30 MIN: CPT | Performed by: NURSE PRACTITIONER

## 2019-06-19 PROCEDURE — 99213 OFFICE O/P EST LOW 20 MIN: CPT

## 2019-06-19 ASSESSMENT — PAIN SCALES - GENERAL: PAINLEVEL_OUTOF10: 0

## 2019-06-19 NOTE — PLAN OF CARE
Problem: HH LEARNING/TEACHING NEEDS-OSTOMY  Goal: Knowledge of discharge instructions  Description  Knowledge of discharge instructions     Outcome: Ongoing   Patient presents to wound and ostomy clinic for follow up of urostomy. Okay for patient to try using no paste also. Matilda ostomy supplies booklet given today, to order supplies patient to call number on front cover and give them the supply order numbers as listed below. Supplies Size Order #   Janna one piece urostomy bag, soft convex Pre cut 3/4 inch   [id number]   Adapt Paste 2.1oz [id number]   Adapt belt    7300   Bedside drainage bag    9839         Change your pouch              1-2        times / week. PROCEDURE:  Assemble above supplies in order of application before removing pouch. Remove the paper backing. If using paste, apply THIN LAYER of it around the inner edge of flange. Remove your worn appliance by gently pulling away from skin and discard. Wash skin with soap and water, rinse and pat dry. Hold a rolled gauze pad or tampon to stoma while cleaning skin to absorb urine. This will act as a wick. Inspect your skin for redness or irritation. If present, apply a small amount of powder to skin around stoma. Brush off excess powder with a Kleenex. Do not use ointments. [address]   (for wet, weepy skin)  __x_        Desenex Powder     (Miconazole powder 2%)    ([employer])  (itches, rash)  Center the pouch around your stoma. Smooth the adhesive collar to your abdomen. Close the valve at the bottom of pouch. Apply belt to fit snuggly. Empty pouch when 1/3-1/2 full. Connect to night time drainage bag if needed. Follow up: Patient to call as needed with any issues. Care plan reviewed with patient and spouse. Patient and spouse verbalize understanding of the plan of care and contribute to goal setting.

## 2019-06-19 NOTE — PROGRESS NOTES
[facility]  Ostomy Progress Note      NAME:  Jeancarlos Noyola [place] RECORD NUMBER:  [id number]  AGE: 78 y.o. GENDER:  male  :  1940  TODAY'S DATE:  2019    Subjective       Chief Complaint   Patient presents with    Other     urostomy         HISTORY of PRESENT ILLNESS HPI     Mario López is a 78 y.o. male Established patient referred by Dr. Angeli Jose, who presents today for ostomy/stoma evaluation. History of Ostomy Context: Patient had an ileal conduit with  radical cystoprostatectomy on 19 due to bladder cancer. He has not had leaking from his ostomy pouch since switching to the convex pouching system. He has had some melting out of the pouch and he continues to be anxious related to his ostomy care. Wound/Ulcer Pain Timing/Severity: none  Quality of pain: N/A  Severity:  0 / 10   Modifying Factors: None  Associated Signs/Symptoms: none    Interval History:   Current ostomy care includes [place] one piece convex pouch preout to 7/8 inches with ostomy paste around the inner opening. Applied a 1 inch ostomy belt snugly to help secure a seal. Change 1-2 times per week.      PAST MEDICAL HISTORY        Diagnosis Date    AAA (abdominal aortic aneurysm) ([address]     intravascular stent    BCG ROXANE 2014    BPH (benign prostatic hypertrophy)     CAD (coronary artery disease)     Carotid artery stenosis     Chronic back pain     Chronic kidney disease     Congenital absence of one kidney     absent right kidney    History of lumbar fusion 3/15    mid lower back down    Hyperlipidemia     Hypertension     Hypothyroidism     Obesity     TIA (transient ischemic attack) 2008    Unspecified cerebral artery occlusion with cerebral infarction     TIA       PAST SURGICAL HISTORY    Past Surgical History:   Procedure Laterality Date    ABDOMINAL AORTIC ANEURYSM REPAIR  3/2010    [facility] ABDOMINAL AORTIC ANEURYSM REPAIR  7/24/15    Mariana Cue    COLONOSCOPY  [address]  WITH STENT PLACEMENT      2 stents    FEMORAL-FEMORAL BYPASS GRAFT  2017    Dr. Lala Bass    arm    KNEE ARTHROSCOPY  2008    meniscus tear    LUMBAR LAMINECTOMY  3/2016    [facility]--Dr. Sharita Araujo    OTHER SURGICAL HISTORY  4/14/15    melanoma removal from right chest    OTHER SURGICAL HISTORY  2016    I and D, exicison of posterior neck cyst    [place] OFFICE/OUTPT VISIT,PROCEDURE ONLY N/A 2018    TURBT performed by Hawk Rosario MD at [facility] 2019    ROBOTIC RADICAL CYSTOPROSTATECTOMY performed by Hawk Rosario MD at [address]  10/2016    Dr Roberth Allison  's    TUNNELED VENOUS PORT PLACEMENT      TURP  2014    See note of Dr. Steve Gomez for OR procedure details       FAMILY HISTORY    Family History   Problem Relation Age of Onset    Diabetes Mother     Cancer Mother     Arthritis Mother     Asthma Father     Heart Disease Father     Cancer Father     Stroke Sister     Heart Disease Sister     Cancer Sister     Stroke Brother     Heart Disease Brother     Cancer Brother        SOCIAL HISTORY    Social History     Tobacco Use    Smoking status: Former Smoker     Packs/day: 1.00     Years: 24.00     Pack years: 24.00     Types: Cigarettes     Start date:      Last attempt to quit: 1979     Years since quittin.2    Smokeless tobacco: Never Used   Substance Use Topics    Alcohol use: No     Alcohol/week: 0.0 oz    Drug use: No       ALLERGIES    Allergies   Allergen Reactions    Tape [Adhesive Tape]      Surgical tape-rash       MEDICATIONS    Current Outpatient Medications on File Prior to Encounter   Medication Sig Dispense Refill    atorvastatin (LIPITOR) 40 MG tablet TAKE 1 TABLET BY MOUTH EVERY DAY AT BEDTIME 90 tablet 3    DULoxetine (CYMBALTA) 20 MG extended release capsule Take 1 capsule by mouth daily 30 capsule 3    metoprolol tartrate (LOPRESSOR) 50 MG tablet Take 1 tablet by mouth 2 times daily New dose. 180 tablet 3    furosemide (LASIX) 20 MG tablet Take 40 mg by mouth daily       docusate sodium (COLACE, DULCOLAX) 100 MG CAPS Take 100 mg by mouth 2 times daily 30 capsule 0    polycarbophil (FIBERCON) 625 MG tablet Take 625 mg by mouth daily      b complex vitamins capsule Take 1 capsule by mouth daily      pregabalin (LYRICA) 50 MG capsule Take 1 capsule by mouth 3 times daily for 90 days. 270 capsule 3    levothyroxine (SYNTHROID) 137 MCG tablet TAKE 1 TABLET BY MOUTH DAILY 90 tablet 3    KLOR-CON M10 10 MEQ extended release tablet TAKE 1 TABLET BY MOUTH DAILY 90 tablet 3    aspirin 81 MG tablet Take 81 mg by mouth daily.  nitroGLYCERIN (NITROSTAT) 0.4 MG SL tablet Place 1 tablet under the tongue every 5 minutes as needed for Chest pain 25 tablet 1    Elastic Bandages & Supports (JOBST KNEE HIGH COMPRESSION SM) MISC 1 each by Does not apply route daily as needed 1 each 0    Handicap Placard MISC Cannot walk more than 200 feet without stopping to rest or without assistance. Lifetime x 5 years. 1 each 0     No current facility-administered medications on file prior to encounter.         REVIEW OF SYSTEMS    Constitutional: negative for chills, fevers and sweats  Eyes: negative for irritation and redness  Ears, nose, mouth, throat, and face: negative for hearing loss and hoarseness  Respiratory: negative for cough and shortness of breath  Cardiovascular: negative for chest pain  Gastrointestinal: negative for abdominal pain, nausea and vomiting  Integument/breast: negative for peristomal skin irritation  Musculoskeletal:negative for muscle weakness  Neurological: negative for gait problems, memory problems and speech problems  Behavioral/Psych: positive for good mood    Objective    /65   Pulse 67   Temp 97.9 °F (36.6 °C) (Oral)   Resp 18   Ht 5' 7\" (1.702 m)   Wt 191 lb (86.6 INR 1.07 03/12/2019     Prealbumin: No results found for: PREALBUMIN  Albumin:  Lab Results   Component Value Date    LABALBU 2.7 05/04/2019    LABALBU 4.1 05/19/2012     Sed Rate:No results found for: [place]  Micro:   Lab Results   Component Value Date    BC No growth-preliminary  No growth   03/12/2019        Assessment     Attention to urostomy  Peristomal skin complication     Contributing Factors: Poorly fitting ostomy appliance    Patient Active Problem List   Diagnosis Code    Coronary artery disease involving native heart I25.10    Hyperlipidemia E78.5    Essential hypertension I10    Hypothyroidism E03.9    Claudication ([facility] I73.9    CKD (chronic kidney disease) stage 3, GFR 30-59 ml/min ([facility]) N18.3    BCG ROXANE C67.9    Bladder cancer ([facility] C67.9    Subcutaneous cyst L72.9    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 Patient continues to have some issues with excepting his ostomy. He has been having some melting out to the back of his pouch as well as some itching despite the use of antifungal powder. He does have a crease around his stoma. No sign of fungal rash to the peristomal skin. He would benefit from a flexible soft convex pouching system, a ceraplus barrier to help with his itching, and having his size adjusted. Discussed tips for making his pouching easier as well as tips for applying ostomy paste around the stoma. He was instructed that less is best. He can also try applying his pouch without the paste and see if he has issues with leaking.  He was provided with reassurance on caring for his ostomy. Applied a [place] soft convex ceraplus pouch cut out to 3/4 inch with thin bead of ostomy paste around the inner opening of the pouch. 1 inch ostomy belt applied snugly. See discharge instructions for product numbers of precut pouch and application instructions. Antibiotics: No    Follow up: as needed    Please see attached Discharge Instructions    Written patient dismissal instructions given to patient and signed by patient or POA. Discharge Instructions         Visit discharge/physician orders:  - If you ever need a urine sample do not take it from your urostomy bag. Take bag off. Clean stoma. Take urine cup up to stoma and collect directly from stoma.   - You can take showers with bag on.  - You can tuck bag into pants if you prefer. - Clean night time bag with a diluted bleach solution by rinsing it. - Can try using no paste also. [place] ostomy supplies booklet given today, to order supplies call number on front cover and given them the supply order numbers as listed below.                    Supplies Size Order #   [place] one piece urostomy bag, soft convex Pre cut 3/4 inch   8412   Adapt Paste 2.1oz 36531   Adapt belt    7300   Bedside drainage bag    9839         Change your pouch              1-2        times / week.        PROCEDURE:  1. Assemble above supplies in order of application before removing pouch. 2. Remove the paper backing. If using paste, apply THIN LAYER of it around the inner edge of flange. 3. Remove your worn appliance by gently pulling away from skin and discard. 4. Wash skin with soap and water, rinse and pat dry. Hold a rolled gauze pad or tampon to stoma while cleaning skin to absorb urine. This will act as a wick. 5. Inspect your skin for redness or irritation. If present, apply a small amount of powder to skin around stoma. Brush off excess powder with a Kleenex.  Do not use ointments. [address]   (for wet, weepy skin)   __x_        Desenex Powder     (Miconazole powder 2%)    ([employer])  (itches, rash)  7. Center the pouch around your stoma. Smooth the adhesive collar to your abdomen. 8. Close the valve at the bottom of pouch. 9. Apply belt to fit snuggly. 10. Empty pouch when 1/3-1/2 full. 11. Connect to night time drainage bag if needed.     OSTOMY CLINIC: Monday-Friday 8:00am-4:00pm  Phone:       [phone]   Bring your supplies with you to each visit     Basic Ostomy Info:   · New ostomies/stomas shrink for the first 6-8 weeks. You will need to adjust the opening in the pouch/flange to fit as the stoma shrinks and heals. · Pouch/flange should fit up to the stoma within 1/8 inch to protect the skin   · Be sure skin is clean and dry before applying a new flange/pouch  · Pouch/flange will need to be changed 1-2 times a week or if it is leaking  · Pouch should be emptied when 1/3 full  · Skin should be cleansed with warm water and a washcloth or paper towel. ? If soap is used recommend [facility] to avoid moisturizers interfering with the flange sticking. Be sure to rinse well afterwards. ? Avoid baby wipes to cleanse the stoma due to the moisturizers and additives  · You can shower with your pouch on or off. Be sure to rinse your skin well after showering to remove any soap residue on the skin, pat the skin dry and then apply your pouch. · If the pouch/flange is leaking: remove it, cleanse the skin, and reapply a new one.   ? Never tape the edges down if leaking as this will cause skin irritation. · If you have a hairy abdomen you may need to clip your hair periodically. Use a dedicated clipper for the skin around the stomadont use the same clippers on your face as around your stoma. · Once you are healed from your surgery (approx. 6-8 weeks) you should be able to resume your normal activities once cleared by your surgeon.    ? If your do a lot of heavy lifting for work etc. may need to consider being fitted for a hernia prevention belt. · A full-length mirror in your bathroom is helpful to make it easier when applying your flange/pouch     Urostomy:  · You will typically need to empty your pouch 4-6 times per day or when 1/3 full  · Holding a tampon or rolled up paper towel on the stoma helps to wick up the urine and keep the skin dry when changing the pouch  · May need to hook to a vann catheter bag a nighttime to prevent your pouch from getting too full and leaking, you can unhook it in the morning  · Nighttime vann bag should be cleansed each morning to prevent odor and bacteria from growing:  ? Cleanse bedside vann bag each morning with diluted bleach solution (1-part bleach to 10 parts water or 4 ounces of bleach to 1 gallon of water) helps with bacteria build up. May also use vinegar solution (1-part vinegar and 3 parts water) this helps with odor control. · You will be discharged home from the hospital with stents in your stoma for up to 2 weeks. Be careful when changing your pouch so your do not pull the stents out. · Never take a urine sample out of your urostomy pouch, not even a new pouch. If specimen is needed:  ? Remove your pouch. Cleanse skin and stoma with warm water. Next cleanse stoma with betadine or antiseptic, dab off with gauze gently. Allow urine to drip into specimen cup. This will take a while, maybe up to 30 minutes.  Will need at least 10ml.        Follow up: Call as needed with any issues    Electronically signed by DEMAR Knight CNP on 6/19/19 at 7:23 PM              Electronically signed by DEMAR Knight CNP on 6/19/2019 at 7:27 PM

## 2019-06-24 ENCOUNTER — OFFICE VISIT (OUTPATIENT)
Dept: FAMILY MEDICINE CLINIC | Age: 79
End: 2019-06-24
Payer: MEDICARE

## 2019-06-24 VITALS
DIASTOLIC BLOOD PRESSURE: 68 MMHG | BODY MASS INDEX: 29.63 KG/M2 | WEIGHT: 189.2 LBS | SYSTOLIC BLOOD PRESSURE: 125 MMHG | HEART RATE: 72 BPM | TEMPERATURE: 97.8 F | RESPIRATION RATE: 16 BRPM

## 2019-06-24 DIAGNOSIS — E03.9 ACQUIRED HYPOTHYROIDISM: Chronic | ICD-10-CM

## 2019-06-24 DIAGNOSIS — G47.00 INSOMNIA, UNSPECIFIED TYPE: Primary | ICD-10-CM

## 2019-06-24 LAB — TSH SERPL DL<=0.05 MIU/L-ACNC: 4.3 UIU/ML (ref 0.4–4.2)

## 2019-06-24 PROCEDURE — 99214 OFFICE O/P EST MOD 30 MIN: CPT | Performed by: NURSE PRACTITIONER

## 2019-06-24 PROCEDURE — 36415 COLL VENOUS BLD VENIPUNCTURE: CPT | Performed by: NURSE PRACTITIONER

## 2019-06-24 RX ORDER — ALPRAZOLAM 0.25 MG/1
0.25 TABLET ORAL NIGHTLY PRN
Qty: 5 TABLET | Refills: 0 | Status: SHIPPED | OUTPATIENT
Start: 2019-06-24 | End: 2019-06-29

## 2019-06-24 ASSESSMENT — ENCOUNTER SYMPTOMS
WHEEZING: 0
NAUSEA: 0
EYE DISCHARGE: 0
SORE THROAT: 0
BACK PAIN: 0
VOMITING: 0
RHINORRHEA: 0
ABDOMINAL PAIN: 0
EYE PAIN: 0
COLOR CHANGE: 0
CONSTIPATION: 0
SHORTNESS OF BREATH: 0
COUGH: 0
DIARRHEA: 0
STRIDOR: 0

## 2019-06-24 NOTE — PATIENT INSTRUCTIONS
Patient Education        Insomnia: Care Instructions  Your Care Instructions    Insomnia is the inability to sleep well. It is a common problem for most people at some time. Insomnia may make it hard for you to get to sleep, stay asleep, or sleep as long as you need to. This can make you tired and grouchy during the day. It can also make you forgetful, less effective at work, and unhappy. Insomnia can be caused by conditions such as depression or anxiety. Pain can also affect your ability to sleep. When these problems are solved, the insomnia usually clears up. But sometimes bad sleep habits can cause insomnia. If insomnia is affecting your work or your enjoyment of life, you can take steps to improve your sleep. Follow-up care is a key part of your treatment and safety. Be sure to make and go to all appointments, and call your doctor if you are having problems. It's also a good idea to know your test results and keep a list of the medicines you take. How can you care for yourself at home? What to avoid  · Do not have drinks with caffeine, such as coffee or black tea, for 8 hours before bed. · Do not smoke or use other types of tobacco near bedtime. Nicotine is a stimulant and can keep you awake. · Avoid drinking alcohol late in the evening, because it can cause you to wake in the middle of the night. · Do not eat a big meal close to bedtime. If you are hungry, eat a light snack. · Do not drink a lot of water close to bedtime, because the need to urinate may wake you up during the night. · Do not read or watch TV in bed. Use the bed only for sleeping and sexual activity. What to try  · Go to bed at the same time every night, and wake up at the same time every morning. Do not take naps during the day. · Keep your bedroom quiet, dark, and cool. · Sleep on a comfortable pillow and mattress. · If watching the clock makes you anxious, turn it facing away from you so you cannot see the time.   · If you worry when you lie down, start a worry book. Well before bedtime, write down your worries, and then set the book and your concerns aside. · Try meditation or other relaxation techniques before you go to bed. · If you cannot fall asleep, get up and go to another room until you feel sleepy. Do something relaxing. Repeat your bedtime routine before you go to bed again. · Make your house quiet and calm about an hour before bedtime. Turn down the lights, turn off the TV, log off the computer, and turn down the volume on music. This can help you relax after a busy day. When should you call for help? Watch closely for changes in your health, and be sure to contact your doctor if:    · Your efforts to improve your sleep do not work.     · Your insomnia gets worse.     · You have been feeling down, depressed, or hopeless or have lost interest in things that you usually enjoy. Where can you learn more? Go to https://InnoCCpeZeeWhere.CogniK. org and sign in to your TaDaweb account. Enter P513 in the [employer] box to learn more about \"Insomnia: Care Instructions. \"     If you do not have an account, please click on the \"Sign Up Now\" link. Current as of: June 28, 2018  Content Version: 12.0  © 1010-8781 Healthwise, Incorporated. Care instructions adapted under license by [facility] ([facility]). If you have questions about a medical condition or this instruction, always ask your healthcare professional. [place] [address] any warranty or liability for your use of this information. Patient Education        Insomnia: Care Instructions  Your Care Instructions    Insomnia is the inability to sleep well. It is a common problem for most people at some time. Insomnia may make it hard for you to get to sleep, stay asleep, or sleep as long as you need to. This can make you tired and grouchy during the day. It can also make you forgetful, less effective at work, and unhappy.   Insomnia can be caused by conditions such as depression or anxiety. Pain can also affect your ability to sleep. When these problems are solved, the insomnia usually clears up. But sometimes bad sleep habits can cause insomnia. If insomnia is affecting your work or your enjoyment of life, you can take steps to improve your sleep. Follow-up care is a key part of your treatment and safety. Be sure to make and go to all appointments, and call your doctor if you are having problems. It's also a good idea to know your test results and keep a list of the medicines you take. How can you care for yourself at home? What to avoid  · Do not have drinks with caffeine, such as coffee or black tea, for 8 hours before bed. · Do not smoke or use other types of tobacco near bedtime. Nicotine is a stimulant and can keep you awake. · Avoid drinking alcohol late in the evening, because it can cause you to wake in the middle of the night. · Do not eat a big meal close to bedtime. If you are hungry, eat a light snack. · Do not drink a lot of water close to bedtime, because the need to urinate may wake you up during the night. · Do not read or watch TV in bed. Use the bed only for sleeping and sexual activity. What to try  · Go to bed at the same time every night, and wake up at the same time every morning. Do not take naps during the day. · Keep your bedroom quiet, dark, and cool. · Sleep on a comfortable pillow and mattress. · If watching the clock makes you anxious, turn it facing away from you so you cannot see the time. · If you worry when you lie down, start a worry book. Well before bedtime, write down your worries, and then set the book and your concerns aside. · Try meditation or other relaxation techniques before you go to bed. · If you cannot fall asleep, get up and go to another room until you feel sleepy. Do something relaxing. Repeat your bedtime routine before you go to bed again.   · Make your house quiet and calm about an hour before bedtime. Turn down the lights, turn off the TV, log off the computer, and turn down the volume on music. This can help you relax after a busy day. When should you call for help? Watch closely for changes in your health, and be sure to contact your doctor if:    · Your efforts to improve your sleep do not work.     · Your insomnia gets worse.     · You have been feeling down, depressed, or hopeless or have lost interest in things that you usually enjoy. Where can you learn more? Go to https://orat.iopeEZMove.Frontify. org and sign in to your MyWebzz account. Enter P513 in the [employer] box to learn more about \"Insomnia: Care Instructions. \"     If you do not have an account, please click on the \"Sign Up Now\" link. Current as of: June 28, 2018  Content Version: 12.0  © 8149-4740 Healthwise, Incorporated. Care instructions adapted under license by [facility] ([facility]). If you have questions about a medical condition or this instruction, always ask your healthcare professional. [address] any warranty or liability for your use of this information.

## 2019-06-24 NOTE — PROGRESS NOTES
[address] [address]  Dept: [phone]  Dept Fax: [phone]  Loc: [address] [place] [address] Priti Lynn is a 78 y.o.male here with complaint about experiencing insomnia since he was switched from Lyrica to Cymbalta by another physician. Patient has recently undergone significant life change including dealing with a malignant neoplasm of his bladder, and surgery. Patient states he is also become more anxious and wakes up at night and has trouble getting back to sleep. He did not sleep at all last night he said. He typically has mild anxiety but never has a history of insomnia. Melinda Gerber His wife is with him today. Patient Active Problem List   Diagnosis    Coronary artery disease involving native heart    Hyperlipidemia    Essential hypertension    Hypothyroidism    Claudication (HCC)    CKD (chronic kidney disease) stage 3, GFR 30-59 ml/min (HCC)    BCG ROXANE    Bladder cancer (HCC)    Subcutaneous cyst    Pseudoaneurysm of right femoral artery (HCC)    PVD [facility]peripheral vascular disease) ([facility] [address]    Spinal stenosis of lumbar region    Macular degeneration    Abnormal CT of the chest    Bladder tumor    Pain in penis and bladder    Peripheral polyneuropathy    History of malignant neoplasm of bladder    Attention to urostomy (HCC)    Peristomal skin complication       Current Outpatient Medications   Medication Sig Dispense Refill    ALPRAZolam (XANAX) 0.25 MG tablet Take 1 tablet by mouth nightly as needed for Sleep or Anxiety for up to 5 days. 5 tablet 0    atorvastatin (LIPITOR) 40 MG tablet TAKE 1 TABLET BY MOUTH EVERY DAY AT BEDTIME 90 tablet 3    DULoxetine (CYMBALTA) 20 MG extended release capsule Take 1 capsule by mouth daily 30 capsule 3    metoprolol tartrate (LOPRESSOR) 50 MG tablet Take 1 tablet by mouth 2 times daily New dose.  180 tablet 3    nitroGLYCERIN (NITROSTAT) 0.4 MG SL tablet Place 1 tablet under the tongue every 5 minutes as needed for Chest pain 25 tablet 1    docusate sodium (COLACE, DULCOLAX) 100 MG CAPS Take 100 mg by mouth 2 times daily 30 capsule 0    polycarbophil (FIBERCON) 625 MG tablet Take 625 mg by mouth daily      b complex vitamins capsule Take 1 capsule by mouth daily      levothyroxine (SYNTHROID) 137 MCG tablet TAKE 1 TABLET BY MOUTH DAILY 90 tablet 3    KLOR-CON M10 10 MEQ extended release tablet TAKE 1 TABLET BY MOUTH DAILY 90 tablet 3    Elastic Bandages & Supports (JOBST KNEE HIGH COMPRESSION SM) MISC 1 each by Does not apply route daily as needed 1 each 0    Handicap Placard MISC Cannot walk more than 200 feet without stopping to rest or without assistance. Lifetime x 5 years. 1 each 0    aspirin 81 MG tablet Take 81 mg by mouth daily. No current facility-administered medications for this visit. Review of Systems   Constitutional: Negative for activity change, appetite change, chills, fatigue and fever. HENT: Negative for congestion, ear pain, rhinorrhea and sore throat. Eyes: Negative for pain and discharge. Respiratory: Negative for cough, shortness of breath, wheezing and stridor. Cardiovascular: Negative for chest pain. Gastrointestinal: Negative for abdominal pain, constipation, diarrhea, nausea and vomiting. Genitourinary: Negative for dysuria, flank pain and frequency. Musculoskeletal: Negative for arthralgias, back pain, myalgias and neck pain. Skin: Negative for color change and rash. Allergic/Immunologic: Negative for immunocompromised state. Neurological: Negative for dizziness, weakness and headaches. Psychiatric/Behavioral: Positive for sleep disturbance.            OBJECTIVE     /68 (Site: Right Upper Arm)   Pulse 72   Temp 97.8 °F (36.6 °C) (Oral)   Resp 16   Wt 189 lb 3.2 oz (85.8 kg)   BMI 29.63 kg/m²     Wt Readings from Last 3 Encounters:   06/24/19 189 lb 3.2 oz (85.8 kg)   06/19/19 191 lb (86.6 kg)   06/17/19 191 lb (86.6 kg)     Body mass index is 29.63 kg/m². Physical Exam   Constitutional: He is oriented to person, place, and time. He appears well-developed and well-nourished. No distress. Eyes: Conjunctivae are normal. Right eye exhibits no discharge. Left eye exhibits no discharge. Neck: Normal range of motion. Cardiovascular: Normal rate, regular rhythm and normal heart sounds. Pulmonary/Chest: Effort normal and breath sounds normal. No respiratory distress. Musculoskeletal: Normal range of motion. He exhibits no edema or tenderness. Neurological: He is alert and oriented to person, place, and time. Skin: Skin is warm and dry. No rash noted. He is not diaphoretic. No erythema. No pallor. Psychiatric: He has a normal mood and affect. His behavior is normal. Judgment and thought content normal.   Nursing note and vitals reviewed.       Lab Results   Component Value Date    LABA1C 6.4 08/17/2011       Lab Results   Component Value Date    CHOL 153 02/22/2017    TRIG 102 02/22/2017    HDL 31 02/22/2017    LDLCALC 102 02/22/2017    LDLDIRECT 112.38 02/12/2018       The 10-year ASCVD risk score (Ketan Escoto et al., 2013) is: 36.1%    Values used to calculate the score:      Age: 78 years      Sex: Male      Is Non- : No      Diabetic: No      Tobacco smoker: No      Systolic Blood Pressure: 960 mmHg      Is BP treated: Yes      HDL Cholesterol: 31 mg/dl      Total Cholesterol: 153 mg/dl    Lab Results   Component Value Date     05/11/2019    K 4.1 05/11/2019    CL 98 05/11/2019    CO2 29 05/11/2019    BUN 17 05/11/2019    CREATININE 1.1 05/11/2019    GLUCOSE 105 05/11/2019    CALCIUM 9.1 05/11/2019    PROT 4.9 (L) 05/04/2019    LABALBU 2.7 (L) 05/04/2019    BILITOT 1.0 05/04/2019    ALKPHOS 68 05/04/2019    AST 12 05/04/2019    ALT 10 (L) 05/04/2019    LABGLOM 65 (A) 05/11/2019       No results found for: Valeria Shines    Lab Results type  ALPRAZolam (XANAX) 0.25 MG tablet   2. Acquired hypothyroidism  TSH    TSH       PLAN      Patient will stop taking Cymbalta and [facility] TSH drawn in the office. 5 doses of as needed Xanax for sleep prescribed. Patient will follow-up and report further needs.     Electronically signed by DEMAR Velazquez CNP on 6/24/2019 at 4:25 PM

## 2019-06-27 RX ORDER — MIDAZOLAM HYDROCHLORIDE 1 MG/ML
1 INJECTION INTRAMUSCULAR; INTRAVENOUS ONCE
Status: CANCELLED | OUTPATIENT
Start: 2019-06-27 | End: 2019-06-27

## 2019-06-27 RX ORDER — SODIUM CHLORIDE 450 MG/100ML
INJECTION, SOLUTION INTRAVENOUS CONTINUOUS
Status: CANCELLED | OUTPATIENT
Start: 2019-06-27

## 2019-06-27 RX ORDER — FENTANYL CITRATE 50 UG/ML
50 INJECTION, SOLUTION INTRAMUSCULAR; INTRAVENOUS ONCE
Status: CANCELLED | OUTPATIENT
Start: 2019-06-27 | End: 2019-06-27

## 2019-06-28 ENCOUNTER — HOSPITAL ENCOUNTER (OUTPATIENT)
Dept: INTERVENTIONAL RADIOLOGY/VASCULAR | Age: 79
Discharge: HOME | End: 2019-06-28
Payer: MEDICARE

## 2019-06-28 VITALS
OXYGEN SATURATION: 96 % | SYSTOLIC BLOOD PRESSURE: 126 MMHG | DIASTOLIC BLOOD PRESSURE: 63 MMHG | TEMPERATURE: 98.1 F | HEART RATE: 60 BPM | RESPIRATION RATE: 18 BRPM

## 2019-06-28 DIAGNOSIS — C67.9 MALIGNANT NEOPLASM OF URINARY BLADDER, UNSPECIFIED SITE (HCC): ICD-10-CM

## 2019-06-28 PROCEDURE — 6360000002 HC RX W HCPCS

## 2019-06-28 PROCEDURE — 2500000003 HC RX 250 WO HCPCS: Performed by: RADIOLOGY

## 2019-06-28 PROCEDURE — 2709999900 HC NON-CHARGEABLE SUPPLY

## 2019-06-28 PROCEDURE — 2500000003 HC RX 250 WO HCPCS

## 2019-06-28 PROCEDURE — 2580000003 HC RX 258: Performed by: RADIOLOGY

## 2019-06-28 PROCEDURE — 6360000002 HC RX W HCPCS: Performed by: RADIOLOGY

## 2019-06-28 PROCEDURE — 77001 FLUOROGUIDE FOR VEIN DEVICE: CPT | Performed by: RADIOLOGY

## 2019-06-28 PROCEDURE — 36590 REMOVAL TUNNELED CV CATH: CPT | Performed by: RADIOLOGY

## 2019-06-28 RX ORDER — CEFAZOLIN SODIUM 1 G/50ML
1 INJECTION, SOLUTION INTRAVENOUS
Status: DISCONTINUED | OUTPATIENT
Start: 2019-06-28 | End: 2019-06-29 | Stop reason: HOSPADM

## 2019-06-28 RX ORDER — SODIUM CHLORIDE 450 MG/100ML
INJECTION, SOLUTION INTRAVENOUS CONTINUOUS
Status: DISCONTINUED | OUTPATIENT
Start: 2019-06-28 | End: 2019-06-29 | Stop reason: HOSPADM

## 2019-06-28 RX ORDER — MIDAZOLAM HYDROCHLORIDE 1 MG/ML
1 INJECTION INTRAMUSCULAR; INTRAVENOUS ONCE
Status: COMPLETED | OUTPATIENT
Start: 2019-06-28 | End: 2019-06-28

## 2019-06-28 RX ORDER — FENTANYL CITRATE 50 UG/ML
50 INJECTION, SOLUTION INTRAMUSCULAR; INTRAVENOUS ONCE
Status: COMPLETED | OUTPATIENT
Start: 2019-06-28 | End: 2019-06-28

## 2019-06-28 RX ADMIN — SODIUM CHLORIDE: 4.5 INJECTION, SOLUTION INTRAVENOUS at 09:59

## 2019-06-28 RX ADMIN — FENTANYL CITRATE 50 MCG: 50 INJECTION, SOLUTION INTRAMUSCULAR; INTRAVENOUS at 11:18

## 2019-06-28 RX ADMIN — MIDAZOLAM HYDROCHLORIDE 1 MG: 1 INJECTION INTRAMUSCULAR; INTRAVENOUS at 11:18

## 2019-06-28 RX ADMIN — BACITRACIN 50000 UNITS: 50000 INJECTION, POWDER, FOR SOLUTION INTRAMUSCULAR at 11:24

## 2019-06-28 RX ADMIN — CEFAZOLIN SODIUM 1 G: 1 INJECTION, SOLUTION INTRAVENOUS at 09:59

## 2019-06-28 ASSESSMENT — PAIN SCALES - GENERAL
PAINLEVEL_OUTOF10: 0

## 2019-06-28 NOTE — H&P
  atorvastatin (LIPITOR) 40 MG tablet, TAKE 1 TABLET BY MOUTH EVERY DAY AT BEDTIME, Disp: 90 tablet, Rfl: 3    ALPRAZolam (XANAX) 0.25 MG tablet, Take 1 tablet by mouth nightly as needed for Sleep or Anxiety for up to 5 days. , Disp: 5 tablet, Rfl: 0    DULoxetine (CYMBALTA) 20 MG extended release capsule, Take 1 capsule by mouth daily, Disp: 30 capsule, Rfl: 3    metoprolol tartrate (LOPRESSOR) 50 MG tablet, Take 1 tablet by mouth 2 times daily New dose., Disp: 180 tablet, Rfl: 3    nitroGLYCERIN (NITROSTAT) 0.4 MG SL tablet, Place 1 tablet under the tongue every 5 minutes as needed for Chest pain, Disp: 25 tablet, Rfl: 1    docusate sodium (COLACE, DULCOLAX) 100 MG CAPS, Take 100 mg by mouth 2 times daily, Disp: 30 capsule, Rfl: 0    polycarbophil (FIBERCON) 625 MG tablet, Take 625 mg by mouth daily, Disp: , Rfl:     b complex vitamins capsule, Take 1 capsule by mouth daily, Disp: , Rfl:     levothyroxine (SYNTHROID) 137 MCG tablet, TAKE 1 TABLET BY MOUTH DAILY, Disp: 90 tablet, Rfl: 3    KLOR-CON M10 10 MEQ extended release tablet, TAKE 1 TABLET BY MOUTH DAILY, Disp: 90 tablet, Rfl: 3    Elastic Bandages & Supports (JOBST KNEE HIGH COMPRESSION SM) MISC, 1 each by Does not apply route daily as needed, Disp: 1 each, Rfl: 0    Handicap Placard MISC, Cannot walk more than 200 feet without stopping to rest or without assistance. Lifetime x 5 years. , Disp: 1 each, Rfl: 0    aspirin 81 MG tablet, Take 81 mg by mouth daily. , Disp: , Rfl:     Current Facility-Administered Medications:     0.45 % sodium chloride infusion, , Intravenous, Continuous, Beti Mehta MD, Last Rate: 20 mL/hr at 06/28/19 0959    bacitracin 50,000 Units in sodium chloride 0.9 % 500 mL irrigation, 50,000 Units, Topical, Once, Beti Mehta MD    ceFAZolin (ANCEF) 1 g in dextrose 5 % 50 mL IVPB (premix), 1 g, Intravenous, 60 Min Pre-Op, Beti Mehta MD, Stopped at 06/28/19 1030    fentaNYL (SUBLIMAZE) injection 50 mcg, 50 mcg, Intravenous, Once, Simin Powell MD    midazolam (VERSED) injection 1 mg, 1 mg, Intravenous, Once, Simin Powell MD  Prior to Admission medications    Medication Sig Start Date End Date Taking? Authorizing Provider   atorvastatin (LIPITOR) 40 MG tablet TAKE 1 TABLET BY MOUTH EVERY DAY AT BEDTIME 6/17/19  Yes Morelia Ceron MD   ALPRAZolam Verlean Jose Luis) 0.25 MG tablet Take 1 tablet by mouth nightly as needed for Sleep or Anxiety for up to 5 days. 6/24/19 6/29/19  DEMAR Simpson CNP   DULoxetine (CYMBALTA) 20 MG extended release capsule Take 1 capsule by mouth daily 6/17/19   Morelia Ceron MD   metoprolol tartrate (LOPRESSOR) 50 MG tablet Take 1 tablet by mouth 2 times daily New dose. 5/23/19   Morelia Ceron MD   nitroGLYCERIN (NITROSTAT) 0.4 MG SL tablet Place 1 tablet under the tongue every 5 minutes as needed for Chest pain 5/14/19   Ashutosh Calixto MD   docusate sodium (COLACE, DULCOLAX) 100 MG CAPS Take 100 mg by mouth 2 times daily 5/7/19   DEMAR Washington CNP   polycarbophil (FIBERCON) 625 MG tablet Take 625 mg by mouth daily    Historical Provider, MD   b complex vitamins capsule Take 1 capsule by mouth daily    Historical Provider, MD   levothyroxine (SYNTHROID) 137 MCG tablet TAKE 1 TABLET BY MOUTH DAILY 3/11/19   Morelia Ceron MD   KLOR-CON M10 10 MEQ extended release tablet TAKE 1 TABLET BY MOUTH DAILY 1/14/19   Morelia Ceron MD   Elastic Bandages & Supports (JOBST KNEE HIGH COMPRESSION SM) MISC 1 each by Does not apply route daily as needed 2/16/16   DEMAR Katz CNP   Handicap Placard [facility] Cannot walk more than 200 feet without stopping to rest or without assistance. Lifetime x 5 years. 11/11/15   DEMAR Katz CNP   aspirin 81 MG tablet Take 81 mg by mouth daily.     Historical Provider, MD     Additional information:       VITAL SIGNS   Vitals:    06/28/19 1112   BP: (!) 164/79   Pulse: 67   Resp: 20   Temp:    SpO2: 98%       PHYSICAL:   Heart:  [x]Regular rate and rhythm  []Other:    Lungs:  [x]Clear    []Other:    Abdomen: [x]Soft    []Other:    Mental Status: [x]Alert & Oriented  []Other:      PLANNED PROCEDURE   []Biospy []Arteriogram              []Drainage   [x]Mediport removal  []Fistulogram []IV access       []Vertebroplasty / Augmentation  []IVC filter []Dialysis catheter []Biliary drainage  []Other: []CAPD Catheter []Nephrostomy Tube / Stent  SEDATION  Planned agent:[x]Midazolam []Meperidine [x]Sublimaze []Dilaudid []Morphine     []Diazepam  []Other:     ASA Classification:  []1 [x]2 []3 []4 []5  Class 1: A normal healthy patient  Class 2: Pt with mild to moderate systemic disease  Class 3: Severe systemic disease or disturbance  Class 4: Severe systemic disorders that are already life threatening. Class 5: Moribund pt with little chances of survival, for more than 24 hours. Mallampati I Airway Classification:   []1 []2 [x]3 []4    [x]Pre-procedure diagnostic studies complete and results available. Comment:    [x]Previous sedation/anesthesia experiences assessed. Comment:    [x]The patient is an appropriate candidate to undergo the planned procedure sedation and anesthesia. (Refer to nursing sedation/analgesia documentation record)  [x]Formulation and discussion of sedation/procedure plan, risks, and expectations with patient and/or responsible adult completed. [x]Patient examined immediately prior to the procedure.  (Refer to nursing sedation/analgesia documentation record)    Chitra Wells MD  Electronically signed 6/28/2019 at 11:18 AM

## 2019-06-28 NOTE — H&P
Formulation and discussion of sedation / procedure plans, risks, benefits, side effects and alternatives with patient and/or responsible adult completed.     Electronically signed by Corinne Conrad, MD on 6/28/2019 at 11:18 AM

## 2019-06-28 NOTE — PROGRESS NOTES
7566 pt arrives ambulatory with cane with spouse for mediport removal. Procedure explained and questions answered. PT RIGHTS AND RESPONSIBILITIES OFFERED TO PT. Pt denies taking blood thinners in past 5 days. 1145 Pt back from [facility]. Vitals stable. Dressing clean and intact. Pt denies pain. Pt provided with coffee. Ice pack to site.                      _m___ Safety:       (Environmental)   [place] to environment   Ensure ID band is correct and in place/ allergy band as needed   Assess for fall risk   Initiate fall precautions as applicable (fall band, side rails, etc.)   Call light within reach   Bed in low position/ wheels locked    __m__ Pain:        Assess pain level and characteristics   Administer analgesics as ordered   Assess effectiveness of pain management and report to MD as needed    _m___ Knowledge Deficit:   Assess baseline knowledge   Provide teaching at level of understanding   Provide teaching via preferred learning method   Evaluate teaching effectiveness    __m__ Hemodynamic/Respiratory Status:       (Pre and Post Procedure Monitoring)   Assess/Monitor vital signs and LOC   Assess Baseline SpO2 prior to any sedation   Obtain weight/height   Assess vital signs/ LOC until patient meets discharge criteria   Monitor procedure site and notify MD of any issues

## 2019-06-28 NOTE — PROGRESS NOTES
1106 Patient received in IR for mediport removal. Denies c/o pain, VSS, questions and concerns addressed  1109 This procedure has been fully reviewed with the patient and written informed consent has been obtained. 1121 Procedure started with SA.[email or web address]  1451 mediport removed intact  1129 Procedure completed; patient tolerated well. Dermabond, steri strips, gauze and opsite applied; no bleeding noted. Pt denies c/o pain  1130 Report called to OPN  1135 Patient on cart; comfort ensured. [phone] Patient taken to OPN via cart.

## 2019-06-28 NOTE — PROGRESS NOTES
1225:  DRESSING DRY. ICE ON. 1230:  PT ASSISTED TO DRESS. TOLERATED WELL  1245:  DISCHARGE INFORMATION GIVEN AND PT DISCHARGED VIA WC IN CARE OF WIFE. DRESSING DRY. ICE ON. DENIES PAIN.     _M___ Safety:       (Environmental)   [place] to environment   Ensure ID band is correct and in place/ allergy band as needed   Assess for fall risk   Initiate fall precautions as applicable (fall band, side rails, etc.)   Call light within reach   Bed in low position/ wheels locked    _M___ Pain:        Assess pain level and characteristics   Administer analgesics as ordered   Assess effectiveness of pain management and report to MD as needed    M____ Knowledge Deficit:   Assess baseline knowledge   Provide teaching at level of understanding   Provide teaching via preferred learning method   Evaluate teaching effectiveness    __M__ Hemodynamic/Respiratory Status:       (Pre and Post Procedure Monitoring)   Assess/Monitor vital signs and LOC   Assess Baseline SpO2 prior to any sedation   Obtain weight/height   Assess vital signs/ LOC until patient meets discharge criteria   Monitor procedure site and notify MD of any issues    REMOVED____ Infection-Risk of Central Venous Catheter:   Monitor for infection signs and symptoms (catheter site redness, temperature elevation, etc)   Assess for infection risks   Educate regarding infection prevention   Manage central venous catheter (flushes/ dressing changes per protocol)

## 2019-06-28 NOTE — OP NOTE
Department of Radiology  Post Procedure Progress Note      Pre-Procedure Diagnosis:  Bladder cancer, no longer needs mediport    Procedure Performed:  mediport removal    Anesthesia: local / versed and fentanyl    Findings: successful    Immediate Complications:  None    Estimated Blood Loss: minimal    SEE DICTATED PROCEDURE NOTE FOR COMPLETE DETAILS.     Vinod Jones MD   6/28/2019 11:29 AM

## 2019-07-30 ENCOUNTER — HOSPITAL ENCOUNTER (OUTPATIENT)
Dept: AUDIOLOGY | Age: 79
Discharge: HOME | End: 2019-07-30

## 2019-07-30 PROCEDURE — 9990000010 HC NO CHARGE VISIT: Performed by: AUDIOLOGIST

## 2019-08-12 ENCOUNTER — HOSPITAL ENCOUNTER (OUTPATIENT)
Dept: CT IMAGING | Age: 79
Discharge: HOME | End: 2019-08-12
Payer: MEDICARE

## 2019-08-12 DIAGNOSIS — C67.9 MALIGNANT NEOPLASM OF URINARY BLADDER, UNSPECIFIED SITE (HCC): ICD-10-CM

## 2019-08-12 LAB — POC CREATININE WHOLE BLOOD: 1.2 MG/DL (ref 0.5–1.2)

## 2019-08-12 PROCEDURE — 6360000004 HC RX CONTRAST MEDICATION: Performed by: UROLOGY

## 2019-08-12 PROCEDURE — 74178 CT ABD&PLV WO CNTR FLWD CNTR: CPT

## 2019-08-12 PROCEDURE — 82565 ASSAY OF CREATININE: CPT

## 2019-08-12 RX ADMIN — IOPAMIDOL 80 ML: 755 INJECTION, SOLUTION INTRAVENOUS at 12:51

## 2019-08-19 ENCOUNTER — TELEPHONE (OUTPATIENT)
Dept: UROLOGY | Age: 79
End: 2019-08-19

## 2019-08-19 ENCOUNTER — OFFICE VISIT (OUTPATIENT)
Dept: UROLOGY | Age: 79
End: 2019-08-19
Payer: MEDICARE

## 2019-08-19 VITALS
DIASTOLIC BLOOD PRESSURE: 66 MMHG | BODY MASS INDEX: 30.13 KG/M2 | SYSTOLIC BLOOD PRESSURE: 136 MMHG | WEIGHT: 192 LBS | HEIGHT: 67 IN

## 2019-08-19 DIAGNOSIS — C67.9 MALIGNANT NEOPLASM OF URINARY BLADDER, UNSPECIFIED SITE (HCC): Primary | ICD-10-CM

## 2019-08-19 PROCEDURE — 99214 OFFICE O/P EST MOD 30 MIN: CPT | Performed by: UROLOGY

## 2019-08-19 RX ORDER — PREGABALIN 50 MG/1
50 CAPSULE ORAL 3 TIMES DAILY
COMMUNITY
End: 2019-10-22 | Stop reason: SDUPTHER

## 2019-08-19 ASSESSMENT — ENCOUNTER SYMPTOMS
COLOR CHANGE: 0
EYE ITCHING: 0
BACK PAIN: 1
EYE PAIN: 0
CHEST TIGHTNESS: 0
SHORTNESS OF BREATH: 1
DIARRHEA: 0
NAUSEA: 0

## 2019-08-19 NOTE — PROGRESS NOTES
10/2016    Dr Arriaga Fraction  1990's    TUNNELED VENOUS PORT PLACEMENT      TURP  04/16/2014    See note of Dr. William Richardson for OR procedure details       Allergies   Allergen Reactions    Tape Ebony Vamsi Tape]      Surgical tape-rash         Current Outpatient Medications:     pregabalin (LYRICA) 50 MG capsule, Take 50 mg by mouth 3 times daily. , Disp: , Rfl:     atorvastatin (LIPITOR) 40 MG tablet, TAKE 1 TABLET BY MOUTH EVERY DAY AT BEDTIME, Disp: 90 tablet, Rfl: 3    metoprolol tartrate (LOPRESSOR) 50 MG tablet, Take 1 tablet by mouth 2 times daily New dose., Disp: 180 tablet, Rfl: 3    docusate sodium (COLACE, DULCOLAX) 100 MG CAPS, Take 100 mg by mouth 2 times daily, Disp: 30 capsule, Rfl: 0    polycarbophil (FIBERCON) 625 MG tablet, Take 625 mg by mouth daily, Disp: , Rfl:     b complex vitamins capsule, Take 1 capsule by mouth daily, Disp: , Rfl:     levothyroxine (SYNTHROID) 137 MCG tablet, TAKE 1 TABLET BY MOUTH DAILY, Disp: 90 tablet, Rfl: 3    KLOR-CON M10 10 MEQ extended release tablet, TAKE 1 TABLET BY MOUTH DAILY, Disp: 90 tablet, Rfl: 3    Elastic Bandages & Supports (JOBST KNEE HIGH COMPRESSION SM) MISC, 1 each by Does not apply route daily as needed, Disp: 1 each, Rfl: 0    Handicap Placard MISC, Cannot walk more than 200 feet without stopping to rest or without assistance. Lifetime x 5 years. , Disp: 1 each, Rfl: 0    aspirin 81 MG tablet, Take 81 mg by mouth daily. , Disp: , Rfl:     nitroGLYCERIN (NITROSTAT) 0.4 MG SL tablet, Place 1 tablet under the tongue every 5 minutes as needed for Chest pain (Patient not taking: Reported on 8/19/2019), Disp: 25 tablet, Rfl: 1    Review of Systems   Constitutional: Negative for chills and fever. HENT: Ear discharge: walking up stairs. Eyes: Negative for pain and itching. Respiratory: Positive for shortness of breath. Negative for chest tightness. Cardiovascular: Negative for chest pain and leg swelling.    Gastrointestinal: Negative for diarrhea and nausea. Endocrine: Negative for cold intolerance and heat intolerance. Genitourinary: Positive for flank pain. Negative for hematuria. Musculoskeletal: Positive for back pain (low back pain). Skin: Negative for color change and rash. Allergic/Immunologic: Negative for environmental allergies and food allergies. Neurological: Negative for dizziness and headaches. Hematological: Bruises/bleeds easily. /66   Ht 5' 7\" (1.702 m)   Wt 192 lb (87.1 kg)   BMI 30.07 kg/m²     Objective:   Physical Exam   Constitutional: He is oriented to person, place, and time. Vital signs are normal. He appears well-developed and well-nourished. He is cooperative. No distress. HENT:   Head: Normocephalic and atraumatic. Mouth/Throat: Oropharynx is clear and moist and mucous membranes are normal. No oropharyngeal exudate. Eyes: Pupils are equal, round, and reactive to light. EOM are normal. Right eye exhibits no discharge. Left eye exhibits no discharge. No scleral icterus. Neck: Trachea normal. No JVD present. No tracheal deviation present. Pulmonary/Chest: Effort normal. No respiratory distress. He has no wheezes. Abdominal: Soft. He exhibits no distension. There is no tenderness. There is no rebound and no CVA tenderness. Musculoskeletal: He exhibits no edema or tenderness. Lymphadenopathy:        Right: No supraclavicular adenopathy present. Left: No supraclavicular adenopathy present. Neurological: He is alert and oriented to person, place, and time. No cranial nerve deficit. Skin: Skin is warm and dry. He is not diaphoretic. Psychiatric: He has a normal mood and affect. His behavior is normal.   Nursing note and vitals reviewed. Labs    No results found for this visit on 08/19/19.     Lab Results   Component Value Date    CREATININE 1.1 05/11/2019    BUN 17 05/11/2019     05/11/2019    K 4.1 05/11/2019    CL 98 05/11/2019    CO2 29

## 2019-08-19 NOTE — Clinical Note
Can you please make sure Celina Thompson is also referred back to Dr. Karmen Barajas for anxiety / depression? Thanks,C

## 2019-08-28 ENCOUNTER — NURSE TRIAGE (OUTPATIENT)
Dept: OTHER | Facility: CLINIC | Age: 79
End: 2019-08-28

## 2019-08-28 ENCOUNTER — OFFICE VISIT (OUTPATIENT)
Dept: FAMILY MEDICINE CLINIC | Age: 79
End: 2019-08-28
Payer: MEDICARE

## 2019-08-28 VITALS
RESPIRATION RATE: 16 BRPM | HEART RATE: 64 BPM | WEIGHT: 193.2 LBS | SYSTOLIC BLOOD PRESSURE: 110 MMHG | DIASTOLIC BLOOD PRESSURE: 60 MMHG | TEMPERATURE: 98.1 F | BODY MASS INDEX: 30.26 KG/M2

## 2019-08-28 DIAGNOSIS — F41.9 ANXIETY: ICD-10-CM

## 2019-08-28 DIAGNOSIS — M54.42 ACUTE LEFT-SIDED LOW BACK PAIN WITH LEFT-SIDED SCIATICA: Primary | ICD-10-CM

## 2019-08-28 PROCEDURE — 99214 OFFICE O/P EST MOD 30 MIN: CPT | Performed by: NURSE PRACTITIONER

## 2019-08-28 RX ORDER — PREDNISONE 20 MG/1
20 TABLET ORAL 2 TIMES DAILY
Qty: 10 TABLET | Refills: 0 | Status: SHIPPED | OUTPATIENT
Start: 2019-08-28 | End: 2019-09-02

## 2019-08-28 RX ORDER — ALPRAZOLAM 0.5 MG/1
0.5 TABLET ORAL DAILY
Qty: 30 TABLET | Refills: 0 | Status: SHIPPED | OUTPATIENT
Start: 2019-08-28 | End: 2019-09-27

## 2019-08-28 RX ORDER — BACLOFEN 10 MG/1
10 TABLET ORAL 2 TIMES DAILY
Qty: 60 TABLET | Refills: 0 | Status: SHIPPED | OUTPATIENT
Start: 2019-08-28 | End: 2019-10-08 | Stop reason: ALTCHOICE

## 2019-08-28 ASSESSMENT — ENCOUNTER SYMPTOMS
VOMITING: 0
SHORTNESS OF BREATH: 0
EYE REDNESS: 0
ALLERGIC/IMMUNOLOGIC NEGATIVE: 1
NAUSEA: 0
RHINORRHEA: 0
WHEEZING: 0
DIARRHEA: 0
EYE DISCHARGE: 0
BACK PAIN: 1
SORE THROAT: 0
CONSTIPATION: 0
ABDOMINAL PAIN: 0
COUGH: 0
EYE PAIN: 0
TROUBLE SWALLOWING: 0

## 2019-08-28 NOTE — PROGRESS NOTES
lumbar spinal region from previous surgeries. Patient's mood is also anxious and he has been this way on each visit I have seen him recently. He is easily tearful, very worried about his cancer and struggles openly about coping with being older. He states that when I gave him Xanax for sleep a few months ago this greatly helped his anxiety. His wife is very much encouraging him to be willing to trying a daily Xanax or to calm his fears. Diagnosis Orders   1. Acute left-sided low back pain with left-sided sciatica  predniSONE (DELTASONE) 20 MG tablet    baclofen (LIORESAL) 10 MG tablet   2. Anxiety  ALPRAZolam (XANAX) 0.5 MG tablet       PLAN      Patient is prescribed baclofen and a course of prednisone for his back pain. He is also prescribed Xanax for daily use as a trial but will hold off until he is done taking the baclofen. Otherwise he can follow-up here if he is not improving in 4 to 5 days.     Electronically signed by DEMAR Ferrara CNP on 8/28/2019 at 2:37 PM

## 2019-09-16 ENCOUNTER — OFFICE VISIT (OUTPATIENT)
Dept: INTERNAL MEDICINE CLINIC | Age: 79
End: 2019-09-16
Payer: MEDICARE

## 2019-09-16 VITALS
HEIGHT: 67 IN | OXYGEN SATURATION: 94 % | HEART RATE: 77 BPM | BODY MASS INDEX: 30.61 KG/M2 | DIASTOLIC BLOOD PRESSURE: 60 MMHG | SYSTOLIC BLOOD PRESSURE: 126 MMHG | WEIGHT: 195 LBS

## 2019-09-16 DIAGNOSIS — I89.0 LYMPHEDEMA: ICD-10-CM

## 2019-09-16 DIAGNOSIS — I25.10 CORONARY ARTERY DISEASE INVOLVING NATIVE CORONARY ARTERY OF NATIVE HEART WITHOUT ANGINA PECTORIS: Primary | Chronic | ICD-10-CM

## 2019-09-16 PROCEDURE — 99213 OFFICE O/P EST LOW 20 MIN: CPT | Performed by: INTERNAL MEDICINE

## 2019-09-16 NOTE — PROGRESS NOTES
 ABDOMINAL AORTIC ANEURYSM REPAIR  7/24/15    Skyelr Imam    COLONOSCOPY  11    [facility]    CORONARY ANGIOPLASTY WITH STENT PLACEMENT      2 stents    FEMORAL-FEMORAL BYPASS GRAFT  2017    Dr. José Luis Veliz    arm    KNEE ARTHROSCOPY  2008    meniscus tear    LUMBAR LAMINECTOMY  3/2016    [facility]--Dr. Pietro Carlton    OTHER SURGICAL HISTORY  4/14/15    melanoma removal from right chest    OTHER SURGICAL HISTORY  2016    I and D, exicison of posterior neck cyst    [place] OFFICE/OUTPT VISIT,PROCEDURE ONLY N/A 2018    TURBT performed by Lore Palacios MD at [address] [facility] 2019    ROBOTIC RADICAL CYSTOPROSTATECTOMY performed by Lore Palacios MD at [address] [facility] SKIN CANCER EXCISION  10/2016    Dr Janki Casas  's    TUNNELED VENOUS PORT PLACEMENT      TURP  2014    See note of Dr. Mirza Curiel for OR procedure details     Family History   Problem Relation Age of Onset    Diabetes Mother     Cancer Mother     Arthritis Mother     Asthma Father     Heart Disease Father     Cancer Father     Stroke Sister     Heart Disease Sister     Cancer Sister     Stroke Brother     Heart Disease Brother     Cancer Brother      Social History     Socioeconomic History    Marital status:      Spouse name: Not on file    Number of children: Not on file    Years of education: Not on file    Highest education level: Not on file   Occupational History    Not on file   Social Needs    Financial resource strain: Not on file    Food insecurity:     Worry: Not on file     Inability: Not on file    Transportation needs:     Medical: Not on file     Non-medical: Not on file   Tobacco Use    Smoking status: Former Smoker     Packs/day: 1.00     Years: 24.00     Pack years: 24.00     Types: Cigarettes     Start date:      Last attempt to quit: 1979     Years since quittin.4    Smokeless

## 2019-10-08 ENCOUNTER — OFFICE VISIT (OUTPATIENT)
Dept: FAMILY MEDICINE CLINIC | Age: 79
End: 2019-10-08
Payer: MEDICARE

## 2019-10-08 VITALS
WEIGHT: 192.4 LBS | TEMPERATURE: 98.3 F | BODY MASS INDEX: 30.13 KG/M2 | HEART RATE: 62 BPM | SYSTOLIC BLOOD PRESSURE: 104 MMHG | RESPIRATION RATE: 18 BRPM | DIASTOLIC BLOOD PRESSURE: 60 MMHG

## 2019-10-08 DIAGNOSIS — Z85.51 HISTORY OF MALIGNANT NEOPLASM OF BLADDER: Primary | ICD-10-CM

## 2019-10-08 DIAGNOSIS — F45.22 BODY IMAGE DISTURBANCE: ICD-10-CM

## 2019-10-08 DIAGNOSIS — C67.9 MALIGNANT NEOPLASM OF URINARY BLADDER, UNSPECIFIED SITE (HCC): ICD-10-CM

## 2019-10-08 DIAGNOSIS — Z90.6 STATUS POST RADICAL CYSTOPROSTATECTOMY: ICD-10-CM

## 2019-10-08 DIAGNOSIS — G62.9 PERIPHERAL POLYNEUROPATHY: ICD-10-CM

## 2019-10-08 DIAGNOSIS — Z90.79 STATUS POST RADICAL CYSTOPROSTATECTOMY: ICD-10-CM

## 2019-10-08 DIAGNOSIS — F32.1 CURRENT MODERATE EPISODE OF MAJOR DEPRESSIVE DISORDER, UNSPECIFIED WHETHER RECURRENT (HCC): ICD-10-CM

## 2019-10-08 DIAGNOSIS — G47.9 SLEEP DISTURBANCE: ICD-10-CM

## 2019-10-08 PROCEDURE — 99214 OFFICE O/P EST MOD 30 MIN: CPT | Performed by: NURSE PRACTITIONER

## 2019-10-08 RX ORDER — ESCITALOPRAM OXALATE 10 MG/1
10 TABLET ORAL DAILY
Qty: 30 TABLET | Refills: 3 | Status: SHIPPED | OUTPATIENT
Start: 2019-10-08 | End: 2020-01-28

## 2019-10-08 RX ORDER — ALPRAZOLAM 0.5 MG/1
0.5 TABLET ORAL 2 TIMES DAILY PRN
Qty: 60 TABLET | Refills: 0 | Status: SHIPPED | OUTPATIENT
Start: 2019-10-08 | End: 2019-12-16 | Stop reason: SDUPTHER

## 2019-10-21 ASSESSMENT — ENCOUNTER SYMPTOMS
BACK PAIN: 0
SHORTNESS OF BREATH: 0
COUGH: 0
RHINORRHEA: 0
EYE PAIN: 0
ABDOMINAL DISTENTION: 0
PHOTOPHOBIA: 0
ABDOMINAL PAIN: 0
EYE DISCHARGE: 0
WHEEZING: 0
CHEST TIGHTNESS: 0
APNEA: 0

## 2019-10-22 DIAGNOSIS — G62.9 NEUROPATHY: Primary | ICD-10-CM

## 2019-10-23 RX ORDER — PREGABALIN 50 MG/1
50 CAPSULE ORAL 2 TIMES DAILY
Qty: 60 CAPSULE | Refills: 2 | Status: SHIPPED | OUTPATIENT
Start: 2019-10-23 | End: 2020-01-29 | Stop reason: SDUPTHER

## 2019-11-07 ENCOUNTER — OFFICE VISIT (OUTPATIENT)
Dept: FAMILY MEDICINE CLINIC | Age: 79
End: 2019-11-07
Payer: MEDICARE

## 2019-11-07 VITALS
BODY MASS INDEX: 31.18 KG/M2 | WEIGHT: 194 LBS | SYSTOLIC BLOOD PRESSURE: 120 MMHG | RESPIRATION RATE: 16 BRPM | DIASTOLIC BLOOD PRESSURE: 72 MMHG | TEMPERATURE: 97.7 F | HEART RATE: 56 BPM | HEIGHT: 66 IN

## 2019-11-07 DIAGNOSIS — Z23 NEED FOR 23-POLYVALENT PNEUMOCOCCAL POLYSACCHARIDE VACCINE: Primary | ICD-10-CM

## 2019-11-07 DIAGNOSIS — Z00.00 ROUTINE GENERAL MEDICAL EXAMINATION AT A HEALTH CARE FACILITY: ICD-10-CM

## 2019-11-07 PROCEDURE — G0438 PPPS, INITIAL VISIT: HCPCS | Performed by: NURSE PRACTITIONER

## 2019-11-07 PROCEDURE — G0009 ADMIN PNEUMOCOCCAL VACCINE: HCPCS | Performed by: NURSE PRACTITIONER

## 2019-11-07 PROCEDURE — 90732 PPSV23 VACC 2 YRS+ SUBQ/IM: CPT | Performed by: NURSE PRACTITIONER

## 2019-11-07 ASSESSMENT — PATIENT HEALTH QUESTIONNAIRE - PHQ9
SUM OF ALL RESPONSES TO PHQ QUESTIONS 1-9: 2
SUM OF ALL RESPONSES TO PHQ QUESTIONS 1-9: 2

## 2019-11-07 ASSESSMENT — LIFESTYLE VARIABLES: HOW OFTEN DO YOU HAVE A DRINK CONTAINING ALCOHOL: 0

## 2019-11-14 ENCOUNTER — HOSPITAL ENCOUNTER (OUTPATIENT)
Dept: AUDIOLOGY | Age: 79
Discharge: HOME | End: 2019-11-14

## 2019-11-14 PROCEDURE — V5014 HEARING AID REPAIR/MODIFYING: HCPCS | Performed by: AUDIOLOGIST

## 2019-12-16 DIAGNOSIS — F45.22 BODY IMAGE DISTURBANCE: ICD-10-CM

## 2019-12-16 DIAGNOSIS — G47.9 SLEEP DISTURBANCE: ICD-10-CM

## 2019-12-17 RX ORDER — ALPRAZOLAM 0.5 MG/1
0.5 TABLET ORAL 2 TIMES DAILY PRN
Qty: 60 TABLET | Refills: 0 | Status: SHIPPED | OUTPATIENT
Start: 2019-12-17 | End: 2020-02-11

## 2020-01-13 RX ORDER — POTASSIUM CHLORIDE 750 MG/1
TABLET, EXTENDED RELEASE ORAL
Qty: 90 TABLET | Refills: 3 | Status: SHIPPED | OUTPATIENT
Start: 2020-01-13 | End: 2020-12-18

## 2020-01-28 RX ORDER — ESCITALOPRAM OXALATE 10 MG/1
TABLET ORAL
Qty: 90 TABLET | Refills: 1 | Status: SHIPPED | OUTPATIENT
Start: 2020-01-28 | End: 2020-07-28

## 2020-01-29 RX ORDER — PREGABALIN 50 MG/1
50 CAPSULE ORAL 2 TIMES DAILY
Qty: 180 CAPSULE | Refills: 2 | Status: SHIPPED | OUTPATIENT
Start: 2020-01-29 | End: 2020-08-10

## 2020-02-05 ENCOUNTER — TELEPHONE (OUTPATIENT)
Dept: UROLOGY | Age: 80
End: 2020-02-05

## 2020-02-10 NOTE — TELEPHONE ENCOUNTER
Last visit- 11/7/2019  Next visit- Visit date not found    Requested Prescriptions     Pending Prescriptions Disp Refills    ALPRAZolam (XANAX) 0.5 MG tablet [Pharmacy Med Name: ALPRAZOLAM 0.5 MG TABLET] 60 tablet 0     Sig: TAKE 1 TABLET BY MOUTH 2 TIMES DAILY AS NEEDED FOR SLEEP OR ANXIETY       Last prescribed on 12/17/19, 60 tabs, 0 refills.

## 2020-02-11 RX ORDER — ALPRAZOLAM 0.5 MG/1
TABLET ORAL
Qty: 60 TABLET | Refills: 0 | Status: SHIPPED | OUTPATIENT
Start: 2020-02-11 | End: 2020-04-13 | Stop reason: SDUPTHER

## 2020-02-12 ENCOUNTER — HOSPITAL ENCOUNTER (OUTPATIENT)
Dept: WOUND CARE | Age: 80
Discharge: HOME | End: 2020-02-12
Payer: MEDICARE

## 2020-02-12 ENCOUNTER — HOSPITAL ENCOUNTER (OUTPATIENT)
Dept: CT IMAGING | Age: 80
Discharge: HOME | End: 2020-02-12
Payer: MEDICARE

## 2020-02-12 VITALS
HEART RATE: 61 BPM | OXYGEN SATURATION: 95 % | SYSTOLIC BLOOD PRESSURE: 138 MMHG | DIASTOLIC BLOOD PRESSURE: 65 MMHG | HEIGHT: 67 IN | BODY MASS INDEX: 29.98 KG/M2 | RESPIRATION RATE: 18 BRPM | WEIGHT: 191 LBS | TEMPERATURE: 98 F

## 2020-02-12 LAB — POC CREATININE WHOLE BLOOD: 1.3 MG/DL (ref 0.5–1.2)

## 2020-02-12 PROCEDURE — 99213 OFFICE O/P EST LOW 20 MIN: CPT

## 2020-02-12 PROCEDURE — 99214 OFFICE O/P EST MOD 30 MIN: CPT | Performed by: NURSE PRACTITIONER

## 2020-02-12 PROCEDURE — 82565 ASSAY OF CREATININE: CPT

## 2020-02-12 PROCEDURE — 6360000004 HC RX CONTRAST MEDICATION: Performed by: UROLOGY

## 2020-02-12 PROCEDURE — 74178 CT ABD&PLV WO CNTR FLWD CNTR: CPT

## 2020-02-12 RX ADMIN — IOPAMIDOL 85 ML: 755 INJECTION, SOLUTION INTRAVENOUS at 12:52

## 2020-02-12 ASSESSMENT — PAIN DESCRIPTION - ONSET: ONSET: ON-GOING

## 2020-02-12 ASSESSMENT — PAIN DESCRIPTION - DESCRIPTORS: DESCRIPTORS: SORE;ITCHING

## 2020-02-12 ASSESSMENT — PAIN SCALES - GENERAL
PAINLEVEL_OUTOF10: 1
PAINLEVEL_OUTOF10: 0

## 2020-02-12 ASSESSMENT — PAIN DESCRIPTION - PROGRESSION: CLINICAL_PROGRESSION: GRADUALLY WORSENING

## 2020-02-12 ASSESSMENT — PAIN DESCRIPTION - PAIN TYPE: TYPE: ACUTE PAIN

## 2020-02-12 ASSESSMENT — PAIN DESCRIPTION - LOCATION: LOCATION: ABDOMEN

## 2020-02-12 ASSESSMENT — PAIN DESCRIPTION - FREQUENCY: FREQUENCY: CONTINUOUS

## 2020-02-12 NOTE — PROGRESS NOTES
Madelyn PFEIFFER has reviewed and agrees with MICHELLE Hawkins LPN's shift  Assessment.     Malia Lies  2/12/2020

## 2020-02-12 NOTE — PLAN OF CARE
Problem: HH LEARNING/TEACHING NEEDS-OSTOMY  Goal: Knowledge of discharge instructions  Description  Knowledge of discharge instructions     Outcome: Completed   Pt presented to wound clinic for urostomy leaking issues. Raghavendra stoma appears denuded and red . Pt c/o itching to raghavendra stoma. Pt afebrile. See avs for discharge instructions . Pt to follow up as needed. Care plan reviewed with patient. Patient verbalize understanding of the plan of care and contribute to goal setting.

## 2020-02-13 NOTE — PROGRESS NOTES
[address] . [facility] 49  Ostomy Progress Note      NAME:  [facility] RECORD NUMBER:  [id number]  AGE: [de-identified] y.o. GENDER:  male  :  1940  TODAY'S DATE:  2020    Subjective       Chief Complaint   Patient presents with    Follow-up     urostomy leaking          HISTORY of PRESENT ILLNESS HPI     Lore Wolff is a [de-identified] y.o. male Established patient referred by Dr. Tyra Madison, who presents today for ostomy/stoma evaluation. History of Ostomy Context: Patient had an ileal conduit with  radical cystoprostatectomy on 19 due to bladder cancer. He has been having issues with burning and itching to his peristomal skin. He has been leaking every 3 days. Wound/Ulcer Pain Timing/Severity: mild  Quality of pain: burning, itching  Severity:  1 / 10   Modifying Factors: Pain worsens with pouch changes  Associated Signs/Symptoms: erythema and drainage    Interval History:   Current ostomy care includes:    Desenex powder to sore skin around the stoma. [place] soft convex ceraplus pouch precut to 3/4 inch with thin bead of ostomy paste around the inner opening of the pouch. 1 inch ostomy belt applied snugly.     PAST MEDICAL HISTORY        Diagnosis Date    AAA (abdominal aortic aneurysm) ([address]     intravascular stent    BCG ROXANE 2014    BPH (benign prostatic hypertrophy)     CAD (coronary artery disease)     Carotid artery stenosis     Chronic back pain     Chronic kidney disease     Congenital absence of one kidney     absent right kidney    History of lumbar fusion 3/15    mid lower back down    Hyperlipidemia     Hypertension     Hypothyroidism     Obesity     Sciatica     TIA (transient ischemic attack) 2008    Unspecified cerebral artery occlusion with cerebral infarction     TIA       PAST SURGICAL HISTORY    Past Surgical History:   Procedure Laterality Date    ABDOMINAL AORTIC [facility]UR[facility]    ABDOMINAL AORTIC ANEURYSM REPAIR  7/24/15    Oscar Ramirez (LYRICA) 50 MG capsule Take 1 capsule by mouth 2 times daily for 181 days. 180 capsule 2    escitalopram (LEXAPRO) 10 MG tablet TAKE 1 TABLET BY MOUTH EVERY DAY 90 tablet 1    KLOR-CON M10 10 MEQ extended release tablet TAKE 1 TABLET BY MOUTH DAILY 90 tablet 3    atorvastatin (LIPITOR) 40 MG tablet TAKE 1 TABLET BY MOUTH EVERY DAY AT BEDTIME 90 tablet 3    metoprolol tartrate (LOPRESSOR) 50 MG tablet Take 1 tablet by mouth 2 times daily New dose. 180 tablet 3    nitroGLYCERIN (NITROSTAT) 0.4 MG SL tablet Place 1 tablet under the tongue every 5 minutes as needed for Chest pain 25 tablet 1    docusate sodium (COLACE, DULCOLAX) 100 MG CAPS Take 100 mg by mouth 2 times daily 30 capsule 0    polycarbophil (FIBERCON) 625 MG tablet Take 625 mg by mouth daily      b complex vitamins capsule Take 1 capsule by mouth daily      levothyroxine (SYNTHROID) 137 MCG tablet TAKE 1 TABLET BY MOUTH DAILY 90 tablet 3    aspirin 81 MG tablet Take 81 mg by mouth daily.  Elastic Bandages & Supports (JOBST KNEE HIGH COMPRESSION SM) MISC 1 each by Does not apply route daily as needed 1 each 0     No current facility-administered medications on file prior to encounter.         REVIEW OF SYSTEMS    Constitutional: negative for chills, fevers and sweats  Eyes: negative for irritation and redness  Ears, nose, mouth, throat, and face: negative for hearing loss and hoarseness  Respiratory: negative for cough and shortness of breath  Cardiovascular: negative for chest pain  Gastrointestinal: negative for abdominal pain, nausea and vomiting  Integument/breast: positive for peristomal skin irritation  Musculoskeletal:negative for muscle weakness  Neurological: negative for gait problems, memory problems and speech problems  Behavioral/Psych: positive for good mood    Objective    /65   Pulse 61   Temp 98 °F (36.7 °C) (Oral)   Resp 18   Ht 5' 7\" (1.702 m)   Wt 191 lb (86.6 kg)   SpO2 95%   BMI 29.91 kg/m²     Wt Readings from Last 3 Encounters:   02/12/20 191 lb (86.6 kg)   11/07/19 194 lb (88 kg)   10/08/19 192 lb 6.4 oz (87.3 kg)       PHYSICAL EXAM    General Appearance: alert and oriented to person, place and time  Skin: warm and dry  Head: normocephalic and atraumatic  Eyes: conjunctivae normal  ENT: hearing grossly normal bilaterally  Pulmonary/Chest: clear to auscultation bilaterally- no wheezes, rales or rhonchi, normal air movement, no respiratory distress  Cardiovascular: normal rate and regular rhythm  Abdomen: soft, non-tender and bowel sounds normal  Extremities: no cyanosis and no clubbing  Musculoskeletal: normal range of motion of extremities x 4  Neurologic: gait and coordination normal and speech normal  Ileal conduit, RLQ: Stoma is red, moist, measures 7/8 inches, protrudes slightly, functioning clear, yellow urine. Crease noted at 3 and 9 o'clock which worsens when sitting.  Peristomal skin is red, denuded with blistered areas noted.  No induration noted. * Large amount of melting out to old pouching system when removed. Ileal conduit, RLQ    Urostomy Ileal conduit RLQ (Active)   Stomal Appliance 1 piece;Leaking; Changed 2/12/2020  3:10 PM   Stoma  Assessment Moist;Red 2/12/2020  3:10 PM   Mucocutaneous Junction Intact 2/12/2020  3:10 PM   Peristomal Assessment Denuded;Red;Painful 2/12/2020  3:10 PM   Treatment Bag change;Site care 2/12/2020  3:10 PM   Urine Color Yellow 2/12/2020  3:10 PM   Urine Appearance Clear 2/12/2020  3:10 PM   Number of days: 288            LABS       CBC:   Lab Results   Component Value Date    WBC 10.4 05/11/2019    HGB 11.5 05/11/2019    HCT 35.3 05/11/2019    MCV 97.2 05/11/2019     05/11/2019     BMP:   Lab Results   Component Value Date     05/11/2019    K 4.1 05/11/2019    K 3.3 05/04/2019    CL 98 05/11/2019    CO2 29 05/11/2019    PHOS 3.3 05/27/2018    BUN 17 05/11/2019    CREATININE 1.1 05/11/2019     PT/INR:   Lab Results   Component Value Date    INR 1.07 ordered with urostomy pouch and 1 inch belt. See discharge instructions for product numbers and application instructions. Antibiotics: No    Follow up: as needed, patient to call if his skin issues are not resolved with the new pouching system. Please see attached Discharge Instructions    Written patient dismissal instructions given to patient and signed by patient or POA. Discharge Instructions         Visit discharge/physician orders:  - If you ever need a urine sample do not take it from your urostomy bag. Take bag off. Clean stoma. Take urine cup up to stoma and collect directly from stoma.   - You can take showers with bag on.  - You can tuck bag into pants if you prefer. - Clean night time bag with a diluted bleach solution by rinsing it.     - Samples through [employer] ordered           Supplies Size Order #   Convatex Sure fit duradhesive flange , convex  Pre cut 7/8 inch [id number]   Convatec 2 piece urostomy bag   [id number]   Devante Paste  [id number]   Convate belt    [id number]   Bedside drainage bag    [id number]         Change your pouch              1-2        times / week.        PROCEDURE:  1. Assemble above supplies in order of application before removing pouch. 2. Remove the paper backing.  If using paste, apply THIN LAYER of it around the inner edge of flange. 3. Remove your worn appliance by gently pulling away from skin and discard. 4. Wash skin with soap and water, rinse and pat dry.  Hold a rolled gauze pad or tampon to stoma while cleaning skin to absorb urine.  This will act as a wick.    5. Inspect your skin for redness or irritation.  If present, apply a small amount of powder to skin around stoma.  Brush off excess powder with a Kleenex. Do not use ointments. 7906      Adapt Stoma Powder   (for wet, weepy skin)   __x_        Desenex Powder     (Miconazole powder 2%)    ([employer])  (itches, rash)  7.  Center the pouch around your stoma.  Smooth the adhesive collar to your abdomen. 8. Close the valve at the bottom of pouch. 9. Apply belt to fit snuggly. 10. Empty pouch when 1/3-1/2 full.   11. Connect to night time drainage bag if needed.     OSTOMY CLINIC: Monday-Friday 8:00am-4:00pm  Phone:       [phone]   Bring your supplies with you to each visit    Follow up: as needed      Electronically signed by DEMAR Joseph CNP on 2/13/20 at 8:47 AM            Electronically signed by DEMAR Joseph CNP on 2/13/2020 at 8:47 AM

## 2020-02-14 ENCOUNTER — TELEPHONE (OUTPATIENT)
Dept: UROLOGY | Age: 80
End: 2020-02-14

## 2020-02-19 NOTE — PROGRESS NOTES
Mr. Yasmine Olguin is an 42-year-old patient male with a history of bladder cancer. He was first diagnosed with non-invasive high grade papillary urothelial carcinoma in 2014. He underwent TURBT and BCG therapy. Unfortunately, his surveillance cystoscopy in October 2018 showed a large bladder tumor. He underwent a TURBT on November 8, 2018. 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 He completed three cycles of neoadjuvant Gemzar/Cisplatin in 3/20/19. He underwent a Radical Cystoprostatectomy with Pelvic Lymph Node Dissection and creation of Ileal Loop Diversion on 5/1/19. He post-neoadjuvant treatment pathology was pT0, pN0. He states that he has been feeling relatively well since his last visit. He still has fatigue and has harbored issues with depression after his surgery. He feels that everything is stable at this time. He has been trying out a new ostomy bag and has experienced some leaking. Otherwise, he denies ostomy pain, gross hematuria, fever/chills, flank/pelvic/abdominal pain, or difficulty with bowel movements. He denies night sweats, new onset bone pain, chest pain/pressure, dyspnea, N/V, leg pain, or lightheadedness.  He presents today to review his most recent CT urogram.      Past Medical History:   Diagnosis Date    AAA (abdominal aortic aneurysm) (HCC)     intravascular stent    BCG ROXANE 5/21/2014    BPH (benign prostatic hypertrophy)     CAD (coronary artery disease)     Carotid artery stenosis     Chronic back pain     Chronic kidney disease     Congenital absence of one kidney     absent right kidney    History of lumbar fusion 3/15    mid lower back down    Hyperlipidemia     Hypertension     Hypothyroidism     Obesity     Sciatica     TIA (transient ischemic attack) 1/2008    Unspecified cerebral artery occlusion with cerebral infarction     TIA       Past Surgical History:   Procedure Laterality Date    ABDOMINAL AORTIC ANEURYSM [facility]PAIR  3/2010    [facility] ABDOMINAL AORTIC ANEURYSM REPAIR  7/24/15    [facility]le Chimera    [facility]    CORONARY ANGIOPLASTY WITH STENT PLACEMENT  2003    2 stents    FEMORAL-FEMORAL BYPASS GRAFT  03/09/2017    Dr. Hermelindo Coles    arm    KNEE ARTHROSCOPY  12/2008    meniscus tear    LUMBAR LAMINECTOMY  3/2016    [facility]--Dr. Shelbi Hickey    OTHER SURGICAL HISTORY  4/14/15    melanoma removal from right chest    OTHER SURGICAL HISTORY  5/20/2016    I and D, exicison of posterior neck cyst    [place] OFFICE/OUTPT VISIT,PROCEDURE ONLY N/A 11/8/2018    TURBT performed by Mariluz Burger MD at [address] [facility] 5/1/2019    ROBOTIC RADICAL CYSTOPROSTATECTOMY performed by Mariluz Burger MD at [address]  10/2016    Dr Rayvon Lundborg  1990's    TUNNELED VENOUS PORT PLACEMENT      TURP  04/16/2014    See note of Dr. Real Hernandez for OR procedure details       Current Outpatient Medications on File Prior to Visit   Medication Sig Dispense Refill    ALPRAZolam (XANAX) 0.5 MG tablet TAKE 1 TABLET BY MOUTH 2 TIMES DAILY AS NEEDED FOR SLEEP OR ANXIETY 60 tablet 0    pregabalin (LYRICA) 50 MG capsule Take 1 capsule by mouth 2 times daily for 181 days. 180 capsule 2    escitalopram (LEXAPRO) 10 MG tablet TAKE 1 TABLET BY MOUTH EVERY DAY 90 tablet 1    KLOR-CON M10 10 MEQ extended release tablet TAKE 1 TABLET BY MOUTH DAILY 90 tablet 3    atorvastatin (LIPITOR) 40 MG tablet TAKE 1 TABLET BY MOUTH EVERY DAY AT BEDTIME 90 tablet 3    metoprolol tartrate (LOPRESSOR) 50 MG tablet Take 1 tablet by mouth 2 times daily New dose.  180 tablet 3    nitroGLYCERIN (NITROSTAT) 0.4 MG SL tablet Place 1 tablet under the tongue every 5 minutes as needed for Chest pain 25 tablet 1    docusate sodium (COLACE, DULCOLAX) 100 MG CAPS Take 100 mg by mouth 2 times daily 30 capsule 0    polycarbophil (FIBERCON) 625 MG tablet Take 625 mg by mouth daily      b complex vitamins capsule Take 1 capsule by mouth daily      levothyroxine (SYNTHROID) 137 MCG tablet TAKE 1 TABLET BY MOUTH DAILY 90 tablet 3    Elastic Bandages & Supports (JOBST KNEE HIGH COMPRESSION SM) MISC 1 each by Does not apply route daily as needed 1 each 0    aspirin 81 MG tablet Take 81 mg by mouth daily. No current facility-administered medications on file prior to visit.         Allergies   Allergen Reactions    Tape Terry Alan Tape]      Surgical tape-rash       Family History   Problem Relation Age of Onset    Diabetes Mother     Cancer Mother     Arthritis Mother     Asthma Father     Heart Disease Father     Cancer Father     Stroke Sister     Heart Disease Sister     Cancer Sister     Stroke Brother     Heart Disease Brother     Cancer Brother        Social History     Socioeconomic History    Marital status:      Spouse name: Not on file    Number of children: Not on file    Years of education: Not on file    Highest education level: Not on file   Occupational History    Not on file   Social Needs    Financial resource strain: Not on file    Food insecurity:     Worry: Not on file     Inability: Not on file    Transportation needs:     Medical: Not on file     Non-medical: Not on file   Tobacco Use    Smoking status: Former Smoker     Packs/day: 1.00     Years: 24.00     Pack years: 24.00     Types: Cigarettes     Start date:      Last attempt to quit: 1979     Years since quittin.9    Smokeless tobacco: Never Used   Substance and Sexual Activity    Alcohol use: No     Alcohol/week: 0.0 standard drinks    Drug use: No    Sexual activity: Not Currently   Lifestyle    Physical activity:     Days per week: Not on file     Minutes per session: Not on file    Stress: Not on file   Relationships    Social connections:     Talks on phone: Not on file     Gets together: Not on file     Attends [employer] service: Not on file     Active member of club or organization: Not on file     Attends meetings of clubs or organizations: Not on file     Relationship status: Not on file    Intimate partner violence:     Fear of current or ex partner: Not on file     Emotionally abused: Not on file     Physically abused: Not on file     Forced sexual activity: Not on file   Other Topics Concern    Not on file   Social History Narrative    Not on file       Review of Systems  No problems with ears, nose or throat. No problems with eyes. No chest pain, shortness of breath, abdominal pain, extremity pain or weakness, and no neurological deficits. No rashes. No swollen glands or lymph nodes.  symptoms per HPI. The remainder of the review of symptoms is negative. Exam    /74   Ht 5' 7\" (1.702 m)   Wt 200 lb (90.7 kg)   BMI 31.32 kg/m²     Constitutional: Oriented to person, place, and time. Vital signs are normal. Appears well-developed and well-nourished. cooperative. No distress. HENT:    Head: Normocephalic and atraumatic.    Eyes: EOM are normal. Pupils are equal, round, and reactive to light. Right eye exhibits no discharge. Left eye exhibits no discharge. No scleral icterus. Neck: Trachea normal. No JVD present. Cardiovascular: Normal rate and regular rhythm. S1 and S2 normal.  Pulmonary/Chest: Effort normal. No respiratory distress. No wheezes, rhonchi, or rales. Abdominal: Soft. Exhibits no distension. There is no generalized tenderness. There is no rebound and no CVA tenderness. Musculoskeletal: No pitting edema or calf tenderness. Lymphadenopathy: No supraclavicular or superficial cervical lymphadenopathy. Neurological: Alert and oriented to person, place, and time. No cranial nerve deficit. Skin: Skin is warm and dry. Not diaphoretic. Psychiatric: Normal mood and affect. Behavior is normal.   Nursing note and vitals reviewed.     Labs    No results found for this visit on 02/20/20. Lab Results   Component Value Date    CREATININE 1.1 05/11/2019    BUN 17 05/11/2019     05/11/2019    K 4.1 05/11/2019    CL 98 05/11/2019    CO2 29 05/11/2019       Lab Results   Component Value Date    PSA 0.70 07/25/2011    PSA 0.83 10/31/2008       Radiology:     CT urogram: February 2020     FINDINGS: There is a stable linear opacity at the left lung base as evidence for recurrent atelectasis or scar. Visualized portions of lungs are otherwise clear. The visualized portion of the heart is unremarkable.       There is a 9 mm hypodense focus in the lateral segment of left lobe of liver which has appeared in the interval since prior exam. There is also a 1 cm hypodense focus in the caudate lobe of the liver which has also appeared in the interval. Liver is    otherwise unremarkable. The gallbladder is normal in appearance. Adrenal glands are unremarkable. Redemonstration of surgically absent right kidney. There is a stable 1.6 cm hypodense cyst at the interpolar region of the left kidney. Left kidney is    otherwise unremarkable. No hydronephrosis is present. The left ureter is again noted to empty into an ileal pouch with right-sided urostomy. No focal filling defect is identified within the collecting system. The spleen and pancreas are unremarkable. No    retroperitoneal or mesenteric lymphadenopathy is identified. There is again noted to be a prominent infrarenal aortic aneurysm with bifurcated endograft ridging the aneurysm. Overall size of the native aneurysm is slightly decreased in size measuring 7.1    cm in greatest diameter on the current exam compared to 7.3 cm on prior. The left common iliac and external iliac stent graft is again noted to be occluded. There is a stable femorofemoral bypass graft which appears patent.       There are multiple diverticula within the large bowel without adjacent inflammation to suggest diverticulitis.  Small bowel otherwise appears

## 2020-02-20 ENCOUNTER — OFFICE VISIT (OUTPATIENT)
Dept: UROLOGY | Age: 80
End: 2020-02-20
Payer: MEDICARE

## 2020-02-20 VITALS
DIASTOLIC BLOOD PRESSURE: 74 MMHG | SYSTOLIC BLOOD PRESSURE: 128 MMHG | HEIGHT: 67 IN | BODY MASS INDEX: 31.39 KG/M2 | WEIGHT: 200 LBS

## 2020-02-20 PROCEDURE — 99213 OFFICE O/P EST LOW 20 MIN: CPT | Performed by: PHYSICIAN ASSISTANT

## 2020-02-21 ENCOUNTER — HOSPITAL ENCOUNTER (OUTPATIENT)
Dept: WOUND CARE | Age: 80
Discharge: HOME | End: 2020-02-21
Payer: MEDICARE

## 2020-02-21 VITALS
TEMPERATURE: 97.9 F | DIASTOLIC BLOOD PRESSURE: 67 MMHG | OXYGEN SATURATION: 96 % | HEART RATE: 68 BPM | SYSTOLIC BLOOD PRESSURE: 142 MMHG | RESPIRATION RATE: 16 BRPM

## 2020-02-21 PROCEDURE — 99214 OFFICE O/P EST MOD 30 MIN: CPT | Performed by: NURSE PRACTITIONER

## 2020-02-21 PROCEDURE — 99213 OFFICE O/P EST LOW 20 MIN: CPT

## 2020-02-21 ASSESSMENT — PAIN SCALES - GENERAL: PAINLEVEL_OUTOF10: 0

## 2020-02-21 NOTE — PLAN OF CARE
Problem: HH SN OSTOMY/ILEOSTOMY CARE  Goal: Will be independent with ostomy care  Description  Will be independent with ostomy care     Outcome: Ongoing   Pt. Seen today for ostomy issues see AVS for orders. Discharge from clinic. Care plan reviewed with patient. Patient verbalize understanding of the plan of care and contribute to goal setting.

## 2020-02-21 NOTE — PROGRESS NOTES
EXAM    General Appearance: alert and oriented to person, place and time  Skin: warm and dry  Head: normocephalic and atraumatic  Eyes: conjunctivae normal  ENT: hearing grossly normal bilaterally  Pulmonary/Chest: clear to auscultation bilaterally- no wheezes, rales or rhonchi, normal air movement, no respiratory distress  Cardiovascular: normal rate and regular rhythm  Abdomen: soft, non-tender and bowel sounds normal  Extremities: no cyanosis and no clubbing  Musculoskeletal: normal range of motion of extremities x 4  Neurologic: gait and coordination normal and speech normal  Ileal conduit, RLQ: Stoma is red, moist, measures 7/8 inches, protrudes slightly, functioning clear, yellow urine. Crease noted at 3 and 9 o'clock which worsens when sitting.  Peristomal skin is intact with small superficial open area remaining at the 7 o'clock position.  No redness, warmth, or induration noted. Ileal conduit, RLQ    Urostomy Ileal conduit RLQ (Active)   Stomal Appliance 1 piece;Leaking; Changed 2/12/2020  3:10 PM   Stoma  Assessment Moist;Red 2/21/2020  2:38 PM   Mucocutaneous Junction Intact 2/21/2020  2:38 PM   Peristomal Assessment Red 2/21/2020  2:38 PM   Treatment Bag change 2/21/2020  2:38 PM   Urine Color Yellow 2/21/2020  2:38 PM   Urine Appearance Clear 2/21/2020  2:38 PM   Number of days: 296     LABS       CBC:   Lab Results   Component Value Date    WBC 10.4 05/11/2019    HGB 11.5 05/11/2019    HCT 35.3 05/11/2019    MCV 97.2 05/11/2019     05/11/2019     BMP:   Lab Results   Component Value Date     05/11/2019    K 4.1 05/11/2019    K 3.3 05/04/2019    CL 98 05/11/2019    CO2 29 05/11/2019    PHOS 3.3 05/27/2018    BUN 17 05/11/2019    CREATININE 1.1 05/11/2019     PT/INR:   Lab Results   Component Value Date    INR 1.07 03/12/2019     Prealbumin: No results found for: PREALBUMIN  Albumin:  Lab Results   Component Value Date    LABALBU 2.7 05/04/2019    LABALBU 4.1 05/19/2012     Sed Rate:No results found for: SEDRATE  Micro:   Lab Results   Component Value Date    BC No growth-preliminary  No growth   03/12/2019        Assessment     Attention to urostomy  Peristomal skin complication    Patient Active Problem List   Diagnosis Code    Coronary artery disease involving native heart I25.10    Hyperlipidemia E78.5    Essential hypertension I10    Hypothyroidism E03.9    Claudication ([facility]) I73.9    CKD (chronic kidney disease) stage 3, GFR 30-59 ml/min ([facility]) N18.3    BCG ROXANE C67.9    Bladder cancer ([facility]) C67.9    Subcutaneous cyst L72.9    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 Patient had received his new ConvaTec 2 piece ostomy samples and was can be used about proper application and was having issues getting a seal.  His skin irritation/allergy around his stoma from the Janna pouches had completely resolved within the last 2 days with only one small open area remaining. He denies any further itching. Somehow he had his ConvaTec paper adhesive folded back onto the flange itself which was causing his pouch to leak. He is requesting to also try a one-piece ConvaTec pouch. Recommend not using Janna pouching systems any longer due to an allergy/dermatitis    Applied small amount of antifungal powder to the skin around the stoma, dusted off excess powder.   Applied ConvaTec 2 piece 1-3/4 inch pouching system with convex Durahesive flange precut to 7/8 inches with Devante cohesive paste around the inner opening, urostomy pouch your stoma.  Smooth the adhesive collar to your abdomen. 8. Close the valve at the bottom of pouch. 9. Apply belt to fit snuggly. 10. Empty pouch when 1/3-1/2 full.   11. Connect to night time drainage bag if needed.     OSTOMY CLINIC: Monday-Friday 8:00am-4:00pm  Phone:       [phone]   Bring your supplies with you to each visit     Follow up: as needed     Electronically signed by DEMAR Lebron CNP on 2/21/20 at 3:08 PM            Electronically signed by DEMAR Lebron CNP on 2/21/2020 at 3:08 PM

## 2020-03-02 RX ORDER — LEVOTHYROXINE SODIUM 137 UG/1
TABLET ORAL
Qty: 90 TABLET | Refills: 3 | Status: SHIPPED | OUTPATIENT
Start: 2020-03-02 | End: 2021-02-24

## 2020-04-02 RX ORDER — ATORVASTATIN CALCIUM 40 MG/1
TABLET, FILM COATED ORAL
Qty: 90 TABLET | Refills: 3 | Status: SHIPPED | OUTPATIENT
Start: 2020-04-02 | End: 2021-05-17

## 2020-04-02 RX ORDER — METOPROLOL TARTRATE 50 MG/1
50 TABLET, FILM COATED ORAL 2 TIMES DAILY
Qty: 180 TABLET | Refills: 3 | Status: SHIPPED | OUTPATIENT
Start: 2020-04-02 | End: 2021-05-17

## 2020-04-13 RX ORDER — ALPRAZOLAM 0.5 MG/1
TABLET ORAL
Qty: 60 TABLET | Refills: 2 | Status: SHIPPED | OUTPATIENT
Start: 2020-04-13 | End: 2020-07-13

## 2020-05-15 ENCOUNTER — TELEPHONE (OUTPATIENT)
Dept: UROLOGY | Age: 80
End: 2020-05-15

## 2020-05-17 LAB
ABSOLUTE BASO #: 0.1 X10E9/L (ref 0–0.9)
ABSOLUTE EOS #: 0.3 X10E9/L (ref 0–0.4)
ABSOLUTE LYMPH #: 3.1 X10E9/L (ref 1–3.5)
ABSOLUTE MONO #: 0.8 X10E9/L (ref 0–0.9)
ABSOLUTE NEUT #: 5.3 X10E9/L (ref 1.5–6.6)
ALBUMIN SERPL-MCNC: 4.1 G/DL (ref 3.2–5.3)
ALK PHOSPHATASE: 94 U/L (ref 39–130)
ALT SERPL-CCNC: 23 U/L (ref 0–40)
ANION GAP SERPL CALCULATED.3IONS-SCNC: 9 MMOL/L (ref 5–15)
AST SERPL-CCNC: 25 U/L (ref 0–41)
BASOPHILS RELATIVE PERCENT: 0.6 %
BILIRUB SERPL-MCNC: 1.4 MG/DL (ref 0.3–1.2)
BUN BLDV-MCNC: 20 MG/DL (ref 5–27)
CALCIUM SERPL-MCNC: 9.3 MG/DL (ref 8.5–10.5)
CHLORIDE BLD-SCNC: 101 MMOL/L (ref 98–109)
CO2: 31 MMOL/L (ref 22–32)
CREAT SERPL-MCNC: 1.29 MG/DL (ref 0.6–1.3)
EGFR AFRICAN AMERICAN: >60 ML/MIN/1.73SQ.M
EGFR IF NONAFRICAN AMERICAN: 54 ML/MIN/1.73SQ.M
EOSINOPHILS RELATIVE PERCENT: 3.3 %
GLUCOSE: 88 MG/DL (ref 65–99)
HCT VFR BLD CALC: 48.2 % (ref 36–53)
HEMOGLOBIN: 16.1 G/DL (ref 12.6–17.4)
LYMPHOCYTE %: 32.1 %
MCH RBC QN AUTO: 30.8 PG (ref 27–35)
MCHC RBC AUTO-ENTMCNC: 33.4 G/DL (ref 31–36)
MCV RBC AUTO: 92 FL (ref 81–101)
MONOCYTES # BLD: 8 %
NEUTROPHILS RELATIVE PERCENT: 56 %
PDW BLD-RTO: 14.7 % (ref 11.4–14.3)
PLATELETS: 203 X10E9/L (ref 150–450)
PMV BLD AUTO: 8.6 FL (ref 7–12)
POTASSIUM SERPL-SCNC: 3.9 MMOL/L (ref 3.5–5)
RBC: 5.23 X10E12/L (ref 3.3–5.5)
SODIUM BLD-SCNC: 141 MMOL/L (ref 134–146)
TOTAL PROTEIN: 6.8 G/DL (ref 6–8)
VITAMIN B-12: 392 PG/ML (ref 180–914)
WBC: 9.6 X10E9/L (ref 4.4–12)

## 2020-05-20 ENCOUNTER — HOSPITAL ENCOUNTER (OUTPATIENT)
Dept: CT IMAGING | Age: 80
Discharge: HOME | End: 2020-05-20
Payer: MEDICARE

## 2020-05-20 PROCEDURE — 71260 CT THORAX DX C+: CPT

## 2020-05-20 PROCEDURE — 74178 CT ABD&PLV WO CNTR FLWD CNTR: CPT

## 2020-05-20 PROCEDURE — 6360000004 HC RX CONTRAST MEDICATION: Performed by: PHYSICIAN ASSISTANT

## 2020-05-20 RX ADMIN — IOPAMIDOL 85 ML: 755 INJECTION, SOLUTION INTRAVENOUS at 12:32

## 2020-05-20 RX ADMIN — IOHEXOL 50 ML: 240 INJECTION, SOLUTION INTRATHECAL; INTRAVASCULAR; INTRAVENOUS; ORAL at 12:32

## 2020-05-20 NOTE — PROGRESS NOTES
Obesity     Sciatica     TIA (transient ischemic attack) 1/2008    Unspecified cerebral artery occlusion with cerebral infarction     TIA       Past Surgical History:   Procedure Laterality Date    ABDOMINAL AORTIC ANEURYSM REPAIR  3/2010    [facility]    ABDOMINAL AORTIC ANEURYSM REPAIR  7/24/15    [place]    COLONOSCOPY  12/21/11    JESÚSTIFFANIWHIT    CORONARY ANGIOPLASTY WITH STENT PLACEMENT  2003    2 stents    FEMORAL-FEMORAL BYPASS GRAFT  03/09/2017    Dr. Danna Bass    arm    KNEE ARTHROSCOPY  12/2008    meniscus tear    LUMBAR LAMINECTOMY  3/2016    [facility]--Dr. Bruna Robles    OTHER SURGICAL HISTORY  4/14/15    melanoma removal from right chest    OTHER SURGICAL HISTORY  5/20/2016    I and D, exicison of posterior neck cyst    [place] OFFICE/OUTPT VISIT,PROCEDURE ONLY N/A 11/8/2018    TURBT performed by Charley Valadez MD at [facility] 5/1/2019    ROBOTIC RADICAL CYSTOPROSTATECTOMY performed by Charley Valadez MD at [address]  10/2016    Dr Kirk Cervantes  1990's    TUNNELED VENOUS PORT PLACEMENT      TURP  04/16/2014    See note of Dr. Angelita Kapoor for OR procedure details       Current Outpatient Medications on File Prior to Visit   Medication Sig Dispense Refill    ALPRAZolam (XANAX) 0.5 MG tablet TAKE 1 TABLET BY MOUTH 2 TIMES DAILY AS NEEDED FOR SLEEP OR ANXIETY 60 tablet 2    atorvastatin (LIPITOR) 40 MG tablet TAKE 1 TABLET BY MOUTH EVERY DAY AT BEDTIME 90 tablet 3    metoprolol tartrate (LOPRESSOR) 50 MG tablet Take 1 tablet by mouth 2 times daily New dose. 180 tablet 3    levothyroxine (SYNTHROID) 137 MCG tablet TAKE 1 TABLET BY MOUTH DAILY 90 tablet 3    pregabalin (LYRICA) 50 MG capsule Take 1 capsule by mouth 2 times daily for 181 days.  180 capsule 2    escitalopram (LEXAPRO) 10 MG tablet TAKE 1 TABLET BY MOUTH EVERY DAY 90 tablet 1    KLOR-CON M10 10 MEQ extended release tablet TAKE 1 TABLET BY Component Value Date    CREATININE 1.29 05/16/2020    BUN 20 05/16/2020     05/16/2020    K 3.9 05/16/2020     05/16/2020    CO2 31 05/16/2020       Lab Results   Component Value Date    PSA 0.70 07/25/2011    PSA 0.83 10/31/2008     B-12: 392 (Normal)  GFR: 54 (65)        Radiology:     CT abdomen and pelvis (May 2020)     FINDINGS:        Lung bases: Minimal fibrotic stranding along left hemidiaphragm. No acute infiltrates or effusions are seen.       Liver: The previously described low-density lesions in the liver on the prior study are no longer seen, suggesting response to therapy. The liver is normal in appearance at this time. Suggestion of a few tiny gallstones in the gallbladder. Gallbladder    otherwise unremarkable.       Pancreas, spleen and adrenal glands: Unremarkable       Kidneys:  Prior right nephrectomy. Benign-appearing 16 mm cyst posterior aspect left kidney. Left kidney otherwise unremarkable. There are a few nonobstructing calculi in the left kidney No mass lesion. No hydronephrosis. Patient has an ileal diversion    loop with ostomy in the right mid abdomen.       Bowel/Peritoneum: No abnormally dilated bowel loops are seen. No free air. No free fluid in the abdomen. No abnormally enlarged para-aortic lymph nodes are seen. 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 No    endoleak is seen. This aneurysm has a maximum diameter of 7.3 x 6.6 cm and does not appear to have changed appreciably since the prior study. The left iliac limb of the endograft is occluded. Note that there is a right-to-left femorofemoral bypass graft    which is widely patent.       Bones : Moderate multifocal degenerative changes are present in the lumbar spine mild degenerative changes left hip. No evidence for acute fracture or bone destruction. Mild retrolisthesis L4 upon L5, 6 mm.       Pelvis: Urinary bladder is surgically absent.  Small inguinal hernia left side contains only adipose tissue. Postsurgical changes both inguinal regions with fibrotic scarring and vascular clips in place. There also appear to be embolization coils in both    internal iliac arteries.           Impression   1. The previously noted low-density lesions in the liver on the prior study are longer seen, suggesting response to therapy. 2. Prior right nephrectomy. Prior bladder resection. Ileal diversion loop right mid abdomen. There are a few nonobstructing calculi left kidney. 3. Fusiform aneurysm infrarenal abdominal aorta with endograft in place. No endoleak seen. The aneurysm has not changed appreciably in size since prior study. Total occlusion the left iliac limb of the endograft with a right-to-left femorofemoral bypass    graft in place. CT chest (May 2020)    FINDINGS:        Upper abdomen: There are a few nonobstructing calculi left kidney. Small 16 mm cyst in the mid body left kidney. Abdominal aortic aneurysm with endograft in place.       Mediastinum and toro: Several small mediastinal lymph nodes and a few small left hilar lymph nodes, likely postinflammatory. No abnormally enlarged hilar or mediastinal lymph nodes are seen.       Bones: Moderately severe multilevel degenerative changes throughout the thoracic spine extending into the upper lumbar spine. No evidence for acute fracture or bone destruction.       Lungs: Fibroemphysematous lungs. .. Several small blebs seen in both apical regions. Mild atelectatic/fibrotic stranding left lung base. No acute infiltrates or effusions are seen. No lung nodules are identified.           Impression   1. Mild atelectatic/fibrotic stranding along left hemidiaphragm. Fiber emphysematous lungs. 2. No evidence for metastatic disease.               Plan:    1. Bladder cancer-  He completed three cycles of neoadjuvant Gemzar/Cisplatin in 3/20/19.  He underwent a Radical Cystoprostatectomy with Pelvic Lymph Node Dissection and creation of Ileal Loop Diversion on 5/1/19. His post-neoadjuvant treatment pathology was pT0, pN0. His February 2020, his CT urogram revealed development of small hypodense lesions of the liver. These two lesions measured 6-10 mm in size. His May 2020 revealed resolution of these previously noted liver lesions. Will scheduled follow-up CT urogram and CT chest in six months for further delineation. Will obtain CBC with differential and CMP in six months. Urine cytology at next office visit. Consider urethral wash cytology.     Surveillance recommendations per NCCN 2019 guidelines are as follows:     Years 1 and 2: CT urogram every 3-6 months, CXR or CT chest every 3-6 months, CBC<CMP every 3-6 months. Urine cytologies every 6-12 months. Consider urethral wash cytology every 6-12 months. B12 annually.     Years 3-5: CT abdomen and pelvis annually. CXR annually. BMP, LFT, Vitamin 12 annually. Urine cytology as clinically indicated.      Years 5-10: Renal US annually. B12 annually.     Years 10+: B12 annually    2. Nephrolithiasis vs Vascular Calcifications: Continue to monitor. Patient is asymptomatic. Aiden Ramos is a [de-identified] y.o. male being evaluated by a Virtual Visit (video visit) encounter to address concerns as mentioned above. A caregiver was present when appropriate. Due to this being a TeleHealth encounter (During OLQG-57 public health emergency), evaluation of the following organ systems was limited: Vitals/Constitutional/EENT/Resp/CV/GI//MS/Neuro/Skin/Heme-Lymph-Imm. Pursuant to the emergency declaration under the [address] [place] [address], [address] [place] [address] authority and the [employer] and [employer] Act, this Virtual Visit was conducted with patient's (and/or legal guardian's) consent, to reduce the patient's risk of exposure to COVID-19 and provide necessary medical care.   The patient (and/or legal guardian) has also been advised to contact this office for

## 2020-05-21 ENCOUNTER — VIRTUAL VISIT (OUTPATIENT)
Dept: UROLOGY | Age: 80
End: 2020-05-21
Payer: MEDICARE

## 2020-05-21 PROCEDURE — 99213 OFFICE O/P EST LOW 20 MIN: CPT | Performed by: PHYSICIAN ASSISTANT

## 2020-05-21 ASSESSMENT — ENCOUNTER SYMPTOMS
SHORTNESS OF BREATH: 0
CHEST TIGHTNESS: 0
EYE ITCHING: 0
BACK PAIN: 1
COLOR CHANGE: 0
NAUSEA: 0
EYE PAIN: 0
DIARRHEA: 0

## 2020-05-21 NOTE — PROGRESS NOTES
Patient:  Mamie Ocampo    Review of Systems    Review of Systems   Constitutional: Negative for chills and fever. Eyes: Negative for pain and itching. Respiratory: Negative for chest tightness and shortness of breath. Cardiovascular: Negative for chest pain and leg swelling. Gastrointestinal: Negative for diarrhea and nausea. Endocrine: Negative for cold intolerance and heat intolerance. Genitourinary: Negative for difficulty urinating and hematuria. Musculoskeletal: Positive for back pain (low back pain) and joint swelling (ankle swelling). Skin: Negative for color change and rash. Allergic/Immunologic: Negative for environmental allergies and food allergies. Neurological: Negative for dizziness and headaches. Hematological: Bruises/bleeds easily.

## 2020-05-27 ENCOUNTER — TELEPHONE (OUTPATIENT)
Dept: UROLOGY | Age: 80
End: 2020-05-27

## 2020-05-27 NOTE — TELEPHONE ENCOUNTER
Patient scheduled for his CT Urogram and CT chest on 11- at 10:00am with an arrival of 9:30am.  Patient to have nothing to eat or drink 4-hours prior.

## 2020-06-14 RX ORDER — FUROSEMIDE 40 MG/1
TABLET ORAL
Qty: 90 TABLET | Refills: 1 | OUTPATIENT
Start: 2020-06-14

## 2020-06-23 ENCOUNTER — OFFICE VISIT (OUTPATIENT)
Dept: FAMILY MEDICINE CLINIC | Age: 80
End: 2020-06-23
Payer: MEDICARE

## 2020-06-23 ENCOUNTER — HOSPITAL ENCOUNTER (OUTPATIENT)
Dept: AUDIOLOGY | Age: 80
Discharge: HOME | End: 2020-06-23

## 2020-06-23 VITALS
SYSTOLIC BLOOD PRESSURE: 124 MMHG | RESPIRATION RATE: 16 BRPM | DIASTOLIC BLOOD PRESSURE: 64 MMHG | BODY MASS INDEX: 29.82 KG/M2 | HEART RATE: 78 BPM | WEIGHT: 190.4 LBS | TEMPERATURE: 97.9 F

## 2020-06-23 PROCEDURE — 69210 REMOVE IMPACTED EAR WAX UNI: CPT | Performed by: FAMILY MEDICINE

## 2020-06-23 PROCEDURE — 99213 OFFICE O/P EST LOW 20 MIN: CPT | Performed by: FAMILY MEDICINE

## 2020-06-23 PROCEDURE — 9990000010 HC NO CHARGE VISIT: Performed by: AUDIOLOGIST

## 2020-06-23 RX ORDER — FLUOROURACIL 50 MG/G
CREAM TOPICAL
COMMUNITY
Start: 2020-06-16 | End: 2020-10-23

## 2020-06-23 ASSESSMENT — ENCOUNTER SYMPTOMS
NAUSEA: 0
SINUS PRESSURE: 0
ABDOMINAL PAIN: 0
EYE PAIN: 0
RHINORRHEA: 0
SHORTNESS OF BREATH: 0
DIARRHEA: 0
COUGH: 0
ABDOMINAL DISTENTION: 0
CONSTIPATION: 0
SORE THROAT: 0

## 2020-06-23 ASSESSMENT — PATIENT HEALTH QUESTIONNAIRE - PHQ9
1. LITTLE INTEREST OR PLEASURE IN DOING THINGS: 0
2. FEELING DOWN, DEPRESSED OR HOPELESS: 0
SUM OF ALL RESPONSES TO PHQ QUESTIONS 1-9: 0
SUM OF ALL RESPONSES TO PHQ9 QUESTIONS 1 & 2: 0
SUM OF ALL RESPONSES TO PHQ QUESTIONS 1-9: 0

## 2020-06-23 NOTE — PROGRESS NOTES
ANEURYSM REPAIR  3/2010    Cecilio Oquendo ABDOMINAL AORTIC ANEURYSM REPAIR  7/24/15    Letty Latia    [facility]OSCOPY  11    [facility]    CORONARY ANGIOPLASTY WITH STENT PLACEMENT      2 stents    FEMORAL-FEMORAL BYPASS GRAFT  2017    Dr. Nichol Leon    arm    KNEE ARTHROSCOPY  2008    meniscus tear    LUMBAR LAMINECTOMY  3/2016    [facility]--Dr. Naye Gutierrez    OTHER SURGICAL HISTORY  4/14/15    melanoma removal from right chest    OTHER SURGICAL HISTORY  2016    I and D, exicison of posterior neck cyst    [place] OFFICE/OUTPT VISIT,PROCEDURE ONLY N/A 2018    TURBT performed by Dinah Uribe MD at [facility] N/A 2019    ROBOTIC RADICAL CYSTOPROSTATECTOMY performed by Dinah Uribe MD at [address] [facility] SKIN CANCER EXCISION  10/2016    Dr Hilda Morales  's    TUNNELED VENOUS PORT PLACEMENT      TURP  2014    See note of Dr. Maggie Cordoba for OR procedure details     Family History   Problem Relation Age of Onset    Diabetes Mother     Cancer Mother     Arthritis Mother     Asthma Father     Heart Disease Father     Cancer Father     Stroke Sister     Heart Disease Sister     Cancer Sister     Stroke Brother     Heart Disease Brother     Cancer Brother      Social History     Tobacco Use    Smoking status: Former Smoker     Packs/day: 1.00     Years: 24.00     Pack years: 24.00     Types: Cigarettes     Start date:      Last attempt to quit: 1979     Years since quittin.2    Smokeless tobacco: Never Used   Substance Use Topics    Alcohol use: No     Alcohol/week: 0.0 standard drinks      Current Outpatient Medications   Medication Sig Dispense Refill    ALPRAZolam (XANAX) 0.5 MG tablet TAKE 1 TABLET BY MOUTH 2 TIMES DAILY AS NEEDED FOR SLEEP OR ANXIETY 60 tablet 2    atorvastatin (LIPITOR) 40 MG tablet TAKE 1 TABLET BY MOUTH EVERY DAY AT BEDTIME 90 tablet 3    metoprolol tartrate Ears:      Comments: External auditory canals impacted with cerumen bilaterally. Cerumen removed manually patient tolerated well  Eyes:      Conjunctiva/sclera: Conjunctivae normal.   Neck:      Vascular: No JVD. Cardiovascular:      Rate and Rhythm: Normal rate and regular rhythm. Heart sounds: Normal heart sounds. Pulmonary:      Effort: Pulmonary effort is normal.      Breath sounds: Normal breath sounds. No wheezing or rales. Musculoskeletal:         General: No tenderness. Skin:     General: Skin is warm and dry. Coloration: Skin is not pale. Neurological:      Mental Status: He is alert and oriented to person, place, and time. /64 (Site: Left Upper Arm)   Pulse 78   Temp 97.9 °F (36.6 °C) (Temporal)   Resp 16   Wt 190 lb 6.4 oz (86.4 kg)   BMI 29.82 kg/m²       Impression/Plan:  1. Bilateral hearing loss, unspecified hearing loss type    2. Bilateral impacted cerumen      Requested Prescriptions      No prescriptions requested or ordered in this encounter     Orders Placed This Encounter   Procedures    25736 - HI REMOVE IMPACTED EAR WAX       Patient giveneducational materials - see patient instructions. Discussed use, benefit, and side effects of prescribed medications. All patient questions answered. Pt voiced understanding. Reviewed health maintenance. Patient agreedwith treatment plan. Follow up as directed. **This report has been created using voice recognition software. It may contain minor errorswhich are inherent in voice recognition technology. **       Electronically signed by Sarahi Mckoy MD on 6/23/2020 at 4:56 PM

## 2020-06-25 ENCOUNTER — HOSPITAL ENCOUNTER (OUTPATIENT)
Dept: AUDIOLOGY | Age: 80
Discharge: HOME | End: 2020-06-25
Payer: MEDICARE

## 2020-06-25 ENCOUNTER — TELEPHONE (OUTPATIENT)
Dept: FAMILY MEDICINE CLINIC | Age: 80
End: 2020-06-25

## 2020-06-25 PROCEDURE — 92567 TYMPANOMETRY: CPT | Performed by: AUDIOLOGIST

## 2020-06-25 PROCEDURE — 92557 COMPREHENSIVE HEARING TEST: CPT | Performed by: AUDIOLOGIST

## 2020-06-25 NOTE — PROGRESS NOTES
AUDIOLOGICAL EVALUATION      REASON FOR TESTING:  The patient had his ears cleaned two days ago at [facility] office. He a significant decrease in hearing ability for the left ear. He has slight otalgia in the left ear. Ongoing issues with imbalance. Longstanding hearing loss and hearing aid patient at this facility. History of chemotherapy for bladder cancer. OTOSCOPY: Dull tympanic membrane for the left ear. Blood was visualized in the external auditory canal of the right ear. AUDIOGRAM        Reliability: Good  Audiometer Used:  [id number]      COMMENTS: Mild, sloping severe sensorineural hearing loss for the right ear. 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 Speech discrimination ability is good at 80% for the right ear and very poor at 44% for the left ear. Tympanometry revealed normal peak pressure and normal middle ear compliance for the right ear. Tympanometry revealed excessive negative middle ear pressure and reduced middle ear compliance for the left ear. RECOMMENDATION(S):   1- ENT consult is recommended for possible medical management of the conductive hearing loss/abnormal middle ear function for the left ear. The patient was previously since at the [address] [place] [address] ENT office by Dr. Emily Manning. A new referral is needed for scheduling since he has not been seen there since 2017. The patient was encouraged to follow up with Dr. Diane Pelayo regarding this referral.  2- Repeat audiometry and tympanometry, along with completing real ear measures with the patient's hearing aids, following any medical intervention. 3- VNG testing may be beneficial as well due to ongoing imbalance.

## 2020-07-02 ENCOUNTER — TELEPHONE (OUTPATIENT)
Dept: FAMILY MEDICINE CLINIC | Age: 80
End: 2020-07-02

## 2020-07-02 NOTE — TELEPHONE ENCOUNTER
Requesting referral to ENT, he has an increase in hearing loss and vertigo. They want [facility]. This ok? No need to call patient back, just put referral in if ok.

## 2020-07-06 ENCOUNTER — VIRTUAL VISIT (OUTPATIENT)
Dept: INTERNAL MEDICINE CLINIC | Age: 80
End: 2020-07-06
Payer: MEDICARE

## 2020-07-06 PROCEDURE — 99213 OFFICE O/P EST LOW 20 MIN: CPT | Performed by: INTERNAL MEDICINE

## 2020-07-06 NOTE — PROGRESS NOTES
(JOBST KNEE HIGH COMPRESSION SM) MISC 1 each by Does not apply route daily as needed  Jair Don APRN - CNP   aspirin 81 MG tablet Take 81 mg by mouth daily.   Historical Provider, MD       Social History     Tobacco Use    Smoking status: Former Smoker     Packs/day: 1.00     Years: 24.00     Pack years: 24.00     Types: Cigarettes     Start date:      Last attempt to quit: 1979     Years since quittin.2    Smokeless tobacco: Never Used   Substance Use Topics    Alcohol use: No     Alcohol/week: 0.0 standard drinks    Drug use: No        Allergies   Allergen Reactions    Tape [Adhesive Tape]      Surgical tape-rash   ,   Past Medical History:   Diagnosis Date    AAA (abdominal aortic aneurysm) ([facility] [address]     intravascular stent    BCG ROXANE 2014    BPH (benign prostatic hypertrophy)     CAD (coronary artery disease)     Carotid artery stenosis     Chronic back pain     Chronic kidney disease     Congenital absence of one kidney     absent right kidney    History of lumbar fusion 3/15    mid lower back down    Hyperlipidemia     Hypertension     Hypothyroidism     Obesity     Sciatica     TIA (transient ischemic attack) 2008    Unspecified cerebral artery occlusion with cerebral infarction     TIA   ,   Social History     Tobacco Use    Smoking status: Former Smoker     Packs/day: 1.00     Years: 24.00     Pack years: 24.00     Types: Cigarettes     Start date:      Last attempt to quit: 1979     Years since quittin.2    Smokeless tobacco: Never Used   Substance Use Topics    Alcohol use: No     Alcohol/week: 0.0 standard drinks    Drug use: No   ,   Family History   Problem Relation Age of Onset    Diabetes Mother     Cancer Mother     Arthritis Mother     Asthma Father     Heart Disease Father     Cancer Father     Stroke Sister     Heart Disease Sister     Cancer Sister     Stroke Brother     Heart Disease Brother     Cancer Brother provided through a video synchronous discussion virtually to substitute for in-person clinic visit. Patient and provider were located at their individual homes. --Peggie Carrel, MD on 7/6/2020 at 1:55 PM    An electronic signature was used to authenticate this note.

## 2020-07-21 ENCOUNTER — OFFICE VISIT (OUTPATIENT)
Dept: ENT CLINIC | Age: 80
End: 2020-07-21
Payer: MEDICARE

## 2020-07-21 ENCOUNTER — TELEPHONE (OUTPATIENT)
Dept: ENT CLINIC | Age: 80
End: 2020-07-21

## 2020-07-21 VITALS
RESPIRATION RATE: 14 BRPM | HEART RATE: 72 BPM | BODY MASS INDEX: 29.6 KG/M2 | SYSTOLIC BLOOD PRESSURE: 128 MMHG | WEIGHT: 189 LBS | TEMPERATURE: 97.6 F | DIASTOLIC BLOOD PRESSURE: 80 MMHG

## 2020-07-21 PROBLEM — R42 VERTIGO: Status: ACTIVE | Noted: 2020-07-21

## 2020-07-21 PROBLEM — H93.11 TINNITUS AURIUM, RIGHT: Status: ACTIVE | Noted: 2020-07-21

## 2020-07-21 PROBLEM — H61.23 HEARING LOSS OF BOTH EARS DUE TO CERUMEN IMPACTION: Status: ACTIVE | Noted: 2020-07-21

## 2020-07-21 PROCEDURE — 99203 OFFICE O/P NEW LOW 30 MIN: CPT | Performed by: OTOLARYNGOLOGY

## 2020-07-21 PROCEDURE — 69210 REMOVE IMPACTED EAR WAX UNI: CPT | Performed by: OTOLARYNGOLOGY

## 2020-07-21 NOTE — Clinical Note
Based on outpatients audiologic evaluation, he warrants a evaluation for retrocochlear causes of his hearing loss and vestibular dysfunction. I have ordered an MRI with gadolinium to accomplish this. To  Office clinical staff:  please call the patient and inform him that this is why he is having an MRI.

## 2020-07-21 NOTE — PROGRESS NOTES
Anmol Simpson [facility] EAR, NOSE AND THROAT  [address] [place] [address]  [address] [place] [address]  Dept: [phone]  Dept Fax: [phone]  Loc: [phone]    Jerry Madrigal is a [de-identified] y.o. male who was referred by DEMAR Ashraf - * for:  Chief Complaint   Patient presents with    New Patient     Here for dizziness and hearing loss. Has more difficulty hearing out of the LT ear. HPI:     Jerry Madrigal is a [de-identified] y.o. male referred for evaluation of 3 recent events associated with whirling vertigo at rest.  The patient states each time he was sitting in his easy chair and does not recall any particular change in his gaze or position of his head prior to the onset of whirling vertigo. These lasted about an hour into cases and only a few minutes in the last one. He had no auditory prodrome that he can recall. 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 He took a dietary supplement the name of which she cannot remember the helped him to the point that this condition simply disappeared. He is very clear that his vertiginous episodes of late were very different than his Ménière's events. In addition to his vestibulopathy symptoms, the patient is reporting increased tinnitus in his right ear and decreased hearing in his left ear. He did have ear cleaning by his primary care clinical team that he states was painful and associated with bleeding from his right ear. He is not sure either ear was cleared of cerumen obstruction. He walks with a cane and has done so for several years because of a sense of instability. He has had no major falls but several minor ones.   He tends to use his cane whenever he is out of the house and walks in the house by holding onto any structure to which he is near.  History: Allergies   Allergen Reactions    Tape Benna Hazard Tape]      Surgical tape-rash     Current Outpatient Medications   Medication Sig Dispense Refill    fluorouracil (EFUDEX) 5 % cream PLEASE SEE ATTACHED FOR DETAILED DIRECTIONS      atorvastatin (LIPITOR) 40 MG tablet TAKE 1 TABLET BY MOUTH EVERY DAY AT BEDTIME 90 tablet 3    metoprolol tartrate (LOPRESSOR) 50 MG tablet Take 1 tablet by mouth 2 times daily New dose. 180 tablet 3    levothyroxine (SYNTHROID) 137 MCG tablet TAKE 1 TABLET BY MOUTH DAILY 90 tablet 3    pregabalin (LYRICA) 50 MG capsule Take 1 capsule by mouth 2 times daily for 181 days. 180 capsule 2    escitalopram (LEXAPRO) 10 MG tablet TAKE 1 TABLET BY MOUTH EVERY DAY 90 tablet 1    KLOR-CON M10 10 MEQ extended release tablet TAKE 1 TABLET BY MOUTH DAILY 90 tablet 3    nitroGLYCERIN (NITROSTAT) 0.4 MG SL tablet Place 1 tablet under the tongue every 5 minutes as needed for Chest pain 25 tablet 1    docusate sodium (COLACE, DULCOLAX) 100 MG CAPS Take 100 mg by mouth 2 times daily 30 capsule 0    b complex vitamins capsule Take 1 capsule by mouth daily      Elastic Bandages & Supports (JOBST KNEE HIGH COMPRESSION ) MISC 1 each by Does not apply route daily as needed 1 each 0    aspirin 81 MG tablet Take 81 mg by mouth daily. No current facility-administered medications for this visit.       Past Medical History:   Diagnosis Date    AAA (abdominal aortic aneurysm) ([facility] [address]     intravascular stent    BCG ROXANE 5/21/2014    BPH (benign prostatic hypertrophy)     CAD (coronary artery disease)     Carotid artery stenosis     Chronic back pain     Chronic kidney disease     Congenital absence of one kidney     absent right kidney    History of lumbar fusion 3/15    mid lower back down    Hyperlipidemia     Hypertension     Hypothyroidism     Obesity     Sciatica     TIA (transient ischemic attack) 1/2008    Unspecified cerebral artery occlusion with cerebral infarction     TIA      Past Surgical History:   Procedure Laterality Date    ABDOMINAL AORTIC ANEURYSM REPAIR  3/2010    [place] ABDOMINAL AORTIC ANEURYSM REPAIR  7/24/15    Breanna Knights    COLONOSCOPY  11    MOE    CORONARY ANGIOPLASTY WITH STENT PLACEMENT      2 stents    FEMORAL-FEMORAL BYPASS GRAFT  2017    Dr. Tevin Omer    arm    KNEE ARTHROSCOPY  2008    meniscus tear    LUMBAR LAMINECTOMY  3/2016    [facility]--Dr. Brooks Loco    OTHER SURGICAL HISTORY  4/14/15    melanoma removal from right chest    OTHER SURGICAL HISTORY  2016    I and D, exicison of posterior neck cyst    [place] OFFICE/OUTPT VISIT,PROCEDURE ONLY N/A 2018    TURBT performed by Aida Chao MD at [facility] 2019    ROBOTIC RADICAL CYSTOPROSTATECTOMY performed by Aida Chao MD at [address]  10/2016    Dr Lucille Glover  1990's    TUNNELED VENOUS PORT PLACEMENT      TURP  2014    See note of Dr. Sandeep Degroot for OR procedure details     Family History   Problem Relation Age of Onset    Diabetes Mother     Cancer Mother     Arthritis Mother     Asthma Father     Heart Disease Father     Cancer Father     Stroke Sister     Heart Disease Sister     Cancer Sister     Stroke Brother     Heart Disease Brother     Cancer Brother      Social History     Tobacco Use    Smoking status: Former Smoker     Packs/day: 1.00     Years: 24.00     Pack years: 24.00     Types: Cigarettes     Start date:      Last attempt to quit: 1979     Years since quittin.3    Smokeless tobacco: Never Used   Substance Use Topics    Alcohol use: No     Alcohol/week: 0.0 standard drinks        Subjective:      Review of Systems  Rest of review of systems are negative, except as noted in HPI.      Objective:     /80 (Site: Right Upper Arm, Position: Sitting)   Pulse 72   Temp 97.6 °F (36.4 °C) Resp 14   Wt 189 lb (85.7 kg)   BMI 29.60 kg/m²     Physical Exam     On general physical exam I found the patient to be a pleasant alert and well oriented older adult male who appears approximately his stated age. His voice is asthenic and his speech pattern is slightly somewhat slowed consistent with his age and hearing loss. On otoscopy I found the patient to have bilateral cerumen and dried blood and cerumen occluding both canals, left and right respectively. Examining his extraocular movements and smooth pursuit, I found the patient to have a tendency for overshooting but no obvious beating nystagmus was seen in any plane. No rotatory nystagmus was seen either. Procedure: Cerumen disimpaction: Following informed consent I used an operating microscope along with hydrogen peroxide, suction catheters, and cerumen curettes to clean both external auditory canals thoroughly and visualize his tympanic membranes. Both tympanic membranes were hazy and somewhat thickened but appeared to be normally inflated and free of acute pathology. The patient tolerated the procedure well. Vitals reviewed. No results found.    Lab Results   Component Value Date     05/16/2020     05/11/2019     05/07/2019    K 3.9 05/16/2020    K 4.1 05/11/2019    K 3.4 05/07/2019    K 3.3 05/04/2019    K 3.6 05/03/2019    K 4.7 05/26/2018     05/16/2020    CL 98 05/11/2019     05/07/2019    CO2 31 05/16/2020    CO2 29 05/11/2019    CO2 25 05/07/2019    BUN 20 05/16/2020    BUN 17 05/11/2019    BUN 17 05/07/2019    CREATININE 1.29 05/16/2020    CREATININE 1.1 05/11/2019    CREATININE 1.0 05/07/2019    CALCIUM 9.3 05/16/2020    CALCIUM 9.1 05/11/2019    CALCIUM 8.5 05/07/2019    PROT 6.8 05/16/2020    PROT 4.9 05/04/2019    PROT 5.8 04/03/2019    LABALBU 4.1 05/16/2020    LABALBU 2.7 05/04/2019    LABALBU 3.7 04/03/2019    LABALBU 4.1 05/19/2012    LABALBU 4.4 05/17/2012    LABALBU 4.1 04/24/2012 BILITOT 1.4 05/16/2020    BILITOT 1.0 05/04/2019    BILITOT 0.6 04/03/2019    BILITOT NEGATIVE 10/26/2018    ALKPHOS 94 05/16/2020    ALKPHOS 68 05/04/2019    ALKPHOS 238 04/03/2019    ALKPHOS 253 03/06/2019    AST 25 05/16/2020    AST 12 05/04/2019    AST 19 04/03/2019    ALT 23 05/16/2020    ALT 10 05/04/2019    ALT 19 04/03/2019       All of the past medical history, past surgical history, family history,social history, allergies and current medications were reviewed with the patient. Assessment & Plan   Diagnoses and all orders for this visit:     Diagnosis Orders   1. Vertigo  VL DUP CAROTID BILATERAL    Basic Vestibular Evaluation   2. Tinnitus aurium, right     3. Hearing loss of both ears due to cerumen impaction         Based on his history and these physical findings, his decreased hearing may have been attributable to his cerumen impaction. His tinnitus may be related to his vestibulopathy. That said, I detected no pathological nystagmus on today's examination although visual inspection of eye movements is far less sensitive than vestibular nystagmography which I have placed an order for to further work this process up. Pending that work-up, I did inform him and his wife that he may need a brain MRI depending on what that study shows. I did explain if I find what are called \"retrocochlear signs\" that I will order an MRI of his head. Then I will see him back to review that as well. At a minimum I recommended that he exchange his current cane for a tripod cane that will give him more support and assist him in ambulating safely. He reported understanding the basis of my recommendations, having his questions and his concerns addressed and being willing to proceed as such. His wife agreed. His follow-up will be predicated on the results of these upcoming tests. No follow-ups on file. **This report has been created using voice recognition software.  It may contain minor errors

## 2020-07-28 RX ORDER — ESCITALOPRAM OXALATE 10 MG/1
TABLET ORAL
Qty: 90 TABLET | Refills: 1 | Status: SHIPPED | OUTPATIENT
Start: 2020-07-28 | End: 2020-11-02 | Stop reason: ALTCHOICE

## 2020-08-04 ENCOUNTER — HOSPITAL ENCOUNTER (OUTPATIENT)
Dept: INTERVENTIONAL RADIOLOGY/VASCULAR | Age: 80
Discharge: HOME | End: 2020-08-04
Payer: MEDICARE

## 2020-08-04 PROCEDURE — 93880 EXTRACRANIAL BILAT STUDY: CPT

## 2020-08-10 RX ORDER — PREGABALIN 50 MG/1
50 CAPSULE ORAL 2 TIMES DAILY
Qty: 180 CAPSULE | Refills: 0 | Status: SHIPPED | OUTPATIENT
Start: 2020-08-10 | End: 2020-11-02 | Stop reason: SDUPTHER

## 2020-08-20 ENCOUNTER — HOSPITAL ENCOUNTER (OUTPATIENT)
Dept: AUDIOLOGY | Age: 80
Discharge: HOME | End: 2020-08-20
Payer: MEDICARE

## 2020-08-20 PROCEDURE — 92567 TYMPANOMETRY: CPT | Performed by: AUDIOLOGIST

## 2020-08-20 PROCEDURE — 92537 CALORIC VSTBLR TEST W/REC: CPT | Performed by: AUDIOLOGIST

## 2020-08-20 PROCEDURE — 92540 BASIC VESTIBULAR EVALUATION: CPT | Performed by: AUDIOLOGIST

## 2020-08-20 NOTE — PROGRESS NOTES
ACCOUNT #: [de-identified]      VNG REPORT      CHIEF COMPLAINT: Recent episodes of vertigo and constant imbalance. He is also reporting increased hearing loss particularly in his left ear. He recently had bilaterally impacted cerumen cleaned from his ears. Audiometric testing from  [id number] revealed a conductive component and middle ear dysfunction in his left ear. The patient feels that his hearing and balance both decreased while he was under going treatment for bladder cancer. He is not hearing well with his current hearing aids. He also has difficulty standing and walking due to weakness in his legs that started after he was treated for an aortic aneurysm. MEDICATIONS REVIEWED: Patient has held appropriate medications for VNG testing. OTOSCOPY: Clear canal, bilaterally. TYMPANOMETRY: Normal middle ear compliance, pressure and volume, bilaterally. VNG RESULTS:    SACCADES: WNL  TRACKING: WNL  OPTOKINETICS:  WNL  GAZE: Low velocity let beating nystagmus with gaze left    HALLPIKE MANEUVER:  WNL   POSITIONAL: Horizontal nystagmus that changed from left beating to right beating was noted in the first supine position. Right beating horizontal nystagmus was noted in head left and the second supine position test.  This nystagmus did not change direction and had a velocity that was considered significant only in the head left position. HEADSHAKE TEST: WNL  CALORIC TESTING: During warm caloric stimulation of the left ear left beating horizontal nystagmus was initially present at the expected latency but changed to right beating horizontal nystagmus. JAVY' SOT: DNT    IMPRESSIONS/RECOMMENDATIONS:  The presence of nystagmus that changes directions in the same position can indicate an abnormality of the central vestibular system. Gaze nystagmus with fixation is also consistent with a central vestibular site of lesion. Further evaluation is encouraged.  Follow up should also be completed with his managing audiologist due to his ongoing issues with his amplification.

## 2020-08-24 ENCOUNTER — TELEPHONE (OUTPATIENT)
Dept: ENT CLINIC | Age: 80
End: 2020-08-24

## 2020-08-24 NOTE — TELEPHONE ENCOUNTER
Please call radiology and find out if patient is able to get MRI with the stents or if some are not compatible.

## 2020-08-26 NOTE — TELEPHONE ENCOUNTER
Spoke with Dee; patient has a hard time hearing on the phone. Patient has two stents; heart; he had them placed in 2003/2004. Scheduling has been informed. Dee stated that patient has not been receiving MRI imaging because he had an abdominal aneurysm.

## 2020-08-26 NOTE — TELEPHONE ENCOUNTER
Please find out where he had the stents placed. There should be a record of the  and even the lot number of what was put in him. Radiology should have some means of determining if it had any metal within them. Nonmetallic stents have been used for most of this century.

## 2020-08-27 NOTE — TELEPHONE ENCOUNTER
Spoke with Odilon Hardy, patient's wife who is on HIPAA, and she stated the stents were placed in [facility]. I called medical records and spoke with [facility]. I informed her what I was looking for and they informed me to fax a request. Request was faxed and scanned into media.

## 2020-08-27 NOTE — TELEPHONE ENCOUNTER
Received the Cardiac Catherization records for the patient's heart stents. Patient received a 3.0 millimeter of Mercury 13 millimeter Zeta stent and a 30. Millimeter / 8 millimeter Zeta stent. (records scanned into media). I then called radiology ext: [phone] and spoke with Rikki and she transferred me to MRI. Delonte Garza with MRI was informed of the model of stents and they were placed in 2003. Delonte Garza stated it was okay for patient to have MRI. Also in appointment notes on the appointment desk states Kindra Tacosnorbert stated it was okay. Called Cong Neighbor to inform her the patient is okay to have the MRI on Monday and radiology is aware. Cong Lopez also was concerned about metal in patient's arm. Delonte Garza also cleared patient for the MRI. Cong Neighbor also mentioned patient had abdominal aneurysm about 8-10 years ago and has a metal wire mesh. Emilee December with radiology MRI stated the patient is okay to get the MRI of the head. All of Trinidad's concerns and questions were answered.

## 2020-08-31 ENCOUNTER — TELEPHONE (OUTPATIENT)
Dept: FAMILY MEDICINE CLINIC | Age: 80
End: 2020-08-31

## 2020-08-31 ENCOUNTER — HOSPITAL ENCOUNTER (OUTPATIENT)
Dept: MRI IMAGING | Age: 80
Discharge: HOME | End: 2020-08-31
Payer: MEDICARE

## 2020-08-31 LAB — POC CREATININE WHOLE BLOOD: 1.2 MG/DL (ref 0.5–1.2)

## 2020-08-31 PROCEDURE — 6360000004 HC RX CONTRAST MEDICATION: Performed by: OTOLARYNGOLOGY

## 2020-08-31 PROCEDURE — 70553 MRI BRAIN STEM W/O & W/DYE: CPT

## 2020-08-31 PROCEDURE — A9579 GAD-BASE MR CONTRAST NOS,1ML: HCPCS | Performed by: OTOLARYNGOLOGY

## 2020-08-31 PROCEDURE — 82565 ASSAY OF CREATININE: CPT

## 2020-08-31 RX ADMIN — GADOTERIDOL 15 ML: 279.3 INJECTION, SOLUTION INTRAVENOUS at 11:46

## 2020-08-31 NOTE — TELEPHONE ENCOUNTER
Patient wife called to get a renewal handicap placard for patient. Call patient wife when ready for .

## 2020-09-02 ENCOUNTER — OFFICE VISIT (OUTPATIENT)
Dept: ENT CLINIC | Age: 80
End: 2020-09-02
Payer: MEDICARE

## 2020-09-02 VITALS
RESPIRATION RATE: 12 BRPM | TEMPERATURE: 97.6 F | BODY MASS INDEX: 31.17 KG/M2 | HEART RATE: 64 BPM | SYSTOLIC BLOOD PRESSURE: 110 MMHG | WEIGHT: 199 LBS | DIASTOLIC BLOOD PRESSURE: 68 MMHG

## 2020-09-02 PROCEDURE — 99214 OFFICE O/P EST MOD 30 MIN: CPT | Performed by: OTOLARYNGOLOGY

## 2020-09-02 NOTE — PROGRESS NOTES
Anmol Simpson [facility] EAR, NOSE AND THROAT  [address] [place] [address]  [address] [place] [address]  Dept: [phone]  Dept Fax: [phone]  Loc: [phone]    Danni Freitas is a [de-identified] y.o. male who was referred by No ref. provider found for:  Chief Complaint   Patient presents with    Follow-up     Patient here for 4 week follow up. HPI:     Danni Freitas is a [de-identified] y.o. male with a history of ataxia sensorineural hearing loss and tinnitus. He is status post a brain MRI looking for any possible retrocochlear causes for this problem. None were found. He also had vascular imaging of his for cervical vessels. Only the external carotid artery was found to have significant narrowing which does not cause any CNS problems. He is here today with his wife and reports having gotten a four-point cane to increase his stability. He states it is helping him significantly. He also wants to discuss his continued physical exertion and busyness in the context of his condition. History: Allergies   Allergen Reactions    Tape Anthonette Rea Tape]      Surgical tape-rash     Current Outpatient Medications   Medication Sig Dispense Refill    pregabalin (LYRICA) 50 MG capsule TAKE 1 CAPSULE BY MOUTH 2 TIMES DAILY  DAYS. 180 capsule 0    escitalopram (LEXAPRO) 10 MG tablet TAKE 1 TABLET BY MOUTH EVERY DAY 90 tablet 1    fluorouracil (EFUDEX) 5 % cream PLEASE SEE ATTACHED FOR DETAILED DIRECTIONS      atorvastatin (LIPITOR) 40 MG tablet TAKE 1 TABLET BY MOUTH EVERY DAY AT BEDTIME 90 tablet 3    metoprolol tartrate (LOPRESSOR) 50 MG tablet Take 1 tablet by mouth 2 times daily New dose.  180 tablet 3    levothyroxine (SYNTHROID) 137 MCG tablet TAKE 1 TABLET BY MOUTH DAILY 90 tablet 3    KLOR-CON M10 10 MEQ extended release tablet TAKE 1 TABLET BY MOUTH DAILY 90 tablet 3    nitroGLYCERIN (NITROSTAT) 0.4 MG SL tablet Place 1 tablet under the tongue every 5 minutes as needed for Chest pain 25 tablet 1    docusate sodium (COLACE, DULCOLAX) 100 MG CAPS Take 100 mg by mouth 2 times daily 30 capsule 0    b complex vitamins capsule Take 1 capsule by mouth daily      Elastic Bandages & Supports (JOBST KNEE HIGH COMPRESSION SM) MISC 1 each by Does not apply route daily as needed 1 each 0    aspirin 81 MG tablet Take 81 mg by mouth daily. No current facility-administered medications for this visit.       Past Medical History:   Diagnosis Date    AAA (abdominal aortic aneurysm) ([address]     intravascular stent    BCG ROXANE 5/21/2014    BPH (benign prostatic hypertrophy)     CAD (coronary artery disease)     Carotid artery stenosis     Chronic back pain     Chronic kidney disease     Congenital absence of one kidney     absent right kidney    History of lumbar fusion 3/15    mid lower back down    Hyperlipidemia     Hypertension     Hypothyroidism     Obesity     Sciatica     TIA (transient ischemic attack) 1/2008    Unspecified cerebral artery occlusion with cerebral infarction     TIA      Past Surgical History:   Procedure Laterality Date    ABDOMINAL AORTIC ANEURYSM REPAIR  3/2010    Shireen    ABDOMINAL AORTIC ANEURYSM REPAIR  7/24/15    Kym Dresden    [facility]    CORONARY ANGIOPLASTY WITH STENT PLACEMENT  2003    2 stents    FEMORAL-FEMORAL BYPASS GRAFT  03/09/2017    Dr. Selma Hagan    arm    KNEE ARTHROSCOPY  12/2008    meniscus tear    LUMBAR LAMINECTOMY  3/2016    [facility]--Dr. Ely Porter    OTHER SURGICAL HISTORY  4/14/15    melanoma removal from right chest    OTHER SURGICAL HISTORY  5/20/2016    I and D, exicison of posterior neck cyst    [place] OFFICE/OUTPT VISIT,PROCEDURE ONLY N/A 11/8/2018    TURBT performed by Srinivas Wells MD at [facility] 5/1/2019    ROBOTIC RADICAL CYSTOPROSTATECTOMY performed by Srinivas Wells MD at [address]  10/2016    Dr Alcon Archer REMOVAL      TUNNELED VENOUS PORT PLACEMENT      TURP  2014    See note of Dr. Brenda Torres for OR procedure details     Family History   Problem Relation Age of Onset    Diabetes Mother     Cancer Mother     Arthritis Mother     Asthma Father     Heart Disease Father     Cancer Father     Stroke Sister     Heart Disease Sister     Cancer Sister     Stroke Brother     Heart Disease Brother     Cancer Brother      Social History     Tobacco Use    Smoking status: Former Smoker     Packs/day: 1.00     Years: 24.00     Pack years: 24.00     Types: Cigarettes     Start date:      Last attempt to quit: 1979     Years since quittin.4    Smokeless tobacco: Never Used   Substance Use Topics    Alcohol use: No     Alcohol/week: 0.0 standard drinks        Subjective:      Review of Systems  Rest of review of systems are negative, except as noted in HPI. Objective:     /68 (Site: Right Upper Arm, Position: Sitting)   Pulse 64   Temp 97.6 °F (36.4 °C) (Infrared)   Resp 12   Wt 199 lb (90.3 kg)   BMI 31.17 kg/m²     Physical Exam     Vitals reviewed. Vl Dup Carotid Bilateral    Result Date: 2020  1. RIGHT ICA . Annia Jesi Annia Jesi Mild mixed plaque, mild stenosis less than 50%. .. 2. RIGHT ECA. . Mild mixed plaque, mild stenosis. ..... Annia Jesi RIGHT CCA. . Moderate intimal thickening, no appreciable stenosis. .. 3. RIGHT VERTEBRAL. Annia Jesi Antegrade flow . .. ... LEFT VERTEBRAL. .. Antegrade flow. .. 4. LEFT ICA. .... Moderate amount somewhat irregular mixed plaque, less than 50% stenosis. .. Cannot exclude plaque ulceration. 5. LEFT ECA. .. Moderate amount soft plaque, high-grade stenosis. ..... LEFT CCA. .. Mild intimal thickening, no appreciable stenosis. Annia Liraa **This report has been created using voice recognition software. It may contain minor errors which are inherent in voice recognition technology. ** Final report electronically signed by Dr. Mervin Hedrick on 2020 1:19 PM    [facility] Contrast    Result Date: 8/31/2020   1. Moderate severity global volume loss which has progressed compared to 2007. 2. Mild severity chronic small vessel ischemic changes. 3. Normal-appearing IACs. 4. Degenerative changes in the cervical spine. **This report has been created using voice recognition software. It may contain minor errors which are inherent in voice recognition technology. ** Final report electronically signed by Dr. Jc Cronin on 8/31/2020 2:03 PM     Lab Results   Component Value Date     05/16/2020     05/11/2019     05/07/2019    K 3.9 05/16/2020    K 4.1 05/11/2019    K 3.4 05/07/2019    K 3.3 05/04/2019    K 3.6 05/03/2019    K 4.7 05/26/2018     05/16/2020    CL 98 05/11/2019     05/07/2019    CO2 31 05/16/2020    CO2 29 05/11/2019    CO2 25 05/07/2019    BUN 20 05/16/2020    BUN 17 05/11/2019    BUN 17 05/07/2019    CREATININE 1.29 05/16/2020    CREATININE 1.1 05/11/2019    CREATININE 1.0 05/07/2019    CALCIUM 9.3 05/16/2020    CALCIUM 9.1 05/11/2019    CALCIUM 8.5 05/07/2019    PROT 6.8 05/16/2020    PROT 4.9 05/04/2019    PROT 5.8 04/03/2019    LABALBU 4.1 05/16/2020    LABALBU 2.7 05/04/2019    LABALBU 3.7 04/03/2019    LABALBU 4.1 05/19/2012    LABALBU 4.4 05/17/2012    LABALBU 4.1 04/24/2012    BILITOT 1.4 05/16/2020    BILITOT 1.0 05/04/2019    BILITOT 0.6 04/03/2019    BILITOT NEGATIVE 10/26/2018    ALKPHOS 94 05/16/2020    ALKPHOS 68 05/04/2019    ALKPHOS 238 04/03/2019    ALKPHOS 253 03/06/2019    AST 25 05/16/2020    AST 12 05/04/2019    AST 19 04/03/2019    ALT 23 05/16/2020    ALT 10 05/04/2019    ALT 19 04/03/2019       All of the past medical history, past surgical history, family history,social history, allergies and current medications were reviewed with the patient. Assessment & Plan   Diagnoses and all orders for this visit:     Diagnosis Orders   1. Sensorineural hearing loss (SNHL) of both ears     2. Tinnitus, bilateral     3.  Ataxia 4. Hypesthesia      Lower extremities       Based on the patient's history and in particular based on his loss of lower extremity sensation, his ataxia is relatively easy to understand. However, his busyness may result in significant injury if he is not made physically more stable. As such I recommended that we get him into vestibular rehab to increase his capacity to understand his physical position and reduce his risk for self-induced injury from a fall that could be very serious. Regarding his hearing, I recommend we get him to the audiologist sometime in the next few weeks to do a reprogramming of his current amplification system and a replacement of his earmolds that are not fitting him well. He may also need to consider  New technology in the event that additional amplification of high frequencies are necessary and beyond his current technology. I explained all this in detail to the patient and his wife to their satisfaction. I answered his questions and addressed his concerns. They both reported being pleased with the outcome of the visit and being willing to proceed as such. I will see him on an as-needed basis as well as in 6 months for routine follow-up. I spent 35 minutes with the patient and his wife today. Return in about 6 months (around 3/2/2021) for I follow-up of his communication and ambulation condition. **This report has been created using voice recognition software. It may contain minor errors which are inherent in voice recognition technology. **               September 3, 2020    In order to enable a reprogramming of the patient's hearing aids, he needs a current audiogram which I will order.     PFC

## 2020-09-11 ENCOUNTER — HOSPITAL ENCOUNTER (OUTPATIENT)
Dept: AUDIOLOGY | Age: 80
Discharge: HOME | End: 2020-09-11
Payer: MEDICARE

## 2020-09-11 PROCEDURE — 92567 TYMPANOMETRY: CPT | Performed by: AUDIOLOGIST

## 2020-09-11 PROCEDURE — 92557 COMPREHENSIVE HEARING TEST: CPT | Performed by: AUDIOLOGIST

## 2020-09-11 NOTE — PROGRESS NOTES
AUDIOLOGICAL EVALUATION      REASON FOR TESTING:  Repeat audiometry and tympanometry due to conductive component/flat tympanogram on 6/25/2020. The patient was referred to ENT due to the conductive component. VNG testing was also recommended due to the patient's ongoing imbalance. The VNG was completed on 8/20/2020 and suggested an abnormality of the central vestibular system. According to Dr. Karen Carballo 9/2/2020 note, MRI was negative for any retrocochlear lesion. Also, vascular imaging of external carotid artery had significant narrowing which does not cause any CNS problems. Today the patient expresses frustration in hearing his family members in conversation, difficulty on the phone and trouble understanding television. He was fit with Merla Cord  behind-the-ear hearing aids on 5/30/2017. History of chemotherapy for bladder cancer. OTOSCOPY: WNL for both ears. 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 Moderately-severe, sloping to profound sensorineural hearing loss for the left ear. Asymmetrical thresholds at 250Hz-4KHz with the left ear being worse. The asymmetry was not present on 4/12/2019 audiogram. Speech discrimination ability is good at 80% in the right ear and very poor at 44% in the left ear. AIDED TESTING:  Real ear to  measures were obtained during today's appointment. The [employer] Real Ear system was used to perform the RECD measures. The hearing aid was reprogrammed to match appropriate targets for MPO, soft, medium and loud stimuli with speech mapping based on today's audiological data. The measures were as follows. RECD Measures (measured in dBSPL):  RIGHT EAR:  250Hz -7, 500Hz 3, 750Hz 0*, 1000Hz 6, 1500Hz 12, 2000Hz 10, 3000Hz 7, 4000Hz 5, 6000 Hz 11.   LEFT EAR:  250Hz -8, 500Hz 1, 750Hz 3, 1000Hz 7, 1500Hz 9,  2000Hz 9, 3000Hz 6, 4000Hz -1, 6000 Hz 27.              RECOMMENDATION(S):   1- Continue care with Dr. Tereza Farias as scheduled. 2- Regarding amplification, earmold tubing was changed and real ear measures were completed. Aided responses suggest appropriate outcomes with binaural amplification/programming changes made today. The patient and his wife were counseled on realistic expectations with the 44% speech discrimination ability for the left ear. It was recommended that he try using the right hearing aid only or possibly consider a CROS hearing aid. Earmold tubing replacement today may help the problem. If the earmold issue continues, then the patient can return for an earmold impression appointment. 3- Repeat audiometry in six months for monitoring purposes or sooner if any changes are noted in hearing ability. 4- Patient agreed to [facility] phone- submitted certification form on the patient's behalf. 5- Discussed wireless technology and closed captioning for television use. The patient was given a brochure on the [employer] media 2 TV streamer.

## 2020-09-21 RX ORDER — FUROSEMIDE 40 MG/1
20 TABLET ORAL DAILY
Qty: 90 TABLET | Refills: 1 | Status: SHIPPED | OUTPATIENT
Start: 2020-09-21 | End: 2020-09-23

## 2020-09-21 NOTE — TELEPHONE ENCOUNTER
Spoke to pts wife as we do not have furosemide on his list anymore. She states pts furosemide was decreased to 20 mg. Daily and had an abundant supply left until now.

## 2020-09-23 RX ORDER — FUROSEMIDE 40 MG/1
TABLET ORAL
Qty: 90 TABLET | Refills: 1 | Status: SHIPPED | OUTPATIENT
Start: 2020-09-23 | End: 2021-07-21 | Stop reason: SDUPTHER

## 2020-10-09 ENCOUNTER — NURSE ONLY (OUTPATIENT)
Dept: FAMILY MEDICINE CLINIC | Age: 80
End: 2020-10-09
Payer: MEDICARE

## 2020-10-09 PROCEDURE — G0008 ADMIN INFLUENZA VIRUS VAC: HCPCS | Performed by: NURSE PRACTITIONER

## 2020-10-09 PROCEDURE — 90694 VACC AIIV4 NO PRSRV 0.5ML IM: CPT | Performed by: NURSE PRACTITIONER

## 2020-10-23 ENCOUNTER — OFFICE VISIT (OUTPATIENT)
Dept: FAMILY MEDICINE CLINIC | Age: 80
End: 2020-10-23
Payer: MEDICARE

## 2020-10-23 VITALS
HEART RATE: 72 BPM | SYSTOLIC BLOOD PRESSURE: 136 MMHG | BODY MASS INDEX: 31.29 KG/M2 | WEIGHT: 199.8 LBS | DIASTOLIC BLOOD PRESSURE: 74 MMHG | TEMPERATURE: 97 F | RESPIRATION RATE: 16 BRPM

## 2020-10-23 PROCEDURE — 99214 OFFICE O/P EST MOD 30 MIN: CPT | Performed by: NURSE PRACTITIONER

## 2020-10-23 RX ORDER — ALPRAZOLAM 0.5 MG/1
TABLET ORAL
Qty: 60 TABLET | Refills: 0 | Status: SHIPPED | OUTPATIENT
Start: 2020-10-23 | End: 2020-11-22

## 2020-10-23 RX ORDER — ALPRAZOLAM 0.5 MG/1
TABLET ORAL
COMMUNITY
Start: 2020-08-09 | End: 2020-10-23

## 2020-10-23 RX ORDER — ALPRAZOLAM 0.5 MG/1
TABLET ORAL
Status: CANCELLED | OUTPATIENT
Start: 2020-10-23

## 2020-10-23 ASSESSMENT — ENCOUNTER SYMPTOMS
COUGH: 0
VOMITING: 0
RHINORRHEA: 0
TROUBLE SWALLOWING: 0
EYE DISCHARGE: 0
DIARRHEA: 0
SHORTNESS OF BREATH: 0
WHEEZING: 0
ALLERGIC/IMMUNOLOGIC NEGATIVE: 1
SORE THROAT: 0
NAUSEA: 0
BACK PAIN: 0
EYE PAIN: 0
CONSTIPATION: 0
EYE REDNESS: 0
ABDOMINAL PAIN: 0

## 2020-10-23 NOTE — PROGRESS NOTES
60 tablet 0    furosemide (LASIX) 40 MG tablet TAKE 1 TABLET BY MOUTH EVERY DAY 90 tablet 1    pregabalin (LYRICA) 50 MG capsule TAKE 1 CAPSULE BY MOUTH 2 TIMES DAILY  DAYS. 180 capsule 0    escitalopram (LEXAPRO) 10 MG tablet TAKE 1 TABLET BY MOUTH EVERY DAY 90 tablet 1    atorvastatin (LIPITOR) 40 MG tablet TAKE 1 TABLET BY MOUTH EVERY DAY AT BEDTIME 90 tablet 3    metoprolol tartrate (LOPRESSOR) 50 MG tablet Take 1 tablet by mouth 2 times daily New dose. 180 tablet 3    levothyroxine (SYNTHROID) 137 MCG tablet TAKE 1 TABLET BY MOUTH DAILY 90 tablet 3    KLOR-CON M10 10 MEQ extended release tablet TAKE 1 TABLET BY MOUTH DAILY 90 tablet 3    nitroGLYCERIN (NITROSTAT) 0.4 MG SL tablet Place 1 tablet under the tongue every 5 minutes as needed for Chest pain 25 tablet 1    docusate sodium (COLACE, DULCOLAX) 100 MG CAPS Take 100 mg by mouth 2 times daily 30 capsule 0    b complex vitamins capsule Take 1 capsule by mouth daily      Elastic Bandages & Supports (JOBST KNEE HIGH COMPRESSION SM) MISC 1 each by Does not apply route daily as needed 1 each 0    aspirin 81 MG tablet Take 81 mg by mouth daily. No current facility-administered medications for this visit. Review of Systems   Constitutional: Negative for activity change, fatigue and fever. HENT: Negative for congestion, ear pain, rhinorrhea, sore throat and trouble swallowing. Eyes: Negative for pain, discharge and redness. Respiratory: Negative for cough, shortness of breath and wheezing. Cardiovascular: Negative. Gastrointestinal: Negative for abdominal pain, constipation, diarrhea, nausea and vomiting. Excessive flatulence   Endocrine: Negative. Genitourinary: Negative for dysuria, frequency and urgency. Musculoskeletal: Negative for arthralgias, back pain and myalgias. Skin: Negative for rash. Allergic/Immunologic: Negative. Neurological: Negative for dizziness, tremors, weakness and headaches. Hematological: Negative. Psychiatric/Behavioral: Positive for dysphoric mood. Negative for sleep disturbance. The patient is not nervous/anxious. OBJECTIVE     /74 (Site: Left Upper Arm)   Pulse 72   Temp 97 °F (36.1 °C) (Infrared)   Resp 16   Wt 199 lb 12.8 oz (90.6 kg)   BMI 31.29 kg/m²     Wt Readings from Last 3 Encounters:   10/23/20 199 lb 12.8 oz (90.6 kg)   09/02/20 199 lb (90.3 kg)   07/21/20 189 lb (85.7 kg)     Body mass index is 31.29 kg/m². Physical Exam  Constitutional:       General: He is not in acute distress. Appearance: He is well-developed. He is not diaphoretic. HENT:      Right Ear: External ear normal.      Left Ear: External ear normal.      Nose: Nose normal.   Eyes:      General:         Right eye: No discharge. Left eye: No discharge. Conjunctiva/sclera: Conjunctivae normal.      Pupils: Pupils are equal, round, and reactive to light. Neck:      Musculoskeletal: Normal range of motion. Vascular: No JVD. Cardiovascular:      Rate and Rhythm: Normal rate and regular rhythm. Heart sounds: Normal heart sounds. Pulmonary:      Effort: Pulmonary effort is normal. No respiratory distress. Breath sounds: Normal breath sounds. No wheezing. Abdominal:      General: Bowel sounds are normal. There is no distension. Palpations: Abdomen is soft. Tenderness: There is no abdominal tenderness. Musculoskeletal: Normal range of motion. General: No tenderness or deformity. Skin:     General: Skin is warm and dry. Capillary Refill: Capillary refill takes less than 2 seconds. Coloration: Skin is not pale. Findings: No erythema or rash. Neurological:      Mental Status: He is alert and oriented to person, place, and time. Coordination: Coordination normal.   Psychiatric:         Behavior: Behavior normal.         Thought Content:  Thought content normal.         Judgment: Judgment normal. Lab Results   Component Value Date    LABA1C 6.4 08/17/2011       Lab Results   Component Value Date    CHOL 153 02/22/2017    TRIG 102 02/22/2017    HDL 31 02/22/2017    LDLCALC 102 02/22/2017    LDLDIRECT 112.38 02/12/2018       The ASCVD Risk score (Gabriel Beard, et al., 2013) failed to calculate for the following reasons:     The 2013 ASCVD risk score is only valid for ages 36 to 78    Lab Results   Component Value Date     05/16/2020    K 3.9 05/16/2020     05/16/2020    CO2 31 05/16/2020    BUN 20 05/16/2020    CREATININE 1.29 05/16/2020    GLUCOSE 88 05/16/2020    CALCIUM 9.3 05/16/2020    PROT 6.8 05/16/2020    LABALBU 4.1 05/16/2020    BILITOT 1.4 (H) 05/16/2020    ALKPHOS 94 05/16/2020    AST 25 05/16/2020    ALT 23 05/16/2020    LABGLOM 65 (A) 05/11/2019       No results found for: LABMICR, [id number]    Lab Results   Component Value Date    TSH 4.300 (H) 06/24/2019       Lab Results   Component Value Date    WBC 9.6 05/16/2020    HGB 16.1 05/16/2020    HCT 48.2 05/16/2020    MCV 92 05/16/2020     05/16/2020       Lab Results   Component Value Date    PSA 0.70 07/25/2011    PSA 0.83 10/31/2008       Immunization History   Administered Date(s) Administered    Influenza 11/14/2011, 09/30/2013, 10/20/2015    Influenza Virus Vaccine 10/20/2015, 10/01/2016, 11/01/2017, 10/15/2018, 10/14/2019    Influenza Whole 10/01/2008    Influenza, High Dose (Fluzone 65 yrs and older) 10/02/2014    Influenza, Quadv, adjuvanted, 65 yrs +, IM, PF (Fluad) 10/09/2020    Pneumococcal Conjugate 13-valent (Xsevzoh61) 02/08/2017    Pneumococcal Conjugate 7-valent (Prevnar7) 01/01/2006    Pneumococcal Polysaccharide ([id number]) 11/07/2019       Health Maintenance   Topic Date Due    DTaP/Tdap/Td vaccine (1 - Tdap) 01/[birth year]/1959    Shingles Vaccine (1 of 2) 01/21/1990    Lipid screen  02/12/2019    TSH testing  06/24/2020    Annual Wellness Visit (AWV)  11/07/2020    Potassium monitoring 05/16/2021    Creatinine monitoring  05/16/2021    Flu vaccine  Completed    Pneumococcal 65+ years Vaccine  Completed    Hepatitis A vaccine  Aged Out    Hepatitis B vaccine  Aged Out    Hib vaccine  Aged Out    Meningococcal (ACWY) vaccine  Aged Out       Future Appointments   Date Time Provider Devon Luevano   11/7/2020 10:00 AM STR CT IMAGING RM1 STRZ CT SCAN STR Radiolog   11/7/2020 10:20 AM STR CT IMAGING RM1 STRZ CT SCAN STR Radiolog   11/19/2020 10:00 AM Jacquelyn Jama [facility] [place] [email or web address]   1/6/2021 10:00 AM Desiree Mishra MD SRPX Physic [facility]   1/19/2021  1:30 PM Americo Hernandez MD SRPX VARGAS [facility] [place] [email or web address]   3/2/2021 11:30 AM April Sahni MD ENT [facility] AM [address] [email or web address]       ASSESSMENT       Diagnosis Orders   1. Sleep disturbance  ALPRAZolam (XANAX) 0.5 MG tablet   2. Body image disturbance  ALPRAZolam (XANAX) 0.5 MG tablet   3. Flatulence     4. Depression, unspecified depression type         PLAN      1. Millenn[employer] testing today and we will likely change from the Lexapro to something else. We will also be checking efficacy of the Xanax. 2.  Recommend patient try 1 week of Gas-X, 2 capsules a day for his flatulence. 3.  Continue current medications and follow-up in 3 months.       Electronically signed by DEMAR Harkins CNP on 10/23/2020 at 11:37 AM

## 2020-11-02 ENCOUNTER — TELEPHONE (OUTPATIENT)
Dept: FAMILY MEDICINE CLINIC | Age: 80
End: 2020-11-02

## 2020-11-02 RX ORDER — DULOXETIN HYDROCHLORIDE 30 MG/1
30 CAPSULE, DELAYED RELEASE ORAL DAILY
Qty: 30 CAPSULE | Refills: 3 | Status: SHIPPED | OUTPATIENT
Start: 2020-11-02 | End: 2021-01-19 | Stop reason: SDUPTHER

## 2020-11-02 RX ORDER — PREGABALIN 50 MG/1
50 CAPSULE ORAL 2 TIMES DAILY
Qty: 180 CAPSULE | Refills: 1 | Status: SHIPPED | OUTPATIENT
Start: 2020-11-02 | End: 2021-05-03

## 2020-11-02 NOTE — TELEPHONE ENCOUNTER
Wife, Leigh Ann Heck, on HIPAA, was given results and it was discussed in length how he will introduce the Cymbalta and come off of the Lexapro. She verbalized understanding.

## 2020-11-03 PROBLEM — I73.9 PVD (PERIPHERAL VASCULAR DISEASE) (HCC): Status: RESOLVED | Noted: 2017-08-14 | Resolved: 2020-11-03

## 2020-11-07 ENCOUNTER — HOSPITAL ENCOUNTER (OUTPATIENT)
Dept: CT IMAGING | Age: 80
Discharge: HOME | End: 2020-11-07
Payer: MEDICARE

## 2020-11-07 LAB — POC CREATININE WHOLE BLOOD: 1.2 MG/DL (ref 0.5–1.2)

## 2020-11-07 PROCEDURE — 82565 ASSAY OF CREATININE: CPT

## 2020-11-07 PROCEDURE — 74178 CT ABD&PLV WO CNTR FLWD CNTR: CPT

## 2020-11-07 PROCEDURE — 6360000004 HC RX CONTRAST MEDICATION: Performed by: PHYSICIAN ASSISTANT

## 2020-11-07 PROCEDURE — 71260 CT THORAX DX C+: CPT

## 2020-11-07 RX ADMIN — IOPAMIDOL 80 ML: 755 INJECTION, SOLUTION INTRAVENOUS at 09:59

## 2020-11-11 LAB
ABSOLUTE BASO #: 0.1 X10E9/L (ref 0–0.2)
ABSOLUTE EOS #: 0.4 X10E9/L (ref 0–0.4)
ABSOLUTE LYMPH #: 3.3 X10E9/L (ref 1–3.5)
ABSOLUTE MONO #: 0.8 X10E9/L (ref 0–0.9)
ABSOLUTE NEUT #: 5.8 X10E9/L (ref 1.5–6.6)
ALBUMIN SERPL-MCNC: 4.2 G/DL (ref 3.2–5.3)
ALK PHOSPHATASE: 111 U/L (ref 39–130)
ALT SERPL-CCNC: 22 U/L (ref 0–40)
ANION GAP SERPL CALCULATED.3IONS-SCNC: 9 MMOL/L (ref 5–15)
AST SERPL-CCNC: 24 U/L (ref 0–41)
BASOPHILS RELATIVE PERCENT: 0.9 %
BILIRUB SERPL-MCNC: 1.6 MG/DL (ref 0.3–1.2)
BUN BLDV-MCNC: 21 MG/DL (ref 5–27)
CALCIUM SERPL-MCNC: 9.5 MG/DL (ref 8.5–10.5)
CHLORIDE BLD-SCNC: 101 MMOL/L (ref 98–109)
CO2: 29 MMOL/L (ref 22–32)
CREAT SERPL-MCNC: 1.31 MG/DL (ref 0.6–1.3)
EGFR AFRICAN AMERICAN: >60 ML/MIN/1.73SQ.M
EGFR IF NONAFRICAN AMERICAN: 53 ML/MIN/1.73SQ.M
EOSINOPHILS RELATIVE PERCENT: 3.4 %
GLUCOSE: 100 MG/DL (ref 65–99)
HCT VFR BLD CALC: 50.2 % (ref 39–49)
HEMOGLOBIN: 16.6 G/DL (ref 13–17)
LYMPHOCYTE %: 32 %
MCH RBC QN AUTO: 30.4 PG (ref 27–34)
MCHC RBC AUTO-ENTMCNC: 33 G/DL (ref 32–36)
MCV RBC AUTO: 92 FL (ref 80–100)
MONOCYTES # BLD: 7.8 %
NEUTROPHILS RELATIVE PERCENT: 55.9 %
PDW BLD-RTO: 14.4 % (ref 11.5–15)
PLATELETS: 175 X10E9/L (ref 150–450)
PMV BLD AUTO: 9 FL (ref 7–12)
POTASSIUM SERPL-SCNC: 5 MMOL/L (ref 3.5–5)
RBC: 5.46 X10E12/L (ref 4.1–5.7)
SODIUM BLD-SCNC: 139 MMOL/L (ref 134–146)
TOTAL PROTEIN: 6.9 G/DL (ref 6–8)
WBC: 10.4 X10E9/L (ref 4–11)

## 2020-11-18 NOTE — PROGRESS NOTES
Mr. Heriberto Lal is an 80-year-old patient male with a history of bladder cancer. He was first diagnosed with non-invasive high grade papillary urothelial carcinoma in 2014. He underwent TURBT and BCG therapy. Unfortunately, his surveillance cystoscopy in October 2018 showed a large bladder tumor. He underwent a TURBT on November 8, 2018. 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 He completed three cycles of neoadjuvant Gemzar/Cisplatin in 3/20/19. He underwent a Radical Cystoprostatectomy with Pelvic Lymph Node Dissection and creation of Ileal Loop Diversion on 5/1/19. He post-neoadjuvant treatment pathology was pT0, pN0.      He states that he has been feeling relatively well since his last visit. He feels that everything is stable at this time. He is pleased that he has found an ostomy bag that is working relatively well for him. Otherwise, he denies ostomy pain, gross hematuria, fever/chills, flank/pelvic/abdominal pain, or difficulty with bowel movements. He denies night sweats, new onset bone pain, chest pain/pressure, dyspnea, N/V, leg pain, or lightheadedness. He is happy with his general medical state. He presents today for further evaluation.       Past Medical History:   Diagnosis Date    AAA (abdominal aortic aneurysm) ([address]     intravascular stent    BCG ROXANE 5/21/2014    BPH (benign prostatic hypertrophy)     CAD (coronary artery disease)     Carotid artery stenosis     Chronic back pain     Chronic kidney disease     Congenital absence of one kidney     absent right kidney    History of lumbar fusion 3/15    mid lower back down    Hyperlipidemia     Hypertension     Hypothyroidism     Obesity     Sciatica     TIA (transient ischemic attack) 1/2008    Unspecified cerebral artery occlusion with cerebral infarction     TIA       Past Surgical History:   Procedure Laterality Date    ABDOMINAL AORTIC DAILY 90 tablet 3    b complex vitamins capsule Take 1 capsule by mouth daily      Elastic Bandages & Supports (JOBST KNEE HIGH COMPRESSION SM) MISC 1 each by Does not apply route daily as needed 1 each 0    aspirin 81 MG tablet Take 81 mg by mouth daily.  nitroGLYCERIN (NITROSTAT) 0.4 MG SL tablet Place 1 tablet under the tongue every 5 minutes as needed for Chest pain (Patient not taking: Reported on 2020) 25 tablet 1     No current facility-administered medications on file prior to visit.         Allergies   Allergen Reactions    Tape Latrice Blonder Tape]      Surgical tape-rash       Family History   Problem Relation Age of Onset    Diabetes Mother     Cancer Mother     Arthritis Mother     Asthma Father     Heart Disease Father     Cancer Father     Stroke Sister     Heart Disease Sister     Cancer Sister     Stroke Brother     Heart Disease Brother     Cancer Brother        Social History     Socioeconomic History    Marital status:      Spouse name: Not on file    Number of children: Not on file    Years of education: Not on file    Highest education level: Not on file   Occupational History    Not on file   Social Needs    Financial resource strain: Not on file    Food insecurity     Worry: Not on file     Inability: Not on file   [employer] needs     Medical: Not on file     Non-medical: Not on file   Tobacco Use    Smoking status: Former Smoker     Packs/day: 1.00     Years: 24.00     Pack years: 24.00     Types: Cigarettes     Start date:      Last attempt to quit: 1979     Years since quittin.6    Smokeless tobacco: Never Used   Substance and Sexual Activity    Alcohol use: No     Alcohol/week: 0.0 standard drinks    Drug use: No    Sexual activity: Not Currently   Lifestyle    Physical activity     Days per week: Not on file     Minutes per session: Not on file    Stress: Not on file   Relationships    Social connections     Talks on phone: Not on file     Gets together: Not on file     Attends Zoroastrian service: Not on file     Active member of club or organization: Not on file     Attends meetings of clubs or organizations: Not on file     Relationship status: Not on file    Intimate partner violence     Fear of current or ex partner: Not on file     Emotionally abused: Not on file     Physically abused: Not on file     Forced sexual activity: Not on file   Other Topics Concern    Not on file   Social History Narrative    Not on file       Review of Systems  Constitutional: Negative for chills and fever. Eyes: Negative for pain and itching. Respiratory: Negative for chest tightness and shortness of breath.    Cardiovascular: Negative for chest pain and leg swelling. Gastrointestinal: Negative for diarrhea and nausea. Endocrine: Negative for cold intolerance and heat intolerance. Genitourinary: Negative for difficulty urinating and hematuria. Musculoskeletal: Positive for back pain (mild, chronic). Skin: Negative for color change and rash. Allergic/Immunologic: Negative for environmental allergies and food allergies. Neurological: Negative for dizziness and headaches. Hematological: Bruises/bleeds easily.         Exam    Temp 97.1 °F (36.2 °C)   Ht 5' 7\" (1.702 m)   Wt 202 lb 4.8 oz (91.8 kg)   BMI 31.68 kg/m²      Constitutional: Oriented to person, place, and time. Appears well-developed and well-nourished. Cooperative. No distress. HENT:    Head: Normocephalic and atraumatic.    Neck: No obvious neck masses. Heart: RRR. S1/S1 normal.  Pulmonary/Chest: Effort normal. No respiratory distress. No wheezes, rhonchi, or rales. Abdomen: Soft. Non-tender. Non-distended. Active bowel sounds. No CVA tenderness bilaterally. Stoma pinkish-red. Bag with light yellow urine. Ext: No pitting edema or calf tenderness bilaterally. Neurological: Alert and oriented to person, place, and time.    Skin: No obvious jaundice or pallor. Psychiatric: Normal mood and affect. Behavior is normal.   Nursing note reviewed. Labs    Lab Results   Component Value Date    CREATININE 1.31 (H) 11/10/2020    BUN 21 11/10/2020     11/10/2020    K 5.0 11/10/2020     11/10/2020    CO2 29 11/10/2020       Lab Results   Component Value Date    PSA 0.70 07/25/2011    PSA 0.83 10/31/2008     Radiology:     CT Urogram: (11/7/20)    FINDINGS:    The liver and spleen demonstrate smooth contours and are normal in size.         There is a 2.4 cm right adrenal gland nodule which is new when compared to the previous examination.         The pancreas and left adrenal gland are within normal limits. There is a small calculus within the lumen of the gallbladder.         There is a solitary left kidney. There is a 1.5 cm cyst in the kidney. There is no hydronephrosis.         There is no small bowel obstruction.         There are diverticula in the sigmoid colon.         There are postsurgical changes of urinary diversion with an ileal conduit. The urinary bladder is surgically absent.         There is a femoral-femoral bypass graft.         There is an aorto bifemoral bypass graft. The native aneurysm sac measures 7.3 cm. This is similar when compared to the previous examination.         There is no free intraperitoneal air or free fluid in the abdomen or pelvis.         There are multilevel degenerative changes throughout the spine. There are no acute fractures.              Impression         1. Postsurgical changes from previous right nephrectomy are again noted. 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 These findings are similar to the previous exam.    2. Interval development of a 2.4 cm right adrenal gland nodule. 3. 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  This finding is similar to the previous exam.      CT chest (11/7/20)    FINDINGS:                   The heart size is normal. There are coronary artery calcifications. The aorta is of normal caliber. There is no gross abnormality of the pulmonary artery and its proximal branches. There is no mediastinal, axillary or hilar adenopathy. There is no    pericardial or pleural effusion.         There are some emphysematous changes in the lungs. No pulmonary masses or nodules are noted. There is some mild left basilar atelectasis.         There is a new right adrenal nodule measuring 2.7 x 2.0 cm.         There are degenerative changes throughout the thoracic spine. No suspicious osseous lesions are noted.                   Impression         1. New 2.7 cm right adrenal nodule. This can indicate a metastasis. 2. Stable left basilar atelectasis. 3. No pulmonary nodules. Plan:    1. Bladder cancer-  He completed three cycles of neoadjuvant Gemzar/Cisplatin in 3/20/19. He underwent a Radical Cystoprostatectomy with Pelvic Lymph Node Dissection and creation of Ileal Loop Diversion on 5/1/19. His post-neoadjuvant treatment pathology was pT0, pN0. His February 2020, his CT urogram revealed development of small hypodense lesions of the liver. These two lesions measured 6-10 mm in size. His May 2020 revealed resolution of these previously noted liver lesions. CT urogram in November 2020 demonstrated a new 2.7 cm right adrenal nodule of indeterminate etiology. Cannot obtain MRI of the abdomen with and without contrast for further delineation due to bypass graft. Will obtain CT adrenal protocol or PET-CT per radiology recommendations. CT chest did not demonstrate any abnormal pulmonary findings. Urine cytology today. Consider urethral wash cytology. Will call patient with results of adrenal imaging before next round of general imaging scheduled.      Surveillance recommendations per NCCN 2019 guidelines are as follows:     Years 1 and 2: CT urogram every 3-6 months, CXR or CT chest every 3-6 months, CBC and CMP every 3-6 months.  Urine cytologies every 6-12 months. Consider urethral wash cytology every 6-12 months. B12 annually.     Years 3-5: CT abdomen and pelvis annually. CXR annually. BMP, LFT, Vitamin 12 annually. Urine cytology as clinically indicated.      Years 5-10: Renal US annually. B12 annually.     Years 10+: B12 annually     2. Nephrolithiasis (small) vs Vascular Calcifications: Favor vascular calcifications. Continue to monitor. Patient is asymptomatic.

## 2020-11-19 ENCOUNTER — OFFICE VISIT (OUTPATIENT)
Dept: UROLOGY | Age: 80
End: 2020-11-19
Payer: MEDICARE

## 2020-11-19 VITALS — TEMPERATURE: 97.1 F | WEIGHT: 202.3 LBS | HEIGHT: 67 IN | BODY MASS INDEX: 31.75 KG/M2

## 2020-11-19 PROCEDURE — 99213 OFFICE O/P EST LOW 20 MIN: CPT | Performed by: PHYSICIAN ASSISTANT

## 2020-11-19 RX ORDER — AZITHROMYCIN 250 MG/1
1 TABLET, FILM COATED ORAL ONCE
COMMUNITY
Start: 2020-11-17 | End: 2020-12-18 | Stop reason: ALTCHOICE

## 2020-11-20 ENCOUNTER — TELEPHONE (OUTPATIENT)
Dept: UROLOGY | Age: 80
End: 2020-11-20

## 2020-11-20 NOTE — TELEPHONE ENCOUNTER
Patient advised of the CT urogram results. He voiced understanding. Please schedule PET CT. Thank you.

## 2020-11-20 NOTE — TELEPHONE ENCOUNTER
Patient has a 2.4 cm new right adrenal nodule found on CT urogram. Indeterminate. History of bladder cancer status post neoadjuvant therapy and Radical Cystoprostatectomy with Ileal Loop Conduit. History of endograft. Patient reports that he is not allowed to have MRI below the neck. Spoke to Dr. Chelsi Reid in Radiology. CT adrenal protocol will not give any further information. Since he is not a MRI candidate, he recommends PET CT for further delineation. Will schedule as soon as possible.

## 2020-12-03 ENCOUNTER — HOSPITAL ENCOUNTER (OUTPATIENT)
Dept: PET IMAGING | Age: 80
Discharge: HOME | End: 2020-12-03
Payer: MEDICARE

## 2020-12-03 PROCEDURE — 78815 PET IMAGE W/CT SKULL-THIGH: CPT

## 2020-12-03 PROCEDURE — 3430000000 HC RX DIAGNOSTIC RADIOPHARMACEUTICAL: Performed by: PHYSICIAN ASSISTANT

## 2020-12-03 PROCEDURE — A9552 F18 FDG: HCPCS | Performed by: PHYSICIAN ASSISTANT

## 2020-12-03 RX ORDER — FLUDEOXYGLUCOSE F 18 200 MCI/ML
12 INJECTION, SOLUTION INTRAVENOUS
Status: COMPLETED | OUTPATIENT
Start: 2020-12-03 | End: 2020-12-03

## 2020-12-03 RX ADMIN — FLUDEOXYGLUCOSE F 18 12 MILLICURIE: 200 INJECTION, SOLUTION INTRAVENOUS at 10:15

## 2020-12-08 ENCOUNTER — TELEPHONE (OUTPATIENT)
Dept: UROLOGY | Age: 80
End: 2020-12-08

## 2020-12-09 ENCOUNTER — TELEPHONE (OUTPATIENT)
Dept: UROLOGY | Age: 80
End: 2020-12-09

## 2020-12-10 ENCOUNTER — TELEPHONE (OUTPATIENT)
Dept: UROLOGY | Age: 80
End: 2020-12-10

## 2020-12-10 NOTE — TELEPHONE ENCOUNTER
Spoke with Corrie Weaver in IR she is confirming a date and time with the radiologist. She will call me when it is finalized.

## 2020-12-10 NOTE — TELEPHONE ENCOUNTER
Patient scheduled for biopsy on 12/18/20. Arrival of 7:30 am to [facility] 2nd floor Outpatient Nursing. Stay off blood thinners 5 days prior,have a  and NPO 4 hours prior. Left message for the patient with the details and to call if he has any questions.

## 2020-12-18 ENCOUNTER — HOSPITAL ENCOUNTER (OUTPATIENT)
Dept: CT IMAGING | Age: 80
Discharge: HOME | End: 2020-12-18
Payer: MEDICARE

## 2020-12-18 VITALS
TEMPERATURE: 98.2 F | WEIGHT: 191 LBS | BODY MASS INDEX: 29.98 KG/M2 | DIASTOLIC BLOOD PRESSURE: 83 MMHG | OXYGEN SATURATION: 100 % | SYSTOLIC BLOOD PRESSURE: 154 MMHG | HEIGHT: 67 IN | HEART RATE: 78 BPM | RESPIRATION RATE: 18 BRPM

## 2020-12-18 LAB
CREAT SERPL-MCNC: 1.1 MG/DL (ref 0.4–1.2)
ERYTHROCYTE [DISTWIDTH] IN BLOOD BY AUTOMATED COUNT: 13.5 % (ref 11.5–14.5)
ERYTHROCYTE [DISTWIDTH] IN BLOOD BY AUTOMATED COUNT: 46 FL (ref 35–45)
GFR SERPL CREATININE-BSD FRML MDRD: 64 ML/MIN/1.73M2
HCT VFR BLD CALC: 51.2 % (ref 42–52)
HEMOGLOBIN: 17.1 GM/DL (ref 14–18)
MCH RBC QN AUTO: 30.8 PG (ref 26–33)
MCHC RBC AUTO-ENTMCNC: 33.4 GM/DL (ref 32.2–35.5)
MCV RBC AUTO: 92.3 FL (ref 80–94)
PLATELET # BLD: 167 THOU/MM3 (ref 130–400)
PMV BLD AUTO: 9.8 FL (ref 9.4–12.4)
RBC # BLD: 5.55 MILL/MM3 (ref 4.7–6.1)
WBC # BLD: 9.4 THOU/MM3 (ref 4.8–10.8)

## 2020-12-18 PROCEDURE — 2580000003 HC RX 258: Performed by: RADIOLOGY

## 2020-12-18 PROCEDURE — 6360000002 HC RX W HCPCS

## 2020-12-18 PROCEDURE — 6370000000 HC RX 637 (ALT 250 FOR IP)

## 2020-12-18 PROCEDURE — 77012 CT SCAN FOR NEEDLE BIOPSY: CPT

## 2020-12-18 PROCEDURE — 36415 COLL VENOUS BLD VENIPUNCTURE: CPT

## 2020-12-18 PROCEDURE — 85027 COMPLETE CBC AUTOMATED: CPT

## 2020-12-18 PROCEDURE — 82565 ASSAY OF CREATININE: CPT

## 2020-12-18 RX ORDER — SODIUM CHLORIDE 450 MG/100ML
INJECTION, SOLUTION INTRAVENOUS CONTINUOUS
Status: DISCONTINUED | OUTPATIENT
Start: 2020-12-18 | End: 2020-12-19 | Stop reason: HOSPADM

## 2020-12-18 RX ORDER — FENTANYL CITRATE 50 UG/ML
50 INJECTION, SOLUTION INTRAMUSCULAR; INTRAVENOUS ONCE
Status: DISCONTINUED | OUTPATIENT
Start: 2020-12-18 | End: 2020-12-19 | Stop reason: HOSPADM

## 2020-12-18 RX ORDER — MIDAZOLAM HYDROCHLORIDE 2 MG/2ML
1 INJECTION, SOLUTION INTRAMUSCULAR; INTRAVENOUS ONCE
Status: DISCONTINUED | OUTPATIENT
Start: 2020-12-18 | End: 2020-12-19 | Stop reason: HOSPADM

## 2020-12-18 RX ORDER — POTASSIUM CHLORIDE 750 MG/1
TABLET, EXTENDED RELEASE ORAL
Qty: 90 TABLET | Refills: 3 | Status: SHIPPED | OUTPATIENT
Start: 2020-12-18 | End: 2022-01-05

## 2020-12-18 RX ADMIN — SODIUM CHLORIDE: 4.5 INJECTION, SOLUTION INTRAVENOUS at 08:08

## 2020-12-18 ASSESSMENT — PAIN - FUNCTIONAL ASSESSMENT: PAIN_FUNCTIONAL_ASSESSMENT: 0-10

## 2020-12-18 NOTE — PROGRESS NOTES
_m___ Safety:       (Environmental)   [place] to environment   Ensure ID band is correct and in place/ allergy band as needed   Assess for fall risk   Initiate fall precautions as applicable (fall band, side rails, etc.)   Call light within reach   Bed in low position/ wheels locked    _m___ Pain:        Assess pain level and characteristics   Administer analgesics as ordered   Assess effectiveness of pain management and report to MD as needed    __m__ Knowledge Deficit:   Assess baseline knowledge   Provide teaching at level of understanding   Provide teaching via preferred learning method   Evaluate teaching effectiveness  m  ____ Hemodynamic/Respiratory Status:       (Pre and Post Procedure Monitoring)   Assess/Monitor vital signs and LOC   Assess Baseline SpO2 prior to any sedation   Obtain weight/height   Assess vital signs/ LOC until patient meets discharge criteria   Monitor procedure site and notify MD of any issues

## 2020-12-21 ENCOUNTER — TELEPHONE (OUTPATIENT)
Dept: UROLOGY | Age: 80
End: 2020-12-21

## 2020-12-21 NOTE — TELEPHONE ENCOUNTER
hCuyita Giraldo MD at [facility] has agreed to review . Pablo Valdovinos (November 2020) and PET CT scan (December 4683) to ascertain whether a CT-guided needle biopsy may be performed on his new right adrenal mass. Please contact [facility]'s Radiology department to see whether these films can be pushed or placed on disc for his review. Dr. Zoe Lester has discussed this case with Dr. Sheri Lopez and he is awaiting these films for review.

## 2020-12-22 NOTE — TELEPHONE ENCOUNTER
Called [facility], Anahi Mejia, and advised to push CT Urogram, and Pet CT to [facility]. Called Gio at [facility], they had not received them. Called Afua back and gave her Gio's number. Woodrow called back and [facility] and were advising Dr Bonnie Bauman.

## 2020-12-23 ENCOUNTER — TELEPHONE (OUTPATIENT)
Dept: UROLOGY | Age: 80
End: 2020-12-23

## 2020-12-23 NOTE — TELEPHONE ENCOUNTER
Patient wife on HIPPA advised of the message. She voiced understanding. Appointment cancelled and will wait on your phone call to discuss.

## 2020-12-23 NOTE — TELEPHONE ENCOUNTER
Patient wife would like to know if they are to keep the appointment on 12/28/2020 since they could not do the biopsy or what is the next step. Please advise. Thank you.

## 2020-12-23 NOTE — TELEPHONE ENCOUNTER
Please let Mrs. Doug Lagunas know that we have sent his images to an Interventional Radiologist at [facility] for a second opinion. He is reviewing these films to let us know if he feels that he can safely biopsy Mr. Erich Davis right adrenal mass. Dr. Roshni Quarles and I have been awaiting his opinion but still have not heard back. If they would like to cancel this appointment, that is acceptable. I can call them when I hear back from Dr. Gilford Heady in [facility] and we can discuss next steps over the phone if they feel comfortable doing that.

## 2021-01-04 ENCOUNTER — TELEPHONE (OUTPATIENT)
Dept: UROLOGY | Age: 81
End: 2021-01-04

## 2021-01-05 NOTE — TELEPHONE ENCOUNTER
Will you please contact our Radiology department and make sure Mr. Essie Valdes CT urogram and PET CT have pushed through to [facility]. We need to make sure Dr. Bel Marie from [facility] has access to these films for immediate review.

## 2021-01-05 NOTE — TELEPHONE ENCOUNTER
Patient would like to know if you found out anything regarding the second opinion. Please advise. Thank you.

## 2021-01-06 ENCOUNTER — TELEPHONE (OUTPATIENT)
Dept: UROLOGY | Age: 81
End: 2021-01-06

## 2021-01-06 ENCOUNTER — OFFICE VISIT (OUTPATIENT)
Dept: INTERNAL MEDICINE CLINIC | Age: 81
End: 2021-01-06
Payer: MEDICARE

## 2021-01-06 VITALS
TEMPERATURE: 97.2 F | DIASTOLIC BLOOD PRESSURE: 70 MMHG | HEIGHT: 67 IN | SYSTOLIC BLOOD PRESSURE: 126 MMHG | BODY MASS INDEX: 32.18 KG/M2 | WEIGHT: 205 LBS

## 2021-01-06 DIAGNOSIS — T50.905S ADVERSE EFFECT OF DRUG, SEQUELA: ICD-10-CM

## 2021-01-06 DIAGNOSIS — I25.10 CORONARY ARTERY DISEASE INVOLVING NATIVE CORONARY ARTERY OF NATIVE HEART WITHOUT ANGINA PECTORIS: Primary | Chronic | ICD-10-CM

## 2021-01-06 DIAGNOSIS — E03.4 HYPOTHYROIDISM DUE TO ACQUIRED ATROPHY OF THYROID: Chronic | ICD-10-CM

## 2021-01-06 DIAGNOSIS — I10 ESSENTIAL HYPERTENSION: ICD-10-CM

## 2021-01-06 DIAGNOSIS — N18.30 STAGE 3 CHRONIC KIDNEY DISEASE, UNSPECIFIED WHETHER STAGE 3A OR 3B CKD (HCC): ICD-10-CM

## 2021-01-06 PROBLEM — Z90.6 H/O TOTAL CYSTECTOMY: Status: ACTIVE | Noted: 2021-01-06

## 2021-01-06 PROCEDURE — 99213 OFFICE O/P EST LOW 20 MIN: CPT | Performed by: INTERNAL MEDICINE

## 2021-01-06 PROCEDURE — 93000 ELECTROCARDIOGRAM COMPLETE: CPT | Performed by: INTERNAL MEDICINE

## 2021-01-06 RX ORDER — FUROSEMIDE 20 MG/1
20 TABLET ORAL DAILY
Qty: 90 TABLET | Refills: 3 | Status: SHIPPED | OUTPATIENT
Start: 2021-01-06 | End: 2021-01-19

## 2021-01-06 RX ORDER — DIPHENHYDRAMINE HCL 25 MG
25 TABLET ORAL DAILY
COMMUNITY
End: 2021-01-19

## 2021-01-06 ASSESSMENT — PATIENT HEALTH QUESTIONNAIRE - PHQ9
SUM OF ALL RESPONSES TO PHQ QUESTIONS 1-9: 1
SUM OF ALL RESPONSES TO PHQ QUESTIONS 1-9: 1
2. FEELING DOWN, DEPRESSED OR HOPELESS: 1

## 2021-01-06 NOTE — TELEPHONE ENCOUNTER
Patient has a history of bladder cancer. He was first diagnosed with non-invasive high grade papillary urothelial carcinoma in 2014. He underwent TURBT and BCG therapy. Unfortunately, his surveillance cystoscopy in October 2018 showed a large bladder tumor. He underwent a TURBT on November 8, 2018. 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 He completed three cycles of neoadjuvant Gemzar/Cisplatin in 3/20/19. He underwent a Radical Cystoprostatectomy with Pelvic Lymph Node Dissection and creation of Ileal Loop Diversion on 5/1/19. He post-neoadjuvant treatment pathology was pT0, pN0. Spoke to Mrs. Horacio Miles this morning Dr. Jair Siu at [facility] has consented to perform a CT-guided biopsy of his new right adrenal mass.  and Mrs. Horacio Miles will discuss this option and call our office with their decision. We discussed that his PET scan indicates that there is a strong likelihood that this is a recurrence of his bladder cancer. We discussed adrenal mass biopsy at this point versus right adrenalectomy. Mrs. Horacio Miles understands that a right adrenalectomy may be technically difficult due to his extensive surgery in May 2019. She has concerns with surgery at this time due to his age and Covid-25. She does have concerns with driving to [place] during the winter season and is hoping they will be flexible with scheduling. Tissue biopsy is needed before systemic therapy could be a consideration, if this is indeed a recurrence.     She will contact our office later this week and let us know if they would like to proceed with the biopsy at [facility].

## 2021-01-06 NOTE — PROGRESS NOTES
YOB: 1940    Chief Complaint   Patient presents with    Coronary Artery Disease     6 MONTH FOLLOW UP    Other     PVD     NEW SXS: saw urologist recently; had PET suspicious for adrenal met. To talk with urologist.     Denies chest pain. Has remote stent    Notes 10 yr hx pain legs/feet( lack of feeling in feet); attributes it to multiple AAA repairs. Had cystectomy; has ileal loop    Denies significant sob    Has hearing loss, attributes it to industrial exposure    Patient Active Problem List   Diagnosis    Coronary artery disease involving native heart    Hyperlipidemia    Essential hypertension    Hypothyroidism    Claudication (HCC)    CKD (chronic kidney disease) stage 3, GFR 30-59 ml/min    BCG ROXANE    Bladder cancer (HCC)    Subcutaneous cyst    Pseudoaneurysm of right femoral artery ([address]    Spinal stenosis of lumbar region    Macular degeneration    Abnormal CT of the chest    Pain in penis and bladder    Peripheral polyneuropathy    History of malignant neoplasm of bladder    [facility]rost[facility] ([facility])    Peristomal skin complication    Lymphedema    Hearing loss of both ears due to cerumen impaction    Vertigo    Tinnitus aurium, right    H/O total cystectomy       Current Outpatient Medications   Medication Sig Dispense Refill    diphenhydrAMINE (BENADRYL) 25 MG tablet Take 25 mg by mouth daily      furosemide (LASIX) 20 MG tablet Take 1 tablet by mouth daily 90 tablet 3    KLOR-CON M10 10 MEQ extended release tablet TAKE 1 TABLET BY MOUTH EVERY DAY 90 tablet 3    DULoxetine (CYMBALTA) 30 MG extended release capsule Take 1 capsule by mouth daily 30 capsule 3    pregabalin (LYRICA) 50 MG capsule Take 1 capsule by mouth 2 times daily for 181 days.  180 capsule 1    furosemide (LASIX) 40 MG tablet TAKE 1 TABLET BY MOUTH EVERY DAY (Patient taking differently: Take 20 mg by mouth daily ) 90 tablet 1    atorvastatin (LIPITOR) 40 MG tablet TAKE 1 TABLET BY MOUTH EVERY DAY AT BEDTIME 90 tablet 3    metoprolol tartrate (LOPRESSOR) 50 MG tablet Take 1 tablet by mouth 2 times daily New dose. 180 tablet 3    levothyroxine (SYNTHROID) 137 MCG tablet TAKE 1 TABLET BY MOUTH DAILY 90 tablet 3    nitroGLYCERIN (NITROSTAT) 0.4 MG SL tablet Place 1 tablet under the tongue every 5 minutes as needed for Chest pain 25 tablet 1    b complex vitamins capsule Take 1 capsule by mouth daily      aspirin 81 MG tablet Take 81 mg by mouth daily.  Elastic Bandages & Supports (JOBST KNEE HIGH COMPRESSION SM) MISC 1 each by Does not apply route daily as needed 1 each 0     No current facility-administered medications for this visit. Allergies   Allergen Reactions    Tape [Adhesive Tape]      Surgical tape-rash       I have reviewed the patient's medications, as well as, their past medical, social, and family history as appropriate. Review of Systems - General ROS: negative  Psychological ROS: negative  Hematological and Lymphatic ROS: No history of blood clots or bleeding disorder. Respiratory ROS: no cough, shortness of breath, or wheezing  Cardiovascular ROS: no chest pain or dyspnea on exertion  Gastrointestinal ROS: no abdominal pain, change in bowel habits, or black or bloody stools  Genito-Urinary ROS: See above    Musculoskeletal ROS: positive for - general arthrits  Neurological ROS: See above    Dermatological ROS: negative    Blood pressure 126/70, temperature 97.2 °F (36.2 °C), height 5' 7.01\" (1.702 m), weight 205 lb (93 kg).     Physical Examination: General appearance - alert, well appearing, and in no distress and overweight  Mental status - alert, oriented to person, place, and time  Neck - supple, no significant adenopathy, no JVD, or carotid bruits  Chest - clear to auscultation, no wheezes, rales or rhonchi, symmetric air entry  Heart - normal rate, regular rhythm, normal S1, S2, no murmurs, rubs, clicks or gallops  Abdomen - soft, nontender, nondistended, no masses or organomegaly  Ileostomy in place  Neurological - alert, oriented, normal speech, no focal findings or movement disorder noted  Musculoskeletal - no muscular tenderness noted  Extremities - no pedal edema noted, has shoes and socks on  Skin - normal coloration and turgor, no rashes, no suspicious skin lesions noted          Diagnosis Orders   1. Coronary artery disease involving native coronary artery of native heart without angina pectoris  EKG 12 Lead    LDL Cholesterol, Direct   2. Stage 3 chronic kidney disease, unspecified whether stage 3a or 3b CKD     3. Essential hypertension  EKG 12 Lead   4. Hypothyroidism due to acquired atrophy of thyroid  TSH   5. Adverse effect of drug, sequela  LDL Cholesterol, Direct    TSH       Orders Placed This Encounter   Procedures    LDL Cholesterol, Direct     Standing Status:   Future     Standing Expiration Date:   1/6/2022    TSH     Standing Status:   Future     Standing Expiration Date:   1/6/2022    EKG 12 Lead     Order Specific Question:   Reason for Exam?     Answer: Other        IMP/PLAN:    1. Cad- stable; reviewed ekg  2. S/p cystectomy for bladder ca- possible met- Urology to discuss  3. Possible peripheral neuropathy        Will schedule follow up appointment in 6m. Continue medications at current doses. Signed By:  Sasha Valadez M.D.

## 2021-01-07 DIAGNOSIS — C67.8 MALIGNANT NEOPLASM OF OVERLAPPING SITES OF BLADDER (HCC): ICD-10-CM

## 2021-01-07 DIAGNOSIS — E27.8 RIGHT ADRENAL MASS (HCC): Primary | ICD-10-CM

## 2021-01-07 NOTE — TELEPHONE ENCOUNTER
Yes, the CT guided right adrenal mass biopsy should be scheduled with Dr. Marion Yoon at [facility]. Please schedule early afternoon appointment if possible due to drive time.

## 2021-01-11 NOTE — TELEPHONE ENCOUNTER
Order for CT guided biopsy of the adrenal mass and records faxed to [facility] IR department ([phone]). Interventional radiology will call the patient to schedule. Patient's wife notified and advised if they do not hear anything by the end of the week to call our office back. She voiced understanding.

## 2021-01-15 ENCOUNTER — TELEPHONE (OUTPATIENT)
Dept: INTERNAL MEDICINE CLINIC | Age: 81
End: 2021-01-15

## 2021-01-15 LAB
LDL CHOLESTEROL DIRECT: 100 MG/DL
TSH SERPL DL<=0.05 MIU/L-ACNC: 1.22 UIU/ML (ref 0.49–4.67)

## 2021-01-19 ENCOUNTER — OFFICE VISIT (OUTPATIENT)
Dept: FAMILY MEDICINE CLINIC | Age: 81
End: 2021-01-19
Payer: MEDICARE

## 2021-01-19 VITALS
HEART RATE: 64 BPM | WEIGHT: 201 LBS | TEMPERATURE: 98.2 F | SYSTOLIC BLOOD PRESSURE: 116 MMHG | RESPIRATION RATE: 20 BRPM | DIASTOLIC BLOOD PRESSURE: 66 MMHG | BODY MASS INDEX: 31.47 KG/M2

## 2021-01-19 DIAGNOSIS — F32.1 CURRENT MODERATE EPISODE OF MAJOR DEPRESSIVE DISORDER, UNSPECIFIED WHETHER RECURRENT (HCC): ICD-10-CM

## 2021-01-19 PROCEDURE — 99213 OFFICE O/P EST LOW 20 MIN: CPT | Performed by: FAMILY MEDICINE

## 2021-01-19 RX ORDER — DULOXETIN HYDROCHLORIDE 30 MG/1
30 CAPSULE, DELAYED RELEASE ORAL DAILY
Qty: 90 CAPSULE | Refills: 3 | Status: SHIPPED | OUTPATIENT
Start: 2021-01-19 | End: 2022-01-24 | Stop reason: SDUPTHER

## 2021-01-19 ASSESSMENT — ENCOUNTER SYMPTOMS
RHINORRHEA: 0
CONSTIPATION: 0
SHORTNESS OF BREATH: 0
NAUSEA: 0
COUGH: 0
ABDOMINAL PAIN: 0
SINUS PRESSURE: 0
DIARRHEA: 0
EYE PAIN: 0
SORE THROAT: 0
ABDOMINAL DISTENTION: 0

## 2021-01-20 NOTE — PROGRESS NOTES
[address] [place] [facility] [address]  Dept: [phone]  Dept Fax: [phone]  Loc: [phone]  PROGRESS NOTE      VisitDate: 1/19/2021    Jaime Carcamo is a [de-identified] y.o. male who presents today for:     Chief Complaint   Patient presents with    3 Month Follow-Up     Insomnia, Depression. Wolbachkim Madrigal did Millnennium-and D/c lexarpo and started xanax. Cymbalta is in epic, but not on pt's med list. Will check with pharmacy. Subjective:  HPI  Follow-up depression. Reviewed Millennium testing with the patient. He is doing well with Cymbalta. He has tapered off of Lexapro. Denies any negative side effects. We discussed his PET scan that showed possible activity over his and adrenal gland and he is scheduled for an upcoming biopsy    Review of Systems   Constitutional: Negative for appetite change and fever. HENT: Negative for congestion, ear pain, postnasal drip, rhinorrhea, sinus pressure and sore throat. Eyes: Negative for pain and visual disturbance. Respiratory: Negative for cough and shortness of breath. Cardiovascular: Negative for chest pain. Gastrointestinal: Negative for abdominal distention, abdominal pain, constipation, diarrhea and nausea. Genitourinary: Negative for dysuria, frequency and urgency. Musculoskeletal: Negative for arthralgias. Skin: Negative for rash. Neurological: Negative for dizziness.      Past Medical History:   Diagnosis Date    AAA (abdominal aortic aneurysm) ([address]     intravascular stent    BCG ROXANE 5/21/2014    BPH (benign prostatic hypertrophy)     CAD (coronary artery disease)     Carotid artery stenosis     Chronic back pain     Chronic kidney disease     Congenital absence of one kidney     absent right kidney    History of lumbar fusion 3/15    mid lower back down    Hyperlipidemia     Hypertension     Hypothyroidism     Obesity     Sciatica     TIA (transient extended release capsule Take 1 capsule by mouth daily 90 capsule 3    KLOR-CON M10 10 MEQ extended release tablet TAKE 1 TABLET BY MOUTH EVERY DAY 90 tablet 3    pregabalin (LYRICA) 50 MG capsule Take 1 capsule by mouth 2 times daily for 181 days. 180 capsule 1    furosemide (LASIX) 40 MG tablet TAKE 1 TABLET BY MOUTH EVERY DAY (Patient taking differently: Take 20 mg by mouth daily ) 90 tablet 1    atorvastatin (LIPITOR) 40 MG tablet TAKE 1 TABLET BY MOUTH EVERY DAY AT BEDTIME 90 tablet 3    metoprolol tartrate (LOPRESSOR) 50 MG tablet Take 1 tablet by mouth 2 times daily New dose. 180 tablet 3    levothyroxine (SYNTHROID) 137 MCG tablet TAKE 1 TABLET BY MOUTH DAILY 90 tablet 3    nitroGLYCERIN (NITROSTAT) 0.4 MG SL tablet Place 1 tablet under the tongue every 5 minutes as needed for Chest pain 25 tablet 1    b complex vitamins capsule Take 1 capsule by mouth daily      Elastic Bandages & Supports (JOBST KNEE HIGH COMPRESSION SM) MISC 1 each by Does not apply route daily as needed 1 each 0    aspirin 81 MG tablet Take 81 mg by mouth daily.  furosemide (LASIX) 20 MG tablet Take 1 tablet by mouth daily 90 tablet 3     No current facility-administered medications for this visit.       Allergies   Allergen Reactions    Tape Miri Sandra Tape]      Surgical tape-rash     Health Maintenance   Topic Date Due    COVID-19 Vaccine (1 of 2) 01/21/1956    DTaP/Tdap/Td vaccine (1 - Tdap) 01/21/1959    Shingles Vaccine (1 of 2) 01/21/1990    Annual Wellness Visit (AWV)  05/29/2019    Potassium monitoring  11/10/2021    Creatinine monitoring  12/18/2021    Lipid screen  01/14/2022    TSH testing  01/14/2022    Flu vaccine  Completed    Pneumococcal 65+ years Vaccine  Completed    Hepatitis A vaccine  Aged Out    Hepatitis B vaccine  Aged Out    Hib vaccine  Aged Out    Meningococcal (ACWY) vaccine  Aged Out         Objective:     Physical Exam  Constitutional:       General: He is not in acute distress. Appearance: He is well-developed. He is not diaphoretic. HENT:      Head: Normocephalic and atraumatic. Right Ear: External ear normal.      Left Ear: External ear normal.   Eyes:      Conjunctiva/sclera: Conjunctivae normal.   Neck:      Vascular: No JVD. Cardiovascular:      Rate and Rhythm: Normal rate and regular rhythm. Heart sounds: Normal heart sounds. Pulmonary:      Effort: Pulmonary effort is normal.      Breath sounds: Normal breath sounds. No wheezing or rales. Musculoskeletal:         General: No tenderness. Skin:     General: Skin is warm and dry. Coloration: Skin is not pale. Neurological:      Mental Status: He is alert and oriented to person, place, and time. /66   Pulse 64   Temp 98.2 °F (36.8 °C) (Oral)   Resp 20   Wt 201 lb (91.2 kg)   BMI 31.47 kg/m²       Impression/Plan:  1. Current moderate episode of major depressive disorder, unspecified whether recurrent (HCC)      Requested Prescriptions     Signed Prescriptions Disp Refills    DULoxetine (CYMBALTA) 30 MG extended release capsule 90 capsule 3     Sig: Take 1 capsule by mouth daily     No orders of the defined types were placed in this encounter. Patient giveneducational materials - see patient instructions. Discussed use, benefit, and side effects of prescribed medications. All patient questions answered. Pt voiced understanding. Reviewed health maintenance. Patient agreedwith treatment plan. Follow up as directed. **This report has been created using voice recognition software. It may contain minor errorswhich are inherent in voice recognition technology. **       Electronically signed by Jenny Deal MD on 1/19/2021 at 9:35 PM

## 2021-02-05 ENCOUNTER — TELEPHONE (OUTPATIENT)
Dept: UROLOGY | Age: 81
End: 2021-02-05

## 2021-02-05 NOTE — TELEPHONE ENCOUNTER
Will you please call [facility] to see if they can fax us Dr. Armida Dalton procedural notes and biopsy results when available. Thank you.

## 2021-02-05 NOTE — TELEPHONE ENCOUNTER
Patient had a biopsy completed in [facility] on 02/01/2021. They were asking for results. I advise her the results were not back yet.

## 2021-02-08 NOTE — TELEPHONE ENCOUNTER
Received Path report from [facility], scanned in [address], Routed to [facility]

## 2021-02-10 ENCOUNTER — TELEPHONE (OUTPATIENT)
Dept: UROLOGY | Age: 81
End: 2021-02-10

## 2021-02-10 NOTE — TELEPHONE ENCOUNTER
Dee left a message stating she missed your call but would be available tonight if you can call them again.

## 2021-02-11 DIAGNOSIS — C79.9 METASTATIC ADENOCARCINOMA (HCC): Primary | ICD-10-CM

## 2021-02-11 NOTE — TELEPHONE ENCOUNTER
Spoke to Tamia Gandhi to inform her of her 's pathology obtained from his right adrenal mass biopsy by Dr. Karli Delaney at [facility] on 2/1/21. 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 I spoke with Dr. Krys Padilla regarding his histochemical stains. He did not feel that this is metastatic disease from his bladder. Also, he did not feel that it was of prostate or adrenal origin. Based upon his positive TTF-1, he felt it could be a neuroendocrine tumor originating in the lung. Mr. Glover Mealing CT chest in November 2020 and PET CT in December 2020 are negative for any lung findings. Dr. Herlinda Townsend has been made aware of these findings as well. Medical Oncology consultation with Dr. Matthew Winston is warranted at this time. Mr. Acacia Valdivia has seen Dr. Matthew Winston in the past for three cycles of neoadjuvant chemotherapy prior to his Cystoprostatectomy in May 2019 for muscle-invasive bladder cancer. He would like consultation with her again. Will arrange as soon as possible. Dr. Krys Padilla does recommend Cancer TYPE ID analysis for further delineation. Will hold on ordering at this time until Mr. Acacia Valdivia sees Dr. Matthew Winston. Patient does have a history of skin cancer. He follows with Dr. Josh Paredes. Will try to obtain his pathology reports from Dr. Josh Paredes to see what type of skin cancer he has. Mrs. Acacia Valdivia believes all excisions have been basal cell carcinoma.

## 2021-02-12 NOTE — TELEPHONE ENCOUNTER
Received Office Notes and Pathology Reports from Dr Diana Aguilar, scanned in [place], routed to [facility]

## 2021-02-16 ENCOUNTER — HOSPITAL ENCOUNTER (OUTPATIENT)
Dept: INFUSION THERAPY | Age: 81
Discharge: HOME | End: 2021-02-16
Payer: MEDICARE

## 2021-02-16 ENCOUNTER — OFFICE VISIT (OUTPATIENT)
Dept: ONCOLOGY | Age: 81
End: 2021-02-16
Payer: MEDICARE

## 2021-02-16 VITALS
BODY MASS INDEX: 32.15 KG/M2 | HEART RATE: 63 BPM | SYSTOLIC BLOOD PRESSURE: 153 MMHG | RESPIRATION RATE: 18 BRPM | HEIGHT: 67 IN | TEMPERATURE: 97.7 F | DIASTOLIC BLOOD PRESSURE: 72 MMHG | OXYGEN SATURATION: 95 % | WEIGHT: 204.8 LBS

## 2021-02-16 DIAGNOSIS — Z85.51 HISTORY OF MALIGNANT NEOPLASM OF BLADDER: ICD-10-CM

## 2021-02-16 DIAGNOSIS — C79.71 MALIGNANT NEOPLASM METASTATIC TO RIGHT ADRENAL GLAND (HCC): Primary | ICD-10-CM

## 2021-02-16 PROCEDURE — 99213 OFFICE O/P EST LOW 20 MIN: CPT | Performed by: INTERNAL MEDICINE

## 2021-02-16 PROCEDURE — 99211 OFF/OP EST MAY X REQ PHY/QHP: CPT

## 2021-02-16 ASSESSMENT — ENCOUNTER SYMPTOMS
CONSTIPATION: 0
EYE DISCHARGE: 0
SHORTNESS OF BREATH: 0
CHEST TIGHTNESS: 0
SORE THROAT: 0
NAUSEA: 0
TROUBLE SWALLOWING: 0
WHEEZING: 0
ABDOMINAL DISTENTION: 0
COLOR CHANGE: 0
ABDOMINAL PAIN: 0
VOMITING: 0
RECTAL PAIN: 0
COUGH: 0
DIARRHEA: 0
BLOOD IN STOOL: 0
BACK PAIN: 0
FACIAL SWELLING: 0

## 2021-02-16 NOTE — PROGRESS NOTES
[facility], [address] [facility] [address]  [address] [address]  Dept: [phone]  Dept Fax: [phone]: [phone]    Visit Date:2/16/2021     Tonja Godinez is a 80 y.o. male who presents today for:   Chief Complaint   Patient presents with    Follow-up     BLADDER CANCER        HPI:   This is an 59-year-old patient with Hx of noninvasive high grade papillary urothelial carcinoma diagnosed in 2014. He underwent TURBT and BCG therapy. He was doing fine, regular surveillance cystoscopies were negative till most recent one in October 2018 showed a large bladder tumor. The patient underwent TURBT with transurethral resection On November 8, 2018. Final pathology showed:      High-grade invasive papillary urothelial carcinoma involving the      muscularis propria.   Flat carcinoma in situ. He has a hx of cad with RCA stent in 2003. He had a negative stress test in 2016. He currently expresses no sxs of angina. He can climb a flight of stairs without chest pain. .        He has a hx of other vascular disease with prior fem/fem bypass and AAA. He had peripheral vascular studies one year ago showing adequate flow. CTA of the abdomen and pelvis on December 5, 2018 showed:  1. Stable endovascularly repaired fusiform aneurysm of the infrarenal abdominal aorta without evidence of endoleak. 2. Occluded left common and external iliac arteries with opacification of the left common femoral artery from a patent bifemoral bypass graft. 3. Irregular thickening of the anterior right urinary bladder wall which may correspond to known malignancy. 4. Prior right nephrectomy. Patient started neoadjuvant chemotherapy with dose dense dose dense Gemzar and cisplatin on January 9, 2019 and completed on 03/20/2019. He had bladder surgery on May 1, 2029.   Final pathology report showed:  : Left distal ureter, biopsy:   Cross-section of ureter with no significant pathologic findings.   Negative for dysplasia and invasive malignancy. B: Prostate and bladder, radical cystoprostatectomy:   Bladder:    Stromal changes indicative of previous therapy effects.    One lymph node with no evidence of malignancy.  (0/1)    Overlying urothelial dysplasia.    Edematous mucosa with mixed inflammation and reactive changes.    Negative for residual invasive malignancy.   Prostate:    Nodular prostatic hyperplasia with acute and chronic inflammation.    Necrotizing granulomatous inflammation.    Negative for prostatic malignancy. C: Right and left pelvic lymph nodes, resection:   Fourteen lymph node with no evidence of malignancy.  (0/14)     (y)pT0, pN0    Interim history on February 16, 2021: I saw the patient last time in May 2019. He has been following with urology. In November 2020 he had CT urogram that in addition to postsurgical changes from previous right nephrectomy and urinary diversion showed interval development of a 2.4 cm right adrenal mass. PET scan on December 3, 2020 did confirm a 2.4 cm right adrenal nodule with a maximum SUV of 12.3. On February 1, 2021 the patient underwent CT-guided biopsy. 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 Neoplastic cells are positive for AE1/AE3, CAM 5.2, TTF-1 and synaptophysin. These cells are negative for CK 7, CK 20, GATA3, P 40, Melan-A, inhibin, chromogranin, Napsin A and PSA. The patient was told that he is not the best surgical candidate due to his age and comorbidities.   Therefore he is referred to the medical oncology clinic for consideration of nonsurgical treatment   HPI   Past Medical History:   Diagnosis Date    AAA (abdominal aortic aneurysm) ([address]     intravascular stent    BCG ROXANE 05/21/2019    BPH (benign prostatic hypertrophy)     CAD (coronary artery disease)     Cancer of skin     Carotid artery stenosis  Chronic back pain     Chronic kidney disease     Congenital absence of one kidney     absent right kidney    History of lumbar fusion 3/15    mid lower back down    Hyperlipidemia     Hypertension     Hypothyroidism     Obesity     Sciatica     TIA (transient ischemic attack) 1/2008    Unspecified cerebral artery occlusion with cerebral infarction     TIA      Past Surgical History:   Procedure Laterality Date    ABDOMINAL AORTIC ANEURYSM REPAIR  3/2010    Shireen    ABDOMINAL AORTIC ANEURYSM REPAIR  7/24/15    Santi Shah    [facility]    CORONARY ANGIOPLASTY WITH STENT PLACEMENT  2003    2 stents    FEMORAL-FEMORAL BYPASS GRAFT  03/09/2017    Dr. Mathis Bence    arm    KNEE ARTHROSCOPY  12/2008    meniscus tear    LUMBAR LAMINECTOMY  3/2016    [facility]--Dr. Delilah Patel    OTHER SURGICAL HISTORY  4/14/15    melanoma removal from right chest    OTHER SURGICAL HISTORY  5/20/2016    I and D, exicison of posterior neck cyst    ME OFFICE/OUTPT VISIT,PROCEDURE ONLY N/A 11/8/2018    TURBT performed by Celso Lux MD at [address] [facility] 5/1/2019    ROBOTIC RADICAL CYSTOPROSTATECTOMY performed by Celso Lux MD at [address]  10/2016    Dr Lennox Bohr   [address] [place] [address]  11/2020    on wrist     TESTICLE REMOVAL  1990's    TUNNELED VENOUS PORT PLACEMENT      TURP  04/16/2014    See note of Dr. Umer Powers for OR procedure details      Family History   Problem Relation Age of Onset    Diabetes Mother     Cancer Mother         Liver    Arthritis Mother     Asthma Father     Heart Disease Father     Cancer Father         Lung, Bone    Stroke Sister     Heart Disease Sister     Cancer Sister     Stroke Brother     Heart Disease Brother     Cancer Brother         Lung      Social History     Tobacco Use    Smoking status: Former Smoker     Packs/day: 1.00     Years: 24.00     Pack years: 24.00 Types: Cigarettes     Start date: 5     Quit date: 1979     Years since quittin.9    Smokeless tobacco: Never Used   Substance Use Topics    Alcohol use: No     Alcohol/week: 0.0 standard drinks      Current Outpatient Medications   Medication Sig Dispense Refill    DULoxetine (CYMBALTA) 30 MG extended release capsule Take 1 capsule by mouth daily 90 capsule 3    KLOR-CON M10 10 MEQ extended release tablet TAKE 1 TABLET BY MOUTH EVERY DAY 90 tablet 3    pregabalin (LYRICA) 50 MG capsule Take 1 capsule by mouth 2 times daily for 181 days. 180 capsule 1    furosemide (LASIX) 40 MG tablet TAKE 1 TABLET BY MOUTH EVERY DAY (Patient taking differently: Take 20 mg by mouth daily ) 90 tablet 1    atorvastatin (LIPITOR) 40 MG tablet TAKE 1 TABLET BY MOUTH EVERY DAY AT BEDTIME 90 tablet 3    metoprolol tartrate (LOPRESSOR) 50 MG tablet Take 1 tablet by mouth 2 times daily New dose. 180 tablet 3    nitroGLYCERIN (NITROSTAT) 0.4 MG SL tablet Place 1 tablet under the tongue every 5 minutes as needed for Chest pain 25 tablet 1    b complex vitamins capsule Take 1 capsule by mouth daily      Elastic Bandages & Supports (JOBST KNEE HIGH COMPRESSION SM) MISC 1 each by Does not apply route daily as needed 1 each 0    aspirin 81 MG tablet Take 81 mg by mouth daily.  levothyroxine (SYNTHROID) 137 MCG tablet TAKE 1 TABLET BY MOUTH EVERY DAY 90 tablet 3    ALPRAZolam (XANAX) 0.5 MG tablet Take 0.5 mg by mouth nightly as needed for Sleep. No current facility-administered medications for this visit.        Allergies   Allergen Reactions    Tape Trenia [place]elfair Tape]      Surgical tape-rash      Health Maintenance   Topic Date Due    DTaP/Tdap/Td vaccine (1 - Tdap) Never done    Shingles Vaccine (1 of 2) Never done   [employer] Visit (AWV)  Never done    Potassium monitoring  11/10/2021    Creatinine monitoring  2021    Lipid screen  2022    TSH testing  2022    Flu vaccine Completed    Pneumococcal 65+ yrs at Risk Vaccine  Completed    COVID-19 Vaccine  Completed    Hepatitis A vaccine  Aged Out    Hepatitis B vaccine  Aged Out    Hib vaccine  Aged Out    Meningococcal (ACWY) vaccine  Aged Out        Subjective:   Review of Systems   Constitutional: Positive for unexpected weight change (Recent weight loss). Negative for activity change, appetite change, fatigue and fever. HENT: Positive for hearing loss. Negative for congestion, dental problem, facial swelling, mouth sores, nosebleeds, sore throat, tinnitus and trouble swallowing. Eyes: Positive for visual disturbance. Negative for discharge. Respiratory: Negative for cough, chest tightness, shortness of breath and wheezing. Cardiovascular: Negative for chest pain, palpitations and leg swelling. Gastrointestinal: Negative for abdominal distention, abdominal pain, blood in stool, constipation, diarrhea, nausea, rectal pain and vomiting. Endocrine: Negative for cold intolerance, polydipsia and polyuria. Genitourinary: Positive for difficulty urinating and frequency. Negative for decreased urine volume, dysuria, flank pain, hematuria and urgency. Musculoskeletal: Positive for arthralgias, joint swelling and myalgias. Negative for back pain, gait problem and neck stiffness. Skin: Negative for color change, rash and wound. Neurological: Negative for dizziness, tremors, seizures, speech difficulty, weakness, light-headedness, numbness and headaches. Hematological: Negative for adenopathy. Does not bruise/bleed easily. Psychiatric/Behavioral: Negative for confusion and sleep disturbance. The patient is nervous/anxious. Objective:   Physical Exam  Vitals signs reviewed. Constitutional:       General: He is not in acute distress. Appearance: He is well-developed. HENT:      Head: Normocephalic. Mouth/Throat:      Pharynx: No oropharyngeal exudate. Eyes:      General: No scleral icterus. Right eye: No discharge. Left eye: No discharge. Pupils: Pupils are equal, round, and reactive to light. Neck:      Musculoskeletal: Normal range of motion and neck supple. Thyroid: No thyromegaly. Vascular: No JVD. Trachea: No tracheal deviation. Cardiovascular:      Rate and Rhythm: Normal rate. Heart sounds: Normal heart sounds. No murmur. No friction rub. No gallop. Pulmonary:      Effort: Pulmonary effort is normal. No respiratory distress. Breath sounds: Normal breath sounds. No stridor. No wheezing or rales. Chest:      Chest wall: No tenderness. Abdominal:      General: Bowel sounds are normal. There is no distension. Palpations: Abdomen is soft. There is no mass. Tenderness: There is no abdominal tenderness. There is no rebound. Musculoskeletal: Normal range of motion. Comments: Good range of motion in all four extremities. Lymphadenopathy:      Cervical: No cervical adenopathy. Skin:     General: Skin is warm. Findings: No erythema or rash. Neurological:      Mental Status: He is alert and oriented to person, place, and time. Cranial Nerves: No cranial nerve deficit. Motor: No abnormal muscle tone. Deep Tendon Reflexes: Reflexes are normal and symmetric. Psychiatric:         Behavior: Behavior normal.         Thought Content: Thought content normal.         Judgment: Judgment normal.        BP (!) 153/72 (Site: Left Upper Arm, Position: Sitting, Cuff Size: Large Adult)   Pulse 63   Temp 97.7 °F (36.5 °C) (Oral)   Resp 18   Ht 5' 7.01\" (1.702 m)   Wt 204 lb 12.8 oz (92.9 kg)   SpO2 95%   BMI 32.07 kg/m²      ECOG status is 1.     Imaging studies and labs:       Lab Results   Component Value Date    WBC 9.4 12/18/2020    HGB 17.1 12/18/2020    HCT 51.2 12/18/2020    MCV 92.3 12/18/2020     12/18/2020       Chemistry        Component Value Date/Time     11/10/2020 0821    K 5.0 11/10/2020 8716 K 3.3 (L) 05/04/2019 0405     11/10/2020 0821    CO2 29 11/10/2020 0821    BUN 21 11/10/2020 0821    CREATININE 1.1 12/18/2020 0805        Component Value Date/Time    CALCIUM 9.5 11/10/2020 0821    ALKPHOS 111 11/10/2020 0821    ALKPHOS 68 05/04/2019 0405    AST 24 11/10/2020 0821    ALT 22 11/10/2020 0821    BILITOT 1.6 (H) 11/10/2020 0821    BILITOT NEGATIVE 10/26/2018 0935          CT urogram on November 7, 2020 showed:  1. New 2.7 cm right adrenal nodule. This can indicate a metastasis. 2. Stable left basilar atelectasis. 3. No pulmonary nodules. PET scan on December 3, 2020 showed:     FDG avid right adrenal nodule highly suspicious for metastatic malignancy. Assessment/Plan: 1. History of muscle invasive bladder cancer. Treated with neoadjuvant chemotherapy. Cycle #1 given on January 9, 2019. Due to profound fatigue and worsening peripheral neuropathy cycle #1 day 15 of Gemzar/ Cisplatin given with  dose reduction of cisplatin to 35 mg/m² . He completed 3 of Gemzar and cisplatin on 03/20/2019. On May 1, 2019 he underwent radical cystoprostatectomy showed complete pathologic response to neoadjuvant chemotherapy. Has been under surveillance by urology. 2.  Isolated right adrenal mass. Needle core biopsy showed poorly differentiated carcinoma with neuroendocrine features. PET scan in December 2020 was negative for evidence ofdisease anywhere else, so it might be primary location and not metastatic disease. I had a lengthy discussion with the patient about poorly differentiated neuroendocrine malignancy. If possible it should be removed surgically. If surgery is not feasible then he can consider radiosurgery. DLE he should be treated with adjuvant chemotherapy cisplatin and etoposide. However,  the patient had a very difficult time with Gemzar and cisplatin. The patient should meet with urologist Dr. Wendy Dillard to discuss pros and cons of surgical treatment.  He might also benefit from second opinion at the [facility]. In addition, his PET scan was almost 3 months ago I will recommend repeating PET scan since neuroendocrine tumor tends to be very aggressive and progressing quickly              Diagnosis Orders   1. 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 History of malignant neoplasm of bladder          Plan:   No follow-ups on file. Orders Placed:   No orders of the defined types were placed in this encounter. Medications Prescribed:   No orders of the defined types were placed in this encounter.

## 2021-02-18 ENCOUNTER — OFFICE VISIT (OUTPATIENT)
Dept: UROLOGY | Age: 81
End: 2021-02-18
Payer: MEDICARE

## 2021-02-18 VITALS — HEIGHT: 67 IN | BODY MASS INDEX: 32.33 KG/M2 | WEIGHT: 206 LBS | TEMPERATURE: 96.8 F

## 2021-02-18 DIAGNOSIS — C79.9 METASTATIC ADENOCARCINOMA (HCC): Primary | ICD-10-CM

## 2021-02-18 DIAGNOSIS — E27.8 RIGHT ADRENAL MASS (HCC): ICD-10-CM

## 2021-02-18 DIAGNOSIS — C67.9 MALIGNANT NEOPLASM OF URINARY BLADDER, UNSPECIFIED SITE (HCC): Primary | ICD-10-CM

## 2021-02-18 PROCEDURE — 99214 OFFICE O/P EST MOD 30 MIN: CPT | Performed by: UROLOGY

## 2021-02-18 RX ORDER — ALPRAZOLAM 0.5 MG/1
0.5 TABLET ORAL NIGHTLY PRN
COMMUNITY
End: 2021-07-21

## 2021-02-18 NOTE — PROGRESS NOTES
Osman Garcia MD   Urology Clinic Progress Note      Patient:  Tina Richards  YOB: 1940  Date: 2/18/2021    HISTORY OF PRESENT ILLNESS:   The patient is a 80 y.o. male who presents today for follow-up for the following problem(s):      1. Metastatic adenocarcinoma ([address]    2. Right adrenal mass (HCC)           Overall the problem(s) : are worsening. Associated Symptoms: No dysuria, gross hematuria. Pain Severity:      Summary of old records:   Mr. Mckenzie Marie is an 80-year-old patient male with a history of bladder cancer. He was first diagnosed with non-invasive high grade papillary urothelial carcinoma in 2014. He underwent TURBT and BCG therapy. Unfortunately, his surveillance cystoscopy in October 2018 showed a large bladder tumor. He underwent a TURBT on November 8, 2018. 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 He completed three cycles of neoadjuvant Gemzar/Cisplatin in 3/20/19. He underwent a Radical Cystoprostatectomy with Pelvic Lymph Node Dissection and creation of Ileal Loop Diversion on 5/1/19. He post-neoadjuvant treatment pathology was pT0, pN0.     Additional History: Onset few months  Severity is described as moderate to severe. The course of symptoms over time is insidious. Alleviating factors: none  Worsening factors: none  Lower urinary tract symptoms: none, has conduit    PET scan showed right adrenal mass  Had IR biopsy [facility]1/[facility], The pathology demonstrated metastatic poorly differentiated carcinoma with neuroendocrine features  Based upon his positive TTF-1, he felt it could be a neuroendocrine tumor originating in the lung. Mr. Jimenez Lab CT chest in November 2020 and PET CT in December 2020 are negative for any lung findings.   Saw Ike Salguero who felt surgery>XRT>chemo  He is here today to discuss  Hx of cardiac stents, AAA repair (endovascular)  There is a solitary left kidney. There is a 1.5 cm cyst in the kidney. There is no hydronephrosis. Walks with cane  Last several PSA's:  Lab Results   Component Value Date    PSA 0.70 07/25/2011    PSA 0.83 10/31/2008       Last total testosterone:  No results found for: TESTOSTERONE    Urinalysis today:  No results found for this visit on 02/18/21.     Last BUN and creatinine:  Lab Results   Component Value Date    BUN 21 11/10/2020     Lab Results   Component Value Date    CREATININE 1.1 12/18/2020       Imaging Reviewed during this Office Visit:   (results were independently reviewed by physician and radiology report verified)    PAST MEDICAL, FAMILY AND SOCIAL HISTORY UPDATE:  Past Medical History:   Diagnosis Date    AAA (abdominal aortic aneurysm) ([facility] [address]     intravascular stent    BCG ROXANE 5/21/2014    BPH (benign prostatic hypertrophy)     CAD (coronary artery disease)     Carotid artery stenosis     Chronic back pain     Chronic kidney disease     Congenital absence of one kidney     absent right kidney    History of lumbar fusion 3/15    mid lower back down    Hyperlipidemia     Hypertension     Hypothyroidism     Obesity     Sciatica     TIA (transient ischemic attack) 1/2008    Unspecified cerebral artery occlusion with cerebral infarction     TIA     Past Surgical History:   Procedure Laterality Date    ABDOMINAL AORTIC ANEURYSM REPAIR  3/2010    Shireen    ABDOMINAL AORTIC ANEURYSM REPAIR  7/24/15    Loran Saver    [facility]    CORONARY ANGIOPLASTY WITH STENT PLACEMENT  2003    2 stents    FEMORAL-FEMORAL BYPASS GRAFT  03/09/2017    Dr. Ne Wright    arm    KNEE ARTHROSCOPY  12/2008    meniscus tear    LUMBAR LAMINECTOMY  3/2016    [facility]--Dr. Irene Boyle    OTHER SURGICAL HISTORY  4/14/15    melanoma removal from right chest    OTHER SURGICAL HISTORY  5/20/2016    I and D, exicison of posterior neck cyst    GA OFFICE/OUTPT VISIT,PROCEDURE ONLY N/A 11/8/2018    TURBT performed by Vincent Maldonado MD at [address] [place] [address] N/A 5/1/2019    ROBOTIC RADICAL CYSTOPROSTATECTOMY performed by Vincent Maldonado MD at [address]  10/2016    Dr Berg [address]  11/2020    on wrist     TESTICLE REMOVAL  1990's    TUNNELED VENOUS PORT PLACEMENT      TURP  04/16/2014    See note of Dr. Phoenix Forrest for OR procedure details     Family History   Problem Relation Age of Onset    Diabetes Mother     Cancer Mother     Arthritis Mother     Asthma Father     Heart Disease Father     Cancer Father     Stroke Sister     Heart Disease Sister     Cancer Sister     Stroke Brother     Heart Disease Brother     Cancer Brother      Outpatient Medications Marked as Taking for the 2/18/21 encounter (Office Visit) with Marcello Apple MD   Medication Sig Dispense Refill    ALPRAZolam (XANAX) 0.5 MG tablet Take 0.5 mg by mouth nightly as needed for Sleep.  DULoxetine (CYMBALTA) 30 MG extended release capsule Take 1 capsule by mouth daily 90 capsule 3    KLOR-CON M10 10 MEQ extended release tablet TAKE 1 TABLET BY MOUTH EVERY DAY 90 tablet 3    pregabalin (LYRICA) 50 MG capsule Take 1 capsule by mouth 2 times daily for 181 days. 180 capsule 1    furosemide (LASIX) 40 MG tablet TAKE 1 TABLET BY MOUTH EVERY DAY (Patient taking differently: Take 20 mg by mouth daily ) 90 tablet 1    atorvastatin (LIPITOR) 40 MG tablet TAKE 1 TABLET BY MOUTH EVERY DAY AT BEDTIME 90 tablet 3    metoprolol tartrate (LOPRESSOR) 50 MG tablet Take 1 tablet by mouth 2 times daily New dose.  180 tablet 3    levothyroxine (SYNTHROID) 137 MCG tablet TAKE 1 TABLET BY MOUTH DAILY 90 tablet 3    nitroGLYCERIN (NITROSTAT) 0.4 MG SL tablet Place 1 tablet under the tongue every 5 minutes as needed for Chest pain 25 tablet 1    b complex vitamins capsule Take 1 capsule by mouth daily      Elastic Bandages & Supports (JOBST KNEE HIGH COMPRESSION ) MISC 1 each by Does not apply route daily as needed 1 each 0    aspirin 81 MG tablet Take 81 mg by mouth daily. Tape [place] tape]  Social History     Tobacco Use   Smoking Status Former Smoker    Packs/day: 1.00    Years: 24.00    Pack years: 24.00    Types: Cigarettes    Start date:     Quit date: 1979    Years since quittin.9   Smokeless Tobacco Never Used       Social History     Substance and Sexual Activity   Alcohol Use No    Alcohol/week: 0.0 standard drinks       REVIEW OF SYSTEMS:  Constitutional: negative  Eyes: negative  Respiratory: negative  Cardiovascular: negative  Gastrointestinal: negative  Musculoskeletal: negative  Genitourinary: negative except for what is in HPI  Skin: negative   Neurological: negative  Hematological/Lymphatic: negative  Psychological: negative    Physical Exam:      Vitals:    21 1358   Temp: 96.8 °F (36 °C)     Patient is a 80 y.o. male in no acute distress and alert and oriented to person, place and time. NAD, A/o  Non labored respiration  Normal peripheral pulses  Skin- warm and dry  Psych- normal mood and affect      Assessment and Plan      1. Metastatic adenocarcinoma ([facility] [address]    2. Right adrenal mass [facility])           Plan:      No follow-ups on file. Risks: I discussed all the risks, benefits, alternatives and possible complications or surgery as well as expectations and post-op recovery.   Discussed with him that his surgery would be complex, possible open surgery, lysis of adhesions  Discussed alternative of radiation or even ablative therapy  He will think about it  In meantime, will see radiation Oncology  Will set up telephone call to finalize decision           Nabila Reynolds M.D  [facility] Urology

## 2021-02-23 ENCOUNTER — TELEPHONE (OUTPATIENT)
Dept: UROLOGY | Age: 81
End: 2021-02-23

## 2021-02-24 RX ORDER — LEVOTHYROXINE SODIUM 137 UG/1
TABLET ORAL
Qty: 90 TABLET | Refills: 3 | Status: SHIPPED | OUTPATIENT
Start: 2021-02-24 | End: 2022-01-24 | Stop reason: SDUPTHER

## 2021-02-24 NOTE — TELEPHONE ENCOUNTER
This is Sukhwinder Pereira from [facility] Urology. calling in regards to Irasema benson [birth year] you seen on 2-18. he called back to let us know when he is scheduled to see Radiation. when do you want to see back. here is your plan:      Plan:   No follow-ups on file. Risks: I discussed all the risks, benefits, alternatives and possible complications or surgery as well as expectations and post-op recovery. Discussed with him that his surgery would be complex, possible open surgery, lysis of adhesions  Discussed alternative of radiation or even ablative therapy  He will think about it  In meantime, will see radiation Oncology  Will set up telephone call to finalize decision Read 2/19/2021 1:05       2-3 weeks fu      Called patient scheduled for 3-18 at 11:30. Patient voiced understanding.

## 2021-02-25 ENCOUNTER — TELEPHONE (OUTPATIENT)
Dept: ENT CLINIC | Age: 81
End: 2021-02-25

## 2021-03-02 ENCOUNTER — HOSPITAL ENCOUNTER (OUTPATIENT)
Dept: RADIATION ONCOLOGY | Age: 81
Discharge: HOME | End: 2021-03-02
Payer: MEDICARE

## 2021-03-02 ENCOUNTER — CLINICAL DOCUMENTATION (OUTPATIENT)
Dept: CASE MANAGEMENT | Age: 81
End: 2021-03-02

## 2021-03-02 VITALS
WEIGHT: 203 LBS | BODY MASS INDEX: 31.86 KG/M2 | TEMPERATURE: 98.1 F | HEART RATE: 70 BPM | DIASTOLIC BLOOD PRESSURE: 71 MMHG | OXYGEN SATURATION: 93 % | RESPIRATION RATE: 18 BRPM | SYSTOLIC BLOOD PRESSURE: 155 MMHG | HEIGHT: 67 IN

## 2021-03-02 DIAGNOSIS — C80.1 MALIGNANT NEOPLASM WITHOUT SPECIFICATION OF SITE (HCC): ICD-10-CM

## 2021-03-02 DIAGNOSIS — C79.71 MALIGNANT NEOPLASM METASTATIC TO RIGHT ADRENAL GLAND (HCC): ICD-10-CM

## 2021-03-02 PROBLEM — C79.70 SECONDARY MALIGNANT NEOPLASM OF ADRENAL GLAND (HCC): Status: ACTIVE | Noted: 2021-03-02

## 2021-03-02 PROCEDURE — 99205 OFFICE O/P NEW HI 60 MIN: CPT | Performed by: RADIOLOGY

## 2021-03-02 PROCEDURE — 99202 OFFICE O/P NEW SF 15 MIN: CPT | Performed by: RADIOLOGY

## 2021-03-02 NOTE — PROGRESS NOTES
Gary Valdivia  3/2/2021    Chaperone: No    Advance Directives     Power of  Living Will ACP-Advance Directive ACP-Power of     Not on File Filed on 04/15/14 Filed Not on File      Patient states he does have a healthcare POA, wife Jocelyn Yanez.      Temp Readings from Last 2 Encounters:   03/02/21 98.1 °F (36.7 °C) (Infrared)   02/18/21 96.8 °F (36 °C)     BP Readings from Last 2 Encounters:   03/02/21 (!) 155/71   02/16/21 (!) 153/72     Pulse Readings from Last 2 Encounters:   03/02/21 70   02/16/21 63        Wt Readings from Last 3 Encounters:   03/02/21 203 lb (92.1 kg)   02/18/21 206 lb (93.4 kg)   02/16/21 204 lb 12.8 oz (92.9 kg)        Lab Results   Component Value Date    CREATININE 1.1 12/18/2020     Lab Results   Component Value Date    BUN 21 11/10/2020     Mediport: no    Pacemaker/ICD: no    Previous XRT: no    Past Medical History:   Diagnosis Date    AAA (abdominal aortic aneurysm) (HCC)     intravascular stent    BCG ROXANE 05/21/2019    BPH (benign prostatic hypertrophy)     CAD (coronary artery disease)     Cancer of skin     Carotid artery stenosis     Chronic back pain     Chronic kidney disease     Congenital absence of one kidney     absent right kidney    History of lumbar fusion 3/15    mid lower back down    Hyperlipidemia     Hypertension     Hypothyroidism     Obesity     Sciatica     TIA (transient ischemic attack) 1/2008    Unspecified cerebral artery occlusion with cerebral infarction     TIA     Past Surgical History:   Procedure Laterality Date    ABDOMINAL AORTIC ANEURYSM REPAIR  3/2010    Shireen    ABDOMINAL AORTIC ANEURYSM REPAIR  7/24/15    Enrico Russ    COLONOSCOPY  12/21/11    MOE    CORONARY ANGIOPLASTY WITH STENT PLACEMENT  2003    2 stents    FEMORAL-FEMORAL BYPASS GRAFT  03/09/2017    Dr. Qureshi Fairly    arm    KNEE ARTHROSCOPY  12/2008    meniscus tear    LUMBAR LAMINECTOMY  3/2016    [facility]--Dr. Marko Ramos    OTHER SURGICAL HISTORY  4/14/15    melanoma removal from right chest    OTHER SURGICAL HISTORY  5/20/2016    I and D, exicison of posterior neck cyst    [place] OFFICE/OUTPT VISIT,PROCEDURE ONLY N/A 11/8/2018    TURBT performed by Kayla Lopez MD at [facility] 5/1/2019    ROBOTIC RADICAL CYSTOPROSTATECTOMY performed by Kayla Lopez MD at [address]  10/2016    Dr Ophelia Reynolds   [address] [place] [address]  11/2020    on wrist     TESTICLE REMOVAL  1990's    TUNNELED VENOUS PORT PLACEMENT      TURP  04/16/2014    See note of Dr. Genesis Davis for OR procedure details       Allergies   Allergen Reactions    Tape Darrold Bloodgood Tape]      Surgical tape-rash        Current Outpatient Medications:     levothyroxine (SYNTHROID) 137 MCG tablet, TAKE 1 TABLET BY MOUTH EVERY DAY, Disp: 90 tablet, Rfl: 3    ALPRAZolam (XANAX) 0.5 MG tablet, Take 0.5 mg by mouth nightly as needed for Sleep., Disp: , Rfl:     DULoxetine (CYMBALTA) 30 MG extended release capsule, Take 1 capsule by mouth daily, Disp: 90 capsule, Rfl: 3    KLOR-CON M10 10 MEQ extended release tablet, TAKE 1 TABLET BY MOUTH EVERY DAY, Disp: 90 tablet, Rfl: 3    pregabalin (LYRICA) 50 MG capsule, Take 1 capsule by mouth 2 times daily for 181 days. , Disp: 180 capsule, Rfl: 1    furosemide (LASIX) 40 MG tablet, TAKE 1 TABLET BY MOUTH EVERY DAY (Patient taking differently: Take 20 mg by mouth daily ), Disp: 90 tablet, Rfl: 1    atorvastatin (LIPITOR) 40 MG tablet, TAKE 1 TABLET BY MOUTH EVERY DAY AT BEDTIME, Disp: 90 tablet, Rfl: 3    metoprolol tartrate (LOPRESSOR) 50 MG tablet, Take 1 tablet by mouth 2 times daily New dose., Disp: 180 tablet, Rfl: 3    nitroGLYCERIN (NITROSTAT) 0.4 MG SL tablet, Place 1 tablet under the tongue every 5 minutes as needed for Chest pain, Disp: 25 tablet, Rfl: 1    b complex vitamins capsule, Take 1 capsule by mouth daily, Disp: , Rfl:     Elastic Bandages & Supports (JOBST KNEE HIGH COMPRESSION SM) MISC, 1 each by Does not apply route daily as needed, Disp: 1 each, Rfl: 0    aspirin 81 MG tablet, Take 81 mg by mouth daily. , Disp: , Rfl:       ADDITIONAL COMMENTS: Patient here for consult with Dr. Samanta Casiano.        Annette Woodruff BSN, RN

## 2021-03-02 NOTE — PROGRESS NOTES
toxicity. Porsha Diaz received a copy of the clinical practice guideline pertaining to his situation. He also received a copy of his pathology report from the needle core biopsy, and received a copy of a representative article on the use of stereotactic ablative therapy for adrenal lesions. My recommendation is in favor of stereotactic ablative radiotherapy to the right adrenal mass followed by close surveillance and initiation of chemotherapy if/when the disease manifests its self elsewhere. Various aspects of radiation therapy were discussed. We reviewed the nature/mode of action of external beam radiation therapy using stereotactic ablative technique, multiple steps involved in simulation/set up, planning, initiation and performance of the treatment and logistics of treatment including expected number of treatments and amount of time usually spent in the department for each treatment. We reviewed potential short and long-term side effects and risks of the treatment. Expected toxicity is very low, especially given that the patient lacks a right kidney. Porsha Diaz and his wife had the opportunity to ask questions, and indicated that their questions were satisfactorily addressed. Porsha Diaz also indicated that he understood the discussion, and wishes to proceed with the recommended treatment. 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   Porsha Diaz was first diagnosed with noninvasive bladder cancer in 2014, for which he underwent TURBT followed by BCG. He was meticulously compliant with his surveillance, and in 2018 surveillance showed a large bladder tumor. TURBT revealed muscle invasive high-grade papillary urothelial carcinoma. Dixon Galicia underwent neoadjuvant chemotherapy followed by cystoprostatectomy, and final pathology showed a complete pathologic response to the chemotherapy. Surveillance imaging in November 2020 showed a new mass involving the right adrenal gland. PET scan in early December 2020 showed increased metabolic activity associated with the adrenal mass but no other areas of suspicious increased metabolism. Dixon Galicia underwent needle core biopsy of the right adrenal mass 2/1/2021. 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 The histology is suggestive of lung primary site of origin, though as noted this is an occult malignancy with a single demonstrable site of disease involving the right adrenal gland. Dixon Galicia was seen in urology regarding possible resection of the adrenal mass. Because of his medical comorbidities, site of the metastasis and previous abdominal surgery (with likely presence of adhesions), attempted resection would be associated with increased risk. 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         Past Medical History:   Diagnosis Date    AAA (abdominal aortic aneurysm) ([facility] [address]     intravascular stent    BCG ROXANE 05/21/2019    BPH (benign prostatic hypertrophy)     CAD (coronary artery disease)     Cancer of skin     Carotid artery stenosis     Chronic back pain     Chronic kidney disease     Congenital absence of one kidney     absent right kidney    History of lumbar fusion 3/15    mid lower back down    Hyperlipidemia     Hypertension     Hypothyroidism     Obesity     Sciatica     TIA (transient ischemic attack) 1/2008    Unspecified cerebral artery occlusion with cerebral infarction     TIA        Past Surgical History:   Procedure Laterality Date    ABDOMINAL AORTIC ANEURYSM REPAIR  3/2010    Shireen    ABDOMINAL AORTIC ANEURYSM REPAIR  7/24/15    [facility]    [facility]    CORONARY ANGIOPLASTY WITH STENT PLACEMENT  2003    2 stents    FEMORAL-FEMORAL BYPASS GRAFT  03/09/2017    Dr. Abiola River    arm    KNEE ARTHROSCOPY  12/2008    meniscus tear    LUMBAR LAMINECTOMY  3/2016    [facility]--Dr. Felder N Jose Avcelena OTHER SURGICAL HISTORY  4/14/15    melanoma removal from right chest    OTHER SURGICAL HISTORY  5/20/2016    I and D, exicison of posterior neck cyst    [place] OFFICE/OUTPT VISIT,PROCEDURE ONLY N/A 11/8/2018    TURBT performed by Darcie Priest MD at [address] [place] [address] N/A 5/1/2019    ROBOTIC RADICAL CYSTOPROSTATECTOMY performed by Darcie Priest MD at [address]  10/2016    Dr Bigg Menchaca   [address] [place] [address]  11/2020    on wrist     TESTICLE REMOVAL  1990's    TUNNELED VENOUS PORT PLACEMENT      TURP  04/16/2014    See note of Dr. Kassie Pressley for OR procedure details       Family History   Problem Relation Age of Onset    Diabetes Mother     Cancer Mother         Liver    Arthritis Mother     Asthma Father     Heart Disease Father     Cancer Father         Lung, Bone    Stroke Sister     Heart Disease Sister     Cancer Sister     Stroke Brother     Heart Disease Brother     Cancer Brother         Lung       Social History     Socioeconomic History    Marital status:      Spouse name: Isaura Garza Number of children: 2    Years of education: Not on file    Highest education level: Not on file   Occupational History    Not on file   Social Needs    Financial resource strain: Not on file   [place] insecurity     Worry: Not on file     Inability: Not on file    Transportation needs     Medical: Not on file     Non-medical: Not on file   Tobacco Use    Smoking status: Former Smoker     Packs/day: 1.00     Years: 24.00     Pack years: 24.00     Types: Cigarettes     Start date: 5     Quit date: 1979     Years since quittin.9    Smokeless tobacco: Never Used   Substance and Sexual Activity    Alcohol use: No     Alcohol/week: 0.0 standard drinks    Drug use: No    Sexual activity: Not Currently   Lifestyle    Physical activity     Days per week: Not on file     Minutes per session: Not on file    Stress: Not on file   Relationships    Social connections     Talks on phone: Not on file     Gets together: Not on file     Attends Taoist service: Not on file     Active member of club or organization: Not on file     Attends meetings of clubs or organizations: Not on file     Relationship status: Not on file    Intimate partner violence     Fear of current or ex partner: Not on file     Emotionally abused: Not on file     Physically abused: Not on file     Forced sexual activity: Not on file   Other Topics Concern    Not on file   Social History Narrative    Not on file     Exposure to Industrial/ environmental Carcinogens: no      ALLERGIES:   Allergies   Allergen Reactions    Tape Ileene Spark Tape]      Surgical tape-rash          Current Outpatient Medications   Medication Sig Dispense Refill    levothyroxine (SYNTHROID) 137 MCG tablet TAKE 1 TABLET BY MOUTH EVERY DAY 90 tablet 3    ALPRAZolam (XANAX) 0.5 MG tablet Take 0.5 mg by mouth nightly as needed for Sleep.  DULoxetine (CYMBALTA) 30 MG extended release capsule Take 1 capsule by mouth daily 90 capsule 3    KLOR-CON M10 10 MEQ extended release tablet TAKE 1 TABLET BY MOUTH EVERY DAY 90 tablet 3    pregabalin (LYRICA) 50 MG capsule Take 1 capsule by mouth 2 times daily for 181 days.  180 capsule 1    furosemide (LASIX) 40 MG tablet TAKE 1 TABLET BY MOUTH EVERY DAY (Patient taking differently: Take 20 mg by mouth daily ) 90 tablet 1    atorvastatin (LIPITOR) 40 MG tablet TAKE 1 TABLET BY MOUTH EVERY DAY AT BEDTIME 90 tablet 3    metoprolol tartrate (LOPRESSOR) 50 MG tablet Take 1 tablet by mouth 2 times daily New dose. 180 tablet 3    nitroGLYCERIN (NITROSTAT) 0.4 MG SL tablet Place 1 tablet under the tongue every 5 minutes as needed for Chest pain 25 tablet 1    b complex vitamins capsule Take 1 capsule by mouth daily      Elastic Bandages & Supports (JOBST KNEE HIGH COMPRESSION SM) MISC 1 each by Does not apply route daily as needed 1 each 0    aspirin 81 MG tablet Take 81 mg by mouth daily. No current facility-administered medications for this encounter.       No outpatient medications have been marked as taking for the 3/2/21 encounter [facility] Encounter) with Priyanka Atkinson MD.          LABORATORY STUDIES:    CBC:   Lab Results   Component Value Date    WBC 9.4 12/18/2020    RBC 5.55 12/18/2020    RBC 5.46 11/10/2020    HGB 17.1 12/18/2020    HCT 51.2 12/18/2020    MCV 92.3 12/18/2020    MCH 30.8 12/18/2020    MCHC 33.4 12/18/2020    RDW 14.4 11/10/2020     12/18/2020    MPV 9.8 33/44/3912     MetabolicPanel:  Lab Results   Component Value Date     11/10/2020    K 5.0 11/10/2020    K 3.3 05/04/2019     11/10/2020    CO2 29 11/10/2020    BUN 21 11/10/2020    CREATININE 1.1 12/18/2020    LABGLOM 64 12/18/2020    GLUCOSE 100 11/10/2020    PROT 6.9 11/10/2020    LABALBU 4.2 11/10/2020    LABALBU 4.1 05/19/2012    CALCIUM 9.5 11/10/2020    BILITOT 1.6 11/10/2020    BILITOT NEGATIVE 10/26/2018    ALKPHOS 111 11/10/2020    ALKPHOS 68 05/04/2019    AST 24 11/10/2020    ALT 22 11/10/2020         PATHOLOGY:  [Note: Pathology reports from previous bladder cancer are available in the electronic medical record.]  2/1/2021: Surgical pathology:  Final pathologic diagnosis:  Right adrenal mass, needle core biopsies:  Metastatic poorly differentiated carcinoma with neuroendocrine features. Note: Immunohistochemical stains are performed with satisfactory controls. Neoplastic cells are positive for AE1/AE3, CAM 5.2, TTF-1 and synaptophysin. The cells are negative for CK7, CK20, GATA3, p40, melanin A, inhibin, chromogranin, Napsin-A and PSA. The pattern of immunohistochemical staining is supportive of the above impression. RADIOLOGIC STUDIES:   11/7/2020: CT chest with contrast:  Impression   1. New 2.7 cm right adrenal nodule. This can indicate a metastasis. 2. Stable left basilar atelectasis. 3. No pulmonary nodules. 11/7/2020: CT urogram:  Impression   1. Postsurgical changes from previous right nephrectomy are again noted. 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 These findings are similar to the previous exam.   2. Interval development of a 2.4 cm right adrenal gland nodule. 3. 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 This finding is similar to the previous exam.     12/3/2020: PET/CT skull base to mid thigh:  Impression   FDG avid right adrenal nodule highly suspicious for metastatic malignancy. REVIEW OF SYSTEMS:    Constitutional: Denies fever, chills, unintentional weight change. HEENT: Denies new hearing or vision change. Denies dysphagia or odynophagia. Respiratory: Denies worsening dyspnea. Denies hemoptysis, coughing, wheezing or sputum production. Cardiovascular: Abdominal aortic aneurysm; carotid artery stenosis; coronary artery disease denies chest pain, palpitations, syncope. GI: Denies nausea or vomiting, hematemesis or rectal bleeding. Denies change in bowel habits. : Congenital absence of right kidney. Denies hematuria or dysuria. Musculoskeletal: DJD  Extremities: Maintains extremity ROM  Metabolic/endocrine: Hypothyroidism  Neurological: History of stroke.  Denies seizures, fainting or tremors. Integument: Denies rashes or ulcerations. PHYSICAL EXAMINATION:   VITAL SIGNS: BP (!) 155/71   Pulse 70   Temp 98.1 °F (36.7 °C) (Infrared)   Resp 18   Ht 5' 7.01\" (1.702 m)   Wt 203 lb (92.1 kg)   SpO2 93%   BMI 31.79 kg/m²   ECOG PERFORMANCE STATUS: 1  PAIN RATIN  GENERAL: Pleasant well-developed adult male. In no acute distress. Alert and oriented ×3; clear mentation with appropriate affect  SKIN: Warm and dry, without jaundice, ecchymoses, or petechiae. HEENT: Normocephalic, atraumatic. PERRL/EOMI; ears, nose and lips unremarkable on external examination; NECK:  No JVD; no palpable cervical adenopathy. THORAX: No palpable supraclavicular or axillary adenopathy;  LUNGS: clear    CARDIAC: Nontachycardic, regular  ABDOMEN: Soft, nontender, bowel sounds present  EXTREMITIES: Extremity edema present, no clubbing or cyanosis. NEUROLOGIC:  Cranial nerves grossly intact        Thank you for allowing my assistance in [employer] care. Klarissa Craft MD   Radiation Oncology     ATTESTATION: 60 minutes face-to-face time,  >50% spent in counseling/discussion/education. CC: Sahara Bautista; Leonardo Barcenas; Catherine Shankar; Shayne Rankin. Liborio Sood PA-C  ACC: Tumor Registry: [facility]      This document was created using a voice-recognition program.  Computer generated transcription errors may be present.

## 2021-03-02 NOTE — PROGRESS NOTES
Name: Jayla Walter  : 1940  MRN: [id number]    Oncology Navigation- Initial Note:    Intake-  Contact Type: Radiation Oncology    Diagnosis: Poorly differentiated cancer  Rt adrenal gland    HX Bladder cancer    Home Disposition: Lives with other who is able to assist  Uses cane for mobility. Has shower chair  +Peripheral neuropathy  Drives, but spouse generally takes pt to appts. No services at present      Patient needs and barriers to care: Coordination of Care, Knowledge deficit and Symptom Management     Referral Source: Outpatient    Receptive to Advanced Care Planning/ Palliative Care:  deferred    Interventions-   General Interventions: [facility] program revisited with pt & spouse. Pt known to [facility] from intravesical chemo treatment for bladder cancer. Folder & Contact info provided to pt again. Education/Screenings:  yes -   Rad Onc review of pathology & treatment recommendations.      Referrals: Supportive Therapies  reviewed in detail     Continuum of Care: XRT treatment -->stereotactic ablative radiation therapy planned     Notes: [facility] available to assist as needed    Electronically signed by Va Bonds RN on 3/2/2021 at 4:41 PM

## 2021-03-03 ENCOUNTER — HOSPITAL ENCOUNTER (OUTPATIENT)
Dept: RADIATION ONCOLOGY | Age: 81
Discharge: HOME | End: 2021-03-03
Payer: MEDICARE

## 2021-03-03 ENCOUNTER — APPOINTMENT (OUTPATIENT)
Dept: RADIATION ONCOLOGY | Age: 81
End: 2021-03-03
Payer: MEDICARE

## 2021-03-03 NOTE — PROGRESS NOTES
[address] [facility] [address]  [facility] [address]  Phone: [phone]   Toll Free: [phone]   Fax: [phone]    RADIATION ONCOLOGY EDUCATION    CHIEF COMPLAINT: Yamilex aMrs and his wife present to radiation oncology today for education prior to starting radiation treatment to right adrenal gland. HISTORY OF PRESENT ILLNESS: Yamilex Mars was diagnosed with metastatic cancer to the right adrenal gland with an occult primary in February 2021. They met with surgeon who referred him to radiation oncology to discuss treatment. After hearing his options he decided to proceed with radiation treatment. Yamilex Mars does have history of bladder cancer diagnosed in 2014. He underwent neoadjuvant chemotherapy followed by cystectomy. He had a complete pathological response. EXAMINATION:   CONSTITUTIONAL: Yamilex Mars is a pleasant well developed adult male. NEUROLOGICAL: He is alert and oriented x3 in no acute distress. Clear mentation. MOOD/AFFECT: Appropriate for place and situation. PLAN:   1. Discussed with patient the steps involved with set up and initiation of radiation therapy (including treatment simulation and verification). Also reviewed the logistics of treatment (day, time and number of treatments). Expected and potential side effects (both short and long-term) were discussed in detail. 2. Skin care and moisturization instructions were discussed in detail. 3. Patient was agreeable to Massage referral which will be placed once treatment is initiated. 4. Tony and his wife had the opportunity to ask questions, and indicated that their questions were satisfactorily addressed. ATTESTATION:  I have spent 50 minutes reviewing previous notes, test results and face to face with the patient discussing the diagnosis and importance of compliance with the treatment plan as well as documenting on the day of the visit.

## 2021-03-05 ENCOUNTER — TELEPHONE (OUTPATIENT)
Dept: SPIRITUAL SERVICES | Facility: CLINIC | Age: 81
End: 2021-03-05

## 2021-03-05 ENCOUNTER — HOSPITAL ENCOUNTER (OUTPATIENT)
Dept: CT IMAGING | Age: 81
Discharge: HOME | End: 2021-03-05
Payer: MEDICARE

## 2021-03-05 ENCOUNTER — HOSPITAL ENCOUNTER (OUTPATIENT)
Dept: RADIATION ONCOLOGY | Age: 81
Discharge: HOME | End: 2021-03-05
Attending: RADIOLOGY
Payer: MEDICARE

## 2021-03-05 DIAGNOSIS — C79.71 MALIGNANT NEOPLASM METASTATIC TO RIGHT ADRENAL GLAND (HCC): ICD-10-CM

## 2021-03-05 PROCEDURE — 77334 RADIATION TREATMENT AID(S): CPT | Performed by: RADIOLOGY

## 2021-03-05 PROCEDURE — 77470 SPECIAL RADIATION TREATMENT: CPT | Performed by: RADIOLOGY

## 2021-03-05 PROCEDURE — 77290 THER RAD SIMULAJ FIELD CPLX: CPT | Performed by: RADIOLOGY

## 2021-03-05 PROCEDURE — 77263 THER RADIOLOGY TX PLNG CPLX: CPT | Performed by: RADIOLOGY

## 2021-03-05 PROCEDURE — 77332 RADIATION TREATMENT AID(S): CPT | Performed by: RADIOLOGY

## 2021-03-05 PROCEDURE — 3209999900 CT GUIDE RADIATION THERAPY NO CHARGE

## 2021-03-12 ENCOUNTER — TELEPHONE (OUTPATIENT)
Dept: SPIRITUAL SERVICES | Facility: CLINIC | Age: 81
End: 2021-03-12

## 2021-03-12 NOTE — TELEPHONE ENCOUNTER
During my follow up contact with Karen Florentino, he shared that he was \"undergoing testing and was being patient (as possible. \" I offered emotional support and words of encouragement.

## 2021-03-17 ENCOUNTER — HOSPITAL ENCOUNTER (OUTPATIENT)
Dept: RADIATION ONCOLOGY | Age: 81
Discharge: HOME | End: 2021-03-17
Attending: RADIOLOGY
Payer: MEDICARE

## 2021-03-17 ENCOUNTER — NURSE ONLY (OUTPATIENT)
Dept: LAB | Age: 81
End: 2021-03-17

## 2021-03-17 DIAGNOSIS — C80.1 MALIGNANT NEOPLASM WITHOUT SPECIFICATION OF SITE (HCC): Primary | ICD-10-CM

## 2021-03-17 DIAGNOSIS — C80.1 MALIGNANT NEOPLASM WITHOUT SPECIFICATION OF SITE (HCC): ICD-10-CM

## 2021-03-17 LAB
ALBUMIN SERPL-MCNC: 3.9 G/DL (ref 3.5–5.1)
ALP BLD-CCNC: 112 U/L (ref 38–126)
ALT SERPL-CCNC: 25 U/L (ref 11–66)
AST SERPL-CCNC: 35 U/L (ref 5–40)
BILIRUB SERPL-MCNC: 1.4 MG/DL (ref 0.3–1.2)
BILIRUBIN DIRECT: 0.3 MG/DL (ref 0–0.3)
TOTAL PROTEIN: 6.9 G/DL (ref 6.1–8)

## 2021-03-17 PROCEDURE — 77300 RADIATION THERAPY DOSE PLAN: CPT | Performed by: RADIOLOGY

## 2021-03-17 PROCEDURE — 77293 RESPIRATOR MOTION MGMT SIMUL: CPT | Performed by: RADIOLOGY

## 2021-03-17 PROCEDURE — 77338 DESIGN MLC DEVICE FOR IMRT: CPT | Performed by: RADIOLOGY

## 2021-03-17 PROCEDURE — 77301 RADIOTHERAPY DOSE PLAN IMRT: CPT | Performed by: RADIOLOGY

## 2021-03-18 ENCOUNTER — TELEPHONE (OUTPATIENT)
Dept: FAMILY MEDICINE CLINIC | Age: 81
End: 2021-03-18

## 2021-03-18 ENCOUNTER — HOSPITAL ENCOUNTER (OUTPATIENT)
Dept: RADIATION ONCOLOGY | Age: 81
Discharge: HOME | End: 2021-03-18
Attending: RADIOLOGY
Payer: MEDICARE

## 2021-03-18 VITALS
SYSTOLIC BLOOD PRESSURE: 159 MMHG | DIASTOLIC BLOOD PRESSURE: 71 MMHG | WEIGHT: 196.65 LBS | HEART RATE: 70 BPM | BODY MASS INDEX: 30.79 KG/M2 | RESPIRATION RATE: 18 BRPM | TEMPERATURE: 97.7 F | OXYGEN SATURATION: 95 %

## 2021-03-18 DIAGNOSIS — R11.0 NAUSEA: Primary | ICD-10-CM

## 2021-03-18 PROCEDURE — 77435 SBRT MANAGEMENT: CPT | Performed by: RADIOLOGY

## 2021-03-18 PROCEDURE — 77373 STRTCTC BDY RAD THER TX DLVR: CPT | Performed by: RADIOLOGY

## 2021-03-18 RX ORDER — ONDANSETRON 4 MG/1
4 TABLET, FILM COATED ORAL EVERY 8 HOURS PRN
Qty: 9 TABLET | Refills: 0 | Status: SHIPPED | OUTPATIENT
Start: 2021-03-18 | End: 2021-03-21

## 2021-03-18 NOTE — TELEPHONE ENCOUNTER
Thinks he has a GI bug, he has nausea and has vomited twice. Wife asking for something for nausea.      [facility] KARI

## 2021-03-18 NOTE — PROGRESS NOTES
[address] [place] [address] SONG  [facility], [address]  Phone: [phone]   Toll Free: [phone]   Fax: [phone]    RADIATION ONCOLOGY  WEEKLY ON-TREATMENT ASSESSMENT NOTE    PATIENT: Maria Fernanda Martinez OF BIRTH: 1940  CSN: [id number]  DATE: 3/18/2021    RADIATION ONCOLOGY ATTENDING  ARLYN Hopper       DIAGNOSIS:  Cancer Staging  Bladder cancer [facility])  Staging form: Urinary Bladder, AJCC 7th Edition  - Clinical stage from 3/2/2021: Stage II (T2, N0, M0) - Signed by Melissa cShaffer MD on 3/2/2021  - Pathologic stage from 3/2/2021: yT0, N0, cM0 - Signed by Melissa Schaffer MD on 3/2/2021    Secondary malignant neoplasm of adrenal gland [facility])  Staging form: Adrenal Cortical Carcinoma, AJCC 8th Edition  - Clinical stage from 3/2/2021: Stage IV (cT0, cN0, pM1) - Signed by Melissa Schaffer MD on 3/2/2021  Staging comments: 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 Summary:  Current Total Dose(cGy): 1000  Current Fraction: 1  Final/Cumulative Rx. Dose (cGy): 5000    SUBJECTIVE:   No complaints relating to current radiotherapy. Continues normal activities. MEDICATIONS:     Current Outpatient Medications   Medication Sig Dispense Refill    levothyroxine (SYNTHROID) 137 MCG tablet TAKE 1 TABLET BY MOUTH EVERY DAY 90 tablet 3    ALPRAZolam (XANAX) 0.5 MG tablet Take 0.5 mg by mouth nightly as needed for Sleep.  DULoxetine (CYMBALTA) 30 MG extended release capsule Take 1 capsule by mouth daily 90 capsule 3    KLOR-CON M10 10 MEQ extended release tablet TAKE 1 TABLET BY MOUTH EVERY DAY 90 tablet 3    pregabalin (LYRICA) 50 MG capsule Take 1 capsule by mouth 2 times daily for 181 days.  180 capsule 1    furosemide (LASIX) 40 MG tablet TAKE 1 TABLET BY MOUTH EVERY DAY (Patient taking differently: Take 20 mg by mouth daily ) 90 tablet 1    atorvastatin (LIPITOR) 40 MG tablet TAKE 1 TABLET BY MOUTH EVERY DAY AT BEDTIME 90 tablet 3    metoprolol tartrate (LOPRESSOR) 50 MG tablet Take 1 tablet by mouth 2 times daily New dose. 180 tablet 3    nitroGLYCERIN (NITROSTAT) 0.4 MG SL tablet Place 1 tablet under the tongue every 5 minutes as needed for Chest pain 25 tablet 1    b complex vitamins capsule Take 1 capsule by mouth daily      Elastic Bandages & Supports (JOBST KNEE HIGH COMPRESSION SM) MISC 1 each by Does not apply route daily as needed 1 each 0    aspirin 81 MG tablet Take 81 mg by mouth daily. No current facility-administered medications for this encounter. * New    PHYSICAL EXAM:     ECOG performance Status: 0-1   Pain Score: 0/10  BP (!) 159/71   Pulse 70   Temp 97.7 °F (36.5 °C) (Infrared)   Resp 18   Wt 196 lb 10.4 oz (89.2 kg)   SpO2 95%   BMI 30.79 kg/m²   General:   NAD, AO x 3, Mentation is clear with appropriate affect. HEENT:   EOMI, oral mucosa without lesion or exudate, tongue mid-line  Thorax: no stridor  COR:  Non-tachycardic  Abdomen: Active bowel sounds present, soft, NT/ND, no rebound  Neuro:   Cranial nerves grossly intact; no focal deficits  Skin  treatment portal:  unremarkable     Systemic Therapy: none currently     Treatment Imaging:    CBCT  imaging reviewed and approved. ASSESSMENT:  No significant radiation side effects. New medications, diagnostic results: na    PLAN: Again reviewed potential side effects of radiation for the patient's treatment. Continue local/topical care as instructed. Continue current radiation course as prescribed.

## 2021-03-22 ENCOUNTER — HOSPITAL ENCOUNTER (OUTPATIENT)
Dept: RADIATION ONCOLOGY | Age: 81
Discharge: HOME | End: 2021-03-22
Attending: RADIOLOGY
Payer: MEDICARE

## 2021-03-22 PROCEDURE — 77336 RADIATION PHYSICS CONSULT: CPT | Performed by: RADIOLOGY

## 2021-03-22 PROCEDURE — 77373 STRTCTC BDY RAD THER TX DLVR: CPT | Performed by: RADIOLOGY

## 2021-03-25 ENCOUNTER — HOSPITAL ENCOUNTER (OUTPATIENT)
Dept: RADIATION ONCOLOGY | Age: 81
Discharge: HOME | End: 2021-03-25
Attending: RADIOLOGY
Payer: MEDICARE

## 2021-03-25 PROCEDURE — 77373 STRTCTC BDY RAD THER TX DLVR: CPT | Performed by: RADIOLOGY

## 2021-03-29 ENCOUNTER — HOSPITAL ENCOUNTER (OUTPATIENT)
Dept: RADIATION ONCOLOGY | Age: 81
Discharge: HOME | End: 2021-03-29
Attending: RADIOLOGY
Payer: MEDICARE

## 2021-03-29 PROCEDURE — 77373 STRTCTC BDY RAD THER TX DLVR: CPT | Performed by: RADIOLOGY

## 2021-04-01 ENCOUNTER — HOSPITAL ENCOUNTER (OUTPATIENT)
Dept: RADIATION ONCOLOGY | Age: 81
Discharge: HOME | End: 2021-04-01
Attending: RADIOLOGY
Payer: MEDICARE

## 2021-04-01 DIAGNOSIS — C79.71 MALIGNANT NEOPLASM METASTATIC TO RIGHT ADRENAL GLAND (HCC): Primary | ICD-10-CM

## 2021-04-01 DIAGNOSIS — C79.70 MALIGNANT NEOPLASM METASTATIC TO ADRENAL GLAND, UNSPECIFIED LATERALITY (HCC): Primary | ICD-10-CM

## 2021-04-01 PROCEDURE — 77373 STRTCTC BDY RAD THER TX DLVR: CPT | Performed by: RADIOLOGY

## 2021-05-02 DIAGNOSIS — G62.9 NEUROPATHY: ICD-10-CM

## 2021-05-03 ENCOUNTER — HOSPITAL ENCOUNTER (OUTPATIENT)
Dept: CT IMAGING | Age: 81
Discharge: HOME | End: 2021-05-03
Payer: MEDICARE

## 2021-05-03 ENCOUNTER — HOSPITAL ENCOUNTER (OUTPATIENT)
Age: 81
Discharge: HOME | End: 2021-05-03
Payer: MEDICARE

## 2021-05-03 DIAGNOSIS — C79.71 MALIGNANT NEOPLASM METASTATIC TO RIGHT ADRENAL GLAND (HCC): ICD-10-CM

## 2021-05-03 LAB
ALBUMIN SERPL-MCNC: 3.6 G/DL (ref 3.5–5.1)
ALP BLD-CCNC: 128 U/L (ref 38–126)
ALT SERPL-CCNC: 33 U/L (ref 11–66)
AST SERPL-CCNC: 35 U/L (ref 5–40)
BILIRUB SERPL-MCNC: 1.3 MG/DL (ref 0.3–1.2)
BILIRUBIN DIRECT: 0.3 MG/DL (ref 0–0.3)
POC CREATININE WHOLE BLOOD: 1.3 MG/DL (ref 0.5–1.2)
TOTAL PROTEIN: 6.4 G/DL (ref 6.1–8)

## 2021-05-03 PROCEDURE — 6360000004 HC RX CONTRAST MEDICATION: Performed by: RADIOLOGY

## 2021-05-03 PROCEDURE — 82565 ASSAY OF CREATININE: CPT

## 2021-05-03 PROCEDURE — 74177 CT ABD & PELVIS W/CONTRAST: CPT

## 2021-05-03 PROCEDURE — 36415 COLL VENOUS BLD VENIPUNCTURE: CPT

## 2021-05-03 PROCEDURE — 80076 HEPATIC FUNCTION PANEL: CPT

## 2021-05-03 RX ORDER — PREGABALIN 50 MG/1
CAPSULE ORAL
Qty: 180 CAPSULE | Refills: 1 | Status: SHIPPED | OUTPATIENT
Start: 2021-05-03 | End: 2021-05-06 | Stop reason: SDUPTHER

## 2021-05-03 RX ADMIN — IOHEXOL 50 ML: 240 INJECTION, SOLUTION INTRATHECAL; INTRAVASCULAR; INTRAVENOUS; ORAL at 13:19

## 2021-05-03 RX ADMIN — IOPAMIDOL 80 ML: 755 INJECTION, SOLUTION INTRAVENOUS at 13:20

## 2021-05-03 NOTE — TELEPHONE ENCOUNTER
Sujata Cobb needs refill of   Requested Prescriptions     Pending Prescriptions Disp Refills    pregabalin (LYRICA) 50 MG capsule [Pharmacy Med Name: PREGABALIN 50 MG CAPSULE] 180 capsule      Sig: TAKE 1 CAPSULE BY MOUTH 2 TIMES DAILY       Last Filled on:  11/2/20 180*1    Last Visit Date:  1/19/2021    Next Visit Date:    Visit date not found     180*2 pending

## 2021-05-05 PROCEDURE — 99212 OFFICE O/P EST SF 10 MIN: CPT | Performed by: RADIOLOGY

## 2021-05-06 DIAGNOSIS — G62.9 NEUROPATHY: ICD-10-CM

## 2021-05-06 RX ORDER — PREGABALIN 50 MG/1
CAPSULE ORAL
Qty: 180 CAPSULE | Refills: 1 | Status: SHIPPED | OUTPATIENT
Start: 2021-05-06 | End: 2021-11-01

## 2021-05-17 RX ORDER — METOPROLOL TARTRATE 50 MG/1
TABLET, FILM COATED ORAL
Qty: 180 TABLET | Refills: 3 | Status: ON HOLD | OUTPATIENT
Start: 2021-05-17 | End: 2022-02-28 | Stop reason: SDUPTHER

## 2021-05-17 RX ORDER — ATORVASTATIN CALCIUM 40 MG/1
TABLET, FILM COATED ORAL
Qty: 90 TABLET | Refills: 3 | Status: SHIPPED | OUTPATIENT
Start: 2021-05-17 | End: 2022-05-02

## 2021-06-07 ENCOUNTER — TELEPHONE (OUTPATIENT)
Dept: UROLOGY | Age: 81
End: 2021-06-07

## 2021-06-07 DIAGNOSIS — Z85.51 HISTORY OF BLADDER CANCER: Primary | ICD-10-CM

## 2021-06-07 NOTE — TELEPHONE ENCOUNTER
Patient wife Dee called in regards to a fu for Cesar Monaco. He finished his radiation on 5-5-21. Thought you wanted a fu(6 mo w/scan prior) please advise on fu with you or Dr Annie Tesfaye?  Thank you

## 2021-06-08 ENCOUNTER — TELEPHONE (OUTPATIENT)
Dept: UROLOGY | Age: 81
End: 2021-06-08

## 2021-06-08 DIAGNOSIS — Z85.51 HISTORY OF BLADDER CANCER: Primary | ICD-10-CM

## 2021-06-08 NOTE — TELEPHONE ENCOUNTER
He should be scheduled with Dr. Edenilson alamo. His last visit with him was February 2021, so he can be scheduled in July. He just had a CT abdomen and pelvis in May 2021. We do not need to repeat that, but he should have a CT chest with contrast before his appointment.

## 2021-06-08 NOTE — TELEPHONE ENCOUNTER
Patient scheduled for CT chest w/ contrast  at [facility] on 07- at 11:20am with an arrival of 10:50am.  Patient advised of instructions.

## 2021-06-21 NOTE — TELEPHONE ENCOUNTER
Patient scheduled for CT chest w/ contrast  at [facility] on 07- at 11:20am with an arrival of 10:50am.  Patient advised of instructions.  Aneta Forte took care of

## 2021-06-29 ENCOUNTER — HOSPITAL ENCOUNTER (OUTPATIENT)
Dept: AUDIOLOGY | Age: 81
Discharge: HOME | End: 2021-06-29

## 2021-06-29 PROCEDURE — 92592 HC HEARING AID CHECK, ONE EAR: CPT | Performed by: AUDIOLOGIST

## 2021-06-29 NOTE — PROGRESS NOTES
ACCOUNT #: [de-identified]    DIAGNOSIS: Sensorineural hearing loss of both ears. HEARING AID PROBLEM: Patient reports difficulty understanding speech and is experiencing feedback with the right hearing aid. Otoscopy revealed mostly occluding cerumen for both ears. Patient's wife went to ENT office and scheduled ear cleaning for 6/30/2021. At the last visit, repeat audiometry was recommended for six months (March 2021), which was never completed. Routine audiometry has been recommended for this patient due to progressive hearing loss, bilaterally. Scheduled audiogram for 8/26/2021 following ear cleaning. The patient and his wife inquired about new hearing aids. Given his poor speech discrimination ability, it is uncertain whether he will benefit significantly from new hearing aids. I will order a pair of Phonak Bolero P hearing aids for the patient to demo after the audiogram. Cleaned both earmolds today. Replaced earmold tubing and cleaned under hearing aid microphones. A listening check revealed normal output of both hearing aids. The patient was not contacted by [facility] Call- I will follow up. Patient's wife would like to be contacted at [phone]. Billed $20.00 for today's clean and check.

## 2021-06-30 ENCOUNTER — OFFICE VISIT (OUTPATIENT)
Dept: ENT CLINIC | Age: 81
End: 2021-06-30
Payer: MEDICARE

## 2021-06-30 VITALS
DIASTOLIC BLOOD PRESSURE: 66 MMHG | WEIGHT: 192 LBS | HEART RATE: 68 BPM | SYSTOLIC BLOOD PRESSURE: 116 MMHG | RESPIRATION RATE: 12 BRPM | HEIGHT: 67 IN | BODY MASS INDEX: 30.13 KG/M2 | TEMPERATURE: 98.1 F

## 2021-06-30 DIAGNOSIS — H61.23 IMPACTED CERUMEN, BILATERAL: Primary | ICD-10-CM

## 2021-06-30 PROCEDURE — 69210 REMOVE IMPACTED EAR WAX UNI: CPT | Performed by: NURSE PRACTITIONER

## 2021-06-30 NOTE — PROGRESS NOTES
Procedure note:  DATE AND TIME OF PROCEDURE: 6/30/2021 4:34 PM  PATIENT NAME: Mile Mcdaniels  MRN [id number]  PROVIDER: DEMAR Trotter CNP   PRE-PROCEDURE DIAGNOSIS:  Bilateral cerumen impaction  POST-PROCEDURE DIAGNOSIS:   Same     INDICATION: Cerumenosis causing an inability to visualize the tympanic membrane, middle ear space and/or external auditory canal.     TEACHING: Procedure, benefits, and risks were explained to patient/family. Verbal informed consent was obtained. TIME OUT: A time out was conducted immediately before starting the procedure that confirmed a final verification of the correct patient, correct procedure, correct patient position, correct site and availability of special equipment. DESCRIPTION OF PROCEDURE:  PROCEDURE: Cerumenectomy  SITE: Bilateral ear(s)    ANESTHESIA:  NONE  COMPLICATIONS: NONE  EBL: NONE    PROCEDURE: Handheld otoscope used to visualize EAC. Bilateral cerumen in place, obstructing visualization of tympanic membranes. Cerumen removed with wire loop, alligator forceps, right angle ball tip and suction until tympanic membranes could be visualized as documented above. Difficult right medial impaction with dry/hard cerumen. Normal exam. The patient tolerated the procedure well, with no complications. Recommend use of Debrox, mineral oil or sweet oil 2 drops each ear an bedtime once weekly to start. Return in 6 months for ear cleaning.

## 2021-07-01 NOTE — PROGRESS NOTES
RADIATION ONCOLOGY [address]  [address] [place] [address], [address]  Ph. [phone]  Fax [phone]    PATIENT: Radha Pepper  : 1940  MRN: [id number]  DATE: 2021      DIAGNOSIS: C79.7 -- Secondary malignant neoplasm of adrenal gland, right  C80.0 -- Disseminated malignant neoplasm from unspecified primary site (occult primary); Poorly differentiated carcinoma with neuroendocrine features, TTF1 +; Tx Nx M1, Stage IV. [solitary metastasis from occult primary, presumed lung origin due to TTF1+]  Z85.81 -- Personal history of malignant neoplasm of bladder: C67.9 -- Malignant neoplasm of bladder, unspecified; High grade invasive papillary urothelial carcinoma; clinical T2 N0; treated with neoadjuvant chemotherapy followed by cystectomy; ypT0 ypN0 cM0 (complete pathologic response)  Date of diagnosis: 2021 (adrenal mass)      Treatment Course Number: 1    Treatment Site (s) Modality Dose (cGy) From To Fractions/  Elapsed Days   Right Adrenal Mass 6MV photons \"flattening filter free\" 5000 cGy 3/18/2021 2021 5/ 14     Cumulative Dose to Right Adrenal Mass: 5000 cGy  Treatment Modifiers: Stereotactic Body Radiation Therapy; Volume Modulated Arc Therapy with RapidArc; Custom MLC Blocking; Stereotactic Body Frame Immobilization; Daily Cone Beam CT Guidance; Optical Surface Monitoring System Gating. HISTORY OF PRESENT ILLNESS:   Broderick Reilly is an 69-year-old gentleman with a previous history of bladder cancer and multiple skin cancers, now with a diagnosis of a solitary right adrenal metastasis from occult primary. Michael Herrera was first diagnosed with noninvasive bladder cancer in , for which he underwent TURBT followed by BCG. He was meticulously compliant with his surveillance, and in  surveillance showed a large bladder tumor.   TURBT revealed muscle invasive high-grade papillary urothelial carcinoma. Adriel Cho underwent neoadjuvant chemotherapy followed by cystoprostatectomy, and final pathology showed a complete pathologic response to the chemotherapy. Surveillance imaging in 2020 showed a new mass involving the right adrenal gland. PET scan in early 2020 showed increased metabolic activity associated with the adrenal mass but no other areas of suspicious increased metabolism. Adriel Cho underwent needle core biopsy of the right adrenal mass 2021. 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 The histology is suggestive of lung primary site of origin, though as noted this is an occult malignancy with a single demonstrable site of disease involving the right adrenal gland. Adriel Cho was seen in urology regarding possible resection of the adrenal mass. Because of his medical comorbidities, site of the metastasis and previous abdominal surgery (with likely presence of adhesions), attempted resection would be associated with increased risk. Adriel Cho was seen 3/2/2021 for evaluation and discussion regarding the potential role of stereotactic ablative radiation therapy in the management of his solitary adrenal metastasis from occult primary site. Pain Ratin/10  ECOG Performance Status: 1      Treatment Tolerance/Response:   Janey Damico tolerated his stereotactic body radiotherapy well. He did not have any complaints specific to the treatment, nor did he have any unexpected complications during the treatment course. Systemic Therapy: None currently      Follow Up Plan:   Janey Damico received posttreatment instructions regarding recommended activity level and fluid/nutritional needs. He is scheduled to return for a checkup in a month, and will undergo surveillance with regular CT scans. Adriel Cho will continue care with his other physicians/providers.   As usual, he was instructed to call and arrange for an earlier appointment if he has any problems, questions or concerns. Wayne Dueñas MD   Radiation Oncology    CC: Sahara De; Nazario Solorio; Luis Alfredo Tracey; Henry Arzola. Greg Morrison PA-C  ACC: Tumor Registry: Reginaldo Garay      This document was created using a voice-recognition program.  Computer generated transcription errors may be present.

## 2021-07-14 ENCOUNTER — OFFICE VISIT (OUTPATIENT)
Dept: FAMILY MEDICINE CLINIC | Age: 81
End: 2021-07-14
Payer: MEDICARE

## 2021-07-14 VITALS
WEIGHT: 197.2 LBS | DIASTOLIC BLOOD PRESSURE: 62 MMHG | OXYGEN SATURATION: 96 % | HEART RATE: 57 BPM | TEMPERATURE: 98.2 F | BODY MASS INDEX: 30.95 KG/M2 | HEIGHT: 67 IN | SYSTOLIC BLOOD PRESSURE: 136 MMHG

## 2021-07-14 DIAGNOSIS — Z00.00 ROUTINE GENERAL MEDICAL EXAMINATION AT A HEALTH CARE FACILITY: Primary | ICD-10-CM

## 2021-07-14 DIAGNOSIS — H60.392 OTHER INFECTIVE ACUTE OTITIS EXTERNA OF LEFT EAR: ICD-10-CM

## 2021-07-14 PROCEDURE — G0439 PPPS, SUBSEQ VISIT: HCPCS | Performed by: NURSE PRACTITIONER

## 2021-07-14 RX ORDER — CIPROFLOXACIN HYDROCHLORIDE 3.5 MG/ML
1 SOLUTION/ DROPS TOPICAL 3 TIMES DAILY
Qty: 1 BOTTLE | Refills: 0 | Status: SHIPPED | OUTPATIENT
Start: 2021-07-14 | End: 2021-07-14

## 2021-07-14 RX ORDER — CIPROFLOXACIN HYDROCHLORIDE 3.5 MG/ML
1 SOLUTION/ DROPS TOPICAL 3 TIMES DAILY
Qty: 1 BOTTLE | Refills: 0 | Status: SHIPPED | OUTPATIENT
Start: 2021-07-14 | End: 2021-07-19

## 2021-07-14 SDOH — ECONOMIC STABILITY: FOOD INSECURITY: WITHIN THE PAST 12 MONTHS, THE FOOD YOU BOUGHT JUST DIDN'T LAST AND YOU DIDN'T HAVE MONEY TO GET MORE.: NEVER TRUE

## 2021-07-14 SDOH — ECONOMIC STABILITY: FOOD INSECURITY: WITHIN THE PAST 12 MONTHS, YOU WORRIED THAT YOUR FOOD WOULD RUN OUT BEFORE YOU GOT MONEY TO BUY MORE.: NEVER TRUE

## 2021-07-14 ASSESSMENT — PATIENT HEALTH QUESTIONNAIRE - PHQ9
1. LITTLE INTEREST OR PLEASURE IN DOING THINGS: 1
2. FEELING DOWN, DEPRESSED OR HOPELESS: 1
SUM OF ALL RESPONSES TO PHQ QUESTIONS 1-9: 2
SUM OF ALL RESPONSES TO PHQ QUESTIONS 1-9: 2
SUM OF ALL RESPONSES TO PHQ9 QUESTIONS 1 & 2: 2
SUM OF ALL RESPONSES TO PHQ QUESTIONS 1-9: 2

## 2021-07-14 ASSESSMENT — LIFESTYLE VARIABLES: HOW OFTEN DO YOU HAVE A DRINK CONTAINING ALCOHOL: 0

## 2021-07-14 ASSESSMENT — SOCIAL DETERMINANTS OF HEALTH (SDOH): HOW HARD IS IT FOR YOU TO PAY FOR THE VERY BASICS LIKE FOOD, HOUSING, MEDICAL CARE, AND HEATING?: NOT HARD AT ALL

## 2021-07-14 NOTE — PROGRESS NOTES
Medicare Annual Wellness Visit  Name: Елена Neville Date: 2021   MRN: [id number] Sex: Male   Age: 80 y.o. Ethnicity: Non-/Non    : 1940 Race: Margaret Shell is here for Medicare AWV (ear pain, feels clogged. went 2 weeks ago and got flushed)    Screenings for behavioral, psychosocial and functional/safety risks, and cognitive dysfunction are all negative except as indicated below. These results, as well as other patient data from the [address] form, are documented in Flowsheets linked to this Encounter. Allergies   Allergen Reactions    Tape John Kary Tape]      Surgical tape-rash         Prior to Visit Medications    Medication Sig Taking? Authorizing Provider   ciprofloxacin (CILOXAN) 0.3 % ophthalmic solution Place 1 drop in ear(s) three times daily for 5 days Left ear Yes Amie Santos APRN - AMY   metoprolol tartrate (LOPRESSOR) 50 MG tablet TAKE 1 TABLET BY MOUTH 2 TIMES DAILY Yes Franco Iniguez MD   atorvastatin (LIPITOR) 40 MG tablet TAKE 1 TABLET BY MOUTH EVERYDAY AT BEDTIME Yes Franco Iniguez MD   pregabalin (LYRICA) 50 MG capsule TAKE 1 CAPSULE BY MOUTH 2 TIMES DAILY Yes Praveen Rosado MD   levothyroxine (SYNTHROID) 137 MCG tablet TAKE 1 TABLET BY MOUTH EVERY DAY Yes Franco Iniguez MD   ALPRAZolam Evaline Beams) 0.5 MG tablet Take 0.5 mg by mouth nightly as needed for Sleep.  Yes Historical Provider, MD   DULoxetine (CYMBALTA) 30 MG extended release capsule Take 1 capsule by mouth daily Yes Praveen Rosado MD   KLOR-CON M10 10 MEQ extended release tablet TAKE 1 TABLET BY MOUTH EVERY DAY Yes Franco Iniguez MD   furosemide (LASIX) 40 MG tablet TAKE 1 TABLET BY MOUTH EVERY DAY  Patient taking differently: Take 20 mg by mouth daily  Yes Franco Iniguez MD   nitroGLYCERIN (NITROSTAT) 0.4 MG SL tablet Place 1 tablet under the tongue every 5 minutes as needed for Chest pain Yes Praveen Rosado MD   b complex vitamins capsule Take 1 capsule by mouth daily Yes Historical Provider, MD   Elastic Bandages & Supports (JOBST KNEE HIGH COMPRESSION SM) MISC 1 each by Does not apply route daily as needed Yes Brooklyn Thomson, APRN - CNP   aspirin 81 MG tablet Take 81 mg by mouth daily.  Yes Historical Provider, MD         Past Medical History:   Diagnosis Date    AAA (abdominal aortic aneurysm) ([address]     intravascular stent    BCG ROXANE 05/21/2019    BPH (benign prostatic hypertrophy)     CAD (coronary artery disease)     Cancer of skin     Carotid artery stenosis     Chronic back pain     Chronic kidney disease     Congenital absence of one kidney     absent right kidney    History of lumbar fusion 3/15    mid lower back down    Hyperlipidemia     Hypertension     Hypothyroidism     Obesity     Sciatica     TIA (transient ischemic attack) 1/2008    Unspecified cerebral artery occlusion with cerebral infarction     TIA       Past Surgical History:   Procedure Laterality Date    ABDOMINAL AORTIC ANEURYSM REPAIR  3/2010    Shireen    ABDOMINAL AORTIC ANEURYSM REPAIR  7/24/15    Anurag Lance    [facility]    CORONARY ANGIOPLASTY WITH STENT PLACEMENT  2003    2 stents    FEMORAL-FEMORAL BYPASS GRAFT  03/09/2017    Dr. Kristin Osborne    arm    KNEE ARTHROSCOPY  12/2008    meniscus tear    LUMBAR LAMINECTOMY  3/2016    [facility]--Dr. Anurag Frye    OTHER SURGICAL HISTORY  4/14/15    melanoma removal from right chest    OTHER SURGICAL HISTORY  5/20/2016    I and D, exicison of posterior neck cyst    SC OFFICE/OUTPT VISIT,PROCEDURE ONLY N/A 11/8/2018    TURBT performed by Radha Stallings MD at [address] [place] [address] 5/1/2019    ROBOTIC RADICAL CYSTOPROSTATECTOMY performed by Radha Stallings MD at [address]  10/2016    Dr Hutchins [address]  11/2020    on wrist     TESTICLE REMOVAL  1990's    TUNNELED VENOUS PORT PLACEMENT      TURP  04/16/2014    See note of  Shayy Bring for OR procedure details         Family History   Problem Relation Age of Onset    Diabetes Mother     Cancer Mother         Liver    Arthritis Mother     Asthma Father     Heart Disease Father     Cancer Father         Lung, Bone    Stroke Sister     Heart Disease Sister     Cancer Sister     Stroke Brother     Heart Disease Brother     Cancer Brother         Lung       CareTeam (Including outside providers/suppliers regularly involved in providing care):   Patient Care Team:  Pedro Rankin MD as PCP - Yaa Hedrick MD as PCP - [address] [place] [address]  [facility] Provider  Frank Stewart MD as Physician (Urology)  Maria Luisa Calle MD as Physician (Hospitalist)  Josue Rogers RN as Nurse Navigator (Oncology)    Wt Readings from Last 3 Encounters:   07/14/21 197 lb 3.2 oz (89.4 kg)   06/30/21 192 lb (87.1 kg)   05/05/21 194 lb (88 kg)     Vitals:    07/14/21 1358   BP: 136/62   Pulse: 57   Temp: 98.2 °F (36.8 °C)   TempSrc: Oral   SpO2: 96%   Weight: 197 lb 3.2 oz (89.4 kg)   Height: 5' 7\" (1.702 m)     Body mass index is 30.89 kg/m². Based upon direct observation of the patient, evaluation of cognition reveals recent and remote memory intact.     General Appearance: alert and oriented to person, place and time, well developed and well- nourished, in no acute distress  Skin: warm and dry, no rash or erythema  Head: normocephalic and atraumatic  Eyes: pupils equal, round, and reactive to light, extraocular eye movements intact, conjunctivae normal  ENT: tympanic membrane, right ear canal and TM normal, left ear canal full of white thick pus and tragal tenderness, nose without deformity, nasal mucosa and turbinates normal without polyps  Neck: supple and non-tender without mass, no thyromegaly or thyroid nodules, no cervical lymphadenopathy  Pulmonary/Chest: clear to auscultation bilaterally- no wheezes, rales or rhonchi, normal air movement, no respiratory distress  Cardiovascular: normal rate, regular rhythm, normal S1 and S2, no murmurs, rubs, clicks, or gallops, distal pulses intact, no carotid bruits  Abdomen: soft, non-tender, non-distended, normal bowel sounds, no masses or organomegaly  Extremities: no cyanosis, clubbing or edema  Musculoskeletal: normal range of motion, no joint swelling, deformity or tenderness  Neurologic: reflexes normal and symmetric, no cranial nerve deficit, gait, coordination and speech normal    Patient's complete Health Risk Assessment and screening values have been reviewed and are found in Flowsheets. The following problems were reviewed today and where indicated follow up appointments were made and/or referrals ordered. Positive Risk Factor Screenings with Interventions:     Fall Risk:  Timed Up and Go Test > 12 seconds? (Complete if either Fall Risk answers are Yes): no  2 or more falls in past year?: (!) yes  Fall with injury in past year?: no  Fall Risk Interventions:    · Patient declines any further evaluation/treatment for this issue          General Health and ACP:  General  In general, how would you say your health is?: Fair  In the past 7 days, have you experienced any of the following?  New or Increased Pain, New or Increased Fatigue, Loneliness, Social Isolation, Stress or Anger?: (!) Stress, Social Isolation  Do you get the social and emotional support that you need?: (!) No  Do you have a Living Will?: Yes  Advance Directives     Power of  Living Will ACP-Advance Directive ACP-Power of     Not on File Filed on 04/15/14 Filed Not on File      General Health Risk Interventions:  · Has family assistance    Health Habits/Nutrition:  Health Habits/Nutrition  Do you exercise for at least 20 minutes 2-3 times per week?: (!) No  Have you lost any weight without trying in the past 3 months?: (!) Yes  Do you eat only one meal per day?: No  Have you seen the dentist within the past year?: Yes  Body mass index: (!) 30.88  Health Habits/Nutrition Interventions:  · Declines    Hearing/Vision:  No exam data present  Hearing/Vision  Do you or your family notice any trouble with your hearing that hasn't been managed with hearing aids?: (!) Yes  Do you have difficulty driving, watching TV, or doing any of your daily activities because of your eyesight?: No  Have you had an eye exam within the past year?: Yes  Hearing/Vision Interventions:  · Hearing concerns:  Continue to follow with audiology    Safety:  Safety  Do you have working smoke detectors?: Yes  Have all throw rugs been removed or fastened?: (!) No  Do you have non-slip mats or surfaces in all bathtubs/showers?: (!) No  Do all of your stairways have a railing or banister?: Yes  Are your doorways, halls and stairs free of clutter?: Yes  Do you always fasten your seatbelt when you are in a car?: Yes  Safety Interventions:  · Patient declines any further evaluation/treatment for this issue     Personalized Preventive Plan   Current Health Maintenance Status  Immunization History   Administered Date(s) Administered    Influenza 11/14/2011, 09/30/2013, 10/20/2015    Influenza Virus Vaccine 10/20/2015, 10/01/2016, 11/01/2017, 10/15/2018, 10/14/2019    Influenza Whole 10/01/2008    Influenza, High Dose (Fluzone 65 yrs and older) 10/02/2014    Influenza, Quadv, adjuvanted, 65 yrs +, IM, PF (Fluad) 10/09/2020    Pneumococcal Conjugate 13-valent ([id number]) 02/08/2017    Pneumococcal Conjugate 7-valent (Kathleene Macho) 01/01/2006    Pneumococcal Polysaccharide ([id number]) 11/07/2019        Health Maintenance   Topic Date Due    DTaP/Tdap/Td vaccine (1 - Tdap) Never done    Shingles Vaccine (1 of 2) Never done   [facility] Visit (AWV)  Never done    Flu vaccine (1) 09/01/2021    Potassium monitoring  11/10/2021    Creatinine monitoring  12/18/2021    Lipid screen  01/14/2022    TSH testing  01/14/2022    Pneumococcal 65+ yrs at Risk Vaccine  Completed    COVID-19 Vaccine  Completed    Hepatitis A vaccine  Aged Out    Hepatitis B vaccine  Aged Out    Hib vaccine  Aged Out    Meningococcal (ACWY) vaccine  Aged Out     Recommendations for Advanced Circulatory Due: see orders and patient instructions/AVS.  . Recommended screening schedule for the next 5-10 years is provided to the patient in written form: see Patient Instructions/AVS.    Aldo Davila was seen today for medicare awv. Diagnoses and all orders for this visit:    Routine general medical examination at a health care facility    Other infective acute otitis externa of left ear  -     Discontinue: ciprofloxacin (CILOXAN) 0.3 % ophthalmic solution; Place 1 drop in ear(s) three times daily for 5 days  -     ciprofloxacin (CILOXAN) 0.3 % ophthalmic solution; Place 1 drop in ear(s) three times daily for 5 days Left ear    Left auditory canal was flushed with warm water to remove a modest amount of thick white pasty debris. Patient does experience some tragal tenderness during the procedure. We will follow-up if left otalgia worsens.

## 2021-07-14 NOTE — PATIENT INSTRUCTIONS
Personalized Preventive Plan for Alix Tong - 7/14/2021  Medicare offers a range of preventive health benefits. Some of the tests and screenings are paid in full while other may be subject to a deductible, co-insurance, and/or copay. Some of these benefits include a comprehensive review of your medical history including lifestyle, illnesses that may run in your family, and various assessments and screenings as appropriate. After reviewing your medical record and screening and assessments performed today your provider may have ordered immunizations, labs, imaging, and/or referrals for you. A list of these orders (if applicable) as well as your Preventive Care list are included within your After Visit Summary for your review. Other Preventive Recommendations:    · A preventive eye exam performed by an eye specialist is recommended every 1-2 years to screen for glaucoma; cataracts, macular degeneration, and other eye disorders. · A preventive dental visit is recommended every 6 months. · Try to get at least 150 minutes of exercise per week or 10,000 steps per day on a pedometer . · Order or download the FREE \"Exercise & Physical Activity: Your Everyday Guide\" from [employer]. Call 6-121.929.7368 or search [employer] online. · You need 4698-7101 mg of calcium and 3445-7927 IU of vitamin D per day. It is possible to meet your calcium requirement with diet alone, but a vitamin D supplement is usually necessary to meet this goal.  · When exposed to the sun, use a sunscreen that protects against both UVA and UVB radiation with an SPF of 30 or greater. Reapply every 2 to 3 hours or after sweating, drying off with a towel, or swimming. · Always wear a seat belt when traveling in a car. Always wear a helmet when riding a bicycle or motorcycle.

## 2021-07-21 ENCOUNTER — OFFICE VISIT (OUTPATIENT)
Dept: INTERNAL MEDICINE CLINIC | Age: 81
End: 2021-07-21
Payer: MEDICARE

## 2021-07-21 VITALS
TEMPERATURE: 97.7 F | HEART RATE: 60 BPM | DIASTOLIC BLOOD PRESSURE: 66 MMHG | BODY MASS INDEX: 31.23 KG/M2 | WEIGHT: 199 LBS | SYSTOLIC BLOOD PRESSURE: 132 MMHG | HEIGHT: 67 IN

## 2021-07-21 DIAGNOSIS — E78.00 PURE HYPERCHOLESTEROLEMIA: Chronic | ICD-10-CM

## 2021-07-21 DIAGNOSIS — N18.30 STAGE 3 CHRONIC KIDNEY DISEASE, UNSPECIFIED WHETHER STAGE 3A OR 3B CKD (HCC): Primary | ICD-10-CM

## 2021-07-21 DIAGNOSIS — I25.10 CORONARY ARTERY DISEASE INVOLVING NATIVE CORONARY ARTERY OF NATIVE HEART WITHOUT ANGINA PECTORIS: Chronic | ICD-10-CM

## 2021-07-21 DIAGNOSIS — I10 ESSENTIAL HYPERTENSION: ICD-10-CM

## 2021-07-21 DIAGNOSIS — E03.4 HYPOTHYROIDISM DUE TO ACQUIRED ATROPHY OF THYROID: Chronic | ICD-10-CM

## 2021-07-21 PROCEDURE — 99213 OFFICE O/P EST LOW 20 MIN: CPT | Performed by: INTERNAL MEDICINE

## 2021-07-21 RX ORDER — FUROSEMIDE 40 MG/1
TABLET ORAL
Qty: 90 TABLET | Refills: 1 | Status: SHIPPED | OUTPATIENT
Start: 2021-07-21 | End: 2022-01-24 | Stop reason: SDUPTHER

## 2021-07-21 NOTE — PROGRESS NOTES
Myrna Sears (:  1940) is a 80 y.o. male,Established patient, here for evaluation of the following chief complaint(s):  Coronary Artery Disease (6 month follow up), Chronic Kidney Disease, and Hypertension         ASSESSMENT/PLAN:  1. Stage 3 chronic kidney disease, unspecified whether stage 3a or 3b CKD ([facility] [address] stable  2. Coronary artery disease involving native coronary artery of native heart without angina pectoris  -     furosemide (LASIX) 40 MG tablet; TAKE 1 TABLET BY MOUTH EVERY DAY, Disp-90 tablet, R-1Normal  Denies any chest pain; remote stent  3. Essential hypertension- controlled; continue same meds  4. Hypothyroidism due to acquired atrophy of thyroid- follow labs  5. Pure hypercholesterolemia- continue present meds  6. 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  cystectomy      Return in about 3 months (around 2021). Subjective   SUBJECTIVE/OBJECTIVE:  HPI pt with cad, remote stent; seen in f/u. Following with xrt, gets periodic scans    Review of Systems    Twelve point ROS completed and found to be negative except as noted above.           Objective   Physical Exam    General appearance - alert, well appearing, and in no distress    Mental Status - alert, oriented to person, place, and time          Neck - supple, no significant adenopathy        Chest - clear to auscultation, no wheezes, rales or rhonchi, symmetric air entry     Heart - normal rate, regular rhythm, normal S1, S2, no murmurs, rubs, clicks or gallops     Abdomen - soft, nontender, nondistended, no masses or organomegaly  Cystectomy bag in place         Neurological - alert, oriented, normal speech, no focal findings or movement disorder noted     Musculoskeletal -   msk exam:[id number]}     Extremities - peripheral pulses normal, no pedal edema, no clubbing or cyanosis     Skin - normal coloration and turgor, no rashes, no suspicious skin lesions noted            An electronic signature was used to authenticate this note.    --Jorge Luther MD

## 2021-07-22 ENCOUNTER — HOSPITAL ENCOUNTER (OUTPATIENT)
Dept: CT IMAGING | Age: 81
Discharge: HOME | End: 2021-07-22
Payer: MEDICARE

## 2021-07-22 DIAGNOSIS — Z85.51 HISTORY OF BLADDER CANCER: ICD-10-CM

## 2021-07-22 LAB — POC CREATININE WHOLE BLOOD: 1.3 MG/DL (ref 0.5–1.2)

## 2021-07-22 PROCEDURE — 6360000004 HC RX CONTRAST MEDICATION: Performed by: PHYSICIAN ASSISTANT

## 2021-07-22 PROCEDURE — 71260 CT THORAX DX C+: CPT

## 2021-07-22 PROCEDURE — 82565 ASSAY OF CREATININE: CPT

## 2021-07-22 RX ADMIN — IOPAMIDOL 85 ML: 755 INJECTION, SOLUTION INTRAVENOUS at 11:13

## 2021-07-26 ENCOUNTER — OFFICE VISIT (OUTPATIENT)
Dept: FAMILY MEDICINE CLINIC | Age: 81
End: 2021-07-26
Payer: MEDICARE

## 2021-07-26 VITALS
TEMPERATURE: 98.3 F | HEART RATE: 66 BPM | BODY MASS INDEX: 30.44 KG/M2 | WEIGHT: 194.4 LBS | RESPIRATION RATE: 18 BRPM | SYSTOLIC BLOOD PRESSURE: 130 MMHG | DIASTOLIC BLOOD PRESSURE: 68 MMHG

## 2021-07-26 DIAGNOSIS — J02.9 EXUDATIVE PHARYNGITIS: Primary | ICD-10-CM

## 2021-07-26 PROCEDURE — 99213 OFFICE O/P EST LOW 20 MIN: CPT | Performed by: FAMILY MEDICINE

## 2021-07-26 RX ORDER — AMOXICILLIN 500 MG/1
500 CAPSULE ORAL 3 TIMES DAILY
Qty: 30 CAPSULE | Refills: 0 | Status: SHIPPED | OUTPATIENT
Start: 2021-07-26 | End: 2021-08-05

## 2021-08-06 ASSESSMENT — ENCOUNTER SYMPTOMS
SORE THROAT: 1
NAUSEA: 1
SHORTNESS OF BREATH: 0
DIARRHEA: 0
SINUS PRESSURE: 0
RHINORRHEA: 0
CONSTIPATION: 0
ABDOMINAL PAIN: 0
EYE PAIN: 0
COUGH: 0
ABDOMINAL DISTENTION: 0

## 2021-08-06 NOTE — PROGRESS NOTES
[address] [address]  Dept: [phone]  Dept Fax: [phone]  Loc: [phone]  PROGRESS NOTE      VisitDate: 7/26/2021    Scarlett Beth is a 80 y.o. male who presents today for:     Chief Complaint   Patient presents with    Pharyngitis     symptoms 5-6 days, ST-severe-possible white spots this morning, nasal congestion, dry cough sometimes, 98.3 temp-felt warm         Subjective:  HPI    Patient comes in with severe sore throat feeling feverish center 5 6 days no other upper respiratory symptoms. No cough some mild intermittent nausea no shortness of breath no nasal discharge  Review of Systems   Constitutional: Negative for appetite change and fever. HENT: Positive for sore throat. Negative for congestion, ear pain, postnasal drip, rhinorrhea and sinus pressure. Eyes: Negative for pain and visual disturbance. Respiratory: Negative for cough and shortness of breath. Cardiovascular: Negative for chest pain. Gastrointestinal: Positive for nausea. Negative for abdominal distention, abdominal pain, constipation and diarrhea. Genitourinary: Negative for dysuria, frequency and urgency. Musculoskeletal: Negative for arthralgias. Skin: Negative for rash. Neurological: Negative for dizziness.      Past Medical History:   Diagnosis Date    AAA (abdominal aortic aneurysm) ([address]     intravascular stent    BCG ROXANE 05/21/2019    BPH (benign prostatic hypertrophy)     CAD (coronary artery disease)     Cancer of skin     Carotid artery stenosis     Chronic back pain     Chronic kidney disease     Congenital absence of one kidney     absent right kidney    History of lumbar fusion 3/15    mid lower back down    Hyperlipidemia     Hypertension     Hypothyroidism     Obesity     Sciatica     TIA (transient ischemic attack) 1/2008    Unspecified cerebral artery occlusion with cerebral infarction TIA      Past Surgical History:   Procedure Laterality Date    ABDOMINAL AORTIC ANEURYSM REPAIR  3/2010    [facility] ABDOMINAL AORTIC ANEURYSM REPAIR  7/24/15    Oval Luanne    COLONOSCOPY  11    [facility]    CORONARY ANGIOPLASTY WITH STENT PLACEMENT      2 stents    FEMORAL-FEMORAL BYPASS GRAFT  2017    Dr. Myrtle Frederick    arm    KNEE ARTHROSCOPY  2008    meniscus tear    LUMBAR LAMINECTOMY  3/2016    [facility]--Dr. Elver Wilhelm    OTHER SURGICAL HISTORY  4/14/15    melanoma removal from right chest    OTHER SURGICAL HISTORY  2016    I and D, exicison of posterior neck cyst    MA OFFICE/OUTPT VISIT,PROCEDURE ONLY N/A 2018    TURBT performed by Chaz Becerra MD at [address] [place] [address] N/A 2019    ROBOTIC RADICAL CYSTOPROSTATECTOMY performed by Chaz Becerra MD at [address]  10/2016    Dr Yonatan Arevalo   [address] [facility]  2020    on wrist     TESTICLE REMOVAL  's    TUNNELED VENOUS PORT PLACEMENT      TURP  2014    See note of Dr. Vale Thompson for OR procedure details     Family History   Problem Relation Age of Onset    Diabetes Mother     Cancer Mother         Liver    Arthritis Mother     Asthma Father     Heart Disease Father     Cancer Father         Lung, Bone    Stroke Sister     Heart Disease Sister     Cancer Sister     Stroke Brother     Heart Disease Brother     Cancer Brother         Lung     Social History     Tobacco Use    Smoking status: Former Smoker     Packs/day: 1.00     Years: 24.00     Pack years: 24.00     Types: Cigarettes     Start date:      Quit date: 1979     Years since quittin.3    Smokeless tobacco: Never Used   Substance Use Topics    Alcohol use: No     Alcohol/week: 0.0 standard drinks      Current Outpatient Medications   Medication Sig Dispense Refill    Multiple Vitamins-Minerals (PRESERVISION AREDS 2+MULTI VIT PO) Take 2 tablets by mouth daily      furosemide (LASIX) 40 MG tablet TAKE 1 TABLET BY MOUTH EVERY DAY 90 tablet 1    metoprolol tartrate (LOPRESSOR) 50 MG tablet TAKE 1 TABLET BY MOUTH 2 TIMES DAILY 180 tablet 3    atorvastatin (LIPITOR) 40 MG tablet TAKE 1 TABLET BY MOUTH EVERYDAY AT BEDTIME 90 tablet 3    pregabalin (LYRICA) 50 MG capsule TAKE 1 CAPSULE BY MOUTH 2 TIMES DAILY 180 capsule 1    levothyroxine (SYNTHROID) 137 MCG tablet TAKE 1 TABLET BY MOUTH EVERY DAY 90 tablet 3    DULoxetine (CYMBALTA) 30 MG extended release capsule Take 1 capsule by mouth daily 90 capsule 3    KLOR-CON M10 10 MEQ extended release tablet TAKE 1 TABLET BY MOUTH EVERY DAY 90 tablet 3    nitroGLYCERIN (NITROSTAT) 0.4 MG SL tablet Place 1 tablet under the tongue every 5 minutes as needed for Chest pain 25 tablet 1    b complex vitamins capsule Take 1 capsule by mouth daily      Elastic Bandages & Supports (JOBST KNEE HIGH COMPRESSION SM) MISC 1 each by Does not apply route daily as needed 1 each 0    aspirin 81 MG tablet Take 81 mg by mouth daily. No current facility-administered medications for this visit. Allergies   Allergen Reactions    Tape [employer] Tape]      Surgical tape-rash     Health Maintenance   Topic Date Due    DTaP/Tdap/Td vaccine (1 - Tdap) Never done    Shingles Vaccine (1 of 2) Never done    Flu vaccine (1) 09/01/2021    Potassium monitoring  11/10/2021    Creatinine monitoring  12/18/2021    Lipid screen  01/14/2022    TSH testing  01/14/2022    Annual Wellness Visit (AWV)  07/15/2022    Pneumococcal 65+ yrs at Risk Vaccine  Completed    COVID-19 Vaccine  Completed    Hepatitis A vaccine  Aged Out    Hepatitis B vaccine  Aged Out    Hib vaccine  Aged Out    Meningococcal (ACWY) vaccine  Aged Out         Objective:     Physical Exam  Constitutional:       General: He is not in acute distress. Appearance: He is well-developed. He is not diaphoretic. HENT:      Head: Normocephalic and atraumatic.

## 2021-08-13 ENCOUNTER — OFFICE VISIT (OUTPATIENT)
Dept: UROLOGY | Age: 81
End: 2021-08-13
Payer: MEDICARE

## 2021-08-13 VITALS — BODY MASS INDEX: 30.9 KG/M2 | HEIGHT: 67 IN | WEIGHT: 196.9 LBS | RESPIRATION RATE: 17 BRPM

## 2021-08-13 DIAGNOSIS — Z85.51 HISTORY OF BLADDER CANCER: Primary | ICD-10-CM

## 2021-08-13 DIAGNOSIS — E27.8 RIGHT ADRENAL MASS (HCC): ICD-10-CM

## 2021-08-13 DIAGNOSIS — C79.9 METASTATIC ADENOCARCINOMA (HCC): ICD-10-CM

## 2021-08-13 DIAGNOSIS — C67.9 MALIGNANT NEOPLASM OF URINARY BLADDER, UNSPECIFIED SITE (HCC): ICD-10-CM

## 2021-08-13 PROCEDURE — 99213 OFFICE O/P EST LOW 20 MIN: CPT | Performed by: UROLOGY

## 2021-08-13 NOTE — PROGRESS NOTES
Lisa Tamez MD   Urology Clinic Progress Note      Patient:  Bobby Ovalles  YOB: 1940  Date: 8/13/2021    HISTORY OF PRESENT ILLNESS:   The patient is a 80 y.o. male who presents today for follow-up for the following problem(s):      1. History of bladder cancer    2. Malignant neoplasm of urinary bladder, unspecified site ([address]    3. Metastatic adenocarcinoma ([address]    4. Right adrenal mass (HCC)           Overall the problem(s) : are worsening. Associated Symptoms: No dysuria, gross hematuria. Pain Severity:      Summary of old records:   Mr. Daya Vicente is an 19-year-old patient male with a history of bladder cancer. He was first diagnosed with non-invasive high grade papillary urothelial carcinoma in 2014. He underwent TURBT and BCG therapy. Unfortunately, his surveillance cystoscopy in October 2018 showed a large bladder tumor. He underwent a TURBT on November 8, 2018. 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 He completed three cycles of neoadjuvant Gemzar/Cisplatin in 3/20/19. He underwent a Radical Cystoprostatectomy with Pelvic Lymph Node Dissection and creation of Ileal Loop Diversion on 5/1/19. He post-neoadjuvant treatment pathology was pT0, pN0.     Additional History: Onset few months  Severity is described as moderate to severe. The course of symptoms over time is insidious. 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 Mr. Morocho Sis CT chest in November 2020 and PET CT in December 2020 are negative for any lung findings.   Saw Roney who felt surgery>XRT>chemo  Hx of cardiac stents, AAA repair (endovascular)  There is a solitary left kidney. There is a 1.5 cm cyst in the kidney. There is no hydronephrosis. Walks with cane    Had radiation of right adrenal lesion in April/mach of 2021  No new symptoms  Doing well    5/2021 CT   1. Marked reduction in size of the right adrenal lesion. Currently adrenal gland is normal size there is a 0.6 x 0.9 low density portion. 2. Stable CT scan abdomen and pelvis otherwise. Stable size of the patient's abdominal aortic aneurysm. I independently reviewed and verified the images and reports from:    CT CHEST W CONTRAST    Result Date: 7/22/2021  PROCEDURE: CT CHEST W CONTRAST CLINICAL INFORMATION: History of bladder cancer. COMPARISON: CT chest November 7, 2020. TECHNIQUE: Multiple axial 5 millimeter images of the thorax and upper abdomen are obtained following the administration of intravenous contrast material. 71 mL Isovue-370 given intravenously. Coronal and sagittal reformats are generated from the axial data set. All CT scans at this facility use dose modulation, iterative reconstruction, and/or weight-based dosing when appropriate to reduce radiation dose to as low as reasonably achievable. FINDINGS: Lungs: Stable 4 mm noncalcified nodule within the right upper lobe (series 2 image 25). No additional discrete pulmonary nodules are identified. There are again mild to moderate centrilobular and paraseptal emphysematous changes. There is again patchy atelectasis versus scarring in bilateral lung bases. There is no pleural effusion. There is no pneumothorax. Proximal tracheobronchial tree is widely patent. Mediastinum and toro: There is no axillary, mediastinal, or hilar lymphadenopathy. Heart size is normal. There is no pericardial effusion. Thoracic aorta is normal in caliber. There is calcified atherosclerotic plaque within the thoracic aorta.  Although the study is not optimized for pulmonary artery evaluation, there is no filling defect in the central pulmonary arteries to suggest pulmonary embolism. There are again coronary artery calcifications, however the study is not optimized for coronary artery  evaluation. Upper abdomen: Right adrenal nodule measures 0.8 x 1.4 cm in axial dimension, previously measuring 1.8 x 2.3 cm. Right kidney is absent. There is again a water attenuation cyst within the left kidney. There is a partially visualized stent graft within the infrarenal abdominal aorta. Remainder of the visualized upper abdomen is unremarkable. Bones: No suspicious lytic or blastic osseous lesions are identified. Vertebral body heights are maintained. There are diffuse degenerative changes of the visualized spine. 1. Stable 4 mm noncalcified nodule in the right upper lobe. No other discrete pulmonary nodules are identified. 2. No intrathoracic lymphadenopathy. 3. Mild to moderate emphysematous changes. 4. Right adrenal nodule demonstrates interval decrease in size from November 2020. **This report has been created using voice recognition software. It may contain minor errors which are inherent in voice recognition technology. ** Final report electronically signed by Dr Erica Martines on 7/22/2021 11:42 AM    Last several PSA's:  Lab Results   Component Value Date    PSA 0.70 07/25/2011    PSA 0.83 10/31/2008       Last total testosterone:  No results found for: TESTOSTERONE    Urinalysis today:  No results found for this visit on 08/13/21.     Last BUN and creatinine:  Lab Results   Component Value Date    BUN 21 11/10/2020     Lab Results   Component Value Date    CREATININE 1.1 12/18/2020       Imaging Reviewed during this Office Visit:   (results were independently reviewed by physician and radiology report verified)    PAST MEDICAL, FAMILY AND SOCIAL HISTORY UPDATE:  Past Medical History:   Diagnosis Date    AAA (abdominal aortic aneurysm) ([address]     intravascular stent    BCG ROXANE 05/21/2019    BPH (benign prostatic hypertrophy)     CAD (coronary artery disease)     Cancer of skin     Carotid artery stenosis     Chronic back pain     Chronic kidney disease     Congenital absence of one kidney     absent right kidney    History of lumbar fusion 3/15    mid lower back down    Hyperlipidemia     Hypertension     Hypothyroidism     Obesity     Sciatica     TIA (transient ischemic attack) 1/2008    Unspecified cerebral artery occlusion with cerebral infarction     TIA     Past Surgical History:   Procedure Laterality Date    ABDOMINAL AORTIC ANEURYSM REPAIR  3/2010    Shireen    ABDOMINAL AORTIC ANEURYSM REPAIR  7/24/15    Shaheen Kevin    COLONOSCOPY  12/21/11    RINESMITH    CORONARY ANGIOPLASTY WITH STENT PLACEMENT  2003    2 stents    FEMORAL-FEMORAL BYPASS GRAFT  03/09/2017    Dr. Dobson Heal    arm    KNEE ARTHROSCOPY  12/2008    meniscus tear    LUMBAR LAMINECTOMY  3/2016    [facility]--Dr. Luh Schmid    OTHER SURGICAL HISTORY  4/14/15    melanoma removal from right chest    OTHER SURGICAL HISTORY  5/20/2016    I and D, exicison of posterior neck cyst    PA OFFICE/OUTPT VISIT,PROCEDURE ONLY N/A 11/8/2018    TURBT performed by Damaris Ayers MD at [address] [facility] 5/1/2019    ROBOTIC RADICAL CYSTOPROSTATECTOMY performed by Damaris Ayers MD at [address]  10/2016    Dr Alban Hashimoto   [facility]  11/2020    on wrist     TESTICLE REMOVAL  1990's    TUNNELED VENOUS PORT PLACEMENT      TURP  04/16/2014    See note of Dr. Golden Veronica for OR procedure details     Family History   Problem Relation Age of Onset    Diabetes Mother     Cancer Mother         Liver    Arthritis Mother     Asthma Father     Heart Disease Father     Cancer Father         Lung, Bone    Stroke Sister     Heart Disease Sister     Cancer Sister     Stroke Brother     Heart Disease Brother     Cancer Brother         Lung     Outpatient Medications Marked as Taking for the 21 encounter (Office Visit) with Bebo Pack MD   Medication Sig Dispense Refill    Multiple Vitamins-Minerals (PRESERVISION AREDS 2+MULTI VIT PO) Take 2 tablets by mouth daily      furosemide (LASIX) 40 MG tablet TAKE 1 TABLET BY MOUTH EVERY DAY 90 tablet 1    metoprolol tartrate (LOPRESSOR) 50 MG tablet TAKE 1 TABLET BY MOUTH 2 TIMES DAILY 180 tablet 3    atorvastatin (LIPITOR) 40 MG tablet TAKE 1 TABLET BY MOUTH EVERYDAY AT BEDTIME 90 tablet 3    pregabalin (LYRICA) 50 MG capsule TAKE 1 CAPSULE BY MOUTH 2 TIMES DAILY 180 capsule 1    levothyroxine (SYNTHROID) 137 MCG tablet TAKE 1 TABLET BY MOUTH EVERY DAY 90 tablet 3    DULoxetine (CYMBALTA) 30 MG extended release capsule Take 1 capsule by mouth daily 90 capsule 3    KLOR-CON M10 10 MEQ extended release tablet TAKE 1 TABLET BY MOUTH EVERY DAY 90 tablet 3    nitroGLYCERIN (NITROSTAT) 0.4 MG SL tablet Place 1 tablet under the tongue every 5 minutes as needed for Chest pain 25 tablet 1    b complex vitamins capsule Take 1 capsule by mouth daily      Elastic Bandages & Supports (JOBST KNEE HIGH COMPRESSION SM) MISC 1 each by Does not apply route daily as needed 1 each 0    aspirin 81 MG tablet Take 81 mg by mouth daily.          Tape [employer] tape]  Social History     Tobacco Use   Smoking Status Former Smoker    Packs/day: 1.00    Years: 24.00    Pack years: 24.00    Types: Cigarettes    Start date:     Quit date: 1979    Years since quittin.3   Smokeless Tobacco Never Used       Social History     Substance and Sexual Activity   Alcohol Use No    Alcohol/week: 0.0 standard drinks       REVIEW OF SYSTEMS:  Constitutional: negative  Eyes: negative  Respiratory: negative  Cardiovascular: negative  Gastrointestinal: negative  Musculoskeletal: negative  Genitourinary: negative except for what is in HPI  Skin: negative   Neurological: negative  Hematological/Lymphatic: negative  Psychological: negative    Physical Exam:      Vitals:    08/13/21 1143   Resp: 17     Patient is a 80 y.o. male in no acute distress and alert and oriented to person, place and time. NAD, A/o  Non labored respiration  Normal peripheral pulses  Skin- warm and dry  Psych- normal mood and affect      Assessment and Plan      1. History of bladder cancer    2. Malignant neoplasm of urinary bladder, unspecified site ([facility] [address]    3. Metastatic adenocarcinoma ([facility] [address]    4. Right adrenal mass [facility])           Plan:      Return in about 6 months (around 2/13/2022).   Doing well  CT's reviewed and discussed with patient  Right adrenal mass drastically reduced in size  Follow up 6 months of CT of abdomen           Irma Golds, M.D [facility]

## 2021-08-25 DIAGNOSIS — H93.13 TINNITUS, BILATERAL: ICD-10-CM

## 2021-08-25 DIAGNOSIS — H90.3 SENSORINEURAL HEARING LOSS (SNHL) OF BOTH EARS: Primary | ICD-10-CM

## 2021-08-26 ENCOUNTER — HOSPITAL ENCOUNTER (OUTPATIENT)
Dept: AUDIOLOGY | Age: 81
Discharge: HOME | End: 2021-08-26
Payer: MEDICARE

## 2021-08-26 PROCEDURE — 92557 COMPREHENSIVE HEARING TEST: CPT | Performed by: AUDIOLOGIST

## 2021-08-26 PROCEDURE — 92567 TYMPANOMETRY: CPT | Performed by: AUDIOLOGIST

## 2021-08-26 NOTE — PROGRESS NOTES
AUDIOLOGICAL EVALUATION      REASON FOR TESTING:  Audiometric evaluation per the request of Geneva BENZ, due to the diagnosis of sensorineural hearing loss of both ears and tinnitus, bilateral. Longstanding bilateral hearing loss, hearing aid use, vertigo and imbalance. The patient's low frequency hearing has fluctuated for both ears in comparison to previous audiograms and he has poor word discrimination in the left ear that has gradually worsened. VNG testing was completed 8/20/2020 and suggested an abnormality of the central vestibular system. According to Dr. Zoila Selby 9/2/2020 note, MRI was negative for any retrocochlear lesion. The patient was fit with [employer]  behind-the-ear hearing aids on 5/30/2017. History of chemotherapy for bladder cancer. OTOSCOPY: Some cerumen was safely removed from both ear canals with a lighted curette prior to testing. AUDIOGRAM        Reliability: Good  Audiometer Used:  GSI-61    COMMENTS: Mild, 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 Low frequency thresholds are improved, bilaterally, when compared to 9/11/2020 audiogram. Speech discrimination ability is poor at 60% for the left ear and good at 84% for the right ear. Tympanometry revealed normal peak pressure and normal middle ear compliance for both ears. HEARING AID CHECK: A listening check of the patient's current [employer] hearing aids revealed normal output. Replaced earmold tubing on both hearing aids. RECOMMENDATION(S):   1- Demonstrated new hearing aid technology for the patient today- Phonak Urszula M70-SP BTE hearing aids were fit with his current earmolds. The patient noted an improvement in quality/sound quality. He is not sure that he would like to pursue new hearing aids at this time. He will discuss this further with his wife. The Phonak Urszula hearing aids will be held for a few days while the patient discusses new hearing aids with his wife.    2- Annual audiometry for monitoring purposes or sooner if any changes are noted in hearing ability.

## 2021-10-31 DIAGNOSIS — G62.9 NEUROPATHY: ICD-10-CM

## 2021-11-01 RX ORDER — PREGABALIN 50 MG/1
50 CAPSULE ORAL 2 TIMES DAILY
Qty: 180 CAPSULE | Refills: 1 | Status: SHIPPED | OUTPATIENT
Start: 2021-11-01 | End: 2022-05-02

## 2021-11-01 NOTE — TELEPHONE ENCOUNTER
Please approve or deny     Last Visit Date:  7/26/2021       Next Visit Date:    Visit date not found

## 2021-11-10 ENCOUNTER — TELEPHONE (OUTPATIENT)
Dept: UROLOGY | Age: 81
End: 2021-11-10

## 2021-11-10 ENCOUNTER — OFFICE VISIT (OUTPATIENT)
Dept: FAMILY MEDICINE CLINIC | Age: 81
End: 2021-11-10
Payer: MEDICARE

## 2021-11-10 VITALS
OXYGEN SATURATION: 95 % | BODY MASS INDEX: 31.32 KG/M2 | DIASTOLIC BLOOD PRESSURE: 70 MMHG | WEIGHT: 200 LBS | TEMPERATURE: 99.2 F | HEART RATE: 61 BPM | RESPIRATION RATE: 18 BRPM | SYSTOLIC BLOOD PRESSURE: 138 MMHG

## 2021-11-10 DIAGNOSIS — J20.9 ACUTE BRONCHITIS, UNSPECIFIED ORGANISM: Primary | ICD-10-CM

## 2021-11-10 DIAGNOSIS — C67.9 MALIGNANT NEOPLASM OF URINARY BLADDER, UNSPECIFIED SITE (HCC): Primary | ICD-10-CM

## 2021-11-10 DIAGNOSIS — Z20.822 SUSPECTED COVID-19 VIRUS INFECTION: ICD-10-CM

## 2021-11-10 LAB
Lab: NORMAL
PERFORMING INSTRUMENT: NORMAL
QC PASS/FAIL: NORMAL
SARS-COV-2, POC: NORMAL

## 2021-11-10 PROCEDURE — 99213 OFFICE O/P EST LOW 20 MIN: CPT | Performed by: FAMILY MEDICINE

## 2021-11-10 PROCEDURE — 87426 SARSCOV CORONAVIRUS AG IA: CPT | Performed by: FAMILY MEDICINE

## 2021-11-10 RX ORDER — CEPHALEXIN 500 MG/1
500 CAPSULE ORAL 3 TIMES DAILY
Qty: 30 CAPSULE | Refills: 0 | Status: SHIPPED | OUTPATIENT
Start: 2021-11-10 | End: 2022-01-24

## 2021-11-10 NOTE — TELEPHONE ENCOUNTER
Patient scheduled for CT ABDOMEN AND PELVIS WITH AND WITHOUT CONTRAST   at [address] [place] [address] on 02/10/2022 WITH ARRIVAL TIME OF 10:30AM.  Patient advised of instructions BRING LIST OF MEDICATION AND NPO 4 HOURS PRIOR.   Order mailed to patient

## 2021-11-17 ASSESSMENT — ENCOUNTER SYMPTOMS
COUGH: 1
ABDOMINAL DISTENTION: 0
CONSTIPATION: 0
ABDOMINAL PAIN: 0
DIARRHEA: 0
SHORTNESS OF BREATH: 0
SORE THROAT: 0
NAUSEA: 0
EYE PAIN: 0
SINUS PRESSURE: 0
RHINORRHEA: 0

## 2021-11-17 NOTE — PROGRESS NOTES
[address] [place] [id number]  Dept: [phone]  Dept Fax: [phone]  Loc: [phone]  PROGRESS NOTE      VisitDate: 11/10/2021    Esvin Barker is a 80 y.o. male who presents today for:     Chief Complaint   Patient presents with    Cough     and congestion x1 week. Subjective:  HPI  Patient comes in upper respiratory cough and congestion for the past week denies fever chills nausea vomiting diarrhea no relief with over-the-counter medicines    Review of Systems   Constitutional: Negative for appetite change and fever. HENT: Positive for congestion. Negative for ear pain, postnasal drip, rhinorrhea, sinus pressure and sore throat. Eyes: Negative for pain and visual disturbance. Respiratory: Positive for cough. Negative for shortness of breath. Cardiovascular: Negative for chest pain. Gastrointestinal: Negative for abdominal distention, abdominal pain, constipation, diarrhea and nausea. Genitourinary: Negative for dysuria, frequency and urgency. Musculoskeletal: Negative for arthralgias. Skin: Negative for rash. Neurological: Negative for dizziness.      Past Medical History:   Diagnosis Date    AAA (abdominal aortic aneurysm) ([address]     intravascular stent    BCG ROXANE 05/21/2019    BPH (benign prostatic hypertrophy)     CAD (coronary artery disease)     Cancer of skin     Carotid artery stenosis     Chronic back pain     Chronic kidney disease     Congenital absence of one kidney     absent right kidney    History of lumbar fusion 3/15    mid lower back down    Hyperlipidemia     Hypertension     Hypothyroidism     Obesity     Sciatica     TIA (transient ischemic attack) 1/2008    Unspecified cerebral artery occlusion with cerebral infarction     TIA      Past Surgical History:   Procedure Laterality Date    ABDOMINAL AORTIC ANEURYSM REPAIR  3/2010    Solo Martinez ABDOMINAL AORTIC ANEURYSM REPAIR  7/24/15    [facility]  11    [facility]    CORONARY ANGIOPLASTY WITH STENT PLACEMENT      2 stents    FEMORAL-FEMORAL BYPASS GRAFT  2017    Dr. Loren Hunter    arm    KNEE ARTHROSCOPY  2008    meniscus tear    LUMBAR LAMINECTOMY  3/2016    [facility]--Dr. Fredo Craft    OTHER SURGICAL HISTORY  4/14/15    melanoma removal from right chest    OTHER SURGICAL HISTORY  2016    I and D, exicison of posterior neck cyst    NM OFFICE/OUTPT VISIT,PROCEDURE ONLY N/A 2018    TURBT performed by Vivian Rangel MD at [address] [place] [address] N/A 2019    ROBOTIC RADICAL CYSTOPROSTATECTOMY performed by Vivian Rangel MD at [address]  10/2016    Dr Sharon Love  2020    on wrist     TESTICLE REMOVAL  's    TUNNELED VENOUS PORT PLACEMENT      TURP  2014    See note of Dr. Kaitlin De La Cruz for OR procedure details     Family History   Problem Relation Age of Onset    Diabetes Mother     Cancer Mother         Liver    Arthritis Mother     Asthma Father     Heart Disease Father     Cancer Father         Lung, Bone    Stroke Sister     Heart Disease Sister     Cancer Sister     Stroke Brother     Heart Disease Brother     Cancer Brother         Lung     Social History     Tobacco Use    Smoking status: Former Smoker     Packs/day: 1.00     Years: 24.00     Pack years: 24.00     Types: Cigarettes     Start date:      Quit date: 1979     Years since quittin.6    Smokeless tobacco: Never Used   Substance Use Topics    Alcohol use: No     Alcohol/week: 0.0 standard drinks      Current Outpatient Medications   Medication Sig Dispense Refill    cephALEXin (KEFLEX) 500 MG capsule Take 1 capsule by mouth 3 times daily 30 capsule 0    pregabalin (LYRICA) 50 MG capsule Take 1 capsule by mouth 2 times daily for 90 days.  TAKE 1 CAPSULE BY MOUTH TWICE A  capsule 1    Multiple Vitamins-Minerals (PRESERVISION AREDS 2+MULTI VIT PO) Take 2 tablets by mouth daily      furosemide (LASIX) 40 MG tablet TAKE 1 TABLET BY MOUTH EVERY DAY 90 tablet 1    metoprolol tartrate (LOPRESSOR) 50 MG tablet TAKE 1 TABLET BY MOUTH 2 TIMES DAILY 180 tablet 3    atorvastatin (LIPITOR) 40 MG tablet TAKE 1 TABLET BY MOUTH EVERYDAY AT BEDTIME 90 tablet 3    levothyroxine (SYNTHROID) 137 MCG tablet TAKE 1 TABLET BY MOUTH EVERY DAY 90 tablet 3    DULoxetine (CYMBALTA) 30 MG extended release capsule Take 1 capsule by mouth daily 90 capsule 3    KLOR-CON M10 10 MEQ extended release tablet TAKE 1 TABLET BY MOUTH EVERY DAY 90 tablet 3    b complex vitamins capsule Take 1 capsule by mouth daily      Elastic Bandages & Supports (JOBST KNEE HIGH COMPRESSION SM) MISC 1 each by Does not apply route daily as needed 1 each 0    aspirin 81 MG tablet Take 81 mg by mouth daily.  nitroGLYCERIN (NITROSTAT) 0.4 MG SL tablet Place 1 tablet under the tongue every 5 minutes as needed for Chest pain (Patient not taking: Reported on 11/10/2021) 25 tablet 1     No current facility-administered medications for this visit. Allergies   Allergen Reactions    Tape Jeaneen Eastern Tape]      Surgical tape-rash     Health Maintenance   Topic Date Due    DTaP/Tdap/Td vaccine (1 - Tdap) Never done    Shingles Vaccine (1 of 2) Never done    COVID-19 Vaccine (3 - Moderna risk 4-dose series) 03/17/2021    Potassium monitoring  11/10/2021    Creatinine monitoring  12/18/2021    TSH testing  01/14/2022    Annual Wellness Visit (AWV)  07/15/2022    Flu vaccine  Completed    Pneumococcal 65+ yrs at Risk Vaccine  Completed    Hepatitis A vaccine  Aged Out    Hepatitis B vaccine  Aged Out    Hib vaccine  Aged Out    Meningococcal (ACWY) vaccine  Aged Out         Objective:     Physical Exam  Constitutional:       General: He is not in acute distress. Appearance: He is well-developed.  He is not diaphoretic. HENT:      Head: Normocephalic and atraumatic. Right Ear: External ear normal.      Left Ear: External ear normal.   Eyes:      Conjunctiva/sclera: Conjunctivae normal.   Neck:      Vascular: No JVD. Cardiovascular:      Rate and Rhythm: Normal rate and regular rhythm. Heart sounds: Normal heart sounds. Pulmonary:      Effort: Pulmonary effort is normal.      Breath sounds: Wheezing and rhonchi present. No rales. Musculoskeletal:         General: No tenderness. Skin:     General: Skin is warm and dry. Coloration: Skin is not pale. Neurological:      Mental Status: He is alert and oriented to person, place, and time. /70   Pulse 61   Temp 99.2 °F (37.3 °C) (Oral)   Resp 18   Wt 200 lb (90.7 kg)   SpO2 95%   BMI 31.32 kg/m²     Covid negative  Impression/Plan:  1. Acute bronchitis, unspecified organism    2. Suspected COVID-19 virus infection      Requested Prescriptions     Signed Prescriptions Disp Refills    cephALEXin (KEFLEX) 500 MG capsule 30 capsule 0     Sig: Take 1 capsule by mouth 3 times daily     Orders Placed This Encounter   Procedures    POCT COVID-19, Antigen     Order Specific Question:   Is this test for diagnosis or screening? Answer:   Diagnosis of ill patient     Order Specific Question:   Symptomatic for COVID-19 as defined by CDC? Answer:   Yes     Order Specific Question:   Date of Symptom Onset     Answer:   11/3/2021     Order Specific Question:   Hospitalized for COVID-19? Answer:   No     Order Specific Question:   Admitted to ICU for COVID-19? Answer:   No     Order Specific Question:   Employed in healthcare setting? Answer:   No     Order Specific Question:   Resident in a congregate (group) care setting? Answer:   No     Order Specific Question:   Pregnant: Answer:   No     Order Specific Question:   Previously tested for COVID-19?      Answer:   Yes       Patient giveneducational materials - see patient instructions. Discussed use, benefit, and side effects of prescribed medications. All patient questions answered. Pt voiced understanding. Reviewed health maintenance. Patient agreedwith treatment plan. Follow up as directed. **This report has been created using voice recognition software. It may contain minor errorswhich are inherent in voice recognition technology. **       Electronically signed by Nicholas Owen MD on 11/17/2021 at 6:49 AM

## 2022-01-03 ENCOUNTER — OFFICE VISIT (OUTPATIENT)
Dept: ENT CLINIC | Age: 82
End: 2022-01-03
Payer: MEDICARE

## 2022-01-03 VITALS
WEIGHT: 208 LBS | HEART RATE: 86 BPM | SYSTOLIC BLOOD PRESSURE: 126 MMHG | TEMPERATURE: 98.1 F | BODY MASS INDEX: 32.65 KG/M2 | DIASTOLIC BLOOD PRESSURE: 70 MMHG | HEIGHT: 67 IN | RESPIRATION RATE: 14 BRPM | OXYGEN SATURATION: 93 %

## 2022-01-03 DIAGNOSIS — H61.23 IMPACTED CERUMEN, BILATERAL: Primary | ICD-10-CM

## 2022-01-03 PROCEDURE — 69210 REMOVE IMPACTED EAR WAX UNI: CPT | Performed by: NURSE PRACTITIONER

## 2022-01-03 NOTE — PROGRESS NOTES
Procedure note:  DATE AND TIME OF PROCEDURE: 1/4/2022 7:55 AM  PATIENT NAME: Parmjit Wooten  MRN [id number]  PROVIDER: DEMAR Bain CNP   PRE-PROCEDURE DIAGNOSIS:  Bilateral cerumen impaction  POST-PROCEDURE DIAGNOSIS:   Same     INDICATION: Cerumenosis causing an inability to visualize the tympanic membrane, middle ear space and/or external auditory canal.     TEACHING: Procedure, benefits, and risks were explained to patient/family. Verbal informed consent was obtained. TIME OUT: A time out was conducted immediately before starting the procedure that confirmed a final verification of the correct patient, correct procedure, correct patient position, correct site and availability of special equipment. DESCRIPTION OF PROCEDURE:  PROCEDURE: Cerumenectomy  SITE: Bilateral ear(s)    ANESTHESIA:  NONE  COMPLICATIONS: NONE  EBL: NONE    PROCEDURE: Handheld otoscope used to visualize EAC. Bilateral cerumen in place, obstructing visualization of tympanic membranes. Cerumen removed with wire loop, alligator forceps and suction until tympanic membranes could be visualized as documented above. Normal exam. The patient tolerated the procedure well, with no complications.

## 2022-01-05 RX ORDER — POTASSIUM CHLORIDE 750 MG/1
TABLET, EXTENDED RELEASE ORAL
Qty: 90 TABLET | Refills: 3 | Status: SHIPPED | OUTPATIENT
Start: 2022-01-05

## 2022-01-24 ENCOUNTER — OFFICE VISIT (OUTPATIENT)
Dept: INTERNAL MEDICINE CLINIC | Age: 82
End: 2022-01-24
Payer: MEDICARE

## 2022-01-24 VITALS
HEIGHT: 67 IN | TEMPERATURE: 97.5 F | WEIGHT: 207.4 LBS | DIASTOLIC BLOOD PRESSURE: 70 MMHG | SYSTOLIC BLOOD PRESSURE: 126 MMHG | BODY MASS INDEX: 32.55 KG/M2

## 2022-01-24 DIAGNOSIS — E78.00 PURE HYPERCHOLESTEROLEMIA: ICD-10-CM

## 2022-01-24 DIAGNOSIS — F32.1 CURRENT MODERATE EPISODE OF MAJOR DEPRESSIVE DISORDER, UNSPECIFIED WHETHER RECURRENT (HCC): ICD-10-CM

## 2022-01-24 DIAGNOSIS — I71.40 ABDOMINAL AORTIC ANEURYSM (AAA) WITHOUT RUPTURE: ICD-10-CM

## 2022-01-24 DIAGNOSIS — C67.9 MALIGNANT NEOPLASM OF URINARY BLADDER, UNSPECIFIED SITE (HCC): ICD-10-CM

## 2022-01-24 DIAGNOSIS — I10 ESSENTIAL HYPERTENSION: ICD-10-CM

## 2022-01-24 DIAGNOSIS — I73.9 CLAUDICATION (HCC): ICD-10-CM

## 2022-01-24 DIAGNOSIS — T50.905S MEDICATION ADVERSE EFFECT, SEQUELA: ICD-10-CM

## 2022-01-24 DIAGNOSIS — N18.30 STAGE 3 CHRONIC KIDNEY DISEASE, UNSPECIFIED WHETHER STAGE 3A OR 3B CKD (HCC): Primary | ICD-10-CM

## 2022-01-24 DIAGNOSIS — I25.10 CORONARY ARTERY DISEASE INVOLVING NATIVE CORONARY ARTERY OF NATIVE HEART WITHOUT ANGINA PECTORIS: Chronic | ICD-10-CM

## 2022-01-24 DIAGNOSIS — E03.4 HYPOTHYROIDISM DUE TO ACQUIRED ATROPHY OF THYROID: ICD-10-CM

## 2022-01-24 PROBLEM — I65.29 CAROTID ARTERY STENOSIS: Status: ACTIVE | Noted: 2022-01-24

## 2022-01-24 PROCEDURE — 99213 OFFICE O/P EST LOW 20 MIN: CPT | Performed by: INTERNAL MEDICINE

## 2022-01-24 PROCEDURE — 93000 ELECTROCARDIOGRAM COMPLETE: CPT | Performed by: INTERNAL MEDICINE

## 2022-01-24 RX ORDER — DULOXETIN HYDROCHLORIDE 30 MG/1
30 CAPSULE, DELAYED RELEASE ORAL DAILY
Qty: 90 CAPSULE | Refills: 3 | Status: SHIPPED | OUTPATIENT
Start: 2022-01-24 | End: 2022-08-03 | Stop reason: SDUPTHER

## 2022-01-24 RX ORDER — FUROSEMIDE 40 MG/1
TABLET ORAL
Qty: 90 TABLET | Refills: 3 | Status: ON HOLD | OUTPATIENT
Start: 2022-01-24 | End: 2022-02-28 | Stop reason: HOSPADM

## 2022-01-24 RX ORDER — LEVOTHYROXINE SODIUM 137 UG/1
TABLET ORAL
Qty: 90 TABLET | Refills: 3 | Status: SHIPPED | OUTPATIENT
Start: 2022-01-24

## 2022-01-24 NOTE — PROGRESS NOTES
Green Button (:  1940) is a 80 y.o. male,Established patient, here for evaluation of the following chief complaint(s):  Coronary Artery Disease (6 month follow up), Chronic Kidney Disease, Hypertension, and Hypothyroidism         ASSESSMENT/PLAN:  1. Stage 3 chronic kidney disease, unspecified whether stage 3a or 3b CKD ([facility]- check labs  -     Basic Metabolic Panel; Future  2. Abdominal aortic aneurysm (AAA) without rupture ([facility]  Comments: Followed prior with Dr Leti Hunter, has no f/u appt  stented  3. Claudication ([facility]  -     [address] - [place] Duplex Lo Extrem Art Bilat  Notes pain in legs, dependent rubor; can only walk a short distance; arterial dopplers  4. Coronary artery disease involving native coronary artery of native heart without angina pectoris- ekg reviewed, normal; no chest pain; ck labs  -     EKG 12 Lead  -     furosemide (LASIX) 40 MG tablet; TAKE 1 TABLET BY MOUTH EVERY DAY, Disp-90 tablet, R-3Normal  -     Basic Metabolic Panel; Future  -     LDL Cholesterol, Direct; Future  5. Essential hypertension- stable by reading  -     EKG 12 Lead  -     Basic Metabolic Panel; Future  6. Current moderate episode of major depressive disorder, unspecified whether recurrent (HCC)  -     DULoxetine (CYMBALTA) 30 MG extended release capsule; Take 1 capsule by mouth daily, Disp-90 capsule, R-3Normal  7. Medication adverse effect, sequela  -     TSH without Reflex; Future  -     Basic Metabolic Panel; Future  -     LDL Cholesterol, Direct; Future  8. Hypothyroidism due to acquired atrophy of thyroid  -     TSH without Reflex; Future  9. Pure hypercholesterolemia  -     LDL Cholesterol, Direct; Future  10. Malignant neoplasm of urinary bladder, unspecified site [facility])- to follow up with Urology and get CT SCAN      No follow-ups on file. Subjective   SUBJECTIVE/OBJECTIVE:  HPI Pt with CAD, stent, AAA stented, PAD, hx bladder cancer with urostomy, seen in f/u.   He feels well but has limited walking due to pain in legs, mainly thighs with walking; very limited. Notes feet beet red when he gets up in ams. States he was checked for circulation in past and told ok. Had back operation 6-7 yr ago at [facility]; will get records    Denies any chest sxs    Review of Systems    Twelve point ROS completed and found to be negative except as noted above. Objective   Physical Exam    General appearance - alert, well appearing, and in no distress and overweight    Mental Status - alert, oriented to person, place, and time          Neck - supple, no significant adenopathy        Chest - clear to auscultation, no wheezes, rales or rhonchi, symmetric air entry     Heart - normal rate, regular rhythm, normal S1, S2, no murmurs, rubs, clicks or gallops     Abdomen - soft, nontender, nondistended, no masses or organomegaly  protuberant         Neurological - normal muscle tone, no tremors, strength 5/5     Musculoskeletal -   msk exam:[id number]}     Extremities - legs puffy, unable to palpate pulses; bp 110 right PT, unable to get pulses by doppler left foot or right DP. Foot red with dangling     Skin - See above              An electronic signature was used to authenticate this note.     --Broderick Burgess MD

## 2022-01-31 ENCOUNTER — TELEPHONE (OUTPATIENT)
Dept: INTERNAL MEDICINE CLINIC | Age: 82
End: 2022-01-31

## 2022-01-31 DIAGNOSIS — I73.9 CLAUDICATION (HCC): Primary | ICD-10-CM

## 2022-01-31 NOTE — TELEPHONE ENCOUNTER
On last office visit you ordered duplex lower extremities arterial bilateral for claudication. This did not print as it is not the right order. Is this the same as segemental pressures?   I can put the order in if so.(pt unavailable to do it on 10,11 or 17)

## 2022-02-01 LAB
ANION GAP SERPL CALCULATED.3IONS-SCNC: 10 MMOL/L (ref 5–15)
BUN BLDV-MCNC: 19 MG/DL (ref 5–27)
CALCIUM SERPL-MCNC: 9.2 MG/DL (ref 8.5–10.5)
CHLORIDE BLD-SCNC: 104 MMOL/L (ref 98–109)
CO2: 31 MMOL/L (ref 22–32)
CREAT SERPL-MCNC: 1.21 MG/DL (ref 0.6–1.3)
EGFR AFRICAN AMERICAN: >60 ML/MIN/1.73SQ.M
EGFR IF NONAFRICAN AMERICAN: 57 ML/MIN/1.73SQ.M
GLUCOSE: 97 MG/DL (ref 65–99)
LDL CHOLESTEROL DIRECT: 90 MG/DL
POTASSIUM SERPL-SCNC: 4.8 MMOL/L (ref 3.5–5)
SODIUM BLD-SCNC: 145 MMOL/L (ref 134–146)
TSH SERPL DL<=0.05 MIU/L-ACNC: 1.17 UIU/ML (ref 0.49–4.67)

## 2022-02-04 ENCOUNTER — TELEPHONE (OUTPATIENT)
Dept: INTERNAL MEDICINE CLINIC | Age: 82
End: 2022-02-04

## 2022-02-04 DIAGNOSIS — E78.00 PURE HYPERCHOLESTEROLEMIA: ICD-10-CM

## 2022-02-04 DIAGNOSIS — I25.10 CORONARY ARTERY DISEASE INVOLVING NATIVE CORONARY ARTERY OF NATIVE HEART WITHOUT ANGINA PECTORIS: Primary | ICD-10-CM

## 2022-02-04 DIAGNOSIS — T50.905S ADVERSE EFFECT OF DRUG, SEQUELA: ICD-10-CM

## 2022-02-04 RX ORDER — EZETIMIBE 10 MG/1
10 TABLET ORAL DAILY
Qty: 30 TABLET | Refills: 3 | Status: SHIPPED | OUTPATIENT
Start: 2022-02-04 | End: 2022-05-31

## 2022-02-04 NOTE — TELEPHONE ENCOUNTER
----- Message from Ileana Covington MD sent at 2/1/2022  8:06 AM EST -----  Suggest he add [id number] mg to get ldl lower

## 2022-02-04 NOTE — TELEPHONE ENCOUNTER
Spoke to pts wife as he is hard of hearing and advised her labs are [facility] except would like to try to get his ldl lower and would like to try zetia in addition to his other meds. She states that is fine and would like script to go to [facility] on [address]. I mailed pts wife a lab order for ldl in 6 weeks.

## 2022-02-08 ENCOUNTER — HOSPITAL ENCOUNTER (OUTPATIENT)
Dept: INTERVENTIONAL RADIOLOGY/VASCULAR | Age: 82
Discharge: HOME | End: 2022-02-08
Payer: MEDICARE

## 2022-02-08 DIAGNOSIS — I73.9 CLAUDICATION (HCC): ICD-10-CM

## 2022-02-08 PROCEDURE — 93925 LOWER EXTREMITY STUDY: CPT

## 2022-02-09 ENCOUNTER — TELEPHONE (OUTPATIENT)
Dept: INTERNAL MEDICINE CLINIC | Age: 82
End: 2022-02-09

## 2022-02-09 DIAGNOSIS — I73.9 PERIPHERAL VASCULAR DISEASE (HCC): ICD-10-CM

## 2022-02-09 DIAGNOSIS — I73.9 CLAUDICATION (HCC): Primary | ICD-10-CM

## 2022-02-09 NOTE — TELEPHONE ENCOUNTER
I spoke to pts wife and advised there is some impairment of circulation high up in his legs and Dr. Celio Golbderg is recommending he see Dr. Kayla Craig who has done his previous surgery. Wife is in agreement. I advised her  will contact pt.

## 2022-02-10 ENCOUNTER — HOSPITAL ENCOUNTER (OUTPATIENT)
Dept: CT IMAGING | Age: 82
Discharge: HOME | End: 2022-02-10
Payer: MEDICARE

## 2022-02-10 DIAGNOSIS — C67.9 MALIGNANT NEOPLASM OF URINARY BLADDER, UNSPECIFIED SITE (HCC): ICD-10-CM

## 2022-02-10 DIAGNOSIS — E27.8 RIGHT ADRENAL MASS (HCC): ICD-10-CM

## 2022-02-10 PROCEDURE — 6360000004 HC RX CONTRAST MEDICATION: Performed by: UROLOGY

## 2022-02-10 PROCEDURE — 74178 CT ABD&PLV WO CNTR FLWD CNTR: CPT

## 2022-02-10 RX ADMIN — IOPAMIDOL 85 ML: 755 INJECTION, SOLUTION INTRAVENOUS at 10:08

## 2022-02-17 ENCOUNTER — TELEPHONE (OUTPATIENT)
Dept: UROLOGY | Age: 82
End: 2022-02-17

## 2022-02-17 ENCOUNTER — OFFICE VISIT (OUTPATIENT)
Dept: UROLOGY | Age: 82
End: 2022-02-17
Payer: MEDICARE

## 2022-02-17 VITALS
SYSTOLIC BLOOD PRESSURE: 120 MMHG | HEIGHT: 67 IN | WEIGHT: 205 LBS | BODY MASS INDEX: 32.18 KG/M2 | DIASTOLIC BLOOD PRESSURE: 78 MMHG

## 2022-02-17 DIAGNOSIS — C67.9 MALIGNANT NEOPLASM OF URINARY BLADDER, UNSPECIFIED SITE (HCC): Primary | ICD-10-CM

## 2022-02-17 DIAGNOSIS — E27.8 RIGHT ADRENAL MASS (HCC): ICD-10-CM

## 2022-02-17 PROCEDURE — 99213 OFFICE O/P EST LOW 20 MIN: CPT | Performed by: PHYSICIAN ASSISTANT

## 2022-02-17 NOTE — TELEPHONE ENCOUNTER
Patient scheduled for CT CHEST WO  at [address] [place] [address] on 2/21/22 ARRIVAL  PM FOR A 340 PM SCAN TIME WITH NO PREP. Patient advised of instructions.   Order mailed/given to patient

## 2022-02-17 NOTE — PROGRESS NOTES
[address] [address] [place] [address]  Dept: [phone]  Loc: [phone]      Mr. Stefani Faustin was seen in follow up for   Chief Complaint   Patient presents with    Follow-up     CT prior    Bladder Cancer     Right adrenal mass and Metastatic adenocarcinoma         HPI:    Mr. Stefani Faustin is an 24-year-old patient male with a history of bladder cancer. He was first diagnosed with non-invasive high grade papillary urothelial carcinoma in 2014. He underwent TURBT and BCG therapy. Unfortunately, his surveillance cystoscopy in October 2018 showed a large bladder tumor. He underwent a TURBT on November 8, 2018. 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 He completed three cycles of neoadjuvant Gemzar/Cisplatin in 3/20/19. He underwent a Radical Cystoprostatectomy with Pelvic Lymph Node Dissection and creation of Ileal Loop Diversion on 5/1/19. His  post-neoadjuvant treatment pathology was pT0, pN0.     Unfortunately, his imaging demonstrated a new renal adrenal mass in last 2020. A Needle core biopsy performed by Dr. Arsh Gonzalez at 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 Pathology felt this tumor may have originated in the lungs. Patient received 5000 cGy EBRT, completed 5/5 in March 2021. He states that he has been feeling relatively well since his last visit but his wife reports that he is only able to walk short distances due to bilateral leg pain. He also experiences intermittent wheezing episodes while sitting. He denies any exacerbating or alleviating factors. He still has fatigue and has harbored issues with depression after his surgery. He feels that everything is stable at this time.  He denies ostomy pain, gross hematuria, fever/chills, flank/pelvic/abdominal pain, or difficulty with bowel movements. He denies night sweats, new onset bone pain, chest pain/pressure, dyspnea, N/V, leg pain, or lightheadedness. He is happy with his general medical state. He presents today for further evaluation.       Past Medical History:   Diagnosis Date    AAA (abdominal aortic aneurysm) ([address]     intravascular stent    BCG ROXANE 05/21/2019    BPH (benign prostatic hypertrophy)     CAD (coronary artery disease)     Cancer of skin     Carotid artery stenosis     Chronic back pain     Chronic kidney disease     Congenital absence of one kidney     absent right kidney    History of lumbar fusion 3/15    mid lower back down    Hyperlipidemia     Hypertension     Hypothyroidism     Obesity     Sciatica     TIA (transient ischemic attack) 1/2008    Unspecified cerebral artery occlusion with cerebral infarction     TIA       Past Surgical History:   Procedure Laterality Date    ABDOMINAL AORTIC [facility]EURYSM [facility]PAIR  3/2010    [facility]    ABDOMINAL AORTIC ANEURYSM REPAIR  7/24/15    [facility]    COLONOSCOPY  12/21/11    [facility]    CORONARY ANGIOPLASTY WITH STENT PLACEMENT  2003    2 stents    FEMORAL-FEMORAL BYPASS GRAFT  03/09/2017    Dr. Marlene Garrido    arm    KNEE ARTHROSCOPY  12/2008    meniscus tear    LUMBAR LAMINECTOMY  3/2016    [facility]--Dr. Chaparrita Zavala    OTHER SURGICAL HISTORY  4/14/15    melanoma removal from right chest    OTHER SURGICAL HISTORY  5/20/2016    I and D, exicison of posterior neck cyst    HI OFFICE/OUTPT VISIT,PROCEDURE ONLY N/A 11/8/2018    TURBT performed by Francisco Pedraza MD at [facility] 5/1/2019    ROBOTIC RADICAL CYSTOPROSTATECTOMY performed by Francisco Pedraza MD at [address]  10/2016    Dr Mika Barbour   [facility]  11/2020    on wrist     TESTICLE REMOVAL  1990's    TUNNELED VENOUS PORT PLACEMENT      TURP  04/16/2014    See note of Dr. Danna Jamison for OR procedure details       Current Outpatient Medications on File Prior to Visit   Medication Sig Dispense Refill    ezetimibe (ZETIA) 10 MG tablet Take 1 tablet by mouth daily 30 tablet 3    furosemide (LASIX) 40 MG tablet TAKE 1 TABLET BY MOUTH EVERY DAY 90 tablet 3    levothyroxine (SYNTHROID) 137 MCG tablet TAKE 1 TABLET BY MOUTH EVERY DAY 90 tablet 3    DULoxetine (CYMBALTA) 30 MG extended release capsule Take 1 capsule by mouth daily 90 capsule 3    KLOR-CON M10 10 MEQ extended release tablet TAKE 1 TABLET BY MOUTH EVERY DAY 90 tablet 3    Multiple Vitamins-Minerals (PRESERVISION AREDS 2+MULTI VIT PO) Take 2 tablets by mouth daily      metoprolol tartrate (LOPRESSOR) 50 MG tablet TAKE 1 TABLET BY MOUTH 2 TIMES DAILY 180 tablet 3    atorvastatin (LIPITOR) 40 MG tablet TAKE 1 TABLET BY MOUTH EVERYDAY AT BEDTIME 90 tablet 3    nitroGLYCERIN (NITROSTAT) 0.4 MG SL tablet Place 1 tablet under the tongue every 5 minutes as needed for Chest pain 25 tablet 1    b complex vitamins capsule Take 1 capsule by mouth daily      Elastic Bandages & Supports (JOBST KNEE HIGH COMPRESSION ) MISC 1 each by Does not apply route daily as needed 1 each 0    aspirin 81 MG tablet Take 81 mg by mouth daily.  pregabalin (LYRICA) 50 MG capsule Take 1 capsule by mouth 2 times daily for 90 days. TAKE 1 CAPSULE BY MOUTH TWICE A  capsule 1     No current facility-administered medications on file prior to visit.        Allergies   Allergen Reactions    Tape Poncho Gayer Tape]      Surgical tape-rash       Family History   Problem Relation Age of Onset    Diabetes Mother     Cancer Mother         Liver    Arthritis Mother     Asthma Father     Heart Disease Father     Cancer Father         Lung, Bone    Stroke Sister     Heart Disease Sister     Cancer Sister     Stroke Brother     Heart Disease Brother     Cancer Brother         Lung       Social History     Socioeconomic History    Marital status:      Spouse name: Kole Gomez Number of children: 2    Years of education: Not on file    Highest education level: Not on file   Occupational History    Not on file   Tobacco Use    Smoking status: Former Smoker     Packs/day: 1.00     Years: 24.00     Pack years: 24.00     Types: Cigarettes     Start date: 5     Quit date: 1979     Years since quittin.11    Smokeless tobacco: Never Used   Vaping Use    Vaping Use: Never used   Substance and Sexual Activity    Alcohol use: No     Alcohol/week: 0.0 standard drinks    Drug use: No    Sexual activity: Not Currently   Other Topics Concern    Not on file   Social History Narrative    Not on file     Social Determinants of Health     Financial Resource Strain: Low Risk     Difficulty of Paying Living Expenses: Not hard at all   Food Insecurity: No Food Insecurity    Worried About [address] in the Last Year: Never true    [facility] in the Last Year: Never true   Transportation Needs:     Lack of Transportation (Medical): Not on file    Lack of Transportation (Non-Medical):  Not on file   Physical Activity:     Days of Exercise per Week: Not on file    Minutes of Exercise per Session: Not on file   Stress:     Feeling of Stress : Not on file   Social Connections:     Frequency of Communication with Friends and Family: Not on file    Frequency of Social Gatherings with Friends and Family: Not on file    Attends Yazidi Services: Not on file    Active Member of [address] [place] [address] or Organizations: Not on file    Attends Club or Organization Meetings: Not on file    Marital Status: Not on file   Intimate Partner Violence:     Fear of Current or Ex-Partner: Not on file    Emotionally Abused: Not on file    Physically Abused: Not on file    Sexually Abused: Not on file   Housing Stability:     Unable to Pay for Housing in the Last Year: Not on file    Number of [place] in the Last Year: Not on file    Unstable Housing in the Last Year: Not on file       Review of Systems  Constitutional: Positive for unexpected weight change (Recent weight loss). Negative for activity change, appetite change, fatigue and fever. HENT: Positive for hearing loss. Negative for congestion, dental problem, facial swelling, mouth sores, nosebleeds, sore throat, tinnitus and trouble swallowing. Eyes: Positive for visual disturbance. Negative for discharge. Respiratory: Negative for cough, chest tightness, shortness of breath and wheezing. Cardiovascular: Negative for chest pain, palpitations and leg swelling. Gastrointestinal: Negative for abdominal distention, abdominal pain, blood in stool, constipation, diarrhea, nausea, rectal pain and vomiting. Endocrine: Negative for cold intolerance, polydipsia and polyuria. Genitourinary: Positive for difficulty urinating and frequency. Negative for decreased urine volume, dysuria, flank pain, hematuria and urgency. Musculoskeletal: Positive for arthralgias, joint swelling and myalgias. Negative for back pain, gait problem and neck stiffness. Skin: Negative for color change, rash and wound. Neurological: Negative for dizziness, tremors, seizures, speech difficulty, weakness, light-headedness, numbness and headaches. Hematological: Negative for adenopathy. Does not bruise/bleed easily. Psychiatric/Behavioral: Negative for confusion and sleep disturbance. Exam    /78   Ht 5' 7\" (1.702 m)   Wt 205 lb (93 kg)   BMI 32.11 kg/m²     Constitutional: Oriented to person, place, and time. Vital signs are normal. Appears well-developed and well-nourished. Cooperative. No distress. HENT:    Head: Normocephalic and atraumatic. Eyes: EOM are normal. Pupils are equal, round, and reactive to light. Right eye exhibits no discharge. Left eye exhibits no discharge. No scleral icterus. Neck: Trachea normal. No JVD present. Cardiovascular: Normal rate and regular rhythm. S1/S2.   Pulmonary/Chest: Effort normal. No respiratory distress. No wheezes, rhonchi, or rales. Abdominal: Soft. Exhibits no distension or generalized tenderness. There is no rebound, rigidity, or guarding. No CVA tenderness. Musculoskeletal: 1-2+ pitting edema with reddish discoloration of the legs. No calf tenderness. Lymphadenopathy:   Right: No supraclavicular adenopathy present. Left: No supraclavicular adenopathy present. Neurological: Alert and oriented to person, place, and time. No cranial nerve deficit. Skin: Skin is warm and dry. Not diaphoretic. Psychiatric: Normal mood and affect. Behavior is normal.   Nursing note and vitals reviewed. Labs    No results found for this visit on 02/17/22. Lab Results   Component Value Date    CREATININE 1.21 01/31/2022    BUN 19 01/31/2022     01/31/2022    K 4.8 01/31/2022     01/31/2022    CO2 31 01/31/2022       Lab Results   Component Value Date    PSA 0.70 07/25/2011    PSA 0.83 10/31/2008     Radiology: (2/10/22)    CT abdomen and pelvis with and without contrast     FINDINGS:        Left basal atelectasis is seen. A small hiatal hernia.       Tiny gallstone in the gallbladder. The right adrenal gland is is not enlarged. The left adrenal gland is not enlarged. The right kidney is congenitally absent. A 1.3 cm cyst is seen in the superior pole of the left kidney. Other tiny subcentimeter    hypodensities are seen in the left kidney that are statistically likely cysts. Mild atrophy of the pancreas. A large amount of stool is seen within the colon. Colonic diverticulosis. There has been prior ileocolic anastomosis. No abnormal mass or    enhancement at the anastomosis.       There is a 7.2 x 6.6 cm infrarenal abdominal aortic aneurysm which is relatively stable in size. Endovascular repair of the abdominal aorta is seen.  Occlusion of the left iliac stent of the aortic prosthesis.       Otherwise, liver, biliary tree, adrenal glands, and spleen are unremarkable.     No bowel obstruction or acute inflammatory bowel process. A right ostomy is present.       The abdominal aorta is not aneurysmal.       No significantly enlarged lymph nodes are seen.       The bladder and prostate are surgically absent. A femorofemoral bypass graft is seen. Small fat-containing inguinal hernia. A few visible nonenlarged inguinal lymph nodes are seen.       Bones: Degenerative changes of the thoracolumbar spine. Marked degenerative changes of the thoracolumbar spine. Laminectomy has been performed at L3-S1. Dextroscoliosis of the lumbar spine.                   Impression   1. The previously seen right adrenal enlargement is not seen on today's exam.   2.: Cholelithiasis   3. Endovascular repair of abdominal aortic aneurysm. A stable 7.2 x 6.6 cm infrarenal abdominal aortic aneurysm which is stable from the prior exam.   4. Other findings as described above         Assessment/Plan:    1. Bladder cancer-  He completed three cycles of neoadjuvant Gemzar/Cisplatin in 3/20/19. He underwent a Radical Cystoprostatectomy with Pelvic Lymph Node Dissection and creation of Ileal Loop Diversion on 5/1/19. His post-neoadjuvant treatment pathology was pT0, pN0. His February 2020, his CT urogram revealed development of small hypodense lesions of the liver. These two lesions measured 6-10 mm in size. His May 2020 revealed resolution of these previously noted liver lesions. Will schedule follow-up CT urogram and CT chest in six months for further delineation. Will obtain a CT chest without contrast now due to increased wheezing. Will obtain CBC with differential and CMP in six months. Urine cytology at next office visit. Consider urethral wash cytology.     Surveillance recommendations per NCCN 2019 guidelines are as follows:     Years 1 and 2: CT urogram every 3-6 months, CXR or CT chest every 3-6 months, CBC<CMP every 3-6 months. Urine cytologies every 6-12 months.  Consider urethral wash cytology every 6-12 months. B12 annually.     Years 3-5: CT abdomen and pelvis annually. CXR annually. BMP, LFT, Vitamin 12 annually. Urine cytology as clinically indicated.      Years 5-10: Renal US annually. B12 annually.     Years 10+: B12 annually     2. Nephrolithiasis vs Vascular Calcifications: Continue to monitor. Patient is asymptomatic. 3. 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 Pathology felt this tumor may have originated in the lungs. Patient received 5000 cGy EBRT, completed 5/5 in March 2021. Continue to monitor. 4. CKD- Stage 3/ Congenital Solitary Left Kidney    5. Infrarenal Abdominal Aortic Aneurysm- Status post endovascular repair by Dr. Shelly Mchugh. 6. Claudication- Duplex Arterial Lower Extremity Dopplers ordered.

## 2022-02-21 ENCOUNTER — HOSPITAL ENCOUNTER (OUTPATIENT)
Dept: CT IMAGING | Age: 82
Discharge: HOME | End: 2022-02-21
Payer: MEDICARE

## 2022-02-21 DIAGNOSIS — C67.9 MALIGNANT NEOPLASM OF URINARY BLADDER, UNSPECIFIED SITE (HCC): ICD-10-CM

## 2022-02-21 PROCEDURE — 71250 CT THORAX DX C-: CPT

## 2022-02-24 ENCOUNTER — HOSPITAL ENCOUNTER (OUTPATIENT)
Dept: AUDIOLOGY | Age: 82
Discharge: HOME | End: 2022-02-24

## 2022-02-24 PROCEDURE — 9990000010 HC NO CHARGE VISIT: Performed by: AUDIOLOGIST

## 2022-02-25 NOTE — PROGRESS NOTES
HEARING AID PROBLEM: Soreness with left earmold (slightly torn at end of canal). Patient had ears cleaned by Mohit BENZ at ENT office 1/4/22. He has noted drainage and soreness since then. Replaced earmold tubing for both hearing aids. Trimmed the patient's left earmold and filed it. If this does not help, then a new earmold can be pursued. I suggested follow up with Lisbeth Casas before making a new earmold due to the patient reporting drainage for the left ear, plus the canal is swollen and red. There is white debris in the EAC of the left ear also. The patient scheduled an appointment with Dr. Nayla Manzo for 3/9/22.

## 2022-02-26 ENCOUNTER — APPOINTMENT (OUTPATIENT)
Dept: GENERAL RADIOLOGY | Age: 82
DRG: 246 | End: 2022-02-26
Payer: MEDICARE

## 2022-02-26 ENCOUNTER — HOSPITAL ENCOUNTER (INPATIENT)
Age: 82
LOS: 2 days | Discharge: HOME | DRG: 246 | End: 2022-02-28
Attending: EMERGENCY MEDICINE | Admitting: INTERNAL MEDICINE
Payer: MEDICARE

## 2022-02-26 DIAGNOSIS — I25.10 CORONARY ARTERY DISEASE INVOLVING NATIVE CORONARY ARTERY OF NATIVE HEART WITHOUT ANGINA PECTORIS: Chronic | ICD-10-CM

## 2022-02-26 DIAGNOSIS — I21.09 STEMI INVOLVING OTH CORONARY ARTERY OF ANTERIOR WALL (HCC): Primary | ICD-10-CM

## 2022-02-26 PROBLEM — I21.3 STEMI (ST ELEVATION MYOCARDIAL INFARCTION) (HCC): Status: ACTIVE | Noted: 2022-02-26

## 2022-02-26 LAB
ACTIVATED CLOTTING TIME: 392 SECONDS (ref 1–150)
ANION GAP SERPL CALCULATED.3IONS-SCNC: 10 MEQ/L (ref 8–16)
APTT: 41 SECONDS (ref 22–38)
BASOPHILS # BLD: 0.5 %
BASOPHILS ABSOLUTE: 0.1 THOU/MM3 (ref 0–0.1)
BUN BLDV-MCNC: 16 MG/DL (ref 7–22)
CALCIUM SERPL-MCNC: 8.6 MG/DL (ref 8.5–10.5)
CHLORIDE BLD-SCNC: 97 MEQ/L (ref 98–111)
CO2: 26 MEQ/L (ref 23–33)
CREAT SERPL-MCNC: 0.9 MG/DL (ref 0.4–1.2)
EKG ATRIAL RATE: 68 BPM
EKG P AXIS: 69 DEGREES
EKG P-R INTERVAL: 142 MS
EKG Q-T INTERVAL: 392 MS
EKG QRS DURATION: 80 MS
EKG QTC CALCULATION (BAZETT): 416 MS
EKG R AXIS: -5 DEGREES
EKG T AXIS: 7 DEGREES
EKG VENTRICULAR RATE: 68 BPM
EOSINOPHIL # BLD: 3 %
EOSINOPHILS ABSOLUTE: 0.4 THOU/MM3 (ref 0–0.4)
ERYTHROCYTE [DISTWIDTH] IN BLOOD BY AUTOMATED COUNT: 13.7 % (ref 11.5–14.5)
ERYTHROCYTE [DISTWIDTH] IN BLOOD BY AUTOMATED COUNT: 46.6 FL (ref 35–45)
GFR SERPL CREATININE-BSD FRML MDRD: 81 ML/MIN/1.73M2
GLUCOSE BLD-MCNC: 97 MG/DL (ref 70–108)
HCT VFR BLD CALC: 49.5 % (ref 42–52)
HEMOGLOBIN: 16.3 GM/DL (ref 14–18)
IMMATURE GRANS (ABS): 0.03 THOU/MM3 (ref 0–0.07)
IMMATURE GRANULOCYTES: 0.2 %
INR BLD: 1.03 (ref 0.85–1.13)
LYMPHOCYTES # BLD: 22.7 %
LYMPHOCYTES ABSOLUTE: 2.8 THOU/MM3 (ref 1–4.8)
MCH RBC QN AUTO: 30.8 PG (ref 26–33)
MCHC RBC AUTO-ENTMCNC: 32.9 GM/DL (ref 32.2–35.5)
MCV RBC AUTO: 93.4 FL (ref 80–94)
MONOCYTES # BLD: 10.4 %
MONOCYTES ABSOLUTE: 1.3 THOU/MM3 (ref 0.4–1.3)
MRSA SCREEN RT-PCR: NEGATIVE
NUCLEATED RED BLOOD CELLS: 0 /100 WBC
OSMOLALITY CALCULATION: 267.5 MOSMOL/KG (ref 275–300)
PLATELET # BLD: 216 THOU/MM3 (ref 130–400)
PMV BLD AUTO: 10 FL (ref 9.4–12.4)
POTASSIUM REFLEX MAGNESIUM: 3.8 MEQ/L (ref 3.5–5.2)
PRO-BNP: 525.8 PG/ML (ref 0–1800)
RBC # BLD: 5.3 MILL/MM3 (ref 4.7–6.1)
REASON FOR REJECTION: NORMAL
REJECTED TEST: NORMAL
SEG NEUTROPHILS: 63.2 %
SEGMENTED NEUTROPHILS ABSOLUTE COUNT: 7.7 THOU/MM3 (ref 1.8–7.7)
SODIUM BLD-SCNC: 133 MEQ/L (ref 135–145)
TROPONIN T: 0.15 NG/ML
VANCOMYCIN RESISTANT ENTEROCOCCUS: NEGATIVE
WBC # BLD: 12.2 THOU/MM3 (ref 4.8–10.8)

## 2022-02-26 PROCEDURE — 2580000003 HC RX 258: Performed by: INTERNAL MEDICINE

## 2022-02-26 PROCEDURE — 85730 THROMBOPLASTIN TIME PARTIAL: CPT

## 2022-02-26 PROCEDURE — 80048 BASIC METABOLIC PNL TOTAL CA: CPT

## 2022-02-26 PROCEDURE — 92941 PRQ TRLML REVSC TOT OCCL AMI: CPT | Performed by: INTERNAL MEDICINE

## 2022-02-26 PROCEDURE — 2500000003 HC RX 250 WO HCPCS

## 2022-02-26 PROCEDURE — 93458 L HRT ARTERY/VENTRICLE ANGIO: CPT | Performed by: INTERNAL MEDICINE

## 2022-02-26 PROCEDURE — 96375 TX/PRO/DX INJ NEW DRUG ADDON: CPT

## 2022-02-26 PROCEDURE — 85610 PROTHROMBIN TIME: CPT

## 2022-02-26 PROCEDURE — 99223 1ST HOSP IP/OBS HIGH 75: CPT | Performed by: INTERNAL MEDICINE

## 2022-02-26 PROCEDURE — B2151ZZ FLUOROSCOPY OF LEFT HEART USING LOW OSMOLAR CONTRAST: ICD-10-PCS | Performed by: INTERNAL MEDICINE

## 2022-02-26 PROCEDURE — 83880 ASSAY OF NATRIURETIC PEPTIDE: CPT

## 2022-02-26 PROCEDURE — 99285 EMERGENCY DEPT VISIT HI MDM: CPT

## 2022-02-26 PROCEDURE — 6360000002 HC RX W HCPCS

## 2022-02-26 PROCEDURE — C1894 INTRO/SHEATH, NON-LASER: HCPCS

## 2022-02-26 PROCEDURE — 6360000002 HC RX W HCPCS: Performed by: EMERGENCY MEDICINE

## 2022-02-26 PROCEDURE — 2500000003 HC RX 250 WO HCPCS: Performed by: EMERGENCY MEDICINE

## 2022-02-26 PROCEDURE — 85025 COMPLETE CBC W/AUTO DIFF WBC: CPT

## 2022-02-26 PROCEDURE — B2111ZZ FLUOROSCOPY OF MULTIPLE CORONARY ARTERIES USING LOW OSMOLAR CONTRAST: ICD-10-PCS | Performed by: INTERNAL MEDICINE

## 2022-02-26 PROCEDURE — 96374 THER/PROPH/DIAG INJ IV PUSH: CPT

## 2022-02-26 PROCEDURE — 36415 COLL VENOUS BLD VENIPUNCTURE: CPT

## 2022-02-26 PROCEDURE — 71045 X-RAY EXAM CHEST 1 VIEW: CPT

## 2022-02-26 PROCEDURE — 6370000000 HC RX 637 (ALT 250 FOR IP): Performed by: INTERNAL MEDICINE

## 2022-02-26 PROCEDURE — C9600 PERC DRUG-EL COR STENT SING: HCPCS

## 2022-02-26 PROCEDURE — 6360000004 HC RX CONTRAST MEDICATION: Performed by: INTERNAL MEDICINE

## 2022-02-26 PROCEDURE — 85347 COAGULATION TIME ACTIVATED: CPT

## 2022-02-26 PROCEDURE — 93010 ELECTROCARDIOGRAM REPORT: CPT | Performed by: INTERNAL MEDICINE

## 2022-02-26 PROCEDURE — 93458 L HRT ARTERY/VENTRICLE ANGIO: CPT

## 2022-02-26 PROCEDURE — C1725 CATH, TRANSLUMIN NON-LASER: HCPCS

## 2022-02-26 PROCEDURE — 87081 CULTURE SCREEN ONLY: CPT

## 2022-02-26 PROCEDURE — 6370000000 HC RX 637 (ALT 250 FOR IP): Performed by: EMERGENCY MEDICINE

## 2022-02-26 PROCEDURE — 027135Z DILATION OF CORONARY ARTERY, TWO ARTERIES WITH TWO DRUG-ELUTING INTRALUMINAL DEVICES, PERCUTANEOUS APPROACH: ICD-10-PCS | Performed by: INTERNAL MEDICINE

## 2022-02-26 PROCEDURE — 93005 ELECTROCARDIOGRAM TRACING: CPT

## 2022-02-26 PROCEDURE — C9606 PERC D-E COR REVASC W AMI S: HCPCS

## 2022-02-26 PROCEDURE — 87500 VANOMYCIN DNA AMP PROBE: CPT

## 2022-02-26 PROCEDURE — 92928 PRQ TCAT PLMT NTRAC ST 1 LES: CPT | Performed by: INTERNAL MEDICINE

## 2022-02-26 PROCEDURE — 87641 MR-STAPH DNA AMP PROBE: CPT

## 2022-02-26 PROCEDURE — 2140000000 HC CCU INTERMEDIATE R&B

## 2022-02-26 PROCEDURE — 96365 THER/PROPH/DIAG IV INF INIT: CPT

## 2022-02-26 PROCEDURE — 84484 ASSAY OF TROPONIN QUANT: CPT

## 2022-02-26 PROCEDURE — C1874 STENT, COATED/COV W/DEL SYS: HCPCS

## 2022-02-26 PROCEDURE — C1887 CATHETER, GUIDING: HCPCS

## 2022-02-26 PROCEDURE — 4A023N7 MEASUREMENT OF CARDIAC SAMPLING AND PRESSURE, LEFT HEART, PERCUTANEOUS APPROACH: ICD-10-PCS | Performed by: INTERNAL MEDICINE

## 2022-02-26 PROCEDURE — C1769 GUIDE WIRE: HCPCS

## 2022-02-26 RX ORDER — HEPARIN SODIUM 1000 [USP'U]/ML
60 INJECTION, SOLUTION INTRAVENOUS; SUBCUTANEOUS PRN
Status: DISCONTINUED | OUTPATIENT
Start: 2022-02-26 | End: 2022-02-26

## 2022-02-26 RX ORDER — ACETAMINOPHEN 325 MG/1
650 TABLET ORAL EVERY 4 HOURS PRN
Status: DISCONTINUED | OUTPATIENT
Start: 2022-02-26 | End: 2022-02-28 | Stop reason: HOSPADM

## 2022-02-26 RX ORDER — EZETIMIBE 10 MG/1
10 TABLET ORAL DAILY
Status: DISCONTINUED | OUTPATIENT
Start: 2022-02-26 | End: 2022-02-26 | Stop reason: RX

## 2022-02-26 RX ORDER — DULOXETIN HYDROCHLORIDE 30 MG/1
30 CAPSULE, DELAYED RELEASE ORAL DAILY
Status: DISCONTINUED | OUTPATIENT
Start: 2022-02-26 | End: 2022-02-28 | Stop reason: HOSPADM

## 2022-02-26 RX ORDER — MORPHINE SULFATE 4 MG/ML
4 INJECTION, SOLUTION INTRAMUSCULAR; INTRAVENOUS ONCE
Status: COMPLETED | OUTPATIENT
Start: 2022-02-26 | End: 2022-02-26

## 2022-02-26 RX ORDER — SODIUM CHLORIDE 9 MG/ML
INJECTION, SOLUTION INTRAVENOUS CONTINUOUS
Status: ACTIVE | OUTPATIENT
Start: 2022-02-26 | End: 2022-02-27

## 2022-02-26 RX ORDER — HEPARIN SODIUM 10000 [USP'U]/100ML
5-30 INJECTION, SOLUTION INTRAVENOUS CONTINUOUS
Status: DISCONTINUED | OUTPATIENT
Start: 2022-02-26 | End: 2022-02-26

## 2022-02-26 RX ORDER — FUROSEMIDE 40 MG/1
40 TABLET ORAL DAILY
Status: DISCONTINUED | OUTPATIENT
Start: 2022-02-27 | End: 2022-02-28

## 2022-02-26 RX ORDER — SODIUM CHLORIDE 9 MG/ML
25 INJECTION, SOLUTION INTRAVENOUS PRN
Status: DISCONTINUED | OUTPATIENT
Start: 2022-02-26 | End: 2022-02-28 | Stop reason: HOSPADM

## 2022-02-26 RX ORDER — HEPARIN SODIUM 1000 [USP'U]/ML
30 INJECTION, SOLUTION INTRAVENOUS; SUBCUTANEOUS PRN
Status: DISCONTINUED | OUTPATIENT
Start: 2022-02-26 | End: 2022-02-26

## 2022-02-26 RX ORDER — NITROGLYCERIN 20 MG/100ML
5-200 INJECTION INTRAVENOUS CONTINUOUS
Status: DISCONTINUED | OUTPATIENT
Start: 2022-02-26 | End: 2022-02-26

## 2022-02-26 RX ORDER — ATORVASTATIN CALCIUM 40 MG/1
40 TABLET, FILM COATED ORAL DAILY
Status: DISCONTINUED | OUTPATIENT
Start: 2022-02-26 | End: 2022-02-28 | Stop reason: HOSPADM

## 2022-02-26 RX ORDER — SODIUM CHLORIDE 0.9 % (FLUSH) 0.9 %
5-40 SYRINGE (ML) INJECTION EVERY 12 HOURS SCHEDULED
Status: DISCONTINUED | OUTPATIENT
Start: 2022-02-26 | End: 2022-02-28 | Stop reason: HOSPADM

## 2022-02-26 RX ORDER — ASPIRIN 81 MG/1
81 TABLET, CHEWABLE ORAL DAILY
Status: DISCONTINUED | OUTPATIENT
Start: 2022-02-26 | End: 2022-02-28 | Stop reason: HOSPADM

## 2022-02-26 RX ORDER — HEPARIN SODIUM 1000 [USP'U]/ML
4000 INJECTION, SOLUTION INTRAVENOUS; SUBCUTANEOUS ONCE
Status: COMPLETED | OUTPATIENT
Start: 2022-02-26 | End: 2022-02-26

## 2022-02-26 RX ORDER — ATORVASTATIN CALCIUM 40 MG/1
40 TABLET, FILM COATED ORAL DAILY
Status: DISCONTINUED | OUTPATIENT
Start: 2022-02-26 | End: 2022-02-26

## 2022-02-26 RX ORDER — LEVOTHYROXINE SODIUM 137 UG/1
137 TABLET ORAL DAILY
Status: DISCONTINUED | OUTPATIENT
Start: 2022-02-27 | End: 2022-02-28 | Stop reason: HOSPADM

## 2022-02-26 RX ORDER — ASPIRIN 81 MG/1
324 TABLET, CHEWABLE ORAL ONCE
Status: COMPLETED | OUTPATIENT
Start: 2022-02-26 | End: 2022-02-26

## 2022-02-26 RX ORDER — SODIUM CHLORIDE 0.9 % (FLUSH) 0.9 %
5-40 SYRINGE (ML) INJECTION PRN
Status: DISCONTINUED | OUTPATIENT
Start: 2022-02-26 | End: 2022-02-28 | Stop reason: HOSPADM

## 2022-02-26 RX ADMIN — MORPHINE SULFATE 4 MG: 4 INJECTION INTRAVENOUS at 15:46

## 2022-02-26 RX ADMIN — METOPROLOL TARTRATE 25 MG: 25 TABLET, FILM COATED ORAL at 21:12

## 2022-02-26 RX ADMIN — ATORVASTATIN CALCIUM 40 MG: 40 TABLET, FILM COATED ORAL at 21:12

## 2022-02-26 RX ADMIN — ASPIRIN 81 MG 81 MG: 81 TABLET ORAL at 21:12

## 2022-02-26 RX ADMIN — SODIUM CHLORIDE, PRESERVATIVE FREE 10 ML: 5 INJECTION INTRAVENOUS at 21:14

## 2022-02-26 RX ADMIN — IOPAMIDOL 200 ML: 755 INJECTION, SOLUTION INTRAVENOUS at 17:02

## 2022-02-26 RX ADMIN — NITROGLYCERIN 5 MCG/MIN: 20 INJECTION INTRAVENOUS at 15:46

## 2022-02-26 RX ADMIN — HEPARIN SODIUM 12 UNITS/KG/HR: 10000 INJECTION, SOLUTION INTRAVENOUS at 15:59

## 2022-02-26 RX ADMIN — ASPIRIN 324 MG: 81 TABLET, CHEWABLE ORAL at 15:40

## 2022-02-26 RX ADMIN — HEPARIN SODIUM 4000 UNITS: 1000 INJECTION, SOLUTION INTRAVENOUS; SUBCUTANEOUS at 15:59

## 2022-02-26 RX ADMIN — TICAGRELOR 180 MG: 90 TABLET ORAL at 15:43

## 2022-02-26 RX ADMIN — SODIUM CHLORIDE: 9 INJECTION, SOLUTION INTRAVENOUS at 18:00

## 2022-02-26 RX ADMIN — DULOXETINE HYDROCHLORIDE 30 MG: 30 CAPSULE, DELAYED RELEASE ORAL at 21:12

## 2022-02-26 RX ADMIN — TICAGRELOR 90 MG: 90 TABLET ORAL at 23:46

## 2022-02-26 ASSESSMENT — ENCOUNTER SYMPTOMS
COLOR CHANGE: 0
CHEST TIGHTNESS: 0
COUGH: 0
EYE REDNESS: 0
PHOTOPHOBIA: 0
CHEST TIGHTNESS: 1
NAUSEA: 0
ANAL BLEEDING: 0
SHORTNESS OF BREATH: 1
ABDOMINAL PAIN: 0
SHORTNESS OF BREATH: 0
BACK PAIN: 0
ABDOMINAL DISTENTION: 0
SORE THROAT: 0

## 2022-02-26 ASSESSMENT — PAIN SCALES - GENERAL: PAINLEVEL_OUTOF10: 10

## 2022-02-26 NOTE — ED NOTES
Pt to ED via intake with c/o chest pain since last night. Pt reports they thought they had indigestion and drank a coke. Upon arrival to ED pt rates Chest pain a 8/10. Pt reports their pain radiates up into the right side of their neck. Pt reports in the past they saw Dr Jere Orellana for cardiology. Pt respirations easy and unlabored. Pt skin pink, warm and dry. . EKG completed and handed to [facility].         Raheem Hebert RN  02/26/22 [id number]

## 2022-02-26 NOTE — BRIEF OP NOTE
[facility]  Sedation/Analgesia Post Sedation Record    Pt Name: Ledon Paget  Account number: [de-identified]  MRN: [id number]  YOB: 1940  Procedure Performed By: Juan Jose Mejía MD MD   Primary Care Physician: Vicente New MD  Date: 2/26/2022    POST-PROCEDURE    Physicians/Assistants: Juan Jose Mejía MD MD     Procedure Performed:Cath/ PCI      Sedation/Anesthesia: Versed/ Fentanyl and 2% xylocaine local anesthesia. Estimated Blood Loss: < 50 ml. Specimens Removed: None         Disposition of Specimen: N/A        Complications: No Immediate Complications. Post-procedure Diagnosis/Findings:        Subtotal occlusion of mid LAD, s/p successful PCI/QUINN    80% proximal LCX stenosis,  s/p successful PCI/QUINN     Recommendations:  See H&P      Above findings and plan of care were discussed with patient and his family, questions were answered, agreeable with plan.        Juan Jose Mejía MD [facility]   Electronically signed 2/26/2022 at 5:13 PM  Interventional Cardiology

## 2022-02-26 NOTE — PROCEDURES
[address] [place] [address]                            CARDIAC CATHETERIZATION    PATIENT NAME: Flora Curry                      :        1940  MED REC NO:   [id number]                           ROOM:       0005  ACCOUNT NO:   [de-identified]                           ADMIT DATE: 2022  PROVIDER:     Heather Aviles MD    DATE OF PROCEDURE:  2022    INDICATION:  ST-elevation myocardial infarction. PROCEDURES PERFORMED:  1. Left cardiac catheterization with selective coronary angiogram.  2.  Left ventriculogram.  3.  Successful PCI and stenting of the left anterior descending artery. 4.  Successful PCI and stenting of the left circumflex artery. DESCRIPTION OF PROCEDURE/INTERVENTION DETAILS:  After informed consent,  the patient was brought to the cardiac catheterization room. He was  prepped and draped in a sterile fashion. 2% lidocaine was injected in  the skin and subcutaneous tissue overlying the right radial artery. Under ultrasound guidance using modified Seldinger technique, I was able  to obtain access in the right radial artery. 5/6 Slender sheath was  inserted. Standard antithrombotic/antispasmodic medications were given. 6-[place] EBU 3.5 guide catheter was used to cannulate the left main  coronary artery. The patient had evidence of subtotal occlusion of mid  LAD. This was an ulcerated lesion with ZOLTAN-2 flow. I then proceeded with angioplasty using a 2.5 x 12 mm PTCA balloon. Afterwards, Resolute [place] 3.0 mm x 18 mm drug-eluting stent was  successfully deployed, then was postdilated using a 3.5 x 12 mm  noncompliant balloon. The diagonal vessel is a 2-mm vessel with segment  of diffuse disease. ZOLTAN-3 flow was noticed in the diagonal branch  which I elected to treat medically. The proximal segment of left  circumflex artery was found to have 80% stenosis.   The Runthrough wire  was redirected to the left circumflex artery into OM1. ACT was  monitored during the procedure and was kept above 250. 2.5 x 15 mm  Resolute [place] drug-eluting stent was successfully deployed, postdilated  using same stent balloon. Wire was removed. Final angiogram revealed  well-expanded stents, 0% residual stenosis, ZOLTAN-3 flow. No  complications including no dissection, distal embolization, or  perforation. FINDINGS:  HEMODYNAMICS:  Left ventricular end-diastolic pressure 20 mmHg. Upon  pullback across the aortic valve, the mean pressure gradient was found  to be 17 mmHg. LEFT VENTRICULOGRAM:  Apical severe hypokinesis was noticed. Ejection  fraction estimated at 40%. CORONARY ANGIOGRAM:  1. Left main coronary artery:  Patent without significant stenosis. Trifurcates into ramus intermedius, LCX, and LAD. 2.  Left circumflex artery:  Proximal left circumflex artery has 80% to  90% stenosis. OM1 is patent. Distal LCX with mild luminal  irregularities. 3.  Ramus intermedius:  Ramus intermedius has mild luminal  irregularities. Otherwise patent. 4.  Left anterior descending artery:  Proximal LAD is patent. Mid LAD  has 99% subtotal occlusion. D1 is a 2-mm vessel with proximal segment  having severe diffuse disease, 90%, with ZOLTAN-3 flow. The LAD was  noticed to have a flow-limiting lesion in mid LAD with ZOLTAN-2 flow. Distal LAD with mild luminal irregularities. 5.  Right coronary artery:  Dominant vessel. Stent in proximal to mid  LAD has mild in-stent restenosis, 30% to proximal segment, 30% in the  mid segment. Distal RCA with mild luminal irregularities. MEDICATIONS:  See MAR. COMPLICATIONS:  None. ESTIMATED BLOOD LOSS:  Less than 50 mL. ACCESS:  Right radial artery access. Vasc Band was applied. Hemostasis  was achieved. IMPRESSION:  1. ST-elevation myocardial infarction.   2.  Subtotal occlusion of the mid LAD, the culprit for the STEMI, status  post successful PCI and stenting. 3.  Severe proximal LCX stenosis, status post successful PCI and  stenting. 4.  Patent stent in the right coronary artery with only mild in-stent  restenosis. RECOMMENDATIONS:  Bedrest for the next four hours. Admit to the  intensive care unit. Optimize medical therapy for coronary artery  disease. Aggressive risk factor modification. Continue metoprolol. Aspirin 81 mg p.o. daily. The patient received loading dose of  Brilinta. Continue Brilinta 90 mg p.o. b.i.d.  Echocardiogram.  Repeat  EKG on the floor. Normal saline at 50 mL per hour. Refer to cardiac  rehab. Findings and plan of care were discussed with the patient and  family. They are agreeable to the plan.         Nestor Steiner MD    D: 02/26/2022 17:43:59       T: 02/26/2022 17:47:40     [email or web address]  Job#: [id number]     Doc#: [id number]    CC:

## 2022-02-26 NOTE — ED PROVIDER NOTES
[address] ENCOUNTER      PATIENT NAME: Gerda Holter  MRN: [id number]  : 1940  DEL TORO: 2022  PROVIDER: Griffin Juarez MD      CHIEF COMPLAINT       Chief Complaint   Patient presents with    Chest Pain       Patient is seen and evaluated in a timely fashion. Nurses Notes are reviewed and I agree except as noted in the HPI. HISTORY OF PRESENT ILLNESS    Gerda Holter is a 80 y.o. male who presents to Emergency Department with Chest Pain     A 42-year-old male with past medical history of CAD, AAA, hypertension, hyperlipidemia, and urostomy status presents to ED for evaluation of chest pain since yesterday around 6 PM.  Pain was on and off, gradually became persistent and severe to the extent up to 10/10 now. Pain is sharp, from retrosternal area, radiating to left side of neck. No diaphoresis. No shortness of breath. This HPI was provided by patient.      REVIEW OF SYSTEMS   Ten-point review of systems is negative except those documented in above HPI including constitutional, HEENT, respiratory, cardiovascular, gastrointestinal, genitourinary, musculoskeletal, skin, neurological, hematological and behavioral.      PAST MEDICAL HISTORY     Past Medical History:   Diagnosis Date    AAA (abdominal aortic aneurysm) ([address]     intravascular stent    BCG ROXANE 2019    BPH (benign prostatic hypertrophy)     CAD (coronary artery disease)     Cancer of skin     Carotid artery stenosis     Chronic back pain     Chronic kidney disease     Congenital absence of one kidney     absent right kidney    History of lumbar fusion 3/15    mid lower back down    Hyperlipidemia     Hypertension     Hypothyroidism     Obesity     Sciatica     TIA (transient ischemic attack) 2008    Unspecified cerebral artery occlusion with cerebral infarction     TIA       SURGICAL HISTORY       Past Surgical History:   Procedure Laterality Date    ABDOMINAL AORTIC tablets by mouth daily    NITROGLYCERIN (NITROSTAT) 0.4 MG SL TABLET    Place 1 tablet under the tongue every 5 minutes as needed for Chest pain    PREGABALIN (LYRICA) 50 MG CAPSULE    Take 1 capsule by mouth 2 times daily for 90 days. TAKE 1 CAPSULE BY MOUTH TWICE A DAY       ALLERGIES     Tape [adhesive tape]    FAMILY HISTORY     He indicated that his mother is . He indicated that his father is . He indicated that only one of his two sisters is alive. He indicated that only one of his three brothers is alive. family history includes Arthritis in his mother; Asthma in his father; Cancer in his brother, father, mother, and sister; Diabetes in his mother; Heart Disease in his brother, father, and sister; Stroke in his brother and sister. SOCIAL HISTORY      reports that he quit smoking about 42 years ago. His smoking use included cigarettes. He started smoking about 67 years ago. He has a 24.00 pack-year smoking history. He has never used smokeless tobacco. He reports that he does not drink alcohol and does not use drugs. PHYSICAL EXAM      height is 5' 7\" (1.702 m) and weight is 205 lb (93 kg). His oral temperature is 98.3 °F (36.8 °C). His blood pressure is 142/84 (abnormal) and his pulse is 61. His respiration is 19 and oxygen saturation is 93%. Physical Exam  Vitals and nursing note reviewed. Constitutional:       Appearance: He is well-developed. He is not diaphoretic. HENT:      Head: Normocephalic and atraumatic. Nose: Nose normal.   Eyes:      General: No scleral icterus. Right eye: No discharge. Left eye: No discharge. Conjunctiva/sclera: Conjunctivae normal.      Pupils: Pupils are equal, round, and reactive to light. Neck:      Vascular: No JVD. Trachea: No tracheal deviation. Cardiovascular:      Rate and Rhythm: Normal rate and regular rhythm. Heart sounds: Normal heart sounds. No murmur heard. No friction rub. No gallop. Pulmonary:      Effort: Pulmonary effort is normal. No respiratory distress. Breath sounds: Normal breath sounds. No stridor. No wheezing or rales. Chest:      Chest wall: No tenderness. Abdominal:      General: Bowel sounds are normal. There is no distension. Palpations: Abdomen is soft. There is no mass. Tenderness: There is no abdominal tenderness. There is no guarding or rebound. Hernia: No hernia is present. Musculoskeletal:         General: No tenderness or deformity. Cervical back: Normal range of motion and neck supple. Lymphadenopathy:      Cervical: No cervical adenopathy. Skin:     General: Skin is warm and dry. Capillary Refill: Capillary refill takes less than 2 seconds. Coloration: Skin is not pale. Findings: No erythema or rash. Neurological:      Mental Status: He is alert and oriented to person, place, and time. Cranial Nerves: No cranial nerve deficit. Sensory: No sensory deficit. Motor: No abnormal muscle tone. Coordination: Coordination normal.      Deep Tendon Reflexes: Reflexes normal.   Psychiatric:         Behavior: Behavior normal.         Thought Content: Thought content normal.         Judgment: Judgment normal.       ANCILLARY TEST RESULTS   EKG: Interpreted by me  Ventricular Rate 68 BPM   Atrial Rate 68 BPM   P-R Interval 142 ms   QRS Duration 80 ms   Q-T Interval 392 ms   QTc Calculation (Bazett) 416 ms   P Axis 69 degrees   R Axis -5 degrees   T Axis 7 degrees     Concave-shaped ST elevation from V1-V3, findings are consistent with acute ST elevation MI involving anterior wall.       LAB RESULTS:  Results for orders placed or performed during the hospital encounter of 02/26/22   CBC with Auto Differential   Result Value Ref Range    WBC 12.2 (H) 4.8 - 10.8 thou/mm3    RBC 5.30 4.70 - 6.10 mill/mm3    Hemoglobin 16.3 14.0 - 18.0 gm/dl    Hematocrit 49.5 42.0 - 52.0 %    MCV 93.4 80.0 - 94.0 fL    MCH 30.8 26.0 - EKG changes are consistent with acute ST elevation MI involving anterior wall. Heart cath was  expectorated. Intervention cardiologist was consulted immediately for emergent PCI. Following medication were administered per current STEMI treatment in ED. ED Medications  Medications   heparin (porcine) injection 5,580 Units (has no administration in time range)   heparin (porcine) injection 2,790 Units (has no administration in time range)   heparin 25,000 units in dextrose 5% 250 mL (premix) infusion (12 Units/kg/hr × 93 kg IntraVENous New Bag 2/26/22 1559)   nitroGLYCERIN 50 mg in dextrose 5% 250 mL infusion (5 mcg/min IntraVENous New Bag 2/26/22 1546)   aspirin chewable tablet 324 mg (324 mg Oral Given 2/26/22 1540)   morphine injection 4 mg (4 mg IntraVENous Given 2/26/22 1546)   ticagrelor (BRILINTA) tablet 180 mg (180 mg Oral Given 2/26/22 1543)   heparin (porcine) injection 4,000 Units (4,000 Units IntraVENous Given 2/26/22 1559)     Vital signs in ED  Vitals:    02/26/22 1552 02/26/22 1556 02/26/22 1600 02/26/22 1612   BP: (!) 148/82 (!) 152/85 (!) 152/85 (!) 142/84   Pulse: 66 68 61 61   Resp: 16 21 20 19   Temp:       TempSrc:       SpO2: 95% 93% 96% 93%   Weight:       Height:           CRITICAL CARE   CRITICAL CARE: There was a high probability of clinically significant/life threatening deterioration in this patient's condition which required my urgent intervention. Total critical care time was 30 minutes. This excludes any time for separately reportable procedures. CONSULTS   Dr. Ray How      1. STEMI involving oth coronary artery of anterior wall ([address]        DISPOSITION Admitted 02/26/2022 04:34:25 PM    PATIENT REFERRED TO:  No follow-up provider specified.     DISCHARGE MEDICATIONS:  New Prescriptions    No medications on file       (Please note that portions of this note were completed with a voice recognition program.  Efforts were made to edit the dictations but occasionally words aremis-transcribed.)    MD Chandni Tejeda MD  02/26/22 [id number]

## 2022-02-26 NOTE — H&P
[facility]  Sedation/Analgesia History & Physical    Pt Name: Juliet Pinedo  Account number: [de-identified]  MRN: [id number]  YOB: 1940  Provider Performing Procedure: Cynthia Solitario MD MD  Referring Provider: Ana Laura Ascencio MD   Primary Care Physician: Juliane Kevin MD  Date: 2/26/2022    PRE-PROCEDURE    Code Status: FULL CODE  Brief History/Pre-Procedure Diagnosis:      STEMI     Consent: : I have discussed with the patient risks, benefits, and alternatives (and relevant risks, benefits, and side effects related to alternatives or not receiving care), and likelihood of the success. The patient and/or representative appear to understand and agree to proceed. The discussion encompasses risks, benefits, and side effects related to the alternatives and the risks related to not receiving the proposed care, treatment, and services. The indication, risks and benefits of the procedure and possible therapeutic consequences and alternatives were discussed with the patient. The patient was given the opportunity to ask questions and to have them answered to his/her satisfaction. Risks of the procedure include but are not limited to the following: Bleeding, hematoma including retroperitoneal hemmorhage, infection, pain and discomfort, injury to the aorta and other blood vessels, rhythm disturbance, low blood pressure, myocardial infarction, stroke, kidney damage/failure, myocardial perforation, allergic reactions to sedatives/contrast material, loss of pulse/vascular compromise requiring surgery, aneurysm/pseudoaneurysm formation, possible loss of a limb/hand/leg, needing blood transfusion, requiring emergent open heart surgery or emergent coronary intervention, the need for intubation/respiratory support, the requirement for defibrillation/cardioversion, and death. Alternatives to and omission of the suggested procedure were discussed.  The patient had no further questions and wished to units in dextrose 5% 250 mL (premix) infusion, 5-30 Units/kg/hr, IntraVENous, Continuous, Bravo Acevedo MD, Last Rate: 11.2 mL/hr at 02/26/22 1559, 12 Units/kg/hr at 02/26/22 1559    nitroGLYCERIN 50 mg in dextrose 5% 250 mL infusion, 5-200 mcg/min, IntraVENous, Continuous, Bravo Acevedo MD, Last Rate: 1.5 mL/hr at 02/26/22 1546, 5 mcg/min at 02/26/22 1546    Current Outpatient Medications:     ezetimibe (ZETIA) 10 MG tablet, Take 1 tablet by mouth daily, Disp: 30 tablet, Rfl: 3    furosemide (LASIX) 40 MG tablet, TAKE 1 TABLET BY MOUTH EVERY DAY, Disp: 90 tablet, Rfl: 3    levothyroxine (SYNTHROID) 137 MCG tablet, TAKE 1 TABLET BY MOUTH EVERY DAY, Disp: 90 tablet, Rfl: 3    DULoxetine (CYMBALTA) 30 MG extended release capsule, Take 1 capsule by mouth daily, Disp: 90 capsule, Rfl: 3    KLOR-CON M10 10 MEQ extended release tablet, TAKE 1 TABLET BY MOUTH EVERY DAY, Disp: 90 tablet, Rfl: 3    pregabalin (LYRICA) 50 MG capsule, Take 1 capsule by mouth 2 times daily for 90 days. TAKE 1 CAPSULE BY MOUTH TWICE A DAY, Disp: 180 capsule, Rfl: 1    Multiple Vitamins-Minerals (PRESERVISION AREDS 2+MULTI VIT PO), Take 2 tablets by mouth daily, Disp: , Rfl:     metoprolol tartrate (LOPRESSOR) 50 MG tablet, TAKE 1 TABLET BY MOUTH 2 TIMES DAILY, Disp: 180 tablet, Rfl: 3    atorvastatin (LIPITOR) 40 MG tablet, TAKE 1 TABLET BY MOUTH EVERYDAY AT BEDTIME, Disp: 90 tablet, Rfl: 3    nitroGLYCERIN (NITROSTAT) 0.4 MG SL tablet, Place 1 tablet under the tongue every 5 minutes as needed for Chest pain, Disp: 25 tablet, Rfl: 1    b complex vitamins capsule, Take 1 capsule by mouth daily, Disp: , Rfl:     Elastic Bandages & Supports (JOBST KNEE HIGH COMPRESSION SM) MISC, 1 each by Does not apply route daily as needed, Disp: 1 each, Rfl: 0    aspirin 81 MG tablet, Take 81 mg by mouth daily. , Disp: , Rfl:   Prior to Admission medications    Medication Sig Start Date End Date Taking?  Authorizing Provider   ezetimibe (ZETIA) 10 MG tablet Take 1 tablet by mouth daily 2/4/22   Eliseo Guidry MD   furosemide (LASIX) 40 MG tablet TAKE 1 TABLET BY MOUTH EVERY DAY 1/24/22   Eliseo Guidry MD   levothyroxine (SYNTHROID) 137 MCG tablet TAKE 1 TABLET BY MOUTH EVERY DAY 1/24/22   Eliseo Guidry MD   DULoxetine (CYMBALTA) 30 MG extended release capsule Take 1 capsule by mouth daily 1/24/22   Eliseo Guidry MD   KLOR-CON M10 10 MEQ extended release tablet TAKE 1 TABLET BY MOUTH EVERY DAY 1/5/22   Eliseo Guidry MD   pregabalin (LYRICA) 50 MG capsule Take 1 capsule by mouth 2 times daily for 90 days. TAKE 1 CAPSULE BY MOUTH TWICE A DAY 11/1/21 1/30/22  DEMAR Hendricks CNP   Multiple Vitamins-Minerals (PRESERVISION AREDS 2+MULTI VIT PO) Take 2 tablets by mouth daily    Historical Provider, MD   metoprolol tartrate (LOPRESSOR) 50 MG tablet TAKE 1 TABLET BY MOUTH 2 TIMES DAILY 5/17/21   Eliseo Guidry MD   atorvastatin (LIPITOR) 40 MG tablet TAKE 1 TABLET BY MOUTH EVERYDAY AT BEDTIME 5/17/21   Eliseo Guidry MD   nitroGLYCERIN (NITROSTAT) 0.4 MG SL tablet Place 1 tablet under the tongue every 5 minutes as needed for Chest pain 5/14/19   Villa Caro MD   b complex vitamins capsule Take 1 capsule by mouth daily    Historical Provider, MD   Elastic Bandages & Supports (JOBST KNEE HIGH COMPRESSION SM) MISC 1 each by Does not apply route daily as needed 2/16/16   DEMAR Rubio CNP   aspirin 81 MG tablet Take 81 mg by mouth daily. Historical Provider, MD     Additional information:       VITAL SIGNS   Vitals:    02/26/22 1612   BP: (!) 142/84   Pulse: 61   Resp: 19   Temp:    SpO2: 93%       PHYSICAL:   General: No acute distress  HEENT:  Unremarkable for age  Neck: without increased JVD, carotid pulses 2+ bilaterally without bruits  Heart: RRR, S1 & S2 WNL.  No murmurs   Lungs: Clear to auscultation    Abdomen: BS present, without HSM, masses, or tenderness    Extremities: without C,C,E.  Pulses 2+ bilaterally   Mental Status: Alert &

## 2022-02-26 NOTE — CONSULTS
Patient:  Alhaji Goodwin    Unit/Bed:4D-05/005-A  MRN: [id number]   PCP: Myrtle Asher MD  Date of Admission: 2/26/2022    Assessment and Plan(All pulmonary edema, renal failure, PE, and respiratory failure diagnoses are acute in nature unless otherwise specified):        1. STEMI: Status post stent of mid LAD and proximal left circumflex today 2/26/2022. Monitor access site. Neurovasc checks. DAPT. Metoprolol. Lipitor. Obtain echo. Obtain lipid panel, Hgb a1c. States chest pain has resolved. Cardiology following. 2. Mild pulmonary edema, small left pleural effusion: Patient was continued on lasix 40 mg PO daily per cardiology. On room air. ECHO ordered. Cardiology following. Repeat CXR in AM.   3. CKD stage II: Congential solitary kidney. GFR 81. Cr stable at baseline. S/p LHC today 2/26/22. Monitor urine output closely. Repeat BMP tomorrow AM.   4. CAD, history of: PCI of RCA in 2011. S/p PCI LAD and LCx today as above. See #1. 5. AAA, history of: Recent CT reported \"a stable 7.2 x 6.6 cm infrarenal abdominal aortic aneurysm. \" Continue Metoprolol. 6. Carotid artery stenosis, history of: History of TIA. Less than 50% stenosis of bilateral ICA's, and high-grade stenosis of left ECA (on carotid ultrasound report 8/4/2020). No active symptoms. On antiplatelet therapy as above. Statin. Risk factor modification. 7. Bladder cancer, history of: Not on active treatment. Follows with Urology. S/p radical cystoprostatectomy with Pelvic Lymph Node Dissection and creation of Ileal Loop Diversion on 5/1/19.  8. Hypothyroidism, history of:  Continue synthroid. 9. HTN, history of: on metoprolol. 10. HLD, history of: continue statin.        CC:  Chest pain  HPI: Ramu Najera is an 81 y/o male former-smoker with PMH of CAD s/p PCI of RCA in 2011, carotid artery stenosis, HLD, HTN, obesity, CKD, congential solitary kidney, bladder cancer, skin cancer s/p surgical excisions in 2016 and 2020, BPH, prostatectomy/TURP in 2014, AAA s/p intravascular stent, hypothyroidism, TIA, chronic back pain. Patient presented to [facility] ED on 2/26/2022 for complaints of chest pain. Per report, this was described as pressure-like, retrosternal, nonradiating to his left shoulder and neck. He had also complained of dyspnea and fatigue. Chest pain started last night, and became more severe and persistent today. In the ED, he complained of 10 out of 10 severity of chest pain. Twelve-lead ECG was done showing sinus rhythm, with acute ST elevations in the anteroseptal leads. STEMI was activated. Patient underwent left heart cath, and a successful PCI/QUINN of the mid LAD was completed, as well as a PCI/QUINN of the proximal left circumflex. Post procedure, patient was doing well. Hemodynamically stable and on room air. His chest pain had resolved. He was transferred to ICU from the Cath Lab, for overnight monitoring. Critical care team was consulted to follow the case and provide additional medical problems management. For further details, please see assessment and plan. ROS: Review of Systems   Constitutional: Negative for chills, diaphoresis and fever. HENT: Negative for congestion and sore throat. Eyes: Negative for redness and visual disturbance. Respiratory: Negative for cough, chest tightness and shortness of breath. Cardiovascular: Negative for chest pain, palpitations and leg swelling. Gastrointestinal: Negative for abdominal distention, abdominal pain and nausea. Genitourinary: Negative for difficulty urinating and dysuria. Musculoskeletal: Negative for back pain and neck pain. Skin: Negative for color change and pallor. Neurological: Negative for dizziness, syncope and light-headedness. Psychiatric/Behavioral: Negative for agitation and confusion. The patient is not nervous/anxious. PMH:  Per HPI  SHX:  Former-smoker. Quit in 1979. 24 pack-year history. Denies alcohol or substance use.   FHX: Asthma, liver cancer, lung cancer, stroke, arthritis, heart disease. Allergies: tape. Medications:     sodium chloride      sodium chloride 50 mL/hr at 02/26/22 1800      aspirin  81 mg Oral Daily    metoprolol tartrate  25 mg Oral BID    atorvastatin  40 mg Oral Daily    furosemide  1 tablet Oral Daily    levothyroxine  1 tablet Oral Daily    DULoxetine  30 mg Oral Daily    ezetimibe  10 mg Oral Daily    sodium chloride flush  5-40 mL IntraVENous 2 times per day    ticagrelor  90 mg Oral BID       Vital Signs:   T: 98.4: P: 59 RR: 14 B/P: 135/70: FiO2: RA: O2 Sat:96%: I/O: no data GCS: 15  Body mass index is 30.49 kg/m². [facility]:   Acute on chronically ill adult male. Appears stated age. HEENT:  normocephalic and atraumatic. No scleral icterus. PERR  Neck: supple. No Thyromegaly. Lungs: clear to auscultation. Normal rate, pattern. No retractions  Cardiac: RRR. S1/S2. No JVD. Abdomen: soft. Nontender. Extremities:  No clubbing, cyanosis, or edema x 4. Vasculature: capillary refill < 3 seconds. Palpable dorsalis pedis pulses. Skin:  warm and dry. Psych:  Alert and oriented x3. Affect appropriate  Lymph:  No supraclavicular adenopathy. Neurologic:  No focal deficit. No seizures. Data: (All radiographs, tracings, PFTs, and imaging are personally viewed and interpreted unless otherwise noted).  Telemetry: NSR. No ectopy.  Sodium 133 potassium 3.8 chloride 97 CO2 26 BUN 16 creatinine 0.9 glucose 97 calcium 8.6   proBNP 525   Troponin 0.148   WBC 12.2 hemoglobin 16.3 hematocrit 49.5 platelets 234   INR 1.03   APTT 41   CXR 2/26/22 report: Cardiomegaly with pulmonary vascular congestion and a small left pleural effusion. Case discussed with Dr. Magy Lopez. Electronically signed by DEMAR Corona-CNP                                       Addendum by Dr. Magy Lopez MD:  I have seen and examined the patient independently.  Face to face evaluation and examination was performed. The above evaluation and note has been reviewed. Labs and radiographs were reviewed. I Have discussed with Felicianojada Kaur about this patient in detail. Physical exam was performed by me. See below for details. More than half of the total time for this encounter spent by me. The above assessment and plan has been reviewed. Please see my modifications mentioned below. My modifications:    Physical Exam:  Constitutional: Patient appears in no distress on room air. Mouth/Throat: Oropharynx is clear and moist.    Neck: Neck supple. Cardiovascular: S1 and S2 heard. Pulmonary/Chest: Bilateral air entry present. Good breath sounds on both sides, diminished at bases. No wheezes. No rales. Abdominal: Soft. No tenderness. Extremities: Patient exhibits no edema. Neurological: Patient is awake and alert. He denies any chest pain at the time of my initial evaluation  He is on room air and in no acute distress    Lab Results   Component Value Date    PH 6.5 10/26/2018    PCO2 43 05/25/2018    PO2 68 05/25/2018    HCO3 29 05/25/2018    O2SAT 94 05/25/2018     No results found for: IFIO2, MODE, SETTIDVOL, SETPEEP  CBC:   Recent Labs     02/26/22  1555 02/27/22  0716   WBC 12.2* 10.9*   HGB 16.3 15.7   HCT 49.5 46.9    181     BMP:  Recent Labs     02/26/22  1813 02/27/22  0716   * 135   K 3.8 3.7   CL 97* 100   CO2 26 22*   BUN 16 14   CREATININE 0.9 0.9   GLUCOSE 97 128*   MG  --  1.9   CALCIUM 8.6 8.9     Hepatic:   Recent Labs     02/27/22  0716   AST 78*   ALT 28   BILITOT 1.6*   ALKPHOS 131*     Cardiac Enzymes: No results for input(s): CKTOTAL, CKMB, TROPONINI in the last 72 hours. BNP: No results for input(s): BNP in the last 72 hours. INR:   Recent Labs     02/26/22  1555   INR 1.03     POC No results for input(s): POCGLU in the last 72 hours. No results for input(s): LACTA in the last 72 hours.         Taran Shelley MD 2/27/2022 1:45 PM

## 2022-02-26 NOTE — ED PROVIDER NOTES
[place] ENCOUNTER          Pt Name: Luiza Howell  MRN: [id number]  [place] 1940  Date of evaluation: 2/26/2022  Treating Resident Physician: Moris Malhotra DO  Supervising Physician: Everlina Mortimer, MD    CHIEF COMPLAINT       Chief Complaint   Patient presents with    Chest Pain     History obtained from patient      HISTORY OF PRESENT ILLNESS    HPI  Luiza Howell is a 80 y.o. male PMH AAA, BPH, CAD, carotid artery stenosis, absent right kidney, HLD, HTN, hypothyroidism, TIA, who is coming in with complaints of chest pain that started yesterday. The patient's wife thought it was indigestion at first as the pain eventually went away. However the patient woke up at 3 AM again with the same pain that he described as sharp, constant, and located at his upper chest. The patient said that he tried to take nitroglycerin but was unable to do so as the pain was severe. He doesn't follow a cardiologist at this time but did follow Dr. Gwen Arias before as a doctor. The patient previously had 2 stents placed in his heart and said that his pain radiated to his neck a little bit. He rated the pain as 10/10 on arrival.         REVIEW OF SYSTEMS   Review of Systems   Constitutional: Positive for activity change and fatigue. HENT: Negative for congestion, dental problem and drooling. Eyes: Negative for photophobia, redness and visual disturbance. Respiratory: Positive for chest tightness and shortness of breath. Cardiovascular: Positive for chest pain and palpitations. Gastrointestinal: Negative for abdominal distention, abdominal pain and anal bleeding. Endocrine: Negative for polydipsia, polyphagia and polyuria. Genitourinary: Negative for penile pain, penile swelling and scrotal swelling. Musculoskeletal: Negative for back pain, gait problem and joint swelling. Skin: Negative for pallor and rash.    Allergic/Immunologic: Negative for food allergies and immunocompromised state. Neurological: Negative for tremors, syncope and speech difficulty. Hematological: Does not bruise/bleed easily. Psychiatric/Behavioral: Negative for self-injury and sleep disturbance. The patient is not nervous/anxious and is not hyperactive.           PAST MEDICAL AND SURGICAL HISTORY     Past Medical History:   Diagnosis Date    AAA (abdominal aortic aneurysm) ([address]     intravascular stent    BCG ROXANE 05/21/2019    BPH (benign prostatic hypertrophy)     CAD (coronary artery disease)     Cancer of skin     Carotid artery stenosis     Chronic back pain     Chronic kidney disease     Congenital absence of one kidney     absent right kidney    History of lumbar fusion 3/15    mid lower back down    Hyperlipidemia     Hypertension     Hypothyroidism     Obesity     Sciatica     TIA (transient ischemic attack) 1/2008    Unspecified cerebral artery occlusion with cerebral infarction     TIA     Past Surgical History:   Procedure Laterality Date    ABDOMIN[facility]    ABDOMINAL AORTIC [facility]PA[facility]  [facility]    COLONOSCOPY  12/21/11    [facility]    CORONARY ANGIOPLASTY WITH STENT PLACEMENT  2003    2 stents    FEMORAL-FEMORAL BYPASS GRAFT  03/09/2017    Dr. Marlene Garrido    arm    KNEE ARTHROSCOPY  12/2008    meniscus tear    LUMBAR LAMINECTOMY  3/2016    [facility]--Dr. Chaparrita Zavala    OTHER SURGICAL HISTORY  4/14/15    melanoma removal from right chest    OTHER SURGICAL HISTORY  5/20/2016    I and D, exicison of posterior neck cyst    MA OFFICE/OUTPT VISIT,PROCEDURE ONLY N/A 11/8/2018    TURBT performed by Francisco Pedraza MD at [address] [facility] 5/1/2019    ROBOTIC RADICAL CYSTOPROSTATECTOMY performed by Francisco Pedraza MD at [address]  10/2016    Dr Mika Barbour   [address] [place] [address]  11/2020    on wrist     TESTICLE REMOVAL  1990's    TUNNELED VENOUS Disp: 25 tablet, Rfl: 1    b complex vitamins capsule, Take 1 capsule by mouth daily, Disp: , Rfl:     Elastic Bandages & Supports (JOBST KNEE HIGH COMPRESSION SM) MISC, 1 each by Does not apply route daily as needed, Disp: 1 each, Rfl: 0    aspirin 81 MG tablet, Take 81 mg by mouth daily. , Disp: , Rfl:       SOCIAL HISTORY     Social History     Social History Narrative    Not on file     Social History     Tobacco Use    Smoking status: Former Smoker     Packs/day: 1.00     Years: 24.00     Pack years: 24.00     Types: Cigarettes     Start date:      Quit date: 1979     Years since quittin.11    Smokeless tobacco: Never Used   Vaping Use    Vaping Use: Never used   Substance Use Topics    Alcohol use: No     Alcohol/week: 0.0 standard drinks    Drug use: No         ALLERGIES     Allergies   Allergen Reactions    Tape [Adhesive Tape]      Surgical tape-rash         FAMILY HISTORY     Family History   Problem Relation Age of Onset    Diabetes Mother     Cancer Mother         Liver    Arthritis Mother     Asthma Father     Heart Disease Father     Cancer Father         Lung, Bone    Stroke Sister     Heart Disease Sister     Cancer Sister     Stroke Brother     Heart Disease Brother     Cancer Brother         Lung         PREVIOUS RECORDS   Previous records reviewed:   No previous ER visits in the last 7 months        PHYSICAL EXAM     ED Triage Vitals [22]   BP Temp Temp Source Pulse Resp SpO2 Height Weight   (!) 163/86 98.3 °F (36.8 °C) Oral 73 23 100 % 5' 7\" (1.702 m) 205 lb (93 kg)     Initial vital signs and nursing assessment reviewed and normal. Body mass index is 32.11 kg/m². Pulsoximetry is abnormal per my interpretation. Additional Vital Signs:  Vitals:    22   BP: (!) 163/86   Pulse: 73   Resp: 23   Temp: 98.3 °F (36.8 °C)   SpO2: 100%       Physical Exam  Constitutional:       General: He is in acute distress. Appearance: He is ill-appearing. APTT   PROTIME-INR   ANTI-XA, UNFRACTIONATED HEPARIN   ANTI-XA, UNFRACTIONATED HEPARIN       Radiologic studies results:  XR CHEST PORTABLE    (Results Pending)       ED Medications administered this visit:   Medications   morphine injection 4 mg (has no administration in time range)   heparin (porcine) injection 60 Units/kg (has no administration in time range)   heparin (porcine) injection 60 Units/kg (has no administration in time range)   heparin (porcine) injection 30 Units/kg (has no administration in time range)   heparin 25,000 units in dextrose 5% 250 mL (premix) infusion (has no administration in time range)   nitroGLYCERIN 50 mg in dextrose 5% 250 mL infusion (5 mcg/min IntraVENous New Bag 2/26/22 1546)   aspirin chewable tablet 324 mg (324 mg Oral Given 2/26/22 1540)   ticagrelor (BRILINTA) tablet 180 mg (180 mg Oral Given 2/26/22 1543)         ED COURSE      STEMI alert called  Patient given heparin, brilinta, nitro drip, and loading dose aspirin  Patient given morphine  Dr. Judi Hart called for emergent STEMI      MEDICATION CHANGES     New Prescriptions    No medications on file         FINAL DISPOSITION     Final diagnoses:   STEMI involving oth coronary artery of anterior wall ([address]     Condition: Stable  Dispo: Admit to cath Celso Cruz      This transcription was electronically signed. Parts of this transcriptions may have been dictated by use of voice recognition software and electronically transcribed, and parts may have been transcribed with the assistance of an ED scribe. The transcription may contain errors not detected in proofreading. Please refer to my supervising physician's documentation if my documentation differs.     Electronically Signed: Jose G Manuel DO, 02/26/22, 3:46 PM       Jose G Manuel DO  Resident  02/26/22 0467

## 2022-02-26 NOTE — H&P
[facility]  Interventional Cardiology History and Physical       Patient:  Tomás Harry  YOB: 1940    MRN: [id number]   Acct: [de-identified]     Primary Care Physician: Isreal Primrose, MD     CHIEF COMPLAINT:    Chest pain     HISTORY OF PRESENT ILLNESS:    Tomás Harry is a pleasant 80year old male patient with past medical history that includes:   Past Medical History:   Diagnosis Date    AAA (abdominal aortic aneurysm) ([address]     intravascular stent    BCG ROXANE 05/21/2019    BPH (benign prostatic hypertrophy)     CAD (coronary artery disease)     Cancer of skin     Carotid artery stenosis     Chronic back pain     Chronic kidney disease     Congenital absence of one kidney     absent right kidney    History of lumbar fusion 3/15    mid lower back down    Hyperlipidemia     Hypertension     Hypothyroidism     Obesity     Sciatica     TIA (transient ischemic attack) 1/2008    Unspecified cerebral artery occlusion with cerebral infarction     TIA   Patient has h/o CAD, prior PCI of RCA in 2011. He had prior graft repair for AAA. An Echocardiogram in 2018 revealed an EF of 60%/ For the past one week, the patient has been experiencing intermittent, pressure like, retrosternal chest pain, radiating to neck/left shoulder, associate with SOB and fatigue. Chest pain worsened last night and became more severe/persistent today, was up to 10/10 in severity. EKG in ER revealed SR, ST elevation in anteroseptal leads. STEMI alert was activated. R/B/I/A of LHC/PCI were d/w patient, he agreed to proceed. LHC revealed evidence for an ulcerated lesion causing subtotal flow limiting lesion of mid LAD, underwent successful PCI/QUINN. Proximal LCX had an 80% stenosis, PCI/QUINN was performed. Patient feels well. He denies chest pain now.      All labs, EKG's, diagnostic testing and images as well as cardiac cath, stress testing   were reviewed during this encounter    CARDIAC TESTING  Echo:   ECHO: Results for orders placed during the hospital encounter of 05/01/19    ECHO Complete 2D W Doppler W Color    Narrative  Transthoracic Echocardiography Report (TTE)    Demographics    Patient Name    Jaci Fan Gender                Male    MR #            [id number]     Race                      Ethnicity    Account #       [de-identified]     Room Number           9215    Accession       [id number]     Date of Study         05/03/2019  Number    Date of Birth   1940    Referring Physician   MD Anni Bowen MDpreet Garrido, [id number]    Age             78 year(s)    [place] [address], [facility]    Interpreting          Echo reader of the  Physician             week  Tio Nunez MD    Procedure    Type of Study    TTE procedure:ECHOCARDIOGRAM COMPLETE 2D W DOPPLER W COLOR. Procedure Date  Date: 05/03/2019 Start: 01:06 PM    Study Location: Bedside  Technical Quality: Limited visualization due to poor acoustical window. Indications:Fluid overload. Additional Medical History:Ex smoker, bladder cancer, CAD, hyperlipidemia,  hypothyroid, hypertension, chronic kidney disease, PAD, TIA, AAA    Patient Status: Routine    Height: 66.93 inches Weight: 196.21 pounds BSA: 2 m^2 BMI: 30.8 kg/m^2    BP: 142/66 mmHg    Conclusions    Summary  Ejection fraction is visually estimated at 60%. Overall left ventricular function is normal.  The aortic valve leaflets were not well visualized. Aortic valve appears tricuspid. Aortic valve leaflets are somewhat thickened. Aortic valve leaflets are Mildly calcified. Signature    ----------------------------------------------------------------  Electronically signed by Tio Nunez MD (Interpreting  physician) on 05/03/2019 at 04:30 PM  ----------------------------------------------------------------    Findings    Mitral Valve  Trace mitral regurgitation is present.     Aortic Valve  The aortic valve leaflets were not well visualized. Aortic valve appears tricuspid. Aortic valve leaflets are somewhat thickened. Aortic valve leaflets are Mildly calcified. Tricuspid Valve  Mild tricuspid regurgitation. Pulmonic Valve  The pulmonic valve was not well visualized . Trivial pulmonic regurgitation visualized. Left Atrium  Left atrial size was normal.    Left Ventricle  Ejection fraction is visually estimated at 60%. Overall left ventricular function is normal.    Right Atrium  Right atrial size was normal.    Right Ventricle  The right ventricular size was normal with normal systolic function and  wall thickness. Pericardial Effusion  The pericardium was normal in appearance with no evidence of a pericardial  effusion. Pleural Effusion  No evidence of pleural effusion. Aorta / Great Vessels  -Aortic root dimension within normal limits.  -The Pulmonary artery is within normal limits. -IVC size is within normal limits with normal respiratory phasic changes.     M-Mode/2D Measurements & Calculations    LV Diastolic    LV Systolic Dimension: 2.9   AV Cusp Separation: 2 cmLA  Dimension: 4.1  cm                           Dimension: 3.7 cmAO Root  cm              LV Volume Diastolic: 48.5 ml Dimension: 3 cm  LV FS:29.3 %    LV Volume Systolic: 32.4 ml  LV PW           LV EDV/LV EDV Index: 18.5  Diastolic: 1 cm OC/33 H^7RM ESV/LV ESV  Septum          Index: 32.2 ml/16 m^2        RV Diastolic Dimension: 2.6  Diastolic: 1 cm EF Calculated: 56.6 %        cm    LA/Aorta: 1.23    Doppler Measurements & Calculations    MV Peak E-Wave: 51 cm/s    AV Peak Velocity: 109 LVOT Peak Velocity: 102  MV Peak A-Wave: 68.1 cm/s  cm/s                  cm/s  MV E/A Ratio: 0.75         AV Peak Gradient:     LVOT Peak Gradient: 4  MV Peak Gradient: 1.04     4.75 mmHg             mmHg  mmHg  TV Peak E-Wave: 54.8 cm/s  MV Deceleration Time: 215                        TV Peak A-Wave: 46.3 cm/s  msec  TV Peak Gradient: 1. 2  mmHg  MV E' Septal Velocity: 9.4                       TR Velocity:231 cm/s  cm/s                       AV DVI (Vmax):0.94    TR Gradient:21.34 mmHg  MV A' Septal Velocity:                           PV Peak Velocity: 68.3  14.5 cm/s                                        cm/s  MV E' Lateral Velocity:                          PV Peak Gradient: 1.87  7.9 cm/s                                         mmHg  MV A' Lateral Velocity:  13.9 cm/s  E/E' septal: 5.43                                [place] ED Velocity: 81.4 cm/s  E/E' lateral: 6.46    [id number]      Past Medical History:    Past Medical History:   Diagnosis Date    AAA (abdominal aortic [facility]) [address]     intravascular stent    BCG ROXANE 05/21/2019    BPH (benign prostatic hypertrophy)     CAD (coronary artery disease)     Cancer of skin     Carotid artery stenosis     Chronic back pain     Chronic kidney disease     Congenital absence of one kidney     absent right kidney    History of lumbar fusion 3/15    mid lower back down    Hyperlipidemia     Hypertension     Hypothyroidism     Obesity     Sciatica     TIA (transient ischemic attack) 1/2008    Unspecified cerebral artery occlusion with cerebral infarction     TIA       Past Surgical History:    Past Surgical History:   Procedure Laterality Date    ABDOMINAL AORTIC ANEURYSM REPAIR  3/2010    [facility]    ABDOMINAL AORTIC ANEURYSM REPAIR  7/24/15    [facility]    [facility]    CORONARY ANGIOPLASTY WITH STENT PLACEMENT  2003    2 stents    FEMORAL-FEMORAL BYPASS GRAFT  03/09/2017    Dr. Kirsten Daniel    arm    KNEE ARTHROSCOPY  12/2008    meniscus tear    LUMBAR LAMINECTOMY  3/2016    [facility]--Dr. Jose Miguel De Leon    OTHER SURGICAL HISTORY  4/14/15    melanoma removal from right chest    OTHER SURGICAL HISTORY  5/20/2016 I and D, exicison of posterior neck cyst    MT OFFICE/OUTPT VISIT,PROCEDURE ONLY N/A 11/8/2018    TURBT performed by Franklin Javed MD at [address] [facility] 5/1/2019    ROBOTIC RADICAL CYSTOPROSTATECTOMY performed by Franklin Javed MD at [address]  10/2016    Dr Nico Echeverria   [facility]  11/2020    on wrist     TESTICLE REMOVAL  1990's    TUNNELED VENOUS PORT PLACEMENT      TURP  04/16/2014    See note of Dr. Azevedo Poster for OR procedure details       Medications Prior to Admission:    Not in a hospital admission. Allergies:    Tape [adhesive tape]    Social History:    reports that he quit smoking about 42 years ago. His smoking use included cigarettes. He started smoking about 67 years ago. He has a 24.00 pack-year smoking history. He has never used smokeless tobacco. He reports that he does not drink alcohol and does not use drugs. Family History:   family history includes Arthritis in his mother; Asthma in his father; Cancer in his brother, father, mother, and sister; Diabetes in his mother; Heart Disease in his brother, father, and sister; Stroke in his brother and sister. REVIEW OF SYSTEMS:  Constitutional: negative for anorexia, chills and fevers,weight change  Skin: negative for new skin rash per patient  HEENT: negative for head trauma or new visual changes  Respiratory: negative for cough, hemoptysis, wheezing  Cardiovascular: negative for  orthopnea, palpitations and syncope. Gastrointestinal: negative for abdominal pain,nausea , vomiting, constipation, diarrhea.   Hematologic/lymphatic: negative for bruising,prolonged bleeding,blood clots  Musculoskeletal:negative for muscle weakness, myalgias,wasting  Neurological: negative for coordination problems, dizziness, gait problems and vertigo  Behavioral/Psych:negative for mood/sleep disturbance      PHYSICAL EXAM:   Vitals:  Patient Vitals for the past 24 hrs:   BP Temp Temp portions of the upper abdomen are within normal limits. The osseous structures are intact. No acute fractures or suspicious osseous lesions. Cardiomegaly with pulmonary vascular congestion and a small left pleural effusion. **This report has been created using voice recognition software. It may contain minor errors which are inherent in voice recognition technology. ** Final report electronically signed by Dr Raciel Recinos on 2/26/2022 4:10 PM      LABS:  No results for input(s): CKTOTAL, CKMB, CKMBINDEX, TROPONINT in the last 72 hours.   CBC:   Lab Results   Component Value Date    WBC 12.2 02/26/2022    RBC 5.30 02/26/2022    RBC 5.46 11/10/2020    HGB 16.3 02/26/2022    HCT 49.5 02/26/2022    MCV 93.4 02/26/2022    MCH 30.8 02/26/2022    MCHC 32.9 02/26/2022    RDW 14.4 11/10/2020     02/26/2022    MPV 10.0 02/26/2022     BMP:    Lab Results   Component Value Date     01/31/2022    K 4.8 01/31/2022    K 3.3 05/04/2019     01/31/2022    CO2 31 01/31/2022    BUN 19 01/31/2022    LABALBU 3.6 05/03/2021    LABALBU 4.1 05/19/2012    CREATININE 1.21 01/31/2022    CALCIUM 9.2 01/31/2022    LABGLOM 64 12/18/2020    GLUCOSE 97 01/31/2022     Hepatic Function Panel:    Lab Results   Component Value Date    ALKPHOS 128 05/03/2021    ALT 33 05/03/2021    AST 35 05/03/2021    PROT 6.4 05/03/2021    BILITOT 1.3 05/03/2021    BILITOT NEGATIVE 10/26/2018    BILIDIR 0.3 05/03/2021    LABALBU 3.6 05/03/2021    LABALBU 4.1 05/19/2012     Magnesium:    Lab Results   Component Value Date    MG 1.8 05/04/2019     Warfarin PT/INR:  No components found for: PTPATWAR, PTINRWAR  HgBA1c:    Lab Results   Component Value Date    LABA1C 6.4 08/17/2011     FLP:    Lab Results   Component Value Date    TRIG 102 02/22/2017    HDL 31 02/22/2017    LDLCALC 102 02/22/2017    LDLDIRECT 90 01/31/2022     TSH:    Lab Results   Component Value Date    TSH 1.17 01/31/2022     BNP: No results found for: BNP      ASSESSMENT:  Patient has h/o CAD, prior PCI of RCA in 2011. He had prior graft repair for AAA. An Echocardiogram in 2018 revealed an EF of 60%/ For the past one week, the patient has been experiencing intermittent, pressure like, retrosternal chest pain, radiating to neck/left shoulder, associate with SOB and fatigue. Chest pain worsened last night and became more severe/persistent today, was up to 10/10 in severity. EKG in ER revealed SR, ST elevation in anteroseptal leads. STEMI alert was activated. R/B/I/A of LHC/PCI were d/w patient, he agreed to proceed. LHC revealed evidence for an ulcerated lesion causing subtotal flow limiting lesion of mid LAD, underwent successful PCI/QUINN. Proximal LCX had an 80% stenosis, PCI/QUINN was performed. Patient feels well. He denies chest pain now. 1. STEMI  2. S/p PCI of mid LAD ([facility])   3. S/p PCI of LCX  4. Ischemic CMP  5. Apical hypokinesis   6. CAD,prior PCI of RCA  7. S/p AAA repair   8. CKD  9. H/o TIA  10. PAD   11. S/P Fem-Fem bypass    RECOMMENDATIONS:  Bed rest for 4 hours post procedure   Admit to ICU   Optimize medical therapy for Coronary Artery Disease  Aggressive risk factor modification   Access site care  Antiplatelet therapy: ASA 81 mg PO daily and Bilrinta 90 mg po BID    High intensity statin therapy  Obtain a Full Echocardiogram prior to discharge   Repeat EKG on the floor   IVF: NS at 50 cc/h  AM Labs: BMP, CBC, Lipid panel  Refer to cardiac rehab      Above findings and plan of care were discussed with patient and his family, questions were answered, agreeable to plan. Thank you for allowing me to participate in the care of this patient. Please let me know if I can be of any further assistance.       Khoa Wu MD, Leyla Jensen   5:14 PM  2/26/2022

## 2022-02-26 NOTE — ED NOTES
After being medicated for pain, pt reports chest pain is gone.       Geraldine Reddy, TARYN  02/26/22 1805

## 2022-02-27 ENCOUNTER — APPOINTMENT (OUTPATIENT)
Dept: GENERAL RADIOLOGY | Age: 82
DRG: 246 | End: 2022-02-27
Payer: MEDICARE

## 2022-02-27 PROBLEM — I21.09 STEMI INVOLVING OTH CORONARY ARTERY OF ANTERIOR WALL (HCC): Status: ACTIVE | Noted: 2022-02-26

## 2022-02-27 LAB
ALBUMIN SERPL-MCNC: 3.5 G/DL (ref 3.5–5.1)
ALP BLD-CCNC: 131 U/L (ref 38–126)
ALT SERPL-CCNC: 28 U/L (ref 11–66)
ANION GAP SERPL CALCULATED.3IONS-SCNC: 13 MEQ/L (ref 8–16)
AST SERPL-CCNC: 78 U/L (ref 5–40)
AVERAGE GLUCOSE: 117 MG/DL (ref 70–126)
BILIRUB SERPL-MCNC: 1.6 MG/DL (ref 0.3–1.2)
BUN BLDV-MCNC: 14 MG/DL (ref 7–22)
CALCIUM SERPL-MCNC: 8.9 MG/DL (ref 8.5–10.5)
CHLORIDE BLD-SCNC: 100 MEQ/L (ref 98–111)
CHOLESTEROL, TOTAL: 100 MG/DL (ref 100–199)
CO2: 22 MEQ/L (ref 23–33)
CREAT SERPL-MCNC: 0.9 MG/DL (ref 0.4–1.2)
EKG ATRIAL RATE: 71 BPM
EKG P AXIS: 46 DEGREES
EKG P-R INTERVAL: 134 MS
EKG Q-T INTERVAL: 454 MS
EKG QRS DURATION: 80 MS
EKG QTC CALCULATION (BAZETT): 493 MS
EKG R AXIS: -21 DEGREES
EKG T AXIS: 70 DEGREES
EKG VENTRICULAR RATE: 71 BPM
ERYTHROCYTE [DISTWIDTH] IN BLOOD BY AUTOMATED COUNT: 13.7 % (ref 11.5–14.5)
ERYTHROCYTE [DISTWIDTH] IN BLOOD BY AUTOMATED COUNT: 46.4 FL (ref 35–45)
GFR SERPL CREATININE-BSD FRML MDRD: 81 ML/MIN/1.73M2
GLUCOSE BLD-MCNC: 128 MG/DL (ref 70–108)
HBA1C MFR BLD: 5.9 % (ref 4.4–6.4)
HCT VFR BLD CALC: 46.9 % (ref 42–52)
HDLC SERPL-MCNC: 29 MG/DL
HEMOGLOBIN: 15.7 GM/DL (ref 14–18)
LDL CHOLESTEROL CALCULATED: 57 MG/DL
MAGNESIUM: 1.9 MG/DL (ref 1.6–2.4)
MCH RBC QN AUTO: 31.2 PG (ref 26–33)
MCHC RBC AUTO-ENTMCNC: 33.5 GM/DL (ref 32.2–35.5)
MCV RBC AUTO: 93.1 FL (ref 80–94)
PLATELET # BLD: 181 THOU/MM3 (ref 130–400)
PMV BLD AUTO: 9.6 FL (ref 9.4–12.4)
POTASSIUM SERPL-SCNC: 3.7 MEQ/L (ref 3.5–5.2)
RBC # BLD: 5.04 MILL/MM3 (ref 4.7–6.1)
SODIUM BLD-SCNC: 135 MEQ/L (ref 135–145)
TOTAL PROTEIN: 6.5 G/DL (ref 6.1–8)
TRIGL SERPL-MCNC: 70 MG/DL (ref 0–199)
WBC # BLD: 10.9 THOU/MM3 (ref 4.8–10.8)

## 2022-02-27 PROCEDURE — 85027 COMPLETE CBC AUTOMATED: CPT

## 2022-02-27 PROCEDURE — 99232 SBSQ HOSP IP/OBS MODERATE 35: CPT | Performed by: STUDENT IN AN ORGANIZED HEALTH CARE EDUCATION/TRAINING PROGRAM

## 2022-02-27 PROCEDURE — 2140000000 HC CCU INTERMEDIATE R&B

## 2022-02-27 PROCEDURE — 93010 ELECTROCARDIOGRAM REPORT: CPT | Performed by: INTERNAL MEDICINE

## 2022-02-27 PROCEDURE — 71045 X-RAY EXAM CHEST 1 VIEW: CPT

## 2022-02-27 PROCEDURE — 83735 ASSAY OF MAGNESIUM: CPT

## 2022-02-27 PROCEDURE — 99222 1ST HOSP IP/OBS MODERATE 55: CPT | Performed by: INTERNAL MEDICINE

## 2022-02-27 PROCEDURE — 6360000002 HC RX W HCPCS: Performed by: NURSE PRACTITIONER

## 2022-02-27 PROCEDURE — 36415 COLL VENOUS BLD VENIPUNCTURE: CPT

## 2022-02-27 PROCEDURE — 6370000000 HC RX 637 (ALT 250 FOR IP): Performed by: INTERNAL MEDICINE

## 2022-02-27 PROCEDURE — 6360000002 HC RX W HCPCS: Performed by: INTERNAL MEDICINE

## 2022-02-27 PROCEDURE — 80061 LIPID PANEL: CPT

## 2022-02-27 PROCEDURE — 83036 HEMOGLOBIN GLYCOSYLATED A1C: CPT

## 2022-02-27 PROCEDURE — 99232 SBSQ HOSP IP/OBS MODERATE 35: CPT | Performed by: NURSE PRACTITIONER

## 2022-02-27 PROCEDURE — 2580000003 HC RX 258: Performed by: INTERNAL MEDICINE

## 2022-02-27 PROCEDURE — 93005 ELECTROCARDIOGRAM TRACING: CPT | Performed by: STUDENT IN AN ORGANIZED HEALTH CARE EDUCATION/TRAINING PROGRAM

## 2022-02-27 PROCEDURE — 6370000000 HC RX 637 (ALT 250 FOR IP): Performed by: NURSE PRACTITIONER

## 2022-02-27 PROCEDURE — 80053 COMPREHEN METABOLIC PANEL: CPT

## 2022-02-27 RX ORDER — MAGNESIUM SULFATE 1 G/100ML
1000 INJECTION INTRAVENOUS ONCE
Status: COMPLETED | OUTPATIENT
Start: 2022-02-27 | End: 2022-02-27

## 2022-02-27 RX ORDER — LANOLIN ALCOHOL/MO/W.PET/CERES
4.5 CREAM (GRAM) TOPICAL NIGHTLY PRN
Status: DISCONTINUED | OUTPATIENT
Start: 2022-02-27 | End: 2022-02-28 | Stop reason: HOSPADM

## 2022-02-27 RX ORDER — POTASSIUM CHLORIDE 20 MEQ/1
20 TABLET, EXTENDED RELEASE ORAL ONCE
Status: COMPLETED | OUTPATIENT
Start: 2022-02-27 | End: 2022-02-27

## 2022-02-27 RX ORDER — PREGABALIN 50 MG/1
50 CAPSULE ORAL 2 TIMES DAILY
Status: DISCONTINUED | OUTPATIENT
Start: 2022-02-27 | End: 2022-02-28 | Stop reason: HOSPADM

## 2022-02-27 RX ORDER — FUROSEMIDE 10 MG/ML
20 INJECTION INTRAMUSCULAR; INTRAVENOUS ONCE
Status: COMPLETED | OUTPATIENT
Start: 2022-02-27 | End: 2022-02-27

## 2022-02-27 RX ORDER — POTASSIUM CHLORIDE 20 MEQ/1
10 TABLET, EXTENDED RELEASE ORAL DAILY
Status: DISCONTINUED | OUTPATIENT
Start: 2022-02-28 | End: 2022-02-28 | Stop reason: HOSPADM

## 2022-02-27 RX ORDER — POTASSIUM CHLORIDE 7.45 MG/ML
10 INJECTION INTRAVENOUS
Status: COMPLETED | OUTPATIENT
Start: 2022-02-27 | End: 2022-02-27

## 2022-02-27 RX ORDER — LISINOPRIL 5 MG/1
5 TABLET ORAL DAILY
Status: DISCONTINUED | OUTPATIENT
Start: 2022-02-27 | End: 2022-02-28 | Stop reason: HOSPADM

## 2022-02-27 RX ORDER — ONDANSETRON 2 MG/ML
4 INJECTION INTRAMUSCULAR; INTRAVENOUS EVERY 4 HOURS PRN
Status: DISCONTINUED | OUTPATIENT
Start: 2022-02-27 | End: 2022-02-28 | Stop reason: HOSPADM

## 2022-02-27 RX ADMIN — TICAGRELOR 90 MG: 90 TABLET ORAL at 10:30

## 2022-02-27 RX ADMIN — POTASSIUM CHLORIDE 10 MEQ: 7.46 INJECTION, SOLUTION INTRAVENOUS at 12:34

## 2022-02-27 RX ADMIN — METOPROLOL TARTRATE 25 MG: 25 TABLET, FILM COATED ORAL at 10:10

## 2022-02-27 RX ADMIN — ASPIRIN 81 MG 81 MG: 81 TABLET ORAL at 07:49

## 2022-02-27 RX ADMIN — METOPROLOL TARTRATE 25 MG: 25 TABLET, FILM COATED ORAL at 21:55

## 2022-02-27 RX ADMIN — FUROSEMIDE 40 MG: 40 TABLET ORAL at 07:50

## 2022-02-27 RX ADMIN — Medication 4.5 MG: at 21:56

## 2022-02-27 RX ADMIN — FUROSEMIDE 20 MG: 10 INJECTION, SOLUTION INTRAMUSCULAR; INTRAVENOUS at 17:36

## 2022-02-27 RX ADMIN — TICAGRELOR 90 MG: 90 TABLET ORAL at 21:56

## 2022-02-27 RX ADMIN — POTASSIUM CHLORIDE 10 MEQ: 7.46 INJECTION, SOLUTION INTRAVENOUS at 15:07

## 2022-02-27 RX ADMIN — POTASSIUM CHLORIDE 20 MEQ: 1500 TABLET, EXTENDED RELEASE ORAL at 21:55

## 2022-02-27 RX ADMIN — FUROSEMIDE 20 MG: 10 INJECTION, SOLUTION INTRAMUSCULAR; INTRAVENOUS at 10:31

## 2022-02-27 RX ADMIN — PREGABALIN 50 MG: 50 CAPSULE ORAL at 21:55

## 2022-02-27 RX ADMIN — POTASSIUM CHLORIDE 10 MEQ: 7.46 INJECTION, SOLUTION INTRAVENOUS at 17:34

## 2022-02-27 RX ADMIN — DULOXETINE HYDROCHLORIDE 30 MG: 30 CAPSULE, DELAYED RELEASE ORAL at 07:49

## 2022-02-27 RX ADMIN — MAGNESIUM SULFATE HEPTAHYDRATE 1000 MG: 1 INJECTION, SOLUTION INTRAVENOUS at 11:22

## 2022-02-27 RX ADMIN — ONDANSETRON 4 MG: 2 INJECTION INTRAMUSCULAR; INTRAVENOUS at 07:49

## 2022-02-27 RX ADMIN — SODIUM CHLORIDE, PRESERVATIVE FREE 10 ML: 5 INJECTION INTRAVENOUS at 22:02

## 2022-02-27 RX ADMIN — POTASSIUM CHLORIDE 10 MEQ: 7.46 INJECTION, SOLUTION INTRAVENOUS at 13:48

## 2022-02-27 RX ADMIN — LISINOPRIL 5 MG: 5 TABLET ORAL at 10:10

## 2022-02-27 RX ADMIN — ATORVASTATIN CALCIUM 40 MG: 40 TABLET, FILM COATED ORAL at 21:55

## 2022-02-27 RX ADMIN — PREGABALIN 50 MG: 50 CAPSULE ORAL at 10:30

## 2022-02-27 RX ADMIN — LEVOTHYROXINE SODIUM 137 MCG: 0.14 TABLET ORAL at 07:50

## 2022-02-27 ASSESSMENT — PAIN SCALES - GENERAL
PAINLEVEL_OUTOF10: 0

## 2022-02-27 NOTE — CARE COORDINATION
2/27/22, 12:40 PM EST  DISCHARGE PLANNING EVALUATION:    Ledon Paget       Admitted: 2/26/2022/ [facility] day: 1   Location: [facility]22/022-A Reason for admit: STEMI (ST elevation myocardial infarction) ([facility] [I21.3]  STEMI involving oth coronary artery of anterior wall ([facility] [I21.09]   PMH:  has a past medical history of AAA (abdominal aortic aneurysm) ([facility] BCG ROXANE, BPH (benign prostatic hypertrophy), CAD (coronary artery disease), Cancer of skin, Carotid artery stenosis, Chronic back pain, Chronic kidney disease, Congenital absence of one kidney, History of lumbar fusion, Hyperlipidemia, Hypertension, Hypothyroidism, Obesity, Sciatica, TIA (transient ischemic attack), and Unspecified cerebral artery occlusion with cerebral infarction. Procedure:   2/26 Cardiac Cath with PCI to mid LAD and proximal LCX  2/27 CXR:   Mild nonspecific interstitial prominence. No focal consolidation. Possible small left pleural effusion     Barriers to Discharge: Presented to ED with c/o retrosternal chest pain radiating to left shoulder and neck, dyspnea, and fatigue. Reports chest pain started night prior to presentation and became more severe and persistent on day of presentation. STEMI. Taken to cath lab and received PCI to mid LAD and proximal LCX. ICU post-cath for monitoring; Intensivist consulted. Echo ordered. Now on stepdown unit. Afebrile. NSR. On room air. Ox4. Follows commands. Right radial puncture site WNL. Urostomy w/pink stoma, clear yellow urine. Dietitian and Cardiac Rehab consulted. Telemetry, n/v checks, wound care. Asa, lipitor, cymbalta, po lasix 40 mg daily, synthroid, lisinopril, lopressor, prn IV zofran, K+, lyrica, brilinta, K+ replacement ordered. To receive 20 mg IV lasix x1 this evening. Received 20 mg IV lasix x1 this morning. Received 1g Mag sulfate x1 today. Received loading dose of brilinta yesterday. Na+ 133 - now 135, trop 0.148, alk phos 131, ast 78, bili 1.6, wbc 12.2 -now 10.9. PCP: Coco Garcia MD  Readmission Risk Score: 12.9 ( )%    Patient Goals/Plan/Treatment Preferences: From home. Need meet & greet. Plan pending course. Transportation/Food Security/Housekeeping Addressed:  No issues identified.

## 2022-02-27 NOTE — PROGRESS NOTES
Cardiology Progress Note      Patient:  Marquise Maciel  YOB: 1940  MRN: [id number]   Acct: [de-identified]  Admit Date:  2/26/2022  Primary Cardiologist: none; IM Dr. Zavala   Rounding/Interventional Cardiologist: Genesis Ortiz MD    Rationale for Cardiology consult: chest pain    HPI/PMH: per Consult note 2/25/22 of Dr. Kelton Clark:   80year old male patient with past medical history that includes  Past Medical History:   Diagnosis Date    AAA (abdominal aortic aneurysm) ([address]       intravascular stent    BCG ROXANE 05/21/2019    BPH (benign prostatic hypertrophy)      CAD (coronary artery disease)      Cancer of skin      Carotid artery stenosis      Chronic back pain      Chronic kidney disease      Congenital absence of one kidney       absent right kidney    History of lumbar fusion 3/15     mid lower back down    Hyperlipidemia      Hypertension      Hypothyroidism      Obesity      Sciatica      TIA (transient ischemic attack) 1/2008    Unspecified cerebral artery occlusion with cerebral infarction       TIA      Patient has h/o CAD, prior PCI of RCA in 2011. He had prior graft repair for AAA. An Echocardiogram in 2018 revealed an EF of 60%/ For the past one week, the patient has been experiencing intermittent, pressure like, retrosternal chest pain, radiating to neck/left shoulder, associate with SOB and fatigue. Chest pain worsened last night and became more severe/persistent today, was up to 10/10 in severity. EKG in ER revealed SR, ST elevation in anteroseptal leads. STEMI alert was activated. R/B/I/A of LHC/PCI were d/w patient, he agreed to proceed. LHC revealed evidence for an ulcerated lesion causing subtotal flow limiting lesion of mid LAD, underwent successful PCI/QUINN. Proximal LCX had an 80% stenosis, PCI/QUINN was performed. Patient feels well. He denies chest pain now.        Chief Complaint: \"Just feel like I can't get a full breath once in a while - started last night\". Subjective (Events in last 24 hours): Stable OVN  Denies chest pain or pressure  Noted intermittent dyspnea as above; no acute distress  Did have emesis this AM  Not much appetite this morning  Upon talking with patient had issues with urostomy bag OVN - did not get much sleep - made him somewhat anxious  EKG and CxR already obtained this AM - no acute findings    Objective:   /64   Pulse 60   Temp 97.7 °F (36.5 °C) (Oral)   Resp 16   Ht 5' 7\" (1.702 m)   Wt 194 lb 10.7 oz (88.3 kg)   SpO2 94%   BMI 30.49 kg/m²        TELEMETRY: SB 50's OVN; SR 70 - 80 without ectopy this AM    Physical Exam:  General Appearance: alert and oriented to person, place and time, in no acute distress but upon initial arrival respirations appeared slightly heavy - appeared mildly emotionally upset - improved after talking with patient  Cardiovascular: normal rate, regular rhythm, normal S1 and S2, no murmurs, rubs, clicks, or gallops. Pulmonary/Chest: clear to auscultation bilaterally- no wheezes, rales or rhonchi, slightly dim BB (L>R) otherwise normal air movement, no respiratory distress, RA - sat 92 - 94%  Abdomen: soft, non-tender, non-distended, normal bowel sounds, no masses  Extremities: no cyanosis mild peripheral edema hands, bilateral feet and ankles. R radial pulse 2+. Mild ecchymosis. Drsg D & I. No bleeding or hematoma.  PT, AT pulses 2+   Skin: warm and dry, no rash or erythema  Head: normocephalic and atraumatic  Eyes: pupils equal, round, and reactive to light  Neck: supple and non-tender without mass, no JVD  Musculoskeletal: normal range of motion, no joint swelling, deformity or tenderness  Neurological: alert, oriented, normal speech, no focal findings or movement disorder noted    Medications:    aspirin  81 mg Oral Daily    metoprolol tartrate  25 mg Oral BID    furosemide  40 mg Oral Daily    levothyroxine  137 mcg Oral Daily    DULoxetine  30 mg Oral Daily    sodium chloride flush evident in Anterolateral leads  QT has lengthened      Specimen Collected: 22 09:12             Echo: pending    Echo: 5/3/2019  Summary  Ejection fraction is visually estimated at 60%. Overall left ventricular function is normal.  The aortic valve leaflets were not well visualized. Aortic valve appears tricuspid. Aortic valve leaflets are somewhat thickened. Aortic valve leaflets are Mildly calcified.   Signature   ----------------------------------------------------------------  Electronically signed by Shazia Sheridan MD (Interpreting  physician) on 2019 at 04:30 PM      Stress: not performed    Left Heart Cath:22   [address] [address]                             CARDIAC CATHETERIZATION     PATIENT NAME: Carolyn Bunch                      :        1940  MED REC NO:   [id number]                           ROOM:       [facility]  ACCOUNT NO:   [de-identified]                           ADMIT DATE: 2022  PROVIDER:     Evelin Rodas MD     DATE OF PROCEDURE:  2022     INDICATION:  ST-elevation myocardial infarction.     PROCEDURES PERFORMED:  1. Left cardiac catheterization with selective coronary angiogram.  2.  Left ventriculogram.  3.  Successful PCI and stenting of the left anterior descending artery. 4.  Successful PCI and stenting of the left circumflex artery.     DESCRIPTION OF PROCEDURE/INTERVENTION DETAILS:  After informed consent,  the patient was brought to the cardiac catheterization room. He was  prepped and draped in a sterile fashion. 2% lidocaine was injected in  the skin and subcutaneous tissue overlying the right radial artery. Under ultrasound guidance using modified Seldinger technique, I was able  to obtain access in the right radial artery. 5/6 Slender sheath was  inserted. Standard antithrombotic/antispasmodic medications were given.   6-Kosovan EBU 3.5 guide catheter was used to cannulate the left main  coronary artery. The patient had evidence of subtotal occlusion of mid  LAD. This was an ulcerated lesion with ZOLTAN-2 flow.     I then proceeded with angioplasty using a 2.5 x 12 mm PTCA balloon. Afterwards, Resolute [place] 3.0 mm x 18 mm drug-eluting stent was  successfully deployed, then was postdilated using a 3.5 x 12 mm  noncompliant balloon. The diagonal vessel is a 2-mm vessel with segment  of diffuse disease. ZOLTAN-3 flow was noticed in the diagonal branch  which I elected to treat medically. The proximal segment of left  circumflex artery was found to have 80% stenosis. The Runthrough wire  was redirected to the left circumflex artery into OM1. ACT was  monitored during the procedure and was kept above 250. 2.5 x 15 mm  Resolute [place] drug-eluting stent was successfully deployed, postdilated  using same stent balloon. Wire was removed. Final angiogram revealed  well-expanded stents, 0% residual stenosis, ZOLTAN-3 flow. No  complications including no dissection, distal embolization, or  perforation.     FINDINGS:  HEMODYNAMICS:  Left ventricular end-diastolic pressure 20 mmHg. Upon  pullback across the aortic valve, the mean pressure gradient was found  to be 17 mmHg.     LEFT VENTRICULOGRAM:  Apical severe hypokinesis was noticed. Ejection  fraction estimated at 40%.     CORONARY ANGIOGRAM:  1. Left main coronary artery:  Patent without significant stenosis. Trifurcates into ramus intermedius, LCX, and LAD. 2.  Left circumflex artery:  Proximal left circumflex artery has 80% to  90% stenosis. OM1 is patent. Distal LCX with mild luminal  irregularities. 3.  Ramus intermedius:  Ramus intermedius has mild luminal  irregularities. Otherwise patent. 4.  Left anterior descending artery:  Proximal LAD is patent. Mid LAD  has 99% subtotal occlusion. D1 is a 2-mm vessel with proximal segment  having severe diffuse disease, 90%, with ZOLTAN-3 flow.   The LAD was  noticed to have a flow-limiting lesion in mid LAD with ZOLTAN-2 flow. Distal LAD with mild luminal irregularities. 5.  Right coronary artery:  Dominant vessel. Stent in proximal to mid  LAD has mild in-stent restenosis, 30% to proximal segment, 30% in the  mid segment. Distal RCA with mild luminal irregularities.     MEDICATIONS:  See MAR.     COMPLICATIONS:  None.     ESTIMATED BLOOD LOSS:  Less than 50 mL.     ACCESS:  Right radial artery access. Vasc Band was applied. Hemostasis  was achieved.     IMPRESSION:  1. ST-elevation myocardial infarction. 2.  Subtotal occlusion of the mid LAD, the culprit for the STEMI, status  post successful PCI and stenting. 3.  Severe proximal LCX stenosis, status post successful PCI and  stenting. 4.  Patent stent in the right coronary artery with only mild in-stent  restenosis.     RECOMMENDATIONS:  Bedrest for the next four hours. Admit to the  intensive care unit. Optimize medical therapy for coronary artery  disease. Aggressive risk factor modification. Continue metoprolol. Aspirin 81 mg p.o. daily. The patient received loading dose of  Brilinta. Continue Brilinta 90 mg p.o. b.i.d.  Echocardiogram.  Repeat  EKG on the floor. Normal saline at 50 mL per hour. Refer to cardiac  rehab. Findings and plan of care were discussed with the patient and  family. They are agreeable to the plan.  Cezar Mckeon MD   D: 02/26/2022 17:43:59      Lab Data:    Cardiac Enzymes:  No results for input(s): CKTOTAL, CKMB, CKMBINDEX, TROPONINI in the last 72 hours.     CBC:   Lab Results   Component Value Date    WBC 12.2 02/26/2022    RBC 5.30 02/26/2022    RBC 5.46 11/10/2020    HGB 16.3 02/26/2022    HCT 49.5 02/26/2022     02/26/2022       CMP:    Lab Results   Component Value Date     02/26/2022    K 3.8 02/26/2022    CL 97 02/26/2022    CO2 26 02/26/2022    BUN 16 02/26/2022    CREATININE 0.9 02/26/2022    LABGLOM 81 02/26/2022    GLUCOSE 97 02/26/2022    GLUCOSE 97 01/31/2022 CALCIUM 8.6 02/26/2022       Hepatic Function Panel:    Lab Results   Component Value Date    ALKPHOS 128 05/03/2021    ALT 33 05/03/2021    AST 35 05/03/2021    PROT 6.4 05/03/2021    BILITOT 1.3 05/03/2021    BILITOT NEGATIVE 10/26/2018    BILIDIR 0.3 05/03/2021    LABALBU 3.6 05/03/2021    LABALBU 4.1 05/19/2012       Magnesium:    Lab Results   Component Value Date    MG 1.8 05/04/2019       PT/INR:    Lab Results   Component Value Date    INR 1.03 02/26/2022       HgBA1c:    Lab Results   Component Value Date    LABA1C 6.4 08/17/2011       FLP:    Lab Results   Component Value Date    TRIG 102 02/22/2017    HDL 31 02/22/2017    LDLCALC 102 02/22/2017    LDLDIRECT 90 01/31/2022       TSH:    Lab Results   Component Value Date    TSH 1.17 01/31/2022         Assessment/Plan:  · STEMI  · Presented with chest pain, pressure retrosternal, radiating to L neck and shoulder; associated SOB, fatigue  · EKG (+) ST elevation AS leads = STEMI   · Cath with PCI 2/25/22 per Dr. Billie New   · Successful PCI and QUINN left anterior descending artery  · Successful PCI and QUINN of the left circumflex artery  · VSS post procedure  · SB 50's OVN; 70 - 80 this AM  · Mild dyspnea this AM  · Slightly dim BB L > R; known L pleural effusion  · Trace to 1+ peripheral edema hands, ankles, feet  · CxR with no acute findings; pleural effusion improved  · Routine po Lasix 40 mg administered  · Additional 20 mg IV x 1  · No chest pain or pressure  · EKG this AM; no acute change; resolving MI changes  · Some emotional upset overnight; did not sleep well  · Discussed strategies with nursing  · Melatonin prn HS  · Labs:   · K 3.7 (3.5) - replace  · Monitor; Keep K > 4.0  · Mag 1.9 - repalce  · Monitor; Keep > 2.0  Site  Cardiac Rehab: Yes    Follow-up visits: Dr. Billie New in 2 weeks      Discharge Medications for PCI/MI (performed):   ASA: yes  Statin: yes  P2Y12 Inhibitor: yes - Brilinta  Beta Blocker: yes  ACE / ARB: yes - Lisinopril   Nitro SL: yes      Discharge Medications for ICD, Cardiomyopathy, CHF:  Beta Blocker: yes  ACE Inhibitor/ARB: yes    · Ischemic cardiomyopathy  · HFrEF: echo pending  · LV gram; severe apical hypokinesis with EF ~ 40%  · Echo 2019: EF 60%  · Status post AAA repair  · CKD  · TIA  · PAD  · Status post femorofemoral bypass    Echo pending 2/28.   Advance diet as tolerated  Up in room as tolerated  Replace K & mag; recheck in AM  Additional 20 Lasix IV today  Home dose Kcl to resume 2/28  Added Melatonin at HS prn  Good sleep hygiene as discussed with nursing staff       Electronically signed by DEMAR Hurst CNP on 2/27/2022 at 6:14 AM

## 2022-02-27 NOTE — PROGRESS NOTES
Patient reporting that he can't catch his breath and patient has slightly labored breathing. Oxygen saturation 92-94% on Room Air. Dr. Neetu Victor notified and at bedside with patient. EKG ordered. EKG completed and reviewed.

## 2022-02-27 NOTE — PROGRESS NOTES
This RN spoke with Dr Jose Miguel Shabazz regarding being able to transfer patient to stepdown. Dr Jose Miguel Shabazz okay as long as patient is stable. Patient remains stable at this time with no complaints.

## 2022-02-27 NOTE — PROGRESS NOTES
Hospitalist Progress Note      Patient:  Beka Retana    Unit/Bed:3B-22/022-A  YOB: 1940  MRN: [id number]   Acct: [de-identified]   PCP: Ana Cristina Valera MD  Date of Admission: 2/26/2022    Assessment/Plan:    1. STEMI:  2. Coronary artery disease  a. Status post left heart catheterization with PCI to the LAD and proximal left circumflex on 2/26.  b. Continue aspirin, Brilinta, metoprolol, Lipitor, and lisinopril. c. Repeat EKG this morning shows T wave inversions in the anterior septal leads consistent with resolving MI/post STEMI changes. d. Cardiology to manage. 3. Ischemic cardiomyopathy  4. Acute exacerbation of HFrEF  a. Echo 2019 shows preserved EF of 60%. Left ventriculogram with left heart catheterization shows severe apical hypokinesis with EF of 40%  b. Echocardiogram pending.  c. Patient received p.o. Lasix as well as additional IV Lasix per cardiology this morning. d. Chest x-ray yesterday did show some vascular prominence with a small left pleural effusion. 5. 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 Renal function stable. Diuresing as above.  b. Continue ACE inhibitor. 6. Abdominal aortic aneurysm:   a. Stable 7.2 x 6.6 infrarenal abdominal aortic aneurysm noted on imaging. 7. Carotid artery stenosis:  a. Continue dual antiplatelet therapy as above. 8. History of bladder cancer status post radical cystoprostatectomy with pelvic lymph node dissection and ileal loop diversion on 5/2019  a. Follows with urology outpatient. 9. Hypothyroidism:   a. Continue home Synthroid  10. Hypertension:  a. Continue metoprolol  11. Hyperlipidemia:   a. Continue atorvastatin    Disposition: Anticipate discharge in 24 to 48 hours    Chief Complaint: Chest pain    Hospital Course:    2/26: Patient presents the hospital with complaints of chest pain with associated shortness of breath and fatigue.   EKG revealed ST elevations in anterior septal leads and STEMI alert was activated. The patient was taken emergently to the Cath Lab which revealed an ulcerated lesion of the mid LAD status post successful PCI with QUINN. Patient also had left circumflex artery 80% stenosis and received PCI with QUINN as well. Subjective (past 24 hours):   Patient states he feels like he is somewhat short of air this morning did not sleep well overnight. Denies any chest pain or palpitations. Denies any orthopnea. Past medical history, family history, social history and allergies reviewed again and is unchanged since admission. ROS (12 point review of systems completed. Pertinent positives noted. Otherwise ROS is negative)     Medications:  Reviewed    Infusion Medications    sodium chloride       Scheduled Medications    lisinopril  5 mg Oral Daily    furosemide  20 mg IntraVENous Once    potassium chloride  10 mEq IntraVENous Q2H    [START ON 2/28/2022] potassium chloride  10 mEq Oral Daily    pregabalin  50 mg Oral BID    aspirin  81 mg Oral Daily    metoprolol tartrate  25 mg Oral BID    furosemide  40 mg Oral Daily    levothyroxine  137 mcg Oral Daily    DULoxetine  30 mg Oral Daily    sodium chloride flush  5-40 mL IntraVENous 2 times per day    ticagrelor  90 mg Oral BID    atorvastatin  40 mg Oral Daily     PRN Meds: ondansetron, melatonin, sodium chloride flush, sodium chloride, acetaminophen      Intake/Output Summary (Last 24 hours) at 2/27/2022 1008  Last data filed at 2/27/2022 0606  Gross per 24 hour   Intake 769.29 ml   Output 2300 ml   Net -1530.71 ml       Diet:  ADULT DIET; Regular    Exam:  BP (!) 140/68   Pulse 70   Temp 97.7 °F (36.5 °C) (Oral)   Resp 22   Ht 5' 7\" (1.702 m)   Wt 194 lb 10.7 oz (88.3 kg)   SpO2 92%   BMI 30.49 kg/m²   General appearance: No apparent distress, appears stated age and cooperative. HEENT: Pupils equal, round, and reactive to light. Conjunctivae/corneas clear.   Neck: Supple, with full range of motion. No jugular venous distention. Trachea midline. Respiratory:  Normal respiratory effort. Clear to auscultation, bilaterally without Rales/Wheezes/Rhonchi. Cardiovascular: Regular rate and rhythm with normal S1/S2 without murmurs, rubs or gallops. Left heart catheterization site of the right wrist with clean dry dressing intact. Good capillary refill. Abdomen: Soft, non-tender, non-distended with normal bowel sounds. Urostomy noted in the right lower quadrant. Yellow urine output. Musculoskeletal: passive and active ROM x 4 extremities. Trace pitting edema in the bilateral lower extremities. Skin: Skin color, texture, turgor normal.  No rashes or lesions. Neurologic:  Neurovascularly intact without any focal sensory/motor deficits. Cranial nerves: II-XII intact, grossly non-focal.  Psychiatric: Alert and oriented, thought content appropriate, normal insight  Capillary Refill: Brisk,< 3 seconds   Peripheral Pulses: +2 palpable, equal bilaterally     Labs:   Recent Labs     02/26/22  1555 02/27/22  0716   WBC 12.2* 10.9*   HGB 16.3 15.7   HCT 49.5 46.9    181     Recent Labs     02/26/22  1813 02/27/22  0716   * 135   K 3.8 3.7   CL 97* 100   CO2 26 22*   BUN 16 14   CREATININE 0.9 0.9   CALCIUM 8.6 8.9     Recent Labs     02/27/22  0716   AST 78*   ALT 28   BILITOT 1.6*   ALKPHOS 131*     Recent Labs     02/26/22  1555   INR 1.03     No results for input(s): Erle Keepers in the last 72 hours. Microbiology:    Blood culture #1:   Lab Results   Component Value Date    BC No growth-preliminary  No growth   03/12/2019       Blood culture #2:No results found for: Shalom Grsosman    Organism:  Lab Results   Component Value Date    ORG Escherichia coli 03/11/2019         Lab Results   Component Value Date    LABGRAM  05/20/2016     Rare segmented neutrophils observed. Rare epithelial cells observed. Rare gram positive cocci in pairs.          MRSA culture only:No results found for: [facility]    Urine culture:   Lab Results   Component Value Date    LABURIN No growth-preliminary  No growth 04/22/2014       Respiratory culture: No results found for: CULTRESP    Aerobic and Anaerobic :  Lab Results   Component Value Date    LABAERO  05/20/2016     No growth-preliminary  Culture yielded light growth of gram positive bacilli  consistent with Corynebacterium sp. The gram positive cocci  observed on the direct smear were not recovered. This may be  due to antimicrobial suppression, a fastidious organism, or  an anaerobic organism. Lab Results   Component Value Date    LABANAE  05/20/2016     Culture yielded moderate mixed aerobic and anaerobic growth  of mixed gram positive cocci and gram positive bacilli. If a true mixed aerobic and anaerobic infection is suspected,  then broad spectrum empiric antibiotic therapy is indicated  and should include coverage for anaerobic organisms. Urinalysis:      Lab Results   Component Value Date    NITRU NEGATIVE 04/22/2019    WBCUA 5-10 04/22/2019    BACTERIA NONE 04/22/2019    RBCUA 3-5 04/22/2019    BLOODU NEGATIVE 04/22/2019    SPECGRAV 1.015 04/11/2019    SPECGRAV 1.015 03/07/2017    GLUCOSEU NEGATIVE 04/22/2019       Radiology:  XR CHEST PORTABLE   Final Result   Impression:   Mild nonspecific interstitial prominence. No focal consolidation. Possible small left pleural effusion. This document has been electronically signed by: Dustin White MD on    02/27/2022 04:11 AM      XR CHEST PORTABLE   Final Result   Cardiomegaly with pulmonary vascular congestion and a small left pleural effusion. **This report has been created using voice recognition software. It may contain minor errors which are inherent in voice recognition technology. **      Final report electronically signed by Dr Patsy Romero on 2/26/2022 4:10 PM        XR CHEST PORTABLE    Result Date: 2/27/2022  Single view of the chest. Findings: The patient is rotated to the left. Heart size upper limits of normal. Possible small left pleural effusion. Impression: Mild nonspecific interstitial prominence. No focal consolidation. Possible small left pleural effusion. This document has been electronically signed by: Chiquita Garcia MD on 02/27/2022 04:11 AM    XR CHEST PORTABLE    Result Date: 2/26/2022  PROCEDURE: XR CHEST PORTABLE CLINICAL INFORMATION: 80-year-old male with chest pain. COMPARISON: Radiograph 05/02/2019. TECHNIQUE: AP upright view of the chest was obtained. FINDINGS: There is cardiomegaly with mild pulmonary vascular congestion. There is blunting of the left costophrenic angle which may represent a small pleural effusion. There is no thorax. Visualized portions of the upper abdomen are within normal limits. The osseous structures are intact. No acute fractures or suspicious osseous lesions. Cardiomegaly with pulmonary vascular congestion and a small left pleural effusion. **This report has been created using voice recognition software. It may contain minor errors which are inherent in voice recognition technology. ** Final report electronically signed by Dr Mya Gonzalez on 2/26/2022 4:10 PM      Electronically signed by Kenneth Weiss DO on 2/27/2022 at 10:08 AM

## 2022-02-28 VITALS
TEMPERATURE: 97.5 F | BODY MASS INDEX: 30.55 KG/M2 | WEIGHT: 194.67 LBS | SYSTOLIC BLOOD PRESSURE: 121 MMHG | OXYGEN SATURATION: 94 % | DIASTOLIC BLOOD PRESSURE: 61 MMHG | HEART RATE: 68 BPM | RESPIRATION RATE: 16 BRPM | HEIGHT: 67 IN

## 2022-02-28 LAB
ANION GAP SERPL CALCULATED.3IONS-SCNC: 11 MEQ/L (ref 8–16)
BUN BLDV-MCNC: 16 MG/DL (ref 7–22)
CALCIUM SERPL-MCNC: 8.6 MG/DL (ref 8.5–10.5)
CHLORIDE BLD-SCNC: 100 MEQ/L (ref 98–111)
CO2: 26 MEQ/L (ref 23–33)
CREAT SERPL-MCNC: 1.1 MG/DL (ref 0.4–1.2)
GFR SERPL CREATININE-BSD FRML MDRD: 64 ML/MIN/1.73M2
GLUCOSE BLD-MCNC: 107 MG/DL (ref 70–108)
LV EF: 63 %
LVEF MODALITY: NORMAL
MAGNESIUM: 2.2 MG/DL (ref 1.6–2.4)
MRSA SCREEN: NORMAL
POTASSIUM SERPL-SCNC: 4 MEQ/L (ref 3.5–5.2)
SODIUM BLD-SCNC: 137 MEQ/L (ref 135–145)

## 2022-02-28 PROCEDURE — 99232 SBSQ HOSP IP/OBS MODERATE 35: CPT | Performed by: STUDENT IN AN ORGANIZED HEALTH CARE EDUCATION/TRAINING PROGRAM

## 2022-02-28 PROCEDURE — 36415 COLL VENOUS BLD VENIPUNCTURE: CPT

## 2022-02-28 PROCEDURE — 83735 ASSAY OF MAGNESIUM: CPT

## 2022-02-28 PROCEDURE — 6370000000 HC RX 637 (ALT 250 FOR IP): Performed by: NURSE PRACTITIONER

## 2022-02-28 PROCEDURE — 80048 BASIC METABOLIC PNL TOTAL CA: CPT

## 2022-02-28 PROCEDURE — 93306 TTE W/DOPPLER COMPLETE: CPT

## 2022-02-28 PROCEDURE — 2580000003 HC RX 258: Performed by: INTERNAL MEDICINE

## 2022-02-28 PROCEDURE — 99238 HOSP IP/OBS DSCHRG MGMT 30/<: CPT | Performed by: NURSE PRACTITIONER

## 2022-02-28 PROCEDURE — 6370000000 HC RX 637 (ALT 250 FOR IP): Performed by: INTERNAL MEDICINE

## 2022-02-28 RX ORDER — FUROSEMIDE 20 MG/1
20 TABLET ORAL DAILY
Status: DISCONTINUED | OUTPATIENT
Start: 2022-02-28 | End: 2022-02-28 | Stop reason: HOSPADM

## 2022-02-28 RX ORDER — LISINOPRIL 5 MG/1
5 TABLET ORAL DAILY
Qty: 30 TABLET | Refills: 3 | Status: SHIPPED | OUTPATIENT
Start: 2022-02-28 | End: 2022-03-22

## 2022-02-28 RX ORDER — FUROSEMIDE 20 MG/1
20 TABLET ORAL DAILY
Qty: 60 TABLET | Refills: 3 | Status: SHIPPED | OUTPATIENT
Start: 2022-02-28 | End: 2022-04-21

## 2022-02-28 RX ORDER — METOPROLOL TARTRATE 50 MG/1
25 TABLET, FILM COATED ORAL 2 TIMES DAILY
Qty: 180 TABLET | Refills: 3 | Status: SHIPPED | OUTPATIENT
Start: 2022-02-28

## 2022-02-28 RX ORDER — EZETIMIBE 10 MG/1
TABLET ORAL
Qty: 30 TABLET | Refills: 3 | OUTPATIENT
Start: 2022-02-28

## 2022-02-28 RX ADMIN — SODIUM CHLORIDE, PRESERVATIVE FREE 10 ML: 5 INJECTION INTRAVENOUS at 10:16

## 2022-02-28 RX ADMIN — METOPROLOL TARTRATE 25 MG: 25 TABLET, FILM COATED ORAL at 09:57

## 2022-02-28 RX ADMIN — PREGABALIN 50 MG: 50 CAPSULE ORAL at 09:57

## 2022-02-28 RX ADMIN — DULOXETINE HYDROCHLORIDE 30 MG: 30 CAPSULE, DELAYED RELEASE ORAL at 09:57

## 2022-02-28 RX ADMIN — TICAGRELOR 90 MG: 90 TABLET ORAL at 09:58

## 2022-02-28 RX ADMIN — FUROSEMIDE 20 MG: 40 TABLET ORAL at 09:58

## 2022-02-28 RX ADMIN — ASPIRIN 81 MG 81 MG: 81 TABLET ORAL at 09:57

## 2022-02-28 RX ADMIN — LEVOTHYROXINE SODIUM 137 MCG: 0.14 TABLET ORAL at 09:57

## 2022-02-28 RX ADMIN — POTASSIUM CHLORIDE 10 MEQ: 1500 TABLET, EXTENDED RELEASE ORAL at 09:59

## 2022-02-28 RX ADMIN — LISINOPRIL 5 MG: 5 TABLET ORAL at 09:58

## 2022-02-28 ASSESSMENT — PAIN SCALES - GENERAL
PAINLEVEL_OUTOF10: 0
PAINLEVEL_OUTOF10: 0

## 2022-02-28 NOTE — PROGRESS NOTES
CLINICAL PHARMACY: DISCHARGE MED RECONCILIATION/REVIEW    [facility]) Select Patient?: No  Total # of Interventions Recommended: 0   -   Total # Interventions Accepted: 0  Intervention Severity:   - Level 1 Intervention Present?: No   - Level 2 #: 0   - Level 3 #: 0   Time Spent (min): 15    Additional Documentation:

## 2022-02-28 NOTE — PROGRESS NOTES
Hospitalist Progress Note      Patient:  Cheryl Stoddard    Unit/Bed:3B-22/022-A  YOB: 1940  MRN: [id number]   Acct: [de-identified]   PCP: Tamanna Lew MD  Date of Admission: 2/26/2022    Assessment/Plan:    1. STEMI:  2. Coronary artery disease  a. Status post left heart catheterization with PCI to the LAD and proximal left circumflex on 2/26.  b. Continue aspirin, Brilinta, metoprolol, Lipitor, and lisinopril. c. Shortness of breath has resolved following diuresis yesterday. d. Cardiology to manage. 3. Ischemic cardiomyopathy  4. Acute exacerbation of HFrEF  a. Echo 2019 shows preserved EF of 60%. Left ventriculogram with left heart catheterization shows severe apical hypokinesis with EF of 40%  b. Echocardiogram pending this morning.  c. Continue p.o. Lasix today. Aditya Case d. Chest x-ray yesterday did show some vascular prominence with a small left pleural effusion. Shortness of breath improved following IV Lasix yesterday. 5. 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 Renal function stable. Diuresing as above.  b. Continue ACE inhibitor. 6. Abdominal aortic aneurysm:   a. Stable 7.2 x 6.6 infrarenal abdominal aortic aneurysm noted on imaging. 7. Carotid artery stenosis:  a. Continue dual antiplatelet therapy as above. 8. History of bladder cancer status post radical cystoprostatectomy with pelvic lymph node dissection and ileal loop diversion on 5/2019  a. Follows with urology outpatient. 9. Hypothyroidism:   a. Continue home Synthroid  10. Hypertension:  a. Continue metoprolol  11. Hyperlipidemia:   a. Continue atorvastatin    Disposition: Anticipate discharge today if cleared by cardiology after echocardiogram completed. Chief Complaint: Chest pain    Hospital Course:    2/26: Patient presents the hospital with complaints of chest pain with associated shortness of breath and fatigue.   EKG revealed ST elevations in anterior septal leads and STEMI alert was activated. The patient was taken emergently to the Cath Lab which revealed an ulcerated lesion of the mid LAD status post successful PCI with QUINN. Patient also had left circumflex artery 80% stenosis and received PCI with QUINN as well. 2/27: Patient complaining of some shortness of breath and feels as though he cannot catch his air with deep inspiration. Subjective (past 24 hours):   Patient doing well. Shortness of breath is resolved. Voices no acute complaints today. Awaiting echocardiogram and anticipated discharge. Past medical history, family history, social history and allergies reviewed again and is unchanged since admission. ROS (12 point review of systems completed. Pertinent positives noted. Otherwise ROS is negative)     Medications:  Reviewed    Infusion Medications    sodium chloride       Scheduled Medications    lisinopril  5 mg Oral Daily    potassium chloride  10 mEq Oral Daily    pregabalin  50 mg Oral BID    aspirin  81 mg Oral Daily    metoprolol tartrate  25 mg Oral BID    furosemide  40 mg Oral Daily    levothyroxine  137 mcg Oral Daily    DULoxetine  30 mg Oral Daily    sodium chloride flush  5-40 mL IntraVENous 2 times per day    ticagrelor  90 mg Oral BID    atorvastatin  40 mg Oral Daily     PRN Meds: ondansetron, melatonin, sodium chloride flush, sodium chloride, acetaminophen      Intake/Output Summary (Last 24 hours) at 2/28/2022 0757  Last data filed at 2/28/2022 0430  Gross per 24 hour   Intake 240 ml   Output 2000 ml   Net -1760 ml       Diet:  ADULT DIET; Regular    Exam:  BP (!) 98/55   Pulse 64   Temp 98 °F (36.7 °C) (Oral)   Resp 16   Ht 5' 7\" (1.702 m)   Wt 194 lb 10.7 oz (88.3 kg)   SpO2 93%   BMI 30.49 kg/m²   General appearance: No apparent distress, appears stated age and cooperative. HEENT: Pupils equal, round, and reactive to light. Conjunctivae/corneas clear. Neck: Supple, with full range of motion.  No jugular venous distention. Trachea midline. Respiratory:  Normal respiratory effort. Clear to auscultation, bilaterally without Rales/Wheezes/Rhonchi. Cardiovascular: Regular rate and rhythm with normal S1/S2 without murmurs, rubs or gallops. Left heart catheterization site of the right wrist with clean dry dressing intact. Good capillary refill. Abdomen: Soft, non-tender, non-distended with normal bowel sounds. Urostomy noted in the right lower quadrant. Yellow urine output. Musculoskeletal: passive and active ROM x 4 extremities. Trace pitting edema in the bilateral lower extremities. Skin: Skin color, texture, turgor normal.  No rashes or lesions. Neurologic:  Neurovascularly intact without any focal sensory/motor deficits. Cranial nerves: II-XII intact, grossly non-focal.  Psychiatric: Alert and oriented, thought content appropriate, normal insight  Capillary Refill: Brisk,< 3 seconds   Peripheral Pulses: +2 palpable, equal bilaterally     Labs:   Recent Labs     02/26/22  1555 02/27/22  0716   WBC 12.2* 10.9*   HGB 16.3 15.7   HCT 49.5 46.9    181     Recent Labs     02/26/22  1813 02/27/22  0716 02/28/22  0427   * 135 137   K 3.8 3.7 4.0   CL 97* 100 100   CO2 26 22* 26   BUN 16 14 16   CREATININE 0.9 0.9 1.1   CALCIUM 8.6 8.9 8.6     Recent Labs     02/27/22  0716   AST 78*   ALT 28   BILITOT 1.6*   ALKPHOS 131*     Recent Labs     02/26/22  1555   INR 1.03     No results for input(s): Buddy Curtis in the last 72 hours. Microbiology:    Blood culture #1:   Lab Results   Component Value Date    BC No growth-preliminary  No growth   03/12/2019       Blood culture #2:No results found for: Lani Darleen    Organism:  Lab Results   Component Value Date    ORG Escherichia coli 03/11/2019         Lab Results   Component Value Date    LABGRAM  05/20/2016     Rare segmented neutrophils observed. Rare epithelial cells observed. Rare gram positive cocci in pairs.          MRSA culture only:No results found for: [facility]    Urine culture:   Lab Results   Component Value Date    LABURIN No growth-preliminary  No growth 04/22/2014       Respiratory culture: No results found for: CULTRESP    Aerobic and Anaerobic :  Lab Results   Component Value Date    LABAERO  05/20/2016     No growth-preliminary  Culture yielded light growth of gram positive bacilli  consistent with Corynebacterium sp. The gram positive cocci  observed on the direct smear were not recovered. This may be  due to antimicrobial suppression, a fastidious organism, or  an anaerobic organism. Lab Results   Component Value Date    LABANAE  05/20/2016     Culture yielded moderate mixed aerobic and anaerobic growth  of mixed gram positive cocci and gram positive bacilli. If a true mixed aerobic and anaerobic infection is suspected,  then broad spectrum empiric antibiotic therapy is indicated  and should include coverage for anaerobic organisms. Urinalysis:      Lab Results   Component Value Date    NITRU NEGATIVE 04/22/2019    WBCUA 5-10 04/22/2019    BACTERIA NONE 04/22/2019    RBCUA 3-5 04/22/2019    BLOODU NEGATIVE 04/22/2019    SPECGRAV 1.015 04/11/2019    SPECGRAV 1.015 03/07/2017    GLUCOSEU NEGATIVE 04/22/2019       Radiology:  XR CHEST PORTABLE   Final Result   Impression:   Mild nonspecific interstitial prominence. No focal consolidation. Possible small left pleural effusion. This document has been electronically signed by: Maral Bear MD on    02/27/2022 04:11 AM      XR CHEST PORTABLE   Final Result   Cardiomegaly with pulmonary vascular congestion and a small left pleural effusion. **This report has been created using voice recognition software. It may contain minor errors which are inherent in voice recognition technology. **      Final report electronically signed by Dr Jenaro Gamez on 2/26/2022 4:10 PM        XR CHEST PORTABLE    Result Date: 2/27/2022  Single view of the chest. Findings:  The patient is rotated to the left. Heart size upper limits of normal. Possible small left pleural effusion. Impression: Mild nonspecific interstitial prominence. No focal consolidation. Possible small left pleural effusion. This document has been electronically signed by: Nuria Villegas MD on 02/27/2022 04:11 AM    XR CHEST PORTABLE    Result Date: 2/26/2022  PROCEDURE: XR CHEST PORTABLE CLINICAL INFORMATION: 44-year-old male with chest pain. COMPARISON: Radiograph 05/02/2019. TECHNIQUE: AP upright view of the chest was obtained. FINDINGS: There is cardiomegaly with mild pulmonary vascular congestion. There is blunting of the left costophrenic angle which may represent a small pleural effusion. There is no thorax. Visualized portions of the upper abdomen are within normal limits. The osseous structures are intact. No acute fractures or suspicious osseous lesions. Cardiomegaly with pulmonary vascular congestion and a small left pleural effusion. **This report has been created using voice recognition software. It may contain minor errors which are inherent in voice recognition technology. ** Final report electronically signed by Dr Elba Pittman on 2/26/2022 4:10 PM      Electronically signed by Ines Sesay DO on 2/28/2022 at 7:57 AM

## 2022-02-28 NOTE — PLAN OF CARE
Nutrition Problem #1: Food & Nutrition-related knowledge deficit  Intervention: Food and/or Nutrient Delivery: Continue Current Diet  Nutritional Goals: Pt will demonstrate knowledge regarding heart healthy recommendations during meals.

## 2022-02-28 NOTE — CARE COORDINATION
Discharge Planning Update:     Hospital day: 2  Location: 3B-22/022-A     Procedure:   2/26 Cardiac Cath with PCI to mid LAD and proximal LCX. Barriers to Discharge: Planning discharge home today. Patient Goals/Plan/Treatment Preferences: Spoke with Armani Corral and his wife. They live at home together. Pt can drive, but his wife does most of the driving. He has a cane at home. Denies HH needs. Verified PCP and insurance. 2/28/22, 12:56 PM EST    Patient goals/plan/ treatment preferences discussed by  and . Patient goals/plan/ treatment preferences reviewed with patient/ family. Patient/ family verbalize understanding of discharge plan and are in agreement with goal/plan/treatment preferences. Understanding was demonstrated using the teach back method. AVS provided by RN at time of discharge, which includes all necessary medical information pertaining to the patients current course of illness, treatment, post-discharge goals of care, and treatment preferences.         IMM Letter  IMM Letter given to Patient/Family/Significant other/Guardian/POA/by[de-identified] Pt Access  IMM Letter date given[de-identified] 02/27/22  IMM Letter time given[de-identified] 1727       Electronically signed by Shaina Garcia RN on 2/28/2022 at 12:56 PM

## 2022-02-28 NOTE — DISCHARGE SUMMARY
Cardiology Discharge Summary       Patient:  Tigre Mckeon  YOB: 1940  MRN: [id number]   Acct: [de-identified]  [facility] Date:  2/26/2022  Primary Cardiologist:   Seen by dr. Ruben Alexander     Per prior cardiology consult note-  CHIEF COMPLAINT:    Chest pain      HISTORY OF PRESENT ILLNESS:    Tigre Mckeon is a pleasant 80year old male patient with past medical history that includes:   Past Medical History        Past Medical History:   Diagnosis Date    AAA (abdominal aortic aneurysm) ([address]       intravascular stent    BCG ROXANE 05/21/2019    BPH (benign prostatic hypertrophy)      CAD (coronary artery disease)      Cancer of skin      Carotid artery stenosis      Chronic back pain      Chronic kidney disease      Congenital absence of one kidney       absent right kidney    History of lumbar fusion 3/15     mid lower back down    Hyperlipidemia      Hypertension      Hypothyroidism      Obesity      Sciatica      TIA (transient ischemic attack) 1/2008    Unspecified cerebral artery occlusion with cerebral infarction       TIA      Patient has h/o CAD, prior PCI of RCA in 2011. He had prior graft repair for AAA. An Echocardiogram in 2018 revealed an EF of 60%/ For the past one week, the patient has been experiencing intermittent, pressure like, retrosternal chest pain, radiating to neck/left shoulder, associate with SOB and fatigue. Chest pain worsened last night and became more severe/persistent today, was up to 10/10 in severity. EKG in ER revealed SR, ST elevation in anteroseptal leads. STEMI alert was activated. R/B/I/A of LHC/PCI were d/w patient, he agreed to proceed. LHC revealed evidence for an ulcerated lesion causing subtotal flow limiting lesion of mid LAD, underwent successful PCI/QUINN. Proximal LCX had an 80% stenosis, PCI/QUINN was performed. Patient feels well.  He denies chest pain now.        Subjective (Events in last 24 hours):     Pt in bed   He states SOB much improved from yesterday -- diuresed 2L   No chest pains or SOB today     Tele SR no ectopy   BP low side     RT radial cath site with dressing in place - dry and intact - neurovascular check WNL   Slight ecchymosis noted-  no hematoma noted       Objective:   BP (!) 98/55   Pulse 64   Temp 98 °F (36.7 °C) (Oral)   Resp 16   Ht 5' 7\" (1.702 m)   Wt 194 lb 10.7 oz (88.3 kg)   SpO2 93%   BMI 30.49 kg/m²        TELEMETRY: SR HR 60's    Physical Exam:  General Appearance: alert and oriented to person, place and time, in no acute distress  Cardiovascular: normal rate, regular rhythm, normal S1 and S2, no murmurs, rubs, clicks, or gallops, distal pulses intact,  Pulmonary/Chest: clear to auscultation bilaterally- no wheezes, rales or rhonchi, normal air movement, no respiratory distress  Abdomen: soft, non-tender, non-distended, normal bowel sounds, no masses Extremities: no cyanosis, clubbing 1+ pitting Le edema, pulses present    Musculoskeletal: normal range of motion, no joint swelling, deformity or tenderness  Neurological: alert, oriented, normal speech, no focal findings or movement disorder noted    Medications:    furosemide  20 mg Oral Daily    lisinopril  5 mg Oral Daily    potassium chloride  10 mEq Oral Daily    pregabalin  50 mg Oral BID    aspirin  81 mg Oral Daily    metoprolol tartrate  25 mg Oral BID    levothyroxine  137 mcg Oral Daily    DULoxetine  30 mg Oral Daily    sodium chloride flush  5-40 mL IntraVENous 2 times per day    ticagrelor  90 mg Oral BID    atorvastatin  40 mg Oral Daily      sodium chloride       ondansetron, 4 mg, Q4H PRN  melatonin, 4.5 mg, Nightly PRN  sodium chloride flush, 5-40 mL, PRN  sodium chloride, 25 mL, PRN  acetaminophen, 650 mg, Q4H PRN        Diagnostics:  Echo:pending    Electronically signed by Saroj Jimenez MD (Interpreting   physician) on 05/03/2019 at 04:30 PM   ----------------------------------------------------------------      Findings      Mitral diffuse disease, 90%, with ZOLTAN-3 flow. The LAD was  noticed to have a flow-limiting lesion in mid LAD with ZOLTAN-2 flow. Distal LAD with mild luminal irregularities. 5.  Right coronary artery:  Dominant vessel. Stent in proximal to mid  LAD has mild in-stent restenosis, 30% to proximal segment, 30% in the  mid segment. Distal RCA with mild luminal irregularities.     IMPRESSION:  1. ST-elevation myocardial infarction. 2.  Subtotal occlusion of the mid LAD, the culprit for the STEMI, status  post successful PCI and stenting. 3.  Severe proximal LCX stenosis, status post successful PCI and  stenting. 4.  Patent stent in the right coronary artery with only mild in-stent  restenosis.     RECOMMENDATIONS:  Bedrest for the next four hours. Admit to the  intensive care unit. Optimize medical therapy for coronary artery  disease. Aggressive risk factor modification. Continue metoprolol. Aspirin 81 mg p.o. daily. The patient received loading dose of  Brilinta. Continue Brilinta 90 mg p.o. b.i.d.  Echocardiogram.  Repeat  EKG on the floor. Normal saline at 50 mL per hour. Refer to cardiac  rehab. Findings and plan of care were discussed with the patient and  family. They are agreeable to the plan.           Kya Stokes MD  D: 02/26/2022  Lab Data:    Cardiac Enzymes:  No results for input(s): CKTOTAL, CKMB, CKMBINDEX, TROPONINI in the last 72 hours.     CBC:   Lab Results   Component Value Date    WBC 10.9 02/27/2022    RBC 5.04 02/27/2022    RBC 5.46 11/10/2020    HGB 15.7 02/27/2022    HCT 46.9 02/27/2022     02/27/2022       CMP:    Lab Results   Component Value Date     02/28/2022    K 4.0 02/28/2022    K 3.8 02/26/2022     02/28/2022    CO2 26 02/28/2022    BUN 16 02/28/2022    CREATININE 1.1 02/28/2022    LABGLOM 64 02/28/2022    GLUCOSE 107 02/28/2022    GLUCOSE 97 01/31/2022    CALCIUM 8.6 02/28/2022       Hepatic Function Panel:    Lab Results   Component Value Date ALKPHOS 131 02/27/2022    ALT 28 02/27/2022    AST 78 02/27/2022    PROT 6.5 02/27/2022    BILITOT 1.6 02/27/2022    BILITOT NEGATIVE 10/26/2018    BILIDIR 0.3 05/03/2021    LABALBU 3.5 02/27/2022    LABALBU 4.1 05/19/2012       Magnesium:    Lab Results   Component Value Date    MG 2.2 02/28/2022       PT/INR:    Lab Results   Component Value Date    INR 1.03 02/26/2022       HgBA1c:    Lab Results   Component Value Date    LABA1C 5.9 02/27/2022       FLP:    Lab Results   Component Value Date    TRIG 70 02/27/2022    HDL 29 02/27/2022    LDLCALC 57 02/27/2022    LDLDIRECT 90 01/31/2022       TSH:    Lab Results   Component Value Date    TSH 1.17 01/31/2022         Assessment:    Anterior STEMI  S\p cardiac cath 2/26/22: LAD / LCX PCI    EF 40% by cath -- echo pending     HTN  HLP    Prior CAD (RCA) 2011  Hx AAA w/ graft repair   Hx PAD w/ FEM- FEM    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

## 2022-02-28 NOTE — PROGRESS NOTES
Cardiology Progress Note      Patient:  Ravin Milan  YOB: 1940  MRN: [id number]   Acct: [de-identified]  [facility] Date:  2/26/2022  Primary Cardiologist:   Seen by dr. Ashly Davis     Per prior cardiology consult note-  CHIEF COMPLAINT:    Chest pain      HISTORY OF PRESENT ILLNESS:    Ravin Milan is a pleasant 80year old male patient with past medical history that includes:   Past Medical History        Past Medical History:   Diagnosis Date    AAA (abdominal aortic aneurysm) ([address]       intravascular stent    BCG ROXANE 05/21/2019    BPH (benign prostatic hypertrophy)      CAD (coronary artery disease)      Cancer of skin      Carotid artery stenosis      Chronic back pain      Chronic kidney disease      Congenital absence of one kidney       absent right kidney    History of lumbar fusion 3/15     mid lower back down    Hyperlipidemia      Hypertension      Hypothyroidism      Obesity      Sciatica      TIA (transient ischemic attack) 1/2008    Unspecified cerebral artery occlusion with cerebral infarction       TIA      Patient has h/o CAD, prior PCI of RCA in 2011. He had prior graft repair for AAA. An Echocardiogram in 2018 revealed an EF of 60%/ For the past one week, the patient has been experiencing intermittent, pressure like, retrosternal chest pain, radiating to neck/left shoulder, associate with SOB and fatigue. Chest pain worsened last night and became more severe/persistent today, was up to 10/10 in severity. EKG in ER revealed SR, ST elevation in anteroseptal leads. STEMI alert was activated. R/B/I/A of LHC/PCI were d/w patient, he agreed to proceed. LHC revealed evidence for an ulcerated lesion causing subtotal flow limiting lesion of mid LAD, underwent successful PCI/QUINN. Proximal LCX had an 80% stenosis, PCI/QUINN was performed. Patient feels well.  He denies chest pain now.        Subjective (Events in last 24 hours):     Pt in bed   He states SOB much improved from yesterday -- diuresed 2L   No chest pains or SOB today     Tele SR no ectopy   BP low side     RT radial cath site with dressing in place - dry and intact - neurovascular check WNL   Slight ecchymosis noted-  no hematoma noted       Objective:   BP (!) 98/55   Pulse 64   Temp 98 °F (36.7 °C) (Oral)   Resp 16   Ht 5' 7\" (1.702 m)   Wt 194 lb 10.7 oz (88.3 kg)   SpO2 93%   BMI 30.49 kg/m²        TELEMETRY: SR HR 60's    Physical Exam:  General Appearance: alert and oriented to person, place and time, in no acute distress  Cardiovascular: normal rate, regular rhythm, normal S1 and S2, no murmurs, rubs, clicks, or gallops, distal pulses intact,  Pulmonary/Chest: clear to auscultation bilaterally- no wheezes, rales or rhonchi, normal air movement, no respiratory distress  Abdomen: soft, non-tender, non-distended, normal bowel sounds, no masses Extremities: no cyanosis, clubbing 1+ pitting Le edema, pulses present    Musculoskeletal: normal range of motion, no joint swelling, deformity or tenderness  Neurological: alert, oriented, normal speech, no focal findings or movement disorder noted    Medications:    lisinopril  5 mg Oral Daily    potassium chloride  10 mEq Oral Daily    pregabalin  50 mg Oral BID    aspirin  81 mg Oral Daily    metoprolol tartrate  25 mg Oral BID    furosemide  40 mg Oral Daily    levothyroxine  137 mcg Oral Daily    DULoxetine  30 mg Oral Daily    sodium chloride flush  5-40 mL IntraVENous 2 times per day    ticagrelor  90 mg Oral BID    atorvastatin  40 mg Oral Daily      sodium chloride       ondansetron, 4 mg, Q4H PRN  melatonin, 4.5 mg, Nightly PRN  sodium chloride flush, 5-40 mL, PRN  sodium chloride, 25 mL, PRN  acetaminophen, 650 mg, Q4H PRN        Diagnostics:  Echo:pending    Electronically signed by Flori Perez MD (Interpreting   physician) on 05/03/2019 at 04:30 PM   ----------------------------------------------------------------      Findings      Mitral Valve   Trace mitral regurgitation is present. Aortic Valve   The aortic valve leaflets were not well visualized. Aortic valve appears tricuspid. Aortic valve leaflets are somewhat thickened. Aortic valve leaflets are Mildly calcified. Tricuspid Valve   Mild tricuspid regurgitation. Pulmonic Valve   The pulmonic valve was not well visualized . Trivial pulmonic regurgitation visualized. Left Atrium   Left atrial size was normal.      Left Ventricle   Ejection fraction is visually estimated at 60%. Overall left ventricular function is normal.      Right Atrium   Right atrial size was normal.      Right Ventricle   The right ventricular size was normal with normal systolic function and   wall thickness. Pericardial Effusion   The pericardium was normal in appearance with no evidence of a pericardial   effusion. Pleural Effusion   No evidence of pleural effusion. Aorta / Great Vessels   -Aortic root dimension within normal limits.   -The Pulmonary artery is within normal limits. -IVC size is within normal limits with normal respiratory phasic changes      Left Heart Cath:   HEMODYNAMICS:  Left ventricular end-diastolic pressure 20 mmHg. Upon  pullback across the aortic valve, the mean pressure gradient was found  to be 17 mmHg.     LEFT VENTRICULOGRAM:  Apical severe hypokinesis was noticed. Ejection  fraction estimated at 40%.     CORONARY ANGIOGRAM:  1. Left main coronary artery:  Patent without significant stenosis. Trifurcates into ramus intermedius, LCX, and LAD. 2.  Left circumflex artery:  Proximal left circumflex artery has 80% to  90% stenosis. OM1 is patent. Distal LCX with mild luminal  irregularities. 3.  Ramus intermedius:  Ramus intermedius has mild luminal  irregularities. Otherwise patent. 4.  Left anterior descending artery:  Proximal LAD is patent. Mid LAD  has 99% subtotal occlusion.   D1 is a 2-mm vessel with proximal segment  having severe diffuse disease, 90%, with ZOLTAN-3 flow. The LAD was  noticed to have a flow-limiting lesion in mid LAD with ZOLTAN-2 flow. Distal LAD with mild luminal irregularities. 5.  Right coronary artery:  Dominant vessel. Stent in proximal to mid  LAD has mild in-stent restenosis, 30% to proximal segment, 30% in the  mid segment. Distal RCA with mild luminal irregularities.     IMPRESSION:  1. ST-elevation myocardial infarction. 2.  Subtotal occlusion of the mid LAD, the culprit for the STEMI, status  post successful PCI and stenting. 3.  Severe proximal LCX stenosis, status post successful PCI and  stenting. 4.  Patent stent in the right coronary artery with only mild in-stent  restenosis.     RECOMMENDATIONS:  Bedrest for the next four hours. Admit to the  intensive care unit. Optimize medical therapy for coronary artery  disease. Aggressive risk factor modification. Continue metoprolol. Aspirin 81 mg p.o. daily. The patient received loading dose of  Brilinta. Continue Brilinta 90 mg p.o. b.i.d.  Echocardiogram.  Repeat  EKG on the floor. Normal saline at 50 mL per hour. Refer to cardiac  rehab. Findings and plan of care were discussed with the patient and  family. They are agreeable to the plan.           Preeti Baldwin MD  D: 02/26/2022  Lab Data:    Cardiac Enzymes:  No results for input(s): CKTOTAL, CKMB, CKMBINDEX, TROPONINI in the last 72 hours.     CBC:   Lab Results   Component Value Date    WBC 10.9 02/27/2022    RBC 5.04 02/27/2022    RBC 5.46 11/10/2020    HGB 15.7 02/27/2022    HCT 46.9 02/27/2022     02/27/2022       CMP:    Lab Results   Component Value Date     02/28/2022    K 4.0 02/28/2022    K 3.8 02/26/2022     02/28/2022    CO2 26 02/28/2022    BUN 16 02/28/2022    CREATININE 1.1 02/28/2022    LABGLOM 64 02/28/2022    GLUCOSE 107 02/28/2022    GLUCOSE 97 01/31/2022    CALCIUM 8.6 02/28/2022       Hepatic Function Panel:    Lab Results   Component Value Date    [facility] 131 02/27/2022 ALT 28 02/27/2022    AST 78 02/27/2022    PROT 6.5 02/27/2022    BILITOT 1.6 02/27/2022    BILITOT NEGATIVE 10/26/2018    BILIDIR 0.3 05/03/2021    LABALBU 3.5 02/27/2022    LABALBU 4.1 05/19/2012       Magnesium:    Lab Results   Component Value Date    MG 2.2 02/28/2022       PT/INR:    Lab Results   Component Value Date    INR 1.03 02/26/2022       HgBA1c:    Lab Results   Component Value Date    LABA1C 5.9 02/27/2022       FLP:    Lab Results   Component Value Date    TRIG 70 02/27/2022    HDL 29 02/27/2022    LDLCALC 57 02/27/2022    LDLDIRECT 90 01/31/2022       TSH:    Lab Results   Component Value Date    TSH 1.17 01/31/2022         Assessment:    Anterior STEMI  S\p cardiac cath 2/26/22: LAD / LCX PCI    EF 40% by cath -- echo pending     HTN  HLP    Prior CAD (RCA) 2011  Hx AAA w/ graft repair   Hx PAD w/ FEM- FEM    Hx PET positive mass in the right adrenal gland, bladder cancer 2/2021      Plan:  · Plan on home this afternoon after echo   · Echo today   · Follow with [facility] 2 weeks         Cardiac Rehab: Yes    Discharge condition: stable  Disposition: Home  Time spent on discharge: less than 30 minutes       Discharge Medications for PCI/MI (performed or attempted):   · ASA: yes  · Statin: yes  · P2Y12 Inhibitor: yes  · Beta Blocker: yes  · ACE / ARB: yes  · Nitro SL: yes      Discharge Medications for ICD, Cardiomyopathy, CHF:  · Beta Blocker: yes  · ACE Inhibitor/ARB: yes         Electronically signed by DEMAR Walls CNP on 2/28/2022 at 8:35 AM

## 2022-02-28 NOTE — PROGRESS NOTES
Acute Coronary Syndrome Folder given to patient which includes the following education:    1. Heart healthy Diet booklet  2. Reduce your risk of Heart attack paper  3. Cardiac Rehab phamphlet  4. How to take your Nitroglycerine paper  5. Resources for you and your family paper  6. Smoking Cessation information  7. Cardiac Cath Discharge Instructions Groin/Radial Approach        Heart attack teaching covered with patient and/or family or significant other:  1. Signs and symptoms of a heart attack. 2. When to call 911 and the importance of calling 911.  3. Personal risk factors and ways to lower their risk. 4.   Importance of quitting smoking if applicable. Heart Attack booklet and Acute Coronary Syndrome folder given to the patient and/or family or significant other. Reviewed:  1. Heart Attack - What's Ahead booklet. 2. How to take Nitroglycerin. 3. The importance of participating in Cardiac Rehab and hours of operation. 4. Heart Healthy Diet. 5. Acute MI-Reduce your risk of heart attack. 6. Discharge instructions for Cath/Intervention procedure site.

## 2022-02-28 NOTE — PROGRESS NOTES
MI Documentation    MI: : STEMI    PCI: YES    EF: 40%  ASA w/i first 24 hours: YES   BB w/i first 24 hours: YES       ------------------------------------------  1. ASA: YES  2.  BB: YES  3.   ACE/ARB: YES  4.  Statin: YES  5.  P2Y12: YES    ))))))REMEMBER NTG SL AT DISCHARGE((((((

## 2022-02-28 NOTE — PROGRESS NOTES
Inpatient Cardiac Rehabilitation Consult    Received consult for Phase II Cardiac Rehabilitation. Patient needs cardiac rehab due to STEMI/PCI on 2/26/22. Attempted to see patient for cardiac rehab education. Patient with nursing and dietician. Will re attempt at a later time. UPDATE 1256: Attempted to see patient again for cardiac rehab education. Patient has been discharged. Will call patient at home.

## 2022-02-28 NOTE — PROGRESS NOTES
Comprehensive Nutrition Assessment    Type and Reason for Visit:  Initial,Consult,Patient Education    Nutrition Recommendations/Plan:   -Spoke with patient at bedside this AM. Pt very friendly and willing to accept heart healthy education from RDN but did not appear extremely interested. Pt states he does not add salt to foods. RDN explained importance of reading food labels to limit sodium intake, ect. During this assessment did not go into extreme detail regarding heart health education as patient did not seem completely interested or appropriate at this time.   -Encouraged patient to follow recommendations as best as he can and call with questions or concerns.  -Pt expected to discharge if echo results are appropriate. Nutrition Assessment:      Pt improving from a nutritional standpoint AEB possible discharge today and received nutrition education today (2/28). Remains at risk for further nutritional compromise r/t admission due to STEMI involving oth coronary artery of anterior wall and underlying medical condition (hx: CAD, AAA, HTN, HLD, urostomy status, skin cancer, CKD II, TIA, absence of R kidney). Malnutrition Assessment:  Malnutrition Status: At risk for malnutrition (Comment)    Context:  Acute Illness     Findings of the 6 clinical characteristics of malnutrition:  Energy Intake:  Mild decrease in energy intake (Comment) (2/23: NPO status during hospital stay and decreased appetite today.)  Weight Loss:  No significant weight loss     Body Fat Loss:  No significant body fat loss     Muscle Mass Loss:  No significant muscle mass loss    Fluid Accumulation:  No significant fluid accumulation     Strength:  Not Performed    Estimated Daily Nutrient Needs:  Energy (kcal):  4825-2706 kcal/day (15-18 kcal/kg); Weight Used for Energy Requirements:  Current     Protein (g):   g/day (1.2-1.5 g/kg IBW);  Weight Used for Protein Requirements:  Ideal        Fluid (ml/day):  5612-2722 ml/day (1 ml/kcal) OR per MD recommendations; Method Used for Fluid Requirements:  1 ml/kcal      Nutrition Related Findings:       Pt. Report/Treatments/Miscellaneous: Pt states appetite is down but states it is improving. Echocardiogram done 2/28. Heart cath, PCI and stenting done 2/26. Pt states wife will help with diet recommendations as she helps with his overall care. GI Status: no discomfort at this time. Last BM 2/26. Emesis 2/27. Pertinent Labs: 2/28: Na 137, K 4.0, BUN 16, creatinine 1.1, GFR 64, magnesium 2.2, glucose 107. 2/27: A1C 5.9. Pertinent Meds: lipitor, cymbalta, lopressor, brilinta, zofran (PRN)    Wounds:  None       Current Nutrition Therapies:    ADULT DIET; Regular    Anthropometric Measures:  · Height: 5' 7\" (170.2 cm)  · Current Body Weight: 194 lb 10.7 oz (88.3 kg) (weight taken from 2/26. 2/28: trace edema.)   · Admission Body Weight: 194 lb 10.7 oz (88.3 kg) (Only weight recorded is from 2/26.)    · Usual Body Weight: 198 lb (89.8 kg) (2/28: per patient)     · Ideal Body Weight: 148 lbs; % Ideal Body Weight 131.5 %   · BMI: 30.5  · Adjusted Body Weight:  ; No Adjustment   · BMI Categories: Obese Class 1 (BMI 30.0-34. 9)       Nutrition Diagnosis:   · Food & Nutrition-related knowledge deficit related to cardiac dysfunction as evidenced by other (comment) (pt accepting of nutrition education regarding heart health.)      Nutrition Interventions:   Food and/or Nutrient Delivery:  Continue Current Diet  Nutrition Education/Counseling:  Education completed   Coordination of Nutrition Care:  Continue to monitor while inpatient    Goals:  Pt will demonstrate knowledge regarding heart healthy recommendations during meals.        Nutrition Monitoring and Evaluation:   Behavioral-Environmental Outcomes:  None Identified   Food/Nutrient Intake Outcomes:  Food and Nutrient Intake  Physical Signs/Symptoms Outcomes:  Biochemical Data,GI Status,Fluid Status or Edema,Weight,Skin,Nutrition Focused Physical Findings     Discharge Planning:    No discharge needs at this time     Electronically signed by Elmer Ramos RD on 2/28/22 at 11:01 AM EST    Contact: [phone]

## 2022-03-02 ENCOUNTER — TELEPHONE (OUTPATIENT)
Dept: FAMILY MEDICINE CLINIC | Age: 82
End: 2022-03-02

## 2022-03-02 NOTE — TELEPHONE ENCOUNTER
[facility] Initial Follow Up Call    Call within 2 business days of discharge: Yes     Patient: Driss Coker Patient : 1940   MRN: [id number]  Reason for Admission:STEMI  Discharge Date: 22 RARS: Readmission Risk Score: 13.2 ( )       Spoke with: Patient wife    Discharge department/facility: [facility]     Non-face-to-face services provided:  Scheduled appointment with PCP-2022    Follow Up  Future Appointments   Date Time Provider Devon Luevano   3/7/2022  1:45 PM Alfonso Egan MD [facility] [place]   3/9/2022 11:30 AM MD GIANNA Hernandez ENT [facility] [place]   3/16/2022 11:15 AM JUNIE Jerome [facility] [place]   2022  9:00 AM DEMAR Samuel CNP N ENT P - [place]   2022 10:00 AM Elian Houser MD [facility] Phys[facility] - [place]   2022 10:00 AM STR CT IMAGING RM1  [facility] EXP STR [facility]iolog   2022 10:20 AM STR CT IMAGING RM1  [facility] EXP STR [facility]   2022 10:15 AM JUNIE Simpson [address] Gisella Mckeon

## 2022-03-07 ENCOUNTER — OFFICE VISIT (OUTPATIENT)
Dept: FAMILY MEDICINE CLINIC | Age: 82
End: 2022-03-07
Payer: MEDICARE

## 2022-03-07 VITALS
TEMPERATURE: 98.2 F | OXYGEN SATURATION: 95 % | HEART RATE: 72 BPM | DIASTOLIC BLOOD PRESSURE: 60 MMHG | SYSTOLIC BLOOD PRESSURE: 110 MMHG | WEIGHT: 200 LBS | RESPIRATION RATE: 16 BRPM | BODY MASS INDEX: 31.32 KG/M2

## 2022-03-07 DIAGNOSIS — H61.21 IMPACTED CERUMEN OF RIGHT EAR: ICD-10-CM

## 2022-03-07 DIAGNOSIS — H92.02 EAR PAIN, LEFT: ICD-10-CM

## 2022-03-07 DIAGNOSIS — R09.89 GLOBUS SENSATION: ICD-10-CM

## 2022-03-07 DIAGNOSIS — I21.09 STEMI INVOLVING OTH CORONARY ARTERY OF ANTERIOR WALL (HCC): Primary | ICD-10-CM

## 2022-03-07 DIAGNOSIS — I10 PRIMARY HYPERTENSION: ICD-10-CM

## 2022-03-07 PROCEDURE — 1111F DSCHRG MED/CURRENT MED MERGE: CPT | Performed by: FAMILY MEDICINE

## 2022-03-07 PROCEDURE — 99496 TRANSJ CARE MGMT HIGH F2F 7D: CPT | Performed by: FAMILY MEDICINE

## 2022-03-07 NOTE — PROGRESS NOTES
Post-Discharge Transitional Care  Follow Up      Anthony Medina   YOB: 1940    Date of Office Visit:  3/7/2022  Date of Hospital Admission: 2/26/22  Date of Hospital Discharge: 2/28/22  Risk of hospital readmission (high >=14%. Medium >=10%) :Readmission Risk Score: 13.2 ( )      Care management risk score Rising risk (score 2-5) and Complex Care (Scores >=6): 5     Non face to face  following discharge, date last encounter closed (first attempt may have been earlier): 3/2/2022  3:14 PM    Call initiated 2 business days of discharge: Yes    ASSESSMENT/PLAN:   STEMI involving oth coronary artery of anterior wall (HCC)  Primary hypertension  Ear pain, left  Impacted cerumen of right ear  Globus sensation      Medical Decision Making: high complexity  No follow-ups on file. On this date 3/7/2022 I have spent 30 minutes reviewing previous notes, test results and face to face with the patient discussing the diagnosis and importance of compliance with the treatment plan as well as documenting on the day of the visit. Subjective:   HPI:  Follow up of Hospital problems/diagnosis(es):    Diagnosis Orders   1. STEMI involving oth coronary artery of anterior wall ([address]     2. Primary hypertension     3. Ear pain, left     4. Impacted cerumen of right ear     5. Globus sensation           Inpatient course: Discharge summary reviewed- see chart.     Interval history/Current status: stable    Patient Active Problem List   Diagnosis    CAD in native artery    Hyperlipidemia    Hypertension    Hypothyroidism    Claudication ([address]    CKD (chronic kidney disease) stage 3, GFR 30-59 ml/min (HCC)    BCG ROXANE    Bladder cancer (HCC)    Subcutaneous cyst    Pseudoaneurysm of right femoral artery ([address]    Spinal stenosis of lumbar region    Macular degeneration    Abnormal CT of the chest    Pain in penis and bladder    Peripheral polyneuropathy    History of malignant neoplasm of bladder    Attention to urostomy [facility])    Peristomal skin complication    Lymphedema    Hearing loss of both ears due to cerumen impaction    Vertigo    Tinnitus aurium, right    H/O total cystectomy    Malignant neoplasm without specification of site [facility])    Secondary malignant neoplasm of adrenal gland [facility]    AAA (abdominal aortic aneurysm) ([facility]    Carotid artery stenosis    STEMI involving oth coronary artery of anterior wall ([facility]    Ischemic cardiomyopathy    Unstable angina ([facility]       Medications listed as ordered at the time of discharge from hospital  [unfilled]      Medications marked \"taking\" at this time  Outpatient Medications Marked as Taking for the 3/7/22 encounter (Office Visit) with Masood Islas MD   Medication Sig Dispense Refill    lisinopril (PRINIVIL;ZESTRIL) 5 MG tablet Take 1 tablet by mouth daily 30 tablet 3    metoprolol tartrate (LOPRESSOR) 50 MG tablet Take 0.5 tablets by mouth 2 times daily 180 tablet 3    furosemide (LASIX) 20 MG tablet Take 1 tablet by mouth daily 60 tablet 3    ticagrelor (BRILINTA) 90 MG TABS tablet Take 1 tablet by mouth 2 times daily 180 tablet 3    ezetimibe (ZETIA) 10 MG tablet Take 1 tablet by mouth daily 30 tablet 3    levothyroxine (SYNTHROID) 137 MCG tablet TAKE 1 TABLET BY MOUTH EVERY DAY 90 tablet 3    DULoxetine (CYMBALTA) 30 MG extended release capsule Take 1 capsule by mouth daily 90 capsule 3    KLOR-CON M10 10 MEQ extended release tablet TAKE 1 TABLET BY MOUTH EVERY DAY 90 tablet 3    pregabalin (LYRICA) 50 MG capsule Take 1 capsule by mouth 2 times daily for 90 days.  TAKE 1 CAPSULE BY MOUTH TWICE A  capsule 1    Multiple Vitamins-Minerals (PRESERVISION AREDS 2+MULTI VIT PO) Take 2 tablets by mouth daily      atorvastatin (LIPITOR) 40 MG tablet TAKE 1 TABLET BY MOUTH EVERYDAY AT BEDTIME 90 tablet 3    b complex vitamins capsule Take 1 capsule by mouth daily      Elastic Bandages & Supports (JOBST KNEE HIGH

## 2022-03-09 ENCOUNTER — OFFICE VISIT (OUTPATIENT)
Dept: ENT CLINIC | Age: 82
End: 2022-03-09
Payer: MEDICARE

## 2022-03-09 VITALS
WEIGHT: 201.5 LBS | TEMPERATURE: 97.2 F | RESPIRATION RATE: 16 BRPM | BODY MASS INDEX: 31.63 KG/M2 | SYSTOLIC BLOOD PRESSURE: 112 MMHG | OXYGEN SATURATION: 98 % | HEART RATE: 83 BPM | DIASTOLIC BLOOD PRESSURE: 58 MMHG | HEIGHT: 67 IN

## 2022-03-09 DIAGNOSIS — H60.333 ACUTE SWIMMER'S EAR OF BOTH SIDES: Primary | ICD-10-CM

## 2022-03-09 DIAGNOSIS — K21.9 LARYNGOPHARYNGEAL REFLUX (LPR): ICD-10-CM

## 2022-03-09 PROCEDURE — 99203 OFFICE O/P NEW LOW 30 MIN: CPT | Performed by: OTOLARYNGOLOGY

## 2022-03-09 RX ORDER — OFLOXACIN 3 MG/ML
1 SOLUTION/ DROPS OPHTHALMIC 4 TIMES DAILY
Qty: 10 ML | Refills: 3 | Status: SHIPPED | OUTPATIENT
Start: 2022-03-09 | End: 2022-03-19

## 2022-03-09 NOTE — PROGRESS NOTES
[place], NOSE AND THROAT  [facility] [place]  Dept: [phone]  Dept Fax: [phone]: [phone]       Dear No ref. provider found, thank you for referring Tomás Harry in consultation for a chief complaint of: ear pain. My full evaluation is as follows:     HPI:     As you recall, Tomás Harry is a 80 y.o. male who presents today for dilation of his ears. He has been having some ear pain after he got his left ear cleaned few weeks ago. He also went to get new molds for his hearing aids and was found to have swollen ear canals. He does not have a history of ceruminosis or otitis externa. He does have a history of coughing frequently and throat clearing. This is worse in the morning. He does have acid reflux. Also for the past couple weeks his nose has been running. History:      Allergies   Allergen Reactions    Tape Evlyn Little Tape]      Surgical tape-rash     Current Outpatient Medications   Medication Sig Dispense Refill    ofloxacin (OCUFLOX) 0.3 % solution Place 1 drop into both eyes 4 times daily for 10 days 10 mL 3    lisinopril (PRINIVIL;ZESTRIL) 5 MG tablet Take 1 tablet by mouth daily 30 tablet 3    metoprolol tartrate (LOPRESSOR) 50 MG tablet Take 0.5 tablets by mouth 2 times daily 180 tablet 3    furosemide (LASIX) 20 MG tablet Take 1 tablet by mouth daily 60 tablet 3    ticagrelor (BRILINTA) 90 MG TABS tablet Take 1 tablet by mouth 2 times daily 180 tablet 3    ezetimibe (ZETIA) 10 MG tablet Take 1 tablet by mouth daily 30 tablet 3    levothyroxine (SYNTHROID) 137 MCG tablet TAKE 1 TABLET BY MOUTH EVERY DAY 90 tablet 3    DULoxetine (CYMBALTA) 30 MG extended release capsule Take 1 capsule by mouth daily 90 capsule 3    KLOR-CON M10 10 MEQ extended release tablet TAKE 1 TABLET BY MOUTH EVERY DAY 90 tablet 3    Multiple Vitamins-Minerals (PRESERVISION AREDS 2+MULTI VIT PO) Take 2 tablets by mouth daily      atorvastatin (LIPITOR) 40 MG tablet TAKE 1 TABLET BY MOUTH EVERYDAY AT BEDTIME 90 tablet 3    nitroGLYCERIN (NITROSTAT) 0.4 MG SL tablet Place 1 tablet under the tongue every 5 minutes as needed for Chest pain 25 tablet 1    b complex vitamins capsule Take 1 capsule by mouth daily      Elastic Bandages & Supports (JOBST KNEE HIGH COMPRESSION SM) MISC 1 each by Does not apply route daily as needed 1 each 0    aspirin 81 MG tablet Take 81 mg by mouth daily.  pregabalin (LYRICA) 50 MG capsule Take 1 capsule by mouth 2 times daily for 90 days. TAKE 1 CAPSULE BY MOUTH TWICE A  capsule 1     No current facility-administered medications for this visit.      Past Medical History:   Diagnosis Date    AAA (abdominal aortic aneurysm) ([address]     intravascular stent    BCG ROXANE 05/21/2019    BPH (benign prostatic hypertrophy)     CAD (coronary artery disease)     Cancer of skin     Carotid artery stenosis     Chronic back pain     Chronic kidney disease     Congenital absence of one kidney     absent right kidney    History of lumbar fusion 3/15    mid lower back down    Hyperlipidemia     Hypertension     Hypothyroidism     Obesity     Sciatica     TIA (transient ischemic attack) 1/2008    Unspecified cerebral artery occlusion with cerebral infarction     TIA      Past Surgical History:   Procedure Laterality Date    ABDOMINAL AORTIC ANEURYSM REPAIR  3/2010    [facility] ABDOMINAL AORTIC ANEURYSM REPAIR  7/24/15    [facility] CARDIAC CATHETERIZATION  2/26/222    had two stints put in    COLONOSCOPY  12/21/11    [facility]    CORONARY ANGIOPLASTY WITH STENT PLACEMENT  2003    2 stents    FEMORAL-FEMORAL BYPASS GRAFT  03/09/2017    Dr. Vilma Marmolejo    arm    KNEE ARTHROSCOPY  12/2008    meniscus tear    LUMBAR LAMINECTOMY  3/2016    [facility]--Dr. Kevin Shaw    OTHER SURGICAL HISTORY  4/14/15    melanoma removal from right chest    OTHER SURGICAL HISTORY 2016    I and D, exicison of posterior neck cyst    [place] OFFICE/OUTPT VISIT,PROCEDURE ONLY N/A 2018    TURBT performed by Torie Vásquez MD at [address] [place] [address] N/A 2019    ROBOTIC RADICAL CYSTOPROSTATECTOMY performed by Torie Vásquez MD at [address]  10/2016    Dr Magi Covington   [facility]  2020    on wrist     TESTICLE REMOVAL  's    TUNNELED VENOUS PORT PLACEMENT      TURP  2014    See note of Dr. Adolfo Bourgeois for OR procedure details     Family History   Problem Relation Age of Onset    Diabetes Mother     Cancer Mother         Liver    Arthritis Mother     Asthma Father     Heart Disease Father     Cancer Father         Lung, Bone    Stroke Sister     Heart Disease Sister     Cancer Sister     Stroke Brother     Heart Disease Brother     Cancer Brother         Lung     Social History     Tobacco Use    Smoking status: Former Smoker     Packs/day: 1.00     Years: 24.00     Pack years: 24.00     Types: Cigarettes     Start date:      Quit date: 1979     Years since quittin.9    Smokeless tobacco: Never Used   Substance Use Topics    Alcohol use: No     Alcohol/week: 0.0 standard drinks       All of the past medical history, past surgical history, family history,social history, allergies and current medications were reviewed with the patient. Review of Systems      A complete review of systems was obtained by the patient intake form, which is attached to this visit. Objective:   BP (!) 112/58 (Site: Left Upper Arm, Position: Sitting)   Pulse 83   Temp 97.2 °F (36.2 °C) (Infrared)   Resp 16   Ht 5' 7\" (1.702 m)   Wt 201 lb 8 oz (91.4 kg)   SpO2 98%   BMI 31.56 kg/m²     PHYSICAL EXAM  ABNORMAL OTOLARYNGOLOGIC EXAM FINDINGS: Purulence in the bilateral ear canals. OTHER PERTINENT EXAM FINDINGS:  GENERAL: Awake, NAD, Non-toxic appearing.  Normal voice quality  NEUROLOGICAL: GCS 15, Cranial nerves II-VI, VIII-XII grossly intact,  Facial nerve (VII) w/ House-Brackman Grade 1 of 6 bilaterally, No evidence of nystagmus or gross asymmetry    EARS: Pinna well-formed, see below  EYES: EOMI, No ptosis appreciated   NOSE: Dorsum w/o scar or deformity,  No mucopurulence appreciated, Patent nasal airways bilaterally. No inferior turbinate hypertrophy. No septal deviation. ORAL CAVITY: No mucosal masses/lesions appreciated, Tongue with FROM. Appropriate CHEN w/o trismus. OROPHARYNX: No mucosal masses or lesions appreciated. Symmetric palatal elevation w/o draping. NECK: Soft, supple. No crepitus or masses appreciated,  Trachea midline. No thyromegaly or thyroid tenderness. Otomicroscopic Exam  Procedure performed: Bilateral Otomicroscopy   Attending: Maday Falcon MD  Findings:  Right:  Pinna normal.  Otomicroscopic exam: Right external auditory canal full of moist squamous debris and purulence. I removed this with a #5 suction. The tympanic membrane is intact. Left: Pinna normal.  Otomicroscopic exam: external auditory canal full of moist squamous debris and purulence. I removed this with a #5 suction. The tympanic is intact    Procedure: The patient was taken to the procedure room and laid supine on the table. The microscope was brought over the patient's right ear. A speculum was used to visualize the external auditory canal.  A variety of instruments were used to remove the infection to allow for complete visualization of the ear canal and tympanic membrane. The above findings were noted. The microscope was then moved to the patient's left ear and the same procedure was performed. The patient tolerated the procedure well. There were no complications. Data: The relevant prior clinic notes were reviewed today, including the referring physicians notes. Imaging: None       Assessment & Plan   Driss Coker is a 80 y.o. male with:   1. Acute swimmer's ear of both sides    2. Laryngopharyngeal reflux (LPR)         Otitis Externa  - The etiology of the diagnosis and relevant anatomy were reviewed with the patient. - The ear canal and tympanic membrane were debrided today in clinic  -I prescribed a 1 week course of eardrops. -I discussed dry ear precautions, and the need to allow air to get to the ear canal.  No hearing aid use for at least 1 week. Laryngopharyngeal reflux. We discussed the etiology of the disease. We discussed lifestyle modifications including elevation of the head of bed, dietary changes, and weight loss. Return 7-10 days. Thank you for involving me in this patient's care. Please do not hesitate to call with any questions or concerns. Sincerely,    James Nguyen M.D. Otolaryngology-Head and Neck Surgery    **This report has been created using voice recognition software. It may contain minor errors which are inherent in voice recognition technology. **

## 2022-03-09 NOTE — PATIENT INSTRUCTIONS
Laryngopharyngeal Reflux Disease (LPR)   Your doctor has diagnosed you as having Laryngopharyngeal Reflux Disease or LPR. This condition develops when stomach acid travels up into your throat. Although you may experience \"heartburn\" or \"indigestion\", many of our patients do not have these complaints. Some of the more common symptoms seen in our patients include:    sensation of drainage down the back of the throat or excessive mucus    feeling of something caught in the throat (sometimes a tickling or burning sensation)    throat clearing    chronic cough    post-nasal drip    sore throat    hoarseness    difficulty swallowing    prolonged vocal warm-up (for singers)    loss of the high end of the vocal range     DIAGNOSIS OF LPR:   Most often, your doctor can diagnose LPR by examining your throat and vocal cords with a rigid or flexible telescope. The voice box is typically red, irritated, and swollen from acid reflux damage. This swelling and inflammation will eventually resolve with medical treatment, although it may take a few months. At other times, you may have to undergo a dual-channel pH probe test to diagnose your condition. This involves placing a small tube (catheter) through your nose and down into your swallowing passage (esophagus). The catheter is worn for a 24-hour period and measures the amount of acid that refluxes into your throat. This test is not often necessary, but can provide critical information in certain cases. TREATMENT OF LPR:   Most of the time, LPR is well controlled with medications (Proton Pump Inhibitors, or PPI's), as described on the back of this sheet. Occasionally, surgery is needed in severe cases or those that don't resolve with medications. The recommended surgery is called a Laparoscopic Nissen Fundoplication, and is performed by a General Surgeon. Positive proof of reflux disease is needed first, generally by a pH probe study.  With some patients, the esophagus (swallowing tube) must also be examined for pre-malignant changes. Your ENT doctor can perform a screening test for this under local anesthetic in clinic, called a TNE (transnasal esophagoscopy)       TREATMENT OF LPR   One of the first things you must do is make some changes in your lifestyle. Many foods and drinks can make your symptoms worse, and it is important that these be eliminated. In addition, being overweight, smoking, and drinking alcohol are all factors that worsen reflux disease. It is important to work on the following areas as well:     1. [address] Specifically, avoid coffee (highest caffeine content), tea, and caffeinated soft drinks. Soft drinks such as Coke and Pepsi are particularly bad, because they are very acidic (pH of 2.3), and the carbonation leads to belching and further reflux of acid into the throat. Other acidic juices (orange, grapefruit, cranberry) can worsen reflux. 2. AVOID CHOCOLATE AND MINTS. 3. AVOID ALCOHOL, especially in the late evening and before bedtime. 4. QUIT SMOKING. 5. ELIMINATE FRIED, FATTY, AND SPICY FOODS FROM YOUR DIET. A low-fat diet is the best way to avoid reflux. Onions and garlic are notorious for causing reflux. 6. LOSE WEIGHT if you're overweight. Avoid tight-fitting clothing. 7. STOP EATING AT LEAST 3 HOURS BEFORE GOING TO BED. Eating a heavy meal just before going to sleep is especially bad for your reflux condition. 8. TAKE THE MEDICINES YOUR DOCTOR HAS PRESCRIBED FOR YOU. In most cases, your doctor will prescribe a \"proton pump inhibitor\" drug (PPI) such as Nexium, Prevacid, Protonix, Prilosec or Aciphex. These drugs may be prescribed TWICE A DAY, which is double the usual dose for routine reflux disease. Please see below for more details on your medication. 9. ELEVATE THE HEAD OF YOUR BED 4-6 INCHES by placing wood or cinder blocks under the headboard.  Placing several pillows under your head does not substitute for raising the head of the bed; in fact, this maneuver can make the problem worse. Important Information About Your Medications: If you are taking a Proton Pump Inhibitor (PPI) such as: Nexium, Protonix, Prevacid, Aciphex, Prilosec (omeprazole), Zegerid, it is important to take your medicines 30 minutes - 1 hour before meals. These medicines may be given twice-a-day, so that will mean taking a pill before breakfast and dinner. The medicine is absorbed better if taken this way. You should know that your insurance company/pharmacy program usually prefers a certain PPI. We are happy to change your prescription to the \"preferred\" PPI. Please call your doctor's office/medical assistant about this. Also, insurance companies commonly deny twice daily dosing of these medications. This can be appealed. Please ask for handout describing how to appeal to your insurer. Gaviscon Advance Liquid Aniseed    Gaviscon Advance Liquid Aniseed    Gaviscon Advance Liquid Aniseed provides heartburn  & indigestion relief you can actually feel working. Size: 500ml    Gaviscon Advance Liquid Aniseed contains sodium alginate which provides the strongest Gaviscon  treatment that works as a raft. i.e by forming a thick layer on top of the stomach contents which prevents  acid rising back up into the oesophagus (food pipe). Informational Video: Magdalena[employer].uk/how_does_gaviscon_work/double_action. php  Directions:  ? Shake well before use. ? Adults: take 10ml after meals, 3x/day, or as directed. Where to purchase products Online: There are many online pharmacy's where you can order your product on the Internet and have them delivered  straight to your door. Below are a list of online stockists where you can purchase your chosen product from:  ? [employer] - http://www. oqqxqlhg1u. co.uk/  ? MyPharmacy. co.uk - [facility]. co.[facility]/pharmacy/gaviscon.html  ?  Online Pharmacy: [facility]  ? [employer]es. jsp?  subframe=search&searchphrase=[email or web address]+Advance&image. x=16&image. y=19  ? : Search [employer] at [email or web address][email or web address]/

## 2022-03-14 NOTE — PROGRESS NOTES
[facility] PROFESSIONAL SERVICES  [facility]   [address]   [address] [address]   Dept: [phone]   Dept Fax: [phone]: [phone]      Chief Complaint   Patient presents with   [address] from Hospital    Follow Up After Procedure     Cardiologist:  Dr. Brant Taylor  79 yo male presents for hfu for STEMI s/p PCI to LAD, LCx. EF 40%    No DEL TORO, orthopnea, PND, sob at rest, palpitations, LE edema, or syncope. Has some chest discomfort, dull ache. Has not used any nitro. Lasts very few seconds and goes away. Swelling started again last day or so. No major change in weight. Has not been taking lasix, was previously on 40 mg daily. No other cardiac c/o at this time. Doing ADLs without issue.      General:   No fever, no chills, no weight loss, no fatigue  Pulmonary:    No dyspnea, no wheezing  Cardiac:    occ dull ache, infrequent   GI:     No nausea or vomiting, no abdominal pain  Neuro:      No dizziness or light headedness  Musculoskeletal:  No recent active issues  Extremities:   + edema      Past Medical History:   Diagnosis Date    AAA (abdominal aortic aneurysm) (HCC)     intravascular stent    BCG ROXANE 05/21/2019    BPH (benign prostatic hypertrophy)     CAD (coronary artery disease)     Cancer of skin     Carotid artery stenosis     Chronic back pain     Chronic kidney disease     Congenital absence of one kidney     absent right kidney    History of lumbar fusion 3/15    mid lower back down    Hyperlipidemia     Hypertension     Hypothyroidism     Obesity     Sciatica     TIA (transient ischemic attack) 1/2008    Unspecified cerebral artery occlusion with cerebral infarction     TIA       Allergies   Allergen Reactions    Tape Anat Fend Tape]      Surgical tape-rash       Current Outpatient Medications   Medication Sig Dispense Refill    ofloxacin (OCUFLOX) 0.3 % solution Place 1 drop into both eyes 4 times daily for 10 days 10 mL 3    lisinopril (PRINIVIL;ZESTRIL) 5 MG tablet Take 1 tablet by mouth daily 30 tablet 3    metoprolol tartrate (LOPRESSOR) 50 MG tablet Take 0.5 tablets by mouth 2 times daily 180 tablet 3    ticagrelor (BRILINTA) 90 MG TABS tablet Take 1 tablet by mouth 2 times daily 180 tablet 3    ezetimibe (ZETIA) 10 MG tablet Take 1 tablet by mouth daily 30 tablet 3    levothyroxine (SYNTHROID) 137 MCG tablet TAKE 1 TABLET BY MOUTH EVERY DAY 90 tablet 3    DULoxetine (CYMBALTA) 30 MG extended release capsule Take 1 capsule by mouth daily 90 capsule 3    KLOR-CON M10 10 MEQ extended release tablet TAKE 1 TABLET BY MOUTH EVERY DAY 90 tablet 3    pregabalin (LYRICA) 50 MG capsule Take 1 capsule by mouth 2 times daily for 90 days. TAKE 1 CAPSULE BY MOUTH TWICE A  capsule 1    Multiple Vitamins-Minerals (PRESERVISION AREDS 2+MULTI VIT PO) Take 2 tablets by mouth daily      atorvastatin (LIPITOR) 40 MG tablet TAKE 1 TABLET BY MOUTH EVERYDAY AT BEDTIME 90 tablet 3    nitroGLYCERIN (NITROSTAT) 0.4 MG SL tablet Place 1 tablet under the tongue every 5 minutes as needed for Chest pain 25 tablet 1    b complex vitamins capsule Take 1 capsule by mouth daily      Elastic Bandages & Supports (JOBST KNEE HIGH COMPRESSION SM) MISC 1 each by Does not apply route daily as needed 1 each 0    aspirin 81 MG tablet Take 81 mg by mouth daily.  furosemide (LASIX) 20 MG tablet Take 1 tablet by mouth daily (Patient not taking: Reported on 3/16/2022) 60 tablet 3     No current facility-administered medications for this visit.        Social History     Socioeconomic History    Marital status:      Spouse name: Favio Neighbor Number of children: 2    Years of education: None    Highest education level: None   Occupational History    None   Tobacco Use    Smoking status: Former Smoker     Packs/day: 1.00     Years: 24.00     Pack years: 24.00     Types: Cigarettes     Start date: [facility]     Quit date: 4/1/1979     Years since quittin.9    Smokeless tobacco: Never Used   Vaping Use    Vaping Use: Never used   Substance and Sexual Activity    Alcohol use: No     Alcohol/week: 0.0 standard drinks    Drug use: No    Sexual activity: Not Currently   Other Topics Concern    None   Social History Narrative    None     Social Determinants of Health     Financial Resource Strain: Low Risk     Difficulty of Paying Living Expenses: Not hard at all   Food Insecurity: No Food Insecurity    Worried About Running Out of Food in the Last Year: Never true    Deepthi of Food in the Last Year: Never true   Transportation Needs:     Lack of Transportation (Medical): Not on file    Lack of Transportation (Non-Medical):  Not on file   Physical Activity:     Days of Exercise per Week: Not on file    Minutes of Exercise per Session: Not on file   Stress:     Feeling of Stress : Not on file   Social Connections:     Frequency of Communication with Friends and Family: Not on file    Frequency of Social Gatherings with Friends and Family: Not on file    Attends Faith Services: Not on file    Active Member of Clubs or Organizations: Not on file    Attends Club or Organization Meetings: Not on file    Marital Status: Not on file   Intimate Partner Violence:     Fear of Current or Ex-Partner: Not on file    Emotionally Abused: Not on file    Physically Abused: Not on file    Sexually Abused: Not on file   Housing Stability:     Unable to Pay for Housing in the Last Year: Not on file    Number of [place] in the Last Year: Not on file    Unstable Housing in the Last Year: Not on file       Family History   Problem Relation Age of Onset    Diabetes Mother     Cancer Mother         Liver    Arthritis Mother     Asthma Father     Heart Disease Father     Cancer Father         Lung, Bone    Stroke Sister     Heart Disease Sister     Cancer Sister     Stroke Brother     Heart Disease Brother     Cancer Brother Lung       Blood pressure (!) 120/58, pulse 70, height 5' 7\" (1.702 m), weight 200 lb 6.4 oz (90.9 kg). General:   Well developed, well nourished  Lungs:   Clear to auscultation  Heart:    Normal S1 S2, No murmur, rubs, or gallops  Abdomen:   Soft, non tender, no organomegalies, positive bowel sounds  Extremities:   No edema, no cyanosis, good peripheral pulses  Neurological:   Awake, alert, oriented. No obvious focal deficits  Musculoskeletal:  No obvious deformities    EKG:     Conclusions      Summary   Left ventricle size is normal.   Mild concentric left ventricular hypertrophy. There were no regional wall motion abnormalities. Ejection fraction is visually estimated in the range of 60% to 65%. Trace tricuspid regurgitation. Signature      ----------------------------------------------------------------   Electronically signed by Mimi Pattreson MD (Interpreting   physician) on 02/28/2022     FINDINGS:  HEMODYNAMICS:  Left ventricular end-diastolic pressure 20 mmHg. Upon  pullback across the aortic valve, the mean pressure gradient was found  to be 17 mmHg.     LEFT VENTRICULOGRAM:  Apical severe hypokinesis was noticed. Ejection  fraction estimated at 40%.     CORONARY ANGIOGRAM:  1. Left main coronary artery:  Patent without significant stenosis. Trifurcates into ramus intermedius, LCX, and LAD. 2.  Left circumflex artery:  Proximal left circumflex artery has 80% to  90% stenosis. OM1 is patent. Distal LCX with mild luminal  irregularities. 3.  Ramus intermedius:  Ramus intermedius has mild luminal  irregularities. Otherwise patent. 4.  Left anterior descending artery:  Proximal LAD is patent. Mid LAD  has 99% subtotal occlusion. D1 is a 2-mm vessel with proximal segment  having severe diffuse disease, 90%, with ZOLTAN-3 flow. The LAD was  noticed to have a flow-limiting lesion in mid LAD with ZOLTAN-2 flow. Distal LAD with mild luminal irregularities.   5.  Right coronary artery: Dominant vessel. Stent in proximal to mid  LAD has mild in-stent restenosis, 30% to proximal segment, 30% in the  mid segment. Distal RCA with mild luminal irregularities.     MEDICATIONS:  See MAR.     COMPLICATIONS:  None.     ESTIMATED BLOOD LOSS:  Less than 50 mL.     ACCESS:  Right radial artery access. Vasc Band was applied. Hemostasis  was achieved.     IMPRESSION:  1. ST-elevation myocardial infarction. 2.  Subtotal occlusion of the mid LAD, the culprit for the STEMI, status  post successful PCI and stenting. 3.  Severe proximal LCX stenosis, status post successful PCI and  stenting. 4.  Patent stent in the right coronary artery with only mild in-stent  restenosis.     RECOMMENDATIONS:  Bedrest for the next four hours. Admit to the  intensive care unit. Optimize medical therapy for coronary artery  disease. Aggressive risk factor modification. Continue metoprolol. Aspirin 81 mg p.o. daily. The patient received loading dose of  Brilinta. Continue Brilinta 90 mg p.o. b.i.d.  Echocardiogram.  Repeat  EKG on the floor. Normal saline at 50 mL per hour. Refer to cardiac  rehab. Findings and plan of care were discussed with the patient and  family. They are agreeable to the plan.           Petra Robles MD     D: 02/26/2022   Diagnosis Orders   1. STEMI involving oth coronary artery of anterior wall ([address]     2. CAD in native artery     3. Primary hypertension         No orders of the defined types were placed in this encounter. Assessment/Plan:   STEMI s/p PCI RCA-continue with GDMT with bb/ace/statin/zetia/asa/brilinta. Restart lasix 20 mg daily and monitor weight and swelling. HTN-well controlled. Continue with lisinopril, lopressor. Disposition:   F/u with Dr Jose Miguel Shabazz in 6 months.    Continue Dr Curran Phi current treatment plan  Follow up with Dr Jose Miguel Shabazz as scheduled or sooner if needed

## 2022-03-16 ENCOUNTER — OFFICE VISIT (OUTPATIENT)
Dept: CARDIOLOGY CLINIC | Age: 82
End: 2022-03-16
Payer: MEDICARE

## 2022-03-16 VITALS
BODY MASS INDEX: 31.45 KG/M2 | HEIGHT: 67 IN | WEIGHT: 200.4 LBS | SYSTOLIC BLOOD PRESSURE: 120 MMHG | HEART RATE: 70 BPM | DIASTOLIC BLOOD PRESSURE: 58 MMHG

## 2022-03-16 DIAGNOSIS — I10 PRIMARY HYPERTENSION: ICD-10-CM

## 2022-03-16 DIAGNOSIS — I21.09 STEMI INVOLVING OTH CORONARY ARTERY OF ANTERIOR WALL (HCC): Primary | ICD-10-CM

## 2022-03-16 DIAGNOSIS — I25.10 CAD IN NATIVE ARTERY: ICD-10-CM

## 2022-03-16 PROCEDURE — 99214 OFFICE O/P EST MOD 30 MIN: CPT | Performed by: STUDENT IN AN ORGANIZED HEALTH CARE EDUCATION/TRAINING PROGRAM

## 2022-03-16 NOTE — PROGRESS NOTES
Heart cath follow up. EKG done 2-. C/o cp described as a dull ache, sob, off balance and RYAN. Denies palpitations, dizziness.

## 2022-03-17 ENCOUNTER — OFFICE VISIT (OUTPATIENT)
Dept: ENT CLINIC | Age: 82
End: 2022-03-17
Payer: MEDICARE

## 2022-03-17 VITALS
TEMPERATURE: 97.1 F | RESPIRATION RATE: 14 BRPM | BODY MASS INDEX: 30.54 KG/M2 | SYSTOLIC BLOOD PRESSURE: 110 MMHG | HEART RATE: 73 BPM | OXYGEN SATURATION: 85 % | DIASTOLIC BLOOD PRESSURE: 78 MMHG | WEIGHT: 195 LBS

## 2022-03-17 DIAGNOSIS — H60.333 ACUTE SWIMMER'S EAR OF BOTH SIDES: Primary | ICD-10-CM

## 2022-03-17 PROCEDURE — 99212 OFFICE O/P EST SF 10 MIN: CPT | Performed by: OTOLARYNGOLOGY

## 2022-03-19 LAB — LDL CHOLESTEROL DIRECT: 72 MG/DL

## 2022-03-22 ENCOUNTER — TELEPHONE (OUTPATIENT)
Dept: INTERNAL MEDICINE CLINIC | Age: 82
End: 2022-03-22

## 2022-03-22 NOTE — TELEPHONE ENCOUNTER
----- Message from Miryam Hunter MD sent at 3/19/2022  8:48 AM EDT -----  Tell pt labs ok   May give values

## 2022-03-23 RX ORDER — LISINOPRIL 5 MG/1
TABLET ORAL
Qty: 90 TABLET | Refills: 3 | Status: SHIPPED | OUTPATIENT
Start: 2022-03-23 | End: 2022-03-24 | Stop reason: SINTOL

## 2022-03-24 RX ORDER — LOSARTAN POTASSIUM 50 MG/1
50 TABLET ORAL DAILY
COMMUNITY
End: 2022-03-24 | Stop reason: SDUPTHER

## 2022-03-24 NOTE — TELEPHONE ENCOUNTER
V.O. Start losartan 50 mg daily. Stop lisinopril.spoke to patient wife Rony Barnes voiced understanding. RX pended.     Dr. Asmita Garcia agree

## 2022-03-25 RX ORDER — LOSARTAN POTASSIUM 50 MG/1
50 TABLET ORAL DAILY
Qty: 30 TABLET | Refills: 6 | Status: SHIPPED | OUTPATIENT
Start: 2022-03-25 | End: 2022-04-18

## 2022-03-28 ENCOUNTER — HOSPITAL ENCOUNTER (OUTPATIENT)
Dept: AUDIOLOGY | Age: 82
Discharge: HOME | End: 2022-03-28

## 2022-03-28 PROCEDURE — 9990000010 HC NO CHARGE VISIT: Performed by: AUDIOLOGIST

## 2022-03-28 NOTE — PROGRESS NOTES
EARMOLD IMPRESSION:  Took bilateral earmold impressions without incident. Ordered new custom earmolds in clear color, per patient's request.  Earmold  appointment is scheduled for 4/12/22.

## 2022-04-12 ENCOUNTER — HOSPITAL ENCOUNTER (OUTPATIENT)
Dept: AUDIOLOGY | Age: 82
Discharge: HOME | End: 2022-04-12

## 2022-04-12 PROCEDURE — V5264 EAR MOLD/INSERT: HCPCS | Performed by: AUDIOLOGIST

## 2022-04-12 NOTE — PROGRESS NOTES
ACCOUNT #: [de-identified]     DIAGNOSIS: Sensorineural hearing loss of both ears. EARMOLD : Dispensed new earmolds. Fit is good. Ran feedback test. Patient to return within 30 days with any soreness or fit issues. Billed $140.00.

## 2022-04-13 ENCOUNTER — HOSPITAL ENCOUNTER (OUTPATIENT)
Age: 82
Discharge: HOME | End: 2022-04-13
Payer: MEDICARE

## 2022-04-13 ENCOUNTER — HOSPITAL ENCOUNTER (OUTPATIENT)
Dept: CT IMAGING | Age: 82
Discharge: HOME | End: 2022-04-13
Payer: MEDICARE

## 2022-04-13 DIAGNOSIS — I71.40 ABDOMINAL AORTIC ANEURYSM WITHOUT RUPTURE: ICD-10-CM

## 2022-04-13 DIAGNOSIS — I73.9 PERIPHERAL VASCULAR DISEASE, UNSPECIFIED (HCC): ICD-10-CM

## 2022-04-13 DIAGNOSIS — Z86.79 S/P AAA REPAIR USING BIFURCATION GRAFT: ICD-10-CM

## 2022-04-13 DIAGNOSIS — Z95.828 S/P AAA REPAIR USING BIFURCATION GRAFT: ICD-10-CM

## 2022-04-13 LAB
CREAT SERPL-MCNC: 1.1 MG/DL (ref 0.4–1.2)
GFR SERPL CREATININE-BSD FRML MDRD: 64 ML/MIN/1.73M2
POC CREATININE WHOLE BLOOD: 1.2 MG/DL (ref 0.5–1.2)

## 2022-04-13 PROCEDURE — 75635 CT ANGIO ABDOMINAL ARTERIES: CPT

## 2022-04-13 PROCEDURE — 82565 ASSAY OF CREATININE: CPT

## 2022-04-13 PROCEDURE — 6360000004 HC RX CONTRAST MEDICATION: Performed by: THORACIC SURGERY (CARDIOTHORACIC VASCULAR SURGERY)

## 2022-04-13 PROCEDURE — 36415 COLL VENOUS BLD VENIPUNCTURE: CPT

## 2022-04-13 RX ADMIN — IOPAMIDOL 125 ML: 755 INJECTION, SOLUTION INTRAVENOUS at 09:43

## 2022-04-18 RX ORDER — LOSARTAN POTASSIUM 50 MG/1
TABLET ORAL
Qty: 30 TABLET | Refills: 5 | Status: SHIPPED | OUTPATIENT
Start: 2022-04-18 | End: 2022-08-18 | Stop reason: ALTCHOICE

## 2022-04-21 RX ORDER — FUROSEMIDE 20 MG/1
TABLET ORAL
Qty: 90 TABLET | Refills: 3 | Status: SHIPPED | OUTPATIENT
Start: 2022-04-21 | End: 2022-09-07 | Stop reason: SDUPTHER

## 2022-04-23 DIAGNOSIS — I25.10 CORONARY ARTERY DISEASE INVOLVING NATIVE CORONARY ARTERY OF NATIVE HEART WITHOUT ANGINA PECTORIS: Chronic | ICD-10-CM

## 2022-04-25 RX ORDER — FUROSEMIDE 40 MG/1
TABLET ORAL
Qty: 90 TABLET | Refills: 1 | OUTPATIENT
Start: 2022-04-25

## 2022-04-26 RX ORDER — METOPROLOL TARTRATE 50 MG/1
TABLET, FILM COATED ORAL
Qty: 180 TABLET | Refills: 3 | OUTPATIENT
Start: 2022-04-26

## 2022-05-01 DIAGNOSIS — G62.9 NEUROPATHY: ICD-10-CM

## 2022-05-02 RX ORDER — ATORVASTATIN CALCIUM 40 MG/1
TABLET, FILM COATED ORAL
Qty: 90 TABLET | Refills: 3 | Status: SHIPPED | OUTPATIENT
Start: 2022-05-02

## 2022-05-02 RX ORDER — PREGABALIN 50 MG/1
CAPSULE ORAL
Qty: 180 CAPSULE | Refills: 1 | Status: SHIPPED | OUTPATIENT
Start: 2022-05-02 | End: 2022-10-31

## 2022-05-09 NOTE — TELEPHONE ENCOUNTER
Patient scheduled for CT CHEST WO CONTRAST  at [address] [place] [address] on 8/11/22 ARRIVAL OF 930AM FOR A 10 AM SCAN. Patient advised of instructions. Order mailed/given to patient    Patient scheduled for CT UROGRAM  at [address] [place] [address] on 8/11/22 10:20AM SCAN. NPO 4 HOURS PRIOR. Patient advised of instructions.   Order mailed/given to patient Paramedian Forehead Flap Division And Inset Text: Division and inset of the paramedian forehead flap was performed to achieve optimal aesthetic result, restore normal anatomic appearance and avoid distortion of normal anatomy, expedite and facilitate wound healing, achieve optimal functional result and because linear closure either not possible or would produce suboptimal result. The patient was prepped and draped in the usual manner. The pedicle was infiltrated with local anesthesia. The pedicle was sectioned with a #15 blade. The pedicle was de-bulked and trimmed to match the shape of the defect. Hemostasis was achieved. The flap donor site and free margin of the flap were secured with deep buried sutures and the wound edges were re-approximated.

## 2022-06-01 RX ORDER — EZETIMIBE 10 MG/1
TABLET ORAL
Qty: 90 TABLET | Refills: 3 | Status: SHIPPED | OUTPATIENT
Start: 2022-06-01

## 2022-07-07 ENCOUNTER — OFFICE VISIT (OUTPATIENT)
Dept: ENT CLINIC | Age: 82
End: 2022-07-07
Payer: MEDICARE

## 2022-07-07 VITALS
SYSTOLIC BLOOD PRESSURE: 132 MMHG | HEART RATE: 62 BPM | TEMPERATURE: 96.8 F | OXYGEN SATURATION: 92 % | HEIGHT: 67 IN | DIASTOLIC BLOOD PRESSURE: 70 MMHG | RESPIRATION RATE: 16 BRPM | BODY MASS INDEX: 30.23 KG/M2 | WEIGHT: 192.6 LBS

## 2022-07-07 DIAGNOSIS — H61.23 IMPACTED CERUMEN, BILATERAL: Primary | ICD-10-CM

## 2022-07-07 PROCEDURE — 69210 REMOVE IMPACTED EAR WAX UNI: CPT | Performed by: NURSE PRACTITIONER

## 2022-07-07 NOTE — PROGRESS NOTES
Procedure note:  DATE AND TIME OF PROCEDURE: 7/7/2022 10:02 AM  PATIENT NAME: Ethel Martell  MRN [id number]  PROVIDER: DEMAR Richey CNP   PRE-PROCEDURE DIAGNOSIS:  Bilateral cerumen impaction  POST-PROCEDURE DIAGNOSIS:   Same     INDICATION: Cerumenosis causing an inability to visualize the tympanic membrane, middle ear space and/or external auditory canal.     TEACHING: Procedure, benefits, and risks were explained to patient/family. Verbal informed consent was obtained. TIME OUT: A time out was conducted immediately before starting the procedure that confirmed a final verification of the correct patient, correct procedure, correct patient position, correct site and availability of special equipment. DESCRIPTION OF PROCEDURE:  PROCEDURE: Cerumenectomy  SITE: Bilateral ear(s)    ANESTHESIA:  NONE  COMPLICATIONS: NONE  EBL: NONE    PROCEDURE: Handheld otoscope used to visualize EAC. Bilateral cerumen in place, obstructing visualization of tympanic membranes. Cerumen removed with wire loop and alligator forceps until tympanic membranes could be visualized as documented above. Normal exam. The patient tolerated the procedure well, with no complications.

## 2022-08-02 NOTE — PROGRESS NOTES
6051 . [facility] 49  Ostomy Progress Note      NAME:  Jeancarlos Noyola [place] RECORD NUMBER:  [id number]  AGE: 78 y.o. GENDER:  male  :  1940  TODAY'S DATE:  2019    Subjective       Chief Complaint   Patient presents with    Wound Check     urostomy          HISTORY of PRESENT ILLNESS HPI     Terrence Olivas is a 78 y.o. male Established patient referred by Dr. Rosendo Rothman, who presents today for ostomy/stoma evaluation. History of Ostomy Context: Patient had an ileal conduit with  radical cystoprostatectomy on 19 due to bladder cancer. He is overall doing well but is extremely anxious due to his ostomy bags leaking multiple times the last couple days. His back was actually leaking upon arrival to the clinic today. He is also upset regarding swelling in drainage from his penis. He has an appointment to follow-up with Dr. Rosendo Rothman tomorrow. Patient and his wife have difficulty cutting out the opening in the pouches due to the arthritis in their hands. Wound/Ulcer Pain Timing/Severity: moderate  Quality of pain: aching, tender  Severity:  6 / 10   Modifying Factors: Pain is relieved/improved with rest  Associated Signs/Symptoms: edema, drainage and pain    Interval History:   Current ostomy care includes 1 piece flat Janna urostomy pouch.     PAST MEDICAL HISTORY        Diagnosis Date    AAA (abdominal aortic aneurysm) ([address]     intravascular stent    BCG ROXANE 2014    BPH (benign prostatic hypertrophy)     CAD (coronary artery disease)     Carotid artery stenosis     Chronic back pain     Chronic kidney disease     Congenital absence of one kidney     absent right kidney    History of lumbar fusion 3/15    mid lower back down    Hyperlipidemia     Hypertension     Hypothyroidism     Obesity     TIA (transient ischemic attack) 2008    Unspecified cerebral artery occlusion with cerebral infarction     TIA       PAST SURGICAL HISTORY    Past Surgical History: Procedure Laterality Date    ABDOMINAL AORTIC ANEURYSM [facility]PAIR  3/[facility]    [facility]COPY  11    [facility]    CORONARY ANGIOPLASTY WITH STENT PLACEMENT      2 stents    FEMORAL-FEMORAL BYPASS GRAFT  2017    Dr. Ebony Sullivan    arm    KNEE ARTHROSCOPY  2008    meniscus tear    LUMBAR LAMINECTOMY  3/2016    [facility]--Dr. Patsy Latif    OTHER SURGICAL HISTORY  4/14/15    melanoma removal from right chest    OTHER SURGICAL HISTORY  2016    I and D, exicison of posterior neck cyst    [place] OFFICE/OUTPT VISIT,PROCEDURE ONLY N/A 2018    TURBT performed by Lena Lopez MD at [facility] 2019    ROBOTIC RADICAL CYSTOPROSTATECTOMY performed by Lena Lopez MD at [address] [facility], [facility] SKIN CANCER EXCISION  10/2016    Dr Brenda Hernández  's    TUNNELED VENOUS PORT PLACEMENT      TURP  2014    See note of Dr. Justina Duque for OR procedure details       FAMILY HISTORY    Family History   Problem Relation Age of Onset    Diabetes Mother     Cancer Mother     Arthritis Mother     Asthma Father     Heart Disease Father     Cancer Father     Stroke Sister     Heart Disease Sister     Cancer Sister     Stroke Brother     Heart Disease Brother     Cancer Brother        SOCIAL HISTORY    Social History     Tobacco Use    Smoking status: Former Smoker     Packs/day: 1.00     Years: 24.00     Pack years: 24.00     Types: Cigarettes     Start date:      Last attempt to quit: 1979     Years since quittin.1    Smokeless tobacco: Never Used   Substance Use Topics    Alcohol use: No     Alcohol/week: 0.0 oz    Drug use: No       ALLERGIES    Allergies   Allergen Reactions    Tape [Adhesive Tape]        MEDICATIONS    Current Outpatient Medications on File Prior to Encounter   Medication Sig Dispense Refill    furosemide (LASIX) 20 MG tablet Take 20 mg by mouth 2 times daily      metoprolol tartrate (LOPRESSOR) 25 MG tablet Take 50 mg by mouth 2 times daily      nitroGLYCERIN (NITROSTAT) 0.4 MG SL tablet Place 1 tablet under the tongue every 5 minutes as needed for Chest pain 25 tablet 1    docusate sodium (COLACE, DULCOLAX) 100 MG CAPS Take 100 mg by mouth 2 times daily 30 capsule 0    polycarbophil (FIBERCON) 625 MG tablet Take 625 mg by mouth daily      b complex vitamins capsule Take 1 capsule by mouth daily      atorvastatin (LIPITOR) 40 MG tablet TAKE 1 TABLET BY MOUTH EVERY DAY AT BEDTIME 30 tablet 11    pregabalin (LYRICA) 50 MG capsule Take 1 capsule by mouth 3 times daily for 90 days. 270 capsule 3    levothyroxine (SYNTHROID) 137 MCG tablet TAKE 1 TABLET BY MOUTH DAILY 90 tablet 3    KLOR-CON M10 10 MEQ extended release tablet TAKE 1 TABLET BY MOUTH DAILY 90 tablet 3    aspirin 81 MG tablet Take 81 mg by mouth daily.  Elastic Bandages & Supports (JOBST KNEE HIGH COMPRESSION SM) MISC 1 each by Does not apply route daily as needed 1 each 0    Handicap Placard MISC Cannot walk more than 200 feet without stopping to rest or without assistance. Lifetime x 5 years. 1 each 0     No current facility-administered medications on file prior to encounter.         REVIEW OF SYSTEMS    Constitutional: negative for chills, fevers and sweats  Eyes: negative for irritation and redness  Ears, nose, mouth, throat, and face: negative for hearing loss and hoarseness  Respiratory: negative for cough and shortness of breath  Cardiovascular: negative for chest pain  Gastrointestinal: negative for abdominal pain, nausea and vomiting  Integument/breast:  positive for peristomal skin irritation  Musculoskeletal:negative for muscle weakness  Neurological: negative for gait problems, memory problems and speech problems  Behavioral/Psych: positive for good mood    Objective    /65   Pulse 75   Temp 98.1 °F (36.7 °C) (Oral)   Resp 18   Ht 5' 7\" (1.702 m)   Wt 185 lb (83.9 kg)   SpO2 100%   BMI 28.98 kg/m²     Wt Readings from Last 3 Encounters:   05/22/19 185 lb (83.9 kg)   05/16/19 195 lb 11.2 oz (88.8 kg)   05/15/19 192 lb (87.1 kg)       PHYSICAL EXAM    General Appearance: alert and oriented to person, place and time  Skin: warm and dry  Head: normocephalic and atraumatic  Eyes: conjunctivae normal  ENT: hearing grossly normal bilaterally  Pulmonary/Chest: clear to auscultation bilaterally- no wheezes, rales or rhonchi, normal air movement, no respiratory distress  Cardiovascular: normal rate and regular rhythm  Abdomen: soft, non-tender and bowel sounds normal  Extremities: no cyanosis and no clubbing  Musculoskeletal: normal range of motion of extremities x 4  Neurologic: gait and coordination normal and speech normal  Ileal conduit, RLQ: Stoma is red, moist, measures 7/8 inches, nearly flat. Mucocutaneous junction is intact with sutures noted. One stent noted which was removed after approval from Dr. Jessie Ramos. Dip noted around the stoma. Peristomal skin is slightly irritated. No warmth or induration noted.       Ileal conduit, RLQ    Urostomy Ileal conduit RLQ (Active)   Stomal Appliance 2 piece 5/3/2019  3:22 AM   Stoma  Assessment Moist;Red 5/7/2019  7:45 AM   Mucocutaneous Junction Intact 5/7/2019  7:45 AM   Peristomal Assessment Intact 5/7/2019  7:45 AM   Urine Color Nishant 5/7/2019  7:45 AM   Urine Appearance Clear 5/7/2019  7:45 AM   Output (ml) 550 ml 5/7/2019  3:44 AM   Number of days: 21     LABS       CBC:   Lab Results   Component Value Date    WBC 10.4 05/11/2019    HGB 11.5 05/11/2019    HCT 35.3 05/11/2019    MCV 97.2 05/11/2019     05/11/2019     BMP:   Lab Results   Component Value Date     05/11/2019    K 4.1 05/11/2019    K 3.3 05/04/2019    CL 98 05/11/2019    CO2 29 05/11/2019    PHOS 3.3 05/27/2018    BUN 17 05/11/2019    CREATININE 1.1 05/11/2019     PT/INR:   Lab Results   Component Value Date    INR 1.07 03/12/2019     Prealbumin: No results found for: PREALBUMIN  Albumin:  Lab Results   Component Value Date    LABALBU 2.7 05/04/2019    LABALBU 4.1 05/19/2012     Sed Rate:No results found for: Cara Prado  Micro:   Lab Results   Component Value Date    BC No growth-preliminary  No growth   03/12/2019        Assessment     Attention to urostomy  Peristomal skin complication    Contributing Factors: Poorly fitting ostomy appliance    Patient Active Problem List   Diagnosis Code    Coronary artery disease involving native heart I25.10    Hyperlipidemia E78.5    Essential hypertension I10    Hypothyroidism E03.9    Claudication ([facility]) I73.9    CKD (chronic kidney disease) stage 3, GFR 30-59 ml/min ([facility]) N18.3    BCG ROXANE C67.9    Bladder cancer ([address] C67.9    Subcutaneous cyst L72.9    Pseudoaneurysm of right femoral artery ([facility]) I72.4    PVD (peripheral vascular disease) ([facility]) I73.9    Spinal stenosis of lumbar region M48.061    Macular degeneration H35.30    Abnormal CT of the chest R93.89    Bladder tumor D49.4    Pain in penis and bladder N48.89    Peripheral polyneuropathy G62.9    History of malignant neoplasm of bladder Z85.51    Attention to urostomy ([address] Z43.6    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 Patient has anxiety related to his ostomy leaking as well as drainage from his penis. His pouch as leaked the last couple of days and was leaking upon arrival to the clinic. He has a crease around the stoma and will need a convex pouch. He is scheduled to follow up with Dr. Juliana Cook tomorrow regarding his penis drainage and scrotum swelling. Patient and his wife have trouble cutting openings in their pouches due to arthritis in their hands-Will order them a precut pouching system for now and then we will have to adjusted again in a few weeks as the stoma shrinks.     Ureteral stent removed today with approval from Dr. Alyssa sepulveda [facility] piece convex pouch cut out to 7/8 inches with ostomy paste around the inner opening. Applied a 1 inch ostomy belt snugly to help secure a seal. Change 1-2 times per week. See discharge instructions for product numbers and application instructions. Antibiotics: No    Follow up: 4 weeks    Please see attached Discharge Instructions    Written patient dismissal instructions given to patient and signed by patient or POA. Discharge Instructions         Visit discharge/physician orders:  - Stent (one) taken out of urostomy   - Dr. Farzad Goode will evaluate your scrotum tomorrow    - If you ever need a urine sample do not take it from your urostomy bag. Take bag off. Clean stoma. Take urine cup up to stoma and collect directly from stoma.   - You can take showers with bag on  - You can tuck bag into pants  - To clean night time bag with a diluted bleach solution by rinsing it. - Will order sample kit through janna    - The drainage through our penis is normal   - home health will order supplies     Home Health: [facility]         Supplies Size Order #   Janna one piece urostomy bag convex Pre cut 7/8 inch   [id number]   Adapt Paste 2.1oz [id number]   Adapt belt   7300   Bedside drainage bag   9839       Change your pouch  1-2  times / week. PROCEDURE:  1. Assemble above supplies in order of application before removing pouch. 2. If your pouch is not pre-cut, cut out your size on the back of the pouch. 3. Remove the paper backing. If using paste, apply it around the cut out edge. 4. Remove your worn appliance by gently pulling away from skin and discard. 5. Wash skin with soap and water, rinse and pat dry. Hold a rolled gauze pad or tampon to stoma while cleaning skin to absorb urine. This will act as a wick. 6. Inspect your skin for redness or irritation. If present, apply a small amount of powder to skin around stoma. Brush off excess powder with a Kleenex. Do not use ointments.   [address] So. Katherine Ramirez Stoma Powder  (for wet, weepy skin)  ___  Desenex Powder  ([employer])  (itches, rash)  7. Center the pouch around your stoma. Smooth the adhesive collar to your abdomen. 8. Close the valve at the bottom of pouch. 9. Apply belt to fit snuggly   10. Empty when 1/3 full  11. Connect to night time drainage bag if needed        OSTOMY CLINIC: Monday-Friday 8:00am-4:00pm  Phone: [phone]   Bring your supplies with you to each visit    Basic Ostomy Info:    New ostomies/stomas shrink for the first 6-8 weeks. You will need to adjust the opening in the pouch/flange to fit as the stoma shrinks and heals.  Pouch/flange should fit up to the stoma within 1/8 inch to protect the skin    Be sure skin is clean and dry before applying a new flange/pouch   Pouch/flange will need to be changed 1-2 times a week or if it is leaking   Pouch should be emptied when 1/3 full   Skin should be cleansed with warm water and a washcloth or paper towel.   o If soap is used recommend [facility] to avoid moisturizers interfering with the flange sticking. Be sure to rinse well afterwards. o Avoid baby wipes to cleanse the stoma due to the moisturizers and additives   You can shower with your pouch on or off. Be sure to rinse your skin well after showering to remove any soap residue on the skin, pat the skin dry and then apply your pouch.  If the pouch/flange is leaking: remove it, cleanse the skin, and reapply a new one.   o Never tape the edges down if leaking as this will cause skin irritation.  If you have a hairy abdomen you may need to clip your hair periodically. Use a dedicated clipper for the skin around the stomadont use the same clippers on your face as around your stoma.  Once you are healed from your surgery (approx. 6-8 weeks) you should be able to resume your normal activities once cleared by your surgeon.    o If your do a lot of heavy lifting for work etc. may need to consider being fitted for a hernia prevention Interpolation Flap Text: A decision was made to reconstruct the defect utilizing an interpolation axial flap and a staged reconstruction.  A telfa template was made of the defect.  This telfa template was then used to outline the interpolation flap.  The donor area for the pedicle flap was then injected with anesthesia.  The flap was excised through the skin and subcutaneous tissue down to the layer of the underlying musculature.  The interpolation flap was carefully excised within this deep plane to maintain its blood supply.  The edges of the donor site were undermined.   The donor site was closed in a primary fashion.  The pedicle was then rotated into position and sutured.  Once the tube was sutured into place, adequate blood supply was confirmed with blanching and refill.  The pedicle was then wrapped with xeroform gauze and dressed appropriately with a telfa and gauze bandage to ensure continued blood supply and protect the attached pedicle.

## 2022-08-03 ENCOUNTER — OFFICE VISIT (OUTPATIENT)
Dept: FAMILY MEDICINE CLINIC | Age: 82
End: 2022-08-03
Payer: MEDICARE

## 2022-08-03 VITALS
OXYGEN SATURATION: 97 % | DIASTOLIC BLOOD PRESSURE: 58 MMHG | WEIGHT: 194 LBS | TEMPERATURE: 98.1 F | HEART RATE: 78 BPM | RESPIRATION RATE: 16 BRPM | SYSTOLIC BLOOD PRESSURE: 110 MMHG | BODY MASS INDEX: 30.38 KG/M2

## 2022-08-03 DIAGNOSIS — F32.1 CURRENT MODERATE EPISODE OF MAJOR DEPRESSIVE DISORDER, UNSPECIFIED WHETHER RECURRENT (HCC): ICD-10-CM

## 2022-08-03 DIAGNOSIS — F43.20 ADJUSTMENT DISORDER, UNSPECIFIED TYPE: ICD-10-CM

## 2022-08-03 DIAGNOSIS — I10 PRIMARY HYPERTENSION: Primary | ICD-10-CM

## 2022-08-03 PROCEDURE — 99213 OFFICE O/P EST LOW 20 MIN: CPT | Performed by: FAMILY MEDICINE

## 2022-08-03 PROCEDURE — 1123F ACP DISCUSS/DSCN MKR DOCD: CPT | Performed by: FAMILY MEDICINE

## 2022-08-03 RX ORDER — DULOXETIN HYDROCHLORIDE 60 MG/1
60 CAPSULE, DELAYED RELEASE ORAL DAILY
Qty: 90 CAPSULE | Refills: 3 | Status: SHIPPED | OUTPATIENT
Start: 2022-08-03

## 2022-08-03 SDOH — ECONOMIC STABILITY: FOOD INSECURITY: WITHIN THE PAST 12 MONTHS, YOU WORRIED THAT YOUR FOOD WOULD RUN OUT BEFORE YOU GOT MONEY TO BUY MORE.: NEVER TRUE

## 2022-08-03 SDOH — ECONOMIC STABILITY: FOOD INSECURITY: WITHIN THE PAST 12 MONTHS, THE FOOD YOU BOUGHT JUST DIDN'T LAST AND YOU DIDN'T HAVE MONEY TO GET MORE.: NEVER TRUE

## 2022-08-03 ASSESSMENT — PATIENT HEALTH QUESTIONNAIRE - PHQ9
3. TROUBLE FALLING OR STAYING ASLEEP: 2
SUM OF ALL RESPONSES TO PHQ QUESTIONS 1-9: 18
2. FEELING DOWN, DEPRESSED OR HOPELESS: 3
4. FEELING TIRED OR HAVING LITTLE ENERGY: 3
1. LITTLE INTEREST OR PLEASURE IN DOING THINGS: 3
SUM OF ALL RESPONSES TO PHQ QUESTIONS 1-9: 18
8. MOVING OR SPEAKING SO SLOWLY THAT OTHER PEOPLE COULD HAVE NOTICED. OR THE OPPOSITE, BEING SO FIGETY OR RESTLESS THAT YOU HAVE BEEN MOVING AROUND A LOT MORE THAN USUAL: 2
10. IF YOU CHECKED OFF ANY PROBLEMS, HOW DIFFICULT HAVE THESE PROBLEMS MADE IT FOR YOU TO DO YOUR WORK, TAKE CARE OF THINGS AT HOME, OR GET ALONG WITH OTHER PEOPLE: 3
SUM OF ALL RESPONSES TO PHQ9 QUESTIONS 1 & 2: 6
6. FEELING BAD ABOUT YOURSELF - OR THAT YOU ARE A FAILURE OR HAVE LET YOURSELF OR YOUR FAMILY DOWN: 3
5. POOR APPETITE OR OVEREATING: 0
SUM OF ALL RESPONSES TO PHQ QUESTIONS 1-9: 18
7. TROUBLE CONCENTRATING ON THINGS, SUCH AS READING THE NEWSPAPER OR WATCHING TELEVISION: 2
SUM OF ALL RESPONSES TO PHQ QUESTIONS 1-9: 18
9. THOUGHTS THAT YOU WOULD BE BETTER OFF DEAD, OR OF HURTING YOURSELF: 0

## 2022-08-03 ASSESSMENT — SOCIAL DETERMINANTS OF HEALTH (SDOH): HOW HARD IS IT FOR YOU TO PAY FOR THE VERY BASICS LIKE FOOD, HOUSING, MEDICAL CARE, AND HEATING?: NOT HARD AT ALL

## 2022-08-11 ENCOUNTER — HOSPITAL ENCOUNTER (OUTPATIENT)
Dept: CT IMAGING | Age: 82
Discharge: HOME | End: 2022-08-11
Payer: MEDICARE

## 2022-08-11 DIAGNOSIS — C67.9 MALIGNANT NEOPLASM OF URINARY BLADDER, UNSPECIFIED SITE (HCC): ICD-10-CM

## 2022-08-11 LAB — POC CREATININE WHOLE BLOOD: 1.3 MG/DL (ref 0.5–1.2)

## 2022-08-11 PROCEDURE — 74178 CT ABD&PLV WO CNTR FLWD CNTR: CPT | Performed by: PHYSICIAN ASSISTANT

## 2022-08-11 PROCEDURE — 71260 CT THORAX DX C+: CPT

## 2022-08-11 PROCEDURE — 82565 ASSAY OF CREATININE: CPT

## 2022-08-11 PROCEDURE — 6360000004 HC RX CONTRAST MEDICATION: Performed by: PHYSICIAN ASSISTANT

## 2022-08-11 RX ADMIN — IOPAMIDOL 80 ML: 755 INJECTION, SOLUTION INTRAVENOUS at 09:46

## 2022-08-13 ASSESSMENT — ENCOUNTER SYMPTOMS
NAUSEA: 0
SINUS PRESSURE: 0
RHINORRHEA: 0
DIARRHEA: 0
SHORTNESS OF BREATH: 0
EYE PAIN: 0
ABDOMINAL PAIN: 0
SORE THROAT: 0
CONSTIPATION: 0
ABDOMINAL DISTENTION: 0
COUGH: 0

## 2022-08-13 NOTE — PROGRESS NOTES
[address] [place] [facility] [address]  Dept: [phone]  Dept Fax: [phone]  Loc: [phone]  PROGRESS NOTE      VisitDate: 8/3/2022    Anton Prater is a 80 y.o. male who presents today for:     Chief Complaint   Patient presents with    6 Month Follow-Up     HTN,     Depression     Wife says he gets \"bored\", he admits he has \"lost interest\", due to physical abilities         Subjective:  HPI  Of hypertension stable well-controlled. Has been feeling depressed gets bored and lost interest in a lot of things. No suicidal thoughts    Review of Systems   Constitutional:  Negative for appetite change and fever. HENT:  Negative for congestion, ear pain, postnasal drip, rhinorrhea, sinus pressure and sore throat. Eyes:  Negative for pain and visual disturbance. Respiratory:  Negative for cough and shortness of breath. Cardiovascular:  Negative for chest pain. Gastrointestinal:  Negative for abdominal distention, abdominal pain, constipation, diarrhea and nausea. Genitourinary:  Negative for dysuria, frequency and urgency. Musculoskeletal:  Negative for arthralgias. Skin:  Negative for rash. Neurological:  Negative for dizziness. Psychiatric/Behavioral:  Positive for dysphoric mood.     Past Medical History:   Diagnosis Date    AAA (abdominal aortic aneurysm) ([facility]     intravascular stent    BCG ROXANE 05/21/2019    BPH (benign prostatic hypertrophy)     CAD (coronary artery disease)     Cancer of skin     Carotid artery stenosis     Chronic back pain     Chronic kidney disease     Congenital absence of one kidney     absent right kidney    History of lumbar fusion 3/15    mid lower back down    Hyperlipidemia     Hypertension     Hypothyroidism     Obesity     Sciatica     TIA (transient ischemic attack) 1/2008    Unspecified cerebral artery occlusion with cerebral infarction     TIA      Past Surgical History: Procedure Laterality Date    ABDOMINAL AORTIC ANEURYSM REPAIR  3/2010    [facility]    ABDOMINAL AORTIC ANEURYSM REPAIR  7/24/15    [facility]    CARDIAC CATHETERIZATION      had two stints put in    COLONOSCOPY  11    [facility] WITH STENT PLACEMENT      2 stents    FEMORAL-FEMORAL BYPASS GRAFT  2017    Dr. Enio Eaton    arm    KNEE ARTHROSCOPY  2008    meniscus tear    LUMBAR LAMINECTOMY  3/2016    [facility]--Dr. Fredo Craft    OTHER SURGICAL HISTORY  4/14/15    melanoma removal from right chest    OTHER SURGICAL HISTORY  2016    I and D, exicison of posterior neck cyst    [place] OFFICE/OUTPT VISIT,PROCEDURE ONLY N/A 2018    TURBT performed by Vivian Rangel MD at [facility] N/A 2019    ROBOTIC RADICAL CYSTOPROSTATECTOMY performed by Vivian Rangel MD at [address] [place], [place] [address]y 64  10/2016    Dr Jostin You  2020    on wrist     TESTICLE REMOVAL  's    TUNNELED VENOUS PORT PLACEMENT      TURP  2014    See note of Dr. Kaitlin De La Cruz for OR procedure details     Family History   Problem Relation Age of Onset    Diabetes Mother     Cancer Mother         Liver    Arthritis Mother     Asthma Father     Heart Disease Father     Cancer Father         Lung, Bone    Stroke Sister     Heart Disease Sister     Cancer Sister     Stroke Brother     Heart Disease Brother     Cancer Brother         Lung     Social History     Tobacco Use    Smoking status: Former     Packs/day: 1.00     Years: 24.00     Pack years: 24.00     Types: Cigarettes     Start date: 5     Quit date: 1979     Years since quittin.3    Smokeless tobacco: Never   Substance Use Topics    Alcohol use: No     Alcohol/week: 0.0 standard drinks      Current Outpatient Medications   Medication Sig Dispense Refill    DULoxetine (CYMBALTA) 60 MG extended release capsule Take 1 capsule by mouth in the morning.  80 capsule 3    ezetimibe (ZETIA) 10 mg tablet TAKE 1 TABLET BY MOUTH EVERY DAY 90 tablet 3    atorvastatin (LIPITOR) 40 MG tablet TAKE 1 TABLET BY MOUTH EVERYDAY AT BEDTIME 90 tablet 3    furosemide (LASIX) 20 MG tablet TAKE 1 TABLET BY MOUTH EVERY DAY 90 tablet 3    losartan (COZAAR) 50 MG tablet TAKE 1 TABLET BY MOUTH EVERY DAY 30 tablet 5    metoprolol tartrate (LOPRESSOR) 50 MG tablet Take 0.5 tablets by mouth 2 times daily 180 tablet 3    ticagrelor (BRILINTA) 90 MG TABS tablet Take 1 tablet by mouth 2 times daily 180 tablet 3    levothyroxine (SYNTHROID) 137 MCG tablet TAKE 1 TABLET BY MOUTH EVERY DAY 90 tablet 3    KLOR-CON M10 10 MEQ extended release tablet TAKE 1 TABLET BY MOUTH EVERY DAY 90 tablet 3    Multiple Vitamins-Minerals (PRESERVISION AREDS 2+MULTI VIT PO) Take 2 tablets by mouth daily      nitroGLYCERIN (NITROSTAT) 0.4 MG SL tablet Place 1 tablet under the tongue every 5 minutes as needed for Chest pain 25 tablet 1    b complex vitamins capsule Take 1 capsule by mouth daily      Elastic Bandages & Supports (JOBST KNEE HIGH COMPRESSION SM) MISC 1 each by Does not apply route daily as needed 1 each 0    aspirin 81 MG tablet Take 81 mg by mouth daily. pregabalin (LYRICA) 50 MG capsule TAKE 1 CAPSULE BY MOUTH TWICE A  capsule 1     No current facility-administered medications for this visit.      Allergies   Allergen Reactions    Tape [place] Tape]      Surgical tape-rash     Health Maintenance   Topic Date Due    DTaP/Tdap/Td vaccine (1 - Tdap) Never done    Shingles vaccine (1 of 2) Never done    COVID-19 Vaccine (3 - Moderna risk series) 12/27/2021    Annual Wellness Visit (AWV)  07/15/2022    Flu vaccine (1) 09/01/2022    Depression Monitoring  08/03/2023    Pneumococcal 65+ years Vaccine  Completed    Hepatitis A vaccine  Aged Out    Hepatitis B vaccine  Aged Out    Hib vaccine  Aged Out    Meningococcal (ACWY) vaccine  Aged Out         Objective:     Physical Exam  Constitutional: General: He is not in acute distress. Appearance: He is well-developed. He is not diaphoretic. HENT:      Head: Normocephalic and atraumatic. Right Ear: External ear normal.      Left Ear: External ear normal.   Eyes:      Conjunctiva/sclera: Conjunctivae normal.   Neck:      Vascular: No JVD. Cardiovascular:      Rate and Rhythm: Normal rate and regular rhythm. Heart sounds: Normal heart sounds. Pulmonary:      Effort: Pulmonary effort is normal.      Breath sounds: Normal breath sounds. No wheezing or rales. Musculoskeletal:         General: No tenderness. Skin:     General: Skin is warm and dry. Coloration: Skin is not pale. Neurological:      Mental Status: He is alert and oriented to person, place, and time. BP (!) 110/58 (Site: Left Upper Arm)   Pulse 78   Temp 98.1 °F (36.7 °C) (Oral)   Resp 16   Wt 194 lb (88 kg)   SpO2 97%   BMI 30.38 kg/m²       Impression/Plan:  1. Primary hypertension    2. Current moderate episode of major depressive disorder, unspecified whether recurrent ([facility]    3. Adjustment disorder, unspecified type      Requested Prescriptions     Signed Prescriptions Disp Refills    DULoxetine (CYMBALTA) 60 MG extended release capsule 90 capsule 3     Sig: Take 1 capsule by mouth in the morning. No orders of the defined types were placed in this encounter. Patient giveneducational materials - see patient instructions. Discussed use, benefit, and side effects of prescribed medications. All patient questions answered. Pt voiced understanding. Reviewed health maintenance. Patient agreedwith treatment plan. Follow up as directed. **This report has been created using voice recognition software. It may contain minor errorswhich are inherent in voice recognition technology. **       Electronically signed by Bradley Beckman MD on 8/13/2022 at 12:13 PM

## 2022-08-15 LAB
ABSOLUTE BASO #: 0.04 K/UL (ref 0–0.2)
ABSOLUTE EOS #: 0.58 K/UL (ref 0–0.5)
ABSOLUTE LYMPH #: 3.22 K/UL (ref 1–4)
ABSOLUTE MONO #: 0.87 K/UL (ref 0.2–1)
ABSOLUTE NEUT #: 6.71 K/UL (ref 1.5–7.5)
BASOPHILS RELATIVE PERCENT: 0.3 %
EOSINOPHILS RELATIVE PERCENT: 5.1 %
HCT VFR BLD CALC: 42.1 % (ref 40–51)
HEMOGLOBIN: 14 G/DL (ref 13.5–17)
LYMPHOCYTE %: 28.1 %
MCH RBC QN AUTO: 30.4 PG (ref 25–33)
MCHC RBC AUTO-ENTMCNC: 33.3 G/DL (ref 31–36)
MCV RBC AUTO: 91.3 FL (ref 80–99)
MONOCYTES # BLD: 7.6 %
NEUTROPHILS RELATIVE PERCENT: 58.5 %
PDW BLD-RTO: 13.2 % (ref 11.5–15)
PLATELETS: 216 K/UL (ref 130–400)
PMV BLD AUTO: 11 FL (ref 9.3–13)
RBC: 4.61 M/UL (ref 4.5–6.1)
WBC: 11.5 K/UL (ref 3.5–11)

## 2022-08-16 ENCOUNTER — OFFICE VISIT (OUTPATIENT)
Dept: INTERNAL MEDICINE CLINIC | Age: 82
End: 2022-08-16
Payer: MEDICARE

## 2022-08-16 VITALS
BODY MASS INDEX: 30.45 KG/M2 | DIASTOLIC BLOOD PRESSURE: 60 MMHG | WEIGHT: 194 LBS | SYSTOLIC BLOOD PRESSURE: 114 MMHG | OXYGEN SATURATION: 96 % | TEMPERATURE: 97.2 F | HEIGHT: 67 IN

## 2022-08-16 DIAGNOSIS — I71.40 ABDOMINAL AORTIC ANEURYSM (AAA) WITHOUT RUPTURE: ICD-10-CM

## 2022-08-16 DIAGNOSIS — I25.10 CORONARY ARTERY DISEASE INVOLVING NATIVE CORONARY ARTERY OF NATIVE HEART WITHOUT ANGINA PECTORIS: Primary | ICD-10-CM

## 2022-08-16 DIAGNOSIS — N18.30 STAGE 3 CHRONIC KIDNEY DISEASE, UNSPECIFIED WHETHER STAGE 3A OR 3B CKD (HCC): ICD-10-CM

## 2022-08-16 DIAGNOSIS — E03.4 HYPOTHYROIDISM DUE TO ACQUIRED ATROPHY OF THYROID: ICD-10-CM

## 2022-08-16 DIAGNOSIS — I10 ESSENTIAL HYPERTENSION: ICD-10-CM

## 2022-08-16 DIAGNOSIS — R06.02 SOB (SHORTNESS OF BREATH): ICD-10-CM

## 2022-08-16 PROCEDURE — 99213 OFFICE O/P EST LOW 20 MIN: CPT | Performed by: INTERNAL MEDICINE

## 2022-08-16 PROCEDURE — 1123F ACP DISCUSS/DSCN MKR DOCD: CPT | Performed by: INTERNAL MEDICINE

## 2022-08-16 PROCEDURE — 93000 ELECTROCARDIOGRAM COMPLETE: CPT | Performed by: INTERNAL MEDICINE

## 2022-08-16 NOTE — PROGRESS NOTES
Fred Vasquez (:  1940) is a 80 y.o. male,Established patient, here for evaluation of the following chief complaint(s):  Chronic Kidney Disease, Hypertension, Hypothyroidism, Other (AAA), Bleeding/Bruising (arms), and Shortness of Breath (Can come on even at rest--sometimes it takes a while to get it back)         ASSESSMENT/PLAN:  1. Coronary artery disease involving native coronary artery of native heart without angina pectoris- pt had STEMI 2.22 with stents to LAD/CX; good lv fx. He notes sob since, comes on spontaneously -? Brilinta? Advised to discuss with Cardio; no cp presently  2. Stage 3 chronic kidney disease, unspecified whether stage 3a or 3b CKD ([address]- stable  3. Hypothyroidism due to acquired atrophy of thyroid- TSH ok  4. Essential hypertension-= controlled  5. Abdominal aortic aneurysm (AAA) without rupture ([address]- has stent graft  6. SOB (shortness of breath)- suspect Brilinta- reviewed ekg- normal  -     EKG 12 Lead  7. Hx bladder ca; total cystectomy with conduit  8. Hyperlipemia- controlled on present meds    Return in about 7 months (around 3/6/2023). Subjective   SUBJECTIVE/OBJECTIVE:  HPI  pt with cad, recent mi 6 mos ago; 2 stents; no cp now but sob; see above. Bowels ok. Has ckd, stable. He follows with Urology, to see them this week    Review of Systems   Twelve point ROS completed and found to be negative except as noted above.       Objective   Physical Exam   /60 (Site: Left Upper Arm)   Temp 97.2 °F (36.2 °C)   Ht 5' 7.01\" (1.702 m)   Wt 194 lb (88 kg)   SpO2 96% Comment: at rest on room air with face covering  BMI 30.38 kg/m²     General appearance - chronically ill appearing    Mental Status - alert, oriented to person, place, and time          Neck - supple, no significant adenopathy        Chest - clear to auscultation, no wheezes, rales or rhonchi, symmetric air entry     Heart - normal rate, regular rhythm, normal S1, S2, no murmurs, rubs, clicks or gallops     Abdomen - soft, nontender, nondistended, no masses or organomegaly  Urostomy in place         Neurological - alert, oriented, normal speech, no focal findings or movement disorder noted       Extremities - no pedal edema noted     Skin - normal coloration and turgor, no rashes, no suspicious skin lesions noted            An electronic signature was used to authenticate this note.     --Malia Robertson MD

## 2022-08-17 LAB
ALBUMIN SERPL-MCNC: 4.2 G/DL (ref 3.5–5.2)
ALK PHOSPHATASE: 147 U/L (ref 40–125)
ALT SERPL-CCNC: 32 U/L (ref 5–50)
ANION GAP SERPL CALCULATED.3IONS-SCNC: 16 MEQ/L (ref 7–16)
AST SERPL-CCNC: 27 U/L (ref 9–50)
BILIRUB SERPL-MCNC: 1 MG/DL
BUN BLDV-MCNC: 22 MG/DL (ref 8–23)
CALCIUM SERPL-MCNC: 9 MG/DL (ref 8.5–10.5)
CHLORIDE BLD-SCNC: 101 MEQ/L (ref 95–107)
CO2: 25 MEQ/L (ref 19–31)
CREAT SERPL-MCNC: 1.2 MG/DL (ref 0.8–1.4)
EGFR IF NONAFRICAN AMERICAN: 60 ML/MIN/1.73
GLUCOSE: 95 MG/DL (ref 70–99)
POTASSIUM SERPL-SCNC: 4.5 MEQ/L (ref 3.5–5.4)
SODIUM BLD-SCNC: 142 MEQ/L (ref 133–146)
TOTAL PROTEIN: 6.7 G/DL (ref 6.1–8.3)
VITAMIN B-12: 697 PG/ML (ref 200–950)

## 2022-08-17 NOTE — PROGRESS NOTES
[address] [address] [place] [id number]  Dept: [phone]  Loc: [phone]      Mr. Janneth Robertson was seen in follow up for   Chief Complaint   Patient presents with    Follow-up    Bladder Cancer     Imaging and labs prior         HPI:  Mr. Janneth Robertson is an 45-year-old patient male with a history of bladder cancer. He was first diagnosed with non-invasive high grade papillary urothelial carcinoma in 2014. He underwent TURBT and BCG therapy. Unfortunately, his surveillance cystoscopy in October 2018 showed a large bladder tumor. He underwent a TURBT on November 8, 2018. 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 He completed three cycles of neoadjuvant Gemzar/Cisplatin in 3/20/19. He underwent a Radical Cystoprostatectomy with Pelvic Lymph Node Dissection and creation of Ileal Loop Diversion on 5/1/19. His  post-neoadjuvant treatment pathology was pT0, pN0. 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 A Needle core biopsy performed by Dr. 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 Pathology felt this tumor may have originated in the lungs. Patient received 5000 cGy EBRT, completed 5/5 in March 2021. He states that he has been feeling relatively well since his last visit. He still has some degree of  fatigue but feels that everything is stable at this time. He denies ostomy pain, gross hematuria, fever/chills, flank/pelvic/abdominal pain, or difficulty with bowel movements. He denies night sweats, new onset bone pain, chest pain/pressure, dyspnea, N/V, leg pain, or lightheadedness. He is happy with his general medical state. He presents today for further evaluation.     Past Medical History:   Diagnosis Date    AAA (abdominal aortic aneurysm) ([facility]     intravascular stent    BCG ROXANE 05/21/2019    BPH (benign prostatic hypertrophy)     CAD (coronary artery disease)     Cancer of skin     Carotid artery stenosis     Chronic back pain     Chronic kidney disease     Congenital absence of one kidney     absent right kidney    History of lumbar fusion 3/15    mid lower back down    Hyperlipidemia     Hypertension     Hypothyroidism     Obesity     Sciatica     TIA (transient ischemic attack) 1/2008    Unspecified cerebral artery occlusion with cerebral infarction     TIA       Past Surgical History:   Procedure Laterality Date    ABDOMINAL AORTIC ANEURYSM REPAIR  3/2010    [facility]    ABDOMINAL AORTIC ANEURYSM REPAIR  7/24/15    [facility]    CARDIAC CATHETERIZATION  2/26/222    had two stints put in    COLONOSCOPY  12/21/11    [facility] WITH STENT PLACEMENT  2003    2 stents    FEMORAL-FEMORAL BYPASS GRAFT  03/09/2017    Dr. Alexia Easley    arm    KNEE ARTHROSCOPY  12/2008    meniscus tear    LUMBAR LAMINECTOMY  3/2016    [facility]--Dr. Dulce Donovan    OTHER SURGICAL HISTORY  4/14/15    melanoma removal from right chest    OTHER SURGICAL HISTORY  5/20/2016    I and D, exicison of posterior neck cyst    MO OFFICE/OUTPT VISIT,PROCEDURE ONLY N/A 11/8/2018    TURBT performed by William Chakraborty MD at [address] 5/1/2019    ROBOTIC RADICAL CYSTOPROSTATECTOMY performed by William Chakraborty MD at [address] [facility] [address]  10/2016    Dr Coretta Farrar  11/2020    on wrist     TESTICLE REMOVAL  1990's    TUNNELED VENOUS PORT PLACEMENT      TURP  04/16/2014    See note of Dr. Emma Blair for OR procedure details       Current Outpatient Medications on File Prior to Visit   Medication Sig Dispense Refill    lisinopril (PRINIVIL;ZESTRIL) 5 MG tablet Take 5 mg by mouth daily      DULoxetine (CYMBALTA) 60 MG extended release capsule Take 1 capsule by mouth in the morning.  80 capsule 3    ezetimibe (ZETIA) 10 mg tablet TAKE 1 TABLET BY MOUTH EVERY DAY 90 tablet 3    atorvastatin (LIPITOR) 40 MG tablet TAKE 1 TABLET BY MOUTH EVERYDAY AT BEDTIME 90 tablet 3    furosemide (LASIX) 20 MG tablet TAKE 1 TABLET BY MOUTH EVERY DAY 90 tablet 3    metoprolol tartrate (LOPRESSOR) 50 MG tablet Take 0.5 tablets by mouth 2 times daily 180 tablet 3    ticagrelor (BRILINTA) 90 MG TABS tablet Take 1 tablet by mouth 2 times daily 180 tablet 3    levothyroxine (SYNTHROID) 137 MCG tablet TAKE 1 TABLET BY MOUTH EVERY DAY 90 tablet 3    KLOR-CON M10 10 MEQ extended release tablet TAKE 1 TABLET BY MOUTH EVERY DAY 90 tablet 3    Multiple Vitamins-Minerals (PRESERVISION AREDS 2+MULTI VIT PO) Take 2 tablets by mouth daily      nitroGLYCERIN (NITROSTAT) 0.4 MG SL tablet Place 1 tablet under the tongue every 5 minutes as needed for Chest pain 25 tablet 1    b complex vitamins capsule Take 1 capsule by mouth daily      Elastic Bandages & Supports (JOBST KNEE HIGH COMPRESSION ) MISC 1 each by Does not apply route daily as needed 1 each 0    aspirin 81 MG tablet Take 81 mg by mouth daily. pregabalin (LYRICA) 50 MG capsule TAKE 1 CAPSULE BY MOUTH TWICE A  capsule 1     No current facility-administered medications on file prior to visit.        Allergies   Allergen Reactions    Tape Percell Namita Tape]      Surgical tape-rash       Family History   Problem Relation Age of Onset    Diabetes Mother     Cancer Mother         Liver    Arthritis Mother     Asthma Father     Heart Disease Father     Cancer Father         Lung, Bone    Stroke Sister     Heart Disease Sister     Cancer Sister     Stroke Brother     Heart Disease Brother     Cancer Brother         Lung       Social History     Socioeconomic History    Marital status:      Spouse name: Irais Mathews    Number of children: 2    Years of education: Not on file    Highest education level: Not on file   Occupational History    Not on file   Tobacco Use Smoking status: Former     Packs/day: 1.00     Years: 24.00     Pack years: 24.00     Types: Cigarettes     Start date: 5     Quit date: 1979     Years since quittin.4    Smokeless tobacco: Never   Vaping Use    Vaping Use: Never used   Substance and Sexual Activity    Alcohol use: No     Alcohol/week: 0.0 standard drinks    Drug use: No    Sexual activity: Not Currently   Other Topics Concern    Not on file   Social History Narrative    Not on file     Social Determinants of Health     Financial Resource Strain: Low Risk     Difficulty of Paying Living Expenses: Not hard at all   Food Insecurity: No Food Insecurity    Worried About Running Out of Food in the Last Year: Never true    Ran Out of Food in the Last Year: Never true   Transportation Needs: Not on file   Physical Activity: Not on file   Stress: Not on file   Social Connections: Not on file   Intimate Partner Violence: Not on file   Housing Stability: Not on file       Review of Systems  Constitutional:  Negative for activity change, appetite change, and fever. HENT: Positive for hearing loss. Negative for congestion, dental problem, facial swelling, mouth sores, nosebleeds, sore throat, tinnitus and trouble swallowing. Eyes: Positive for visual disturbance. Negative for discharge. Respiratory: Negative for cough, chest tightness, shortness of breath and wheezing. Cardiovascular: Negative for chest pain, palpitations and leg swelling. Gastrointestinal: Negative for abdominal distention, abdominal pain, blood in stool, constipation, diarrhea, nausea, rectal pain and vomiting. Endocrine: Negative for cold intolerance, polydipsia and polyuria. Genitourinary: See HPI  Musculoskeletal: Positive for arthralgias, joint swelling and myalgias. Negative for back pain, gait problem and neck stiffness. Skin: Negative for color change, rash and wound.   Neurological: Negative for dizziness, tremors, seizures, speech difficulty, weakness, light-headedness, numbness and headaches. Hematological: Negative for adenopathy. Does not bruise/bleed easily. Psychiatric/Behavioral: Negative for confusion and sleep disturbance. Exam    /70   Ht 5' 7\" (1.702 m)   Wt 191 lb (86.6 kg)   BMI 29.91 kg/m²     Constitutional: Oriented to person, place, and time. Vital signs are normal. Appears well-developed and well-nourished. Cooperative. No distress. HENT:    Head: Normocephalic and atraumatic. Eyes: EOM are normal. Pupils are equal, round, and reactive to light. Right eye exhibits no discharge. Left eye exhibits no discharge. No scleral icterus. Neck: Trachea normal. No JVD present. Cardiovascular: Normal rate and regular rhythm. S1/S2. Pulmonary/Chest: Effort normal. No respiratory distress. No wheezes, rhonchi, or rales. Abdominal: Soft. Exhibits no distension or generalized tenderness. There is no rebound, rigidity, or guarding. No CVA tenderness. Musculoskeletal: 1-2+ pitting edema with reddish discoloration of the legs. No calf tenderness. Lymphadenopathy:   Right: No supraclavicular adenopathy present. Left: No supraclavicular adenopathy present. Neurological: Alert and oriented to person, place, and time. No cranial nerve deficit. Skin: Skin is warm and dry. Not diaphoretic. Psychiatric: Normal mood and affect. Behavior is normal.  Nursing note and vitals reviewed. Labs    No results found for this visit on 08/18/22. Lab Results   Component Value Date    CREATININE 1.20 08/15/2022    BUN 22 08/15/2022     08/15/2022    K 4.5 08/15/2022     08/15/2022    CO2 25 08/15/2022       Lab Results   Component Value Date    PSA 0.70 07/25/2011    PSA 0.83 10/31/2008     Radiology:     CT Urogram (8/22)    FINDINGS: Noncontrast   There are gallstones present. There are renal arterial calcifications in the single left kidney.  Infrarenal abdominal aortic aneurysm with the bifurcated endoluminal stent graft present with iliac artery extension to prior embolization of the bilateral    internal iliac arteries. The       Postcontrast   Liver, spleen, and pancreas are within normal limits. The adrenals are normal.   The solitary left kidney demonstrates stable 13 mm Bosniak 1 cyst upper pole no hydronephrosis. Intrarenal collecting structures are normal caliber unremarkable excretion of contrast. Left ureter is normal caliber. Status post ileal diversion. Contrast is present in the proximal portion of the iliotibial loop. Right anterior abdominal ostomy appears patent   There is no retroperitoneal or central mesenteric pelvic or inguinal lymphadenopathy. Status post prostatectomy and cystectomy. The native aneurysm sac measures 6.7 x 7.1 cm. This is stable. There is no evidence of endoleak. The left limb of the graft is chronically occluded. There is insufficient contrast opacification to assess the femorofemoral bypass graft. There is no bowel obstruction. The appendix is normal.       MSK   Postsurgical changes of laminectomy without fusion lower lumbar spine degenerative changes. No suspicious bone lesion is seen. Impression   Stable examination. Stable size of the patient's native aneurysm. No lymphadenopathy or new mass identified. Chest CT (8/22)  FINDINGS: Heart and mediastinum   There is no mediastinal or hilar adenopathy. Heart size is within normal limits. coronary artery calcifications are present. No pericardial effusion. LUNGS:   Paraseptal emphysematous changes in the upper lobes. It is also seen at the right lower lobe. Centrilobular emphysematous changes are present. There are no infiltrates or effusions. No lung masses are seen. Minimal parenchymal scarring left base. CHEST WALL:   No axillary lymphadenopathy. MSK   Degenerative changes throughout the thoracic spine no suspicious bone lesions central canal stenosis is noted at the lower cervical level. Impression   No mediastinal lymphadenopathy or lung masses. Chronic findings detailed above. Assessment/Plan:     1. Bladder cancer-  He completed three cycles of neoadjuvant Gemzar/Cisplatin in 3/20/19. He underwent a Radical Cystoprostatectomy with Pelvic Lymph Node Dissection and creation of Ileal Loop Diversion on 5/1/19. His post-neoadjuvant treatment pathology was pT0, pN0. His February 2020, his CT urogram revealed development of small hypodense lesions of the liver. These two lesions measured 6-10 mm in size. His May 2020 revealed resolution of these previously noted liver lesions. CT urogram and CT chest in August 2022 does not reveal any disease recurrence. Will obtain a CT chest and CT urogram in six months. Will obtain CBC with differential and CMP in six months. Urine cytology at next office visit. Consider urethral wash cytology. Surveillance recommendations per NCCN 2019 guidelines are as follows:     Years 1 and 2: CT urogram every 3-6 months, CXR or CT chest every 3-6 months, CBC<CMP every 3-6 months. Urine cytologies every 6-12 months. Consider urethral wash cytology every 6-12 months. B12 annually. Years 3-5: CT abdomen and pelvis annually. CXR annually. BMP, LFT, Vitamin 12 annually. Urine cytology as clinically indicated. Years 5-10: Renal US annually. B12 annually. Years 10+: B12 annually     2. Nephrolithiasis vs Vascular Calcifications: Continue to monitor. Patient is asymptomatic. 3. 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 Pathology felt this tumor may have originated in the lungs. Patient received 5000 cGy EBRT, completed 5/5 in March 2021. No evidence of recurrence on recent imaging. Continue to monitor. 4. CKD- Stage 3/ Congenital Solitary Left Kidney- Stable. 5. Infrarenal Abdominal Aortic Aneurysm- Status post endovascular repair by Dr. Rubi Dela Cruz.

## 2022-08-18 ENCOUNTER — OFFICE VISIT (OUTPATIENT)
Dept: UROLOGY | Age: 82
End: 2022-08-18
Payer: MEDICARE

## 2022-08-18 VITALS
BODY MASS INDEX: 29.98 KG/M2 | HEIGHT: 67 IN | SYSTOLIC BLOOD PRESSURE: 126 MMHG | WEIGHT: 191 LBS | DIASTOLIC BLOOD PRESSURE: 70 MMHG

## 2022-08-18 DIAGNOSIS — C67.9 MALIGNANT NEOPLASM OF URINARY BLADDER, UNSPECIFIED SITE (HCC): Primary | ICD-10-CM

## 2022-08-18 PROCEDURE — 1123F ACP DISCUSS/DSCN MKR DOCD: CPT | Performed by: PHYSICIAN ASSISTANT

## 2022-08-18 PROCEDURE — 99213 OFFICE O/P EST LOW 20 MIN: CPT | Performed by: PHYSICIAN ASSISTANT

## 2022-08-18 RX ORDER — LISINOPRIL 5 MG/1
5 TABLET ORAL DAILY
COMMUNITY

## 2022-08-20 LAB
ORGANISM: ABNORMAL
URINE CULTURE, ROUTINE: ABNORMAL

## 2022-08-22 ENCOUNTER — TELEPHONE (OUTPATIENT)
Dept: UROLOGY | Age: 82
End: 2022-08-22

## 2022-08-22 DIAGNOSIS — N30.00 ACUTE CYSTITIS WITHOUT HEMATURIA: Primary | ICD-10-CM

## 2022-08-22 RX ORDER — CEFDINIR 300 MG/1
300 CAPSULE ORAL 2 TIMES DAILY
Qty: 20 CAPSULE | Refills: 0 | Status: SHIPPED | OUTPATIENT
Start: 2022-08-22 | End: 2022-09-01

## 2022-08-22 NOTE — TELEPHONE ENCOUNTER
Mr. Adelina Lane has a Klebsiella UTI. Will start Omnicef 300 mg po twice daily x 10 days, #20, 0 refills.

## 2022-08-22 NOTE — TELEPHONE ENCOUNTER
Dee on [employer]IP[employer] advised of the urine results and Omnicef was sent to the pharmacy. She voiced understanding.

## 2022-09-07 RX ORDER — FUROSEMIDE 20 MG/1
TABLET ORAL
Qty: 90 TABLET | Refills: 3 | Status: SHIPPED | OUTPATIENT
Start: 2022-09-07

## 2022-09-11 ENCOUNTER — TELEPHONE (OUTPATIENT)
Dept: UROLOGY | Age: 82
End: 2022-09-11

## 2022-09-11 NOTE — TELEPHONE ENCOUNTER
Please let Mr. Maisha Coreas know that his urine cytology demonstrated a few atypical cells. He had a urinary tract infection at this time, which may have caused this finding. I usually recheck an abnormal cytology approximately six to eight weeks after the original specimen was obtained. Would he be okay with coming in and having another cytology obtained around the first to second week of October? This would be collected from his urostomy.

## 2022-09-12 NOTE — TELEPHONE ENCOUNTER
Patient wife on HIPPA advised of the urine cytology results. She voiced understanding and lab visit scheduled to recollect from urostomy on 10/03/2022.

## 2022-09-27 ENCOUNTER — OFFICE VISIT (OUTPATIENT)
Dept: CARDIOLOGY CLINIC | Age: 82
End: 2022-09-27
Payer: MEDICARE

## 2022-09-27 VITALS
WEIGHT: 193 LBS | HEART RATE: 66 BPM | BODY MASS INDEX: 30.29 KG/M2 | HEIGHT: 67 IN | SYSTOLIC BLOOD PRESSURE: 119 MMHG | DIASTOLIC BLOOD PRESSURE: 64 MMHG

## 2022-09-27 DIAGNOSIS — I25.10 CAD IN NATIVE ARTERY: Primary | ICD-10-CM

## 2022-09-27 PROCEDURE — 99214 OFFICE O/P EST MOD 30 MIN: CPT | Performed by: INTERNAL MEDICINE

## 2022-09-27 PROCEDURE — 1123F ACP DISCUSS/DSCN MKR DOCD: CPT | Performed by: INTERNAL MEDICINE

## 2022-09-27 RX ORDER — CLOPIDOGREL BISULFATE 75 MG/1
75 TABLET ORAL DAILY
Qty: 30 TABLET | Refills: 3 | Status: SHIPPED | OUTPATIENT
Start: 2022-09-27 | End: 2022-10-20

## 2022-09-27 NOTE — PROGRESS NOTES
[id number] [facility] [place] [address] [facility] [address]  Dept: [phone]  Dept Fax: [phone]  Loc: [phone]    Visit Date: 9/27/2022    Mr. Milo Hayward is a 80 y.o. male  who presented for:  STEMI  CAD  HPI:   ESTHELA Garza is a pleasant 80year old male patient who  has a past medical history of AAA (abdominal aortic aneurysm) ([facility] [address] BCG ROXANE, BPH (benign prostatic hypertrophy), CAD (coronary artery disease), Cancer of skin, Carotid artery stenosis, Chronic back pain, Chronic kidney disease, Congenital absence of one kidney, History of lumbar fusion, Hyperlipidemia, Hypertension, Hypothyroidism, Obesity, Sciatica, TIA (transient ischemic attack), and Unspecified cerebral artery occlusion with cerebral infarction. Patient has h/o CAD, prior PCI of RCA in 2011. He had prior graft repair for AAA. On 2/2022, patient was admitted for Anterior wall STEMI. He was found to have subtotal LAD occlusion for which PCI/QUINN was completed, LCX 80% stenos (PCI/QUINN completed), RCA had mild ISR. Echocardiogram 2/2022 revealed an EF of 60-65%. Patient denies chest pain, orthopnea, paroxysmal nocturnal dyspnea, palpitations, syncope, weight gain. Has chronic mild leg swelling. Has mild dizziness on occasions when he stands up quickly. He reports fatigue, shortness of breath, dyspnea on exertion that started after MI. /64.        Current Outpatient Medications:     furosemide (LASIX) 20 MG tablet, TAKE 1 TABLET BY MOUTH EVERY DAY, Disp: 90 tablet, Rfl: 3    lisinopril (PRINIVIL;ZESTRIL) 5 MG tablet, Take 5 mg by mouth daily, Disp: , Rfl:     DULoxetine (CYMBALTA) 60 MG extended release capsule, Take 1 capsule by mouth in the morning., Disp: 90 capsule, Rfl: 3    ezetimibe (ZETIA) 10 mg tablet, TAKE 1 TABLET BY MOUTH EVERY DAY, Disp: 90 tablet, Rfl: 3    pregabalin (LYRICA) 50 MG capsule, TAKE 1 CAPSULE BY MOUTH TWICE A DAY, Disp: 180 capsule, Rfl: 1 atorvastatin (LIPITOR) 40 MG tablet, TAKE 1 TABLET BY MOUTH EVERYDAY AT BEDTIME, Disp: 90 tablet, Rfl: 3    metoprolol tartrate (LOPRESSOR) 50 MG tablet, Take 0.5 tablets by mouth 2 times daily, Disp: 180 tablet, Rfl: 3    ticagrelor (BRILINTA) 90 MG TABS tablet, Take 1 tablet by mouth 2 times daily, Disp: 180 tablet, Rfl: 3    levothyroxine (SYNTHROID) 137 MCG tablet, TAKE 1 TABLET BY MOUTH EVERY DAY, Disp: 90 tablet, Rfl: 3    KLOR-CON M10 10 MEQ extended release tablet, TAKE 1 TABLET BY MOUTH EVERY DAY, Disp: 90 tablet, Rfl: 3    Multiple Vitamins-Minerals (PRESERVISION AREDS 2+MULTI VIT PO), Take 2 tablets by mouth daily, Disp: , Rfl:     nitroGLYCERIN (NITROSTAT) 0.4 MG SL tablet, Place 1 tablet under the tongue every 5 minutes as needed for Chest pain, Disp: 25 tablet, Rfl: 1    b complex vitamins capsule, Take 1 capsule by mouth daily, Disp: , Rfl:     Elastic Bandages & Supports (JOBST KNEE HIGH COMPRESSION SM) MISC, 1 each by Does not apply route daily as needed, Disp: 1 each, Rfl: 0    aspirin 81 MG tablet, Take 81 mg by mouth daily. , Disp: , Rfl:     Past Medical History  Kristen Whiting  has a past medical history of AAA (abdominal aortic aneurysm) ([facility] [address] BCG ROXANE, BPH (benign prostatic hypertrophy), CAD (coronary artery disease), Cancer of skin, Carotid artery stenosis, Chronic back pain, Chronic kidney disease, Congenital absence of one kidney, History of lumbar fusion, Hyperlipidemia, Hypertension, Hypothyroidism, Obesity, Sciatica, TIA (transient ischemic attack), and Unspecified cerebral artery occlusion with cerebral infarction. Social History  Kristen Whiting  reports that he quit smoking about 43 years ago. His smoking use included cigarettes. He started smoking about 67 years ago. He has a 24.00 pack-year smoking history. He has never used smokeless tobacco. He reports that he does not drink alcohol and does not use drugs.     Family History  Kristen Whiting family history includes Arthritis in his mother; Asthma in his father; Cancer in his brother, father, mother, and sister; Diabetes in his mother; Heart Disease in his brother, father, and sister; Stroke in his brother and sister. Past Surgical History   Past Surgical History:   Procedure Laterality Date    ABDOMINAL AORTIC ANEURYSM REPAIR  3/2010    [facility]    ABDOMINAL AORTIC ANEURYSM REPAIR  7/24/15    [facility]    CARDIAC CATHETERIZATION  2/26/222    had two stints put in    COLONOSCOPY  12/21/11    [facility] WITH STENT PLACEMENT  2003    2 stents    FEMORAL-FEMORAL BYPASS GRAFT  03/09/2017    Dr. Rausch Hayden    arm    KNEE ARTHROSCOPY  12/2008    meniscus tear    LUMBAR LAMINECTOMY  3/2016    [facility]--Dr. Shalom Solorio    OTHER SURGICAL HISTORY  4/14/15    melanoma removal from right chest    OTHER SURGICAL HISTORY  5/20/2016    I and D, exicison of posterior neck cyst    VT OFFICE/OUTPT VISIT,PROCEDURE ONLY N/A 11/8/2018    TURBT performed by Maurice Carr MD at [address] N/A 5/1/2019    ROBOTIC RADICAL CYSTOPROSTATECTOMY performed by Maurice Carr MD at [address] [facility] [address]  10/2016    Dr Mark Urbina  11/2020    on wrist     TESTICLE REMOVAL  1990's    TUNNELED VENOUS PORT PLACEMENT      TURP  04/16/2014    See note of Dr. Mychal Flowers for OR procedure details       Review of Systems   Constitutional: Negative for chills and fever  HENT: Negative for congestion, sinus pressure, sneezing and sore throat. Eyes: Negative for pain, discharge, redness and itching. Respiratory: Negative for apnea, cough  Gastrointestinal: Negative for blood in stool, constipation, diarrhea   Endocrine: Negative for cold intolerance, heat intolerance, polydipsia. Genitourinary: Negative for dysuria, enuresis, flank pain and hematuria. Musculoskeletal: Negative for arthralgias, joint swelling and neck pain. Neurological: Negative for numbness and headaches.    Psychiatric/Behavioral: Negative for agitation, confusion, decreased concentration and dysphoric mood. Objective: There were no vitals taken for this visit. Wt Readings from Last 3 Encounters:   08/18/22 191 lb (86.6 kg)   08/16/22 194 lb (88 kg)   08/03/22 194 lb (88 kg)     BP Readings from Last 3 Encounters:   08/18/22 126/70   08/16/22 114/60   08/03/22 (!) 110/58       Nursing note and vitals reviewed. Physical Exam   Constitutional: Oriented to person, place, and time. Appears well-developed and well-nourished. ENT: Moist mucous membranes. No bleeding. Tongue is midline. Head: Normocephalic and atraumatic. Eyes: EOM are normal. Pupils are equal, round, and reactive to light. Neck: Normal range of motion. Neck supple. No JVD present. Cardiovascular: Normal rate, regular rhythm, SYSTOLIC murmur, no rubs, and intact distal pulses. Pulmonary/Chest: Effort normal and breath sounds normal. No respiratory distress. No wheezes. No rales. Abdominal: Soft. Bowel sounds are normal. No distension. There is no tenderness. Musculoskeletal: Normal range of motion. No edema. Neurological: Alert and oriented to person, place, and time. No cranial nerve deficit. Coordination normal.   Skin: Skin is warm and dry. Psychiatric: Normal mood and affect.        Lab Results   Component Value Date/Time    CKTOTAL 151 06/17/2015 08:04 AM    CKTOTAL 185 03/06/2014 08:14 AM       Lab Results   Component Value Date/Time    WBC 11.5 08/15/2022 09:55 AM    WBC 10.9 02/27/2022 07:16 AM    RBC 4.61 08/15/2022 09:55 AM    HGB 14.0 08/15/2022 09:55 AM    HCT 42.1 08/15/2022 09:55 AM    MCV 91.3 08/15/2022 09:55 AM    MCH 30.4 08/15/2022 09:55 AM    MCHC 33.3 08/15/2022 09:55 AM    RDW 13.2 08/15/2022 09:55 AM     08/15/2022 09:55 AM     02/27/2022 07:16 AM    MPV 11.0 08/15/2022 09:55 AM       Lab Results   Component Value Date/Time     08/15/2022 09:55 AM    K 4.5 08/15/2022 09:55 AM    K 3.8 02/26/2022 06:13 PM  08/15/2022 09:55 AM    CO2 25 08/15/2022 09:55 AM    BUN 22 08/15/2022 09:55 AM    LABALBU 4.2 08/15/2022 09:55 AM    LABALBU 4.1 05/19/2012 12:20 PM    CREATININE 1.20 08/15/2022 09:55 AM    CALCIUM 9.0 08/15/2022 09:55 AM    LABGLOM 64 04/13/2022 09:21 AM    GLUCOSE 95 08/15/2022 09:55 AM       Lab Results   Component Value Date/Time    ALKPHOS 147 08/15/2022 09:55 AM    ALKPHOS 131 02/27/2022 07:16 AM    ALT 32 08/15/2022 09:55 AM    AST 27 08/15/2022 09:55 AM    PROT 6.7 08/15/2022 09:55 AM    BILITOT 1.0 08/15/2022 09:55 AM    BILITOT NEGATIVE 10/26/2018 09:35 AM    BILIDIR 0.3 05/03/2021 12:49 PM    LABALBU 4.2 08/15/2022 09:55 AM    LABALBU 4.1 05/19/2012 12:20 PM       Lab Results   Component Value Date/Time    MG 2.2 02/28/2022 04:27 AM       Lab Results   Component Value Date    INR 1.03 02/26/2022    INR 1.07 03/12/2019    INR 0.98 03/07/2017         Lab Results   Component Value Date/Time    LABA1C 5.9 02/27/2022 07:16 AM       Lab Results   Component Value Date/Time    TRIG 70 02/27/2022 07:16 AM    HDL 29 02/27/2022 07:16 AM    LDLCALC 57 02/27/2022 07:16 AM    LDLDIRECT 72 03/18/2022 08:11 AM       Lab Results   Component Value Date/Time    TSH 1.17 01/31/2022 08:43 AM         Testing Reviewed:      I have individually reviewed the cardiac test below:    ECHO: Results for orders placed during the hospital encounter of 02/26/22    ECHO Complete 2D W Doppler W Color    Narrative  Transthoracic Echocardiography Report (TTE)    Demographics    Patient Name     Alejandro Foreman Gender                Male    MR #             [id number]     Race                      Ethnicity    Account #        [de-identified]     Room Number           0022    Accession Number [id number]    Date of Study         02/28/2022    Date of Birth    1940    Referring Physician   Giuliana Mccray CNP    Age              80 year(s)    Luis 56, RDCS    Interpreting          Gala Romo MD  Physician    Procedure    Type of Study    TTE procedure:ECHOCARDIOGRAM COMPLETE 2D W DOPPLER W COLOR. Procedure Date  Date: 02/28/2022 Start: 09:10 AM    Study Location: Bedside  Technical Quality: Poor visualization due to restricted mobility. Indications:STEMI. Additional Medical History:Cardiomyopathy, Coronary artery disease, STEMI,  Hyperlipidemia, Transient ischemic attack    Patient Status: Routine    Height: 66.93 inches Weight: 205.03 pounds BSA: 2.04 m^2 BMI: 32.18 kg/m^2    BP: 98/55 mmHg    Allergies  - See Epic. Conclusions    Summary  Left ventricle size is normal.  Mild concentric left ventricular hypertrophy. There were no regional wall motion abnormalities. Ejection fraction is visually estimated in the range of 60% to 65%. Trace tricuspid regurgitation. Signature    ----------------------------------------------------------------  Electronically signed by Coco Siu MD (Interpreting  physician) on 02/28/2022 at 10:52 AM  ----------------------------------------------------------------    Findings    Mitral Valve  The mitral valve structure was normal with normal leaflet separation. DOPPLER: The transmitral velocity was within the normal range with no  evidence for mitral stenosis. There was no evidence of mitral  regurgitation. Aortic Valve  The aortic valve was trileaflet with normal thickness and cuspal  separation. DOPPLER: Transaortic velocity was within the normal range with  no evidence of aortic stenosis. There was no evidence of aortic  regurgitation. Tricuspid Valve  The tricuspid valve structure was normal with normal leaflet separation. DOPPLER: There was no evidence of tricuspid stenosis. There was evidence  of Trace tricuspid regurgitation. Pulmonic Valve  The pulmonic valve leaflets exhibited normal thickness, no calcification,  and normal cuspal separation.  DOPPLER: The transpulmonic velocity was  within the normal range with no evidence for regurgitation. Left Atrium  Left atrial size was normal.    Left Ventricle  Left ventricle size is normal.  Mild concentric left ventricular hypertrophy. There were no regional wall motion abnormalities. Ejection fraction is visually estimated in the range of 60% to 65%. Right Atrium  Right atrial size was normal.    Right Ventricle  The right ventricular size was normal with normal systolic function and  wall thickness. Pericardial Effusion  The pericardium was normal in appearance with no evidence of a pericardial  effusion. Pleural Effusion  No evidence of pleural effusion. Aorta / Great Vessels  -Aortic root dimension within normal limits.  -The Pulmonary artery is within normal limits. -IVC size is within normal limits with normal respiratory phasic changes.     M-Mode/2D Measurements & Calculations    LV Diastolic    LV Systolic Dimension: 3.1  AV Cusp Separation: 1.8 cmLA  Dimension: 4.2  cm                          Dimension: 3.8 cmAO Root  cm              LV Volume Diastolic: 93.4   Dimension: 2.8 cm  LV FS:26.2 %    ml  LV PW           LV Volume Systolic: 34.2 ml  Diastolic: 0.9  LV EDV/LV EDV Index: 78.6  cm              ml/39 m^2LV ESV/LV ESV      RV Diastolic Dimension: 2.2 cm  Septum          Index: 37.9 QG/63 m^2  Diastolic: 1.2  EF Calculated: 51.8 %       LA/Aorta: 1.36  cm    Doppler Measurements & Calculations    MV Peak E-Wave: 39 cm/s   AV Peak Velocity:     LVOT Peak Velocity: 79.7  MV Peak A-Wave: 53.1 cm/s 98.5 cm/s             cm/s  MV E/A Ratio: 0.73        AV Peak Gradient:     LVOT Peak Gradient: 3 mmHg  MV Peak Gradient: 0.61    3.88 mmHg  mmHg                                            TV Peak E-Wave: 30.4 cm/s  TV Peak A-Wave: 32.1 cm/s  MV Deceleration Time: 256  msec                                            TV Peak Gradient: 0.37  MV P1/2t: 75 msec         AV P1/2t: 564 msec    mmHg  MVA by PHT:2.93 cm^2                            TR Velocity:246 cm/s  TR Gradient:24.21 mmHg  MV E' Septal Velocity: 4                        PV Peak Velocity: 74.1  cm/s                      AV DVI (Vmax):0.81    cm/s  MV A' Septal Velocity:                          PV Peak Gradient: 2.2 mmHg  7.9 cm/s  MV E' Lateral Velocity:  5.2 cm/s  MV A' Lateral Velocity:  5.4 cm/s  E/E' septal: 9.75  E/E' lateral: 7.5    [id number]      Cath:2/2022  CORONARY ANGIOGRAM:  1. Left main coronary artery:  Patent without significant stenosis. Trifurcates into ramus intermedius, LCX, and LAD. 2.  Left circumflex artery:  Proximal left circumflex artery has 80% to  90% stenosis. OM1 is patent. Distal LCX with mild luminal  irregularities. 3.  Ramus intermedius:  Ramus intermedius has mild luminal  irregularities. Otherwise patent. 4.  Left anterior descending artery:  Proximal LAD is patent. Mid LAD  has 99% subtotal occlusion. D1 is a 2-mm vessel with proximal segment  having severe diffuse disease, 90%, with ZOLTAN-3 flow. The LAD was  noticed to have a flow-limiting lesion in mid LAD with ZOLTAN-2 flow. Distal LAD with mild luminal irregularities. 5.  Right coronary artery:  Dominant vessel. Stent in proximal to mid  LAD has mild in-stent restenosis, 30% to proximal segment, 30% in the  mid segment. Distal RCA with mild luminal irregularities. MEDICATIONS:  See MAR. COMPLICATIONS:  None. ESTIMATED BLOOD LOSS:  Less than 50 mL. ACCESS:  Right radial artery access. Vasc Band was applied. Hemostasis  was achieved. IMPRESSION:  1. ST-elevation myocardial infarction. 2.  Subtotal occlusion of the mid LAD, the culprit for the STEMI, status  post successful PCI and stenting. 3.  Severe proximal LCX stenosis, status post successful PCI and  stenting.   4.  Patent stent in the right coronary artery with only mild in-stent  restenosis. AssessmentPlan:     Nataly Garza is a pleasant 80year old male patient who  has a past medical history of AAA (abdominal aortic aneurysm) ([address] BCG ROXANE, BPH (benign prostatic hypertrophy), CAD (coronary artery disease), Cancer of skin, Carotid artery stenosis, Chronic back pain, Chronic kidney disease, Congenital absence of one kidney, History of lumbar fusion, Hyperlipidemia, Hypertension, Hypothyroidism, Obesity, Sciatica, TIA (transient ischemic attack), and Unspecified cerebral artery occlusion with cerebral infarction. Patient has h/o CAD, prior PCI of RCA in 2011. He had prior graft repair for AAA. On 2/2022, patient was admitted for Anterior wall STEMI. He was found to have subtotal LAD occlusion for which PCI/QUINN was completed, LCX 80% stenos (PCI/QUINN completed), RCA had mild ISR. Echocardiogram 2/2022 revealed an EF of 60-65%. Patient denies chest pain, orthopnea, paroxysmal nocturnal dyspnea, palpitations, syncope, weight gain. Has chronic mild leg swelling. Has mild dizziness on occasions when he stands up quickly. He reports fatigue, shortness of breath, dyspnea on exertion that started after MI. /64. Fatigue  Dyspnea   CAD  PCI RCA 2011  Anterior wall STEMI 2/2022  PCI/QUINN LAD, LCX 2/2022  HTN  Dyslipidemia  PAD  S/p AAA repair (graft)   H/o Fem-Fem bypass    Reports fatigue  Has increased asking bruising  Possible side effect of Brilinta   Stop Brilinta  Start Plavix   Denies melena   Has fatigue   Check CBC   Lipitor  Hyperlipidemia: on statins, followed periodically. Patient need periodic lipid and liver profile  Metoprolol   Lisinopril   Daily weight   Limit Na  On lasix  Check BMP  No signs of volume overload       Above findings and plan of care were discussed with patient in details, patient's questions were answered.      Disposition:  RTC in 6 months             Electronically signed by Estephania Christy MD, [facility]    9/26/2022 at 9:37 PM EDT

## 2022-09-27 NOTE — PROGRESS NOTES
PT HERE FOR 6 MO CHECK UP     PT STATES MED LIST IS CORRECT FROM LAST APPT 8/18/22      PT CONTINUES WITH SOB , SWELLING IN LEGS AND FEET     PT STATES BRILINTA IS TOO COSTLY

## 2022-09-29 LAB
ABSOLUTE BASO #: 0.05 K/UL (ref 0–0.2)
ABSOLUTE EOS #: 0.53 K/UL (ref 0–0.5)
ABSOLUTE LYMPH #: 3.15 K/UL (ref 1–4)
ABSOLUTE MONO #: 0.93 K/UL (ref 0.2–1)
ABSOLUTE NEUT #: 6.38 K/UL (ref 1.5–7.5)
BASOPHILS RELATIVE PERCENT: 0.5 %
EOSINOPHILS RELATIVE PERCENT: 4.8 %
HCT VFR BLD CALC: 44 % (ref 40–51)
HEMOGLOBIN: 14.7 G/DL (ref 13.5–17)
LYMPHOCYTE %: 28.4 %
MCH RBC QN AUTO: 30.6 PG (ref 25–33)
MCHC RBC AUTO-ENTMCNC: 33.4 G/DL (ref 31–36)
MCV RBC AUTO: 91.7 FL (ref 80–99)
MONOCYTES # BLD: 8.4 %
NEUTROPHILS RELATIVE PERCENT: 57.5 %
PDW BLD-RTO: 13.1 % (ref 11.5–15)
PLATELETS: 235 K/UL (ref 130–400)
PMV BLD AUTO: 10.6 FL (ref 9.3–13)
RBC: 4.8 M/UL (ref 4.5–6.1)
WBC: 11.1 K/UL (ref 3.5–11)

## 2022-09-30 LAB
ALBUMIN SERPL-MCNC: 4.2 G/DL (ref 3.5–5.2)
ALK PHOSPHATASE: 152 U/L (ref 40–125)
ALT SERPL-CCNC: 46 U/L (ref 5–50)
ANION GAP SERPL CALCULATED.3IONS-SCNC: 9 MEQ/L (ref 7–16)
AST SERPL-CCNC: 36 U/L (ref 9–50)
BILIRUB SERPL-MCNC: 0.8 MG/DL
BUN BLDV-MCNC: 19 MG/DL (ref 8–23)
CALCIUM SERPL-MCNC: 9.4 MG/DL (ref 8.5–10.5)
CHLORIDE BLD-SCNC: 102 MEQ/L (ref 95–107)
CHOLESTEROL/HDL RATIO: 3.3 RATIO
CHOLESTEROL: 101 MG/DL
CO2: 32 MEQ/L (ref 19–31)
CREAT SERPL-MCNC: 1.15 MG/DL (ref 0.8–1.4)
EGFR IF NONAFRICAN AMERICAN: 64 ML/MIN/1.73
GLUCOSE: 101 MG/DL (ref 70–99)
HDLC SERPL-MCNC: 31 MG/DL
LDL CHOLESTEROL CALCULATED: 56 MG/DL
LDL/HDL RATIO: 1.8 RATIO
POTASSIUM SERPL-SCNC: 5.2 MEQ/L (ref 3.5–5.4)
SODIUM BLD-SCNC: 143 MEQ/L (ref 133–146)
TOTAL PROTEIN: 6.4 G/DL (ref 6.1–8.3)
TRIGL SERPL-MCNC: 71 MG/DL
VLDLC SERPL CALC-MCNC: 14 MG/DL

## 2022-10-03 ENCOUNTER — NURSE ONLY (OUTPATIENT)
Dept: UROLOGY | Age: 82
End: 2022-10-03

## 2022-10-03 DIAGNOSIS — C67.9 MALIGNANT NEOPLASM OF URINARY BLADDER, UNSPECIFIED SITE (HCC): Primary | ICD-10-CM

## 2022-10-03 DIAGNOSIS — R89.6 ABNORMAL BLADDER CYTOLOGY: ICD-10-CM

## 2022-10-03 PROCEDURE — 99999 PR OFFICE/OUTPT VISIT,PROCEDURE ONLY: CPT

## 2022-10-11 ENCOUNTER — HOSPITAL ENCOUNTER (OUTPATIENT)
Dept: AUDIOLOGY | Age: 82
Discharge: HOME | End: 2022-10-11

## 2022-10-11 PROCEDURE — 92592 HC HEARING AID CHECK, ONE EAR: CPT | Performed by: AUDIOLOGIST

## 2022-10-11 NOTE — PROGRESS NOTES
ACCOUNT #: [de-identified]    DIAGNOSIS: Sensorineural hearing loss of both ears. SIX MONTH HEARING AID CHECK: Otoscopy was WNL for both ears. Listening check of hearing aids revealed normal output. Replaced earmold tubing and earhooks on both hearing aids. Cleaned under microphone covers. The patient explains that his right earmold comes out frequently. He did not notify me of this problem within the remake period. Explained to patient that he would need to purchase a new earmold. He says that it is fine and it bothers his wife more than it bothers him. He lost 18 pounds, which may be contributing to the earmold not fit as tightly. Billed $20.00 for today's clean/check. Scheduled six month HA check/tubing change for 4/11/23.

## 2022-10-13 ENCOUNTER — TELEPHONE (OUTPATIENT)
Dept: UROLOGY | Age: 82
End: 2022-10-13

## 2022-10-20 RX ORDER — CLOPIDOGREL BISULFATE 75 MG/1
TABLET ORAL
Qty: 30 TABLET | Refills: 6 | Status: SHIPPED | OUTPATIENT
Start: 2022-10-20

## 2022-10-31 DIAGNOSIS — G62.9 NEUROPATHY: ICD-10-CM

## 2022-10-31 RX ORDER — PREGABALIN 50 MG/1
CAPSULE ORAL
Qty: 180 CAPSULE | Refills: 1 | Status: SHIPPED | OUTPATIENT
Start: 2022-10-31 | End: 2023-01-29

## 2022-11-15 NOTE — TELEPHONE ENCOUNTER
Yoanna Abdi called requesting a refill on the following medications:  Requested Prescriptions     Pending Prescriptions Disp Refills    ticagrelor (BRILINTA) 90 MG TABS tablet 180 tablet 3     Sig: Take 1 tablet by mouth 2 times daily     [facility]      Date of last visit:   Date of next visit (if applicable): 5/75/5419

## 2022-11-29 ENCOUNTER — TELEPHONE (OUTPATIENT)
Dept: UROLOGY | Age: 82
End: 2022-11-29

## 2022-11-29 NOTE — TELEPHONE ENCOUNTER
Patient scheduled for CT CHEST W AND CT UROGRAM  at [address] [place] [address] on 2/13/2023 ARRIVAL OF 950AM FOR A 1020AM SCAN AND 1040AM SCAN. NOTHING TO EAT OR DRINK FOR 4 HOURS PRIOR.     Order mailed with instructions to the patient

## 2022-12-27 RX ORDER — POTASSIUM CHLORIDE 750 MG/1
TABLET, EXTENDED RELEASE ORAL
Qty: 90 TABLET | Refills: 3 | Status: SHIPPED | OUTPATIENT
Start: 2022-12-27

## 2023-01-10 ENCOUNTER — OFFICE VISIT (OUTPATIENT)
Dept: ENT CLINIC | Age: 83
End: 2023-01-10
Payer: MEDICARE

## 2023-01-10 VITALS
WEIGHT: 204.3 LBS | DIASTOLIC BLOOD PRESSURE: 80 MMHG | RESPIRATION RATE: 18 BRPM | BODY MASS INDEX: 32 KG/M2 | TEMPERATURE: 97.3 F | HEART RATE: 68 BPM | SYSTOLIC BLOOD PRESSURE: 138 MMHG

## 2023-01-10 DIAGNOSIS — H61.23 IMPACTED CERUMEN, BILATERAL: Primary | ICD-10-CM

## 2023-01-10 PROCEDURE — 69210 REMOVE IMPACTED EAR WAX UNI: CPT | Performed by: NURSE PRACTITIONER

## 2023-01-10 NOTE — PROGRESS NOTES
Procedure note:  DATE AND TIME OF PROCEDURE: 1/10/2023 9:16 AM  PATIENT NAME: Fred Vasquez  MRN [id number]  PROVIDER: DEMAR Dubose CNP   PRE-PROCEDURE DIAGNOSIS:  Bilateral cerumen impaction  POST-PROCEDURE DIAGNOSIS:   Same     INDICATION: Cerumenosis causing an inability to visualize the tympanic membrane, middle ear space and/or external auditory canal.     TEACHING: Procedure, benefits, and risks were explained to patient/family. Verbal informed consent was obtained. TIME OUT: A time out was conducted immediately before starting the procedure that confirmed a final verification of the correct patient, correct procedure, correct patient position, correct site and availability of special equipment. DESCRIPTION OF PROCEDURE:  PROCEDURE: Cerumenectomy  SITE: Bilateral ear(s)    ANESTHESIA:  NONE  COMPLICATIONS: NONE  EBL: NONE    PROCEDURE: Handheld otoscope used to visualize EAC. Bilateral cerumen in place, obstructing visualization of tympanic membranes. Cerumen removed with wire loop and alligator forceps until tympanic membranes could be visualized as documented above. Normal exam. The patient tolerated the procedure well, with no complications.

## 2023-02-13 ENCOUNTER — HOSPITAL ENCOUNTER (OUTPATIENT)
Dept: CT IMAGING | Age: 83
Discharge: HOME | End: 2023-02-13
Payer: MEDICARE

## 2023-02-13 ENCOUNTER — HOSPITAL ENCOUNTER (OUTPATIENT)
Age: 83
Discharge: HOME | End: 2023-02-13
Payer: MEDICARE

## 2023-02-13 DIAGNOSIS — C67.9 MALIGNANT NEOPLASM OF URINARY BLADDER, UNSPECIFIED SITE (HCC): ICD-10-CM

## 2023-02-13 LAB
ALBUMIN SERPL BCG-MCNC: 3.8 G/DL (ref 3.5–5.1)
ALP SERPL-CCNC: 136 U/L (ref 38–126)
ALT SERPL W/O P-5'-P-CCNC: 37 U/L (ref 11–66)
ANION GAP SERPL CALC-SCNC: 6 MEQ/L (ref 8–16)
AST SERPL-CCNC: 27 U/L (ref 5–40)
BASOPHILS ABSOLUTE: 0.1 THOU/MM3 (ref 0–0.1)
BASOPHILS NFR BLD AUTO: 0.6 %
BILIRUB SERPL-MCNC: 0.9 MG/DL (ref 0.3–1.2)
BUN SERPL-MCNC: 19 MG/DL (ref 7–22)
CALCIUM SERPL-MCNC: 8.6 MG/DL (ref 8.5–10.5)
CHLORIDE SERPL-SCNC: 100 MEQ/L (ref 98–111)
CO2 SERPL-SCNC: 31 MEQ/L (ref 23–33)
CREAT SERPL-MCNC: 1.2 MG/DL (ref 0.4–1.2)
DEPRECATED RDW RBC AUTO: 49.6 FL (ref 35–45)
EOSINOPHIL NFR BLD AUTO: 4.4 %
EOSINOPHILS ABSOLUTE: 0.5 THOU/MM3 (ref 0–0.4)
ERYTHROCYTE [DISTWIDTH] IN BLOOD BY AUTOMATED COUNT: 14.6 % (ref 11.5–14.5)
GFR SERPL CREATININE-BSD FRML MDRD: 60 ML/MIN/1.73M2
GLUCOSE SERPL-MCNC: 96 MG/DL (ref 70–108)
HCT VFR BLD AUTO: 43.2 % (ref 42–52)
HGB BLD-MCNC: 14.1 GM/DL (ref 14–18)
IMM GRANULOCYTES # BLD AUTO: 0.03 THOU/MM3 (ref 0–0.07)
IMM GRANULOCYTES NFR BLD AUTO: 0.3 %
LYMPHOCYTES ABSOLUTE: 3.3 THOU/MM3 (ref 1–4.8)
LYMPHOCYTES NFR BLD AUTO: 32.2 %
MCH RBC QN AUTO: 30.5 PG (ref 26–33)
MCHC RBC AUTO-ENTMCNC: 32.6 GM/DL (ref 32.2–35.5)
MCV RBC AUTO: 93.3 FL (ref 80–94)
MONOCYTES ABSOLUTE: 1 THOU/MM3 (ref 0.4–1.3)
MONOCYTES NFR BLD AUTO: 9.3 %
NEUTROPHILS NFR BLD AUTO: 53.2 %
NRBC BLD AUTO-RTO: 0 /100 WBC
PLATELET # BLD AUTO: 173 THOU/MM3 (ref 130–400)
PMV BLD AUTO: 9.7 FL (ref 9.4–12.4)
POC CREATININE WHOLE BLOOD: 1.3 MG/DL (ref 0.5–1.2)
POTASSIUM SERPL-SCNC: 4.4 MEQ/L (ref 3.5–5.2)
PROT SERPL-MCNC: 6.2 G/DL (ref 6.1–8)
RBC # BLD AUTO: 4.63 MILL/MM3 (ref 4.7–6.1)
SEGMENTED NEUTROPHILS ABSOLUTE COUNT: 5.5 THOU/MM3 (ref 1.8–7.7)
SODIUM SERPL-SCNC: 137 MEQ/L (ref 135–145)
WBC # BLD AUTO: 10.4 THOU/MM3 (ref 4.8–10.8)

## 2023-02-13 PROCEDURE — 85025 COMPLETE CBC W/AUTO DIFF WBC: CPT

## 2023-02-13 PROCEDURE — 82565 ASSAY OF CREATININE: CPT

## 2023-02-13 PROCEDURE — 74178 CT ABD&PLV WO CNTR FLWD CNTR: CPT | Performed by: PHYSICIAN ASSISTANT

## 2023-02-13 PROCEDURE — 36415 COLL VENOUS BLD VENIPUNCTURE: CPT

## 2023-02-13 PROCEDURE — 6360000004 HC RX CONTRAST MEDICATION: Performed by: PHYSICIAN ASSISTANT

## 2023-02-13 PROCEDURE — 80053 COMPREHEN METABOLIC PANEL: CPT

## 2023-02-13 PROCEDURE — 71260 CT THORAX DX C+: CPT

## 2023-02-13 RX ADMIN — IOPAMIDOL 80 ML: 755 INJECTION, SOLUTION INTRAVENOUS at 10:15

## 2023-02-20 ENCOUNTER — TELEPHONE (OUTPATIENT)
Dept: UROLOGY | Age: 83
End: 2023-02-20

## 2023-02-20 ENCOUNTER — OFFICE VISIT (OUTPATIENT)
Dept: UROLOGY | Age: 83
End: 2023-02-20
Payer: MEDICARE

## 2023-02-20 VITALS
SYSTOLIC BLOOD PRESSURE: 122 MMHG | DIASTOLIC BLOOD PRESSURE: 80 MMHG | WEIGHT: 204 LBS | BODY MASS INDEX: 32.02 KG/M2 | HEIGHT: 67 IN

## 2023-02-20 DIAGNOSIS — R29.898 LEG WEAKNESS, BILATERAL: ICD-10-CM

## 2023-02-20 DIAGNOSIS — M54.9 BACK PAIN, UNSPECIFIED BACK LOCATION, UNSPECIFIED BACK PAIN LATERALITY, UNSPECIFIED CHRONICITY: Primary | ICD-10-CM

## 2023-02-20 DIAGNOSIS — R30.0 DYSURIA: ICD-10-CM

## 2023-02-20 DIAGNOSIS — Z85.51 HISTORY OF BLADDER CANCER: Primary | ICD-10-CM

## 2023-02-20 DIAGNOSIS — R29.898 WEAKNESS OF DISTAL ARMS AND LEGS: ICD-10-CM

## 2023-02-20 PROCEDURE — 3074F SYST BP LT 130 MM HG: CPT | Performed by: PHYSICIAN ASSISTANT

## 2023-02-20 PROCEDURE — 1123F ACP DISCUSS/DSCN MKR DOCD: CPT | Performed by: PHYSICIAN ASSISTANT

## 2023-02-20 PROCEDURE — 3079F DIAST BP 80-89 MM HG: CPT | Performed by: PHYSICIAN ASSISTANT

## 2023-02-20 PROCEDURE — 99213 OFFICE O/P EST LOW 20 MIN: CPT | Performed by: PHYSICIAN ASSISTANT

## 2023-02-20 RX ORDER — ATORVASTATIN CALCIUM 40 MG/1
TABLET, FILM COATED ORAL
Qty: 90 TABLET | Refills: 3 | Status: SHIPPED | OUTPATIENT
Start: 2023-02-20

## 2023-02-20 RX ORDER — LEVOTHYROXINE SODIUM 137 UG/1
TABLET ORAL
Qty: 90 TABLET | Refills: 3 | Status: SHIPPED | OUTPATIENT
Start: 2023-02-20

## 2023-02-20 NOTE — PROGRESS NOTES
[id number] Reshma Ramirez [address] [place] [address] Ritu Quinonez [address]  Dept: [phone]  Loc: [phone]      Mr. Shruti Palomino was seen in follow up for   Chief Complaint   Patient presents with    Follow-up     CT chest and CT urogram results. Bladder cancer        HPI:  Mr. Shruti Palomino is an 12-year-old patient male with a history of bladder cancer. He was first diagnosed with non-invasive high grade papillary urothelial carcinoma in 2014. He underwent TURBT and BCG therapy. Unfortunately, his surveillance cystoscopy in October 2018 showed a large bladder tumor. He underwent a TURBT on November 8, 2018. 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 He completed three cycles of neoadjuvant Gemzar/Cisplatin in 3/20/19. He underwent a Radical Cystoprostatectomy with Pelvic Lymph Node Dissection and creation of Ileal Loop Diversion on 5/1/19. His  post-neoadjuvant treatment pathology was pT0, pN0. 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 A Needle core biopsy performed by Dr. 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 Pathology felt this tumor may have originated in the lungs. Patient received 5000 cGy EBRT, completed 5/5 in March 2021. He states that he has been feeling relatively well since his last visit. He still has some degree of fatigue but feels that everything is stable at this time. He does report worsening back pain and weakness in his bilateral upper legs. He ambulates with a cane and states that he falls at times and is having difficulty moving during the day. He reports a lumbar fusion by Dr. Bear Alba in 2015. He denies ostomy pain, gross hematuria, fever/chills, flank/pelvic/abdominal pain, or difficulty with bowel movements.  He denies night sweats, new onset bone pain, chest pain/pressure, dyspnea, N/V, leg pain, or lightheadedness. He presents today for further evaluation.     Past Medical History:   Diagnosis Date    AAA (abdominal aortic aneurysm)     intravascular stent    BCG ROXANE 05/21/2019    BPH (benign prostatic hypertrophy)     CAD (coronary artery disease)     Cancer of skin     Carotid artery stenosis     Chronic back pain     Chronic kidney disease     Congenital absence of one kidney     absent right kidney    History of lumbar fusion 03/2015    mid lower back down    Hyperlipidemia     Hypertension     Hypothyroidism     Obesity     Sciatica     TIA (transient ischemic attack) 01/2008    Unspecified cerebral artery occlusion with cerebral infarction     TIA    UTI (urinary tract infection)        Past Surgical History:   Procedure Laterality Date    ABDOMINAL AORTIC ANEURYSM REPAIR  3/2010    [facility]    ABDOMINAL AORTIC ANEURYSM REPAIR  7/24/15    [facility]    CARDIAC CATHETERIZATION  2/26/222    had two stints put in    COLONOSCOPY  12/21/11    [facility] WITH STENT PLACEMENT  2003    2 stents    FEMORAL-FEMORAL BYPASS GRAFT  03/09/2017    Dr. Jose Rafael Beauchamp    arm    KNEE ARTHROSCOPY  12/2008    meniscus tear    LUMBAR LAMINECTOMY  3/2016    [facility]--Dr. Verona Booker    OTHER SURGICAL HISTORY  4/14/15    melanoma removal from right chest    OTHER SURGICAL HISTORY  5/20/2016    I and D, exicison of posterior neck cyst    MS OFFICE/OUTPT VISIT,PROCEDURE ONLY N/A 11/8/2018    TURBT performed by Vidhi Giron MD at [facility] N/A 5/1/2019    ROBOTIC RADICAL CYSTOPROSTATECTOMY performed by Vidhi Giron MD at [address] [facility] [address]  10/2016    Dr Kamran Schultz  11/2020    on wrist     TESTICLE REMOVAL  1990's    TUNNELED VENOUS PORT PLACEMENT      TURP  04/16/2014    See note of Dr. Mireille Youssef for OR procedure details       Current Outpatient Medications on File Prior to Visit   Medication Sig Dispense Refill    KLOR-CON M10 10 MEQ extended release tablet TAKE 1 TABLET BY MOUTH EVERY DAY 90 tablet 3    pregabalin (LYRICA) 50 MG capsule TAKE 1 CAPSULE BY MOUTH TWICE A  capsule 1    clopidogrel (PLAVIX) 75 MG tablet TAKE 1 TABLET BY MOUTH EVERY DAY 30 tablet 6    furosemide (LASIX) 20 MG tablet TAKE 1 TABLET BY MOUTH EVERY DAY 90 tablet 3    lisinopril (PRINIVIL;ZESTRIL) 5 MG tablet Take 5 mg by mouth daily      DULoxetine (CYMBALTA) 60 MG extended release capsule Take 1 capsule by mouth in the morning. 90 capsule 3    ezetimibe (ZETIA) 10 mg tablet TAKE 1 TABLET BY MOUTH EVERY DAY 90 tablet 3    atorvastatin (LIPITOR) 40 MG tablet TAKE 1 TABLET BY MOUTH EVERYDAY AT BEDTIME 90 tablet 3    metoprolol tartrate (LOPRESSOR) 50 MG tablet Take 0.5 tablets by mouth 2 times daily 180 tablet 3    levothyroxine (SYNTHROID) 137 MCG tablet TAKE 1 TABLET BY MOUTH EVERY DAY 90 tablet 3    Multiple Vitamins-Minerals (PRESERVISION AREDS 2+MULTI VIT PO) Take 2 tablets by mouth daily      nitroGLYCERIN (NITROSTAT) 0.4 MG SL tablet Place 1 tablet under the tongue every 5 minutes as needed for Chest pain 25 tablet 1    b complex vitamins capsule Take 1 capsule by mouth daily      Elastic Bandages & Supports (JOBST KNEE HIGH COMPRESSION SM) MISC 1 each by Does not apply route daily as needed 1 each 0    aspirin 81 MG tablet Take 81 mg by mouth daily. No current facility-administered medications on file prior to visit.        Allergies   Allergen Reactions    Tape Jacqulin Keto Tape]      Surgical tape-rash       Family History   Problem Relation Age of Onset    Diabetes Mother     Cancer Mother         Liver    Arthritis Mother     Asthma Father     Heart Disease Father     Cancer Father         Lung, Bone    Stroke Sister     Heart Disease Sister     Cancer Sister     Stroke Brother     Heart Disease Brother     Cancer Brother         Lung       Social History Socioeconomic History    Marital status:      Spouse name: Edward Winchester    Number of children: 2    Years of education: Not on file    Highest education level: Not on file   Occupational History    Not on file   Tobacco Use    Smoking status: Former     Packs/day: 1.00     Years: 24.00     Pack years: 24.00     Types: Cigarettes     Start date: 5     Quit date: 1979     Years since quittin.9    Smokeless tobacco: Never   Vaping Use    Vaping Use: Never used   Substance and Sexual Activity    Alcohol use: No     Alcohol/week: 0.0 standard drinks    Drug use: No    Sexual activity: Not Currently   Other Topics Concern    Not on file   Social History Narrative    Not on file     Social Determinants of Health     Financial Resource Strain: Low Risk     Difficulty of Paying Living Expenses: Not hard at all   Food Insecurity: No Food Insecurity    Worried About Running Out of Food in the Last Year: Never true    Ran Out of Food in the Last Year: Never true   Transportation Needs: Not on file   Physical Activity: Not on file   Stress: Not on file   Social Connections: Not on file   Intimate Partner Violence: Not on file   Housing Stability: Not on file       Review of Systems  Constitutional:  Negative for activity change, appetite change, and fever. HENT: Positive for hearing loss. Negative for congestion, dental problem, facial swelling, mouth sores, nosebleeds, sore throat, tinnitus and trouble swallowing. Eyes: Positive for visual disturbance. Negative for discharge. Respiratory: Negative for cough, chest tightness, shortness of breath and wheezing. Cardiovascular: Negative for chest pain, palpitations and leg swelling. Gastrointestinal: Negative for abdominal distention, abdominal pain, blood in stool, constipation, diarrhea, nausea, rectal pain and vomiting. Endocrine: Negative for cold intolerance, polydipsia and polyuria.   Genitourinary: See HPI  Musculoskeletal: Positive for arthralgias, joint swelling and myalgias, back pain, and difficulty with ambulation. Negative for neck stiffness. Skin: Negative for color change, rash and wound. Neurological: Negative for dizziness, tremors, seizures, speech difficulty, weakness, light-headedness, numbness and headaches. Hematological: Negative for adenopathy. Does not bruise/bleed easily. Psychiatric/Behavioral: Negative for confusion and sleep disturbance. Exam    /80   Ht 5' 7\" (1.702 m)   Wt 204 lb (92.5 kg)   BMI 31.95 kg/m²     Constitutional: Oriented to person, place, and time. Vital signs are normal. Appears well-developed and well-nourished. Cooperative. No distress. HENT:    Head: Normocephalic and atraumatic. Eyes: EOM are normal. Pupils are equal, round, and reactive to light. Right eye exhibits no discharge. Left eye exhibits no discharge. No scleral icterus. Neck: Trachea normal. No JVD present. Cardiovascular: Normal rate and regular rhythm. S1/S2. Pulmonary/Chest: Effort normal. No respiratory distress. No wheezes, rhonchi, or rales. Abdominal: Soft. Exhibits no distension or generalized tenderness. There is no rebound, rigidity, or guarding. No CVA tenderness. Musculoskeletal: 1-2+ pitting edema with reddish discoloration of the legs. No calf tenderness. Lymphadenopathy:   Right: No supraclavicular adenopathy present. Left: No supraclavicular adenopathy present. Neurological: Alert and oriented to person, place, and time. No cranial nerve deficit. Skin: Skin is warm and dry. Not diaphoretic. Psychiatric: Normal mood and affect. Behavior is normal.  Nursing note and vitals reviewed. Labs    No results found for this visit on 02/20/23.     Lab Results   Component Value Date    CREATININE 1.2 02/13/2023    BUN 19 02/13/2023     02/13/2023    K 4.4 02/13/2023     02/13/2023    CO2 31 02/13/2023       Lab Results   Component Value Date    PSA 0.70 07/25/2011    PSA 0.83 10/31/2008 Radiology:     CT Urogram/Chest 2/23  FINDINGS:        Heart/mediastinum: The heart size is normal. Coronary artery calcifications are observed. No pericardial effusion is identified. No aortic aneurysm or dissection is present. No mediastinal, hilar, or axillary lymphadenopathy is visualized. Lungs: Centrilobular emphysema is present. Mild scarring is noted at the lung apices. Dependent atelectasis/scarring is noted at the lung bases. No focal consolidation, pleural effusion, or pneumothorax is observed. No pulmonary mass or nodule is    visualized. The central airways are patent and unremarkable. Liver/gallbladder/bilary tree: Cholelithiasis is stable. No biliary ductal dilatation or pericholecystic inflammation is observed. Hepatomegaly and hepatic steatosis are unchanged. No liver lesions are identified. Pancreas: Normal.   Spleen : Normal.   Adrenal glands: Normal.       Kidneys/ ureters/ bladder: A 13 mm simple cyst in the upper pole of the left kidney is stable (series 11, image 28). The right kidney is absent. No hydronephrosis or hydroureter is present. Postoperative changes related to ileal diversion is unchanged. Gastrointestinal:  Colonic diverticulosis without diverticulitis is stable. No bowel obstruction, free fluid, fluid collection, or free air is visualized. The appendix is unremarkable. Retroperitoneum / lymph nodes: The bifurcated endoluminal stent grafts appear stable. The infrarenal abdominal aortic aneurysm sac measures 6.7 x 7.2 cm (previously measured 6.7 x 7.1 cm. No endoleak is visualized. Chronic occlusion of the left limb of    the graft is unchanged although not fully assessed given the contrast timing of this examination. No lymphadenopathy is observed. Pelvis: The prostate gland is not visualized.  Bifemoral graft and surgical clips in the right and left inguinal soft tissues appear stable right inguinal lymph node measuring up to 9 mm in short axis is unchanged (series 12, 63).       Musculoskeletal: Diffuse osteopenia is present. Multilevel degenerative disc disease in the thoracic and lumbar spine is unchanged. The visualized skeletal structures appear intact. Postoperative changes related to laminectomy in the lower lumbar spine    appear stable.                   Impression   1. No suspicious pulmonary mass or nodule. No mediastinal, hilar, or axillary lymphadenopathy visualized. No acute intrathoracic process observed.       2. No metastatic disease identified within the abdomen/pelvis. Postoperative changes related to prostatectomy and cystectomy with ileal loops divergent is unchanged.       3. Stable negative infrarenal abdominal aortic aneurysm sac measuring 6.7 x 7.2 cm (previously measured 6.7 x 7.1 cm). Additional stable chronic findings are discussed.           Assessment/Plan:  1. Bladder cancer-  He completed three cycles of neoadjuvant Gemzar/Cisplatin in 3/20/19. He underwent a Radical Cystoprostatectomy with Pelvic Lymph Node Dissection and creation of Ileal Loop Diversion on 5/1/19. His post-neoadjuvant treatment pathology was pT0, pN0. His February 2020, his CT urogram revealed development of small hypodense lesions of the liver. These two lesions measured 6-10 mm in size. His May 2020 revealed resolution of these previously noted liver lesions. CT urogram and CT chest in February 2023 did not reveal any disease recurrence. Will obtain a CT chest and CT urogram in six months. Will obtain CBC with differential and CMP in six months. Urine cytology at next office visit.      Surveillance recommendations per NCCN 2019 guidelines are as follows:     Years 1 and 2: CT urogram every 3-6 months, CXR or CT chest every 3-6 months, CBC<CMP every 3-6 months. Urine cytologies every 6-12 months. Consider urethral wash cytology every 6-12 months. B12 annually.     Years 3-5: CT abdomen and pelvis annually. CXR annually. BMP, LFT, Vitamin 12  annually. Urine cytology as clinically indicated. Years 5-10: Renal US annually. B12 annually. Years 10+: B12 annually     2. Nephrolithiasis vs Vascular Calcifications: Continue to monitor. Patient is asymptomatic. 3. 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 Pathology felt this tumor may have originated in the lungs. Patient received 5000 cGy EBRT, completed 5/5 in March 2021. No evidence of recurrence on recent imaging. Continue to monitor. 4. CKD- Stage 3/ Congenital Solitary Left Kidney- Stable. 5. Infrarenal Abdominal Aortic Aneurysm- Stable. Status post endovascular repair by Dr. Ceci Cook. 6. Lower Back Pain and Lower Extremity Proximal Muscle Weakness/History of Falls- Referral to Dr. Judi Magana.

## 2023-02-23 ENCOUNTER — HOSPITAL ENCOUNTER (OUTPATIENT)
Dept: GENERAL RADIOLOGY | Age: 83
Discharge: HOME | End: 2023-02-23
Payer: MEDICARE

## 2023-02-23 ENCOUNTER — HOSPITAL ENCOUNTER (OUTPATIENT)
Age: 83
Discharge: HOME | End: 2023-02-23
Payer: MEDICARE

## 2023-02-23 ENCOUNTER — OFFICE VISIT (OUTPATIENT)
Dept: FAMILY MEDICINE CLINIC | Age: 83
End: 2023-02-23

## 2023-02-23 VITALS
WEIGHT: 202 LBS | OXYGEN SATURATION: 98 % | DIASTOLIC BLOOD PRESSURE: 60 MMHG | SYSTOLIC BLOOD PRESSURE: 100 MMHG | HEART RATE: 65 BPM | BODY MASS INDEX: 31.64 KG/M2 | RESPIRATION RATE: 16 BRPM | TEMPERATURE: 98.1 F

## 2023-02-23 DIAGNOSIS — M25.422 ELBOW SWELLING, LEFT: ICD-10-CM

## 2023-02-23 DIAGNOSIS — M70.22 OLECRANON BURSITIS OF LEFT ELBOW: ICD-10-CM

## 2023-02-23 DIAGNOSIS — M25.522 LEFT ELBOW PAIN: Primary | ICD-10-CM

## 2023-02-23 DIAGNOSIS — M25.522 LEFT ELBOW PAIN: ICD-10-CM

## 2023-02-23 PROCEDURE — 73080 X-RAY EXAM OF ELBOW: CPT

## 2023-02-23 RX ORDER — PREDNISONE 1 MG/1
TABLET ORAL
Qty: 30 TABLET | Refills: 0 | Status: SHIPPED | OUTPATIENT
Start: 2023-02-23 | End: 2023-03-05

## 2023-02-23 RX ORDER — METHYLPREDNISOLONE ACETATE 80 MG/ML
120 INJECTION, SUSPENSION INTRA-ARTICULAR; INTRALESIONAL; INTRAMUSCULAR; SOFT TISSUE ONCE
Status: COMPLETED | OUTPATIENT
Start: 2023-02-23 | End: 2023-02-23

## 2023-02-23 RX ORDER — CEPHALEXIN 500 MG/1
500 CAPSULE ORAL 3 TIMES DAILY
Qty: 30 CAPSULE | Refills: 0 | Status: SHIPPED | OUTPATIENT
Start: 2023-02-23 | End: 2023-03-05

## 2023-02-23 RX ADMIN — METHYLPREDNISOLONE ACETATE 120 MG: 80 INJECTION, SUSPENSION INTRA-ARTICULAR; INTRALESIONAL; INTRAMUSCULAR; SOFT TISSUE at 10:16

## 2023-02-23 SDOH — ECONOMIC STABILITY: HOUSING INSECURITY
IN THE LAST 12 MONTHS, WAS THERE A TIME WHEN YOU DID NOT HAVE A STEADY PLACE TO SLEEP OR SLEPT IN A SHELTER (INCLUDING NOW)?: NO

## 2023-02-23 SDOH — ECONOMIC STABILITY: FOOD INSECURITY: WITHIN THE PAST 12 MONTHS, YOU WORRIED THAT YOUR FOOD WOULD RUN OUT BEFORE YOU GOT MONEY TO BUY MORE.: NEVER TRUE

## 2023-02-23 SDOH — ECONOMIC STABILITY: INCOME INSECURITY: HOW HARD IS IT FOR YOU TO PAY FOR THE VERY BASICS LIKE FOOD, HOUSING, MEDICAL CARE, AND HEATING?: NOT HARD AT ALL

## 2023-02-23 SDOH — ECONOMIC STABILITY: FOOD INSECURITY: WITHIN THE PAST 12 MONTHS, THE FOOD YOU BOUGHT JUST DIDN'T LAST AND YOU DIDN'T HAVE MONEY TO GET MORE.: NEVER TRUE

## 2023-02-23 ASSESSMENT — PATIENT HEALTH QUESTIONNAIRE - PHQ9
SUM OF ALL RESPONSES TO PHQ QUESTIONS 1-9: 0
1. LITTLE INTEREST OR PLEASURE IN DOING THINGS: 0
3. TROUBLE FALLING OR STAYING ASLEEP: 0
4. FEELING TIRED OR HAVING LITTLE ENERGY: 0
9. THOUGHTS THAT YOU WOULD BE BETTER OFF DEAD, OR OF HURTING YOURSELF: 0
SUM OF ALL RESPONSES TO PHQ QUESTIONS 1-9: 0
7. TROUBLE CONCENTRATING ON THINGS, SUCH AS READING THE NEWSPAPER OR WATCHING TELEVISION: 0
2. FEELING DOWN, DEPRESSED OR HOPELESS: 0
SUM OF ALL RESPONSES TO PHQ9 QUESTIONS 1 & 2: 0
SUM OF ALL RESPONSES TO PHQ QUESTIONS 1-9: 0
6. FEELING BAD ABOUT YOURSELF - OR THAT YOU ARE A FAILURE OR HAVE LET YOURSELF OR YOUR FAMILY DOWN: 0
10. IF YOU CHECKED OFF ANY PROBLEMS, HOW DIFFICULT HAVE THESE PROBLEMS MADE IT FOR YOU TO DO YOUR WORK, TAKE CARE OF THINGS AT HOME, OR GET ALONG WITH OTHER PEOPLE: 0
SUM OF ALL RESPONSES TO PHQ QUESTIONS 1-9: 0
8. MOVING OR SPEAKING SO SLOWLY THAT OTHER PEOPLE COULD HAVE NOTICED. OR THE OPPOSITE, BEING SO FIGETY OR RESTLESS THAT YOU HAVE BEEN MOVING AROUND A LOT MORE THAN USUAL: 0
5. POOR APPETITE OR OVEREATING: 0

## 2023-02-23 ASSESSMENT — ENCOUNTER SYMPTOMS
BACK PAIN: 0
ABDOMINAL DISTENTION: 0
COUGH: 0
CHEST TIGHTNESS: 0
WHEEZING: 0
SHORTNESS OF BREATH: 0
ABDOMINAL PAIN: 0

## 2023-02-23 NOTE — PROGRESS NOTES
[address] [place] [address]  Dept: [phone]  Dept Fax: [phone]  Loc: [phone]  PROGRESS NOTE      VisitDate: 2/23/2023    Edilson Teixeira is a 80 y.o. male who presents today for:     Chief Complaint   Patient presents with    Elbow Pain     Left elbow pain since 02/13/2023         Subjective:  Patient presents with complaint of left elbow swelling and pain since 2/13/2023. Patient reports that he did bump left elbow but did not think much of it at the time. Medical history reviewed. Review of Systems   Constitutional:  Negative for activity change, appetite change, chills, fatigue and fever. Eyes:  Negative for visual disturbance. Respiratory:  Negative for cough, chest tightness, shortness of breath and wheezing. Cardiovascular:  Negative for chest pain, palpitations and leg swelling. Gastrointestinal:  Negative for abdominal distention and abdominal pain. Genitourinary:  Negative for dysuria. Musculoskeletal:  Positive for arthralgias. Negative for back pain and neck pain. Skin: Negative. Negative for rash. Neurological:  Negative for dizziness, light-headedness and headaches. Hematological:  Negative for adenopathy. Psychiatric/Behavioral:  Positive for decreased concentration and dysphoric mood. All other systems reviewed and are negative.   Past Medical History:   Diagnosis Date    AAA (abdominal aortic aneurysm)     intravascular stent    BCG ROAXNE 05/21/2019    BPH (benign prostatic hypertrophy)     CAD (coronary artery disease)     Cancer of skin     Carotid artery stenosis     Chronic back pain     Chronic kidney disease     Congenital absence of one kidney     absent right kidney    History of lumbar fusion 03/2015    mid lower back down    Hyperlipidemia     Hypertension     Hypothyroidism     Obesity     Sciatica     TIA (transient ischemic attack) 01/2008    Unspecified cerebral artery occlusion with cerebral infarction     TIA    UTI (urinary tract infection)       Past Surgical History:   Procedure Laterality Date    ABDOMINAL AORTIC ANEURYSM REPAIR  3/2010    [facility]    ABDOMINAL AORTIC ANEURYSM REPAIR  7/24/15    [facility]    CARDIAC CATHETERIZATION      had two stints put in    COLONOSCOPY  11    [facility] WITH STENT PLACEMENT      2 stents    FEMORAL-FEMORAL BYPASS GRAFT  2017    Dr. Tony Calixto    arm    KNEE ARTHROSCOPY  2008    meniscus tear    LUMBAR LAMINECTOMY  3/2016    [facility]--Dr. Maria D Rose    OTHER SURGICAL HISTORY  4/14/15    melanoma removal from right chest    OTHER SURGICAL HISTORY  2016    I and D, exicison of posterior neck cyst    [place] OFFICE/OUTPT VISIT,PROCEDURE ONLY N/A 2018    TURBT performed by Davie Tejada MD at [facility] N/A 2019    ROBOTIC RADICAL CYSTOPROSTATECTOMY performed by Davie Tejada MD at [address]  10/2016    Dr Cate Cooper  2020    on wrist     TESTICLE REMOVAL      TUNNELED VENOUS PORT PLACEMENT      TURP  2014    See note of Dr. Sada Dixon for OR procedure details     Family History   Problem Relation Age of Onset    Diabetes Mother     Cancer Mother         Liver    Arthritis Mother     Asthma Father     Heart Disease Father     Cancer Father         Lung, Bone    Stroke Sister     Heart Disease Sister     Cancer Sister     Stroke Brother     Heart Disease Brother     Cancer Brother         Lung     Social History     Tobacco Use    Smoking status: Former     Packs/day: 1.00     Years: 24.00     Pack years: 24.00     Types: Cigarettes     Start date: 5     Quit date: 1979     Years since quittin.9    Smokeless tobacco: Never   Substance Use Topics    Alcohol use: No     Alcohol/week: 0.0 standard drinks      Current Outpatient Medications   Medication Sig Dispense Refill    predniSONE (DELTASONE) 5 MG tablet 4 po qd for 3 days, then 3 po qd for 3 days, then 2 po qd for 3 days, then 1 po qd for 3 days 30 tablet 0    cephALEXin (KEFLEX) 500 MG capsule Take 1 capsule by mouth 3 times daily for 10 days 30 capsule 0    diclofenac sodium (VOLTAREN) 1 % GEL Apply 2 g topically 4 times daily Apply to left elbow 4 times daily 150 g 1    atorvastatin (LIPITOR) 40 MG tablet TAKE 1 TABLET BY MOUTH EVERYDAY AT BEDTIME 90 tablet 3    levothyroxine (SYNTHROID) 137 MCG tablet TAKE 1 TABLET BY MOUTH EVERY DAY 90 tablet 3    KLOR-CON M10 10 MEQ extended release tablet TAKE 1 TABLET BY MOUTH EVERY DAY 90 tablet 3    pregabalin (LYRICA) 50 MG capsule TAKE 1 CAPSULE BY MOUTH TWICE A  capsule 1    clopidogrel (PLAVIX) 75 MG tablet TAKE 1 TABLET BY MOUTH EVERY DAY 30 tablet 6    furosemide (LASIX) 20 MG tablet TAKE 1 TABLET BY MOUTH EVERY DAY 90 tablet 3    lisinopril (PRINIVIL;ZESTRIL) 5 MG tablet Take 5 mg by mouth daily      DULoxetine (CYMBALTA) 60 MG extended release capsule Take 1 capsule by mouth in the morning. 90 capsule 3    ezetimibe (ZETIA) 10 mg tablet TAKE 1 TABLET BY MOUTH EVERY DAY 90 tablet 3    metoprolol tartrate (LOPRESSOR) 50 MG tablet Take 0.5 tablets by mouth 2 times daily 180 tablet 3    Multiple Vitamins-Minerals (PRESERVISION AREDS 2+MULTI VIT PO) Take 2 tablets by mouth daily      b complex vitamins capsule Take 1 capsule by mouth daily      Elastic Bandages & Supports (JOBST KNEE HIGH COMPRESSION SM) MISC 1 each by Does not apply route daily as needed 1 each 0    aspirin 81 MG tablet Take 81 mg by mouth daily. nitroGLYCERIN (NITROSTAT) 0.4 MG SL tablet Place 1 tablet under the tongue every 5 minutes as needed for Chest pain (Patient not taking: Reported on 2/23/2023) 25 tablet 1     No current facility-administered medications for this visit.      Allergies   Allergen Reactions    Tape Clemente Silk Tape]      Surgical tape-rash     Health Maintenance Topic Date Due    DTaP/Tdap/Td vaccine (1 - Tdap) Never done    Shingles vaccine (1 of 2) Never done    Annual Wellness Visit (AWV)  07/15/2022    Flu vaccine (1) 08/01/2022    COVID-19 Vaccine (4 - Booster) 12/21/2022    Depression Monitoring  08/03/2023    Pneumococcal 65+ years Vaccine  Completed    Hepatitis A vaccine  Aged Out    Hib vaccine  Aged Out    Meningococcal (ACWY) vaccine  Aged Out         Objective:     Physical Exam  Vitals and nursing note reviewed. Constitutional:       Appearance: Normal appearance. He is well-developed. He is not diaphoretic. HENT:      Head: Normocephalic and atraumatic. Not macrocephalic and not microcephalic. Right Ear: Hearing and external ear normal. No drainage or tenderness. No middle ear effusion. No hemotympanum. Tympanic membrane is not injected, scarred, perforated or bulging. Left Ear: Hearing and external ear normal. No drainage or tenderness. No middle ear effusion. No hemotympanum. Tympanic membrane is not injected, scarred, perforated or bulging. Nose: No nasal deformity, septal deviation, mucosal edema or rhinorrhea. Right Sinus: No maxillary sinus tenderness or frontal sinus tenderness. Left Sinus: No maxillary sinus tenderness or frontal sinus tenderness. Mouth/Throat:      Mouth: No oral lesions. Dentition: Normal dentition. Does not have dentures. No dental caries or dental abscesses. Pharynx: Oropharynx is clear. No oropharyngeal exudate or posterior oropharyngeal erythema. Tonsils: No tonsillar abscesses. Eyes:      General: Lids are normal. No scleral icterus. Extraocular Movements:      Right eye: Normal extraocular motion. Left eye: Normal extraocular motion. Conjunctiva/sclera: Conjunctivae normal.      Right eye: Right conjunctiva is not injected. Left eye: Left conjunctiva is not injected. Pupils: Pupils are equal, round, and reactive to light.    Neck:      Thyroid: No thyroid mass or thyromegaly. Vascular: No carotid bruit or JVD. Trachea: Trachea normal.   Cardiovascular:      Rate and Rhythm: Normal rate and regular rhythm. Pulses: Normal pulses. Heart sounds: Normal heart sounds, S1 normal and S2 normal. No murmur heard. No friction rub. No gallop. Pulmonary:      Effort: Pulmonary effort is normal. No respiratory distress. Breath sounds: Normal breath sounds. No wheezing, rhonchi or rales. Chest:      Chest wall: No tenderness. Abdominal:      General: Bowel sounds are normal.      Palpations: Abdomen is soft. There is no hepatomegaly, splenomegaly or mass. Tenderness: There is no guarding or rebound. Hernia: No hernia is present. There is no hernia in the ventral area or left inguinal area. Musculoskeletal:      Left elbow: Swelling and effusion present. No deformity. Decreased range of motion. Tenderness present in radial head and olecranon process. Cervical back: Normal range of motion and neck supple. No edema or erythema. Normal range of motion. Lymphadenopathy:      Head:      Right side of head: No submental, submandibular, tonsillar, preauricular, posterior auricular or occipital adenopathy. Left side of head: No submental, submandibular, tonsillar, preauricular, posterior auricular or occipital adenopathy. Cervical: No cervical adenopathy. Right cervical: No superficial, deep or posterior cervical adenopathy. Left cervical: No superficial, deep or posterior cervical adenopathy. Upper Body:      Right upper body: No supraclavicular or pectoral adenopathy. Left upper body: No supraclavicular or pectoral adenopathy. Skin:     General: Skin is warm and dry. Coloration: Skin is not pale. Findings: No bruising, ecchymosis, laceration, lesion or rash. Nails: There is no clubbing. Neurological:      General: No focal deficit present.       Mental Status: He is alert and oriented to person, place, and time. Cranial Nerves: No cranial nerve deficit. Motor: No abnormal muscle tone. Coordination: Coordination normal.      Deep Tendon Reflexes: Reflexes normal.   Psychiatric:         Speech: Speech normal.         Behavior: Behavior normal.         Thought Content: Thought content normal.         Judgment: Judgment normal.     /60   Pulse 65   Temp 98.1 °F (36.7 °C) (Oral)   Resp 16   Wt 202 lb (91.6 kg)   SpO2 98%   BMI 31.64 kg/m²       Impression/Plan:  1. Left elbow pain    2. Elbow swelling, left    3. Olecranon bursitis of left elbow      Requested Prescriptions     Signed Prescriptions Disp Refills    predniSONE (DELTASONE) 5 MG tablet 30 tablet 0     Si po qd for 3 days, then 3 po qd for 3 days, then 2 po qd for 3 days, then 1 po qd for 3 days    cephALEXin (KEFLEX) 500 MG capsule 30 capsule 0     Sig: Take 1 capsule by mouth 3 times daily for 10 days    diclofenac sodium (VOLTAREN) 1 %  g 1     Sig: Apply 2 g topically 4 times daily Apply to left elbow 4 times daily     Orders Placed This Encounter   Procedures    XR ELBOW LEFT (2 VIEWS)     Standing Status:   Future     Standing Expiration Date:   2024     X-ray left elbow ordered. Prednisone taper prescribed Keflex 500 3 times daily for 10 days. Voltaren gel 4 times daily left elbow. Patient giveneducational materials - see patient instructions. Discussed use, benefit, and side effects of prescribed medications. All patient questions answered. Pt voiced understanding. Reviewed health maintenance. Patient agreedwith treatment plan. Follow up as directed. Depo-Medrol 120 IM provided in office today.          Electronically signed by DEMAR Robertson CNP on 2023 at 11:42 AM

## 2023-02-23 NOTE — PROGRESS NOTES
Administrations This Visit       methylPREDNISolone acetate (DEPO-MEDROL) injection 120 mg       Admin Date  02/23/2023  10:16 Action  Given Dose  120 mg Route  IntraMUSCular Site  Dorsogluteal Right Administered By  Tigist Victor CMA    Ordering Provider: DEMAR White CNP    NDC: 3394-6175-46    Lot#: [id number]    : [address].     Patient Supplied?: No

## 2023-02-27 ENCOUNTER — TELEPHONE (OUTPATIENT)
Dept: FAMILY MEDICINE CLINIC | Age: 83
End: 2023-02-27

## 2023-02-27 DIAGNOSIS — S42.402A CLOSED FRACTURE OF LEFT ELBOW, INITIAL ENCOUNTER: Primary | ICD-10-CM

## 2023-02-28 SDOH — ECONOMIC STABILITY: FOOD INSECURITY: WITHIN THE PAST 12 MONTHS, THE FOOD YOU BOUGHT JUST DIDN'T LAST AND YOU DIDN'T HAVE MONEY TO GET MORE.: NEVER TRUE

## 2023-02-28 SDOH — ECONOMIC STABILITY: TRANSPORTATION INSECURITY
IN THE PAST 12 MONTHS, HAS LACK OF TRANSPORTATION KEPT YOU FROM MEETINGS, WORK, OR FROM GETTING THINGS NEEDED FOR DAILY LIVING?: NO

## 2023-02-28 SDOH — ECONOMIC STABILITY: INCOME INSECURITY: HOW HARD IS IT FOR YOU TO PAY FOR THE VERY BASICS LIKE FOOD, HOUSING, MEDICAL CARE, AND HEATING?: NOT HARD AT ALL

## 2023-02-28 SDOH — ECONOMIC STABILITY: FOOD INSECURITY: WITHIN THE PAST 12 MONTHS, YOU WORRIED THAT YOUR FOOD WOULD RUN OUT BEFORE YOU GOT MONEY TO BUY MORE.: NEVER TRUE

## 2023-03-02 ENCOUNTER — OFFICE VISIT (OUTPATIENT)
Dept: INTERNAL MEDICINE CLINIC | Age: 83
End: 2023-03-02
Payer: MEDICARE

## 2023-03-02 VITALS
DIASTOLIC BLOOD PRESSURE: 70 MMHG | HEIGHT: 67 IN | SYSTOLIC BLOOD PRESSURE: 140 MMHG | HEART RATE: 70 BPM | WEIGHT: 201 LBS | TEMPERATURE: 97.3 F | BODY MASS INDEX: 31.55 KG/M2

## 2023-03-02 DIAGNOSIS — N18.2 CKD (CHRONIC KIDNEY DISEASE) STAGE 2, GFR 60-89 ML/MIN: ICD-10-CM

## 2023-03-02 DIAGNOSIS — I71.43 INFRARENAL ABDOMINAL AORTIC ANEURYSM (AAA) WITHOUT RUPTURE: ICD-10-CM

## 2023-03-02 DIAGNOSIS — I25.10 CORONARY ARTERY DISEASE INVOLVING NATIVE CORONARY ARTERY OF NATIVE HEART WITHOUT ANGINA PECTORIS: Primary | ICD-10-CM

## 2023-03-02 DIAGNOSIS — C67.9 MALIGNANT NEOPLASM OF URINARY BLADDER, UNSPECIFIED SITE (HCC): ICD-10-CM

## 2023-03-02 DIAGNOSIS — E03.4 HYPOTHYROIDISM DUE TO ACQUIRED ATROPHY OF THYROID: ICD-10-CM

## 2023-03-02 DIAGNOSIS — I10 ESSENTIAL HYPERTENSION: ICD-10-CM

## 2023-03-02 PROCEDURE — 3077F SYST BP >= 140 MM HG: CPT | Performed by: INTERNAL MEDICINE

## 2023-03-02 PROCEDURE — 99213 OFFICE O/P EST LOW 20 MIN: CPT | Performed by: INTERNAL MEDICINE

## 2023-03-02 PROCEDURE — 1123F ACP DISCUSS/DSCN MKR DOCD: CPT | Performed by: INTERNAL MEDICINE

## 2023-03-02 PROCEDURE — 3078F DIAST BP <80 MM HG: CPT | Performed by: INTERNAL MEDICINE

## 2023-03-02 RX ORDER — METOPROLOL TARTRATE 50 MG/1
25 TABLET, FILM COATED ORAL 2 TIMES DAILY
Qty: 180 TABLET | Refills: 3 | Status: SHIPPED | OUTPATIENT
Start: 2023-03-02

## 2023-03-02 RX ORDER — EZETIMIBE 10 MG/1
TABLET ORAL
Qty: 90 TABLET | Refills: 3 | Status: SHIPPED | OUTPATIENT
Start: 2023-03-02

## 2023-03-02 NOTE — PROGRESS NOTES
Robert Carlson (:  1940) is a 80 y.o. male,Established patient, here for evaluation of the following chief complaint(s):  Coronary Artery Disease, Chronic Kidney Disease, Hypertension, and Hypothyroidism         ASSESSMENT/PLAN:  1. Coronary artery disease involving native coronary artery of native heart without angina pectoris- had mi 2.22 with stent LAD and CX. No cp currently. EF 60%  2. Hypothyroidism due to acquired atrophy of thyroid- he says pcp checks labs  3. Infrarenal abdominal aortic aneurysm (AAA) without rupture- has stent graft, sees Dr Siddharth Phillips. 4. Malignant neoplasm of urinary bladder, unspecified site [facility])- follows with Urology  5. Essential hypertension- stable; continue meds  6. CKD (chronic kidney disease) stage 2, GFR 60-89 ml/min. Last creat nl. No follow-ups on file. Subjective   SUBJECTIVE/OBJECTIVE:  HPI Pt with cad, AAA with stent, ca bladder hx, balance issues seen in f/u. Had MI  with stents as above. Lipids good. Hx ca bladder, follows with Urology. Has issues with balance, uses cane. Review of Systems  Twelve point ROS completed and found to be negative except as noted above.     Current Outpatient Medications   Medication Instructions    aspirin 81 mg, DAILY    atorvastatin (LIPITOR) 40 MG tablet TAKE 1 TABLET BY MOUTH EVERYDAY AT BEDTIME    b complex vitamins capsule 1 capsule, Oral, DAILY    cephALEXin (KEFLEX) 500 mg, Oral, 3 TIMES DAILY    clopidogrel (PLAVIX) 75 MG tablet TAKE 1 TABLET BY MOUTH EVERY DAY    diclofenac sodium (VOLTAREN) 2 g, Topical, 4 TIMES DAILY, Apply to left elbow 4 times daily    DULoxetine (CYMBALTA) 60 mg, Oral, DAILY    Elastic Bandages & Supports (JOBST KNEE HIGH COMPRESSION SM) MISC 1 each, Does not apply, DAILY PRN    ezetimibe (ZETIA) 10 MG tablet TAKE 1 TABLET BY MOUTH EVERY DAY    furosemide (LASIX) 20 MG tablet TAKE 1 TABLET BY MOUTH EVERY DAY    KLOR-CON M10 10 MEQ extended release tablet TAKE 1 TABLET BY MOUTH EVERY DAY    levothyroxine (SYNTHROID) 137 MCG tablet TAKE 1 TABLET BY MOUTH EVERY DAY    metoprolol tartrate (LOPRESSOR) 25 mg, Oral, 2 TIMES DAILY    Multiple Vitamins-Minerals (PRESERVISION AREDS 2+MULTI VIT PO) 2 tablets, Oral, DAILY    nitroGLYCERIN (NITROSTAT) 0.4 mg, SubLINGual, EVERY 5 MIN PRN    predniSONE (DELTASONE) 5 MG tablet 4 po qd for 3 days, then 3 po qd for 3 days, then 2 po qd for 3 days, then 1 po qd for 3 days    pregabalin (LYRICA) 50 MG capsule TAKE 1 CAPSULE BY MOUTH TWICE A DAY         Objective   Physical Exam   BP (!) 140/70 (Site: Right Upper Arm)   Pulse 70   Temp 97.3 °F (36.3 °C)   Ht 5' 7.01\" (1.702 m)   Wt 201 lb (91.2 kg)   BMI 31.47 kg/m²     General appearance - oriented to person, place, and time and overweight    Mental Status - alert, oriented to person, place, and time          Neck - supple, no significant adenopathy        Chest - clear to auscultation, no wheezes, rales or rhonchi, symmetric air entry     Heart - normal rate, regular rhythm, normal S1, S2, no murmurs, rubs, clicks or gallops     Abdomen - protuberant, ostomy bag in place. (Urostomy)         Neurological - alert, oriented, normal speech, no focal findings or movement disorder noted     Musculoskeletal -   msk exam:[id number]}     Extremities - pedal edema 1 +     Skin - several lesions on head            An electronic signature was used to authenticate this note.     --Kenan Camacho MD

## 2023-03-02 NOTE — PROGRESS NOTES
Mireille Villanueva (:  1940) is a 80 y.o. male,Established patient, here for evaluation of the following chief complaint(s):  Coronary Artery Disease, Chronic Kidney Disease, Hypertension, and Hypothyroidism         ASSESSMENT/PLAN:  1. Coronary artery disease involving native coronary artery of native heart without angina pectoris  MI  with 2 stents  2. Hypothyroidism due to acquired atrophy of thyroid- he says pcp checks labs  3. Infrarenal abdominal aortic aneurysm (AAA) without rupture- sees Dr Nishant Andrews- has had stent greaft  4. Malignant neoplasm of urinary bladder, unspecified site [facility])- has urostomy- follows with Urology  5. Essential hypertension- controlled  6. CKD (chronic kidney disease) stage 2, GFR 60-89 ml/min- stable      No follow-ups on file. Subjective   SUBJECTIVE/OBJECTIVE:  HPI pt with cad, MI  with stents to LAD and CX; no cp now. Has issues with balance, uses cane. Wife has copd. Lipids good. Review of Systems   Twelve point ROS completed and found to be negative except as noted above.     Current Outpatient Medications   Medication Instructions    aspirin 81 mg, DAILY    atorvastatin (LIPITOR) 40 MG tablet TAKE 1 TABLET BY MOUTH EVERYDAY AT BEDTIME    b complex vitamins capsule 1 capsule, Oral, DAILY    cephALEXin (KEFLEX) 500 mg, Oral, 3 TIMES DAILY    clopidogrel (PLAVIX) 75 MG tablet TAKE 1 TABLET BY MOUTH EVERY DAY    diclofenac sodium (VOLTAREN) 2 g, Topical, 4 TIMES DAILY, Apply to left elbow 4 times daily    DULoxetine (CYMBALTA) 60 mg, Oral, DAILY    Elastic Bandages & Supports (JOBST KNEE HIGH COMPRESSION SM) MISC 1 each, Does not apply, DAILY PRN    ezetimibe (ZETIA) 10 MG tablet TAKE 1 TABLET BY MOUTH EVERY DAY    furosemide (LASIX) 20 MG tablet TAKE 1 TABLET BY MOUTH EVERY DAY    KLOR-CON M10 10 MEQ extended release tablet TAKE 1 TABLET BY MOUTH EVERY DAY    levothyroxine (SYNTHROID) 137 MCG tablet TAKE 1 TABLET BY MOUTH EVERY DAY    metoprolol tartrate (LOPRESSOR) 25 mg, Oral, 2 TIMES DAILY    Multiple Vitamins-Minerals (PRESERVISION AREDS 2+MULTI VIT PO) 2 tablets, Oral, DAILY    nitroGLYCERIN (NITROSTAT) 0.4 mg, SubLINGual, EVERY 5 MIN PRN    predniSONE (DELTASONE) 5 MG tablet 4 po qd for 3 days, then 3 po qd for 3 days, then 2 po qd for 3 days, then 1 po qd for 3 days    pregabalin (LYRICA) 50 MG capsule TAKE 1 CAPSULE BY MOUTH TWICE A DAY        Objective   Physical Exam   BP (!) 140/70 (Site: Right Upper Arm)   Pulse 70   Temp 97.3 °F (36.3 °C)   Ht 5' 7.01\" (1.702 m)   Wt 201 lb (91.2 kg)   BMI 31.47 kg/m²     General appearance - oriented to person, place, and time and overweight    Mental Status - alert, oriented to person, place, and time          Neck - supple, no significant adenopathy        Chest - clear to auscultation, no wheezes, rales or rhonchi, symmetric air entry     Heart - normal rate, regular rhythm, normal S1, S2, no murmurs, rubs, clicks or gallops     Abdomen - protuberant, urostomy bag in place         Neurological - alert, oriented, normal speech, no focal findings or movement disorder noted     Musculoskeletal -   msk AFOZ:[id number]}     Extremities - trace edema  Skin - normal coloration and turgor, no rashes, no suspicious skin lesions noted              An electronic signature was used to authenticate this note.     --Andi Dooley MD

## 2023-03-03 ENCOUNTER — HOSPITAL ENCOUNTER (OUTPATIENT)
Age: 83
Discharge: HOME | End: 2023-03-03
Payer: MEDICARE

## 2023-03-03 LAB
ANION GAP SERPL CALC-SCNC: 13 MEQ/L (ref 8–16)
BASOPHILS ABSOLUTE: 0 THOU/MM3 (ref 0–0.1)
BASOPHILS NFR BLD AUTO: 0.1 %
BUN SERPL-MCNC: 29 MG/DL (ref 7–22)
CALCIUM SERPL-MCNC: 9.5 MG/DL (ref 8.5–10.5)
CHLORIDE SERPL-SCNC: 100 MEQ/L (ref 98–111)
CO2 SERPL-SCNC: 28 MEQ/L (ref 23–33)
CREAT SERPL-MCNC: 1.2 MG/DL (ref 0.4–1.2)
DEPRECATED RDW RBC AUTO: 51.7 FL (ref 35–45)
EOSINOPHIL NFR BLD AUTO: 0 %
EOSINOPHILS ABSOLUTE: 0 THOU/MM3 (ref 0–0.4)
ERYTHROCYTE [DISTWIDTH] IN BLOOD BY AUTOMATED COUNT: 14.9 % (ref 11.5–14.5)
GFR SERPL CREATININE-BSD FRML MDRD: 60 ML/MIN/1.73M2
GLUCOSE SERPL-MCNC: 167 MG/DL (ref 70–108)
HCT VFR BLD AUTO: 49.1 % (ref 42–52)
HGB BLD-MCNC: 15.8 GM/DL (ref 14–18)
IMM GRANULOCYTES # BLD AUTO: 0.17 THOU/MM3 (ref 0–0.07)
IMM GRANULOCYTES NFR BLD AUTO: 0.8 %
LYMPHOCYTES ABSOLUTE: 3.7 THOU/MM3 (ref 1–4.8)
LYMPHOCYTES NFR BLD AUTO: 17 %
MCH RBC QN AUTO: 30.2 PG (ref 26–33)
MCHC RBC AUTO-ENTMCNC: 32.2 GM/DL (ref 32.2–35.5)
MCV RBC AUTO: 93.9 FL (ref 80–94)
MONOCYTES ABSOLUTE: 1.1 THOU/MM3 (ref 0.4–1.3)
MONOCYTES NFR BLD AUTO: 5.1 %
NEUTROPHILS NFR BLD AUTO: 77 %
NRBC BLD AUTO-RTO: 0 /100 WBC
PLATELET # BLD AUTO: 262 THOU/MM3 (ref 130–400)
PMV BLD AUTO: 9.7 FL (ref 9.4–12.4)
POTASSIUM SERPL-SCNC: 4.5 MEQ/L (ref 3.5–5.2)
RBC # BLD AUTO: 5.23 MILL/MM3 (ref 4.7–6.1)
SEGMENTED NEUTROPHILS ABSOLUTE COUNT: 16.6 THOU/MM3 (ref 1.8–7.7)
SODIUM SERPL-SCNC: 141 MEQ/L (ref 135–145)
WBC # BLD AUTO: 21.5 THOU/MM3 (ref 4.8–10.8)

## 2023-03-03 PROCEDURE — 80048 BASIC METABOLIC PNL TOTAL CA: CPT

## 2023-03-03 PROCEDURE — 85025 COMPLETE CBC W/AUTO DIFF WBC: CPT

## 2023-03-03 PROCEDURE — 36415 COLL VENOUS BLD VENIPUNCTURE: CPT

## 2023-03-03 PROCEDURE — 93005 ELECTROCARDIOGRAM TRACING: CPT | Performed by: SPECIALIST

## 2023-03-04 LAB
EKG ATRIAL RATE: 74 BPM
EKG P AXIS: 66 DEGREES
EKG P-R INTERVAL: 140 MS
EKG Q-T INTERVAL: 374 MS
EKG QRS DURATION: 82 MS
EKG QTC CALCULATION (BAZETT): 415 MS
EKG R AXIS: 2 DEGREES
EKG T AXIS: 29 DEGREES
EKG VENTRICULAR RATE: 74 BPM

## 2023-03-10 NOTE — PROGRESS NOTES
In preparation for their surgical procedure above patient was screened for Obstructive Sleep Apnea (CHICHI) using the STOP-Bang Questionnaire by the Pre-Admission Testing department. This is a pre-surgical screening tool for patient safety and serves as a recommendation, this WILL NOT cause cancellation of surgery. STOP-Bang Questionnaire  * Do you currently see a pulmonologist?  No     If yes STOP, do not complete. Patient follows with Dr.     1.  Do you snore loudly (able to be heard in the next room)? No    2. Do you often feel tired or sleepy during the daytime? No       3. Has anyone ever told you that you stop breathing during your sleep? No    4. Do you have or are you being treated for high blood pressure? Yes      5. BMI more than 35? BMI (Calculated): 29.5        No    6. Age over 48 years? 80 y.o. Yes    7. Neck Circumference greater than 17 inches for male or 16 inches for female? Measured           (visits only)            Not Applicable    8. Gender Male? Yes      TOTAL SCORE: 3    CHICHI - Low Risk : Yes to 0 - 2 questions  CHICHI - Intermediate Risk : Yes to 3 - 4 questions  CHICHI - High Risk : Yes to 5 - 8 questions    Adapted from:   STOP Questionnaire: A Tool to Screen Patients for Obstructive Sleep Apnea   ZEE Archuleta.PLuzmaC., Sadaf Tena M.B.B.S., Shonda Denny M.D., Jamila Steen. Fabiano Novoa, Ph.D., BOBO Alanis.B.B.S., He Oliveira, M.Sc., Oralia Ugalde M.D., [place]. Ayden ARNULFO GibsonP.C.    Anesthesiology 2008; 353:816-98 Copyright 2008, the [address] of [employer]esthesi[employer], [facility] 37.   ----------------------------------------------------------------------------------------------------------------

## 2023-03-10 NOTE — PROGRESS NOTES
NPO after midnight  [employer] and drivers license  Wear comfortable clean clothing  Do not bring jewelry  Shower night before and morning of surgery with a liquid antibacterial soap  Bring list of medications with dosage and how often taken  Follow all instructions given by your physician   needed at discharge  Please limit to 2 visitors for surgery  You must have a responsible adult with you day of surgery and for 24 hours after surgery  Call [phone] for any questions

## 2023-03-16 ENCOUNTER — TELEPHONE (OUTPATIENT)
Dept: UROLOGY | Age: 83
End: 2023-03-16

## 2023-03-16 NOTE — TELEPHONE ENCOUNTER
Patient scheduled for CT CHEST W AND CT UROGRAM  at [address] [place] [address] on 8/21/2023 ARRIVAL OF 150PM FOR A 220PM START FOR BOTH SCANS. NPO 4 HOURS PRIOR.     Order mailed with instructions  to the patient

## 2023-03-17 ENCOUNTER — HOSPITAL ENCOUNTER (OUTPATIENT)
Age: 83
Setting detail: OUTPATIENT SURGERY
Discharge: HOME | End: 2023-03-17
Attending: SPECIALIST | Admitting: SPECIALIST
Payer: MEDICARE

## 2023-03-17 ENCOUNTER — ANESTHESIA (OUTPATIENT)
Dept: OPERATING ROOM | Age: 83
End: 2023-03-17
Payer: MEDICARE

## 2023-03-17 ENCOUNTER — ANESTHESIA EVENT (OUTPATIENT)
Dept: OPERATING ROOM | Age: 83
End: 2023-03-17
Payer: MEDICARE

## 2023-03-17 VITALS
WEIGHT: 198 LBS | HEART RATE: 80 BPM | OXYGEN SATURATION: 94 % | HEIGHT: 67 IN | BODY MASS INDEX: 31.08 KG/M2 | RESPIRATION RATE: 16 BRPM | SYSTOLIC BLOOD PRESSURE: 140 MMHG | TEMPERATURE: 97.3 F | DIASTOLIC BLOOD PRESSURE: 75 MMHG

## 2023-03-17 DIAGNOSIS — C44.219 BASAL CELL CARCINOMA (BCC) OF LEFT EAR: Primary | ICD-10-CM

## 2023-03-17 PROCEDURE — 2709999900 HC NON-CHARGEABLE SUPPLY: Performed by: SPECIALIST

## 2023-03-17 PROCEDURE — 3600000002 HC SURGERY LEVEL 2 BASE: Performed by: SPECIALIST

## 2023-03-17 PROCEDURE — 2500000003 HC RX 250 WO HCPCS: Performed by: SPECIALIST

## 2023-03-17 PROCEDURE — 3700000001 HC ADD 15 MINUTES (ANESTHESIA): Performed by: SPECIALIST

## 2023-03-17 PROCEDURE — 2580000003 HC RX 258: Performed by: NURSE ANESTHETIST, CERTIFIED REGISTERED

## 2023-03-17 PROCEDURE — 3700000000 HC ANESTHESIA ATTENDED CARE: Performed by: SPECIALIST

## 2023-03-17 PROCEDURE — 6360000002 HC RX W HCPCS: Performed by: NURSE ANESTHETIST, CERTIFIED REGISTERED

## 2023-03-17 PROCEDURE — 7100000010 HC PHASE II RECOVERY - FIRST 15 MIN: Performed by: SPECIALIST

## 2023-03-17 PROCEDURE — 7100000011 HC PHASE II RECOVERY - ADDTL 15 MIN: Performed by: SPECIALIST

## 2023-03-17 PROCEDURE — 3600000012 HC SURGERY LEVEL 2 ADDTL 15MIN: Performed by: SPECIALIST

## 2023-03-17 PROCEDURE — 2500000003 HC RX 250 WO HCPCS: Performed by: NURSE ANESTHETIST, CERTIFIED REGISTERED

## 2023-03-17 RX ORDER — SODIUM CHLORIDE 9 MG/ML
INJECTION, SOLUTION INTRAVENOUS CONTINUOUS
Status: DISCONTINUED | OUTPATIENT
Start: 2023-03-17 | End: 2023-03-17 | Stop reason: HOSPADM

## 2023-03-17 RX ORDER — PYRAZINAMIDE 500 MG/1
1 TABLET ORAL EVERY 4 HOURS PRN
Qty: 10 TABLET | Refills: 0 | Status: SHIPPED | OUTPATIENT
Start: 2023-03-17 | End: 2023-03-20

## 2023-03-17 RX ORDER — PROPOFOL 10 MG/ML
INJECTION, EMULSION INTRAVENOUS CONTINUOUS PRN
Status: DISCONTINUED | OUTPATIENT
Start: 2023-03-17 | End: 2023-03-17 | Stop reason: SDUPTHER

## 2023-03-17 RX ORDER — LIDOCAINE HYDROCHLORIDE AND EPINEPHRINE BITARTRATE 20; .01 MG/ML; MG/ML
INJECTION, SOLUTION SUBCUTANEOUS PRN
Status: DISCONTINUED | OUTPATIENT
Start: 2023-03-17 | End: 2023-03-17 | Stop reason: ALTCHOICE

## 2023-03-17 RX ORDER — LIDOCAINE HYDROCHLORIDE 20 MG/ML
INJECTION, SOLUTION INFILTRATION; PERINEURAL PRN
Status: DISCONTINUED | OUTPATIENT
Start: 2023-03-17 | End: 2023-03-17 | Stop reason: SDUPTHER

## 2023-03-17 RX ORDER — FENTANYL CITRATE 50 UG/ML
INJECTION, SOLUTION INTRAMUSCULAR; INTRAVENOUS PRN
Status: DISCONTINUED | OUTPATIENT
Start: 2023-03-17 | End: 2023-03-17 | Stop reason: SDUPTHER

## 2023-03-17 RX ORDER — SODIUM CHLORIDE 9 MG/ML
INJECTION, SOLUTION INTRAVENOUS CONTINUOUS PRN
Status: DISCONTINUED | OUTPATIENT
Start: 2023-03-17 | End: 2023-03-17 | Stop reason: SDUPTHER

## 2023-03-17 RX ADMIN — LIDOCAINE HYDROCHLORIDE 80 MG: 20 INJECTION, SOLUTION INFILTRATION; PERINEURAL at 09:52

## 2023-03-17 RX ADMIN — SODIUM CHLORIDE: 9 INJECTION, SOLUTION INTRAVENOUS at 09:46

## 2023-03-17 RX ADMIN — Medication 2000 MG: at 10:01

## 2023-03-17 RX ADMIN — FENTANYL CITRATE 25 MCG: 50 INJECTION, SOLUTION INTRAMUSCULAR; INTRAVENOUS at 09:52

## 2023-03-17 RX ADMIN — PROPOFOL 40 MCG/KG/MIN: 10 INJECTION, EMULSION INTRAVENOUS at 09:55

## 2023-03-17 ASSESSMENT — PAIN - FUNCTIONAL ASSESSMENT: PAIN_FUNCTIONAL_ASSESSMENT: 0-10

## 2023-03-17 NOTE — H&P
[address] [place] [address]  History and Physical Update    Pt Name: Blaine King  MRN: [id number]  YOB: 1940  Date of evaluation: 3/17/2023    I have examined the patient and reviewed the H&P/Consult and there are no changes to the patient or plans.       Yandy Alexander MD  Electronically signed 3/17/2023 at 9:55 AM

## 2023-03-17 NOTE — OP NOTE
Operative Note    Patient name: Blaine King             Medical Record Number: [id number]    Primary Care Physician: Soheila Murerll MD     1940    Date of Procedure: 3/17/2023    Pre-operative Diagnosis: 4cm2 defect of left ear (triangular fossa) s/p MOHS for basal cell carcinoma    Post-operative Diagnosis: Same    Procedure Performed: Full thickness skin graft (4 cm2) repair of left ear defect (CPT 80713)    Surgeons/Assistants: MD Eliel Vazquez PA-C    Estimated Blood Loss: 5ml     Complications: none immediately appreciated    Procedure: With the patient lying in the supine position and under adequate anesthesia per the anesthesia team.   Local anesthesia consisting of 19 ml of 1% Lidocaine 1:100,000 with epinephrine solution of the donor site of the left infraclavicular area as well as the left ear. The area was prepped and draped in the standard surgical fashion. The patient has very thin skin and a large defect which could not be closed primarily, therefore a full thickness skin graft was taken. It was defatted and secured in position with a 3-0 silk suture tie and then a 5-0 fast absorbable suture was placed in a simple running fashion. A Bacitracin Xeroform and moist cotton ball tie over bolster was secured using the tails of the silk sutures. The donor site was closed with a 3-0 Monocryl suture placed in interrupted buried fashion and then Histoacryl respectively. The patient tolerated the procedure well and remained hemodynamically stable throughout the procedure and was quite comfortable throughout the operative course.     Clinical staging for cancer cases:  Ct  Cn  Cm    Yandy Alexander MD  Electronically signed by me on 3/17/2023 at 10:22 AM Operative Note      Patient: Blaine King  YOB: 1940  MRN: [id number]    Date of Procedure: 3/17/2023    Pre-Op Diagnosis: Basal cell carcinoma (BCC) of skin of left ear [C44.219]    Post-Op Diagnosis: Same       Procedure(s):  MOHS REPAIR BCC LEFT EAR TRIANGULAR FOSSA WITH FULL THICKNESS SKIN GRAFT LEFT UPPER CHEST    Surgeon(s):  Artie Hinds MD    Assistant:   Physician Assistant: Shen Grimaldo PA-C    Anesthesia: Monitor Anesthesia Care    Estimated Blood Loss (mL): less than 50     Complications: None    Specimens:   * No specimens in log *    Implants:  * No implants in log *      Drains:   Urostomy Ileal conduit RLQ (Active)       Urostomy RUQ (Active)       Findings: 4cm2 defect of left ear (triangular fossa) s/p MOHS for basal cell carcinoma      Detailed Description of Procedure:   Full thickness skin graft (4 cm2) repair of left ear defect (CPT 89469)      Electronically signed by Artie Hinds MD on 3/17/2023 at 10:22 AM

## 2023-03-17 NOTE — ANESTHESIA PRE PROCEDURE
Department of Anesthesiology  Preprocedure Note       Name:  Sivan Taylor   Age:  80 y.o.  :  1940                                          MRN:  [id number]         Date:  3/17/2023      Surgeon: Chad Luque):  Benji Perez MD    Procedure: Procedure(s):  MOHS REPAIR BCC LEFT EAR TRIANGULAR FOSSA    Medications prior to admission:   Prior to Admission medications    Medication Sig Start Date End Date Taking? Authorizing Provider   metoprolol tartrate (LOPRESSOR) 50 MG tablet Take 0.5 tablets by mouth 2 times daily 3/2/23   Marybel Hutchinson MD   ezetimibe (ZETIA) 10 MG tablet TAKE 1 TABLET BY MOUTH EVERY DAY 3/2/23   Marybel Hutchinson MD   diclofenac sodium (VOLTAREN) 1 % GEL Apply 2 g topically 4 times daily Apply to left elbow 4 times daily 23   DEMAR Sumner - CNP   atorvastatin (LIPITOR) 40 MG tablet TAKE 1 TABLET BY MOUTH EVERYDAY AT BEDTIME 23   Marybel Hutchinson MD   levothyroxine (SYNTHROID) 137 MCG tablet TAKE 1 TABLET BY MOUTH EVERY DAY 23   Marybel Hutchinson MD   KLOR-CON M10 10 MEQ extended release tablet TAKE 1 TABLET BY MOUTH EVERY DAY 22   Marybel Hutchinson MD   pregabalin (LYRICA) 50 MG capsule TAKE 1 CAPSULE BY MOUTH TWICE A DAY 10/31/22 3/10/23  Almita Monroy MD   clopidogrel (PLAVIX) 75 MG tablet TAKE 1 TABLET BY MOUTH EVERY DAY 10/20/22   Hortencia Pro MD   furosemide (LASIX) 20 MG tablet TAKE 1 TABLET BY MOUTH EVERY DAY 22   Marybel Hutchinson MD   DULoxetine (CYMBALTA) 60 MG extended release capsule Take 1 capsule by mouth in the morning. 8/3/22   Almita Monroy MD   Multiple Vitamins-Minerals (PRESERVISION AREDS 2+MULTI VIT PO) Take 2 tablets by mouth daily    Historical Provider, MD   nitroGLYCERIN (NITROSTAT) 0.4 MG SL tablet Place 1 tablet under the tongue every 5 minutes as needed for Chest pain 19   Almita Monroy MD   b complex vitamins capsule Take 1 capsule by mouth daily    Historical Provider, MD   aspirin 81 MG tablet Take 81 mg by mouth daily. Historical Provider, MD       Current medications:    No current facility-administered medications for this encounter.       Allergies:    Allergies   Allergen Reactions   • Tape [Adhesive Tape]      Surgical tape-rash       Problem List:    Patient Active Problem List   Diagnosis Code   • CAD in native artery I25.10   • Hyperlipidemia E78.5   • Hypertension I10   • Hypothyroidism E03.9   • Claudication ([facility]) I73.9   • CKD (chronic kidney disease) stage 3, GFR 30-59 ml/min ([facility]) N18.30   • BCG ROXANE C67.9   • Bladder cancer ([facility]) C67.9   • Subcutaneous cyst L72.9   • Pseudoaneurysm of right femoral artery ([facility]) I72.4   • Spinal stenosis of lumbar region M48.061   • Macular degeneration H35.30   • Abnormal CT of the chest R93.89   • Pain in penis and bladder N48.89   • Peripheral polyneuropathy G62.9   • History of malignant neoplasm of bladder Z85.51   • Attention to urostomy ([facility]) Z43.6   • Peristomal skin complication [id number]   • Lymphedema I89.0   • Hearing loss of both ears due to cerumen impaction H61.23   • Vertigo R42   • Tinnitus aurium, right H93.11   • H/O total cystectomy Z90.6   • Malignant neoplasm without specification of site ([facility]) C80.1   • 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   • AAA (abdominal aortic aneurysm) I71.40   • Carotid artery stenosis I65.29   • STEMI involving oth coronary artery of anterior wall ([facility]) I21.09   • Ischemic cardiomyopathy I25.5   • Unstable angina ([facility]) I20.0       Past Medical History:        Diagnosis Date   • AAA (abdominal aortic aneurysm)     intravascular stent   • BCG ROXANE 05/21/2019   • Bladder cancer ([facility])    • BPH (benign prostatic hypertrophy)    • CAD (coronary artery disease)    • Cancer of skin    • Carotid artery stenosis    • Chronic back pain    • Chronic kidney disease    • Congenital absence of one kidney     absent right kidney   • History of lumbar fusion 03/2015    mid lower back down   • Hyperlipidemia    • Hypertension    •  Hypothyroidism     Obesity     Presence of urostomy ([facility] [address]     Sciatica     TIA (transient ischemic attack) 2008    Unspecified cerebral artery occlusion with cerebral infarction     TIA    UTI (urinary tract infection)        Past Surgical History:        Procedure Laterality Date    ABDOMINAL AORTIC ANEURYSM REPAIR  2010    Shireen    ABDOMINAL AORTIC ANEURYSM REPAIR  2015    Rogerio Rodriguez CARDIAC CATHETERIZATION      had two stints put in    [facility]    CORONARY ANGIOPLASTY WITH STENT PLACEMENT      2 stents    FEMORAL-FEMORAL BYPASS GRAFT  2017    Dr. Luzma Blanc    arm    KNEE ARTHROSCOPY Left 2008    meniscus tear    LUMBAR LAMINECTOMY  2016    [facility]--Dr. Harris Guardian    OTHER SURGICAL HISTORY  2015    melanoma removal from right chest    OTHER SURGICAL HISTORY  2016    I and D, exicison of posterior neck cyst    CT OFFICE/OUTPT VISIT,PROCEDURE ONLY N/A 2018    TURBT performed by Gem Reilly MD at [facility] 2019    ROBOTIC RADICAL CYSTOPROSTATECTOMY performed by Gem Reilly MD at [address]  10/2016    Dr [facility]   [facility]  2020    on wrist     TESTICLE REMOVAL  's    TUNNELED VENOUS PORT PLACEMENT      TURP  2014    See note of Dr. Emily Holley for OR procedure details       Social History:    Social History     Tobacco Use    Smoking status: Former     Packs/day: 1.00     Years: 24.00     Pack years: 24.00     Types: Cigarettes     Start date:      Quit date: 1979     Years since quittin.9    Smokeless tobacco: Never   Substance Use Topics    Alcohol use: No     Alcohol/week: 0.0 standard drinks                                Counseling given: Not Answered      Vital Signs (Current):   Vitals:    03/10/23 1230   Weight: 188 lb (85.3 kg)   Height: 5' 7\" (1.702 m) BP Readings from Last 3 Encounters:   03/02/23 (!) 140/70   02/23/23 100/60   02/20/23 122/80       NPO Status:                                                                                 BMI:   Wt Readings from Last 3 Encounters:   03/10/23 188 lb (85.3 kg)   03/02/23 201 lb (91.2 kg)   02/23/23 202 lb (91.6 kg)     Body mass index is 29.44 kg/m². CBC:   Lab Results   Component Value Date/Time    WBC 21.5 03/03/2023 11:35 AM    RBC 5.23 03/03/2023 11:35 AM    RBC 4.80 09/29/2022 09:07 AM    HGB 15.8 03/03/2023 11:35 AM    HCT 49.1 03/03/2023 11:35 AM    MCV 93.9 03/03/2023 11:35 AM    RDW 13.1 09/29/2022 09:07 AM     03/03/2023 11:35 AM       CMP:   Lab Results   Component Value Date/Time     03/03/2023 11:35 AM    K 4.5 03/03/2023 11:35 AM    K 3.8 02/26/2022 06:13 PM     03/03/2023 11:35 AM    CO2 28 03/03/2023 11:35 AM    BUN 29 03/03/2023 11:35 AM    CREATININE 1.2 03/03/2023 11:35 AM    LABGLOM 60 03/03/2023 11:35 AM    GLUCOSE 167 03/03/2023 11:35 AM    GLUCOSE 101 09/29/2022 09:07 AM    PROT 6.2 02/13/2023 09:59 AM    CALCIUM 9.5 03/03/2023 11:35 AM    BILITOT 0.9 02/13/2023 09:59 AM    BILITOT NEGATIVE 10/26/2018 09:35 AM    ALKPHOS 136 02/13/2023 09:59 AM    AST 27 02/13/2023 09:59 AM    ALT 37 02/13/2023 09:59 AM       POC Tests: No results for input(s): POCGLU, POCNA, POCK, POCCL, POCBUN, POCHEMO, POCHCT in the last 72 hours.     Coags:   Lab Results   Component Value Date/Time    INR 1.03 02/26/2022 03:55 PM    APTT 41.0 02/26/2022 03:55 PM       HCG (If Applicable): No results found for: PREGTESTUR, PREGSERUM, HCG, HCGQUANT     ABGs: No results found for: PHART, PO2ART, LIY4DOT, KTR5JZO, BEART, P1MSGFOS     Type & Screen (If Applicable):  Lab Results   Component Value Date    LABRH POS 05/01/2019       Drug/Infectious Status (If Applicable):  No results found for: HIV, HEPCAB    COVID-19 Screening (If Applicable):   Lab Results   Component Value Date/Time    COVID19 Not-Detected 11/10/2021 01:20 PM           Anesthesia Evaluation    Airway: Mallampati: II  TM distance: >3 FB   Neck ROM: full  Mouth opening: > = 3 FB   Dental:          Pulmonary: breath sounds clear to auscultation                             Cardiovascular:    (+) hypertension:, CAD:,         Rhythm: regular                      Neuro/Psych:   (+) CVA:,             GI/Hepatic/Renal:             Endo/Other:    (+) hypothyroidism::., .                 Abdominal:             Vascular: Other Findings:           Anesthesia Plan      MAC     ASA 3     (Coronary stent less than a year ago)  Induction: intravenous. Anesthetic plan and risks discussed with patient and spouse. Plan discussed with CRNA.                     Lior Perez MD   3/17/2023

## 2023-03-17 NOTE — PROGRESS NOTES
1045-Patient to Phase II in chair. Report received from [employer]. Patient denies pain and nausea. Patient with head wrap in place. Graft site in place over left upper clavicle. No drainage from either site. Patient instructed on call light use. Denies needs. Family in room. 1050-Patient provided with snack and drink. Denies needs. 1110-IV removed with no complications. Discharge instructions reviewed. Verbalized understanding. Patient getting dressed at bedside. 1120-Patient meets discharge criteria. Discharged in stable condition with responsible . All belongings given to patient. Patient ambulated to car with assistance from RN. Patient tolerated well.

## 2023-03-17 NOTE — PROGRESS NOTES
DRESSING- LEFT CHEST- SKIN GLUE            LEFT EAR- BACITRACIN, MOIST COTTON BALL, XERFORM, [facility], [address] [place] [address], ACE WRAP

## 2023-03-17 NOTE — DISCHARGE INSTRUCTIONS
POST OPERATIVE INSTRUCTION SHEET  SKIN TUMOR/LESION REMOVAL          Activity:    No strenuous activity for 48 hours  No activity that stresses the suture closure/incision  Regular diet  ABSOLUTELY NO NICOTINE OF ANY TYPE    Wound Care:  Leave head wrap in place for 48 hours. After 48 hours, you may remove ace wrap and gauze. Leave yellow dressing on ear in place. Do not get yellow dressing wet. Dermabond was used on your upper chest, do not scrub, rub or pick at adhesive glue  Recommend sleeping in a recliner or with head elevated for next 2-3 nights. Limitations:  No swimming, hot tub, sauna or soaking in a bathtub    Prescriptions: Take exactly as prescribed  Continue antibiotic as prescribed  May take tylenol OR tylenol with codeine. DO NOT TAKE BOTH. Follow-Up:March 23 at 3:30 PM      Notify our office if you experience any of the following:   Develop a fever (temperature is greater than 100.5F)   Develop redness greater than 1 cm around incision or red streaks up  extremity   Have any excess bleeding/ increased drainage or swelling at the   incision site    *You may resume Plavix and aspirin on Sunday, if held pre operatively and if medically able to hold that long  *A prescription for Tylenol #3 was sent to your pharmacy      Surgical Site Infections      How can we work together to prevent Surgical Site Infections? We would like to thank you for choosing [facility] for your Surgical Care. Below you will find helpful information on how we can work together to prevent Surgical Site Infections. What is a Surgical Site Infection (SSI)? A surgical site infection is an infection that occurs after surgery in the part of the body where the surgery took place. Most patients who have surgery do not develop an infection. However, infections develop in about 1 to 3 out of every 100 patients who have surgery.   Some of the common symptoms of a surgical site infection are:  Redness and pain around the area where you had surgery  Drainage of cloudy fluid from your surgical wound  Fever    Can SSIs be treated? Yes. Most surgical site infections can be treated with antibiotics. The antibiotic given to you depends on the bacteria (germs) causing the infection. Sometimes patients with SSIs also need another surgery to treat the infection. What are some of the things that hospitals are doing to prevent SSIs? To prevent SSIs, doctors, nurses, and other healthcare providers: May remove some of your hair immediately before your surgery using electric clippers if the hair is in the same area where the procedure will occur. They should not shave you with a razor. Give you antibiotics before your surgery starts. In most cases, you should get antibiotics within 60 minutes before the surgery starts and the antibiotics should be stopped within 24 hours after surgery. Clean the skin at the site of your surgery with a special soap that kills germs. Clean their hands and arms up to their elbows with an antiseptic agent just before the surgery. Wear special hair covers, masks, gowns, and gloves during surgery to  keep the surgery area clean. Clean their hands with soap and water or an alcohol-based hand rub before and after caring for each patient. If you do not see your providers clean their hands, please     ask  them to do so. What can I do to help prevent SSIs? Before your surgery:  Tell your doctor about other medical problems you may have. Health problems such as allergies, diabetes, and obesity could affect your surgery and your treatment. Quit smoking. Patients who smoke get more infections. Talk to your doctor about how you can quit before your surgery. Do not shave near where you will have surgery. Shaving with a razor can irritate your skin and make it easier to develop an infection.   At the time of your surgery:  Speak up if someone tries to shave you with a razor before surgery. Ask why you need to be shaved and talk with your surgeon if you have any concerns. Ask if you will get antibiotics before surgery. After your surgery:  Make sure that your healthcare providers clean their hands before examining you, either with soap and water or an alcohol-based hand rub. Family and friends who visit you should not touch the surgical wound or dressings. Family and friends should clean their hands with soap and water or an alcohol-based hand rub before and after visiting you. If you do not see them clean their hands, ask them to clean their hands. What do I need to do when I go home from the hospital?  Before you go home, your doctor or nurse should explain everything you need to know about taking care of your wound. Make sure you understand how to care for your wound before you leave the hospital.  Always clean your hands before and after caring for your wound. Before you go home, make sure you know who to contact if you have questions or problems after you get home. If you have any symptoms of an infection, such as redness and pain at the surgery site, drainage, or fever, call your doctor immediately. If you have additional questions, please ask your doctor or nurse.

## 2023-03-17 NOTE — ANESTHESIA POSTPROCEDURE EVALUATION
Department of Anesthesiology  Postprocedure Note    Patient: Tony Yu  MRN: [id number]  YOB: 1940  Date of evaluation: 3/17/2023      Procedure Summary     Date: 03/17/23 Room / Location: [address]    Anesthesia Start: 0946 Anesthesia Stop: 1043    Procedure: MOHS REPAIR BCC LEFT EAR TRIANGULAR FOSSA WITH FULL THICKNESS SKIN GRAFT LEFT UPPER CHEST (Left: Ear) Diagnosis:       Basal cell carcinoma (BCC) of skin of left ear      (Basal cell carcinoma (BCC) of skin of left ear [C44.219])    Surgeons: Kiran Momin MD Responsible Provider: Inocente Lawrence MD    Anesthesia Type: MAC ASA Status: 3          Anesthesia Type: No value filed.    Ishan Phase I:      Ishan Phase II: Ishan Score: 9      Anesthesia Post Evaluation    Patient location during evaluation: bedside  Patient participation: complete - patient participated  Level of consciousness: awake  Airway patency: patent  Nausea & Vomiting: no vomiting and no nausea  Complications: no  Cardiovascular status: hemodynamically stable  Respiratory status: acceptable  Hydration status: stable

## 2023-03-21 ENCOUNTER — OFFICE VISIT (OUTPATIENT)
Dept: CARDIOLOGY CLINIC | Age: 83
End: 2023-03-21
Payer: MEDICARE

## 2023-03-21 VITALS
SYSTOLIC BLOOD PRESSURE: 138 MMHG | WEIGHT: 197.2 LBS | HEART RATE: 71 BPM | HEIGHT: 67 IN | DIASTOLIC BLOOD PRESSURE: 77 MMHG | BODY MASS INDEX: 30.95 KG/M2

## 2023-03-21 DIAGNOSIS — I71.40 ABDOMINAL AORTIC ANEURYSM (AAA) WITHOUT RUPTURE, UNSPECIFIED PART (HCC): ICD-10-CM

## 2023-03-21 DIAGNOSIS — I25.10 CAD IN NATIVE ARTERY: ICD-10-CM

## 2023-03-21 DIAGNOSIS — I73.9 PAD (PERIPHERAL ARTERY DISEASE) (HCC): Primary | ICD-10-CM

## 2023-03-21 PROCEDURE — 1123F ACP DISCUSS/DSCN MKR DOCD: CPT | Performed by: INTERNAL MEDICINE

## 2023-03-21 PROCEDURE — 99214 OFFICE O/P EST MOD 30 MIN: CPT | Performed by: INTERNAL MEDICINE

## 2023-03-21 PROCEDURE — 3078F DIAST BP <80 MM HG: CPT | Performed by: INTERNAL MEDICINE

## 2023-03-21 PROCEDURE — 3075F SYST BP GE 130 - 139MM HG: CPT | Performed by: INTERNAL MEDICINE

## 2023-03-21 NOTE — PROGRESS NOTES
[address] [facility] [place] [address] [facility] [address]  Dept: [phone]  Dept Fax: [phone]  Loc: [phone]    Visit Date: 3/21/2023    Mr. Demetrius Sahu is a 80 y.o. male  who presented for:  H/o STEMI  CAD  HPI:   ESTHELA Azevedo is a pleasant 80year old male patient who  has a past medical history of AAA (abdominal aortic aneurysm), BCG ROXANE, Bladder cancer ([facility] [address] BPH (benign prostatic hypertrophy), CAD (coronary artery disease), Cancer of skin, Carotid artery stenosis, Chronic back pain, Chronic kidney disease, Congenital absence of one kidney, History of lumbar fusion, Hyperlipidemia, Hypertension, Hypothyroidism, Obesity, Presence of urostomy ([facility] [address] Sciatica, TIA (transient ischemic attack), Unspecified cerebral artery occlusion with cerebral infarction, and UTI (urinary tract infection). Patient has h/o CAD, prior PCI of RCA in 2011. He had prior graft repair for AAA. On 2/2022, patient was admitted for Anterior wall STEMI. He was found to have subtotal LAD occlusion for which PCI/QUINN was completed, LCX 80% stenos (PCI/QUINN completed), RCA had mild ISR. Echocardiogram 2/2022 revealed an EF of 60-65%. Patient denies chest pain. He uses his cane for ambulation, reports some in legs. Has mild dyspnea on exertion.       Current Outpatient Medications:     metoprolol tartrate (LOPRESSOR) 50 MG tablet, Take 0.5 tablets by mouth 2 times daily, Disp: 180 tablet, Rfl: 3    ezetimibe (ZETIA) 10 MG tablet, TAKE 1 TABLET BY MOUTH EVERY DAY, Disp: 90 tablet, Rfl: 3    diclofenac sodium (VOLTAREN) 1 % GEL, Apply 2 g topically 4 times daily Apply to left elbow 4 times daily, Disp: 150 g, Rfl: 1    atorvastatin (LIPITOR) 40 MG tablet, TAKE 1 TABLET BY MOUTH EVERYDAY AT BEDTIME, Disp: 90 tablet, Rfl: 3    levothyroxine (SYNTHROID) 137 MCG tablet, TAKE 1 TABLET BY MOUTH EVERY DAY, Disp: 90 tablet, Rfl: 3    KLOR-CON M10 10 MEQ extended release tablet,

## 2023-03-21 NOTE — PROGRESS NOTES
Pt here for a 6 month f/u    EKG done 3-3-23    Pt c/o RYAN    Pt denies cp, sob, dizziness and palpitations

## 2023-03-27 ENCOUNTER — HOSPITAL ENCOUNTER (OUTPATIENT)
Dept: INTERVENTIONAL RADIOLOGY/VASCULAR | Age: 83
Discharge: HOME | End: 2023-03-27
Payer: MEDICARE

## 2023-03-27 DIAGNOSIS — I71.40 ABDOMINAL AORTIC ANEURYSM (AAA) WITHOUT RUPTURE, UNSPECIFIED PART (HCC): ICD-10-CM

## 2023-03-27 DIAGNOSIS — I25.10 CAD IN NATIVE ARTERY: ICD-10-CM

## 2023-03-27 DIAGNOSIS — I73.9 PAD (PERIPHERAL ARTERY DISEASE) (HCC): ICD-10-CM

## 2023-03-27 PROCEDURE — 93925 LOWER EXTREMITY STUDY: CPT

## 2023-03-28 ENCOUNTER — TELEPHONE (OUTPATIENT)
Dept: CARDIOLOGY CLINIC | Age: 83
End: 2023-03-28

## 2023-04-17 ENCOUNTER — HOSPITAL ENCOUNTER (OUTPATIENT)
Dept: CT IMAGING | Age: 83
Discharge: HOME | End: 2023-04-17
Payer: MEDICARE

## 2023-04-17 DIAGNOSIS — Z95.828 STATUS POST AORTOBIFEMORAL BYPASS SURGERY: ICD-10-CM

## 2023-04-17 DIAGNOSIS — Z86.79 PERSONAL HISTORY OF CARDIOVASCULAR DISEASE: ICD-10-CM

## 2023-04-17 DIAGNOSIS — I71.40 ABDOMINAL AORTIC ANEURYSM (AAA) WITHOUT RUPTURE, UNSPECIFIED PART (HCC): ICD-10-CM

## 2023-04-17 DIAGNOSIS — I73.9 PERIPHERAL VASCULAR DISEASE, UNSPECIFIED (HCC): ICD-10-CM

## 2023-04-17 LAB — POC CREATININE WHOLE BLOOD: 1.3 MG/DL (ref 0.5–1.2)

## 2023-04-17 PROCEDURE — 82565 ASSAY OF CREATININE: CPT

## 2023-04-17 PROCEDURE — 6360000004 HC RX CONTRAST MEDICATION: Performed by: THORACIC SURGERY (CARDIOTHORACIC VASCULAR SURGERY)

## 2023-04-17 PROCEDURE — 75635 CT ANGIO ABDOMINAL ARTERIES: CPT

## 2023-04-17 RX ADMIN — IOPAMIDOL 120 ML: 755 INJECTION, SOLUTION INTRAVENOUS at 13:32

## 2023-04-24 RX ORDER — CLOPIDOGREL BISULFATE 75 MG/1
TABLET ORAL
Qty: 90 TABLET | Refills: 1 | Status: SHIPPED | OUTPATIENT
Start: 2023-04-24

## 2023-05-22 LAB
CHOLESTEROL, TOTAL: 107 MG/DL
CHOLESTEROL/HDL RATIO: ABNORMAL
HDLC SERPL-MCNC: 28 MG/DL (ref 35–70)
LDL CHOLESTEROL CALCULATED: 61 MG/DL (ref 0–160)
NONHDLC SERPL-MCNC: ABNORMAL MG/DL
TRIGL SERPL-MCNC: 94 MG/DL
VLDLC SERPL CALC-MCNC: ABNORMAL MG/DL

## 2023-06-01 ENCOUNTER — TELEPHONE (OUTPATIENT)
Dept: FAMILY MEDICINE CLINIC | Age: 83
End: 2023-06-01

## 2023-06-01 DIAGNOSIS — E03.4 HYPOTHYROIDISM DUE TO ACQUIRED ATROPHY OF THYROID: Primary | Chronic | ICD-10-CM

## 2023-06-01 RX ORDER — LEVOTHYROXINE SODIUM 0.15 MG/1
150 TABLET ORAL DAILY
Qty: 30 TABLET | Refills: 1 | Status: SHIPPED | OUTPATIENT
Start: 2023-06-01

## 2023-06-01 NOTE — TELEPHONE ENCOUNTER
Patient called requesting results of labs done at the [place] on 5/22. Wife says he has been sluggish and tired all of the time.

## 2023-06-01 NOTE — TELEPHONE ENCOUNTER
Chris Eckert was notified of changes, she would like the new script sent to [place] . Lab order mailed to her to repeat in 6 weeks.

## 2023-06-01 NOTE — TELEPHONE ENCOUNTER
TSH is high need to increase dose of thyroid medication. Levothyroxine 150 mcg daily, 30, 1 refill.   Repeat TSH free T4 in 6 weeks

## 2023-06-23 RX ORDER — LEVOTHYROXINE SODIUM 0.15 MG/1
TABLET ORAL
Qty: 30 TABLET | Refills: 1 | OUTPATIENT
Start: 2023-06-23

## 2023-06-26 RX ORDER — LEVOTHYROXINE SODIUM 137 UG/1
TABLET ORAL
Qty: 90 TABLET | Refills: 3 | OUTPATIENT
Start: 2023-06-26

## 2023-07-08 LAB
T4 FREE: 2.05 NG/DL (ref 0.8–1.9)
TSH SERPL DL<=0.05 MIU/L-ACNC: 0.34 UIU/ML (ref 0.4–4.1)

## 2023-07-12 ENCOUNTER — TELEPHONE (OUTPATIENT)
Dept: FAMILY MEDICINE CLINIC | Age: 83
End: 2023-07-12

## 2023-07-12 DIAGNOSIS — E03.4 HYPOTHYROIDISM DUE TO ACQUIRED ATROPHY OF THYROID: Primary | ICD-10-CM

## 2023-07-12 RX ORDER — LEVOTHYROXINE SODIUM 137 UG/1
137 TABLET ORAL DAILY
Qty: 30 TABLET | Refills: 1 | Status: SHIPPED | OUTPATIENT
Start: 2023-07-12

## 2023-07-12 NOTE — TELEPHONE ENCOUNTER
Free T4 elevated TSH low recommend decreasing back to 137 mcg daily, 30, 1 refill repeat TSH and free T4 in 6 weeks

## 2023-07-25 ENCOUNTER — OFFICE VISIT (OUTPATIENT)
Dept: FAMILY MEDICINE CLINIC | Age: 83
End: 2023-07-25
Payer: MEDICARE

## 2023-07-25 VITALS
OXYGEN SATURATION: 94 % | TEMPERATURE: 97.9 F | DIASTOLIC BLOOD PRESSURE: 60 MMHG | HEART RATE: 71 BPM | RESPIRATION RATE: 16 BRPM | WEIGHT: 193 LBS | BODY MASS INDEX: 30.23 KG/M2 | SYSTOLIC BLOOD PRESSURE: 116 MMHG

## 2023-07-25 DIAGNOSIS — E03.4 HYPOTHYROIDISM DUE TO ACQUIRED ATROPHY OF THYROID: Chronic | ICD-10-CM

## 2023-07-25 DIAGNOSIS — M79.10 MYALGIA: Primary | ICD-10-CM

## 2023-07-25 DIAGNOSIS — E78.00 PURE HYPERCHOLESTEROLEMIA: Chronic | ICD-10-CM

## 2023-07-25 DIAGNOSIS — G47.00 INSOMNIA, UNSPECIFIED TYPE: ICD-10-CM

## 2023-07-25 DIAGNOSIS — I25.5 ISCHEMIC CARDIOMYOPATHY: ICD-10-CM

## 2023-07-25 DIAGNOSIS — R60.9 DEPENDENT EDEMA: ICD-10-CM

## 2023-07-25 DIAGNOSIS — I71.40 ABDOMINAL AORTIC ANEURYSM (AAA) WITHOUT RUPTURE, UNSPECIFIED PART (HCC): ICD-10-CM

## 2023-07-25 PROCEDURE — 3078F DIAST BP <80 MM HG: CPT | Performed by: FAMILY MEDICINE

## 2023-07-25 PROCEDURE — 1123F ACP DISCUSS/DSCN MKR DOCD: CPT | Performed by: FAMILY MEDICINE

## 2023-07-25 PROCEDURE — 3074F SYST BP LT 130 MM HG: CPT | Performed by: FAMILY MEDICINE

## 2023-07-25 PROCEDURE — 99214 OFFICE O/P EST MOD 30 MIN: CPT | Performed by: FAMILY MEDICINE

## 2023-07-25 RX ORDER — BUMETANIDE 1 MG/1
1 TABLET ORAL DAILY
Qty: 30 TABLET | Refills: 5 | Status: ON HOLD | OUTPATIENT
Start: 2023-07-25

## 2023-07-26 ASSESSMENT — ENCOUNTER SYMPTOMS
EYE PAIN: 0
DIARRHEA: 0
RHINORRHEA: 0
COUGH: 0
SHORTNESS OF BREATH: 0
SINUS PRESSURE: 0
NAUSEA: 0
SORE THROAT: 0
CONSTIPATION: 0
ABDOMINAL DISTENTION: 0
ABDOMINAL PAIN: 0

## 2023-07-26 NOTE — PROGRESS NOTES
[address] MEDICINE  [address] [facility] [address]  Dept: [phone]  Dept Fax: [phone]  Loc: [phone]  PROGRESS NOTE      VisitDate: 7/25/2023    Savana Miller is a 80 y.o. male who presents today for:  Chief Complaint   Patient presents with    Anxiety     Anxiety/depression, insomnia x3-4 days-goes to sleep and wakes in an hour or 2, cymbalta did not work for him    Edema     BLE edema, rash on front of left leg, painful, walking difficulty    Cough     Dry Cough with sob, started during insomnia        Impression/Plan:  1. Myalgia    2. Ischemic cardiomyopathy    3. Abdominal aortic aneurysm (AAA) without rupture, unspecified part ([address])    4. Hypothyroidism due to acquired atrophy of thyroid    5. Pure hypercholesterolemia    6. Dependent edema    7. Insomnia, unspecified type      Requested Prescriptions     Signed Prescriptions Disp Refills    bumetanide (BUMEX) 1 MG tablet 30 tablet 5     Sig: Take 1 tablet by mouth daily     No orders of the defined types were placed in this encounter. Stop Lasix start Bumex. Hold cholesterol medicine for 2 weeks monitor for response. Short-term follow-up we will see if sleep improves with the change      Subjective:  HPI  Patient comes in history of nausea aneurysm ischemic retinopathy hypothyroidism hyperlipidemia because been experiencing increasing swelling in his lower extremities. Also had increasing muscle soreness and pain. Difficulty sleeping. Review of Systems   Constitutional:  Negative for appetite change and fever. HENT:  Negative for congestion, ear pain, postnasal drip, rhinorrhea, sinus pressure and sore throat. Eyes:  Negative for pain and visual disturbance. Respiratory:  Negative for cough and shortness of breath. Cardiovascular:  Positive for leg swelling. Negative for chest pain.    Gastrointestinal:  Negative for abdominal distention, abdominal pain, constipation,

## 2023-07-27 ENCOUNTER — TELEPHONE (OUTPATIENT)
Dept: FAMILY MEDICINE CLINIC | Age: 83
End: 2023-07-27

## 2023-07-27 DIAGNOSIS — R05.1 ACUTE COUGH: ICD-10-CM

## 2023-07-27 DIAGNOSIS — R06.02 SOB (SHORTNESS OF BREATH): Primary | ICD-10-CM

## 2023-07-27 RX ORDER — MIRTAZAPINE 7.5 MG/1
7.5 TABLET, FILM COATED ORAL NIGHTLY
Qty: 30 TABLET | Refills: 2 | Status: ON HOLD | OUTPATIENT
Start: 2023-07-27

## 2023-07-27 NOTE — TELEPHONE ENCOUNTER
Dee was informed.  CXR order is in epic and Remeron Rx was called to [facility] pharmacist at [address]

## 2023-07-27 NOTE — TELEPHONE ENCOUNTER
Tried to call patient, left voice mail that we would try again before 5:00 pm. If we did not reach her, she is to call us tomorrow after 8:00 am.

## 2023-07-27 NOTE — TELEPHONE ENCOUNTER
Dee states you saw pt two days ago, started on bumex, swelling has improved in left leg vs right, mild sob when up. States he's still not sleeping, he dozes off, wakes up being anxious. States the cough is worse more so at night when lays down. No appetite. She wondering if he was having a reaction to the bumex and wanted to know what to take for insomnia and cough.        Call Dee back

## 2023-07-28 ENCOUNTER — HOSPITAL ENCOUNTER (INPATIENT)
Age: 83
LOS: 4 days | Discharge: HOME | DRG: 871 | End: 2023-08-01
Admitting: INTERNAL MEDICINE
Payer: MEDICARE

## 2023-07-28 ENCOUNTER — APPOINTMENT (OUTPATIENT)
Dept: GENERAL RADIOLOGY | Age: 83
DRG: 871 | End: 2023-07-28
Payer: MEDICARE

## 2023-07-28 DIAGNOSIS — R09.02 HYPOXEMIA: ICD-10-CM

## 2023-07-28 DIAGNOSIS — E03.4 HYPOTHYROIDISM DUE TO ACQUIRED ATROPHY OF THYROID: Chronic | ICD-10-CM

## 2023-07-28 DIAGNOSIS — J18.9 ACUTE PNEUMONIA: Primary | ICD-10-CM

## 2023-07-28 LAB
ANION GAP SERPL CALC-SCNC: 14 MEQ/L (ref 8–16)
BASOPHILS ABSOLUTE: 0.1 THOU/MM3 (ref 0–0.1)
BASOPHILS NFR BLD AUTO: 0.4 %
BUN SERPL-MCNC: 14 MG/DL (ref 7–22)
CALCIUM SERPL-MCNC: 9.2 MG/DL (ref 8.5–10.5)
CHLORIDE SERPL-SCNC: 97 MEQ/L (ref 98–111)
CO2 SERPL-SCNC: 24 MEQ/L (ref 23–33)
CREAT SERPL-MCNC: 1 MG/DL (ref 0.4–1.2)
DEPRECATED RDW RBC AUTO: 46.7 FL (ref 35–45)
EOSINOPHIL NFR BLD AUTO: 0.4 %
EOSINOPHILS ABSOLUTE: 0.1 THOU/MM3 (ref 0–0.4)
ERYTHROCYTE [DISTWIDTH] IN BLOOD BY AUTOMATED COUNT: 13.8 % (ref 11.5–14.5)
FLUAV RNA RESP QL NAA+PROBE: NOT DETECTED
FLUBV RNA RESP QL NAA+PROBE: NOT DETECTED
GFR SERPL CREATININE-BSD FRML MDRD: > 60 ML/MIN/1.73M2
GLUCOSE SERPL-MCNC: 97 MG/DL (ref 70–108)
HCT VFR BLD AUTO: 46.3 % (ref 42–52)
HGB BLD-MCNC: 15.2 GM/DL (ref 14–18)
IMM GRANULOCYTES # BLD AUTO: 0.1 THOU/MM3 (ref 0–0.07)
IMM GRANULOCYTES NFR BLD AUTO: 0.6 %
LACTIC ACID, SEPSIS: 1.9 MMOL/L (ref 0.5–1.9)
LACTIC ACID, SEPSIS: 2.1 MMOL/L (ref 0.5–1.9)
LYMPHOCYTES ABSOLUTE: 2.9 THOU/MM3 (ref 1–4.8)
LYMPHOCYTES NFR BLD AUTO: 17.2 %
MAGNESIUM SERPL-MCNC: 1.9 MG/DL (ref 1.6–2.4)
MCH RBC QN AUTO: 30.3 PG (ref 26–33)
MCHC RBC AUTO-ENTMCNC: 32.8 GM/DL (ref 32.2–35.5)
MCV RBC AUTO: 92.4 FL (ref 80–94)
MONOCYTES ABSOLUTE: 1.9 THOU/MM3 (ref 0.4–1.3)
MONOCYTES NFR BLD AUTO: 11.5 %
NEUTROPHILS NFR BLD AUTO: 69.9 %
NRBC BLD AUTO-RTO: 0 /100 WBC
NT-PROBNP SERPL IA-MCNC: 2206 PG/ML (ref 0–449)
OSMOLALITY SERPL CALC.SUM OF ELEC: 270.5 MOSMOL/KG (ref 275–300)
PLATELET # BLD AUTO: 230 THOU/MM3 (ref 130–400)
PMV BLD AUTO: 9.7 FL (ref 9.4–12.4)
POTASSIUM SERPL-SCNC: 4.1 MEQ/L (ref 3.5–5.2)
PROCALCITONIN SERPL IA-MCNC: 0.06 NG/ML (ref 0.01–0.09)
RBC # BLD AUTO: 5.01 MILL/MM3 (ref 4.7–6.1)
SARS-COV-2 RNA RESP QL NAA+PROBE: NOT DETECTED
SEGMENTED NEUTROPHILS ABSOLUTE COUNT: 11.7 THOU/MM3 (ref 1.8–7.7)
SODIUM SERPL-SCNC: 135 MEQ/L (ref 135–145)
TROPONIN, HIGH SENSITIVITY: 38 NG/L (ref 0–12)
TROPONIN, HIGH SENSITIVITY: 41 NG/L (ref 0–12)
TROPONIN, HIGH SENSITIVITY: 42 NG/L (ref 0–12)
WBC # BLD AUTO: 16.8 THOU/MM3 (ref 4.8–10.8)

## 2023-07-28 PROCEDURE — 87636 SARSCOV2 & INF A&B AMP PRB: CPT

## 2023-07-28 PROCEDURE — 6360000002 HC RX W HCPCS

## 2023-07-28 PROCEDURE — 2580000003 HC RX 258

## 2023-07-28 PROCEDURE — 2580000003 HC RX 258: Performed by: PHYSICIAN ASSISTANT

## 2023-07-28 PROCEDURE — 6370000000 HC RX 637 (ALT 250 FOR IP): Performed by: NURSE PRACTITIONER

## 2023-07-28 PROCEDURE — 93010 ELECTROCARDIOGRAM REPORT: CPT | Performed by: INTERNAL MEDICINE

## 2023-07-28 PROCEDURE — 94761 N-INVAS EAR/PLS OXIMETRY MLT: CPT

## 2023-07-28 PROCEDURE — 84484 ASSAY OF TROPONIN QUANT: CPT

## 2023-07-28 PROCEDURE — 71045 X-RAY EXAM CHEST 1 VIEW: CPT

## 2023-07-28 PROCEDURE — 83605 ASSAY OF LACTIC ACID: CPT

## 2023-07-28 PROCEDURE — 6360000002 HC RX W HCPCS: Performed by: NURSE PRACTITIONER

## 2023-07-28 PROCEDURE — 93005 ELECTROCARDIOGRAM TRACING: CPT | Performed by: NURSE PRACTITIONER

## 2023-07-28 PROCEDURE — 87040 BLOOD CULTURE FOR BACTERIA: CPT

## 2023-07-28 PROCEDURE — 83735 ASSAY OF MAGNESIUM: CPT

## 2023-07-28 PROCEDURE — 2500000003 HC RX 250 WO HCPCS: Performed by: NURSE PRACTITIONER

## 2023-07-28 PROCEDURE — 96365 THER/PROPH/DIAG IV INF INIT: CPT

## 2023-07-28 PROCEDURE — 85025 COMPLETE CBC W/AUTO DIFF WBC: CPT

## 2023-07-28 PROCEDURE — 36415 COLL VENOUS BLD VENIPUNCTURE: CPT

## 2023-07-28 PROCEDURE — 94640 AIRWAY INHALATION TREATMENT: CPT

## 2023-07-28 PROCEDURE — 84145 PROCALCITONIN (PCT): CPT

## 2023-07-28 PROCEDURE — 6370000000 HC RX 637 (ALT 250 FOR IP): Performed by: PHYSICIAN ASSISTANT

## 2023-07-28 PROCEDURE — 93005 ELECTROCARDIOGRAM TRACING: CPT | Performed by: PHYSICIAN ASSISTANT

## 2023-07-28 PROCEDURE — 1200000003 HC TELEMETRY R&B

## 2023-07-28 PROCEDURE — 6370000000 HC RX 637 (ALT 250 FOR IP)

## 2023-07-28 PROCEDURE — 2580000003 HC RX 258: Performed by: NURSE PRACTITIONER

## 2023-07-28 PROCEDURE — 83880 ASSAY OF NATRIURETIC PEPTIDE: CPT

## 2023-07-28 PROCEDURE — 6360000002 HC RX W HCPCS: Performed by: PHYSICIAN ASSISTANT

## 2023-07-28 PROCEDURE — 99223 1ST HOSP IP/OBS HIGH 75: CPT | Performed by: NURSE PRACTITIONER

## 2023-07-28 PROCEDURE — 94669 MECHANICAL CHEST WALL OSCILL: CPT

## 2023-07-28 PROCEDURE — 80048 BASIC METABOLIC PNL TOTAL CA: CPT

## 2023-07-28 PROCEDURE — 99285 EMERGENCY DEPT VISIT HI MDM: CPT

## 2023-07-28 RX ORDER — POTASSIUM CHLORIDE 20 MEQ/1
10 TABLET, EXTENDED RELEASE ORAL DAILY
Status: DISCONTINUED | OUTPATIENT
Start: 2023-07-28 | End: 2023-08-01 | Stop reason: HOSPADM

## 2023-07-28 RX ORDER — AZITHROMYCIN 250 MG/1
250 TABLET, FILM COATED ORAL DAILY
Status: DISCONTINUED | OUTPATIENT
Start: 2023-07-28 | End: 2023-07-28

## 2023-07-28 RX ORDER — MULTIVITAMIN WITH IRON
1 TABLET ORAL DAILY
Status: DISCONTINUED | OUTPATIENT
Start: 2023-07-28 | End: 2023-08-01 | Stop reason: HOSPADM

## 2023-07-28 RX ORDER — SODIUM CHLORIDE 0.9 % (FLUSH) 0.9 %
5-40 SYRINGE (ML) INJECTION PRN
Status: DISCONTINUED | OUTPATIENT
Start: 2023-07-28 | End: 2023-08-01 | Stop reason: HOSPADM

## 2023-07-28 RX ORDER — EZETIMIBE 10 MG/1
10 TABLET ORAL NIGHTLY
Status: DISCONTINUED | OUTPATIENT
Start: 2023-07-28 | End: 2023-08-01 | Stop reason: HOSPADM

## 2023-07-28 RX ORDER — BUMETANIDE 1 MG/1
1 TABLET ORAL DAILY
Status: DISCONTINUED | OUTPATIENT
Start: 2023-07-28 | End: 2023-08-01 | Stop reason: HOSPADM

## 2023-07-28 RX ORDER — MIRTAZAPINE 7.5 MG/1
7.5 TABLET, FILM COATED ORAL NIGHTLY
Status: DISCONTINUED | OUTPATIENT
Start: 2023-07-28 | End: 2023-08-01 | Stop reason: HOSPADM

## 2023-07-28 RX ORDER — ENOXAPARIN SODIUM 100 MG/ML
40 INJECTION SUBCUTANEOUS DAILY
Status: DISCONTINUED | OUTPATIENT
Start: 2023-07-28 | End: 2023-08-01 | Stop reason: HOSPADM

## 2023-07-28 RX ORDER — ACETAMINOPHEN 650 MG/1
650 SUPPOSITORY RECTAL EVERY 6 HOURS PRN
Status: DISCONTINUED | OUTPATIENT
Start: 2023-07-28 | End: 2023-08-01 | Stop reason: HOSPADM

## 2023-07-28 RX ORDER — ASPIRIN 81 MG/1
81 TABLET ORAL DAILY
Status: DISCONTINUED | OUTPATIENT
Start: 2023-07-28 | End: 2023-08-01 | Stop reason: HOSPADM

## 2023-07-28 RX ORDER — SODIUM CHLORIDE 0.9 % (FLUSH) 0.9 %
5-40 SYRINGE (ML) INJECTION EVERY 12 HOURS SCHEDULED
Status: DISCONTINUED | OUTPATIENT
Start: 2023-07-28 | End: 2023-08-01 | Stop reason: HOSPADM

## 2023-07-28 RX ORDER — POLYETHYLENE GLYCOL 3350 17 G/17G
17 POWDER, FOR SOLUTION ORAL DAILY PRN
Status: DISCONTINUED | OUTPATIENT
Start: 2023-07-28 | End: 2023-08-01 | Stop reason: HOSPADM

## 2023-07-28 RX ORDER — MAGNESIUM SULFATE IN WATER 40 MG/ML
2000 INJECTION, SOLUTION INTRAVENOUS PRN
Status: DISCONTINUED | OUTPATIENT
Start: 2023-07-28 | End: 2023-08-01 | Stop reason: HOSPADM

## 2023-07-28 RX ORDER — CLOPIDOGREL BISULFATE 75 MG/1
75 TABLET ORAL DAILY
Status: DISCONTINUED | OUTPATIENT
Start: 2023-07-28 | End: 2023-08-01 | Stop reason: HOSPADM

## 2023-07-28 RX ORDER — POTASSIUM CHLORIDE 7.45 MG/ML
10 INJECTION INTRAVENOUS PRN
Status: DISCONTINUED | OUTPATIENT
Start: 2023-07-28 | End: 2023-08-01 | Stop reason: HOSPADM

## 2023-07-28 RX ORDER — LEVOTHYROXINE SODIUM 137 UG/1
137 TABLET ORAL DAILY
Status: DISCONTINUED | OUTPATIENT
Start: 2023-07-28 | End: 2023-08-01 | Stop reason: HOSPADM

## 2023-07-28 RX ORDER — AZITHROMYCIN 250 MG/1
500 TABLET, FILM COATED ORAL DAILY
Status: COMPLETED | OUTPATIENT
Start: 2023-07-28 | End: 2023-07-30

## 2023-07-28 RX ORDER — IPRATROPIUM BROMIDE AND ALBUTEROL SULFATE 2.5; .5 MG/3ML; MG/3ML
1 SOLUTION RESPIRATORY (INHALATION) ONCE
Status: COMPLETED | OUTPATIENT
Start: 2023-07-28 | End: 2023-07-28

## 2023-07-28 RX ORDER — METOPROLOL TARTRATE 50 MG/1
25 TABLET, FILM COATED ORAL 2 TIMES DAILY
Status: DISCONTINUED | OUTPATIENT
Start: 2023-07-28 | End: 2023-08-01 | Stop reason: HOSPADM

## 2023-07-28 RX ORDER — POTASSIUM CHLORIDE 20 MEQ/1
40 TABLET, EXTENDED RELEASE ORAL PRN
Status: DISCONTINUED | OUTPATIENT
Start: 2023-07-28 | End: 2023-08-01 | Stop reason: HOSPADM

## 2023-07-28 RX ORDER — ONDANSETRON 4 MG/1
4 TABLET, ORALLY DISINTEGRATING ORAL EVERY 8 HOURS PRN
Status: DISCONTINUED | OUTPATIENT
Start: 2023-07-28 | End: 2023-08-01 | Stop reason: HOSPADM

## 2023-07-28 RX ORDER — BUMETANIDE 0.25 MG/ML
1 INJECTION INTRAMUSCULAR; INTRAVENOUS 2 TIMES DAILY
Status: COMPLETED | OUTPATIENT
Start: 2023-07-28 | End: 2023-07-28

## 2023-07-28 RX ORDER — ONDANSETRON 2 MG/ML
4 INJECTION INTRAMUSCULAR; INTRAVENOUS EVERY 6 HOURS PRN
Status: DISCONTINUED | OUTPATIENT
Start: 2023-07-28 | End: 2023-08-01 | Stop reason: HOSPADM

## 2023-07-28 RX ORDER — SODIUM CHLORIDE 9 MG/ML
INJECTION, SOLUTION INTRAVENOUS PRN
Status: DISCONTINUED | OUTPATIENT
Start: 2023-07-28 | End: 2023-08-01 | Stop reason: HOSPADM

## 2023-07-28 RX ORDER — ACETAMINOPHEN 325 MG/1
650 TABLET ORAL EVERY 6 HOURS PRN
Status: DISCONTINUED | OUTPATIENT
Start: 2023-07-28 | End: 2023-08-01 | Stop reason: HOSPADM

## 2023-07-28 RX ORDER — ATORVASTATIN CALCIUM 40 MG/1
40 TABLET, FILM COATED ORAL DAILY
Status: DISCONTINUED | OUTPATIENT
Start: 2023-07-28 | End: 2023-07-28

## 2023-07-28 RX ADMIN — BUMETANIDE 1 MG: 0.25 INJECTION INTRAMUSCULAR; INTRAVENOUS at 20:34

## 2023-07-28 RX ADMIN — IPRATROPIUM BROMIDE AND ALBUTEROL SULFATE 1 DOSE: .5; 3 SOLUTION RESPIRATORY (INHALATION) at 10:56

## 2023-07-28 RX ADMIN — ENOXAPARIN SODIUM 40 MG: 100 INJECTION SUBCUTANEOUS at 18:23

## 2023-07-28 RX ADMIN — ASPIRIN 81 MG: 81 TABLET, COATED ORAL at 18:37

## 2023-07-28 RX ADMIN — PIPERACILLIN AND TAZOBACTAM 4500 MG: 4; .5 INJECTION, POWDER, LYOPHILIZED, FOR SOLUTION INTRAVENOUS at 12:04

## 2023-07-28 RX ADMIN — POTASSIUM CHLORIDE 10 MEQ: 1500 TABLET, EXTENDED RELEASE ORAL at 18:22

## 2023-07-28 RX ADMIN — BUMETANIDE 1 MG: 0.25 INJECTION INTRAMUSCULAR; INTRAVENOUS at 18:23

## 2023-07-28 RX ADMIN — METOPROLOL TARTRATE 25 MG: 50 TABLET, FILM COATED ORAL at 20:34

## 2023-07-28 RX ADMIN — EZETIMIBE 10 MG: 10 TABLET ORAL at 20:34

## 2023-07-28 RX ADMIN — SODIUM CHLORIDE, PRESERVATIVE FREE 10 ML: 5 INJECTION INTRAVENOUS at 20:35

## 2023-07-28 RX ADMIN — Medication 1 TABLET: at 20:34

## 2023-07-28 RX ADMIN — AZITHROMYCIN DIHYDRATE 500 MG: 250 TABLET ORAL at 18:22

## 2023-07-28 RX ADMIN — CLOPIDOGREL BISULFATE 75 MG: 75 TABLET ORAL at 18:37

## 2023-07-28 RX ADMIN — MIRTAZAPINE 7.5 MG: 7.5 TABLET ORAL at 20:34

## 2023-07-28 RX ADMIN — CEFTRIAXONE SODIUM 1000 MG: 1 INJECTION, POWDER, FOR SOLUTION INTRAMUSCULAR; INTRAVENOUS at 18:31

## 2023-07-28 ASSESSMENT — PAIN - FUNCTIONAL ASSESSMENT: PAIN_FUNCTIONAL_ASSESSMENT: NONE - DENIES PAIN

## 2023-07-28 NOTE — ED NOTES
ED to inpatient nurses report    Chief Complaint   Patient presents with    Cough      Present to ED from home  LOC: alert and orientated to name, place, date  Vital signs   Vitals:    07/28/23 1053 07/28/23 1054 07/28/23 1207 07/28/23 1416   BP: 132/60  (!) 129/59 118/60   Pulse: 95 98 95 97   Resp: 16 12 21 25   Temp:       SpO2: 94% 96% 98% 94%   Weight:          Oxygen Baseline RA    Current needs required RA Bipap/Cpap No  LDAs:   Peripheral IV 07/28/23 Distal;Left Forearm (Active)   Site Assessment Clean, dry & intact 07/28/23 1053     Mobility: Requires assistance * 1  Pending ED orders: Complete  Present condition: Stable      C-SSRS Risk of Suicide: No Risk  Swallow Screening    Preferred Language: Maylin     Electronically signed by Eric Rocha RN on 7/28/2023 at 2:55 PM       Laurel Su  07/28/23 6724

## 2023-07-28 NOTE — ED NOTES
Ambulating pulse ox 89% on room air. Patient returned to bed and placed on monitor. States that he only feels that short of breath when he is moving around. Call light in reach. Wife at bedside.       Paulie Nicolas RN  07/28/23 6273

## 2023-07-28 NOTE — ED NOTES
Pt transported from ED to [address] [place] [address] on cart in stable condition. Writer spoke to [place] prior to transport.         Giuliano Born  07/28/23 5546

## 2023-07-28 NOTE — ED NOTES
Patient provided sip of water at this time and repositioned in bed for comfort. Patient also provided an update on the plan of care at this time. Patient denies further questions. Patient VSS. Respirs are easy and no acute distress noted. Call light joy chino. Will continue to monitor. Family at the bedside.        Keisha Lewis RN  07/28/23 1578

## 2023-07-28 NOTE — ED NOTES
Nurse informed Lelia Espana of patient's sepsis BPA indicating to possible sepsis. Received verbal order for labs.       No Velasquez RN  07/28/23 4296

## 2023-07-28 NOTE — ED TRIAGE NOTES
Pt to ED due to fatigue and cough for the last three days. Pt states that he was seen by his doctor and was prescribed a sleeping pill. Pt states that he is not feeling any better. Pt states that he is hardly eating or drinking.

## 2023-07-28 NOTE — H&P
Vitamins-Minerals (PRESERVISION AREDS 2+MULTI VIT PO) Take 2 tablets by mouth daily    Historical Provider, MD   nitroGLYCERIN (NITROSTAT) 0.4 MG SL tablet Place 1 tablet under the tongue every 5 minutes as needed for Chest pain 5/14/19   David Carter MD   b complex vitamins capsule Take 1 capsule by mouth daily    Historical Provider, MD   aspirin 81 MG tablet Take 1 tablet by mouth daily    Historical Provider, MD       Allergies:  Tape Fariha Must tape]    Social History:   reports that he quit smoking about 44 years ago. His smoking use included cigarettes. He started smoking about 68 years ago. He has a 24.00 pack-year smoking history. He has never used smokeless tobacco. He reports that he does not drink alcohol and does not use drugs.     Family History:      Positive as follows:        Problem Relation Age of Onset    Diabetes Mother     Cancer Mother         Liver    Arthritis Mother     Asthma Father     Heart Disease Father     Cancer Father         Lung, Bone    Stroke Sister     Heart Disease Sister     Cancer Sister     Stroke Brother     Heart Disease Brother     Cancer Brother         Lung       REVIEW OF SYSTEMS:     Constitutional: ROS: positive for  - chills, fatigue, and malaise  Head: no headache, no head injury, no migraine   Eyes ROS: denies blurred/double vision  Ears ROS: no hearing difficulty, no tinnitus  Mouth and Throat ROS: no ulceration, dysphagia, dental caries  Psychological ROS: no depression, no anxiety, no panic attacks, denies suicide/homicide ideation  Endocrine ROS: denies polyuria, polydypsia, no heat or cold intolerance  Respiratory ROS: positive for - cough, shortness of breath, and sputum changes  Cardiovascular ROS: positive for - dyspnea on exertion, paroxysmal nocturnal dyspnea, and shortness of breath  Gastrointestinal ROS: no abdominal pain, change in bowel habits, or black or bloody stools  Genito-Urinary ROS: denies dysuria, frequency, urgency; denies

## 2023-07-28 NOTE — ED NOTES
Pt  transported to [facility] in stable condition. Floor contacted before transport,spoke to [facility].       Read Aiyana  07/28/23 9532

## 2023-07-28 NOTE — ED NOTES
Bedside shift report given to this RN at this time by Kike Jama RN. Patient is up in bed and updated on plan of care at this time. Patient is alert and oriented x4 and respirations are regular and unlabored. VSS. Family at the bedside.  Call light within reach       Estela Ge RN  07/28/23 2877

## 2023-07-29 LAB
ANION GAP SERPL CALC-SCNC: 12 MEQ/L (ref 8–16)
BASOPHILS ABSOLUTE: 0.1 THOU/MM3 (ref 0–0.1)
BASOPHILS NFR BLD AUTO: 0.5 %
BUN SERPL-MCNC: 16 MG/DL (ref 7–22)
CALCIUM SERPL-MCNC: 8.8 MG/DL (ref 8.5–10.5)
CHLORIDE SERPL-SCNC: 101 MEQ/L (ref 98–111)
CO2 SERPL-SCNC: 26 MEQ/L (ref 23–33)
CREAT SERPL-MCNC: 1.2 MG/DL (ref 0.4–1.2)
DEPRECATED RDW RBC AUTO: 48 FL (ref 35–45)
EOSINOPHIL NFR BLD AUTO: 1.4 %
EOSINOPHILS ABSOLUTE: 0.2 THOU/MM3 (ref 0–0.4)
ERYTHROCYTE [DISTWIDTH] IN BLOOD BY AUTOMATED COUNT: 13.9 % (ref 11.5–14.5)
GFR SERPL CREATININE-BSD FRML MDRD: 60 ML/MIN/1.73M2
GLUCOSE SERPL-MCNC: 89 MG/DL (ref 70–108)
HCT VFR BLD AUTO: 45.8 % (ref 42–52)
HGB BLD-MCNC: 14.7 GM/DL (ref 14–18)
IMM GRANULOCYTES # BLD AUTO: 0.04 THOU/MM3 (ref 0–0.07)
IMM GRANULOCYTES NFR BLD AUTO: 0.3 %
LYMPHOCYTES ABSOLUTE: 2.7 THOU/MM3 (ref 1–4.8)
LYMPHOCYTES NFR BLD AUTO: 21.9 %
MCH RBC QN AUTO: 30.4 PG (ref 26–33)
MCHC RBC AUTO-ENTMCNC: 32.1 GM/DL (ref 32.2–35.5)
MCV RBC AUTO: 94.6 FL (ref 80–94)
MONOCYTES ABSOLUTE: 1.4 THOU/MM3 (ref 0.4–1.3)
MONOCYTES NFR BLD AUTO: 11.5 %
NEUTROPHILS NFR BLD AUTO: 64.4 %
NRBC BLD AUTO-RTO: 0 /100 WBC
PLATELET # BLD AUTO: 201 THOU/MM3 (ref 130–400)
PMV BLD AUTO: 9.8 FL (ref 9.4–12.4)
POTASSIUM SERPL-SCNC: 4.2 MEQ/L (ref 3.5–5.2)
RBC # BLD AUTO: 4.84 MILL/MM3 (ref 4.7–6.1)
SEGMENTED NEUTROPHILS ABSOLUTE COUNT: 8 THOU/MM3 (ref 1.8–7.7)
SODIUM SERPL-SCNC: 139 MEQ/L (ref 135–145)
WBC # BLD AUTO: 12.4 THOU/MM3 (ref 4.8–10.8)

## 2023-07-29 PROCEDURE — 6370000000 HC RX 637 (ALT 250 FOR IP)

## 2023-07-29 PROCEDURE — 36415 COLL VENOUS BLD VENIPUNCTURE: CPT

## 2023-07-29 PROCEDURE — 80048 BASIC METABOLIC PNL TOTAL CA: CPT

## 2023-07-29 PROCEDURE — 94669 MECHANICAL CHEST WALL OSCILL: CPT

## 2023-07-29 PROCEDURE — 2580000003 HC RX 258: Performed by: NURSE PRACTITIONER

## 2023-07-29 PROCEDURE — 6360000002 HC RX W HCPCS

## 2023-07-29 PROCEDURE — 6370000000 HC RX 637 (ALT 250 FOR IP): Performed by: NURSE PRACTITIONER

## 2023-07-29 PROCEDURE — 1200000003 HC TELEMETRY R&B

## 2023-07-29 PROCEDURE — 1200000000 HC SEMI PRIVATE

## 2023-07-29 PROCEDURE — 6360000002 HC RX W HCPCS: Performed by: NURSE PRACTITIONER

## 2023-07-29 PROCEDURE — 99232 SBSQ HOSP IP/OBS MODERATE 35: CPT | Performed by: INTERNAL MEDICINE

## 2023-07-29 PROCEDURE — 2580000003 HC RX 258

## 2023-07-29 PROCEDURE — 85025 COMPLETE CBC W/AUTO DIFF WBC: CPT

## 2023-07-29 RX ORDER — BUMETANIDE 0.25 MG/ML
1 INJECTION INTRAMUSCULAR; INTRAVENOUS ONCE
Status: CANCELLED | OUTPATIENT
Start: 2023-07-29

## 2023-07-29 RX ADMIN — SODIUM CHLORIDE, PRESERVATIVE FREE 10 ML: 5 INJECTION INTRAVENOUS at 21:50

## 2023-07-29 RX ADMIN — AZITHROMYCIN DIHYDRATE 500 MG: 250 TABLET ORAL at 09:31

## 2023-07-29 RX ADMIN — CLOPIDOGREL BISULFATE 75 MG: 75 TABLET ORAL at 09:31

## 2023-07-29 RX ADMIN — ENOXAPARIN SODIUM 40 MG: 100 INJECTION SUBCUTANEOUS at 09:32

## 2023-07-29 RX ADMIN — Medication 1 TABLET: at 09:31

## 2023-07-29 RX ADMIN — MIRTAZAPINE 7.5 MG: 7.5 TABLET ORAL at 21:50

## 2023-07-29 RX ADMIN — SODIUM CHLORIDE, PRESERVATIVE FREE 10 ML: 5 INJECTION INTRAVENOUS at 09:31

## 2023-07-29 RX ADMIN — ASPIRIN 81 MG: 81 TABLET, COATED ORAL at 09:31

## 2023-07-29 RX ADMIN — EZETIMIBE 10 MG: 10 TABLET ORAL at 21:50

## 2023-07-29 RX ADMIN — CEFTRIAXONE SODIUM 1000 MG: 1 INJECTION, POWDER, FOR SOLUTION INTRAMUSCULAR; INTRAVENOUS at 14:41

## 2023-07-29 RX ADMIN — METOPROLOL TARTRATE 25 MG: 50 TABLET, FILM COATED ORAL at 09:31

## 2023-07-29 RX ADMIN — POTASSIUM CHLORIDE 10 MEQ: 1500 TABLET, EXTENDED RELEASE ORAL at 09:31

## 2023-07-29 RX ADMIN — METOPROLOL TARTRATE 25 MG: 50 TABLET, FILM COATED ORAL at 21:50

## 2023-07-30 ENCOUNTER — APPOINTMENT (OUTPATIENT)
Dept: CT IMAGING | Age: 83
DRG: 871 | End: 2023-07-30
Payer: MEDICARE

## 2023-07-30 LAB
DEPRECATED RDW RBC AUTO: 47.8 FL (ref 35–45)
ERYTHROCYTE [DISTWIDTH] IN BLOOD BY AUTOMATED COUNT: 14 % (ref 11.5–14.5)
HCT VFR BLD AUTO: 42.9 % (ref 42–52)
HGB BLD-MCNC: 13.7 GM/DL (ref 14–18)
MCH RBC QN AUTO: 30 PG (ref 26–33)
MCHC RBC AUTO-ENTMCNC: 31.9 GM/DL (ref 32.2–35.5)
MCV RBC AUTO: 94.1 FL (ref 80–94)
PLATELET # BLD AUTO: 224 THOU/MM3 (ref 130–400)
PMV BLD AUTO: 9.6 FL (ref 9.4–12.4)
RBC # BLD AUTO: 4.56 MILL/MM3 (ref 4.7–6.1)
WBC # BLD AUTO: 13.8 THOU/MM3 (ref 4.8–10.8)

## 2023-07-30 PROCEDURE — 99232 SBSQ HOSP IP/OBS MODERATE 35: CPT | Performed by: INTERNAL MEDICINE

## 2023-07-30 PROCEDURE — 36415 COLL VENOUS BLD VENIPUNCTURE: CPT

## 2023-07-30 PROCEDURE — 94640 AIRWAY INHALATION TREATMENT: CPT

## 2023-07-30 PROCEDURE — 94761 N-INVAS EAR/PLS OXIMETRY MLT: CPT

## 2023-07-30 PROCEDURE — 71260 CT THORAX DX C+: CPT

## 2023-07-30 PROCEDURE — 6370000000 HC RX 637 (ALT 250 FOR IP)

## 2023-07-30 PROCEDURE — 6370000000 HC RX 637 (ALT 250 FOR IP): Performed by: INTERNAL MEDICINE

## 2023-07-30 PROCEDURE — 94669 MECHANICAL CHEST WALL OSCILL: CPT

## 2023-07-30 PROCEDURE — 85027 COMPLETE CBC AUTOMATED: CPT

## 2023-07-30 PROCEDURE — 6360000002 HC RX W HCPCS

## 2023-07-30 PROCEDURE — 6370000000 HC RX 637 (ALT 250 FOR IP): Performed by: NURSE PRACTITIONER

## 2023-07-30 PROCEDURE — 2580000003 HC RX 258: Performed by: NURSE PRACTITIONER

## 2023-07-30 PROCEDURE — 2700000000 HC OXYGEN THERAPY PER DAY

## 2023-07-30 PROCEDURE — 1200000000 HC SEMI PRIVATE

## 2023-07-30 PROCEDURE — 6360000002 HC RX W HCPCS: Performed by: NURSE PRACTITIONER

## 2023-07-30 PROCEDURE — 2580000003 HC RX 258

## 2023-07-30 PROCEDURE — 6360000004 HC RX CONTRAST MEDICATION: Performed by: INTERNAL MEDICINE

## 2023-07-30 RX ORDER — IPRATROPIUM BROMIDE AND ALBUTEROL SULFATE 2.5; .5 MG/3ML; MG/3ML
1 SOLUTION RESPIRATORY (INHALATION)
Status: DISCONTINUED | OUTPATIENT
Start: 2023-07-30 | End: 2023-07-30

## 2023-07-30 RX ORDER — ALBUTEROL SULFATE 90 UG/1
2 AEROSOL, METERED RESPIRATORY (INHALATION) EVERY 6 HOURS PRN
Status: DISCONTINUED | OUTPATIENT
Start: 2023-07-30 | End: 2023-07-30

## 2023-07-30 RX ORDER — TRAZODONE HYDROCHLORIDE 50 MG/1
50 TABLET ORAL NIGHTLY PRN
Status: DISCONTINUED | OUTPATIENT
Start: 2023-07-30 | End: 2023-08-01 | Stop reason: HOSPADM

## 2023-07-30 RX ORDER — IPRATROPIUM BROMIDE AND ALBUTEROL SULFATE 2.5; .5 MG/3ML; MG/3ML
1 SOLUTION RESPIRATORY (INHALATION) EVERY 4 HOURS PRN
Status: DISCONTINUED | OUTPATIENT
Start: 2023-07-30 | End: 2023-08-01 | Stop reason: HOSPADM

## 2023-07-30 RX ORDER — ALBUTEROL SULFATE 90 UG/1
2 AEROSOL, METERED RESPIRATORY (INHALATION)
Status: DISCONTINUED | OUTPATIENT
Start: 2023-07-30 | End: 2023-08-01 | Stop reason: HOSPADM

## 2023-07-30 RX ADMIN — SODIUM CHLORIDE, PRESERVATIVE FREE 10 ML: 5 INJECTION INTRAVENOUS at 20:03

## 2023-07-30 RX ADMIN — EZETIMIBE 10 MG: 10 TABLET ORAL at 20:03

## 2023-07-30 RX ADMIN — ALBUTEROL SULFATE 2 PUFF: 90 AEROSOL, METERED RESPIRATORY (INHALATION) at 16:18

## 2023-07-30 RX ADMIN — AZITHROMYCIN DIHYDRATE 500 MG: 250 TABLET ORAL at 08:07

## 2023-07-30 RX ADMIN — CLOPIDOGREL BISULFATE 75 MG: 75 TABLET ORAL at 08:08

## 2023-07-30 RX ADMIN — ASPIRIN 81 MG: 81 TABLET, COATED ORAL at 08:08

## 2023-07-30 RX ADMIN — BUMETANIDE 1 MG: 1 TABLET ORAL at 08:07

## 2023-07-30 RX ADMIN — MIRTAZAPINE 7.5 MG: 7.5 TABLET ORAL at 20:03

## 2023-07-30 RX ADMIN — CEFTRIAXONE SODIUM 1000 MG: 1 INJECTION, POWDER, FOR SOLUTION INTRAMUSCULAR; INTRAVENOUS at 14:15

## 2023-07-30 RX ADMIN — METOPROLOL TARTRATE 25 MG: 50 TABLET, FILM COATED ORAL at 08:08

## 2023-07-30 RX ADMIN — ENOXAPARIN SODIUM 40 MG: 100 INJECTION SUBCUTANEOUS at 08:09

## 2023-07-30 RX ADMIN — IOPAMIDOL 80 ML: 755 INJECTION, SOLUTION INTRAVENOUS at 13:34

## 2023-07-30 RX ADMIN — METOPROLOL TARTRATE 25 MG: 50 TABLET, FILM COATED ORAL at 20:03

## 2023-07-30 RX ADMIN — SODIUM CHLORIDE, PRESERVATIVE FREE 10 ML: 5 INJECTION INTRAVENOUS at 08:08

## 2023-07-30 RX ADMIN — Medication 1 TABLET: at 08:08

## 2023-07-30 RX ADMIN — POTASSIUM CHLORIDE 10 MEQ: 1500 TABLET, EXTENDED RELEASE ORAL at 08:07

## 2023-07-30 NOTE — FLOWSHEET NOTE
07/30/23 8666   Safe Environment   Safety Measures   (Virtual Nurse Safety Round Complete)     Call placed to patients room, staff answered and stated patient is going to the restroom.

## 2023-07-31 LAB
DEPRECATED RDW RBC AUTO: 46.5 FL (ref 35–45)
ERYTHROCYTE [DISTWIDTH] IN BLOOD BY AUTOMATED COUNT: 13.7 % (ref 11.5–14.5)
HCT VFR BLD AUTO: 42.4 % (ref 42–52)
HGB BLD-MCNC: 13.6 GM/DL (ref 14–18)
MCH RBC QN AUTO: 29.7 PG (ref 26–33)
MCHC RBC AUTO-ENTMCNC: 32.1 GM/DL (ref 32.2–35.5)
MCV RBC AUTO: 92.6 FL (ref 80–94)
PLATELET # BLD AUTO: 254 THOU/MM3 (ref 130–400)
PMV BLD AUTO: 9.8 FL (ref 9.4–12.4)
RBC # BLD AUTO: 4.58 MILL/MM3 (ref 4.7–6.1)
WBC # BLD AUTO: 12.4 THOU/MM3 (ref 4.8–10.8)

## 2023-07-31 PROCEDURE — 6360000002 HC RX W HCPCS

## 2023-07-31 PROCEDURE — 1200000000 HC SEMI PRIVATE

## 2023-07-31 PROCEDURE — 6360000002 HC RX W HCPCS: Performed by: INTERNAL MEDICINE

## 2023-07-31 PROCEDURE — 85027 COMPLETE CBC AUTOMATED: CPT

## 2023-07-31 PROCEDURE — 94640 AIRWAY INHALATION TREATMENT: CPT

## 2023-07-31 PROCEDURE — 97162 PT EVAL MOD COMPLEX 30 MIN: CPT

## 2023-07-31 PROCEDURE — 6370000000 HC RX 637 (ALT 250 FOR IP)

## 2023-07-31 PROCEDURE — 2580000003 HC RX 258: Performed by: INTERNAL MEDICINE

## 2023-07-31 PROCEDURE — 36415 COLL VENOUS BLD VENIPUNCTURE: CPT

## 2023-07-31 PROCEDURE — 97530 THERAPEUTIC ACTIVITIES: CPT

## 2023-07-31 PROCEDURE — 2580000003 HC RX 258

## 2023-07-31 PROCEDURE — 99232 SBSQ HOSP IP/OBS MODERATE 35: CPT | Performed by: INTERNAL MEDICINE

## 2023-07-31 PROCEDURE — 97110 THERAPEUTIC EXERCISES: CPT

## 2023-07-31 RX ORDER — AMOXICILLIN AND CLAVULANATE POTASSIUM 875; 125 MG/1; MG/1
1 TABLET, FILM COATED ORAL EVERY 12 HOURS SCHEDULED
Status: DISCONTINUED | OUTPATIENT
Start: 2023-08-01 | End: 2023-08-01 | Stop reason: HOSPADM

## 2023-07-31 RX ADMIN — CEFTRIAXONE SODIUM 1000 MG: 1 INJECTION, POWDER, FOR SOLUTION INTRAMUSCULAR; INTRAVENOUS at 13:41

## 2023-07-31 RX ADMIN — ASPIRIN 81 MG: 81 TABLET, COATED ORAL at 08:46

## 2023-07-31 RX ADMIN — ALBUTEROL SULFATE 2 PUFF: 90 AEROSOL, METERED RESPIRATORY (INHALATION) at 07:49

## 2023-07-31 RX ADMIN — SODIUM CHLORIDE, PRESERVATIVE FREE 5 ML: 5 INJECTION INTRAVENOUS at 22:22

## 2023-07-31 RX ADMIN — ENOXAPARIN SODIUM 40 MG: 100 INJECTION SUBCUTANEOUS at 08:47

## 2023-07-31 RX ADMIN — EZETIMIBE 10 MG: 10 TABLET ORAL at 21:48

## 2023-07-31 RX ADMIN — MIRTAZAPINE 7.5 MG: 7.5 TABLET ORAL at 21:48

## 2023-07-31 RX ADMIN — CLOPIDOGREL BISULFATE 75 MG: 75 TABLET ORAL at 08:46

## 2023-07-31 RX ADMIN — BUMETANIDE 1 MG: 1 TABLET ORAL at 08:47

## 2023-07-31 RX ADMIN — METOPROLOL TARTRATE 25 MG: 50 TABLET, FILM COATED ORAL at 08:47

## 2023-07-31 RX ADMIN — Medication 1 TABLET: at 08:47

## 2023-07-31 RX ADMIN — ALBUTEROL SULFATE 2 PUFF: 90 AEROSOL, METERED RESPIRATORY (INHALATION) at 16:15

## 2023-07-31 RX ADMIN — SODIUM CHLORIDE, PRESERVATIVE FREE 10 ML: 5 INJECTION INTRAVENOUS at 08:47

## 2023-07-31 RX ADMIN — POTASSIUM CHLORIDE 10 MEQ: 1500 TABLET, EXTENDED RELEASE ORAL at 08:47

## 2023-07-31 RX ADMIN — METOPROLOL TARTRATE 25 MG: 50 TABLET, FILM COATED ORAL at 21:48

## 2023-08-01 VITALS
BODY MASS INDEX: 29.19 KG/M2 | SYSTOLIC BLOOD PRESSURE: 146 MMHG | WEIGHT: 186 LBS | RESPIRATION RATE: 16 BRPM | HEART RATE: 75 BPM | TEMPERATURE: 98.6 F | DIASTOLIC BLOOD PRESSURE: 69 MMHG | HEIGHT: 67 IN | OXYGEN SATURATION: 92 %

## 2023-08-01 PROBLEM — J18.9 COMMUNITY ACQUIRED PNEUMONIA OF BOTH LUNGS: Status: RESOLVED | Noted: 2023-07-28 | Resolved: 2023-08-01

## 2023-08-01 LAB
DEPRECATED RDW RBC AUTO: 46.5 FL (ref 35–45)
ERYTHROCYTE [DISTWIDTH] IN BLOOD BY AUTOMATED COUNT: 13.6 % (ref 11.5–14.5)
HCT VFR BLD AUTO: 43.7 % (ref 42–52)
HGB BLD-MCNC: 13.8 GM/DL (ref 14–18)
MCH RBC QN AUTO: 29.6 PG (ref 26–33)
MCHC RBC AUTO-ENTMCNC: 31.6 GM/DL (ref 32.2–35.5)
MCV RBC AUTO: 93.6 FL (ref 80–94)
PLATELET # BLD AUTO: 252 THOU/MM3 (ref 130–400)
PMV BLD AUTO: 9.5 FL (ref 9.4–12.4)
RBC # BLD AUTO: 4.67 MILL/MM3 (ref 4.7–6.1)
WBC # BLD AUTO: 10.3 THOU/MM3 (ref 4.8–10.8)

## 2023-08-01 PROCEDURE — 85027 COMPLETE CBC AUTOMATED: CPT

## 2023-08-01 PROCEDURE — 36415 COLL VENOUS BLD VENIPUNCTURE: CPT

## 2023-08-01 PROCEDURE — 6370000000 HC RX 637 (ALT 250 FOR IP)

## 2023-08-01 PROCEDURE — 94761 N-INVAS EAR/PLS OXIMETRY MLT: CPT

## 2023-08-01 PROCEDURE — 94669 MECHANICAL CHEST WALL OSCILL: CPT

## 2023-08-01 PROCEDURE — 99232 SBSQ HOSP IP/OBS MODERATE 35: CPT | Performed by: INTERNAL MEDICINE

## 2023-08-01 PROCEDURE — 6370000000 HC RX 637 (ALT 250 FOR IP): Performed by: INTERNAL MEDICINE

## 2023-08-01 PROCEDURE — 94640 AIRWAY INHALATION TREATMENT: CPT

## 2023-08-01 PROCEDURE — 6360000002 HC RX W HCPCS

## 2023-08-01 PROCEDURE — 2580000003 HC RX 258

## 2023-08-01 PROCEDURE — 97530 THERAPEUTIC ACTIVITIES: CPT

## 2023-08-01 RX ORDER — ALBUTEROL SULFATE 90 UG/1
2 AEROSOL, METERED RESPIRATORY (INHALATION)
Qty: 18 G | Refills: 3 | Status: SHIPPED | OUTPATIENT
Start: 2023-08-01

## 2023-08-01 RX ORDER — AMOXICILLIN AND CLAVULANATE POTASSIUM 875; 125 MG/1; MG/1
1 TABLET, FILM COATED ORAL EVERY 12 HOURS SCHEDULED
Qty: 5 TABLET | Refills: 0 | Status: SHIPPED | OUTPATIENT
Start: 2023-08-01 | End: 2023-08-04

## 2023-08-01 RX ADMIN — ALBUTEROL SULFATE 2 PUFF: 90 AEROSOL, METERED RESPIRATORY (INHALATION) at 10:25

## 2023-08-01 RX ADMIN — ASPIRIN 81 MG: 81 TABLET, COATED ORAL at 08:27

## 2023-08-01 RX ADMIN — Medication 1 TABLET: at 08:29

## 2023-08-01 RX ADMIN — AMOXICILLIN AND CLAVULANATE POTASSIUM 1 TABLET: 875; 125 TABLET, FILM COATED ORAL at 08:29

## 2023-08-01 RX ADMIN — CLOPIDOGREL BISULFATE 75 MG: 75 TABLET ORAL at 08:27

## 2023-08-01 RX ADMIN — ENOXAPARIN SODIUM 40 MG: 100 INJECTION SUBCUTANEOUS at 08:27

## 2023-08-01 RX ADMIN — METOPROLOL TARTRATE 25 MG: 50 TABLET, FILM COATED ORAL at 08:29

## 2023-08-01 RX ADMIN — SODIUM CHLORIDE, PRESERVATIVE FREE 10 ML: 5 INJECTION INTRAVENOUS at 08:28

## 2023-08-01 RX ADMIN — POTASSIUM CHLORIDE 10 MEQ: 1500 TABLET, EXTENDED RELEASE ORAL at 08:27

## 2023-08-01 RX ADMIN — BUMETANIDE 1 MG: 1 TABLET ORAL at 08:29

## 2023-08-01 NOTE — DISCHARGE SUMMARY
- 10.8 thou/mm3    RBC 4.58 (L) 4.70 - 6.10 mill/mm3    Hemoglobin 13.6 (L) 14.0 - 18.0 gm/dl    Hematocrit 42.4 42.0 - 52.0 %    MCV 92.6 80.0 - 94.0 fL    MCH 29.7 26.0 - 33.0 pg    MCHC 32.1 (L) 32.2 - 35.5 gm/dl    RDW-CV 13.7 11.5 - 14.5 %    RDW-SD 46.5 (H) 35.0 - 45.0 fL    Platelets 779 833 - 939 thou/mm3    MPV 9.8 9.4 - 12.4 fL   CBC    Collection Time: 08/01/23  5:21 AM   Result Value Ref Range    WBC 10.3 4.8 - 10.8 thou/mm3    RBC 4.67 (L) 4.70 - 6.10 mill/mm3    Hemoglobin 13.8 (L) 14.0 - 18.0 gm/dl    Hematocrit 43.7 42.0 - 52.0 %    MCV 93.6 80.0 - 94.0 fL    MCH 29.6 26.0 - 33.0 pg    MCHC 31.6 (L) 32.2 - 35.5 gm/dl    RDW-CV 13.6 11.5 - 14.5 %    RDW-SD 46.5 (H) 35.0 - 45.0 fL    Platelets 265 195 - 996 thou/mm3    MPV 9.5 9.4 - 12.4 fL        Microbiology:    Blood culture #1:   Lab Results   Component Value Date/Time    BC No growth 24 hours. No growth 48 hours. 07/28/2023 11:26 AM       Blood culture #2:No results found for: BLOODCULT2    Organism:    Lab Results   Component Value Date/Time    LABGRAM  05/20/2016 07:40 AM     Rare segmented neutrophils observed. Rare epithelial cells observed. Rare gram positive cocci in pairs. MRSA culture only:No results found for: [facility]    Urine culture:   Lab Results   Component Value Date/Time    LABURIN [place] count: >100,000 CFU/mL 08/18/2022 10:42 AM     Lab Results   Component Value Date/Time    ORG Klebsiella pneumoniae 08/18/2022 10:42 AM        Respiratory culture: No results found for: CULTRESP    Aerobic and Anaerobic :  Lab Results   Component Value Date/Time    LABAERO  05/20/2016 07:40 AM     No growth-preliminary  Culture yielded light growth of gram positive bacilli  consistent with Corynebacterium sp. The gram positive cocci  observed on the direct smear were not recovered. This may be  due to antimicrobial suppression, a fastidious organism, or  an anaerobic organism.        Lab Results   Component Value Date/Time    Mague Galvan

## 2023-08-01 NOTE — CARE COORDINATION
8/1/23, 9:07 AM EDT    DISCHARGE ON GOING [facility] day: 4  Location: [facility] Reason for admit: Hypoxemia [R09.02]  Acute pneumonia [J18.9]  Community acquired pneumonia of both lungs [J18.9]   Procedure:   7/28 CXR 1. Patchy opacities are seen along the medial aspect of the right lung. Patchy opacity is seen in the left lung base. Findings can relate to atelectasis and/or infiltrates. Clinical correlation is recommended. 7/30 CT Chest W Contrast 1. Area of consolidation near the left lung which likely represents atelectasis although superimposed pneumonia can't be excluded. 2. Emphysematous changes of the lungs. Barriers to Discharge: WBC 12.4, blood cultures (-), PT/OT, telemetry, Augmentin, Asa, Bumex, Plavix, Lovenox, Duonebs, Lopressor, Remeron, MVI, Zofran, electrolyte replacement protocols. PCP: Jewel Pope MD  Readmission Risk Score: 11.3%  Patient Goals/Plan/Treatment Preferences: Plans return home with spouse, and son. Will follow for potential needs. 8/1/23, 2:22 PM EDT    Patient goals/plan/ treatment preferences discussed by  and . Patient goals/plan/ treatment preferences reviewed with patient/ family. Patient/ family verbalize understanding of discharge plan and are in agreement with goal/plan/treatment preferences. Understanding was demonstrated using the teach back method. AVS provided by RN at time of discharge, which includes all necessary medical information pertaining to the patients current course of illness, treatment, post-discharge goals of care, and treatment preferences. Services At/After Discharge: DME  Pt discharged to home with rolling walker, new home O2, Pt preference was [facility], list was deferred. Marla Potter from DME notified.         IMM Letter  IMM Letter given to Patient/Family/Significant other/Guardian/POA/by[de-identified] CM  IMM Letter date given[de-identified] 08/01/23  IMM Letter time given[de-identified] 4577
Kevin Simpson requires the assistance of a rolling walker to successfully complete daily living tasks such as: bathing, toileting, dressing and grooming. A rolling walker is necessary due to the patient's impaired ambulation and mobility restrictions, and can ambulate only by using a walker instead of a lesser assistive device, such as a cane or crutch.       Electronically signed by Byron Fontaine RN on 8/1/2023 at 12:25 PM
needed at discharge: N/A            Potential DME:    Patient expects to discharge to: [address] [facility] for transportation at discharge: Other (see comment) (Spouse)    Financial    Payor: Giuseppe Pierson / Plan: BCBS [employer] [facility] LOCAL / Product Type: *No Product type* /     Does insurance require precert for SNF: Yes    Potential assistance Purchasing Medications: No  Meds-to-Beds request: Yes      [id number] [place], [place] - [address] [facility] [phone]  [address]  [address] [facility]  Phone: [phone] Fax: [phone]      Notes:    Factors facilitating achievement of predicted outcomes: Family support, Cooperative, and Pleasant    Barriers to discharge: Medical complications    Additional Case Management Notes: Admitted from ER with bilat pneumonia. Resp exercises. Rocephin and Zithromax. Afebrile and on room air. Procedure: No.    The Plan for Transition of Care is related to the following treatment goals of Hypoxemia [R09.02]  Acute pneumonia [J18.9]  Community acquired pneumonia of both lungs [J18.9]    Patient Goals/Plan/Treatment Preferences: Met with pt and spouse today. Very pleasant couple. Pt is active but with assist of spouse and son who resides with them. Anyi Joseph has a PCP, is insured and no issues getting medications. Follow for discharge needs. Transportation/Food Security/Housekeeping Addressed: No issues identified.      Анна Yanes RN  Case Management Department

## 2023-08-01 NOTE — PLAN OF CARE
Problem: Discharge Planning  Goal: Discharge to home or other facility with appropriate resources  7/30/2023 0146 by Juan Pablo Wise RN  Outcome: Progressing  Flowsheets (Taken 7/29/2023 0916 by Steve Doty RN)  Discharge to home or other facility with appropriate resources: Identify barriers to discharge with patient and caregiver  7/29/2023 1334 by Steve Doty RN  Outcome: Progressing  Flowsheets (Taken 7/29/2023 5564)  Discharge to home or other facility with appropriate resources: Identify barriers to discharge with patient and caregiver  Note: Patient plans to return home with wife when medically stable. Problem: Safety - Adult  Goal: Free from fall injury  7/30/2023 0146 by Juan Pablo Wise RN  Outcome: Progressing  Flowsheets (Taken 7/28/2023 2215)  Free From Fall Injury: Instruct family/caregiver on patient safety  7/29/2023 1334 by Steve Doty RN  Outcome: Progressing  Flowsheets (Taken 7/28/2023 2215 by Juan Pablo Wise RN)  Free From Fall Injury: Instruct family/caregiver on patient safety  Note: No falls noted this shift. Continue falling star program. Bed alarm on, bed in low position. Call light and personal belongings in reach. Patient uses call light appropriately. Problem: ABCDS Injury Assessment  Goal: Absence of physical injury  7/30/2023 0146 by Juan Pablo Wise RN  Outcome: Progressing  Flowsheets (Taken 7/28/2023 2215)  Absence of Physical Injury: Implement safety measures based on patient assessment  7/29/2023 1334 by Steve Doty RN  Outcome: Progressing  Flowsheets (Taken 7/28/2023 2215 by Juan Pablo Wise RN)  Absence of Physical Injury: Implement safety measures based on patient assessment     Problem: Skin/Tissue Integrity  Goal: Absence of new skin breakdown  Description: 1. Monitor for areas of redness and/or skin breakdown  2. Assess vascular access sites hourly  3. Every 4-6 hours minimum:  Change oxygen saturation probe site  4.   Every 4-6 hours:  If on nasal
Problem: Discharge Planning  Goal: Discharge to home or other facility with appropriate resources  Outcome: Progressing  Flowsheets (Taken 7/28/2023 2215)  Discharge to home or other facility with appropriate resources: Identify barriers to discharge with patient and caregiver     Problem: Safety - Adult  Goal: Free from fall injury  Outcome: Progressing  Flowsheets (Taken 7/28/2023 2215)  Free From Fall Injury: Instruct family/caregiver on patient safety     Problem: ABCDS Injury Assessment  Goal: Absence of physical injury  Outcome: Progressing  Flowsheets (Taken 7/28/2023 2215)  Absence of Physical Injury: Implement safety measures based on patient assessment     Problem: Skin/Tissue Integrity  Goal: Absence of new skin breakdown  Description: 1. Monitor for areas of redness and/or skin breakdown  2. Assess vascular access sites hourly  3. Every 4-6 hours minimum:  Change oxygen saturation probe site  4. Every 4-6 hours:  If on nasal continuous positive airway pressure, respiratory therapy assess nares and determine need for appliance change or resting period. Outcome: Progressing  Note: No new skin breakdown noted. Encouraged patient to reposition in bed.        Problem: Pain  Goal: Verbalizes/displays adequate comfort level or baseline comfort level  Outcome: Progressing  Flowsheets (Taken 7/28/2023 2215)  Verbalizes/displays adequate comfort level or baseline comfort level:   Encourage patient to monitor pain and request assistance   Assess pain using appropriate pain scale     Problem: Respiratory - Adult  Goal: Achieves optimal ventilation and oxygenation  Outcome: Progressing  Flowsheets (Taken 7/28/2023 2215)  Achieves optimal ventilation and oxygenation: Assess for changes in respiratory status     Problem: Musculoskeletal - Adult  Goal: Return mobility to safest level of function  Outcome: Progressing  Flowsheets (Taken 7/28/2023 2215)  Return Mobility to Safest Level of Function: Assess patient
Problem: Discharge Planning  Goal: Discharge to home or other facility with appropriate resources  Outcome: Progressing  Flowsheets (Taken 7/31/2023 0830)  Discharge to home or other facility with appropriate resources: Identify barriers to discharge with patient and caregiver     Problem: Safety - Adult  Goal: Free from fall injury  Outcome: Progressing  Flowsheets (Taken 7/31/2023 0800)  Free From Fall Injury: Instruct family/caregiver on patient safety     Problem: ABCDS Injury Assessment  Goal: Absence of physical injury  Outcome: Progressing     Problem: Skin/Tissue Integrity  Goal: Absence of new skin breakdown  Description: 1. Monitor for areas of redness and/or skin breakdown  2. Assess vascular access sites hourly  3. Every 4-6 hours minimum:  Change oxygen saturation probe site  4. Every 4-6 hours:  If on nasal continuous positive airway pressure, respiratory therapy assess nares and determine need for appliance change or resting period.   Outcome: Progressing     Problem: Pain  Goal: Verbalizes/displays adequate comfort level or baseline comfort level  Outcome: Progressing  Flowsheets  Taken 7/31/2023 1119  Verbalizes/displays adequate comfort level or baseline comfort level: Encourage patient to monitor pain and request assistance  Taken 7/31/2023 0830  Verbalizes/displays adequate comfort level or baseline comfort level: Encourage patient to monitor pain and request assistance     Problem: Respiratory - Adult  Goal: Achieves optimal ventilation and oxygenation  7/31/2023 1337 by Fortino Brown RN  Outcome: Progressing  Flowsheets (Taken 7/31/2023 0830)  Achieves optimal ventilation and oxygenation: Assess for changes in respiratory status     Problem: Musculoskeletal - Adult  Goal: Return mobility to safest level of function  Outcome: Progressing  Flowsheets (Taken 7/31/2023 0830)  Return Mobility to Safest Level of Function: Assess patient stability and activity tolerance for standing,
Problem: Pain  Goal: Verbalizes/displays adequate comfort level or baseline comfort level  7/31/2023 2256 by Kelly To RN  Flowsheets (Taken 7/31/2023 2256)  Verbalizes/displays adequate comfort level or baseline comfort level: Encourage patient to monitor pain and request assistance  7/31/2023 1337 by Kristin Quarles RN  Outcome: Progressing  Flowsheets  Taken 7/31/2023 1119  Verbalizes/displays adequate comfort level or baseline comfort level: Encourage patient to monitor pain and request assistance  Taken 7/31/2023 0830  Verbalizes/displays adequate comfort level or baseline comfort level: Encourage patient to monitor pain and request assistance
Problem: Respiratory - Adult  Goal: Achieves optimal ventilation and oxygenation  7/31/2023 1618 by Catrachita Epps RCP  Outcome: Progressing
Problem: Respiratory - Adult  Goal: Achieves optimal ventilation and oxygenation  7/31/2023 2307 by Wayne Silva RN  Flowsheets (Taken 7/31/2023 2307)  Achieves optimal ventilation and oxygenation:   Assess for changes in respiratory status   Assess for changes in mentation and behavior  7/31/2023 1618 by Mónica Jennings RCP  Outcome: Progressing  7/31/2023 1337 by Coco Mahan RN  Outcome: Progressing  Flowsheets (Taken 7/31/2023 0830)  Achieves optimal ventilation and oxygenation: Assess for changes in respiratory status
Problem: Respiratory - Adult  Goal: Achieves optimal ventilation and oxygenation  Outcome: Progressing
Monitor for areas of redness and/or skin breakdown  2. Assess vascular access sites hourly  3. Every 4-6 hours minimum:  Change oxygen saturation probe site  4. Every 4-6 hours:  If on nasal continuous positive airway pressure, respiratory therapy assess nares and determine need for appliance change or resting period. 7/30/2023 1315 by Alexander Sen RN  Outcome: Progressing  Note: No new signs or symptoms of skin breakdown noted this shift, encouraging patient to turn and reposition self in bed q2h   7/30/2023 0146 by Bhavin Hinojosa RN  Outcome: Progressing  Note: No new skin breakdown noted. Encouraged patient to reposition in bed. Problem: Pain  Goal: Verbalizes/displays adequate comfort level or baseline comfort level  7/30/2023 1315 by Alexander Sen RN  Outcome: Progressing  Flowsheets (Taken 7/28/2023 2215 by Bhavin Hinojosa RN)  Verbalizes/displays adequate comfort level or baseline comfort level:   Encourage patient to monitor pain and request assistance   Assess pain using appropriate pain scale  Note: No complaint of pain voiced at this time. Continue to monitor. PRN medications available if needed.    7/30/2023 0146 by Bhavin Hinojosa RN  Outcome: Progressing  Flowsheets (Taken 7/28/2023 2215)  Verbalizes/displays adequate comfort level or baseline comfort level:   Encourage patient to monitor pain and request assistance   Assess pain using appropriate pain scale     Problem: Respiratory - Adult  Goal: Achieves optimal ventilation and oxygenation  7/30/2023 1315 by Alexander Sen RN  Outcome: Progressing  Flowsheets (Taken 7/30/2023 0805)  Achieves optimal ventilation and oxygenation: Assess for changes in respiratory status  7/30/2023 0146 by Bhavin Hinojosa RN  Outcome: Progressing  Flowsheets (Taken 7/29/2023 0916 by Alexander Sen RN)  Achieves optimal ventilation and oxygenation: Assess for changes in respiratory status     Problem: Musculoskeletal - Adult  Goal: Return mobility to
monitor. PRN medications available if needed. Problem: Respiratory - Adult  Goal: Achieves optimal ventilation and oxygenation  Outcome: Progressing  Flowsheets (Taken 7/29/2023 0916)  Achieves optimal ventilation and oxygenation: Assess for changes in respiratory status     Problem: Musculoskeletal - Adult  Goal: Return mobility to safest level of function  Outcome: Progressing  Flowsheets (Taken 7/29/2023 0916)  Return Mobility to Safest Level of Function: Assess patient stability and activity tolerance for standing, transferring and ambulating with or without assistive devices  Goal: Return ADL status to a safe level of function  Outcome: Progressing  Flowsheets (Taken 7/29/2023 0916)  Return ADL Status to a Safe Level of Function: Administer medication as ordered     Problem: Chronic Conditions and Co-morbidities  Goal: Patient's chronic conditions and co-morbidity symptoms are monitored and maintained or improved  Outcome: Progressing  Flowsheets (Taken 7/29/2023 0916)  Care Plan - Patient's Chronic Conditions and Co-Morbidity Symptoms are Monitored and Maintained or Improved: Monitor and assess patient's chronic conditions and comorbid symptoms for stability, deterioration, or improvement   Care plan reviewed with patient. Patient verbalizes understanding of plan of care and contributes to goal setting.

## 2023-08-02 ENCOUNTER — TELEPHONE (OUTPATIENT)
Dept: FAMILY MEDICINE CLINIC | Age: 83
End: 2023-08-02

## 2023-08-02 LAB
BACTERIA BLD AEROBE CULT: NORMAL
BACTERIA BLD AEROBE CULT: NORMAL

## 2023-08-02 NOTE — TELEPHONE ENCOUNTER
Care Transitions Initial Follow Up Call    Outreach made within 2 business days of discharge: Yes    Patient: Samira Bush Patient : 1940   MRN: [id number]  Reason for Admission: There are no discharge diagnoses documented for the most recent discharge. Discharge Date: 23       Spoke with: JOSELYN for call back    Discharge department/facility: [facility]    TCM Interactive Patient Contact:     Follow Up  Future Appointments   Date Time Provider [address]   2023 11:00 AM Yesi Farias MD SRPX VARGAS FM [facility]   2023  2:20 PM STR CT IMAGING RM1  [facility] STR[facility] OUT EXP STR Radiolog   2023  2:30 PM STR CT IMAGING RM1  [facility] STRZ OUT EXP STR Radiolog   2023  8:30 AM JUNIE Yu [place] [facility]   2023  2:30 PM Sasha Carson MD SRPX Physic [facility]   2023  1:30 PM Lexx Mello MD N SRPX Heart [facility]   10/12/2023  1:00 PM FIT ROOM 2 [place] AUDIOLOG[facility]   2024 10:00 AM DEMAR Samuel - CNP N ENT [facility] [address], [place]

## 2023-08-04 LAB
EKG ATRIAL RATE: 83 BPM
EKG ATRIAL RATE: 95 BPM
EKG P AXIS: 30 DEGREES
EKG P AXIS: 48 DEGREES
EKG P-R INTERVAL: 120 MS
EKG P-R INTERVAL: 124 MS
EKG Q-T INTERVAL: 364 MS
EKG Q-T INTERVAL: 392 MS
EKG QRS DURATION: 112 MS
EKG QRS DURATION: 114 MS
EKG QTC CALCULATION (BAZETT): 457 MS
EKG QTC CALCULATION (BAZETT): 460 MS
EKG R AXIS: -12 DEGREES
EKG R AXIS: 26 DEGREES
EKG T AXIS: -15 DEGREES
EKG T AXIS: -21 DEGREES
EKG VENTRICULAR RATE: 83 BPM
EKG VENTRICULAR RATE: 95 BPM

## 2023-08-09 ENCOUNTER — OFFICE VISIT (OUTPATIENT)
Dept: FAMILY MEDICINE CLINIC | Age: 83
End: 2023-08-09

## 2023-08-09 VITALS
RESPIRATION RATE: 14 BRPM | BODY MASS INDEX: 29.13 KG/M2 | SYSTOLIC BLOOD PRESSURE: 112 MMHG | DIASTOLIC BLOOD PRESSURE: 68 MMHG | TEMPERATURE: 98.4 F | OXYGEN SATURATION: 96 % | WEIGHT: 186 LBS | HEART RATE: 60 BPM

## 2023-08-09 DIAGNOSIS — E03.4 HYPOTHYROIDISM DUE TO ACQUIRED ATROPHY OF THYROID: ICD-10-CM

## 2023-08-09 DIAGNOSIS — J18.9 PNEUMONIA DUE TO INFECTIOUS ORGANISM, UNSPECIFIED LATERALITY, UNSPECIFIED PART OF LUNG: Primary | ICD-10-CM

## 2023-08-09 DIAGNOSIS — Z09 HOSPITAL DISCHARGE FOLLOW-UP: ICD-10-CM

## 2023-08-16 RX ORDER — BUMETANIDE 1 MG/1
TABLET ORAL
Qty: 30 TABLET | Refills: 5 | OUTPATIENT
Start: 2023-08-16

## 2023-08-21 ENCOUNTER — HOSPITAL ENCOUNTER (OUTPATIENT)
Dept: CT IMAGING | Age: 83
Discharge: HOME | End: 2023-08-21
Payer: MEDICARE

## 2023-08-21 ENCOUNTER — HOSPITAL ENCOUNTER (OUTPATIENT)
Age: 83
Discharge: HOME | End: 2023-08-21
Payer: MEDICARE

## 2023-08-21 DIAGNOSIS — Z85.51 HISTORY OF BLADDER CANCER: ICD-10-CM

## 2023-08-21 DIAGNOSIS — E03.4 HYPOTHYROIDISM DUE TO ACQUIRED ATROPHY OF THYROID: ICD-10-CM

## 2023-08-21 LAB
ALBUMIN SERPL BCG-MCNC: 3.5 G/DL (ref 3.5–5.1)
ALP SERPL-CCNC: 108 U/L (ref 38–126)
ALT SERPL W/O P-5'-P-CCNC: 18 U/L (ref 11–66)
ANION GAP SERPL CALC-SCNC: 8 MEQ/L (ref 8–16)
AST SERPL-CCNC: 21 U/L (ref 5–40)
BASOPHILS ABSOLUTE: 0 THOU/MM3 (ref 0–0.1)
BASOPHILS NFR BLD AUTO: 0.6 %
BILIRUB SERPL-MCNC: 0.8 MG/DL (ref 0.3–1.2)
BUN SERPL-MCNC: 16 MG/DL (ref 7–22)
CALCIUM SERPL-MCNC: 8.8 MG/DL (ref 8.5–10.5)
CHLORIDE SERPL-SCNC: 100 MEQ/L (ref 98–111)
CO2 SERPL-SCNC: 28 MEQ/L (ref 23–33)
CREAT SERPL-MCNC: 1 MG/DL (ref 0.4–1.2)
DEPRECATED RDW RBC AUTO: 48.9 FL (ref 35–45)
EOSINOPHIL NFR BLD AUTO: 4.5 %
EOSINOPHILS ABSOLUTE: 0.4 THOU/MM3 (ref 0–0.4)
ERYTHROCYTE [DISTWIDTH] IN BLOOD BY AUTOMATED COUNT: 14.6 % (ref 11.5–14.5)
GFR SERPL CREATININE-BSD FRML MDRD: > 60 ML/MIN/1.73M2
GLUCOSE SERPL-MCNC: 89 MG/DL (ref 70–108)
HCT VFR BLD AUTO: 41.3 % (ref 42–52)
HGB BLD-MCNC: 13.4 GM/DL (ref 14–18)
IMM GRANULOCYTES # BLD AUTO: 0.02 THOU/MM3 (ref 0–0.07)
IMM GRANULOCYTES NFR BLD AUTO: 0.2 %
LYMPHOCYTES ABSOLUTE: 3.3 THOU/MM3 (ref 1–4.8)
LYMPHOCYTES NFR BLD AUTO: 40.3 %
MCH RBC QN AUTO: 29.8 PG (ref 26–33)
MCHC RBC AUTO-ENTMCNC: 32.4 GM/DL (ref 32.2–35.5)
MCV RBC AUTO: 91.8 FL (ref 80–94)
MONOCYTES ABSOLUTE: 0.7 THOU/MM3 (ref 0.4–1.3)
MONOCYTES NFR BLD AUTO: 8.9 %
NEUTROPHILS NFR BLD AUTO: 45.5 %
NRBC BLD AUTO-RTO: 0 /100 WBC
PLATELET # BLD AUTO: 199 THOU/MM3 (ref 130–400)
PMV BLD AUTO: 9.3 FL (ref 9.4–12.4)
POTASSIUM SERPL-SCNC: 3.6 MEQ/L (ref 3.5–5.2)
PROT SERPL-MCNC: 6.4 G/DL (ref 6.1–8)
RBC # BLD AUTO: 4.5 MILL/MM3 (ref 4.7–6.1)
SEGMENTED NEUTROPHILS ABSOLUTE COUNT: 3.8 THOU/MM3 (ref 1.8–7.7)
SODIUM SERPL-SCNC: 136 MEQ/L (ref 135–145)
T4 FREE SERPL-MCNC: 1.61 NG/DL (ref 0.93–1.76)
TSH SERPL DL<=0.005 MIU/L-ACNC: 2.89 UIU/ML (ref 0.4–4.2)
VIT B12 SERPL-MCNC: 597 PG/ML (ref 211–911)
WBC # BLD AUTO: 8.3 THOU/MM3 (ref 4.8–10.8)

## 2023-08-21 PROCEDURE — 71260 CT THORAX DX C+: CPT

## 2023-08-21 PROCEDURE — 80053 COMPREHEN METABOLIC PANEL: CPT

## 2023-08-21 PROCEDURE — 6360000004 HC RX CONTRAST MEDICATION: Performed by: PHYSICIAN ASSISTANT

## 2023-08-21 PROCEDURE — 85025 COMPLETE CBC W/AUTO DIFF WBC: CPT

## 2023-08-21 PROCEDURE — 74178 CT ABD&PLV WO CNTR FLWD CNTR: CPT | Performed by: PHYSICIAN ASSISTANT

## 2023-08-21 PROCEDURE — 84443 ASSAY THYROID STIM HORMONE: CPT

## 2023-08-21 PROCEDURE — 84439 ASSAY OF FREE THYROXINE: CPT

## 2023-08-21 PROCEDURE — 82607 VITAMIN B-12: CPT

## 2023-08-21 PROCEDURE — 36415 COLL VENOUS BLD VENIPUNCTURE: CPT

## 2023-08-21 RX ADMIN — IOPAMIDOL 80 ML: 755 INJECTION, SOLUTION INTRAVENOUS at 15:32

## 2023-08-23 ENCOUNTER — OFFICE VISIT (OUTPATIENT)
Dept: PULMONOLOGY | Age: 83
End: 2023-08-23
Payer: MEDICARE

## 2023-08-23 VITALS
DIASTOLIC BLOOD PRESSURE: 78 MMHG | HEIGHT: 67 IN | HEART RATE: 74 BPM | BODY MASS INDEX: 29.66 KG/M2 | OXYGEN SATURATION: 97 % | WEIGHT: 189 LBS | TEMPERATURE: 97.3 F | SYSTOLIC BLOOD PRESSURE: 132 MMHG

## 2023-08-23 DIAGNOSIS — I25.5 ISCHEMIC CARDIOMYOPATHY: ICD-10-CM

## 2023-08-23 DIAGNOSIS — H91.93 BILATERAL HEARING LOSS, UNSPECIFIED HEARING LOSS TYPE: ICD-10-CM

## 2023-08-23 DIAGNOSIS — I10 PRIMARY HYPERTENSION: ICD-10-CM

## 2023-08-23 DIAGNOSIS — Z87.01 H/O: PNEUMONIA: ICD-10-CM

## 2023-08-23 DIAGNOSIS — I21.09 STEMI INVOLVING OTH CORONARY ARTERY OF ANTERIOR WALL (HCC): ICD-10-CM

## 2023-08-23 DIAGNOSIS — R09.02 HYPOXIA: Primary | ICD-10-CM

## 2023-08-23 DIAGNOSIS — Z85.51 HISTORY OF MALIGNANT NEOPLASM OF BLADDER: ICD-10-CM

## 2023-08-23 DIAGNOSIS — I71.40 ABDOMINAL AORTIC ANEURYSM (AAA) WITHOUT RUPTURE, UNSPECIFIED PART (HCC): ICD-10-CM

## 2023-08-23 PROCEDURE — 94618 PULMONARY STRESS TESTING: CPT | Performed by: SPECIALIST

## 2023-08-23 PROCEDURE — 1123F ACP DISCUSS/DSCN MKR DOCD: CPT | Performed by: SPECIALIST

## 2023-08-23 PROCEDURE — 99204 OFFICE O/P NEW MOD 45 MIN: CPT | Performed by: SPECIALIST

## 2023-08-23 PROCEDURE — 3074F SYST BP LT 130 MM HG: CPT | Performed by: SPECIALIST

## 2023-08-23 PROCEDURE — 3078F DIAST BP <80 MM HG: CPT | Performed by: SPECIALIST

## 2023-08-23 NOTE — TELEPHONE ENCOUNTER
Patient is needing Melda Rony ordered form his dentist due to his gums are infected and Dee is asking if patient is able to take this? Please advise and thanks. Name band;

## 2023-08-23 NOTE — PROGRESS NOTES
[facility]    Patient: Nirmal Palmer, 80 y.o.   : 1940    Patient of Sandra Castrejon MD   Referring Provider: Rafat Smith DO       Subjective     Chief Complaint   Patient presents with    New Patient     [facility] pulm consult . Hypoxemia . Ct & cxr in [facility]. \A [facility] [place] [facility]\""  Dayanna Mccauley is here for evaluation of patient's lung problems and for oxygen check. Patient was hospitalized recently with a history of pneumonia and was treated and discharged. At the time of discharge patient required the home oxygen hence the patient was sent home on oxygen which the patient is not using anymore. He has been having significant weakness of his legs and uses the walker at this time. Patient has multiple medical problems as noted below. Patient has pulmonary function testing done in 2018 showed FVC of 2.95 L, 84%. FEV1 of 2.20 L, 84%. With ratio 70% and lung volumes are within normal limits decreased diffusion.     Immunizations:  Immunization History   Administered Date(s) Administered    COVID-19, MODERNA BLUE border, Primary or Immunocompromised, (age 12y+), IM, 100 mcg/0.5mL 2021, 2021    COVID-19, MODERNA Booster BLUE border, (age 18y+), IM, 50mcg/0.25mL 2021    Influenza 2011, 2013, 10/20/2015    Influenza Virus Vaccine 10/20/2015, 10/01/2016, 2017, 10/15/2018, 10/14/2019, 10/07/2021    Influenza Whole 10/01/2008    Influenza, FLUAD, (age 72 y+), Adjuvanted, 0.5mL 10/09/2020    Influenza, High Dose (Fluzone 65 yrs and older) 10/02/2014    Pneumococcal Conjugate 7-valent (Hazeline Putt) 2006    Pneumococcal, PCV-13, PREVNAR 15, (age 6w+), IM, 0.5mL 2017    Pneumococcal, PPSV23, PNEUMOVAX 21, (age 2y+), SC/IM, 0.5mL 2019      Past Medical hx   PMH:  Past Medical History:   Diagnosis Date    AAA (abdominal aortic aneurysm) ([address])     intravascular stent    BCG ROXANE 2019    Bladder cancer (HCC)     BPH (benign

## 2023-08-28 ENCOUNTER — OFFICE VISIT (OUTPATIENT)
Dept: UROLOGY | Age: 83
End: 2023-08-28
Payer: MEDICARE

## 2023-08-28 ENCOUNTER — TELEPHONE (OUTPATIENT)
Dept: UROLOGY | Age: 83
End: 2023-08-28

## 2023-08-28 VITALS
SYSTOLIC BLOOD PRESSURE: 132 MMHG | WEIGHT: 189 LBS | DIASTOLIC BLOOD PRESSURE: 78 MMHG | HEIGHT: 67 IN | BODY MASS INDEX: 29.66 KG/M2

## 2023-08-28 DIAGNOSIS — R30.0 DYSURIA: ICD-10-CM

## 2023-08-28 DIAGNOSIS — C7A.8 NEUROENDOCRINE CANCER (HCC): ICD-10-CM

## 2023-08-28 DIAGNOSIS — R89.6 ABNORMAL BLADDER CYTOLOGY: ICD-10-CM

## 2023-08-28 DIAGNOSIS — Z85.51 HISTORY OF BLADDER CANCER: Primary | ICD-10-CM

## 2023-08-28 PROCEDURE — 1123F ACP DISCUSS/DSCN MKR DOCD: CPT | Performed by: PHYSICIAN ASSISTANT

## 2023-08-28 PROCEDURE — 99213 OFFICE O/P EST LOW 20 MIN: CPT | Performed by: PHYSICIAN ASSISTANT

## 2023-08-28 PROCEDURE — 3078F DIAST BP <80 MM HG: CPT | Performed by: PHYSICIAN ASSISTANT

## 2023-08-28 PROCEDURE — 3074F SYST BP LT 130 MM HG: CPT | Performed by: PHYSICIAN ASSISTANT

## 2023-08-28 NOTE — PROGRESS NOTES
[address] [place] [address],[address] [address]  Dept: [phone]  Loc: [phone]      Mr. Pop Pate was seen in follow up for   Chief Complaint   Patient presents with    Follow-up     History of bladder cancer        HPI:  Mr. Pop Pate is an 80-year-old patient male with a history of bladder cancer. He was first diagnosed with non-invasive high grade papillary urothelial carcinoma in 2014. He underwent TURBT and BCG therapy. Unfortunately, his surveillance cystoscopy in October 2018 showed a large bladder tumor. He underwent a TURBT on November 8, 2018. 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 He completed three cycles of neoadjuvant Gemzar/Cisplatin in 3/20/19. He underwent a Radical Cystoprostatectomy with Pelvic Lymph Node Dissection and creation of Ileal Loop Diversion on 5/1/19. His  post-neoadjuvant treatment pathology was pT0, pN0. 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 A Needle core biopsy performed by Dr. 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 Pathology felt this tumor may have originated in the lungs. Patient received 5000 cGy EBRT, completed 5/5 in March 2021. He states that he has been feeling relatively well since his last visit. He still has some degree of fatigue but feels that everything is stable at this time. He does report worsening back pain and weakness in his bilateral upper legs. He ambulates with a walker now. He reports a lumbar fusion by Dr. Mehdi Ryan in 2015. He denies ostomy pain, gross hematuria, fever/chills, flank/pelvic/abdominal pain, or difficulty with bowel movements. He denies night sweats, new onset bone pain, chest pain/pressure, dyspnea, N/V, leg pain, or lightheadedness.  He

## 2023-08-28 NOTE — TELEPHONE ENCOUNTER
Patient scheduled for CT CHEST W AND CT UROGRAM  at [facility] on 5/6/24 ARRIVAL OF 1230PM FOR A 1 PM  PM SCAN TIME WITH NPO 4 HOURS PRIOR.     Order mailed with instructions or given to the patient in the office

## 2023-08-29 ENCOUNTER — TELEPHONE (OUTPATIENT)
Dept: PULMONOLOGY | Age: 83
End: 2023-08-29

## 2023-08-29 NOTE — TELEPHONE ENCOUNTER
Patient wife is calling to have a STOP order sent to [employer] so that they can come  th equipment from their home. She says the order says discontinue the order must say Stop and be signed.

## 2023-08-31 ENCOUNTER — HOSPITAL ENCOUNTER (EMERGENCY)
Age: 83
Discharge: HOME | End: 2023-08-31
Attending: EMERGENCY MEDICINE
Payer: MEDICARE

## 2023-08-31 ENCOUNTER — APPOINTMENT (OUTPATIENT)
Dept: CT IMAGING | Age: 83
End: 2023-08-31
Payer: MEDICARE

## 2023-08-31 VITALS
SYSTOLIC BLOOD PRESSURE: 151 MMHG | DIASTOLIC BLOOD PRESSURE: 68 MMHG | RESPIRATION RATE: 16 BRPM | OXYGEN SATURATION: 93 % | TEMPERATURE: 98 F | HEART RATE: 67 BPM

## 2023-08-31 DIAGNOSIS — K80.20 CALCULUS OF GALLBLADDER WITHOUT CHOLECYSTITIS WITHOUT OBSTRUCTION: Primary | ICD-10-CM

## 2023-08-31 LAB
ALBUMIN SERPL BCG-MCNC: 3.8 G/DL (ref 3.5–5.1)
ALP SERPL-CCNC: 124 U/L (ref 38–126)
ALT SERPL W/O P-5'-P-CCNC: 22 U/L (ref 11–66)
ANION GAP SERPL CALC-SCNC: 11 MEQ/L (ref 8–16)
AST SERPL-CCNC: 27 U/L (ref 5–40)
BASOPHILS ABSOLUTE: 0.1 THOU/MM3 (ref 0–0.1)
BASOPHILS NFR BLD AUTO: 0.7 %
BILIRUB SERPL-MCNC: 0.8 MG/DL (ref 0.3–1.2)
BUN SERPL-MCNC: 18 MG/DL (ref 7–22)
CALCIUM SERPL-MCNC: 9.4 MG/DL (ref 8.5–10.5)
CHLORIDE SERPL-SCNC: 100 MEQ/L (ref 98–111)
CO2 SERPL-SCNC: 30 MEQ/L (ref 23–33)
CREAT SERPL-MCNC: 1.2 MG/DL (ref 0.4–1.2)
DEPRECATED RDW RBC AUTO: 50.9 FL (ref 35–45)
EOSINOPHIL NFR BLD AUTO: 1.6 %
EOSINOPHILS ABSOLUTE: 0.2 THOU/MM3 (ref 0–0.4)
ERYTHROCYTE [DISTWIDTH] IN BLOOD BY AUTOMATED COUNT: 14.9 % (ref 11.5–14.5)
GFR SERPL CREATININE-BSD FRML MDRD: 60 ML/MIN/1.73M2
GLUCOSE SERPL-MCNC: 117 MG/DL (ref 70–108)
HCT VFR BLD AUTO: 46.9 % (ref 42–52)
HGB BLD-MCNC: 15.2 GM/DL (ref 14–18)
IMM GRANULOCYTES # BLD AUTO: 0.04 THOU/MM3 (ref 0–0.07)
IMM GRANULOCYTES NFR BLD AUTO: 0.4 %
INR PPP: 0.96 (ref 0.85–1.13)
LIPASE SERPL-CCNC: 21.1 U/L (ref 5.6–51.3)
LYMPHOCYTES ABSOLUTE: 3.1 THOU/MM3 (ref 1–4.8)
LYMPHOCYTES NFR BLD AUTO: 29.3 %
MAGNESIUM SERPL-MCNC: 2.2 MG/DL (ref 1.6–2.4)
MCH RBC QN AUTO: 30.3 PG (ref 26–33)
MCHC RBC AUTO-ENTMCNC: 32.4 GM/DL (ref 32.2–35.5)
MCV RBC AUTO: 93.4 FL (ref 80–94)
MONOCYTES ABSOLUTE: 1 THOU/MM3 (ref 0.4–1.3)
MONOCYTES NFR BLD AUTO: 9 %
NEUTROPHILS NFR BLD AUTO: 59 %
NRBC BLD AUTO-RTO: 0 /100 WBC
OSMOLALITY SERPL CALC.SUM OF ELEC: 284.2 MOSMOL/KG (ref 275–300)
PLATELET # BLD AUTO: 198 THOU/MM3 (ref 130–400)
PMV BLD AUTO: 9.6 FL (ref 9.4–12.4)
POTASSIUM SERPL-SCNC: 4.4 MEQ/L (ref 3.5–5.2)
PROT SERPL-MCNC: 6.7 G/DL (ref 6.1–8)
RBC # BLD AUTO: 5.02 MILL/MM3 (ref 4.7–6.1)
SEGMENTED NEUTROPHILS ABSOLUTE COUNT: 6.3 THOU/MM3 (ref 1.8–7.7)
SODIUM SERPL-SCNC: 141 MEQ/L (ref 135–145)
TROPONIN, HIGH SENSITIVITY: 34 NG/L (ref 0–12)
TROPONIN, HIGH SENSITIVITY: 35 NG/L (ref 0–12)
WBC # BLD AUTO: 10.6 THOU/MM3 (ref 4.8–10.8)

## 2023-08-31 PROCEDURE — 6360000004 HC RX CONTRAST MEDICATION: Performed by: EMERGENCY MEDICINE

## 2023-08-31 PROCEDURE — 96375 TX/PRO/DX INJ NEW DRUG ADDON: CPT

## 2023-08-31 PROCEDURE — 84484 ASSAY OF TROPONIN QUANT: CPT

## 2023-08-31 PROCEDURE — 83690 ASSAY OF LIPASE: CPT

## 2023-08-31 PROCEDURE — 96374 THER/PROPH/DIAG INJ IV PUSH: CPT

## 2023-08-31 PROCEDURE — 71275 CT ANGIOGRAPHY CHEST: CPT

## 2023-08-31 PROCEDURE — 36415 COLL VENOUS BLD VENIPUNCTURE: CPT

## 2023-08-31 PROCEDURE — 83735 ASSAY OF MAGNESIUM: CPT

## 2023-08-31 PROCEDURE — 74174 CTA ABD&PLVS W/CONTRAST: CPT

## 2023-08-31 PROCEDURE — 93005 ELECTROCARDIOGRAM TRACING: CPT | Performed by: EMERGENCY MEDICINE

## 2023-08-31 PROCEDURE — 80053 COMPREHEN METABOLIC PANEL: CPT

## 2023-08-31 PROCEDURE — 85025 COMPLETE CBC W/AUTO DIFF WBC: CPT

## 2023-08-31 PROCEDURE — 99285 EMERGENCY DEPT VISIT HI MDM: CPT

## 2023-08-31 PROCEDURE — 6360000002 HC RX W HCPCS: Performed by: EMERGENCY MEDICINE

## 2023-08-31 PROCEDURE — 85610 PROTHROMBIN TIME: CPT

## 2023-08-31 RX ORDER — ONDANSETRON 2 MG/ML
4 INJECTION INTRAMUSCULAR; INTRAVENOUS ONCE
Status: COMPLETED | OUTPATIENT
Start: 2023-08-31 | End: 2023-08-31

## 2023-08-31 RX ORDER — MORPHINE SULFATE 4 MG/ML
4 INJECTION, SOLUTION INTRAMUSCULAR; INTRAVENOUS
Status: COMPLETED | OUTPATIENT
Start: 2023-08-31 | End: 2023-08-31

## 2023-08-31 RX ADMIN — MORPHINE SULFATE 4 MG: 4 INJECTION, SOLUTION INTRAMUSCULAR; INTRAVENOUS at 20:42

## 2023-08-31 RX ADMIN — ONDANSETRON 4 MG: 2 INJECTION INTRAMUSCULAR; INTRAVENOUS at 20:39

## 2023-08-31 RX ADMIN — IOPAMIDOL 80 ML: 755 INJECTION, SOLUTION INTRAVENOUS at 21:17

## 2023-08-31 ASSESSMENT — PAIN SCALES - GENERAL
PAINLEVEL_OUTOF10: 5
PAINLEVEL_OUTOF10: 8
PAINLEVEL_OUTOF10: 6
PAINLEVEL_OUTOF10: 8

## 2023-08-31 ASSESSMENT — ENCOUNTER SYMPTOMS
BACK PAIN: 0
APNEA: 0
SINUS PRESSURE: 0
VOMITING: 0
EYES NEGATIVE: 1
NAUSEA: 0
ABDOMINAL PAIN: 1
ALLERGIC/IMMUNOLOGIC NEGATIVE: 1
CHOKING: 0
SINUS PAIN: 0
CONSTIPATION: 0
WHEEZING: 0
COUGH: 0
DIARRHEA: 0
ABDOMINAL DISTENTION: 0
STRIDOR: 0
RHINORRHEA: 0
SORE THROAT: 0
SHORTNESS OF BREATH: 0
CHEST TIGHTNESS: 0

## 2023-08-31 ASSESSMENT — PAIN DESCRIPTION - LOCATION: LOCATION: RIB CAGE

## 2023-08-31 ASSESSMENT — PAIN - FUNCTIONAL ASSESSMENT: PAIN_FUNCTIONAL_ASSESSMENT: 0-10

## 2023-08-31 ASSESSMENT — PAIN DESCRIPTION - ORIENTATION: ORIENTATION: RIGHT

## 2023-08-31 NOTE — ED TRIAGE NOTES
Pt comes to ED with c/o right rib pain that started last night. Pt states he was working on his deck a little bit. Pt denies injury that he knows of. Pt states he woke up with pain in the middle of the night. Pt states he took some tylenol for the pain. Pt states the pain is moving around to his right front side by his lower ribs. Pt states it is a sharp pain.

## 2023-09-01 LAB
EKG ATRIAL RATE: 62 BPM
EKG P AXIS: 55 DEGREES
EKG P-R INTERVAL: 154 MS
EKG Q-T INTERVAL: 416 MS
EKG QRS DURATION: 72 MS
EKG QTC CALCULATION (BAZETT): 422 MS
EKG R AXIS: -14 DEGREES
EKG T AXIS: 1 DEGREES
EKG VENTRICULAR RATE: 62 BPM

## 2023-09-01 PROCEDURE — 93010 ELECTROCARDIOGRAM REPORT: CPT | Performed by: NUCLEAR MEDICINE

## 2023-09-01 NOTE — ED PROVIDER NOTES

## 2023-09-01 NOTE — ED NOTES
Discharge instructions and follow up discussed with pt. Pt verbalized understanding and denied further questions. LDA removed. Pt discharged with all belongings.         Shruthi Christiansen RN  08/31/23 2236

## 2023-09-01 NOTE — ED NOTES
Report received from Laurel Son. Pt reports improvement in pain. Family at bedside. Denies further needs at this time. Call light within reach.      Mari Castellon RN  08/31/23 2154

## 2023-09-01 NOTE — ED NOTES
Patient resting in bed. Respirations easy and unlabored. No distress noted. Call light within reach.        Aaliyah Gonzalez RN  08/31/23 2411

## 2023-09-01 NOTE — ED PROVIDER NOTES
[facility]          Pt Name: Yvette Smith  MRN: [id number]  [address] 1940  Date of evaluation: 8/31/2023  Treating Resident Physician: Shelby Worley MD  Supervising Physician: Mabel Gasca MD    History obtained from the patient. CHIEF COMPLAINT       Chief Complaint   Patient presents with    Rib Pain     right           HISTORY OF PRESENT ILLNESS    The history is provided by the patient, the spouse and a relative. Yvette Smith is a 80 y.o. male who presents to the emergency department for evaluation of rib pain. Past medical history significant for AAA, CAD, hyperlipidemia, hypertension, hypothyroidism, BPH, bladder cancer, CKD, skin cancer, diverticulitis. Patient reports that last night he was woken up by back pain located in the right lower region. It was a sharp stabbing pain and he took some Tylenol. He was able to fall back asleep. When he woke up the pain had radiated straight to the front of the abdomen. It is located in the right upper quadrant and does not radiate anywhere else. Pain is no longer in the back. Pain is still a sharp sensation and rated at 9/10. Patient also reported one episode of nausea and vomiting while at home. Denies any blood in the vomit. Patient denies any traumatic event that he remembers. Patient with onset of chills ever since the pain started. Yesterday he underwent surgical excision of what he thinks was a melanoma of his face on the right cheek. Local anesthetic was used. Patient denies any current chest pain, shortness of breath, cough, dizziness, nausea. Patient states that he took his blood pressure medication this morning and that it usually at home is in the 120s/60s. Patient is currently taking Plavix. Patient has had 3 surgeries for his AAA with the last being in 2013.   Patient had a CTA of abdomen aorta with bilateral runoff on 4/17/2023 that showed his aorta

## 2023-09-05 ENCOUNTER — OFFICE VISIT (OUTPATIENT)
Dept: INTERNAL MEDICINE CLINIC | Age: 83
End: 2023-09-05
Payer: MEDICARE

## 2023-09-05 VITALS
BODY MASS INDEX: 29.51 KG/M2 | WEIGHT: 188 LBS | DIASTOLIC BLOOD PRESSURE: 64 MMHG | SYSTOLIC BLOOD PRESSURE: 132 MMHG | HEIGHT: 67 IN | HEART RATE: 81 BPM | OXYGEN SATURATION: 94 %

## 2023-09-05 DIAGNOSIS — R09.02 HYPOXIA: Primary | ICD-10-CM

## 2023-09-05 DIAGNOSIS — C67.9 MALIGNANT NEOPLASM OF URINARY BLADDER, UNSPECIFIED SITE (HCC): ICD-10-CM

## 2023-09-05 DIAGNOSIS — K80.20 GALLSTONES: ICD-10-CM

## 2023-09-05 DIAGNOSIS — I25.10 CORONARY ARTERY DISEASE INVOLVING NATIVE CORONARY ARTERY OF NATIVE HEART WITHOUT ANGINA PECTORIS: ICD-10-CM

## 2023-09-05 DIAGNOSIS — I10 ESSENTIAL HYPERTENSION: Primary | ICD-10-CM

## 2023-09-05 PROCEDURE — 1123F ACP DISCUSS/DSCN MKR DOCD: CPT | Performed by: INTERNAL MEDICINE

## 2023-09-05 PROCEDURE — 3078F DIAST BP <80 MM HG: CPT | Performed by: INTERNAL MEDICINE

## 2023-09-05 PROCEDURE — 99213 OFFICE O/P EST LOW 20 MIN: CPT | Performed by: INTERNAL MEDICINE

## 2023-09-05 PROCEDURE — 3075F SYST BP GE 130 - 139MM HG: CPT | Performed by: INTERNAL MEDICINE

## 2023-09-05 NOTE — PROGRESS NOTES
The DME need to order as a stop order. Then using the word discontinue for oxygen and the order is changed accordingly.

## 2023-09-07 ENCOUNTER — OFFICE VISIT (OUTPATIENT)
Dept: FAMILY MEDICINE CLINIC | Age: 83
End: 2023-09-07
Payer: MEDICARE

## 2023-09-07 VITALS
HEART RATE: 78 BPM | DIASTOLIC BLOOD PRESSURE: 68 MMHG | WEIGHT: 187.9 LBS | BODY MASS INDEX: 29.43 KG/M2 | OXYGEN SATURATION: 96 % | SYSTOLIC BLOOD PRESSURE: 124 MMHG | TEMPERATURE: 98.2 F

## 2023-09-07 DIAGNOSIS — H60.392 OTHER INFECTIVE ACUTE OTITIS EXTERNA OF LEFT EAR: Primary | ICD-10-CM

## 2023-09-07 PROCEDURE — 3078F DIAST BP <80 MM HG: CPT | Performed by: FAMILY MEDICINE

## 2023-09-07 PROCEDURE — 3074F SYST BP LT 130 MM HG: CPT | Performed by: FAMILY MEDICINE

## 2023-09-07 PROCEDURE — 99213 OFFICE O/P EST LOW 20 MIN: CPT | Performed by: FAMILY MEDICINE

## 2023-09-07 PROCEDURE — 1123F ACP DISCUSS/DSCN MKR DOCD: CPT | Performed by: FAMILY MEDICINE

## 2023-09-07 RX ORDER — CIPROFLOXACIN AND DEXAMETHASONE 3; 1 MG/ML; MG/ML
4 SUSPENSION/ DROPS AURICULAR (OTIC) 2 TIMES DAILY
Qty: 7.5 ML | Refills: 0 | Status: SHIPPED | OUTPATIENT
Start: 2023-09-07 | End: 2023-09-17

## 2023-09-10 ASSESSMENT — ENCOUNTER SYMPTOMS
SORE THROAT: 0
COUGH: 0
ABDOMINAL PAIN: 0
DIARRHEA: 0
ABDOMINAL DISTENTION: 0
RHINORRHEA: 0
SHORTNESS OF BREATH: 0
EYE PAIN: 0
CONSTIPATION: 0
NAUSEA: 0
SINUS PRESSURE: 0

## 2023-09-21 ENCOUNTER — OFFICE VISIT (OUTPATIENT)
Dept: CARDIOLOGY CLINIC | Age: 83
End: 2023-09-21
Payer: MEDICARE

## 2023-09-21 VITALS
WEIGHT: 188 LBS | HEIGHT: 67 IN | HEART RATE: 79 BPM | BODY MASS INDEX: 29.51 KG/M2 | DIASTOLIC BLOOD PRESSURE: 75 MMHG | SYSTOLIC BLOOD PRESSURE: 136 MMHG

## 2023-09-21 DIAGNOSIS — I25.10 CAD IN NATIVE ARTERY: Primary | ICD-10-CM

## 2023-09-21 PROCEDURE — 3078F DIAST BP <80 MM HG: CPT | Performed by: INTERNAL MEDICINE

## 2023-09-21 PROCEDURE — 3075F SYST BP GE 130 - 139MM HG: CPT | Performed by: INTERNAL MEDICINE

## 2023-09-21 PROCEDURE — 1123F ACP DISCUSS/DSCN MKR DOCD: CPT | Performed by: INTERNAL MEDICINE

## 2023-09-21 PROCEDURE — 99214 OFFICE O/P EST MOD 30 MIN: CPT | Performed by: INTERNAL MEDICINE

## 2023-09-21 NOTE — PROGRESS NOTES
The patient presents for a 6-month follow-up. EKG done 09/01/23. He's had recent issues with pneumonia and gallstones. The patient denies chest pain, shortness of breath, dizziness, heart palpitations, and swelling of the lower extremities.
in details, patient's questions were answered.      Disposition:  RTC in 12 months             Electronically signed by Ariel Grant MD, [facility], [address] [place] [address]    9/21/2023 at 11:07 AM EDT

## 2023-09-25 RX ORDER — NITROGLYCERIN 0.4 MG/1
0.4 TABLET SUBLINGUAL EVERY 5 MIN PRN
Qty: 25 TABLET | Refills: 1 | Status: SHIPPED | OUTPATIENT
Start: 2023-09-25

## 2023-09-28 ENCOUNTER — HOSPITAL ENCOUNTER (OUTPATIENT)
Dept: NUCLEAR MEDICINE | Age: 83
Discharge: HOME | End: 2023-09-28
Attending: INTERNAL MEDICINE
Payer: MEDICARE

## 2023-09-28 ENCOUNTER — TELEPHONE (OUTPATIENT)
Dept: INTERNAL MEDICINE CLINIC | Age: 83
End: 2023-09-28

## 2023-09-28 DIAGNOSIS — K80.20 GALLSTONES: ICD-10-CM

## 2023-09-28 PROCEDURE — 78227 HEPATOBIL SYST IMAGE W/DRUG: CPT

## 2023-09-28 PROCEDURE — 3430000000 HC RX DIAGNOSTIC RADIOPHARMACEUTICAL: Performed by: INTERNAL MEDICINE

## 2023-09-28 PROCEDURE — A9537 TC99M MEBROFENIN: HCPCS | Performed by: INTERNAL MEDICINE

## 2023-09-28 RX ADMIN — Medication 8 MILLICURIE: at 10:14

## 2023-09-28 NOTE — TELEPHONE ENCOUNTER
Pts wife was notified HIDA scan is normal and we will defer defer any surgery at this time. I advised her to let us know if has increasing symptoms. She verbalizes understanding.

## 2023-09-28 NOTE — TELEPHONE ENCOUNTER
----- Message from Liss Pressley MD sent at 9/28/2023  2:37 PM EDT -----  Tell him kelly francis; would defer any surgery at this time

## 2023-10-06 ENCOUNTER — IMMUNIZATION (OUTPATIENT)
Dept: FAMILY MEDICINE CLINIC | Age: 83
End: 2023-10-06
Payer: MEDICARE

## 2023-10-06 PROCEDURE — G0008 ADMIN INFLUENZA VIRUS VAC: HCPCS | Performed by: FAMILY MEDICINE

## 2023-10-06 PROCEDURE — 90694 VACC AIIV4 NO PRSRV 0.5ML IM: CPT | Performed by: FAMILY MEDICINE

## 2023-10-06 NOTE — PROGRESS NOTES
Immunization(s) given during visit:    Immunizations Administered       Name Date Dose Route    Influenza, FLUAD, (age 72 y+), Adjuvanted, 0.5mL 10/6/2023 0.5 mL Intramuscular    Site: Deltoid- Left    Lot: [id number]    NDC: 78691-645-29            Most recent Vaccine Information Sheet  given to pt

## 2023-10-11 RX ORDER — LEVOTHYROXINE SODIUM 137 UG/1
137 TABLET ORAL DAILY
Qty: 90 TABLET | Refills: 1 | Status: SHIPPED | OUTPATIENT
Start: 2023-10-11

## 2023-11-21 RX ORDER — MIRTAZAPINE 7.5 MG/1
7.5 TABLET, FILM COATED ORAL
Qty: 90 TABLET | Refills: 1 | Status: SHIPPED | OUTPATIENT
Start: 2023-11-21

## 2023-11-28 RX ORDER — BUMETANIDE 1 MG/1
1 TABLET ORAL DAILY
Qty: 90 TABLET | Refills: 2 | Status: SHIPPED | OUTPATIENT
Start: 2023-11-28

## 2023-11-28 RX ORDER — CLOPIDOGREL BISULFATE 75 MG/1
TABLET ORAL
Qty: 90 TABLET | Refills: 3 | Status: SHIPPED | OUTPATIENT
Start: 2023-11-28

## 2023-12-05 ENCOUNTER — OFFICE VISIT (OUTPATIENT)
Dept: INTERNAL MEDICINE CLINIC | Age: 83
End: 2023-12-05
Payer: MEDICARE

## 2023-12-05 VITALS
HEART RATE: 78 BPM | OXYGEN SATURATION: 95 % | HEIGHT: 67 IN | DIASTOLIC BLOOD PRESSURE: 66 MMHG | TEMPERATURE: 98.3 F | WEIGHT: 191.4 LBS | BODY MASS INDEX: 30.04 KG/M2 | SYSTOLIC BLOOD PRESSURE: 116 MMHG

## 2023-12-05 DIAGNOSIS — I25.10 CORONARY ARTERY DISEASE INVOLVING NATIVE CORONARY ARTERY OF NATIVE HEART WITHOUT ANGINA PECTORIS: Primary | ICD-10-CM

## 2023-12-05 DIAGNOSIS — I10 ESSENTIAL HYPERTENSION: ICD-10-CM

## 2023-12-05 PROCEDURE — 3074F SYST BP LT 130 MM HG: CPT | Performed by: INTERNAL MEDICINE

## 2023-12-05 PROCEDURE — 1123F ACP DISCUSS/DSCN MKR DOCD: CPT | Performed by: INTERNAL MEDICINE

## 2023-12-05 PROCEDURE — 99213 OFFICE O/P EST LOW 20 MIN: CPT | Performed by: INTERNAL MEDICINE

## 2023-12-05 PROCEDURE — 3078F DIAST BP <80 MM HG: CPT | Performed by: INTERNAL MEDICINE

## 2023-12-05 RX ORDER — POTASSIUM CHLORIDE 750 MG/1
10 TABLET, EXTENDED RELEASE ORAL DAILY
Qty: 90 TABLET | Refills: 3 | Status: SHIPPED | OUTPATIENT
Start: 2023-12-05

## 2024-03-25 RX ORDER — METOPROLOL TARTRATE 50 MG/1
25 TABLET, FILM COATED ORAL 2 TIMES DAILY
Qty: 90 TABLET | Refills: 3 | Status: SHIPPED | OUTPATIENT
Start: 2024-03-25

## 2024-04-04 RX ORDER — LEVOTHYROXINE SODIUM 137 UG/1
137 TABLET ORAL DAILY
Qty: 90 TABLET | Refills: 1 | Status: SHIPPED | OUTPATIENT
Start: 2024-04-04

## 2024-05-06 ENCOUNTER — HOSPITAL ENCOUNTER (OUTPATIENT)
Dept: CT IMAGING | Age: 84
Discharge: HOME | End: 2024-05-06
Payer: MEDICARE

## 2024-05-06 DIAGNOSIS — Z85.51 HISTORY OF BLADDER CANCER: ICD-10-CM

## 2024-05-06 DIAGNOSIS — C7A.8 NEUROENDOCRINE CANCER (HCC): ICD-10-CM

## 2024-05-06 LAB — POC CREATININE WHOLE BLOOD: 1.3 MG/DL (ref 0.5–1.2)

## 2024-05-06 PROCEDURE — 82565 ASSAY OF CREATININE: CPT

## 2024-05-06 PROCEDURE — 71260 CT THORAX DX C+: CPT

## 2024-05-06 PROCEDURE — 74178 CT ABD&PLV WO CNTR FLWD CNTR: CPT | Performed by: PHYSICIAN ASSISTANT

## 2024-05-06 PROCEDURE — 6360000004 HC RX CONTRAST MEDICATION: Performed by: PHYSICIAN ASSISTANT

## 2024-05-06 RX ADMIN — IOPAMIDOL 85 ML: 755 INJECTION, SOLUTION INTRAVENOUS at 12:52

## 2024-05-13 ENCOUNTER — OFFICE VISIT (OUTPATIENT)
Dept: UROLOGY | Age: 84
End: 2024-05-13

## 2024-05-13 VITALS
WEIGHT: 191 LBS | SYSTOLIC BLOOD PRESSURE: 110 MMHG | HEIGHT: 67 IN | DIASTOLIC BLOOD PRESSURE: 60 MMHG | BODY MASS INDEX: 29.98 KG/M2

## 2024-05-13 DIAGNOSIS — R30.0 DYSURIA: ICD-10-CM

## 2024-05-13 DIAGNOSIS — Z85.51 HISTORY OF BLADDER CANCER: Primary | ICD-10-CM

## 2024-05-13 NOTE — PROGRESS NOTES
[facility] [facility]  [facility]OLOG[facility]  [address]  [address]  [facility] [address]  Dept: [phone]  Loc: [phone]      Mr. Yu was seen in follow up for   Chief Complaint   Patient presents with    Follow-up     Return in about 8 months (around 4/28/2024) for bladder cancer, neuroendocrine cancer of unknown primary.    Other     History of bladder cancer, patient has a catheter when asked for a urine sample.    Results     Scans done prior        HPI:  Mr. Yu is an 84-year-old patient male with a history of bladder cancer. He was first diagnosed with non-invasive high grade papillary urothelial carcinoma in 2014. He underwent TURBT and BCG therapy. Unfortunately, his surveillance cystoscopy in October 2018 showed a large bladder tumor. He underwent a TURBT on November 8, 2018. 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 He completed three cycles of neoadjuvant Gemzar/Cisplatin in 3/20/19. He underwent a Radical Cystoprostatectomy with Pelvic Lymph Node Dissection and creation of Ileal Loop Diversion on 5/1/19. His  post-neoadjuvant treatment pathology was pT0, pN0.      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 A Needle core biopsy performed by Dr. 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 Pathology felt this tumor may have originated in the lungs. Patient received 5000 cGy EBRT, completed 5/5 in March 2021.     He states that he has been feeling relatively well since his last visit. He still has some degree of fatigue but feels that everything is stable at this time. He does report worsening back pain and weakness in his bilateral upper legs. He ambulates with a walker now. He reports a lumbar fusion by Dr. Watters in 2015. He denies ostomy pain, gross

## 2024-05-15 ENCOUNTER — NURSE ONLY (OUTPATIENT)
Dept: UROLOGY | Age: 84
End: 2024-05-15

## 2024-05-15 DIAGNOSIS — R30.0 DYSURIA: ICD-10-CM

## 2024-05-15 DIAGNOSIS — Z85.51 HISTORY OF BLADDER CANCER: Primary | ICD-10-CM

## 2024-05-15 NOTE — PROGRESS NOTES
Patient arrives to give urine sample for Cytology and culture from new ostomy bag. Urine sent to lab

## 2024-05-16 RX ORDER — EZETIMIBE 10 MG/1
TABLET ORAL
Qty: 90 TABLET | Refills: 3 | Status: SHIPPED | OUTPATIENT
Start: 2024-05-16

## 2024-05-17 ENCOUNTER — TELEPHONE (OUTPATIENT)
Dept: UROLOGY | Age: 84
End: 2024-05-17

## 2024-05-17 LAB
BACTERIA UR CULT: ABNORMAL
ORGANISM: ABNORMAL

## 2024-05-17 RX ORDER — AMOXICILLIN AND CLAVULANATE POTASSIUM 875; 125 MG/1; MG/1
1 TABLET, FILM COATED ORAL 2 TIMES DAILY
Qty: 20 TABLET | Refills: 0 | Status: SHIPPED | OUTPATIENT
Start: 2024-05-17 | End: 2024-05-27

## 2024-05-17 NOTE — TELEPHONE ENCOUNTER
Patient wife on HIPAA advised of the urine culture and cystology results.    Please send the Augmentin to [facility].

## 2024-05-17 NOTE — TELEPHONE ENCOUNTER
Done.     Encounter sent to Celestina Fuller PA-C, as well    The patient should go to the ED should they develop fever, chills, nausea, vomiting, chest pain, SOB, calf pain, feelings of incomplete emptying, or should they otherwise feel they need evaluated

## 2024-05-24 NOTE — TELEPHONE ENCOUNTER
Cytology reviewed and negative.     Cx with growth. Will send Augmentin.     The patient should go to the ED should they develop fever, chills, nausea, vomiting, chest pain, SOB, calf pain, feelings of incomplete emptying, or should they otherwise feel they need evaluated     HLD (hyperlipidemia) HLD (hyperlipidemia) HLD (hyperlipidemia) HLD (hyperlipidemia)

## 2024-06-08 SDOH — ECONOMIC STABILITY: FOOD INSECURITY: WITHIN THE PAST 12 MONTHS, THE FOOD YOU BOUGHT JUST DIDN'T LAST AND YOU DIDN'T HAVE MONEY TO GET MORE.: NEVER TRUE

## 2024-06-08 SDOH — ECONOMIC STABILITY: INCOME INSECURITY: HOW HARD IS IT FOR YOU TO PAY FOR THE VERY BASICS LIKE FOOD, HOUSING, MEDICAL CARE, AND HEATING?: NOT HARD AT ALL

## 2024-06-08 SDOH — ECONOMIC STABILITY: FOOD INSECURITY: WITHIN THE PAST 12 MONTHS, YOU WORRIED THAT YOUR FOOD WOULD RUN OUT BEFORE YOU GOT MONEY TO BUY MORE.: NEVER TRUE

## 2024-06-08 ASSESSMENT — PATIENT HEALTH QUESTIONNAIRE - PHQ9
1. LITTLE INTEREST OR PLEASURE IN DOING THINGS: SEVERAL DAYS
2. FEELING DOWN, DEPRESSED OR HOPELESS: SEVERAL DAYS
3. TROUBLE FALLING OR STAYING ASLEEP: NEARLY EVERY DAY
8. MOVING OR SPEAKING SO SLOWLY THAT OTHER PEOPLE COULD HAVE NOTICED. OR THE OPPOSITE, BEING SO FIGETY OR RESTLESS THAT YOU HAVE BEEN MOVING AROUND A LOT MORE THAN USUAL: NOT AT ALL
9. THOUGHTS THAT YOU WOULD BE BETTER OFF DEAD, OR OF HURTING YOURSELF: NOT AT ALL
SUM OF ALL RESPONSES TO PHQ QUESTIONS 1-9: 9
5. POOR APPETITE OR OVEREATING: NOT AT ALL
4. FEELING TIRED OR HAVING LITTLE ENERGY: NEARLY EVERY DAY
9. THOUGHTS THAT YOU WOULD BE BETTER OFF DEAD, OR OF HURTING YOURSELF: NOT AT ALL
6. FEELING BAD ABOUT YOURSELF - OR THAT YOU ARE A FAILURE OR HAVE LET YOURSELF OR YOUR FAMILY DOWN: NOT AT ALL
4. FEELING TIRED OR HAVING LITTLE ENERGY: NEARLY EVERY DAY
10. IF YOU CHECKED OFF ANY PROBLEMS, HOW DIFFICULT HAVE THESE PROBLEMS MADE IT FOR YOU TO DO YOUR WORK, TAKE CARE OF THINGS AT HOME, OR GET ALONG WITH OTHER PEOPLE: SOMEWHAT DIFFICULT
7. TROUBLE CONCENTRATING ON THINGS, SUCH AS READING THE NEWSPAPER OR WATCHING TELEVISION: SEVERAL DAYS
10. IF YOU CHECKED OFF ANY PROBLEMS, HOW DIFFICULT HAVE THESE PROBLEMS MADE IT FOR YOU TO DO YOUR WORK, TAKE CARE OF THINGS AT HOME, OR GET ALONG WITH OTHER PEOPLE: SOMEWHAT DIFFICULT
SUM OF ALL RESPONSES TO PHQ QUESTIONS 1-9: 9
SUM OF ALL RESPONSES TO PHQ QUESTIONS 1-9: 9
8. MOVING OR SPEAKING SO SLOWLY THAT OTHER PEOPLE COULD HAVE NOTICED. OR THE OPPOSITE - BEING SO FIDGETY OR RESTLESS THAT YOU HAVE BEEN MOVING AROUND A LOT MORE THAN USUAL: NOT AT ALL
5. POOR APPETITE OR OVEREATING: NOT AT ALL
7. TROUBLE CONCENTRATING ON THINGS, SUCH AS READING THE NEWSPAPER OR WATCHING TELEVISION: SEVERAL DAYS
1. LITTLE INTEREST OR PLEASURE IN DOING THINGS: SEVERAL DAYS
6. FEELING BAD ABOUT YOURSELF - OR THAT YOU ARE A FAILURE OR HAVE LET YOURSELF OR YOUR FAMILY DOWN: NOT AT ALL
SUM OF ALL RESPONSES TO PHQ9 QUESTIONS 1 & 2: 2
2. FEELING DOWN, DEPRESSED OR HOPELESS: SEVERAL DAYS
SUM OF ALL RESPONSES TO PHQ QUESTIONS 1-9: 9
3. TROUBLE FALLING OR STAYING ASLEEP: NEARLY EVERY DAY
SUM OF ALL RESPONSES TO PHQ QUESTIONS 1-9: 9

## 2024-06-11 ENCOUNTER — OFFICE VISIT (OUTPATIENT)
Dept: INTERNAL MEDICINE CLINIC | Age: 84
End: 2024-06-11
Payer: MEDICARE

## 2024-06-11 VITALS
SYSTOLIC BLOOD PRESSURE: 120 MMHG | HEART RATE: 64 BPM | HEIGHT: 67 IN | BODY MASS INDEX: 28.79 KG/M2 | OXYGEN SATURATION: 96 % | DIASTOLIC BLOOD PRESSURE: 64 MMHG | WEIGHT: 183.4 LBS

## 2024-06-11 DIAGNOSIS — N18.31 STAGE 3A CHRONIC KIDNEY DISEASE (HCC): ICD-10-CM

## 2024-06-11 DIAGNOSIS — I71.40 ABDOMINAL AORTIC ANEURYSM (AAA) WITHOUT RUPTURE, UNSPECIFIED PART (HCC): ICD-10-CM

## 2024-06-11 DIAGNOSIS — I25.10 CORONARY ARTERY DISEASE INVOLVING NATIVE CORONARY ARTERY OF NATIVE HEART WITHOUT ANGINA PECTORIS: Primary | ICD-10-CM

## 2024-06-11 PROCEDURE — 3074F SYST BP LT 130 MM HG: CPT | Performed by: INTERNAL MEDICINE

## 2024-06-11 PROCEDURE — 3078F DIAST BP <80 MM HG: CPT | Performed by: INTERNAL MEDICINE

## 2024-06-11 PROCEDURE — 1123F ACP DISCUSS/DSCN MKR DOCD: CPT | Performed by: INTERNAL MEDICINE

## 2024-06-11 PROCEDURE — 99213 OFFICE O/P EST LOW 20 MIN: CPT | Performed by: INTERNAL MEDICINE

## 2024-06-11 NOTE — PATIENT INSTRUCTIONS
ASK YOUR PHARMACIST OR [facility] ABOUT THE RSV SHOT    HAVE A GOOD SUMMER!    YOU CAN TRY MELATONIN FOR SLEEP; IT IS OVER THE COUNTER

## 2024-06-11 NOTE — PROGRESS NOTES
Tony Yu (:  1940) is a 84 y.o. male,Established patient, here for evaluation of the following chief complaint(s):  Coronary Artery Disease, Hypertension, and trouble sleeping (Can get to sleep no problems but has trouble staying asleep wakes up about the same time every night.)      Assessment & Plan   1. Coronary artery disease involving native coronary artery of native heart without angina pectoris- had STEMI ; now stable.    -     LDL Cholesterol, Direct; Future  2. Abdominal aortic aneurysm (AAA) without rupture, unspecified part (HCC)- had stent; does not see Dr Iyer anymore  3. Stage 3a chronic kidney disease (HCC)- stable; has single kidney  -     Potassium; Future  4. Hx urothelial cancer; declared cancer free  5. Gait instability; uses walker.  Difficult to get to mailbox  6. Sleep issues; advised melatonin  Return in about 9 months (around 3/3/2025).       Subjective   HPI pt with cad, prior mi, ckd, arthritis, seen in f/u.  No new issues.  Gets around slowly.  Has trouble sleeping.  No ch pn.  Has urostomy.  No falls.    Review of Systems   Twelve point ROS completed and found to be negative except as noted above.      Objective   Physical Exam   /64 (Site: Left Upper Arm)   Pulse 64   Ht 1.702 m (5' 7.01\")   Wt 83.2 kg (183 lb 6.4 oz)   SpO2 96% Comment: at rest on room air  BMI 28.72 kg/m²     General appearance - alert, well appearing, and in no distress    Mental Status - alert, oriented to person, place, and time          Neck - supple, no significant adenopathy        Chest - clear to auscultation, no wheezes, rales or rhonchi, symmetric air entry     Heart - normal rate, regular rhythm, normal S1, S2, no murmurs, rubs, clicks or gallops     Abdomen - soft, nontender, nondistended, no masses or organomegaly  Urostomy present         Neurological - alert, oriented, normal speech, no focal findings or movement disorder noted     Musculoskeletal -   msk exam:[id number]}

## 2024-06-13 LAB
LDL CHOLESTEROL DIRECT: 125 MG/DL
POTASSIUM SERPL-SCNC: 3.6 MEQ/L (ref 3.5–5.4)

## 2024-06-19 ENCOUNTER — TELEPHONE (OUTPATIENT)
Dept: INTERNAL MEDICINE CLINIC | Age: 84
End: 2024-06-19

## 2024-06-19 DIAGNOSIS — T50.905S ADVERSE EFFECT OF DRUG, SEQUELA: ICD-10-CM

## 2024-06-19 DIAGNOSIS — E78.5 HYPERLIPIDEMIA, UNSPECIFIED HYPERLIPIDEMIA TYPE: Primary | ICD-10-CM

## 2024-06-19 NOTE — TELEPHONE ENCOUNTER
----- Message from Johny Ludwig MD sent at 6/13/2024  4:42 AM EDT -----  Ldl too high; would he consider zetia, it is generic, no muscle aches with it

## 2024-06-24 RX ORDER — PITAVASTATIN CALCIUM 1.04 MG/1
1 TABLET, FILM COATED ORAL NIGHTLY
Qty: 30 TABLET | Refills: 5 | Status: SHIPPED | OUTPATIENT
Start: 2024-06-24 | End: 2024-06-27

## 2024-06-24 NOTE — TELEPHONE ENCOUNTER
I spoke with pts wife as he does not hear well on the phone and she is on his HIPAA.   She says she thinks he will be willing to add Pitivastatin.   I advised her he will need an LDL in about 6 weeks.   She states she would like a script sent to [facility] on [address].   I advised her I will mail a lab slip to be done in 6 weeks after starting Pritivastatin.

## 2024-06-26 ENCOUNTER — TELEPHONE (OUTPATIENT)
Dept: INTERNAL MEDICINE CLINIC | Age: 84
End: 2024-06-26

## 2024-06-26 DIAGNOSIS — N18.31 STAGE 3A CHRONIC KIDNEY DISEASE (HCC): Primary | ICD-10-CM

## 2024-06-26 DIAGNOSIS — T50.905S ADVERSE EFFECT OF DRUG, SEQUELA: ICD-10-CM

## 2024-06-26 NOTE — TELEPHONE ENCOUNTER
Pts wife called saying insurance will not cover pitivastatin.  I checked with pharmacy and they say pt has to have completed step therapy first.  Has to have tried other statins.   From research of the chart he the only statin listed was atorvastatin which he felt he had muscle aches and pcp had taken him off.   Wife states she thinks he has neuropathy and thinks he would be willing to try other statins,        Also wife states that pt has swollen ankles all of the time.  They never go down.   She feels the current bumex is not working.  He is on 1 mg. Daily.  PCP changed from Lasix to Bumex in July 2023 d/t swelling.   Wife is wondering if there is something else he can try.

## 2024-06-27 RX ORDER — SIMVASTATIN 20 MG
20 TABLET ORAL NIGHTLY
Qty: 30 TABLET | Refills: 5 | Status: SHIPPED | OUTPATIENT
Start: 2024-06-27

## 2024-06-27 NOTE — TELEPHONE ENCOUNTER
I spoke with pts wife.  They will try Simvistatin and let us know if has any problems.    I also advised her can try Bumex 2 mg daily and get lab in 1 week.  He will go to [facility] on 7/3/24.   She will call in a report on how he is doing on 7/2 or 7/3.  He already wears support hose.

## 2024-07-02 ENCOUNTER — NURSE ONLY (OUTPATIENT)
Dept: LAB | Age: 84
End: 2024-07-02

## 2024-07-02 ENCOUNTER — TELEPHONE (OUTPATIENT)
Dept: INTERNAL MEDICINE CLINIC | Age: 84
End: 2024-07-02

## 2024-07-02 DIAGNOSIS — R73.09 ELEVATED HEMOGLOBIN A1C: ICD-10-CM

## 2024-07-02 DIAGNOSIS — N18.31 STAGE 3A CHRONIC KIDNEY DISEASE (HCC): ICD-10-CM

## 2024-07-02 DIAGNOSIS — T50.905S ADVERSE EFFECT OF DRUG, SEQUELA: ICD-10-CM

## 2024-07-02 DIAGNOSIS — Z83.3 FAMILY HISTORY OF DIABETES MELLITUS: Primary | ICD-10-CM

## 2024-07-02 LAB
ANION GAP SERPL CALC-SCNC: 11 MEQ/L (ref 8–16)
BUN SERPL-MCNC: 22 MG/DL (ref 7–22)
CALCIUM SERPL-MCNC: 9.2 MG/DL (ref 8.5–10.5)
CHLORIDE SERPL-SCNC: 94 MEQ/L (ref 98–111)
CO2 SERPL-SCNC: 33 MEQ/L (ref 23–33)
CREAT SERPL-MCNC: 1.3 MG/DL (ref 0.4–1.2)
GFR SERPL CREATININE-BSD FRML MDRD: 54 ML/MIN/1.73M2
GLUCOSE SERPL-MCNC: 83 MG/DL (ref 70–108)
POTASSIUM SERPL-SCNC: 3.6 MEQ/L (ref 3.5–5.2)
SODIUM SERPL-SCNC: 138 MEQ/L (ref 135–145)

## 2024-07-02 NOTE — TELEPHONE ENCOUNTER
Wife called in an update on med changes;    Pt started on simvastatin 20 mg. Daily last week and so far no problems with muscle aches or pain.  Doing well.    Dr. Ludwig increased Bumex to 2 mg.  d/t feet and ankle swelling and pt has lost 6 lbs in the last week.  He has had much improvement.  [facility]  I advised wife we will get back to him after the lab results are in.      Pts wife is inquiring if he should have his A1C checked?   His mother was a diabetic.   His last A1c  on 2/27/22 was 5.9.  IN 2011 was 6.4.   If so he could get it checked with his labs in 6 weeks when we check his LDL.

## 2024-07-03 NOTE — TELEPHONE ENCOUNTER
Pts wife was notified labs are OK and to go back to Bumex 1 mg. Daily and if swelling comes back he can go on 1 mg. Alternating with 2 mg. Qod.  And to check in with us if he needs to do that and give an update on how he is doing on this.  Also will send lab slip for A1C to be done with LDL in 6 weeks.

## 2024-07-08 ENCOUNTER — OFFICE VISIT (OUTPATIENT)
Dept: FAMILY MEDICINE CLINIC | Age: 84
End: 2024-07-08
Payer: MEDICARE

## 2024-07-08 VITALS
BODY MASS INDEX: 28.15 KG/M2 | HEART RATE: 59 BPM | OXYGEN SATURATION: 95 % | TEMPERATURE: 98.1 F | WEIGHT: 179.8 LBS | DIASTOLIC BLOOD PRESSURE: 74 MMHG | SYSTOLIC BLOOD PRESSURE: 132 MMHG

## 2024-07-08 DIAGNOSIS — M54.50 ACUTE BILATERAL LOW BACK PAIN WITHOUT SCIATICA: Primary | ICD-10-CM

## 2024-07-08 PROBLEM — Z90.6 H/O TOTAL CYSTECTOMY: Status: RESOLVED | Noted: 2021-01-06 | Resolved: 2024-07-08

## 2024-07-08 PROCEDURE — 3075F SYST BP GE 130 - 139MM HG: CPT | Performed by: NURSE PRACTITIONER

## 2024-07-08 PROCEDURE — 1123F ACP DISCUSS/DSCN MKR DOCD: CPT | Performed by: NURSE PRACTITIONER

## 2024-07-08 PROCEDURE — 3078F DIAST BP <80 MM HG: CPT | Performed by: NURSE PRACTITIONER

## 2024-07-08 PROCEDURE — 99214 OFFICE O/P EST MOD 30 MIN: CPT | Performed by: NURSE PRACTITIONER

## 2024-07-08 RX ORDER — BACLOFEN 10 MG/1
10 TABLET ORAL 3 TIMES DAILY
Qty: 30 TABLET | Refills: 0 | Status: ON HOLD | OUTPATIENT
Start: 2024-07-08 | End: 2024-07-18

## 2024-07-08 RX ORDER — PREDNISONE 20 MG/1
20 TABLET ORAL 2 TIMES DAILY
Qty: 10 TABLET | Refills: 0 | Status: ON HOLD | OUTPATIENT
Start: 2024-07-08 | End: 2024-07-13

## 2024-07-08 ASSESSMENT — ENCOUNTER SYMPTOMS
DIARRHEA: 0
COUGH: 0
ABDOMINAL PAIN: 0
EYE DISCHARGE: 0
ALLERGIC/IMMUNOLOGIC NEGATIVE: 1
BACK PAIN: 1
SHORTNESS OF BREATH: 0
RHINORRHEA: 0
WHEEZING: 0
TROUBLE SWALLOWING: 0
EYE PAIN: 0
EYE REDNESS: 0
NAUSEA: 0
VOMITING: 0
SORE THROAT: 0
CONSTIPATION: 0

## 2024-07-08 NOTE — PROGRESS NOTES
for arthralgias.   Skin:  Negative for rash.   Allergic/Immunologic: Negative.    Neurological:  Negative for dizziness, tremors, weakness and headaches.   Hematological: Negative.    Psychiatric/Behavioral:  Negative for dysphoric mood and sleep disturbance. The patient is not nervous/anxious.        Objective:     /74 (Site: Right Upper Arm)   Pulse 59   Temp 98.1 °F (36.7 °C) (Oral)   Wt 81.6 kg (179 lb 12.8 oz)   SpO2 95%   BMI 28.15 kg/m²     Physical Exam  Vitals and nursing note reviewed.   Constitutional:       General: He is not in acute distress.     Appearance: He is well-developed. He is not ill-appearing or diaphoretic.   HENT:      Right Ear: External ear normal.      Left Ear: External ear normal.      Nose: Nose normal.   Eyes:      General:         Right eye: No discharge.         Left eye: No discharge.      Conjunctiva/sclera: Conjunctivae normal.      Pupils: Pupils are equal, round, and reactive to light.   Neck:      Vascular: No JVD.   Cardiovascular:      Rate and Rhythm: Normal rate and regular rhythm.   Pulmonary:      Effort: Pulmonary effort is normal. No respiratory distress.   Musculoskeletal:         General: No tenderness or deformity. Normal range of motion.      Cervical back: Normal range of motion.   Skin:     General: Skin is warm and dry.      Capillary Refill: Capillary refill takes less than 2 seconds.      Coloration: Skin is not pale.      Findings: No erythema or rash.   Neurological:      Mental Status: He is alert and oriented to person, place, and time.      Coordination: Coordination normal.   Psychiatric:         Behavior: Behavior normal.         Thought Content: Thought content normal.         Judgment: Judgment normal.         Assessment/Plan:      Tony was seen today for lower back pain.    Diagnoses and all orders for this visit:    Acute bilateral low back pain without sciatica  -     baclofen (LIORESAL) 10 MG tablet; Take 1 tablet by mouth 3 times

## 2024-07-10 ENCOUNTER — TELEPHONE (OUTPATIENT)
Dept: FAMILY MEDICINE CLINIC | Age: 84
End: 2024-07-10

## 2024-07-10 DIAGNOSIS — M54.50 ACUTE BILATERAL LOW BACK PAIN WITHOUT SCIATICA: Primary | ICD-10-CM

## 2024-07-10 RX ORDER — HYDROCODONE BITARTRATE AND ACETAMINOPHEN 5; 325 MG/1; MG/1
1 TABLET ORAL EVERY 6 HOURS PRN
Qty: 20 TABLET | Refills: 0 | Status: ON HOLD | OUTPATIENT
Start: 2024-07-10 | End: 2024-07-15

## 2024-07-10 NOTE — TELEPHONE ENCOUNTER
Patient was seen by Reagan Wasserman 7/8/24 for LBP; patient's wife called stating the medications have provided him no relief, and the pain is actually worse. Patient is not sleeping and wife requesting something stronger for his pain. Please advise

## 2024-07-11 ENCOUNTER — APPOINTMENT (OUTPATIENT)
Dept: CT IMAGING | Age: 84
End: 2024-07-11
Payer: MEDICARE

## 2024-07-11 ENCOUNTER — HOSPITAL ENCOUNTER (INPATIENT)
Age: 84
LOS: 5 days | Discharge: HOME | End: 2024-07-16
Attending: EMERGENCY MEDICINE
Payer: MEDICARE

## 2024-07-11 DIAGNOSIS — N39.0 URINARY TRACT INFECTION WITHOUT HEMATURIA, SITE UNSPECIFIED: ICD-10-CM

## 2024-07-11 DIAGNOSIS — M54.40 LOW BACK PAIN WITH SCIATICA, SCIATICA LATERALITY UNSPECIFIED, UNSPECIFIED BACK PAIN LATERALITY, UNSPECIFIED CHRONICITY: Primary | ICD-10-CM

## 2024-07-11 DIAGNOSIS — M54.9 INTRACTABLE BACK PAIN: ICD-10-CM

## 2024-07-11 LAB
ALBUMIN SERPL BCG-MCNC: 4.1 G/DL (ref 3.5–5.1)
ALP SERPL-CCNC: 111 U/L (ref 38–126)
ALT SERPL W/O P-5'-P-CCNC: 24 U/L (ref 11–66)
ANION GAP SERPL CALC-SCNC: 13 MEQ/L (ref 8–16)
AST SERPL-CCNC: 20 U/L (ref 5–40)
BACTERIA: ABNORMAL
BASOPHILS ABSOLUTE: 0 THOU/MM3 (ref 0–0.1)
BASOPHILS NFR BLD AUTO: 0.1 %
BILIRUB CONJ SERPL-MCNC: 0.2 MG/DL (ref 0.1–13.8)
BILIRUB SERPL-MCNC: 0.8 MG/DL (ref 0.3–1.2)
BILIRUB UR QL STRIP: NEGATIVE
BUN SERPL-MCNC: 28 MG/DL (ref 7–22)
CALCIUM SERPL-MCNC: 9.1 MG/DL (ref 8.5–10.5)
CASTS #/AREA URNS LPF: ABNORMAL /LPF
CASTS #/AREA URNS LPF: ABNORMAL /LPF
CHARACTER UR: CLEAR
CHARCOAL URNS QL MICRO: ABNORMAL
CHLORIDE SERPL-SCNC: 99 MEQ/L (ref 98–111)
CO2 SERPL-SCNC: 27 MEQ/L (ref 23–33)
COLOR UR: YELLOW
CREAT SERPL-MCNC: 1.2 MG/DL (ref 0.4–1.2)
CRYSTALS URNS QL MICRO: ABNORMAL
D DIMER PPP IA.FEU-MCNC: 5159 NG/ML FEU (ref 0–500)
DEPRECATED RDW RBC AUTO: 46.8 FL (ref 35–45)
EKG ATRIAL RATE: 66 BPM
EKG P AXIS: 53 DEGREES
EKG P-R INTERVAL: 148 MS
EKG Q-T INTERVAL: 400 MS
EKG QRS DURATION: 76 MS
EKG QTC CALCULATION (BAZETT): 419 MS
EKG R AXIS: 3 DEGREES
EKG T AXIS: 23 DEGREES
EKG VENTRICULAR RATE: 66 BPM
EOSINOPHIL NFR BLD AUTO: 0 %
EOSINOPHILS ABSOLUTE: 0 THOU/MM3 (ref 0–0.4)
EPITHELIAL CELLS, UA: ABNORMAL /HPF
ERYTHROCYTE [DISTWIDTH] IN BLOOD BY AUTOMATED COUNT: 13.8 % (ref 11.5–14.5)
GFR SERPL CREATININE-BSD FRML MDRD: 59 ML/MIN/1.73M2
GLUCOSE SERPL-MCNC: 101 MG/DL (ref 70–108)
GLUCOSE UR QL STRIP.AUTO: NEGATIVE MG/DL
HCT VFR BLD AUTO: 48.1 % (ref 42–52)
HGB BLD-MCNC: 15.9 GM/DL (ref 14–18)
HGB UR QL STRIP.AUTO: NEGATIVE
IMM GRANULOCYTES # BLD AUTO: 0.1 THOU/MM3 (ref 0–0.07)
IMM GRANULOCYTES NFR BLD AUTO: 0.6 %
KETONES UR QL STRIP.AUTO: NEGATIVE
LACTIC ACID, SEPSIS: 1.5 MMOL/L (ref 0.5–1.9)
LEUKOCYTE ESTERASE UR QL STRIP.AUTO: ABNORMAL
LIPASE SERPL-CCNC: 11.7 U/L (ref 5.6–51.3)
LYMPHOCYTES ABSOLUTE: 4.1 THOU/MM3 (ref 1–4.8)
LYMPHOCYTES NFR BLD AUTO: 22.6 %
MAGNESIUM SERPL-MCNC: 2.2 MG/DL (ref 1.6–2.4)
MCH RBC QN AUTO: 30.6 PG (ref 26–33)
MCHC RBC AUTO-ENTMCNC: 33.1 GM/DL (ref 32.2–35.5)
MCV RBC AUTO: 92.5 FL (ref 80–94)
MONOCYTES ABSOLUTE: 1.2 THOU/MM3 (ref 0.4–1.3)
MONOCYTES NFR BLD AUTO: 6.6 %
NEUTROPHILS ABSOLUTE: 12.8 THOU/MM3 (ref 1.8–7.7)
NEUTROPHILS NFR BLD AUTO: 70.1 %
NITRITE UR QL STRIP.AUTO: NEGATIVE
NRBC BLD AUTO-RTO: 0 /100 WBC
NT-PROBNP SERPL IA-MCNC: 679.6 PG/ML (ref 0–449)
OSMOLALITY SERPL CALC.SUM OF ELEC: 283.2 MOSMOL/KG (ref 275–300)
PH UR STRIP.AUTO: 6.5 [PH] (ref 5–9)
PLATELET # BLD AUTO: 211 THOU/MM3 (ref 130–400)
PLATELET BLD QL SMEAR: ADEQUATE
PMV BLD AUTO: 9.6 FL (ref 9.4–12.4)
POTASSIUM SERPL-SCNC: 3.9 MEQ/L (ref 3.5–5.2)
PROT SERPL-MCNC: 6.7 G/DL (ref 6.1–8)
PROT UR STRIP.AUTO-MCNC: 30 MG/DL
RBC # BLD AUTO: 5.2 MILL/MM3 (ref 4.7–6.1)
RBC #/AREA URNS HPF: ABNORMAL /HPF
RENAL EPI CELLS #/AREA URNS HPF: ABNORMAL /[HPF]
SCAN OF BLOOD SMEAR: NORMAL
SMUDGE CELLS BLD QL SMEAR: PRESENT
SODIUM SERPL-SCNC: 139 MEQ/L (ref 135–145)
SP GR UR REFRACT.AUTO: 1.01 (ref 1–1.03)
TROPONIN, HIGH SENSITIVITY: 23 NG/L (ref 0–12)
TROPONIN, HIGH SENSITIVITY: 30 NG/L (ref 0–12)
UROBILINOGEN UR QL STRIP.AUTO: 0.2 EU/DL (ref 0–1)
VARIANT LYMPHS BLD QL SMEAR: ABNORMAL %
WBC # BLD AUTO: 18.2 THOU/MM3 (ref 4.8–10.8)
WBC #/AREA URNS HPF: ABNORMAL /HPF
YEAST LIKE FUNGI URNS QL MICRO: ABNORMAL

## 2024-07-11 PROCEDURE — 83735 ASSAY OF MAGNESIUM: CPT

## 2024-07-11 PROCEDURE — 80053 COMPREHEN METABOLIC PANEL: CPT

## 2024-07-11 PROCEDURE — 99285 EMERGENCY DEPT VISIT HI MDM: CPT

## 2024-07-11 PROCEDURE — 6360000002 HC RX W HCPCS

## 2024-07-11 PROCEDURE — 99223 1ST HOSP IP/OBS HIGH 75: CPT

## 2024-07-11 PROCEDURE — 2580000003 HC RX 258

## 2024-07-11 PROCEDURE — 74174 CTA ABD&PLVS W/CONTRAST: CPT

## 2024-07-11 PROCEDURE — 96365 THER/PROPH/DIAG IV INF INIT: CPT

## 2024-07-11 PROCEDURE — 93005 ELECTROCARDIOGRAM TRACING: CPT

## 2024-07-11 PROCEDURE — 83880 ASSAY OF NATRIURETIC PEPTIDE: CPT

## 2024-07-11 PROCEDURE — 76376 3D RENDER W/INTRP POSTPROCES: CPT

## 2024-07-11 PROCEDURE — 74176 CT ABD & PELVIS W/O CONTRAST: CPT

## 2024-07-11 PROCEDURE — 85025 COMPLETE CBC W/AUTO DIFF WBC: CPT

## 2024-07-11 PROCEDURE — 84484 ASSAY OF TROPONIN QUANT: CPT

## 2024-07-11 PROCEDURE — 81001 URINALYSIS AUTO W/SCOPE: CPT

## 2024-07-11 PROCEDURE — 93010 ELECTROCARDIOGRAM REPORT: CPT | Performed by: INTERNAL MEDICINE

## 2024-07-11 PROCEDURE — 96372 THER/PROPH/DIAG INJ SC/IM: CPT

## 2024-07-11 PROCEDURE — 83690 ASSAY OF LIPASE: CPT

## 2024-07-11 PROCEDURE — 6360000004 HC RX CONTRAST MEDICATION

## 2024-07-11 PROCEDURE — 1200000000 HC SEMI PRIVATE

## 2024-07-11 PROCEDURE — 85379 FIBRIN DEGRADATION QUANT: CPT

## 2024-07-11 PROCEDURE — 36415 COLL VENOUS BLD VENIPUNCTURE: CPT

## 2024-07-11 PROCEDURE — 83605 ASSAY OF LACTIC ACID: CPT

## 2024-07-11 PROCEDURE — 71275 CT ANGIOGRAPHY CHEST: CPT

## 2024-07-11 PROCEDURE — 6370000000 HC RX 637 (ALT 250 FOR IP)

## 2024-07-11 PROCEDURE — 82248 BILIRUBIN DIRECT: CPT

## 2024-07-11 RX ORDER — FENTANYL CITRATE 50 UG/ML
50 INJECTION, SOLUTION INTRAMUSCULAR; INTRAVENOUS ONCE
Status: DISCONTINUED | OUTPATIENT
Start: 2024-07-11 | End: 2024-07-11

## 2024-07-11 RX ORDER — POTASSIUM CHLORIDE 750 MG/1
10 TABLET, FILM COATED, EXTENDED RELEASE ORAL DAILY
Status: DISCONTINUED | OUTPATIENT
Start: 2024-07-12 | End: 2024-07-16 | Stop reason: HOSPADM

## 2024-07-11 RX ORDER — MAGNESIUM SULFATE IN WATER 40 MG/ML
2000 INJECTION, SOLUTION INTRAVENOUS PRN
Status: DISCONTINUED | OUTPATIENT
Start: 2024-07-11 | End: 2024-07-16 | Stop reason: HOSPADM

## 2024-07-11 RX ORDER — CLOPIDOGREL BISULFATE 75 MG/1
75 TABLET ORAL DAILY
Status: DISCONTINUED | OUTPATIENT
Start: 2024-07-12 | End: 2024-07-16 | Stop reason: HOSPADM

## 2024-07-11 RX ORDER — FENTANYL CITRATE 50 UG/ML
INJECTION, SOLUTION INTRAMUSCULAR; INTRAVENOUS
Status: COMPLETED
Start: 2024-07-11 | End: 2024-07-11

## 2024-07-11 RX ORDER — OXYCODONE HYDROCHLORIDE 5 MG/1
5 TABLET ORAL ONCE
Status: COMPLETED | OUTPATIENT
Start: 2024-07-11 | End: 2024-07-11

## 2024-07-11 RX ORDER — ONDANSETRON 2 MG/ML
4 INJECTION INTRAMUSCULAR; INTRAVENOUS EVERY 6 HOURS PRN
Status: DISCONTINUED | OUTPATIENT
Start: 2024-07-11 | End: 2024-07-16 | Stop reason: HOSPADM

## 2024-07-11 RX ORDER — FENTANYL CITRATE 50 UG/ML
50 INJECTION, SOLUTION INTRAMUSCULAR; INTRAVENOUS ONCE
Status: COMPLETED | OUTPATIENT
Start: 2024-07-11 | End: 2024-07-11

## 2024-07-11 RX ORDER — POLYETHYLENE GLYCOL 3350 17 G/17G
17 POWDER, FOR SOLUTION ORAL DAILY PRN
Status: DISCONTINUED | OUTPATIENT
Start: 2024-07-11 | End: 2024-07-16 | Stop reason: HOSPADM

## 2024-07-11 RX ORDER — BUMETANIDE 1 MG/1
1 TABLET ORAL DAILY
Status: DISCONTINUED | OUTPATIENT
Start: 2024-07-12 | End: 2024-07-16 | Stop reason: HOSPADM

## 2024-07-11 RX ORDER — ACETAMINOPHEN 325 MG/1
650 TABLET ORAL EVERY 6 HOURS PRN
Status: DISCONTINUED | OUTPATIENT
Start: 2024-07-11 | End: 2024-07-16 | Stop reason: HOSPADM

## 2024-07-11 RX ORDER — METOPROLOL TARTRATE 1 MG/ML
5 INJECTION, SOLUTION INTRAVENOUS ONCE
Status: DISCONTINUED | OUTPATIENT
Start: 2024-07-11 | End: 2024-07-11

## 2024-07-11 RX ORDER — ASPIRIN 81 MG/1
81 TABLET, CHEWABLE ORAL DAILY
Status: DISCONTINUED | OUTPATIENT
Start: 2024-07-12 | End: 2024-07-16 | Stop reason: HOSPADM

## 2024-07-11 RX ORDER — MORPHINE SULFATE 2 MG/ML
1 INJECTION, SOLUTION INTRAMUSCULAR; INTRAVENOUS EVERY 4 HOURS PRN
Status: DISCONTINUED | OUTPATIENT
Start: 2024-07-11 | End: 2024-07-16 | Stop reason: HOSPADM

## 2024-07-11 RX ORDER — EZETIMIBE 10 MG/1
10 TABLET ORAL DAILY
Status: DISCONTINUED | OUTPATIENT
Start: 2024-07-12 | End: 2024-07-16 | Stop reason: HOSPADM

## 2024-07-11 RX ORDER — LIDOCAINE 4 G/G
1 PATCH TOPICAL DAILY
Status: DISCONTINUED | OUTPATIENT
Start: 2024-07-12 | End: 2024-07-13

## 2024-07-11 RX ORDER — SODIUM CHLORIDE 0.9 % (FLUSH) 0.9 %
5-40 SYRINGE (ML) INJECTION PRN
Status: DISCONTINUED | OUTPATIENT
Start: 2024-07-11 | End: 2024-07-16 | Stop reason: HOSPADM

## 2024-07-11 RX ORDER — ENOXAPARIN SODIUM 100 MG/ML
40 INJECTION SUBCUTANEOUS DAILY
Status: DISCONTINUED | OUTPATIENT
Start: 2024-07-12 | End: 2024-07-16 | Stop reason: HOSPADM

## 2024-07-11 RX ORDER — ONDANSETRON 4 MG/1
4 TABLET, ORALLY DISINTEGRATING ORAL EVERY 8 HOURS PRN
Status: DISCONTINUED | OUTPATIENT
Start: 2024-07-11 | End: 2024-07-16 | Stop reason: HOSPADM

## 2024-07-11 RX ORDER — SODIUM CHLORIDE 9 MG/ML
INJECTION, SOLUTION INTRAVENOUS PRN
Status: DISCONTINUED | OUTPATIENT
Start: 2024-07-11 | End: 2024-07-16 | Stop reason: HOSPADM

## 2024-07-11 RX ORDER — OXYCODONE HYDROCHLORIDE 5 MG/1
5 TABLET ORAL EVERY 4 HOURS PRN
Status: DISCONTINUED | OUTPATIENT
Start: 2024-07-11 | End: 2024-07-16 | Stop reason: HOSPADM

## 2024-07-11 RX ORDER — POTASSIUM CHLORIDE 20 MEQ/1
40 TABLET, EXTENDED RELEASE ORAL PRN
Status: DISCONTINUED | OUTPATIENT
Start: 2024-07-11 | End: 2024-07-16 | Stop reason: HOSPADM

## 2024-07-11 RX ORDER — ATORVASTATIN CALCIUM 20 MG/1
20 TABLET, FILM COATED ORAL DAILY
Status: DISCONTINUED | OUTPATIENT
Start: 2024-07-12 | End: 2024-07-16 | Stop reason: HOSPADM

## 2024-07-11 RX ORDER — ACETAMINOPHEN 650 MG/1
650 SUPPOSITORY RECTAL EVERY 6 HOURS PRN
Status: DISCONTINUED | OUTPATIENT
Start: 2024-07-11 | End: 2024-07-16 | Stop reason: HOSPADM

## 2024-07-11 RX ORDER — POTASSIUM CHLORIDE 7.45 MG/ML
10 INJECTION INTRAVENOUS PRN
Status: DISCONTINUED | OUTPATIENT
Start: 2024-07-11 | End: 2024-07-16 | Stop reason: HOSPADM

## 2024-07-11 RX ORDER — SODIUM CHLORIDE 0.9 % (FLUSH) 0.9 %
5-40 SYRINGE (ML) INJECTION EVERY 12 HOURS SCHEDULED
Status: DISCONTINUED | OUTPATIENT
Start: 2024-07-11 | End: 2024-07-16 | Stop reason: HOSPADM

## 2024-07-11 RX ORDER — LEVOTHYROXINE SODIUM 137 UG/1
137 TABLET ORAL DAILY
Status: DISCONTINUED | OUTPATIENT
Start: 2024-07-12 | End: 2024-07-16 | Stop reason: HOSPADM

## 2024-07-11 RX ORDER — OXYCODONE HYDROCHLORIDE 5 MG/1
10 TABLET ORAL EVERY 4 HOURS PRN
Status: DISCONTINUED | OUTPATIENT
Start: 2024-07-11 | End: 2024-07-16 | Stop reason: HOSPADM

## 2024-07-11 RX ADMIN — IOPAMIDOL 100 ML: 755 INJECTION, SOLUTION INTRAVENOUS at 13:55

## 2024-07-11 RX ADMIN — FENTANYL CITRATE 50 MCG: 50 INJECTION, SOLUTION INTRAMUSCULAR; INTRAVENOUS at 11:21

## 2024-07-11 RX ADMIN — PIPERACILLIN AND TAZOBACTAM 4500 MG: 4; .5 INJECTION, POWDER, LYOPHILIZED, FOR SOLUTION INTRAVENOUS at 15:39

## 2024-07-11 RX ADMIN — OXYCODONE HYDROCHLORIDE 5 MG: 5 TABLET ORAL at 15:41

## 2024-07-11 ASSESSMENT — PAIN - FUNCTIONAL ASSESSMENT
PAIN_FUNCTIONAL_ASSESSMENT: NONE - DENIES PAIN

## 2024-07-11 ASSESSMENT — PAIN DESCRIPTION - LOCATION: LOCATION: BACK

## 2024-07-11 ASSESSMENT — PAIN SCALES - GENERAL: PAINLEVEL_OUTOF10: 5

## 2024-07-11 NOTE — ED NOTES
PT resting in bed. VS assessed. Call light in reach. Respirations easy and unlabored. PT denies current needs

## 2024-07-11 NOTE — DISCHARGE INSTR - COC
Continuity of Care Form    Patient Name: Tony Yu   :  1940  MRN:  [id number]    Admit date:  2024  Discharge date:  ***    Code Status Order: Prior   Advance Directives:     Admitting Physician:  No admitting provider for patient encounter.  PCP: Sincere Henriquez MD    Discharging Nurse: ***  Discharging Hospital Unit/Room#: 32/032A  Discharging Unit Phone Number: ***    Emergency Contact:   Extended Emergency Contact Information  Primary Emergency Contact: Trinidad Yu  Address: [address] [place] [id number] [facility]  Home Phone: [phone]  Mobile Phone: [phone]  Relation: Spouse  Secondary Emergency Contact: Jeniffer Yutt           [facility]  Home Phone: [phone]  Mobile Phone: [phone]  Relation: Child    Past Surgical History:  Past Surgical History:   Procedure Laterality Date    ABDOMINAL AORTIC ANEURYSM REPAIR  2010    [facility]    ABDOMINAL AORTIC ANEURYSM REPAIR  2015    [facility]    CARDIAC CATHETERIZATION      had two stints put in    COLONOSCOPY  2011    [facility]    CORONARY ANGIOPLASTY WITH STENT PLACEMENT      2 stents    EAR SURGERY Left 3/17/2023    MOHS REPAIR BCC LEFT EAR TRIANGULAR FOSSA WITH FULL THICKNESS SKIN GRAFT LEFT UPPER CHEST performed by Kiran Momin MD at [facility] OR    FEMORAL-FEMORAL BYPASS GRAFT  2017    Dr. Iyer    FRACTURE SURGERY Right 1986    arm    KNEE ARTHROSCOPY Left 2008    meniscus tear    LUMBAR LAMINECTOMY  2016    [facility]--Dr. Watters    OTHER SURGICAL HISTORY  2015    melanoma removal from right chest    OTHER SURGICAL HISTORY  2016    I and D, exicison of posterior neck cyst    UT OFFICE/OUTPT VISIT,PROCEDURE ONLY N/A 2018    TURBT performed by Oscar Badillo MD at [facility] OR    PROSTATECTOMY N/A 2019    ROBOTIC RADICAL CYSTOPROSTATECTOMY performed by Oscar Badillo MD at [facility] OR    SINUS SURGERY    Restriction: {CHP DME Yes amt example:381335869}  Last Modified Barium Swallow with Video (Video Swallowing Test): {Done Not Done Date:}    Treatments at the Time of Hospital Discharge:   Respiratory Treatments: ***  Oxygen Therapy:  {Therapy; copd oxygen:71791}  Ventilator:    {[facility] Vent List:233173669}    Rehab Therapies: {THERAPEUTIC INTERVENTION:3758458231}  Weight Bearing Status/Restrictions: {[facility] Weight Bearin}  Other Medical Equipment (for information only, NOT a DME order):  {EQUIPMENT:552320399}  Other Treatments: ***    Patient's personal belongings (please select all that are sent with patient):  {[facility] DME Belongings:584336077}    RN SIGNATURE:  {Esignature:485504385}    CASE MANAGEMENT/SOCIAL WORK SECTION    Inpatient Status Date: ***    Readmission Risk Assessment Score:  Readmission Risk              Risk of Unplanned Readmission:  0           Discharging to Facility/ Agency   Name:   Address:  Phone:  Fax:    Dialysis Facility (if applicable)   Name:  Address:  Dialysis Schedule:  Phone:  Fax:    / signature: {Esignature:108727699}    PHYSICIAN SECTION    Prognosis: {Prognosis:4601816505}    Condition at Discharge: { Patient Condition:596219278}    Rehab Potential (if transferring to Rehab): {Prognosis:6850340315}    Recommended Labs or Other Treatments After Discharge: ***    Physician Certification: I certify the above information and transfer of Tony Yu  is necessary for the continuing treatment of the diagnosis listed and that he requires {Admit to Appropriate Level of Care:91684} for {GREATER/LESS:515725147} 30 days.     Update Admission H&P: {CHP DME Changes in HandP:334251969}    PHYSICIAN SIGNATURE:  {Esignature:029691164}

## 2024-07-11 NOTE — ED TRIAGE NOTES
PT to the ED with lower back pain starting Monday. PT states the pain started when he woke up and tried to sit up out of bed. PT denies any known injury. PT states that his doctor prescribed him a muscle relaxer and baclofen for pain without relief. PT states that he was prescribed hydrocone-acetaminophen 5-325 and took one yesterday and to this morning. PT states it did not help with pain.

## 2024-07-11 NOTE — Clinical Note
No energy  Low back pain better with rest  Started Monday morning could not go out of bed  PCP Baclofen 10 and Prednisone 20   Sxs worsened  Norco helped started last night. Not this morning  Hurts lower middle back

## 2024-07-11 NOTE — ED PROVIDER NOTES
[facility]  EMERGENCY DEPARTMENT ENCOUNTER          Pt Name: Tony Yu  MRN: [id number]  Birthdate 1940  Date of evaluation: 7/11/2024  Treating Resident Physician: Jacobo Diego MD  Supervising Physician: Chao Fowler DO    History obtained from the patient.     CHIEF COMPLAINT       Chief Complaint   Patient presents with    Back Pain       HISTORY OF PRESENT ILLNESS    Tony Yu is a 84 y.o. male with an extensive medical history including AAA and chronic back pain status post lumbar fusion who presents to the emergency department with a chief complaint of back pain.  Patient rates the pain 10/10 and describes as sharp, constant pain in the lower back radiating to the bilateral lower extremities.  Patient denies recent trauma, but states symptoms of progressively worsened in the last 3 days.  Symptoms started about a week ago when patient went to PCP who prescribed baclofen, steroids, and Norco.  Medications did not alleviate pain reason he came to the ED this morning.  Patient in obvious discomfort and unable to move in bed.  Patient also hypertensive with heart rate in the 60s.  Review of systems unremarkable except for aforementioned.    Triage notes and Nursing notes were reviewed by myself.  Any discrepancies are addressed above.    PAST MEDICAL HISTORY     Past Medical History:   Diagnosis Date    AAA (abdominal aortic aneurysm) (HCC)     intravascular stent    BCG ROXANE 05/21/2019    Bladder cancer (HCC)     BPH (benign prostatic hypertrophy)     CAD (coronary artery disease)     Cancer of skin     Carotid artery stenosis     Cerebrovascular disease 10 yrs ago or more    Chronic back pain     Chronic kidney disease     Congenital absence of one kidney     absent right kidney    Hearing loss     History of lumbar fusion 03/2015    mid lower back down    Hyperlipidemia     Hypertension     Hypothyroidism     Obesity     Presence of urostomy (HCC)

## 2024-07-11 NOTE — ED NOTES
PT resting in bed. VS assessed. Call light in reach. PT denies needs at this time.Respirations easy and unlabored.

## 2024-07-11 NOTE — ED NOTES
ED to inpatient nurses report      Chief Complaint:  Chief Complaint   Patient presents with    Back Pain     Present to ED from: home    MOA:     LOC: alert and orientated to name, place, date  Mobility: Requires assistance * 1  Oxygen Baseline: RA    Current needs required: RA     Code Status:   Prior    What abnormal results were found and what did you give/do to treat them? See results  Any procedures or intervention occur? NA    Mental Status:  Level of Consciousness: Alert (0)    Psych Assessment:        Vitals:  Patient Vitals for the past 24 hrs:   BP Temp Temp src Pulse Resp SpO2 Height Weight   07/11/24 1541 -- -- -- -- 22 -- -- --   07/11/24 1537 (!) 169/78 -- -- 64 22 95 % -- --   07/11/24 1523 (!) 166/79 -- -- 58 14 96 % -- --   07/11/24 1242 (!) 172/86 -- -- 61 14 95 % -- --   07/11/24 1117 (!) 158/82 -- -- 60 18 96 % -- --   07/11/24 0939 (!) 173/101 98.1 °F (36.7 °C) Oral 72 20 92 % 1.702 m (5' 7\") 80.7 kg (178 lb)        LDAs:   Peripheral IV 07/11/24 Left;Dorsal Hand (Active)   Site Assessment Clean, dry & intact 07/11/24 1154   Dressing Status New dressing applied 07/11/24 1154   Dressing Intervention New 07/11/24 1154       Ambulatory Status:  No data recorded    Diagnosis:  DISPOSITION Decision To Admit 07/11/2024 03:43:32 PM   Final diagnoses:   Low back pain with sciatica, sciatica laterality unspecified, unspecified back pain laterality, unspecified chronicity   Urinary tract infection without hematuria, site unspecified        Consults:  None     Pain Score:  Pain Assessment  Pain Assessment: None - Denies Pain  Pain Level: 5  Pain Location: Back    C-SSRS:   Risk of Suicide: No Risk    Sepsis Screening:       [place] Fall Risk:       Swallow Screening        Preferred Language:   English      ALLERGIES     Tape [adhesive tape]    SURGICAL HISTORY       Past Surgical History:   Procedure Laterality Date    ABDOMINAL AORTIC ANEURYSM REPAIR  03/2010    [facility]    ABDOMINAL AORTIC ANEURYSM REPAIR   infarction     TIA    UTI (urinary tract infection)            Electronically signed by Kathleen Newell RN on 7/11/2024 at 4:05 PM

## 2024-07-11 NOTE — ED NOTES
PT resting in bed alert. PT and VS assessed. PT denies pain or needs at this time. PT updated on plan of care. Call light in reach.

## 2024-07-11 NOTE — H&P
Hospitalist History & Physical         Patient: Tony Yu 84 y.o. male      : 1940  Date of Admission: 2024  Date of Service: Pt seen/examined on 24 and Admitted to Observation with expected LOS less than two midnights due to medical therapy.         ASSESSMENT AND PLAN    Intractable Low Back Pain: History of lumbar fusion in  with Dr. Sylvester, ongoing progressive low back pain with progressive weakness in bilateral upper legs noted in OV noted in May. Acute onset worsening of low back pain . No known inciting injury.  Orthospine consulted  Failed outpt management with baclofen and prednisone course  CT thoracic/lumbar spine without acute fx, thoracic spondylosis with ventral osteophytes, disc space narrowing at T4-5, T7-8, T8-9 and T11-12.  Mild canal and moderate bilateral foraminal stenosis at L1-L2 and L2-L3  Unable to get MRIs given history of endograft per patient  Supportive care with lidocaine, oxycodone and morphine for breakthrough pain  PT/OT as able    Concern for Urinary Infection: UA demonstrating many bacteria with 25-50 WBC.  Collected off bag connected to stoma, no bag exchange prior to urine culture.  Instructions for new urine culture to be sent after changing the bag.  However already started on Zosyn prior to this.  Given no history of resistance, continue Rocephin for potential infection.    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  Completed neoadjuvant treatment.  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

## 2024-07-12 ENCOUNTER — APPOINTMENT (OUTPATIENT)
Dept: CT IMAGING | Age: 84
End: 2024-07-12
Payer: MEDICARE

## 2024-07-12 ENCOUNTER — APPOINTMENT (OUTPATIENT)
Dept: GENERAL RADIOLOGY | Age: 84
End: 2024-07-12
Payer: MEDICARE

## 2024-07-12 LAB
ANION GAP SERPL CALC-SCNC: 12 MEQ/L (ref 8–16)
BASOPHILS ABSOLUTE: 0.1 THOU/MM3 (ref 0–0.1)
BASOPHILS NFR BLD AUTO: 0.4 %
BUN SERPL-MCNC: 25 MG/DL (ref 7–22)
CALCIUM SERPL-MCNC: 9 MG/DL (ref 8.5–10.5)
CHLORIDE SERPL-SCNC: 101 MEQ/L (ref 98–111)
CO2 SERPL-SCNC: 28 MEQ/L (ref 23–33)
CREAT SERPL-MCNC: 1.3 MG/DL (ref 0.4–1.2)
DEPRECATED RDW RBC AUTO: 48.9 FL (ref 35–45)
EOSINOPHIL NFR BLD AUTO: 0.8 %
EOSINOPHILS ABSOLUTE: 0.1 THOU/MM3 (ref 0–0.4)
ERYTHROCYTE [DISTWIDTH] IN BLOOD BY AUTOMATED COUNT: 14.2 % (ref 11.5–14.5)
GFR SERPL CREATININE-BSD FRML MDRD: 54 ML/MIN/1.73M2
GLUCOSE SERPL-MCNC: 87 MG/DL (ref 70–108)
HCT VFR BLD AUTO: 52.8 % (ref 42–52)
HGB BLD-MCNC: 17.1 GM/DL (ref 14–18)
IMM GRANULOCYTES # BLD AUTO: 0.07 THOU/MM3 (ref 0–0.07)
IMM GRANULOCYTES NFR BLD AUTO: 0.4 %
LYMPHOCYTES ABSOLUTE: 5.3 THOU/MM3 (ref 1–4.8)
LYMPHOCYTES NFR BLD AUTO: 32.6 %
MCH RBC QN AUTO: 30.7 PG (ref 26–33)
MCHC RBC AUTO-ENTMCNC: 32.4 GM/DL (ref 32.2–35.5)
MCV RBC AUTO: 94.8 FL (ref 80–94)
MONOCYTES ABSOLUTE: 1.4 THOU/MM3 (ref 0.4–1.3)
MONOCYTES NFR BLD AUTO: 8.7 %
NEUTROPHILS ABSOLUTE: 9.3 THOU/MM3 (ref 1.8–7.7)
NEUTROPHILS NFR BLD AUTO: 57.1 %
NRBC BLD AUTO-RTO: 0 /100 WBC
PLATELET # BLD AUTO: 215 THOU/MM3 (ref 130–400)
PLATELET BLD QL SMEAR: ADEQUATE
PMV BLD AUTO: 9.4 FL (ref 9.4–12.4)
POTASSIUM SERPL-SCNC: 4.6 MEQ/L (ref 3.5–5.2)
RBC # BLD AUTO: 5.57 MILL/MM3 (ref 4.7–6.1)
SCAN OF BLOOD SMEAR: NORMAL
SODIUM SERPL-SCNC: 141 MEQ/L (ref 135–145)
VARIANT LYMPHS BLD QL SMEAR: ABNORMAL %
WBC # BLD AUTO: 16.3 THOU/MM3 (ref 4.8–10.8)

## 2024-07-12 PROCEDURE — 80048 BASIC METABOLIC PNL TOTAL CA: CPT

## 2024-07-12 PROCEDURE — 1200000000 HC SEMI PRIVATE

## 2024-07-12 PROCEDURE — 6370000000 HC RX 637 (ALT 250 FOR IP)

## 2024-07-12 PROCEDURE — 36415 COLL VENOUS BLD VENIPUNCTURE: CPT

## 2024-07-12 PROCEDURE — 99232 SBSQ HOSP IP/OBS MODERATE 35: CPT | Performed by: NURSE PRACTITIONER

## 2024-07-12 PROCEDURE — 62304 MYELOGRAPHY LUMBAR INJECTION: CPT

## 2024-07-12 PROCEDURE — 2580000003 HC RX 258

## 2024-07-12 PROCEDURE — 6360000004 HC RX CONTRAST MEDICATION

## 2024-07-12 PROCEDURE — 97166 OT EVAL MOD COMPLEX 45 MIN: CPT

## 2024-07-12 PROCEDURE — 72132 CT LUMBAR SPINE W/DYE: CPT

## 2024-07-12 PROCEDURE — 6370000000 HC RX 637 (ALT 250 FOR IP): Performed by: NURSE PRACTITIONER

## 2024-07-12 PROCEDURE — 6360000002 HC RX W HCPCS

## 2024-07-12 PROCEDURE — 85025 COMPLETE CBC W/AUTO DIFF WBC: CPT

## 2024-07-12 RX ORDER — HYDROXYZINE PAMOATE 25 MG/1
25 CAPSULE ORAL 3 TIMES DAILY PRN
Status: DISCONTINUED | OUTPATIENT
Start: 2024-07-12 | End: 2024-07-16 | Stop reason: HOSPADM

## 2024-07-12 RX ADMIN — OXYCODONE 10 MG: 5 TABLET ORAL at 11:30

## 2024-07-12 RX ADMIN — METOPROLOL TARTRATE 25 MG: 25 TABLET, FILM COATED ORAL at 02:58

## 2024-07-12 RX ADMIN — METOPROLOL TARTRATE 25 MG: 25 TABLET, FILM COATED ORAL at 11:30

## 2024-07-12 RX ADMIN — BUMETANIDE 1 MG: 1 TABLET ORAL at 11:30

## 2024-07-12 RX ADMIN — SODIUM CHLORIDE, PRESERVATIVE FREE 10 ML: 5 INJECTION INTRAVENOUS at 21:23

## 2024-07-12 RX ADMIN — POLYETHYLENE GLYCOL 3350 17 G: 17 POWDER, FOR SOLUTION ORAL at 11:31

## 2024-07-12 RX ADMIN — IOHEXOL 20 ML: 180 INJECTION INTRAVENOUS at 09:52

## 2024-07-12 RX ADMIN — CEFTRIAXONE SODIUM 1000 MG: 1 INJECTION, POWDER, FOR SOLUTION INTRAMUSCULAR; INTRAVENOUS at 23:27

## 2024-07-12 RX ADMIN — SODIUM CHLORIDE, PRESERVATIVE FREE 20 ML: 5 INJECTION INTRAVENOUS at 03:02

## 2024-07-12 RX ADMIN — OXYCODONE 10 MG: 5 TABLET ORAL at 18:29

## 2024-07-12 RX ADMIN — LEVOTHYROXINE SODIUM 137 MCG: 0.14 TABLET ORAL at 11:30

## 2024-07-12 RX ADMIN — CEFTRIAXONE SODIUM 1000 MG: 1 INJECTION, POWDER, FOR SOLUTION INTRAMUSCULAR; INTRAVENOUS at 03:00

## 2024-07-12 RX ADMIN — METOPROLOL TARTRATE 25 MG: 25 TABLET, FILM COATED ORAL at 21:23

## 2024-07-12 RX ADMIN — ATORVASTATIN CALCIUM 20 MG: 20 TABLET, FILM COATED ORAL at 11:30

## 2024-07-12 RX ADMIN — EZETIMIBE 10 MG: 10 TABLET ORAL at 11:30

## 2024-07-12 RX ADMIN — HYDROXYZINE PAMOATE 25 MG: 25 CAPSULE ORAL at 18:29

## 2024-07-12 RX ADMIN — POTASSIUM CHLORIDE 10 MEQ: 750 TABLET, EXTENDED RELEASE ORAL at 11:30

## 2024-07-12 NOTE — PROGRESS NOTES
[facility]  PHYSICAL THERAPY MISSED TREATMENT NOTE  STRZ MED SURG 8AB    Date: 2024  Patient Name: Tony Yu        MRN: [id number]   : 1940  (84 y.o.)  Gender: male                REASON FOR MISSED TREATMENT:  Hold treatment per nursing request.  Ciara RN, reports pt having lots of pain and is waiting for Ortho consult as well. Recommended to check back tomorrow.    Radha Whatley, MPT [id number]

## 2024-07-12 NOTE — PROGRESS NOTES
cefTRIAXone (ROCEPHIN) IV  1,000 mg IntraVENous Q24H     PRN Meds: sodium chloride flush, sodium chloride, potassium chloride **OR** potassium alternative oral replacement **OR** potassium chloride, magnesium sulfate, ondansetron **OR** ondansetron, polyethylene glycol, acetaminophen **OR** acetaminophen, oxyCODONE **OR** oxyCODONE, morphine      Intake/Output Summary (Last 24 hours) at 7/12/2024 0780  Last data filed at 7/12/2024 0528  Gross per 24 hour   Intake 200 ml   Output 250 ml   Net -50 ml       Diet:  ADULT DIET; Regular; Low Sodium (2 gm); 2000 ml    Exam:  BP (!) 155/85   Pulse 65   Temp 97.9 °F (36.6 °C) (Oral)   Resp 16   Ht 1.702 m (5' 7\")   Wt 80.7 kg (178 lb)   SpO2 93%   BMI 27.88 kg/m²     General appearance: No apparent distress, appears stated age and cooperative.  HEENT: Pupils equal, round, and reactive to light. Conjunctivae/corneas clear.  Neck: Supple, with full range of motion. No jugular venous distention. Trachea midline.  Respiratory:  Normal respiratory effort. Clear to auscultation, bilaterally without Rales/Wheezes/Rhonchi.  Cardiovascular: Regular rate and rhythm with normal S1/S2 without murmurs, rubs or gallops.  Abdomen: Soft, non-tender, non-distended with normal bowel sounds.  Musculoskeletal: passive and active ROM x 4 extremities.  Skin: Skin color, texture, turgor normal.    Neurologic:  Neurovascularly intact without any focal sensory/motor deficits. Cranial nerves: II-XII intact, grossly non-focal.  Psychiatric: Alert and oriented x 4, thought content appropriate  Capillary Refill: Brisk,< 3 seconds   Peripheral Pulses: +2 palpable, equal bilaterally       Labs:   Recent Labs     07/11/24  1057 07/12/24  0625   WBC 18.2* 16.3*   HGB 15.9 17.1   HCT 48.1 52.8*    215     Recent Labs     07/11/24  1057 07/12/24  0625    141   K 3.9 4.6   CL 99 101   CO2 27 28   BUN 28* 25*   CREATININE 1.2 1.3*   CALCIUM 9.1 9.0     Recent Labs     07/11/24  1057   AST 20  foraminal stenosis with no canal stenosis. There is degenerative change involving the sacroiliac joints bilaterally There is an endoluminal graft in the abdominal aorta. There are tiny gallstones present. .     1. Postoperative changes at L3, L4 and L5. There is no recurrent disc herniation or canal stenosis at any level. 2. There is mild canal and mild to moderate bilateral foraminal stenosis at L1-2 and L2-3. 3. There is degenerative change involving the sacroiliac joints bilaterally. 4. There are tiny gallstones present. 5. Endoluminal graft in the abdominal aorta. **This report has been created using voice recognition software. It may contain minor errors which are inherent in voice recognition technology.** Electronically signed by Dr. Katy Navarro    CT ABDOMEN PELVIS WO CONTRAST Additional Contrast? None    Result Date: 7/11/2024  PROCEDURE: CT ABDOMEN PELVIS WO CONTRAST CLINICAL INFORMATION: Back pain. History of abdominal aortic aneurysm. COMPARISON: CT abdomen and pelvis 5/6/2024. TECHNIQUE: 5 mm axial imaging through the abdomen and pelvis without IV contrast.  Coronal and sagittal reconstructions were performed. All CT scans at this facility use dose modulation, iterative reconstruction, and/or weight based dosing when appropriate to reduce the radiation dose to as low as reasonably achievable. FINDINGS: LIMITATIONS: The evaluation of the solid organs is limited without IV contrast. Lung bases: Emphysematous changes and dependent atelectasis/scarring is noted at the limited images through the lung bases. A trace right pleural effusion appears stable. Liver/gallbladder/bilary tree: Cholelithiasis is stable. No biliary ductal dilatation or pericholecystic inflammation is observed. Hepatomegaly and hepatic steatosis are unchanged. Pancreas: Remains atrophic. Spleen : Unremarkable noncontrast appearance. Adrenal glands: Unremarkable noncontrast appearance. Kidneys/ ureters/ bladder: Postoperative changes

## 2024-07-12 NOTE — CARE COORDINATION
Case Management Assessment Initial Evaluation    Date/Time of Evaluation: 2024 1:02 PM  Assessment Completed by: Janet Rushing RN    If patient is discharged prior to next notation, then this note serves as note for discharge by case management.    Patient Name: Tony Yu                   YOB: 1940  Diagnosis: Intractable back pain [M54.9]  Urinary tract infection without hematuria, site unspecified [N39.0]  Low back pain with sciatica, sciatica laterality unspecified, unspecified back pain laterality, unspecified chronicity [M54.40]                   Date / Time: 2024  9:35 AM  Location: [address] [place] [address]     Patient Admission Status: Inpatient   Readmission Risk Low 0-14, Mod 15-19), High > 20: Readmission Risk Score: 12.5    Current PCP: Sincere Henriquez MD    Additional Case Management Notes: Patient presented to ED  with low back pain, worsening since . Visited PCP  and was prescribed a muscle relaxer and steroids. Patient has ileal loop diversion with urinary collection into stoma bag due to his bladder cancer.     Admitted by hospitalist. C/s to palliative care and orthopedic spine. PT/OT. Creat 1.3 today. +UTI. IV Rocephin.Creat 1.3 today. Await ortho spine encounter.     Procedures: none    Imagin/11 Myleogram Lumbosacral Via Lumbar Injection: Successful, uncomplicated intrathecal contrast injection prior to CT myelogram using fluoroscopic guidance.   CT Lumbar Spine done, results in process.        Patient Goals/Plan/Treatment Preferences: Tony plans to return home with his wife Dee and son Dhiraj. He has a walker and cane. Discussed talking to PCP about DME such as chair lift, and contacting the [place] and [place] [facility] Aging, both for available assistance. Denies further needs.        [phone]   Service Assessment   Patient Orientation Alert and Oriented   Cognition Alert   History Provided By Patient;Significant Other   Primary Caregiver Self   Accompanied

## 2024-07-12 NOTE — PROGRESS NOTES
[facility]  INPATIENT OCCUPATIONAL THERAPY  STRZ MED SURG 8AB  EVALUATION    Time:   Time In: 859  Time Out: 09  Timed Code Treatment Minutes: 0 Minutes  Minutes: 12          Date: 2024  Patient Name: Tony Yu,   Gender: male      MRN: [id number]  : 1940  (84 y.o.)  Referring Practitioner: Selina Diamond PA-C  Diagnosis: intractable back pain  Additional Pertinent Hx: 84 y.o. male with an extensive medical history including AAA and chronic back pain status post lumbar fusion who presents to the emergency department with a chief complaint of back pain.  Patient rates the pain 10/10 and describes as sharp, constant pain in the lower back radiating to the bilateral lower extremities.  Patient denies recent trauma, but states symptoms of progressively worsened in the last 3 days.  Symptoms started about a week ago when patient went to PCP who prescribed baclofen, steroids, and Norco.  Medications did not alleviate pain reason he came to the ED this morning.  Patient in obvious discomfort and unable to move in bed.  Patient also hypertensive with heart rate in the 60s.  Review of systems unremarkable except for aforementioned. PMHx of bladder cancer, renal adrenal mass, CKD, abdominal aortic aneurysm, femoral femoral bypass, CAD, HFpEF, and peripheral polyneuropathy. Patient reports that he has had low back pain that has been progressive in nature for a long time, however noticed acute worsening on 2024.  Denies any inciting recent injury or fall.  States that he went to his primary care provider on Monday,  and was prescribed a muscle relaxer and steroids.  His pain continued to progress until this morning when he noted that it was unbearable and presented to the ER for further evaluation.  States that he has had progressive weakness and fatigue through his legs that has been ongoing, prior to the worsening of his low back pain.  Unable to tell provider if weakness in legs

## 2024-07-12 NOTE — PALLIATIVE CARE
Initial Evaluation        Patient:   Tony Yu  YOB: 1940  Age:  84 y.o.  Room:  [facility]34/034-A  MRN:  [id number]   Acct: [id number]    Date of Admission:  7/11/2024  9:35 AM  Date of Service:  7/12/2024  Completed By:  Brian Desai RN        Reason for Palliative Care Evaluation:-   Goals of Care     Current Concerns   pain     Palliative Performance Scale   50%  Mainly sit/lie; Extensive disease; Can't do any work; Considerable assist; intake normal or reduced; LOC full/confusion     History    Patient admitted 7/11 from home with intractable back pain. Patient has history of lumbar fusion in 2015, bladder CA 2019, AAA, CKD, PVD, CAD, CHF.     Goals of Care Discussions and Plan             Plan/Follow-Up:-   Attempted to see patient, patient leaving floor for testing. Will follow up later today.    Treatment Limitations:  Unable to assess at this time    Code Status:Full Code No additional code details    ACP documents on file:  -Power of  for Healthcare    Healthcare Power of /Healthcare Surrogate Decision Makers:  Trinidad Yu (spouse)             Electronically signed by Brian Desai RN on 7/12/2024 at 9:43 AM           Palliative Care Office: [phone]

## 2024-07-13 PROCEDURE — 6370000000 HC RX 637 (ALT 250 FOR IP): Performed by: NURSE PRACTITIONER

## 2024-07-13 PROCEDURE — 6370000000 HC RX 637 (ALT 250 FOR IP)

## 2024-07-13 PROCEDURE — 2580000003 HC RX 258

## 2024-07-13 PROCEDURE — 1200000000 HC SEMI PRIVATE

## 2024-07-13 PROCEDURE — 6360000002 HC RX W HCPCS

## 2024-07-13 PROCEDURE — 99232 SBSQ HOSP IP/OBS MODERATE 35: CPT | Performed by: NURSE PRACTITIONER

## 2024-07-13 RX ORDER — SENNOSIDES A AND B 8.6 MG/1
1 TABLET, FILM COATED ORAL NIGHTLY
Status: DISCONTINUED | OUTPATIENT
Start: 2024-07-13 | End: 2024-07-16 | Stop reason: HOSPADM

## 2024-07-13 RX ORDER — LIDOCAINE 4 G/G
3 PATCH TOPICAL DAILY
Status: DISCONTINUED | OUTPATIENT
Start: 2024-07-14 | End: 2024-07-16 | Stop reason: HOSPADM

## 2024-07-13 RX ORDER — POLYETHYLENE GLYCOL 3350 17 G/17G
17 POWDER, FOR SOLUTION ORAL DAILY
Status: DISCONTINUED | OUTPATIENT
Start: 2024-07-13 | End: 2024-07-16 | Stop reason: HOSPADM

## 2024-07-13 RX ORDER — DOCUSATE SODIUM 100 MG/1
100 CAPSULE, LIQUID FILLED ORAL 2 TIMES DAILY
Status: DISCONTINUED | OUTPATIENT
Start: 2024-07-13 | End: 2024-07-16 | Stop reason: HOSPADM

## 2024-07-13 RX ADMIN — SODIUM CHLORIDE, PRESERVATIVE FREE 10 ML: 5 INJECTION INTRAVENOUS at 20:00

## 2024-07-13 RX ADMIN — POTASSIUM CHLORIDE 10 MEQ: 750 TABLET, EXTENDED RELEASE ORAL at 07:43

## 2024-07-13 RX ADMIN — OXYCODONE 10 MG: 5 TABLET ORAL at 20:04

## 2024-07-13 RX ADMIN — BUMETANIDE 1 MG: 1 TABLET ORAL at 07:43

## 2024-07-13 RX ADMIN — ATORVASTATIN CALCIUM 20 MG: 20 TABLET, FILM COATED ORAL at 07:42

## 2024-07-13 RX ADMIN — HYDROXYZINE PAMOATE 25 MG: 25 CAPSULE ORAL at 20:04

## 2024-07-13 RX ADMIN — CEFTRIAXONE SODIUM 1000 MG: 1 INJECTION, POWDER, FOR SOLUTION INTRAMUSCULAR; INTRAVENOUS at 22:59

## 2024-07-13 RX ADMIN — OXYCODONE 10 MG: 5 TABLET ORAL at 14:39

## 2024-07-13 RX ADMIN — DOCUSATE SODIUM 100 MG: 100 CAPSULE, LIQUID FILLED ORAL at 10:20

## 2024-07-13 RX ADMIN — LEVOTHYROXINE SODIUM 137 MCG: 0.14 TABLET ORAL at 07:43

## 2024-07-13 RX ADMIN — POLYETHYLENE GLYCOL (3350) 17 G: 17 POWDER, FOR SOLUTION ORAL at 07:43

## 2024-07-13 RX ADMIN — METOPROLOL TARTRATE 25 MG: 25 TABLET, FILM COATED ORAL at 20:00

## 2024-07-13 RX ADMIN — EZETIMIBE 10 MG: 10 TABLET ORAL at 07:44

## 2024-07-13 RX ADMIN — OXYCODONE 10 MG: 5 TABLET ORAL at 07:43

## 2024-07-13 RX ADMIN — METOPROLOL TARTRATE 25 MG: 25 TABLET, FILM COATED ORAL at 07:42

## 2024-07-13 RX ADMIN — SENNOSIDES 8.6 MG: 8.6 TABLET, FILM COATED ORAL at 20:00

## 2024-07-13 RX ADMIN — HYDROXYZINE PAMOATE 25 MG: 25 CAPSULE ORAL at 07:43

## 2024-07-13 RX ADMIN — DOCUSATE SODIUM 100 MG: 100 CAPSULE, LIQUID FILLED ORAL at 20:00

## 2024-07-13 ASSESSMENT — PAIN DESCRIPTION - FREQUENCY: FREQUENCY: CONTINUOUS

## 2024-07-13 ASSESSMENT — PAIN DESCRIPTION - ORIENTATION
ORIENTATION: RIGHT;LEFT
ORIENTATION: RIGHT;LOWER

## 2024-07-13 ASSESSMENT — PAIN DESCRIPTION - DESCRIPTORS
DESCRIPTORS: SHARP
DESCRIPTORS: STABBING

## 2024-07-13 ASSESSMENT — PAIN SCALES - GENERAL
PAINLEVEL_OUTOF10: 7
PAINLEVEL_OUTOF10: 0
PAINLEVEL_OUTOF10: 3

## 2024-07-13 ASSESSMENT — PAIN DESCRIPTION - ONSET: ONSET: ON-GOING

## 2024-07-13 ASSESSMENT — PAIN DESCRIPTION - LOCATION
LOCATION: BACK
LOCATION: BACK

## 2024-07-13 ASSESSMENT — PAIN DESCRIPTION - PAIN TYPE: TYPE: ACUTE PAIN;CHRONIC PAIN

## 2024-07-13 ASSESSMENT — PAIN - FUNCTIONAL ASSESSMENT: PAIN_FUNCTIONAL_ASSESSMENT: PREVENTS OR INTERFERES WITH MANY ACTIVE NOT PASSIVE ACTIVITIES

## 2024-07-13 NOTE — CONSULTS
ABDOMINAL AORTIC ANEURYSM REPAIR  03/2010    [facility]    ABDOMINAL AORTIC ANEURYSM REPAIR  07/24/2015    [facility]    CARDIAC CATHETERIZATION  2/26/222    had two stints put in    COLONOSCOPY  12/21/2011    [facility]    CORONARY ANGIOPLASTY WITH STENT PLACEMENT  2003    2 stents    EAR SURGERY Left 3/17/2023    MOHS REPAIR BCC LEFT EAR TRIANGULAR FOSSA WITH FULL THICKNESS SKIN GRAFT LEFT UPPER CHEST performed by Kiran Momin MD at [facility] OR    FEMORAL-FEMORAL BYPASS GRAFT  03/09/2017    Dr. Iyer    FRACTURE SURGERY Right 1986    arm    KNEE ARTHROSCOPY Left 12/2008    meniscus tear    LUMBAR LAMINECTOMY  03/2016    [facility]--Dr. Watters    OTHER SURGICAL HISTORY  04/14/2015    melanoma removal from right chest    OTHER SURGICAL HISTORY  05/20/2016    I and D, exicison of posterior neck cyst    ME OFFICE/OUTPT VISIT,PROCEDURE ONLY N/A 11/08/2018    TURBT performed by Oscar Badillo MD at [facility] OR    PROSTATECTOMY N/A 05/01/2019    ROBOTIC RADICAL CYSTOPROSTATECTOMY performed by Oscar Badillo MD at [facility] OR    SINUS SURGERY      SKIN CANCER EXCISION  10/2016    Dr Gregorio    SKIN CANCER EXCISION  11/2020    on wrist     TESTICLE REMOVAL  1990's    TUNNELED VENOUS PORT PLACEMENT      TURP  04/16/2014    See note of Dr. Badillo for OR procedure details     Current Medications:   Current Facility-Administered Medications: hydrOXYzine pamoate (VISTARIL) capsule 25 mg, 25 mg, Oral, TID PRN  [Held by provider] aspirin chewable tablet 81 mg, 81 mg, Oral, Daily  bumetanide (BUMEX) tablet 1 mg, 1 mg, Oral, Daily  [Held by provider] clopidogrel (PLAVIX) tablet 75 mg, 75 mg, Oral, Daily  ezetimibe (ZETIA) tablet 10 mg, 10 mg, Oral, Daily  levothyroxine (SYNTHROID) tablet 137 mcg, 137 mcg, Oral, Daily  metoprolol tartrate (LOPRESSOR) tablet 25 mg, 25 mg, Oral, BID  potassium chloride (KLOR-CON) extended release tablet 10 mEq, 10 mEq, Oral, Daily  atorvastatin (LIPITOR) tablet 20 mg, 20 mg, Oral, Daily  sodium  Date/Time     07/12/2024 06:25 AM    K 4.6 07/12/2024 06:25 AM     07/12/2024 06:25 AM    CO2 28 07/12/2024 06:25 AM    BUN 25 07/12/2024 06:25 AM    CREATININE 1.3 07/12/2024 06:25 AM    LABGLOM 54 07/12/2024 06:25 AM    LABGLOM 60 08/31/2023 08:15 PM    GLUCOSE 87 07/12/2024 06:25 AM    GLUCOSE 101 09/29/2022 09:07 AM    CALCIUM 9.0 07/12/2024 06:25 AM    BILITOT 0.8 07/11/2024 10:57 AM    BILITOT NEGATIVE 10/26/2018 09:35 AM    ALKPHOS 111 07/11/2024 10:57 AM    AST 20 07/11/2024 10:57 AM    ALT 24 07/11/2024 10:57 AM         Radiology:   CT Lumbar Spine  IMPRESSION:     1. Postoperative changes at L3, L4 and L5. There is no recurrent disc herniation  or canal stenosis at any level.  2. There is mild canal and mild to moderate bilateral foraminal stenosis at L1-2  and L2-3.  3. There is degenerative change involving the sacroiliac joints bilaterally.  4. There are tiny gallstones present.  5. Endoluminal graft in the abdominal aorta.    CT Thoracic Spine  IMPRESSION:     1. Thoracic spondylosis with ventral osteophytes especially between T1 and T5  and T12 and L2.  2. Disc space narrowing at T4-5, T7-8, T8-9 and T11-12.  3. No acute fracture noted.  CT Lumbar Spine    CT Myelogram Lumbar Spine  IMPRESSION:  1. Postoperative changes with laminectomy defects at L3, L4 and L5.  2. There is no significant disc herniation or canal stenosis at any level.  3. There is mild to moderate bilateral foraminal stenosis at L2-3, L4-5, L5-S1  and to a lesser extent L3-4.  4. There is degenerative change involving the sacroiliac joints bilaterally.  5. There is an endoluminal graft in the abdominal aorta.    IMPRESSION/RECOMMENDATIONS:    Assessment:   1) Prior L3-S1 laminectomy  2) Lumbar mild stenosis  3) L2-S1 degenerative disc disease with L3-4 and L4-5 spondylolisthesis, grade 1    Plan:  1) Discussed w/ Dr. Watters  CT Myelogram imaging reviewed  No significant stenosis seen on CT myelogram, but diffuse

## 2024-07-13 NOTE — PROGRESS NOTES
SubCUTAneous Daily    lidocaine  1 patch TransDERmal Daily    cefTRIAXone (ROCEPHIN) IV  1,000 mg IntraVENous Q24H     PRN Meds: hydrOXYzine pamoate, sodium chloride flush, sodium chloride, potassium chloride **OR** potassium alternative oral replacement **OR** potassium chloride, magnesium sulfate, ondansetron **OR** ondansetron, polyethylene glycol, acetaminophen **OR** acetaminophen, oxyCODONE **OR** oxyCODONE, morphine      Intake/Output Summary (Last 24 hours) at 7/13/2024 0719  Last data filed at 7/13/2024 0439  Gross per 24 hour   Intake 220 ml   Output 1450 ml   Net -1230 ml         Diet:  ADULT DIET; Regular; Low Sodium (2 gm); 2000 ml    Exam:  BP (!) 143/68   Pulse 58   Temp 97.1 °F (36.2 °C) (Oral)   Resp 18   Ht 1.702 m (5' 7\")   Wt 80.7 kg (178 lb)   SpO2 96%   BMI 27.88 kg/m²     General appearance: No apparent distress, appears stated age and cooperative.  HEENT: Pupils equal, round, and reactive to light. Conjunctivae/corneas clear.  Neck: Supple, with full range of motion. No jugular venous distention. Trachea midline.  Respiratory:  Normal respiratory effort. Clear to auscultation, bilaterally without Rales/Wheezes/Rhonchi.  Cardiovascular: Regular rate and rhythm with normal S1/S2 without murmurs, rubs or gallops.  Abdomen: Soft, non-tender, non-distended with normal bowel sounds.  Musculoskeletal: passive and active ROM x 4 extremities.  Skin: Skin color, texture, turgor normal.    Neurologic:  Neurovascularly intact without any focal sensory/motor deficits. Cranial nerves: II-XII intact, grossly non-focal.  Psychiatric: Alert and oriented x 4, thought content appropriate  Capillary Refill: Brisk,< 3 seconds   Peripheral Pulses: +2 palpable, equal bilaterally       Labs:   Recent Labs     07/11/24  1057 07/12/24  0625   WBC 18.2* 16.3*   HGB 15.9 17.1   HCT 48.1 52.8*    215       Recent Labs     07/11/24  1057 07/12/24  0625    141   K 3.9 4.6   CL 99 101   CO2 27 28   BUN  technology.** Electronically signed by Dr. Katy Navarro    CTA CHEST W WO CONTRAST    Result Date: 7/11/2024  PROCEDURE: CTA ABDOMEN PELVIS W WO CONTRAST, CTA CHEST W WO CONTRAST CLINICAL INFORMATION: Back pain with elevated D-dimer. COMPARISON: CT chest 5/6/2024. CT abdomen and pelvis 7/11/2024 and 5/6/2024. CTA abdomen and pelvis 8/31/2023. TECHNIQUE: 3mm noncontrast axial images were obtained through the chest, abdomen and pelvis.  Then 3 mm axial images were obtained through the chest, abdomen and pelvis after the administration of intravenous contrast.  3D reconstructions were performed on the scanner to include sagittal and coronal MIP reconstructions through the chest, abdomen and pelvis. All CT scans at this facility use dose modulation, iterative reconstruction, and/or weight based dosing when appropriate to reduce the radiation dose to as low as reasonably achievable. CONTRAST: 80 cc Isovue-370. FINDINGS: Heart/mediastinum: The heart size is normal. No pericardial effusion is observed. The thoracic aorta is not dilated. The ascending thoracic aorta measures 3.5 cm in diameter. No aortic dissection is observed. No mediastinal, hilar, or axillary lymphadenopathy is visualized. No filling defects are visualized within the pulmonary arterial vasculature to suggest the presence of pulmonary embolus. Lungs: No focal consolidation, pleural effusion, or pneumothorax is identified. Centrilobular emphysema and dependent atelectasis/scarring at the lung bases is unchanged. Liver/gallbladder/bilary tree: Cholelithiasis is stable. No biliary ductal dilatation or pericholecystic inflammation is observed. Hepatomegaly and hepatic steatosis are unchanged. Pancreas: Remains atrophic. Spleen : Normal. Adrenal glands: Normal. Kidneys/ ureters/ bladder: Postoperative changes related to right nephrectomy and right lower quadrant ileal conduit are stable. Dilatation of the left ureter is unchanged. The left renal pelvis is more

## 2024-07-13 NOTE — PLAN OF CARE
Problem: Discharge Planning  Goal: Discharge to home or other facility with appropriate resources  Outcome: Progressing  Flowsheets (Taken 7/13/2024 0427)  Discharge to home or other facility with appropriate resources:   Identify barriers to discharge with patient and caregiver   Arrange for needed discharge resources and transportation as appropriate   Identify discharge learning needs (meds, wound care, etc)     Problem: Chronic Conditions and Co-morbidities  Goal: Patient's chronic conditions and co-morbidity symptoms are monitored and maintained or improved  Outcome: Progressing  Flowsheets (Taken 7/13/2024 0427)  Care Plan - Patient's Chronic Conditions and Co-Morbidity Symptoms are Monitored and Maintained or Improved: Monitor and assess patient's chronic conditions and comorbid symptoms for stability, deterioration, or improvement     Problem: Safety - Adult  Goal: Free from fall injury  Outcome: Progressing  Flowsheets (Taken 7/13/2024 0427)  Free From Fall Injury: Instruct family/caregiver on patient safety     Problem: Pain  Goal: Verbalizes/displays adequate comfort level or baseline comfort level  Outcome: Progressing  Flowsheets (Taken 7/13/2024 0428)  Verbalizes/displays adequate comfort level or baseline comfort level:   Encourage patient to monitor pain and request assistance   Administer analgesics based on type and severity of pain and evaluate response   Assess pain using appropriate pain scale   Implement non-pharmacological measures as appropriate and evaluate response     Problem: Skin/Tissue Integrity  Goal: Absence of new skin breakdown  Description: 1.  Monitor for areas of redness and/or skin breakdown  2.  Assess vascular access sites hourly  3.  Every 4-6 hours minimum:  Change oxygen saturation probe site  4.  Every 4-6 hours:  If on nasal continuous positive airway pressure, respiratory therapy assess nares and determine need for appliance change or resting period.  Outcome:

## 2024-07-14 PROCEDURE — 6370000000 HC RX 637 (ALT 250 FOR IP)

## 2024-07-14 PROCEDURE — 1200000000 HC SEMI PRIVATE

## 2024-07-14 PROCEDURE — 99232 SBSQ HOSP IP/OBS MODERATE 35: CPT | Performed by: NURSE PRACTITIONER

## 2024-07-14 PROCEDURE — 2580000003 HC RX 258

## 2024-07-14 PROCEDURE — 6370000000 HC RX 637 (ALT 250 FOR IP): Performed by: NURSE PRACTITIONER

## 2024-07-14 PROCEDURE — 97162 PT EVAL MOD COMPLEX 30 MIN: CPT

## 2024-07-14 PROCEDURE — 97116 GAIT TRAINING THERAPY: CPT

## 2024-07-14 RX ADMIN — EZETIMIBE 10 MG: 10 TABLET ORAL at 07:33

## 2024-07-14 RX ADMIN — METOPROLOL TARTRATE 25 MG: 25 TABLET, FILM COATED ORAL at 19:54

## 2024-07-14 RX ADMIN — BUMETANIDE 1 MG: 1 TABLET ORAL at 07:33

## 2024-07-14 RX ADMIN — LEVOTHYROXINE SODIUM 137 MCG: 0.14 TABLET ORAL at 07:33

## 2024-07-14 RX ADMIN — SODIUM CHLORIDE, PRESERVATIVE FREE 10 ML: 5 INJECTION INTRAVENOUS at 19:54

## 2024-07-14 RX ADMIN — POLYETHYLENE GLYCOL (3350) 17 G: 17 POWDER, FOR SOLUTION ORAL at 07:33

## 2024-07-14 RX ADMIN — METOPROLOL TARTRATE 25 MG: 25 TABLET, FILM COATED ORAL at 07:33

## 2024-07-14 RX ADMIN — DOCUSATE SODIUM 100 MG: 100 CAPSULE, LIQUID FILLED ORAL at 19:54

## 2024-07-14 RX ADMIN — ATORVASTATIN CALCIUM 20 MG: 20 TABLET, FILM COATED ORAL at 07:33

## 2024-07-14 RX ADMIN — HYDROXYZINE PAMOATE 25 MG: 25 CAPSULE ORAL at 07:34

## 2024-07-14 RX ADMIN — OXYCODONE 10 MG: 5 TABLET ORAL at 03:40

## 2024-07-14 RX ADMIN — DOCUSATE SODIUM 100 MG: 100 CAPSULE, LIQUID FILLED ORAL at 07:33

## 2024-07-14 RX ADMIN — OXYCODONE 5 MG: 5 TABLET ORAL at 13:58

## 2024-07-14 RX ADMIN — POTASSIUM CHLORIDE 10 MEQ: 750 TABLET, EXTENDED RELEASE ORAL at 07:36

## 2024-07-14 RX ADMIN — SENNOSIDES 8.6 MG: 8.6 TABLET, FILM COATED ORAL at 19:54

## 2024-07-14 ASSESSMENT — PAIN DESCRIPTION - ORIENTATION
ORIENTATION: RIGHT;LEFT
ORIENTATION: RIGHT;LEFT

## 2024-07-14 ASSESSMENT — PAIN SCALES - GENERAL
PAINLEVEL_OUTOF10: 3
PAINLEVEL_OUTOF10: 7
PAINLEVEL_OUTOF10: 0
PAINLEVEL_OUTOF10: 0
PAINLEVEL_OUTOF10: 5

## 2024-07-14 ASSESSMENT — PAIN DESCRIPTION - LOCATION
LOCATION: BACK;LEG
LOCATION: BACK
LOCATION: BACK

## 2024-07-14 ASSESSMENT — PAIN - FUNCTIONAL ASSESSMENT: PAIN_FUNCTIONAL_ASSESSMENT: PREVENTS OR INTERFERES SOME ACTIVE ACTIVITIES AND ADLS

## 2024-07-14 ASSESSMENT — PAIN DESCRIPTION - DESCRIPTORS
DESCRIPTORS: ACHING
DESCRIPTORS: SHOOTING
DESCRIPTORS: ACHING

## 2024-07-14 NOTE — PROGRESS NOTES
Department of Orthopedic Surgery  Spine Service  Attending Progress Note        Subjective:  patient sitting up in chair, just finished working with PT. Ambulated a short distance in room to door and back, feels weak. C/o back pain and left lateral thigh pain    Vitals  VITALS:  BP (!) 143/84   Pulse 69   Temp 98 °F (36.7 °C) (Oral)   Resp 16   Ht 1.702 m (5' 7\")   Wt 80.7 kg (178 lb)   SpO2 96%   BMI 27.88 kg/m²   24HR INTAKE/OUTPUT:    Intake/Output Summary (Last 24 hours) at 7/14/2024 0944  Last data filed at 7/14/2024 0326  Gross per 24 hour   Intake 941.14 ml   Output 1350 ml   Net -408.86 ml     URINARY CATHETER OUTPUT (Velasquez):     DRAIN/TUBE OUTPUT:       PHYSICAL EXAM:    Orientation:  alert and oriented to person, place and time    Lower Extremity Motor :  quadriceps, dorsiflexion, plantarflexion 5/5 bilaterally  Lower Extremity Sensory:  Intact L1-S1 - except decreased in feet        LABS:    HgB:    Lab Results   Component Value Date/Time    HGB 17.1 07/12/2024 06:25 AM    HGB 12.8 05/23/2012 04:07 AM         ASSESSMENT AND PLAN:    1) Prior L3-S1 laminectomy  2) Lumbar mild stenosis  3) L2-S1 degenerative disc disease with L3-4 and L4-5 spondylolisthesis, grade 1    1:  Patient still with back pain, no acute surgical intervention needed.   IR consult for KENYETTA, placed for tomorrow.   Hopeful to do while inpatient and blood thinners are being held.     JIM Stringer

## 2024-07-14 NOTE — PROGRESS NOTES
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 male with PMHx of bladder cancer, renal adrenal mass, CKD, abdominal aortic aneurysm, femoral femoral bypass, CAD, HFpEF, and peripheral polyneuropathy who presents to [facility] with complaints of low back pain.  Patient reports that he has had low back pain that has been progressive in nature for a long time, however noticed acute worsening on 7/7/2024.  Denies any inciting recent injury or fall.  States that he went to his primary care provider on Monday, 7/8 and was prescribed a muscle relaxer and steroids.  His pain continued to progress until this morning when he noted that it was unbearable and presented to the ER for further evaluation.  States that he has had progressive weakness and fatigue through his legs that has been ongoing, prior to the worsening of his low back pain.  Unable to tell provider if weakness in  hepatic steatosis are unchanged. Pancreas: Remains atrophic. Spleen : Normal. Adrenal glands: Normal. Kidneys/ ureters/ bladder: Postoperative changes related to right nephrectomy and right lower quadrant ileal conduit are stable. Dilatation of the left ureter is unchanged. The left renal pelvis is more prominent on the current examination measuring 12 mm (previously measured 7 mm on the examination from this morning). No ureteral calculus is identified. Gastrointestinal: Colonic diverticulosis without diverticulitis is unchanged. Mild to moderate residual fecal material seen throughout the colon. No bowel obstruction, free fluid, fluid collection, or free air is visualized. Retroperitoneum / lymph nodes: The bifurcated aortic stent graft is stable. Occlusion of the left iliac component of the graft is unchanged. The right iliac component remains patent. The aortic portion of the graft remains patent. No aortic dissection is identified. No endoleak is visualized. The native infrarenal abdominal aortic aneurysm sac measures 6.5 x 7.2 cm (previously measured 6.6 x 7.3 cm). No lymphadenopathy is visualized. Pelvis: The prostate gland is not visualized. The femoral femoral bypass graft appears patent. Musculoskeletal: Diffuse osteopenia is observed. Multilevel degenerative disc disease is visualized.     1. The thoracic aorta is not dilated. No thoracic or abdominal aortic dissection is identified. The aortic bifurcated stent graft within the abdomen is unchanged. Occlusion of the left iliac portion of the graft is stable. No endoleak is identified. The native infrarenal abdominal aortic aneurysm sac is slightly smaller measuring 6.5 x 7.2 cm (previously measured 6.6 x 7.3 cm). The femoral-femoral bypass graft appears patent, intact, and unchanged. 2. Postoperative changes related to right nephrectomy, cystectomy, with right lower quadrant ileal conduit are stable. Left ureteral dilatation is unchanged. The left renal

## 2024-07-14 NOTE — PROGRESS NOTES
[facility]  INPATIENT PHYSICAL THERAPY  EVALUATION  STRZ MED SURG 8AB - 8B-34/034-A    Time In: 0840  Time Out: 09  Timed Code Treatment Minutes: 10 Minutes  Minutes: 29        Date: 2024  Patient Name: Tony Yu,  Gender:  male        MRN: [id number]  : 1940  (84 y.o.)      Referring Practitioner: Selina Diamond PA-C  Diagnosis: Intractable Back Pain  Additional Pertinent Hx: Per EMR:The patient is a 84 y.o. male who presents with above chief complaint.  Patient is status post L3-S1 laminectomy done in  with Dr. Watters. He reports chronic ongoing low back pain that significantly worsened about a week ago. No injury or fall. He went to his PCP on Monday and was given medication which did not help. He then presented to the ED on . Patient c/o severe low back pain that is constant and worse with movement that radiates pain into the left lateral thigh. He also has numbness/tingling in the feet and swelling in the right leg. Patient also reports weakness in both legs. Patient reports symptoms have been ongoing but worsened recently. No bowel incontinence. He has a urostomy due to bladder cancer. CT showed no acute fractures. He is unable to have a MRI so CT myelogram was ordered. No significant stenosis seen on CT myelogram, but diffuse degenerative changes  No acute surgical intervention needed at this time.Discussed KENYETTA and patient is willing to try, hopefully can be done while inpatient since his blood thinners have been on hold. Will place an order for IR Monday (7/15) to see when KENYETTA can be done.     Restrictions/Precautions:  Restrictions/Precautions: Fall Risk, General Precautions  Position Activity Restriction  Other position/activity restrictions: ostomy due to h/o bladder cancer    Subjective:  Chart Reviewed: Yes  Patient assessed for rehabilitation services?: Yes  Family / Caregiver Present: No  Subjective: RN approved session, patient sitting up in chair and  Increased pain, Decreased balance, Decreased endurance  Assessment: Tony Yu is a 84 y.o. male that presents with Intractable Back Pain. Pt demonstrates a decrease in baseline by way of bed mobility, transfers and ambulation secondary to decreased activity tolerance, strength, fatigue, and balance deficits. Pt will benefit from skilled PT services throughout admission and beyond hospital discharge for improvements in functional mobility and in order to decrease fall risk and return pt to PLOF.     Therapy Prognosis: Good    Requires PT Follow-Up: Yes    Discharge Recommendations:  Discharge Recommendations: Subacute/Skilled Nursing Facility    Patient Education:      .    Patient Education  Education Given To: Patient  Education Provided: Role of Therapy, Plan of Care  Education Method: Verbal  Barriers to Learning: None  Education Outcome: Verbalized understanding       Equipment Recommendations:  Equipment Needed: No    Plan:  Current Treatment Recommendations: Strengthening, Balance training, Functional mobility training, Transfer training, Gait training, Stair training, Safety education & training, Patient/Caregiver education & training, Therapeutic activities  General Plan:  (5x)    Goals:  Patient Goals : Pt goal to go home and move better  Short Term Goals  Time Frame for Short Term Goals: By discharge  Short Term Goal 1: Supine to/from sit at Mod I in order to get in/out of bed.  Short Term Goal 2: Sit to/from stand at Mod I in order to get up to walk.  Short Term Goal 3: Ambulate 50 feet with RW or rollator at SBA in order to walk in home safely.  Short Term Goal 4: Ascend/Descend 1 platform step with RW at CGA in order to enter/exit home.  Long Term Goals  Time Frame for Long Term Goals : NA due to short ELOS    Following session, patient left in safe position with all fall risk precautions in place.

## 2024-07-14 NOTE — PLAN OF CARE

## 2024-07-15 ENCOUNTER — APPOINTMENT (OUTPATIENT)
Dept: INTERVENTIONAL RADIOLOGY/VASCULAR | Age: 84
End: 2024-07-15
Payer: MEDICARE

## 2024-07-15 LAB
ANION GAP SERPL CALC-SCNC: 13 MEQ/L (ref 8–16)
BUN SERPL-MCNC: 28 MG/DL (ref 7–22)
CALCIUM SERPL-MCNC: 8.6 MG/DL (ref 8.5–10.5)
CHLORIDE SERPL-SCNC: 100 MEQ/L (ref 98–111)
CO2 SERPL-SCNC: 28 MEQ/L (ref 23–33)
CREAT SERPL-MCNC: 1.2 MG/DL (ref 0.4–1.2)
DEPRECATED RDW RBC AUTO: 48.6 FL (ref 35–45)
ERYTHROCYTE [DISTWIDTH] IN BLOOD BY AUTOMATED COUNT: 13.9 % (ref 11.5–14.5)
GFR SERPL CREATININE-BSD FRML MDRD: 59 ML/MIN/1.73M2
GLUCOSE SERPL-MCNC: 96 MG/DL (ref 70–108)
HCT VFR BLD AUTO: 49.1 % (ref 42–52)
HGB BLD-MCNC: 15.9 GM/DL (ref 14–18)
MCH RBC QN AUTO: 30.6 PG (ref 26–33)
MCHC RBC AUTO-ENTMCNC: 32.4 GM/DL (ref 32.2–35.5)
MCV RBC AUTO: 94.4 FL (ref 80–94)
PLATELET # BLD AUTO: 177 THOU/MM3 (ref 130–400)
PMV BLD AUTO: 9.7 FL (ref 9.4–12.4)
POTASSIUM SERPL-SCNC: 4 MEQ/L (ref 3.5–5.2)
RBC # BLD AUTO: 5.2 MILL/MM3 (ref 4.7–6.1)
SODIUM SERPL-SCNC: 141 MEQ/L (ref 135–145)
WBC # BLD AUTO: 13.5 THOU/MM3 (ref 4.8–10.8)

## 2024-07-15 PROCEDURE — 99232 SBSQ HOSP IP/OBS MODERATE 35: CPT | Performed by: NURSE PRACTITIONER

## 2024-07-15 PROCEDURE — 3E0R33Z INTRODUCTION OF ANTI-INFLAMMATORY INTO SPINAL CANAL, PERCUTANEOUS APPROACH: ICD-10-PCS | Performed by: RADIOLOGY

## 2024-07-15 PROCEDURE — 3E0R3BZ INTRODUCTION OF ANESTHETIC AGENT INTO SPINAL CANAL, PERCUTANEOUS APPROACH: ICD-10-PCS | Performed by: RADIOLOGY

## 2024-07-15 PROCEDURE — 6370000000 HC RX 637 (ALT 250 FOR IP): Performed by: NURSE PRACTITIONER

## 2024-07-15 PROCEDURE — 62323 NJX INTERLAMINAR LMBR/SAC: CPT

## 2024-07-15 PROCEDURE — 80048 BASIC METABOLIC PNL TOTAL CA: CPT

## 2024-07-15 PROCEDURE — 6370000000 HC RX 637 (ALT 250 FOR IP)

## 2024-07-15 PROCEDURE — 97116 GAIT TRAINING THERAPY: CPT

## 2024-07-15 PROCEDURE — 85027 COMPLETE CBC AUTOMATED: CPT

## 2024-07-15 PROCEDURE — 1200000000 HC SEMI PRIVATE

## 2024-07-15 PROCEDURE — 6360000002 HC RX W HCPCS: Performed by: RADIOLOGY

## 2024-07-15 PROCEDURE — 2580000003 HC RX 258

## 2024-07-15 PROCEDURE — 2709999900 IR FLUORO GUIDED LUMBAR PUNCTURE THERAPY

## 2024-07-15 PROCEDURE — 36415 COLL VENOUS BLD VENIPUNCTURE: CPT

## 2024-07-15 PROCEDURE — 97530 THERAPEUTIC ACTIVITIES: CPT

## 2024-07-15 PROCEDURE — 6360000004 HC RX CONTRAST MEDICATION: Performed by: RADIOLOGY

## 2024-07-15 RX ORDER — IOPAMIDOL 408 MG/ML
1 INJECTION, SOLUTION INTRATHECAL ONCE
Status: COMPLETED | OUTPATIENT
Start: 2024-07-15 | End: 2024-07-15

## 2024-07-15 RX ORDER — BUPIVACAINE HYDROCHLORIDE 2.5 MG/ML
2 INJECTION, SOLUTION EPIDURAL; INFILTRATION; INTRACAUDAL ONCE
Status: COMPLETED | OUTPATIENT
Start: 2024-07-15 | End: 2024-07-15

## 2024-07-15 RX ORDER — DEXAMETHASONE SODIUM PHOSPHATE 4 MG/ML
4 INJECTION, SOLUTION INTRA-ARTICULAR; INTRALESIONAL; INTRAMUSCULAR; INTRAVENOUS; SOFT TISSUE ONCE
Status: COMPLETED | OUTPATIENT
Start: 2024-07-15 | End: 2024-07-15

## 2024-07-15 RX ADMIN — POLYETHYLENE GLYCOL (3350) 17 G: 17 POWDER, FOR SOLUTION ORAL at 08:11

## 2024-07-15 RX ADMIN — SODIUM CHLORIDE, PRESERVATIVE FREE 10 ML: 5 INJECTION INTRAVENOUS at 21:50

## 2024-07-15 RX ADMIN — METOPROLOL TARTRATE 25 MG: 25 TABLET, FILM COATED ORAL at 08:12

## 2024-07-15 RX ADMIN — DOCUSATE SODIUM 100 MG: 100 CAPSULE, LIQUID FILLED ORAL at 08:11

## 2024-07-15 RX ADMIN — SENNOSIDES 8.6 MG: 8.6 TABLET, FILM COATED ORAL at 21:50

## 2024-07-15 RX ADMIN — BUMETANIDE 1 MG: 1 TABLET ORAL at 08:12

## 2024-07-15 RX ADMIN — EZETIMIBE 10 MG: 10 TABLET ORAL at 08:12

## 2024-07-15 RX ADMIN — ATORVASTATIN CALCIUM 20 MG: 20 TABLET, FILM COATED ORAL at 08:12

## 2024-07-15 RX ADMIN — METOPROLOL TARTRATE 25 MG: 25 TABLET, FILM COATED ORAL at 21:50

## 2024-07-15 RX ADMIN — POTASSIUM CHLORIDE 10 MEQ: 750 TABLET, EXTENDED RELEASE ORAL at 08:11

## 2024-07-15 RX ADMIN — DEXAMETHASONE SODIUM PHOSPHATE 4 MG: 4 INJECTION INTRA-ARTICULAR; INTRALESIONAL; INTRAMUSCULAR; INTRAVENOUS; SOFT TISSUE at 09:05

## 2024-07-15 RX ADMIN — BUPIVACAINE HYDROCHLORIDE 2 MG: 2.5 INJECTION, SOLUTION EPIDURAL; INFILTRATION; INTRACAUDAL; PERINEURAL at 09:05

## 2024-07-15 RX ADMIN — DOCUSATE SODIUM 100 MG: 100 CAPSULE, LIQUID FILLED ORAL at 21:50

## 2024-07-15 RX ADMIN — LEVOTHYROXINE SODIUM 137 MCG: 0.14 TABLET ORAL at 08:12

## 2024-07-15 RX ADMIN — IOPAMIDOL 1 ML: 408 INJECTION, SOLUTION INTRATHECAL at 09:05

## 2024-07-15 ASSESSMENT — PAIN SCALES - GENERAL
PAINLEVEL_OUTOF10: 2
PAINLEVEL_OUTOF10: 0

## 2024-07-15 ASSESSMENT — PAIN DESCRIPTION - DESCRIPTORS: DESCRIPTORS: SHARP;BURNING

## 2024-07-15 ASSESSMENT — PAIN DESCRIPTION - PAIN TYPE: TYPE: CHRONIC PAIN

## 2024-07-15 ASSESSMENT — PAIN DESCRIPTION - LOCATION: LOCATION: BACK

## 2024-07-15 ASSESSMENT — PAIN DESCRIPTION - ORIENTATION: ORIENTATION: LOWER;MID

## 2024-07-15 NOTE — PROGRESS NOTES
[facility]  OCCUPATIONAL THERAPY MISSED TREATMENT NOTE  STRZ MED SURG 8AB  8B-34/034-A      Date: 7/15/2024  Patient Name: Tony Yu        CSN: [id number]   : 1940  (84 y.o.)  Gender: male   Referring Practitioner: Selina Diamond PA-C  Diagnosis: intractable back pain         REASON FOR MISSED TREATMENT: Hold Treatment per Nursing  Pt leaving floor this AM to IR for KENYETTA. Will check back after procedure as time allows and pt appropriateness.

## 2024-07-15 NOTE — CARE COORDINATION
7/15/24, 3:20 PM EDT    DISCHARGE ON GOING EVALUATION    [facility] day: 4  Location: [facility]34/034-A Reason for admit: Intractable back pain [M54.9]  Urinary tract infection without hematuria, site unspecified [N39.0]  Low back pain with sciatica, sciatica laterality unspecified, unspecified back pain laterality, unspecified chronicity [M54.40]     Procedures: 7-15-24 Epidural    Imaging since last note: No.    Barriers to Discharge: Planning epidural block today in IR. PT/OT following. PT recommending SNF after 7-14 session. SW consulted.     PCP: Sincere Henriquez MD  Readmission Risk Score: 12.7    Patient Goals/Plan/Treatment Preferences: From home with spouse. SW to follow for discharge needs.

## 2024-07-15 NOTE — PLAN OF CARE
Problem: Discharge Planning  Goal: Discharge to home or other facility with appropriate resources  Outcome: Progressing  Flowsheets (Taken 7/14/2024 2000)  Discharge to home or other facility with appropriate resources: Identify barriers to discharge with patient and caregiver     Problem: Chronic Conditions and Co-morbidities  Goal: Patient's chronic conditions and co-morbidity symptoms are monitored and maintained or improved  Outcome: Progressing  Flowsheets (Taken 7/14/2024 2000)  Care Plan - Patient's Chronic Conditions and Co-Morbidity Symptoms are Monitored and Maintained or Improved: Monitor and assess patient's chronic conditions and comorbid symptoms for stability, deterioration, or improvement     Problem: Safety - Adult  Goal: Free from fall injury  Outcome: Progressing     Problem: Pain  Goal: Verbalizes/displays adequate comfort level or baseline comfort level  Outcome: Progressing     Problem: Skin/Tissue Integrity  Goal: Absence of new skin breakdown  Description: 1.  Monitor for areas of redness and/or skin breakdown  2.  Assess vascular access sites hourly  3.  Every 4-6 hours minimum:  Change oxygen saturation probe site  4.  Every 4-6 hours:  If on nasal continuous positive airway pressure, respiratory therapy assess nares and determine need for appliance change or resting period.  Outcome: Progressing

## 2024-07-15 NOTE — PROGRESS NOTES
unchanged with measurements provided in the discussion. No renal calculus or obstructive uropathy is visualized. 3. Colonic diverticulosis without diverticulitis. Normal appendix. No bowel obstruction. Numerous chronic findings are discussed. 4. Please see the CT lumbar spine performed the same day and dictated separately for additional assessment. **This report has been created using voice recognition software.  It may contain minor errors which are inherent in voice recognition technology.** Electronically signed by Dr. Kate Curtis      Code Status: Full Code    Tele:   [] yes             [x] no    LDA: []CVC / []PICC / []Midline / []Velasquez~urostomy/ []Drains / []Mediport / []None  Antibiotics: None  Steroids: None  Labs (still needed?): []Yes / [x]No  IVF (still needed?): []Yes / [x]No    Level of care: []Step Down / [x]Med-Surg  Bed Status: [x]Inpatient / []Observation  PT/OT: []Yes / [x]No    DVT Prophylaxis: [x] Lovenox on hold from 7/12/ [] Heparin / [] SCDs / [] Already on Systemic Anticoagulation / [] None       Active Hospital Problems    Diagnosis Date Noted    Intractable back pain [M54.9] 07/11/2024       Electronically signed by DEMAR Gorman CNP on 7/15/2024 at 7:38 AM

## 2024-07-15 NOTE — PROGRESS NOTES
0854 Patient received in IR for L4-5 KENYETTA.  0855 This procedure has been fully reviewed with the patient and written informed consent has been obtained.  0903 Procedure started with .  0907 Procedure completed; patient tolerated well. Dressing to lower back - bandaid; no bleeding noted.  0909 Patient on bed; comfort ensured, pedal push and pull equal and weak.  0920 Patient taken to [facility] via bed. Pt alert and oriented x3; follows commands. Skin pink, warm, and dry. Respirations easy, regular, and nonlabored. Report called to nurse and notified them of pedal push/pull

## 2024-07-15 NOTE — CONSENT
Formulation and discussion of sedation / procedure plans, risks, benefits, side effects and alternatives with patient and/or responsible adult completed.    History and Physical reviewed and unchanged.    Electronically signed by Mu Finch MD on 7/15/24 at 8:31 AM EDT

## 2024-07-15 NOTE — OP NOTE
Department of Radiology  Post Procedure Progress Note    Pre-Procedure Diagnosis:  Lumbar radiculopathy     Procedure Performed:  Epidural block/steroid injection      Anesthesia: local     Findings: successful    Immediate Complications:  None    Estimated Blood Loss: minimal    SEE DICTATED PROCEDURE NOTE FOR COMPLETE DETAILS.      Mu Finch MD   7/15/2024 9:08 AM

## 2024-07-15 NOTE — PROGRESS NOTES
[facility]  INPATIENT PHYSICAL THERAPY  DAILY NOTE  STRZ MED SURG 8AB - 8B-34/034-A    Time In: 1439  Time Out: 1504  Timed Code Treatment Minutes: 25 Minutes  Minutes: 25          Date: 7/15/2024  Patient Name: Tony Yu,  Gender:  male        MRN: [id number]  : 1940  (84 y.o.)     Referring Practitioner: Selina Diamond PA-C  Diagnosis: Intractable Back Pain  Additional Pertinent Hx: Per EMR:The patient is a 84 y.o. male who presents with above chief complaint.  Patient is status post L3-S1 laminectomy done in  with Dr. Watters. He reports chronic ongoing low back pain that significantly worsened about a week ago. No injury or fall. He went to his PCP on Monday and was given medication which did not help. He then presented to the ED on . Patient c/o severe low back pain that is constant and worse with movement that radiates pain into the left lateral thigh. He also has numbness/tingling in the feet and swelling in the right leg. Patient also reports weakness in both legs. Patient reports symptoms have been ongoing but worsened recently. No bowel incontinence. He has a urostomy due to bladder cancer. CT showed no acute fractures. He is unable to have a MRI so CT myelogram was ordered. No significant stenosis seen on CT myelogram, but diffuse degenerative changes  No acute surgical intervention needed at this time.Discussed KENYETTA and patient is willing to try, hopefully can be done while inpatient since his blood thinners have been on hold. Will place an order for IR Monday (7/15) to see when KENYETTA can be done.     Prior Level of Function:  Lives With: Spouse, Family (wife and son)  Type of Home: House  Home Layout: Two level, 1/2 bath on main level (Sleep on the 1st floor, full bath with shower on 2nd level)  Home Access: Stairs to enter without rails (full flight on steps with 1 handrail up to 2nd level)  Entrance Stairs - Number of Steps: 1, 6\"step.  Entrance Stairs - Rails:

## 2024-07-16 VITALS
HEIGHT: 67 IN | RESPIRATION RATE: 14 BRPM | DIASTOLIC BLOOD PRESSURE: 77 MMHG | OXYGEN SATURATION: 94 % | BODY MASS INDEX: 27.94 KG/M2 | SYSTOLIC BLOOD PRESSURE: 152 MMHG | TEMPERATURE: 97.7 F | WEIGHT: 178 LBS | HEART RATE: 60 BPM

## 2024-07-16 PROCEDURE — 97110 THERAPEUTIC EXERCISES: CPT

## 2024-07-16 PROCEDURE — 97116 GAIT TRAINING THERAPY: CPT

## 2024-07-16 PROCEDURE — 97530 THERAPEUTIC ACTIVITIES: CPT

## 2024-07-16 PROCEDURE — 99239 HOSP IP/OBS DSCHRG MGMT >30: CPT | Performed by: NURSE PRACTITIONER

## 2024-07-16 PROCEDURE — 6370000000 HC RX 637 (ALT 250 FOR IP)

## 2024-07-16 PROCEDURE — 6370000000 HC RX 637 (ALT 250 FOR IP): Performed by: NURSE PRACTITIONER

## 2024-07-16 RX ORDER — PSEUDOEPHEDRINE HCL 30 MG
100 TABLET ORAL 2 TIMES DAILY
Qty: 60 CAPSULE | Refills: 0 | COMMUNITY
Start: 2024-07-16

## 2024-07-16 RX ORDER — OXYCODONE HYDROCHLORIDE 5 MG/1
5 TABLET ORAL EVERY 6 HOURS PRN
Qty: 10 TABLET | Refills: 0 | Status: SHIPPED | OUTPATIENT
Start: 2024-07-16 | End: 2024-07-22

## 2024-07-16 RX ORDER — LIDOCAINE 4 G/G
3 PATCH TOPICAL DAILY
Qty: 30 PATCH | Refills: 0 | Status: SHIPPED | OUTPATIENT
Start: 2024-07-17

## 2024-07-16 RX ADMIN — LEVOTHYROXINE SODIUM 137 MCG: 0.14 TABLET ORAL at 07:34

## 2024-07-16 RX ADMIN — METOPROLOL TARTRATE 25 MG: 25 TABLET, FILM COATED ORAL at 07:34

## 2024-07-16 RX ADMIN — EZETIMIBE 10 MG: 10 TABLET ORAL at 07:34

## 2024-07-16 RX ADMIN — POTASSIUM CHLORIDE 10 MEQ: 750 TABLET, EXTENDED RELEASE ORAL at 07:32

## 2024-07-16 RX ADMIN — POLYETHYLENE GLYCOL (3350) 17 G: 17 POWDER, FOR SOLUTION ORAL at 07:32

## 2024-07-16 RX ADMIN — BUMETANIDE 1 MG: 1 TABLET ORAL at 07:34

## 2024-07-16 RX ADMIN — ATORVASTATIN CALCIUM 20 MG: 20 TABLET, FILM COATED ORAL at 07:34

## 2024-07-16 RX ADMIN — DOCUSATE SODIUM 100 MG: 100 CAPSULE, LIQUID FILLED ORAL at 07:32

## 2024-07-16 ASSESSMENT — PAIN SCALES - GENERAL
PAINLEVEL_OUTOF10: 0
PAINLEVEL_OUTOF10: 0

## 2024-07-16 NOTE — CARE COORDINATION
7/16/24, 11:40 AM EDT    DISCHARGE ON GOING EVALUATION    [facility] day: 5  Location: 8B-34/034-A Reason for admit: Intractable back pain [M54.9]  Urinary tract infection without hematuria, site unspecified [N39.0]  Low back pain with sciatica, sciatica laterality unspecified, unspecified back pain laterality, unspecified chronicity [M54.40]     Procedures: 7-15-24 Epidural     Imaging since last note: No.    Barriers to Discharge: Epidural yesterday. Ambulated halls last thuy and worked with PT/OT. Likely to discharge today. Pt anxious to be discharged and get home.    PCP: Sincere Henriquez MD  Readmission Risk Score: 12.6    Patient Goals/Plan/Treatment Preferences: Plans home with new [facility] per SW.

## 2024-07-16 NOTE — DISCHARGE SUMMARY
Hospital Medicine Discharge Summary      Patient Identification:   Tony Yu   : 1940  MRN: [id number]   Account: [id number]      Patient's PCP: Sincere Henriquez MD    Admit Date: 2024     Discharge Date:   2024    Admitting Physician: No admitting provider for patient encounter.     Discharging Nurse Practitioner: Rosalinda Zazueta, APRN - CNP     Discharge Diagnoses with Assessment/Plan:  Intractable low back pain with radiculopathy S/P epidural block steroid injection per IR on 7/15/2020--appreciate orthopedic spine,  myelogram completed ; Lidoderm patches for pain along with oxycodone and last dose was on  at 1358; will dispense oxycodone No. 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    Hospital Course:   Tony Yu is a 84 y.o. male admitted to [facility] on 2024 for low back pain; Per H&P done 2024: \"Tony Yu is a 84 y.o. male with PMHx of bladder cancer, renal adrenal mass, CKD, abdominal aortic aneurysm, femoral femoral bypass, CAD, HFpEF, and peripheral polyneuropathy who presents to [facility] with complaints of low back pain.  Patient reports that he has had low back pain that has been  afebrile and on room air; and epidural/steroid injection per IR      7/16--> hemodynamically stable, afebrile and on room air; ambulated 30 feet x 2 yesterday with PT; today was walking in the hallway and even did a couple steps with OT, he sitting up in the chair, wife at bedside, he denies pain, states he feels so much better and is a static, she better he feels, planning home health with RN/PT/OT, patient does have a flight of steps to get up to the shower, he agreed to take a shower here and agreed to gradually increase his activity, this time patient wife feels very comfortable going home and will be discharged home in stable condition.             Exam:     Vitals:  Vitals:    07/15/24 1512 07/15/24 2145 07/16/24 0341 07/16/24 0730   BP: 113/74 137/80 (!) 153/78 (!) 152/77   Pulse: 76 75 52 60   Resp: 18 18 17 14   Temp: 97.9 °F (36.6 °C) 97.7 °F (36.5 °C) 97.6 °F (36.4 °C) 97.7 °F (36.5 °C)   TempSrc: Oral Oral Oral Oral   SpO2: 92% 92% 92% 94%   Weight:       Height:         Weight: Weight - Scale: 80.7 kg (178 lb)     24 hour intake/output:  Intake/Output Summary (Last 24 hours) at 7/16/2024 1151  Last data filed at 7/16/2024 0951  Gross per 24 hour   Intake 540 ml   Output 2050 ml   Net -1510 ml         General appearance:  No apparent distress, appears stated age and cooperative.  HEENT:  Normal cephalic, atraumatic without obvious deformity. Pupils equal, round, and reactive to light.  Conjunctivae/corneas clear.  Neck: Supple, with full range of motion. No jugular venous distention. Trachea midline.  Respiratory:  Normal respiratory effort. Clear to auscultation, bilaterally without Rales/Wheezes/Rhonchi.  Cardiovascular:  Regular rate and rhythm with normal S1/S2 without murmurs, rubs or gallops.  Abdomen: Soft, non-tender, non-distended with normal bowel sounds.  Musculoskeletal:  No clubbing, cyanosis or edema bilaterally.  Full range of motion without deformity.  Skin: Skin color, texture, turgor

## 2024-07-16 NOTE — CARE COORDINATION
DISCHARGE PLANNING EVALUATION  7/16/24, 10:37 AM EDT    Reason for Referral: Needing HH  Decision Maker: Patient makes decisions  Current Services: none  New Services Requested: HH; nursing, PT & OT  Family/ Social/ Home environment: Spoke with patient, he lives with wife and son.  Patient has become weak, uses a walker. Patient and wife are agreeable to HH.  Patient has used [facility] in the past and is preference.  Payment Source:[employer] Medicare  Transportation at Discharge: wife  Post-acute (PAC) provider list was provided to patient. Patient was informed of their freedom to choose PAC provider. Discussed and offered to show the patient the relevant PAC Providers quality and resource use measures on Medicare Compare web site via computer based on patient's goals of care and treatment preferences. Questions regarding selection process were answered.      Teach Back Method used with patient regarding care plan and discharge planning.  Patient and wife verbalized understanding of the plan of care and contribute to goal setting.       Patient preferences and discharge plan: Plan home with wife and son and new [facility].    Left message for [facility] with new referral. Informed patient discharging today.    Electronically signed by KEVIN Segundo on 7/16/2024 at 10:37 AM    7/16/24, 10:40 AM EDT    Patient goals/plan/ treatment preferences discussed by  and .  Patient goals/plan/ treatment preferences reviewed with patient/ family.  Patient/ family verbalize understanding of discharge plan and are in agreement with goal/plan/treatment preferences.  Understanding was demonstrated using the teach back method.  AVS provided by RN at time of discharge, which includes all necessary medical information pertaining to the patients current course of illness, treatment, post-discharge goals of care, and treatment preferences.     Services At/After Discharge: Home Health, Nursing service, OT, and PT       Anticipate  patient discharge today [facility]  Message left with [facility] for new referral and informed discharge today.

## 2024-07-16 NOTE — PROGRESS NOTES
[facility]  INPATIENT PHYSICAL THERAPY  DAILY NOTE  STRZ MED SURG 8AB - 8B-34/034-A    Time In: 0950  Time Out: 1028  Timed Code Treatment Minutes: 38 Minutes  Minutes: 38          Date: 2024  Patient Name: Tony Yu,  Gender:  male        MRN: [id number]  : 1940  (84 y.o.)     Referring Practitioner: Selina Diamond PA-C  Diagnosis: Intractable Back Pain  Additional Pertinent Hx: Per EMR:The patient is a 84 y.o. male who presents with above chief complaint.  Patient is status post L3-S1 laminectomy done in  with Dr. Watters. He reports chronic ongoing low back pain that significantly worsened about a week ago. No injury or fall. He went to his PCP on Monday and was given medication which did not help. He then presented to the ED on . Patient c/o severe low back pain that is constant and worse with movement that radiates pain into the left lateral thigh. He also has numbness/tingling in the feet and swelling in the right leg. Patient also reports weakness in both legs. Patient reports symptoms have been ongoing but worsened recently. No bowel incontinence. He has a urostomy due to bladder cancer. CT showed no acute fractures. He is unable to have a MRI so CT myelogram was ordered. No significant stenosis seen on CT myelogram, but diffuse degenerative changes  No acute surgical intervention needed at this time.Discussed KENYETTA and patient is willing to try, hopefully can be done while inpatient since his blood thinners have been on hold. Will place an order for IR Monday (7/15) to see when KENYETTA can be done.     Prior Level of Function:  Lives With: Spouse, Family (wife and son)  Type of Home: House  Home Layout: Two level, 1/2 bath on main level (Sleep on the 1st floor, full bath with shower on 2nd level)  Home Access: Stairs to enter without rails (full flight on steps with 1 handrail up to 2nd level)  Entrance Stairs - Number of Steps: 1, 6\"step.  Entrance Stairs - Rails:  Goals : NA due to short ELOS    Following session, patient left in safe position with all fall risk precautions in place.

## 2024-07-16 NOTE — PROGRESS NOTES
[facility]  STRZ MED SURG 8AB  Occupational Therapy  Daily Note  Time:   Time In: 825  Time Out: 910  Timed Code Treatment Minutes: 45 Minutes  Minutes: 45          Date: 2024  Patient Name: Tony Yu,   Gender: male      Room: 8B-34/034-A  MRN: [id number]  : 1940  (84 y.o.)  Referring Practitioner: Selina Diamond PA-C  Diagnosis: intractable back pain  Additional Pertinent Hx: 84 y.o. male with an extensive medical history including AAA and chronic back pain status post lumbar fusion who presents to the emergency department with a chief complaint of back pain.  Patient rates the pain 10/10 and describes as sharp, constant pain in the lower back radiating to the bilateral lower extremities.  Patient denies recent trauma, but states symptoms of progressively worsened in the last 3 days.  Symptoms started about a week ago when patient went to PCP who prescribed baclofen, steroids, and Norco.  Medications did not alleviate pain reason he came to the ED this morning.  Patient in obvious discomfort and unable to move in bed.  Patient also hypertensive with heart rate in the 60s.  Review of systems unremarkable except for aforementioned. PMHx of bladder cancer, renal adrenal mass, CKD, abdominal aortic aneurysm, femoral femoral bypass, CAD, HFpEF, and peripheral polyneuropathy. Patient reports that he has had low back pain that has been progressive in nature for a long time, however noticed acute worsening on 2024.  Denies any inciting recent injury or fall.  States that he went to his primary care provider on Monday,  and was prescribed a muscle relaxer and steroids.  His pain continued to progress until this morning when he noted that it was unbearable and presented to the ER for further evaluation.  States that he has had progressive weakness and fatigue through his legs that has been ongoing, prior to the worsening of his low back pain.  Unable to tell provider if weakness

## 2024-07-16 NOTE — PLAN OF CARE
Problem: Discharge Planning  Goal: Discharge to home or other facility with appropriate resources  Outcome: Progressing  Note: Pt will discharge home when appropriate.       Problem: Chronic Conditions and Co-morbidities  Goal: Patient's chronic conditions and co-morbidity symptoms are monitored and maintained or improved  Outcome: Progressing  Note: Pt monitored and assessed on my shift       Problem: Safety - Adult  Goal: Free from fall injury  Outcome: Progressing  Note: Fall precaution in place. Bed alarm/chair alarm. Bed locked in lowest position. Fall band applied if applicable. Call light and overhead table within reach. Will continue to monitor.       Problem: Pain  Goal: Verbalizes/displays adequate comfort level or baseline comfort level  Outcome: Progressing  Note: Pt denies pain on my shift. Non pharmaceutical interventions like ice and repositioning offered before pain medications. Will continue to assess.       Problem: Skin/Tissue Integrity  Goal: Absence of new skin breakdown  Description: 1.  Monitor for areas of redness and/or skin breakdown  2.  Assess vascular access sites hourly  3.  Every 4-6 hours minimum:  Change oxygen saturation probe site  4.  Every 4-6 hours:  If on nasal continuous positive airway pressure, respiratory therapy assess nares and determine need for appliance change or resting period.  Outcome: Progressing  Note: Absence of new skin integrity issues on my shift. Will continue to assess.     Pt verbalizes understanding of care plan. Pt contributes to goal settings.

## 2024-07-16 NOTE — PROGRESS NOTES
Pt. Discharged home with wife. Discharge education provided for the patient and all questions answered.

## 2024-07-16 NOTE — DISCHARGE INSTRUCTIONS
Oxycodone every 6 hours as needed for severe pain only    Tylenol for mild/moderate pain    Lidoderm patches to painful area~on 12 hours and off 12 hours; you cannot use heat or ice if you have Lidoderm patches    If you use heat or ice please make sure your skin is protected    Gradually increase your activity

## 2024-07-16 NOTE — PALLIATIVE CARE
Follow Up / Progress Note        Patient:   Tony Yu  YOB: 1940  Age:  84 y.o.  Room:  [facility]/034-A  MRN:  [id number]         Family/Patient Discussion:  Met with patient and wife at bedside. Patient up in chair reported feeling good and ready to be discharged. Discussed our previous conversation on code status. Tony reported he has thought about it and discussed it with others and would want to be resuscitated but if then in a terminal state to have life support removed. Tony currently has HPOA and Living Will completed. Wife present for conversation. Patient expressed gratitude for follow up, denies other needs.      Plan/Follow-Up:  Palliative care will be available as needed, please call for additional needs.         Electronically signed by Brian Desai RN on 7/16/2024 at 11:29 AM             Palliative Care Office: [phone]

## 2024-07-17 ENCOUNTER — TELEPHONE (OUTPATIENT)
Dept: FAMILY MEDICINE CLINIC | Age: 84
End: 2024-07-17

## 2024-07-17 NOTE — TELEPHONE ENCOUNTER
Care Transitions Initial Follow Up Call    Outreach made within 2 business days of discharge: Yes    Patient: Tony Yu Patient : 1940   MRN: [id number]  Reason for Admission: There are no discharge diagnoses documented for the most recent discharge.  Discharge Date: 24       Spoke with: Patient wife     Discharge department/facility: [facility]    TCM Interactive Patient Contact:  Was patient able to fill all prescriptions: Yes  Was patient instructed to bring all medications to the follow-up visit: Yes  Is patient taking all medications as directed in the discharge summary? Yes  Does patient understand their discharge instructions: Yes  Does patient have questions or concerns that need addressed prior to 7-14 day follow up office visit: no    Scheduled appointment with PCP within 7-14 days    Follow Up  Future Appointments   Date Time Provider Department Center   2024  9:00 AM Sincere Henriquez MD [facility] [place]   2024  1:15 PM Jered Ty MD N [facility] [place]   3/17/2025 10:00 AM Johny Ludwig MD [facility] [place]   2025 10:30 AM Celestina Fuller PA-C N [place] [facility] [place]       Jennifer Bardales, [facility]

## 2024-07-23 ENCOUNTER — OFFICE VISIT (OUTPATIENT)
Dept: FAMILY MEDICINE CLINIC | Age: 84
End: 2024-07-23

## 2024-07-23 VITALS
WEIGHT: 174.3 LBS | DIASTOLIC BLOOD PRESSURE: 68 MMHG | OXYGEN SATURATION: 96 % | SYSTOLIC BLOOD PRESSURE: 110 MMHG | TEMPERATURE: 98.4 F | BODY MASS INDEX: 27.3 KG/M2 | HEART RATE: 75 BPM

## 2024-07-23 DIAGNOSIS — F32.1 CURRENT MODERATE EPISODE OF MAJOR DEPRESSIVE DISORDER, UNSPECIFIED WHETHER RECURRENT (HCC): ICD-10-CM

## 2024-07-23 DIAGNOSIS — N18.30 STAGE 3 CHRONIC KIDNEY DISEASE, UNSPECIFIED WHETHER STAGE 3A OR 3B CKD (HCC): ICD-10-CM

## 2024-07-23 DIAGNOSIS — G62.9 PERIPHERAL POLYNEUROPATHY: ICD-10-CM

## 2024-07-23 DIAGNOSIS — Z09 HOSPITAL DISCHARGE FOLLOW-UP: ICD-10-CM

## 2024-07-23 DIAGNOSIS — E78.00 PURE HYPERCHOLESTEROLEMIA: Chronic | ICD-10-CM

## 2024-07-23 DIAGNOSIS — M54.59 INTRACTABLE LOW BACK PAIN: Primary | ICD-10-CM

## 2024-07-23 DIAGNOSIS — I10 PRIMARY HYPERTENSION: ICD-10-CM

## 2024-07-23 RX ORDER — DULOXETIN HYDROCHLORIDE 30 MG/1
30 CAPSULE, DELAYED RELEASE ORAL DAILY
Qty: 30 CAPSULE | Refills: 3 | Status: SHIPPED | OUTPATIENT
Start: 2024-07-23

## 2024-07-24 NOTE — PROGRESS NOTES
Post-Discharge Transitional Care  Follow Up      Tony Yu   YOB: 1940    Date of Office Visit:  7/23/2024  Date of Hospital Admission: 7/11/24  Date of Hospital Discharge: 7/16/24  Risk of hospital readmission (high >=14%. Medium >=10%) :Readmission Risk Score: 12.6      Care management risk score Rising risk (score 2-5) and Complex Care (Scores >=6): No Risk Score On File     Non face to face  following discharge, date last encounter closed (first attempt may have been earlier): 07/17/2024    Call initiated 2 business days of discharge: Yes    ASSESSMENT/PLAN:   Intractable low back pain  Primary hypertension  Peripheral polyneuropathy  -     DULoxetine (CYMBALTA) 30 MG extended release capsule; Take 1 capsule by mouth daily, Disp-30 capsule, R-3Normal  Stage 3 chronic kidney disease, unspecified whether stage 3a or 3b CKD (HCC)  Pure hypercholesterolemia  Current moderate episode of major depressive disorder, unspecified whether recurrent (HCC)  -     DULoxetine (CYMBALTA) 30 MG extended release capsule; Take 1 capsule by mouth daily, Disp-30 capsule, R-3Normal      Medical Decision Making: high complexity  Return in about 6 weeks (around 9/3/2024).    On this date 7/23/2024 I have spent 30 minutes reviewing previous notes, test results and face to face with the patient discussing the diagnosis and importance of compliance with the treatment plan as well as documenting on the day of the visit.       Subjective:   HPI:  Follow up of Hospital problems/diagnosis(es):    Diagnosis Orders   1. Intractable low back pain        2. Primary hypertension        3. Peripheral polyneuropathy  DULoxetine (CYMBALTA) 30 MG extended release capsule      4. Stage 3 chronic kidney disease, unspecified whether stage 3a or 3b CKD (HCC)        5. Pure hypercholesterolemia        6. Current moderate episode of major depressive disorder, unspecified whether recurrent (HCC)  DULoxetine (CYMBALTA) 30 MG extended

## 2024-08-01 ENCOUNTER — TELEPHONE (OUTPATIENT)
Dept: FAMILY MEDICINE CLINIC | Age: 84
End: 2024-08-01

## 2024-08-01 RX ORDER — DULOXETIN HYDROCHLORIDE 20 MG/1
20 CAPSULE, DELAYED RELEASE ORAL DAILY
Qty: 30 CAPSULE | Refills: 2 | Status: SHIPPED | OUTPATIENT
Start: 2024-08-01

## 2024-08-01 NOTE — TELEPHONE ENCOUNTER
Cymbalta 20mg daily 30/2rf was escribed to [facility] per 's orders.

## 2024-08-01 NOTE — TELEPHONE ENCOUNTER
Okay to use melatonin as needed.  Okay to send in a prescription for Cymbalta 20 mg 1 capsule daily, 30, 2 refills, and discontinue the Cymbalta 30 mg

## 2024-08-01 NOTE — TELEPHONE ENCOUNTER
Patient wife called stating she thinks the Cymbalta is causing him to be more tired and slowing him down a lot. He does not have any ambition to physically do anything. She asked if it can be decreased in dose and also if its ok if he takes melatonin 5mg to help him sleep. He has been using it some at night and it seems to be helping.

## 2024-08-12 RX ORDER — BUMETANIDE 1 MG/1
1 TABLET ORAL DAILY
Qty: 90 TABLET | Refills: 3 | Status: SHIPPED | OUTPATIENT
Start: 2024-08-12

## 2024-08-23 RX ORDER — DULOXETIN HYDROCHLORIDE 20 MG/1
CAPSULE, DELAYED RELEASE ORAL DAILY
Qty: 90 CAPSULE | Refills: 1 | Status: SHIPPED | OUTPATIENT
Start: 2024-08-23

## 2024-08-30 RX ORDER — CLOPIDOGREL BISULFATE 75 MG/1
TABLET ORAL
Qty: 90 TABLET | Refills: 0 | Status: SHIPPED | OUTPATIENT
Start: 2024-08-30

## 2024-09-03 ENCOUNTER — OFFICE VISIT (OUTPATIENT)
Dept: FAMILY MEDICINE CLINIC | Age: 84
End: 2024-09-03
Payer: MEDICARE

## 2024-09-03 VITALS
RESPIRATION RATE: 16 BRPM | HEART RATE: 64 BPM | OXYGEN SATURATION: 95 % | WEIGHT: 175 LBS | TEMPERATURE: 97.7 F | DIASTOLIC BLOOD PRESSURE: 54 MMHG | SYSTOLIC BLOOD PRESSURE: 130 MMHG | BODY MASS INDEX: 27.41 KG/M2

## 2024-09-03 DIAGNOSIS — F32.1 CURRENT MODERATE EPISODE OF MAJOR DEPRESSIVE DISORDER, UNSPECIFIED WHETHER RECURRENT (HCC): Primary | ICD-10-CM

## 2024-09-03 PROCEDURE — 3078F DIAST BP <80 MM HG: CPT | Performed by: FAMILY MEDICINE

## 2024-09-03 PROCEDURE — 3075F SYST BP GE 130 - 139MM HG: CPT | Performed by: FAMILY MEDICINE

## 2024-09-03 PROCEDURE — 1123F ACP DISCUSS/DSCN MKR DOCD: CPT | Performed by: FAMILY MEDICINE

## 2024-09-03 PROCEDURE — 99213 OFFICE O/P EST LOW 20 MIN: CPT | Performed by: FAMILY MEDICINE

## 2024-09-26 ENCOUNTER — OFFICE VISIT (OUTPATIENT)
Dept: CARDIOLOGY CLINIC | Age: 84
End: 2024-09-26
Payer: MEDICARE

## 2024-09-26 VITALS
SYSTOLIC BLOOD PRESSURE: 151 MMHG | DIASTOLIC BLOOD PRESSURE: 71 MMHG | HEART RATE: 61 BPM | WEIGHT: 176.6 LBS | BODY MASS INDEX: 27.72 KG/M2 | HEIGHT: 67 IN

## 2024-09-26 DIAGNOSIS — I25.10 CAD IN NATIVE ARTERY: Primary | ICD-10-CM

## 2024-09-26 PROCEDURE — 1123F ACP DISCUSS/DSCN MKR DOCD: CPT | Performed by: INTERNAL MEDICINE

## 2024-09-26 PROCEDURE — 99214 OFFICE O/P EST MOD 30 MIN: CPT | Performed by: INTERNAL MEDICINE

## 2024-09-26 PROCEDURE — 3078F DIAST BP <80 MM HG: CPT | Performed by: INTERNAL MEDICINE

## 2024-09-26 PROCEDURE — 3077F SYST BP >= 140 MM HG: CPT | Performed by: INTERNAL MEDICINE

## 2024-10-01 RX ORDER — LEVOTHYROXINE SODIUM 137 UG/1
137 TABLET ORAL DAILY
Qty: 90 TABLET | Refills: 1 | Status: SHIPPED | OUTPATIENT
Start: 2024-10-01

## 2024-10-11 ENCOUNTER — IMMUNIZATION (OUTPATIENT)
Dept: FAMILY MEDICINE CLINIC | Age: 84
End: 2024-10-11
Payer: MEDICARE

## 2024-10-11 DIAGNOSIS — Z23 FLU VACCINE NEED: Primary | ICD-10-CM

## 2024-10-11 PROCEDURE — G0008 ADMIN INFLUENZA VIRUS VAC: HCPCS | Performed by: FAMILY MEDICINE

## 2024-10-11 PROCEDURE — 90653 IIV ADJUVANT VACCINE IM: CPT | Performed by: FAMILY MEDICINE

## 2024-10-11 NOTE — PROGRESS NOTES
Immunizations Administered       Name Date Dose Route    Influenza, FLUAD, (age 65 y+), IM, Trivalent PF, 0.5mL 10/11/2024 0.5 mL Intramuscular    Site: Deltoid- Left    Lot: [id number]    NDC: 38076-302-46

## 2024-10-18 RX ORDER — SIMVASTATIN 20 MG
20 TABLET ORAL NIGHTLY
Qty: 90 TABLET | Refills: 1 | Status: SHIPPED | OUTPATIENT
Start: 2024-10-18

## 2024-11-04 RX ORDER — POTASSIUM CHLORIDE 750 MG/1
10 TABLET, EXTENDED RELEASE ORAL DAILY
Qty: 90 TABLET | Refills: 3 | Status: SHIPPED | OUTPATIENT
Start: 2024-11-04

## 2024-12-27 NOTE — TELEPHONE ENCOUNTER
Patient was started on Lisinopril and since has noticed a \"dry, choking at times cough. \"  They do not monitor his BP at home since their machine is broken. When patient was in on 3/16 to see Cecilia Perez BP was 120/58 and HR 70. Please advise. Printed and out for Dr. Dania Veliz to address. 98.1

## 2025-01-05 NOTE — TELEPHONE ENCOUNTER
Airway  Date/Time: 1/4/2025 8:01 PM  Urgency: elective      General Information and Staff    Patient location during procedure: OR  Anesthesiologist: Helder Adamson MD  Performed: anesthesiologist   Performed by: Helder Adamson MD  Authorized by: Helder Adamson MD      Indications and Patient Condition  Indications for airway management: anesthesia  Sedation level: deep  Preoxygenated: yes  Patient position: sniffing  Mask difficulty assessment: 1 - vent by mask    Final Airway Details  Final airway type: endotracheal airway      Successful airway: ETT  Cuffed: yes   Successful intubation technique: direct laryngoscopy  Endotracheal tube insertion site: oral  Blade: Pina  Blade size: #3  ETT size (mm): 7.0    Cormack-Lehane Classification: grade IIA - partial view of glottis  Placement verified by: capnometry   Cuff volume (mL): 6  Measured from: lips  ETT to lips (cm): 21  Number of attempts at approach: 1         LOV: 7/23/2024  Next OV: 9/3/2024  [facility]

## 2025-01-06 RX ORDER — METOPROLOL TARTRATE 50 MG
TABLET ORAL
Qty: 90 TABLET | Refills: 3 | Status: SHIPPED | OUTPATIENT
Start: 2025-01-06

## 2025-01-21 NOTE — PROGRESS NOTES
Labs drawn via central venous catheter, then patient escorted to exam room accompanied by Madelin Hinton Detail Level: Simple Sunscreen Recommendations: Spf 30 or 50 Sunscreen Recommendations: SPF with zinc 30- 50 Detail Level: Generalized Sunscreen Recommendations: Spf 30 to 50 Moisturizer Recommendations: Apply moisturizing creams daily to skin after showering.

## 2025-02-14 RX ORDER — CLOPIDOGREL BISULFATE 75 MG/1
TABLET ORAL
Qty: 90 TABLET | Refills: 2 | Status: SHIPPED | OUTPATIENT
Start: 2025-02-14

## 2025-02-14 RX ORDER — DULOXETIN HYDROCHLORIDE 20 MG/1
CAPSULE, DELAYED RELEASE ORAL DAILY
Qty: 90 CAPSULE | Refills: 1 | Status: SHIPPED | OUTPATIENT
Start: 2025-02-14

## 2025-02-26 ENCOUNTER — TELEPHONE (OUTPATIENT)
Dept: UROLOGY | Age: 85
End: 2025-02-26

## 2025-02-26 NOTE — TELEPHONE ENCOUNTER
Patient scheduled for CT CHEST W AND CT UROGRAM  at [facility] on 5/8/25.  Arrival of 950 AM for a 1020 AM scan time.  Order mailed with instructions or given to the patient in the office

## 2025-03-14 SDOH — ECONOMIC STABILITY: TRANSPORTATION INSECURITY
IN THE PAST 12 MONTHS, HAS THE LACK OF TRANSPORTATION KEPT YOU FROM MEDICAL APPOINTMENTS OR FROM GETTING MEDICATIONS?: NO

## 2025-03-14 SDOH — ECONOMIC STABILITY: FOOD INSECURITY: WITHIN THE PAST 12 MONTHS, YOU WORRIED THAT YOUR FOOD WOULD RUN OUT BEFORE YOU GOT MONEY TO BUY MORE.: NEVER TRUE

## 2025-03-14 SDOH — ECONOMIC STABILITY: INCOME INSECURITY: IN THE LAST 12 MONTHS, WAS THERE A TIME WHEN YOU WERE NOT ABLE TO PAY THE MORTGAGE OR RENT ON TIME?: NO

## 2025-03-14 SDOH — ECONOMIC STABILITY: FOOD INSECURITY: WITHIN THE PAST 12 MONTHS, THE FOOD YOU BOUGHT JUST DIDN'T LAST AND YOU DIDN'T HAVE MONEY TO GET MORE.: NEVER TRUE

## 2025-03-14 ASSESSMENT — PATIENT HEALTH QUESTIONNAIRE - PHQ9
SUM OF ALL RESPONSES TO PHQ QUESTIONS 1-9: 1
4. FEELING TIRED OR HAVING LITTLE ENERGY: NOT AT ALL
6. FEELING BAD ABOUT YOURSELF - OR THAT YOU ARE A FAILURE OR HAVE LET YOURSELF OR YOUR FAMILY DOWN: NOT AT ALL
8. MOVING OR SPEAKING SO SLOWLY THAT OTHER PEOPLE COULD HAVE NOTICED. OR THE OPPOSITE - BEING SO FIDGETY OR RESTLESS THAT YOU HAVE BEEN MOVING AROUND A LOT MORE THAN USUAL: NOT AT ALL
SUM OF ALL RESPONSES TO PHQ QUESTIONS 1-9: 1
10. IF YOU CHECKED OFF ANY PROBLEMS, HOW DIFFICULT HAVE THESE PROBLEMS MADE IT FOR YOU TO DO YOUR WORK, TAKE CARE OF THINGS AT HOME, OR GET ALONG WITH OTHER PEOPLE: NOT DIFFICULT AT ALL
7. TROUBLE CONCENTRATING ON THINGS, SUCH AS READING THE NEWSPAPER OR WATCHING TELEVISION: NOT AT ALL
1. LITTLE INTEREST OR PLEASURE IN DOING THINGS: SEVERAL DAYS
8. MOVING OR SPEAKING SO SLOWLY THAT OTHER PEOPLE COULD HAVE NOTICED. OR THE OPPOSITE, BEING SO FIGETY OR RESTLESS THAT YOU HAVE BEEN MOVING AROUND A LOT MORE THAN USUAL: NOT AT ALL
9. THOUGHTS THAT YOU WOULD BE BETTER OFF DEAD, OR OF HURTING YOURSELF: NOT AT ALL
3. TROUBLE FALLING OR STAYING ASLEEP: NOT AT ALL
SUM OF ALL RESPONSES TO PHQ QUESTIONS 1-9: 1
6. FEELING BAD ABOUT YOURSELF - OR THAT YOU ARE A FAILURE OR HAVE LET YOURSELF OR YOUR FAMILY DOWN: NOT AT ALL
2. FEELING DOWN, DEPRESSED OR HOPELESS: NOT AT ALL
5. POOR APPETITE OR OVEREATING: NOT AT ALL
4. FEELING TIRED OR HAVING LITTLE ENERGY: NOT AT ALL
7. TROUBLE CONCENTRATING ON THINGS, SUCH AS READING THE NEWSPAPER OR WATCHING TELEVISION: NOT AT ALL
2. FEELING DOWN, DEPRESSED OR HOPELESS: NOT AT ALL
3. TROUBLE FALLING OR STAYING ASLEEP: NOT AT ALL
9. THOUGHTS THAT YOU WOULD BE BETTER OFF DEAD, OR OF HURTING YOURSELF: NOT AT ALL
SUM OF ALL RESPONSES TO PHQ QUESTIONS 1-9: 1
5. POOR APPETITE OR OVEREATING: NOT AT ALL
10. IF YOU CHECKED OFF ANY PROBLEMS, HOW DIFFICULT HAVE THESE PROBLEMS MADE IT FOR YOU TO DO YOUR WORK, TAKE CARE OF THINGS AT HOME, OR GET ALONG WITH OTHER PEOPLE: NOT DIFFICULT AT ALL
1. LITTLE INTEREST OR PLEASURE IN DOING THINGS: SEVERAL DAYS
SUM OF ALL RESPONSES TO PHQ QUESTIONS 1-9: 1

## 2025-03-17 ENCOUNTER — OFFICE VISIT (OUTPATIENT)
Dept: INTERNAL MEDICINE CLINIC | Age: 85
End: 2025-03-17
Payer: MEDICARE

## 2025-03-17 VITALS
WEIGHT: 189.8 LBS | BODY MASS INDEX: 29.79 KG/M2 | DIASTOLIC BLOOD PRESSURE: 62 MMHG | HEIGHT: 67 IN | SYSTOLIC BLOOD PRESSURE: 120 MMHG | HEART RATE: 58 BPM

## 2025-03-17 DIAGNOSIS — I10 ESSENTIAL HYPERTENSION: ICD-10-CM

## 2025-03-17 DIAGNOSIS — I25.10 CORONARY ARTERY DISEASE INVOLVING NATIVE CORONARY ARTERY OF NATIVE HEART WITHOUT ANGINA PECTORIS: Primary | ICD-10-CM

## 2025-03-17 PROCEDURE — 1159F MED LIST DOCD IN RCRD: CPT | Performed by: INTERNAL MEDICINE

## 2025-03-17 PROCEDURE — 3074F SYST BP LT 130 MM HG: CPT | Performed by: INTERNAL MEDICINE

## 2025-03-17 PROCEDURE — 99213 OFFICE O/P EST LOW 20 MIN: CPT | Performed by: INTERNAL MEDICINE

## 2025-03-17 PROCEDURE — 1123F ACP DISCUSS/DSCN MKR DOCD: CPT | Performed by: INTERNAL MEDICINE

## 2025-03-17 PROCEDURE — 3078F DIAST BP <80 MM HG: CPT | Performed by: INTERNAL MEDICINE

## 2025-03-17 RX ORDER — EZETIMIBE 10 MG/1
TABLET ORAL
Qty: 90 TABLET | Refills: 3 | Status: SHIPPED | OUTPATIENT
Start: 2025-03-17

## 2025-03-17 RX ORDER — ASPIRIN 81 MG/1
81 TABLET ORAL DAILY
COMMUNITY

## 2025-03-17 RX ORDER — SIMVASTATIN 20 MG
20 TABLET ORAL NIGHTLY
Qty: 90 TABLET | Refills: 3 | Status: SHIPPED | OUTPATIENT
Start: 2025-03-17

## 2025-03-17 NOTE — PROGRESS NOTES
Tony Yu (:  1940) is a 85 y.o. male,Established patient, here for evaluation of the following chief complaint(s):  Coronary Artery Disease, AAA, and Chronic Kidney Disease         Assessment & Plan  Essential hypertension   - controlled; continue meds         Coronary artery disease involving native coronary artery of native heart without angina pectoris    - denies any dyspnea, chest pain with exertion or cold; continue same meds           Return in about 6 months (around 2025).       Subjective   HPI pt with cad, remote stent, hx gu cancer seen in f/u.  He is limited by vision, hearing, orthopedic issues; does not do much. No gi complaints.    Has had multiple falls.  Uses walker    Review of Systems   See above      Objective   Physical Exam   /62 (BP Site: Left Upper Arm)   Pulse 58   Ht 1.702 m (5' 7.01\")   Wt 86.1 kg (189 lb 12.8 oz)   BMI 29.72 kg/m²     General appearance - chronically ill appearing    Mental Status - alert, oriented to person, place, and time          Neck - supple, no significant adenopathy        Chest - clear to auscultation, no wheezes, rales or rhonchi, symmetric air entry     Heart - normal rate, regular rhythm, normal S1, S2, no murmurs, rubs, clicks or gallops     Abdomen - soft, nontender, nondistended, no masses or organomegaly         Neurological - alert, oriented, normal speech, no focal findings or movement disorder noted     Musculoskeletal -   msk exam:[id number]}     Extremities - pedal edema 1 +     Skin - normal coloration and turgor, no rashes, no suspicious skin lesions noted            An electronic signature was used to authenticate this note.    --Johny Ludwig MD

## 2025-03-28 RX ORDER — LEVOTHYROXINE SODIUM 137 UG/1
137 TABLET ORAL DAILY
Qty: 90 TABLET | Refills: 1 | Status: SHIPPED | OUTPATIENT
Start: 2025-03-28

## 2025-05-08 ENCOUNTER — HOSPITAL ENCOUNTER (OUTPATIENT)
Dept: CT IMAGING | Age: 85
Discharge: HOME | End: 2025-05-08
Payer: MEDICARE

## 2025-05-08 DIAGNOSIS — Z85.51 HISTORY OF BLADDER CANCER: ICD-10-CM

## 2025-05-08 LAB — POC CREATININE WHOLE BLOOD: 1.2 MG/DL (ref 0.5–1.2)

## 2025-05-08 PROCEDURE — 74178 CT ABD&PLV WO CNTR FLWD CNTR: CPT | Performed by: PHYSICIAN ASSISTANT

## 2025-05-08 PROCEDURE — 82565 ASSAY OF CREATININE: CPT

## 2025-05-08 PROCEDURE — 6360000004 HC RX CONTRAST MEDICATION: Performed by: PHYSICIAN ASSISTANT

## 2025-05-08 PROCEDURE — 71260 CT THORAX DX C+: CPT

## 2025-05-08 RX ORDER — IOPAMIDOL 755 MG/ML
80 INJECTION, SOLUTION INTRAVASCULAR
Status: COMPLETED | OUTPATIENT
Start: 2025-05-08 | End: 2025-05-08

## 2025-05-08 RX ADMIN — IOPAMIDOL 80 ML: 755 INJECTION, SOLUTION INTRAVENOUS at 11:45

## 2025-05-15 RX ORDER — BUMETANIDE 1 MG/1
1 TABLET ORAL DAILY
Qty: 90 TABLET | Refills: 0 | Status: SHIPPED | OUTPATIENT
Start: 2025-05-15

## 2025-05-19 ENCOUNTER — TELEPHONE (OUTPATIENT)
Dept: UROLOGY | Age: 85
End: 2025-05-19

## 2025-05-19 ENCOUNTER — TELEPHONE (OUTPATIENT)
Dept: FAMILY MEDICINE CLINIC | Age: 85
End: 2025-05-19

## 2025-05-19 ENCOUNTER — OFFICE VISIT (OUTPATIENT)
Dept: UROLOGY | Age: 85
End: 2025-05-19
Payer: MEDICARE

## 2025-05-19 ENCOUNTER — HOSPITAL ENCOUNTER (OUTPATIENT)
Dept: INTERVENTIONAL RADIOLOGY/VASCULAR | Age: 85
Discharge: HOME | End: 2025-05-21
Payer: MEDICARE

## 2025-05-19 ENCOUNTER — HOSPITAL ENCOUNTER (OUTPATIENT)
Age: 85
Discharge: HOME | End: 2025-05-19
Payer: MEDICARE

## 2025-05-19 VITALS
BODY MASS INDEX: 29.66 KG/M2 | WEIGHT: 189 LBS | DIASTOLIC BLOOD PRESSURE: 72 MMHG | HEIGHT: 67 IN | SYSTOLIC BLOOD PRESSURE: 132 MMHG

## 2025-05-19 DIAGNOSIS — Z85.51 HISTORY OF BLADDER CANCER: Primary | ICD-10-CM

## 2025-05-19 DIAGNOSIS — L03.116 CELLULITIS OF LEFT LOWER EXTREMITY: ICD-10-CM

## 2025-05-19 LAB
ALBUMIN SERPL BCG-MCNC: 4.1 G/DL (ref 3.4–4.9)
ALP SERPL-CCNC: 128 U/L (ref 40–129)
ALT SERPL W/O P-5'-P-CCNC: 24 U/L (ref 10–50)
ANION GAP SERPL CALC-SCNC: 12 MEQ/L (ref 8–16)
ANISOCYTOSIS BLD QL SMEAR: PRESENT
AST SERPL-CCNC: 28 U/L (ref 10–50)
BASOPHILS ABSOLUTE: 0.1 THOU/MM3 (ref 0–0.1)
BASOPHILS NFR BLD AUTO: 0.5 %
BILIRUB SERPL-MCNC: 1.6 MG/DL (ref 0.3–1.2)
BUN SERPL-MCNC: 22 MG/DL (ref 8–23)
CALCIUM SERPL-MCNC: 9.2 MG/DL (ref 8.8–10.2)
CHLORIDE SERPL-SCNC: 96 MEQ/L (ref 98–111)
CO2 SERPL-SCNC: 26 MEQ/L (ref 22–29)
CREAT SERPL-MCNC: 1.2 MG/DL (ref 0.7–1.2)
DEPRECATED RDW RBC AUTO: 45.4 FL (ref 35–45)
ECHO BSA: 2.01 M2
EOSINOPHIL NFR BLD AUTO: 2.8 %
EOSINOPHILS ABSOLUTE: 0.4 THOU/MM3 (ref 0–0.4)
ERYTHROCYTE [DISTWIDTH] IN BLOOD BY AUTOMATED COUNT: 13.4 % (ref 11.5–14.5)
GFR SERPL CREATININE-BSD FRML MDRD: 59 ML/MIN/1.73M2
GLUCOSE SERPL-MCNC: 91 MG/DL (ref 74–109)
HCT VFR BLD AUTO: 47.2 % (ref 42–52)
HGB BLD-MCNC: 15.5 GM/DL (ref 14–18)
IMM GRANULOCYTES # BLD AUTO: 0.02 THOU/MM3 (ref 0–0.07)
IMM GRANULOCYTES NFR BLD AUTO: 0.2 %
LYMPHOCYTES ABSOLUTE: 5 THOU/MM3 (ref 1–4.8)
LYMPHOCYTES NFR BLD AUTO: 38.4 %
MCH RBC QN AUTO: 30.5 PG (ref 26–33)
MCHC RBC AUTO-ENTMCNC: 32.8 GM/DL (ref 32.2–35.5)
MCV RBC AUTO: 92.7 FL (ref 80–94)
MONOCYTES ABSOLUTE: 1.1 THOU/MM3 (ref 0.4–1.3)
MONOCYTES NFR BLD AUTO: 8.2 %
NEUTROPHILS ABSOLUTE: 6.5 THOU/MM3 (ref 1.8–7.7)
NEUTROPHILS NFR BLD AUTO: 49.9 %
NRBC BLD AUTO-RTO: 0 /100 WBC
PLATELET # BLD AUTO: 222 THOU/MM3 (ref 130–400)
PLATELET BLD QL SMEAR: ADEQUATE
PMV BLD AUTO: 9.7 FL (ref 9.4–12.4)
POTASSIUM SERPL-SCNC: 4.2 MEQ/L (ref 3.5–5.2)
PROT SERPL-MCNC: 6.9 G/DL (ref 6.4–8.3)
RBC # BLD AUTO: 5.09 MILL/MM3 (ref 4.7–6.1)
SCAN OF BLOOD SMEAR: NORMAL
SODIUM SERPL-SCNC: 134 MEQ/L (ref 135–145)
VARIANT LYMPHS BLD QL SMEAR: ABNORMAL %
WBC # BLD AUTO: 13 THOU/MM3 (ref 4.8–10.8)

## 2025-05-19 PROCEDURE — 1123F ACP DISCUSS/DSCN MKR DOCD: CPT | Performed by: PHYSICIAN ASSISTANT

## 2025-05-19 PROCEDURE — 80053 COMPREHEN METABOLIC PANEL: CPT

## 2025-05-19 PROCEDURE — 3078F DIAST BP <80 MM HG: CPT | Performed by: PHYSICIAN ASSISTANT

## 2025-05-19 PROCEDURE — 99214 OFFICE O/P EST MOD 30 MIN: CPT | Performed by: PHYSICIAN ASSISTANT

## 2025-05-19 PROCEDURE — 85025 COMPLETE CBC W/AUTO DIFF WBC: CPT

## 2025-05-19 PROCEDURE — 3075F SYST BP GE 130 - 139MM HG: CPT | Performed by: PHYSICIAN ASSISTANT

## 2025-05-19 PROCEDURE — 93971 EXTREMITY STUDY: CPT

## 2025-05-19 PROCEDURE — 36415 COLL VENOUS BLD VENIPUNCTURE: CPT

## 2025-05-19 PROCEDURE — 1159F MED LIST DOCD IN RCRD: CPT | Performed by: PHYSICIAN ASSISTANT

## 2025-05-19 RX ORDER — DOXYCYCLINE HYCLATE 100 MG
100 TABLET ORAL 2 TIMES DAILY
Qty: 28 TABLET | Refills: 0 | Status: SHIPPED | OUTPATIENT
Start: 2025-05-19 | End: 2025-05-30 | Stop reason: SDUPTHER

## 2025-05-19 RX ORDER — AMOXICILLIN 875 MG/1
875 TABLET, COATED ORAL 2 TIMES DAILY
Qty: 28 TABLET | Refills: 0 | Status: SHIPPED | OUTPATIENT
Start: 2025-05-19 | End: 2025-05-30 | Stop reason: SDUPTHER

## 2025-05-19 NOTE — TELEPHONE ENCOUNTER
Janet from [facility] states Celestina Manzanares NP is sending pt for a STAT venous doppler to rule out a DVT and treating him for Cellulitis with Amox bid x 14 days. States she wanted you to be aware and to know if you wanted a follow up with pt. Please advise.

## 2025-05-19 NOTE — TELEPHONE ENCOUNTER
Patient scheduled for STAT VASCULAR DUPLEX LOWER EXTREMITY VENOUS LEFT  [facility] on 5/19/2025.  Arrival of 115 for a 145 scan time.  Order  given to the patient in the office

## 2025-05-19 NOTE — PROGRESS NOTES
[facility] [place] [facility]  [facility]  [address]  [address]  [facility] [address]  Dept: [phone]  Loc: [phone]      Mr. Yu was seen in follow up for   Chief Complaint   Patient presents with    Bladder Cancer    Results        HPI:  Mr. Yu is an 85-year-old patient male with a history of bladder cancer. He was first diagnosed with non-invasive high grade papillary urothelial carcinoma in 2014. He underwent TURBT and BCG therapy. Unfortunately, his surveillance cystoscopy in October 2018 showed a large bladder tumor. He underwent a TURBT on November 8, 2018. 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 He completed three cycles of neoadjuvant Gemzar/Cisplatin in 3/20/19. He underwent a Radical Cystoprostatectomy with Pelvic Lymph Node Dissection and creation of Ileal Loop Diversion on 5/1/19. His  post-neoadjuvant treatment pathology was pT0, pN0.      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 A Needle core biopsy performed by Dr. 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 Pathology felt this tumor may have originated in the lungs. Patient received 5000 cGy EBRT, completed 5/5 in March 2021.     He states that he has been feeling relatively well since his last visit, except for left lower extremity swelling, redness, and warmth that developed approximately 2-3 days ago. He denies any injury to the area but states that he feels pain in the back of his calf with movement of his foot. He denies fever/chills. He still has some degree of chronic generalized fatigue but feels that everything is stable at this time. He does report worsening back pain and weakness in his bilateral upper legs. He ambulates with a walker now. He reports a lumbar fusion by Dr. Ojeda

## 2025-05-19 NOTE — TELEPHONE ENCOUNTER
Spoke to Dr. Henriquez's office.  The staff is sending a message to Dr. Henriquez to let them know what plan of care is.  They will reach out to the patient.

## 2025-05-20 ENCOUNTER — TELEPHONE (OUTPATIENT)
Dept: UROLOGY | Age: 85
End: 2025-05-20

## 2025-05-20 NOTE — TELEPHONE ENCOUNTER
Patient wife advised of the test results. He just started the antibiotics and he may be feeling a little better but its to early to tell.    He is sitting with his legs elevated.    She denies him having any of the symptoms listed below but will notify the office if he develops them.    Lab and doppler results faxed to Dr Henriquez office.

## 2025-05-20 NOTE — TELEPHONE ENCOUNTER
Patient's left lower extremity doppler is negative for blood clots. Started on Doxycycline and Amoxicillin for cellulitis. His white count is slightly elevated but this is a chronic finding. No left shift. The only lab that is abnormal is his total bilirubin. We will notify his PCP's office for further evaluation. Please check with patient to see if he is feeling any better after starting antibiotics. Please make sure he is elevating his leg when sitting. Please make sure he Is not running a fever, having increased fatigue, muscle aches, bone pain, chills, or having increased warmth or redness.

## 2025-05-27 RX ORDER — BUMETANIDE 1 MG/1
1 TABLET ORAL DAILY
Qty: 90 TABLET | Refills: 0 | OUTPATIENT
Start: 2025-05-27

## 2025-05-30 ENCOUNTER — OFFICE VISIT (OUTPATIENT)
Dept: UROLOGY | Age: 85
End: 2025-05-30
Payer: MEDICARE

## 2025-05-30 VITALS
DIASTOLIC BLOOD PRESSURE: 58 MMHG | WEIGHT: 189 LBS | HEIGHT: 67 IN | RESPIRATION RATE: 18 BRPM | BODY MASS INDEX: 29.66 KG/M2 | SYSTOLIC BLOOD PRESSURE: 132 MMHG

## 2025-05-30 DIAGNOSIS — R53.83 FATIGUE, UNSPECIFIED TYPE: ICD-10-CM

## 2025-05-30 DIAGNOSIS — L03.116 CELLULITIS OF LEFT LOWER EXTREMITY: ICD-10-CM

## 2025-05-30 DIAGNOSIS — L03.90 CELLULITIS, UNSPECIFIED CELLULITIS SITE: ICD-10-CM

## 2025-05-30 DIAGNOSIS — C67.9 MALIGNANT NEOPLASM OF URINARY BLADDER, UNSPECIFIED SITE (HCC): Primary | ICD-10-CM

## 2025-05-30 PROCEDURE — 3075F SYST BP GE 130 - 139MM HG: CPT | Performed by: PHYSICIAN ASSISTANT

## 2025-05-30 PROCEDURE — 99213 OFFICE O/P EST LOW 20 MIN: CPT | Performed by: PHYSICIAN ASSISTANT

## 2025-05-30 PROCEDURE — 1123F ACP DISCUSS/DSCN MKR DOCD: CPT | Performed by: PHYSICIAN ASSISTANT

## 2025-05-30 PROCEDURE — 3078F DIAST BP <80 MM HG: CPT | Performed by: PHYSICIAN ASSISTANT

## 2025-05-30 RX ORDER — DOXYCYCLINE HYCLATE 100 MG
100 TABLET ORAL 2 TIMES DAILY
Qty: 14 TABLET | Refills: 0 | Status: SHIPPED | OUTPATIENT
Start: 2025-05-30 | End: 2025-06-06

## 2025-05-30 RX ORDER — AMOXICILLIN 875 MG/1
875 TABLET, COATED ORAL 2 TIMES DAILY
Qty: 14 TABLET | Refills: 0 | Status: SHIPPED | OUTPATIENT
Start: 2025-05-30 | End: 2025-06-06

## 2025-05-30 NOTE — PROGRESS NOTES
neck cyst    TX OFFICE/OUTPT VISIT,PROCEDURE ONLY N/A 11/08/2018    TURBT performed by Oscar Badillo MD at [facility] OR    PROSTATECTOMY N/A 05/01/2019    ROBOTIC RADICAL CYSTOPROSTATECTOMY performed by Oscar Badillo MD at [facility] OR    SINUS SURGERY      SKIN CANCER EXCISION  10/2016    Dr Gregorio    SKIN CANCER EXCISION  11/2020    on wrist     TESTICLE REMOVAL  1990's    TUNNELED VENOUS PORT PLACEMENT      TURP  04/16/2014    See note of Dr. Badillo for OR procedure details       Current Outpatient Medications on File Prior to Visit   Medication Sig Dispense Refill    amoxicillin (AMOXIL) 875 MG tablet Take 1 tablet by mouth 2 times daily for 14 days 28 tablet 0    doxycycline hyclate (VIBRA-TABS) 100 MG tablet Take 1 tablet by mouth 2 times daily for 14 days 28 tablet 0    bumetanide (BUMEX) 1 MG tablet TAKE 1 TABLET BY MOUTH EVERY DAY 90 tablet 0    levothyroxine (SYNTHROID) 137 MCG tablet TAKE 1 TABLET BY MOUTH EVERY DAY 90 tablet 1    aspirin 81 MG EC tablet Take 1 tablet by mouth daily      ezetimibe (ZETIA) 10 MG tablet TAKE 1 TABLET BY MOUTH EVERY DAY 90 tablet 3    simvastatin (ZOCOR) 20 MG tablet Take 1 tablet by mouth nightly 90 tablet 3    clopidogrel (PLAVIX) 75 MG tablet TAKE 1 TABLET BY MOUTH EVERY DAY 90 tablet 2    DULoxetine (CYMBALTA) 20 MG extended release capsule TAKE 1 CAPSULE BY MOUTH EVERY DAY 90 capsule 1    metoprolol tartrate (LOPRESSOR) 50 MG tablet TAKE 1/2 TABLET TWICE A DAY BY MOUTH 90 tablet 3    KLOR-CON M10 10 MEQ extended release tablet TAKE 1 TABLET BY MOUTH EVERY DAY 90 tablet 3    FIBER PO Take by mouth      diclofenac sodium (VOLTAREN) 1 % GEL Apply 2 g topically 4 times daily Apply to left elbow 4 times daily 150 g 1    Multiple Vitamins-Minerals (PRESERVISION AREDS 2+MULTI VIT PO) Take 2 tablets by mouth daily      b complex vitamins capsule Take 1 capsule by mouth daily       No current facility-administered medications on file prior to visit.       Allergies   Allergen

## 2025-06-01 LAB — BACTERIA UR CULT: NORMAL

## 2025-06-02 ENCOUNTER — OFFICE VISIT (OUTPATIENT)
Dept: FAMILY MEDICINE CLINIC | Age: 85
End: 2025-06-02
Payer: MEDICARE

## 2025-06-02 VITALS
DIASTOLIC BLOOD PRESSURE: 76 MMHG | TEMPERATURE: 98.1 F | SYSTOLIC BLOOD PRESSURE: 124 MMHG | OXYGEN SATURATION: 100 % | HEART RATE: 59 BPM

## 2025-06-02 DIAGNOSIS — L03.116 CELLULITIS OF LEFT LOWER EXTREMITY: Primary | ICD-10-CM

## 2025-06-02 PROCEDURE — 3074F SYST BP LT 130 MM HG: CPT | Performed by: FAMILY MEDICINE

## 2025-06-02 PROCEDURE — 1160F RVW MEDS BY RX/DR IN RCRD: CPT | Performed by: FAMILY MEDICINE

## 2025-06-02 PROCEDURE — 99213 OFFICE O/P EST LOW 20 MIN: CPT | Performed by: FAMILY MEDICINE

## 2025-06-02 PROCEDURE — 1123F ACP DISCUSS/DSCN MKR DOCD: CPT | Performed by: FAMILY MEDICINE

## 2025-06-02 PROCEDURE — 3078F DIAST BP <80 MM HG: CPT | Performed by: FAMILY MEDICINE

## 2025-06-02 PROCEDURE — 1159F MED LIST DOCD IN RCRD: CPT | Performed by: FAMILY MEDICINE

## 2025-06-03 ASSESSMENT — ENCOUNTER SYMPTOMS
RHINORRHEA: 0
ABDOMINAL PAIN: 0
EYE PAIN: 0
SINUS PRESSURE: 0
SORE THROAT: 0
DIARRHEA: 0
NAUSEA: 0
COUGH: 0
ABDOMINAL DISTENTION: 0
SHORTNESS OF BREATH: 0
CONSTIPATION: 0

## 2025-06-03 NOTE — PROGRESS NOTES
use of AI, including the recording.          The patient (or guardian, if applicable) and other individuals in attendance with the patient were advised that Artificial Intelligence will be utilized during this visit to record, process the conversation to generate a clinical note, and support improvement of the AI technology. The patient (or guardian, if applicable) and other individuals in attendance at the appointment consented to the use of AI, including the recording.                           Electronically signed by Sincere Henriquez MD on 6/3/2025 at 9:00 AM

## 2025-06-18 ENCOUNTER — TELEPHONE (OUTPATIENT)
Dept: UROLOGY | Age: 85
End: 2025-06-18

## 2025-06-18 NOTE — TELEPHONE ENCOUNTER
Patient scheduled for CT  at [facility] on 12/01/25.  Arrival of 140 PM for a 210 PM scan time.  Order mailed with instructions

## 2025-06-20 ENCOUNTER — TELEPHONE (OUTPATIENT)
Dept: UROLOGY | Age: 85
End: 2025-06-20

## 2025-06-20 NOTE — TELEPHONE ENCOUNTER
Would you please call Mr. Yu to see if his left lower extremity cellulitis has resolved after extending his antibiotic therapy?

## 2025-06-23 RX ORDER — BUMETANIDE 1 MG/1
1 TABLET ORAL DAILY
Qty: 90 TABLET | Refills: 0 | Status: SHIPPED | OUTPATIENT
Start: 2025-06-23

## 2025-08-04 ENCOUNTER — TELEPHONE (OUTPATIENT)
Dept: FAMILY MEDICINE CLINIC | Age: 85
End: 2025-08-04

## 2025-08-18 ENCOUNTER — TELEPHONE (OUTPATIENT)
Dept: FAMILY MEDICINE CLINIC | Age: 85
End: 2025-08-18

## 2025-08-18 RX ORDER — DULOXETIN HYDROCHLORIDE 20 MG/1
CAPSULE, DELAYED RELEASE ORAL DAILY
Qty: 90 CAPSULE | Refills: 2 | Status: SHIPPED | OUTPATIENT
Start: 2025-08-18

## (undated) DEVICE — COVER EQUIP STEEP TREND EZ CLN UP WTRPRF DISP FOR STD SZ

## (undated) DEVICE — LINER SUCT CANSTR 1500CC SEMI RIG W/ POR HYDROPHOBIC SHUT

## (undated) DEVICE — GLOVE ORANGE PI 7   MSG9070

## (undated) DEVICE — POUCH DRNGE FLX BND INTEGR RAIL CLMP DISP EZ CTCH

## (undated) DEVICE — 3M™ RANGER™ BLOOD/FLUID WARMING HIGH FLOW SET, 24370, 10/CASE: Brand: 3M™ RANGER™

## (undated) DEVICE — BLADE CLIPPER GEN PURP NS

## (undated) DEVICE — GOWN,SIRUS,NONRNF,SETINSLV,XL,20/CS: Brand: MEDLINE

## (undated) DEVICE — BASIC SINGLE BASIN BTC-LF: Brand: MEDLINE INDUSTRIES, INC.

## (undated) DEVICE — SOLUTION SURG PREP POV IOD 7.5% 4 OZ

## (undated) DEVICE — CATHETER,FOLEY,SILI-ELAST,LTX,20FR,10ML: Brand: MEDLINE

## (undated) DEVICE — PACK PROC LAP II AURORA

## (undated) DEVICE — HYPODERMIC SAFETY NEEDLE: Brand: MAGELLAN

## (undated) DEVICE — TISSUE RETRIEVAL SYSTEM: Brand: INZII RETRIEVAL SYSTEM

## (undated) DEVICE — AIRSEAL 12 MM ACCESS PORT AND PALM GRIP OBTURATOR WITH BLADELESS OPTICAL TIP, 120 MM LENGTH: Brand: AIRSEAL

## (undated) DEVICE — SUTURE MCRYL SZ 3-0 L27IN ABSRB UD L17MM RB-1 1/2 CIR Y215H

## (undated) DEVICE — TRI-LUMEN FILTERED TUBE SET WITH ACTIVATED CHARCOAL FILTER: Brand: AIRSEAL

## (undated) DEVICE — BLADELESS OBTURATOR: Brand: WECK VISTA

## (undated) DEVICE — GLOVE ORANGE PI 8 1/2   MSG9085

## (undated) DEVICE — NEEDLE INSUF L120MM DIA2MM DISP FOR PNEUMOPERI ENDOPATH

## (undated) DEVICE — PACK PROCEDURE SURG PLAS SC MIN SRHP LF

## (undated) DEVICE — COTTON BALL ST

## (undated) DEVICE — SUTURE MCRYL SZ 3-0 L27IN ABSRB VLT L17MM RB-1 1/2 CIR Y305H

## (undated) DEVICE — CATHETER,FOLEY,SILI-ELAST,LTX,18FR,10ML: Brand: MEDLINE

## (undated) DEVICE — GLOVE SURG SZ 65 THK91MIL LTX FREE SYN POLYISOPRENE

## (undated) DEVICE — TUBING, SUCTION, 1/4" X 20', STRAIGHT: Brand: MEDLINE INDUSTRIES, INC.

## (undated) DEVICE — DRAINBAG,ANTI-REFLUX TOWER,L/F,2000ML,LL: Brand: MEDLINE

## (undated) DEVICE — COLUMN DRAPE

## (undated) DEVICE — YANKAUER,BULB TIP,W/O VENT,RIGID,STERILE: Brand: MEDLINE

## (undated) DEVICE — 35 ML SYRINGE LUER-LOCK TIP: Brand: MONOJECT

## (undated) DEVICE — BANDAGE,GAUZE,4.5"X4.1YD,STERILE,LF: Brand: MEDLINE

## (undated) DEVICE — ELECTRO LUBE IS A SINGLE PATIENT USE DEVICE THAT IS INTENDED TO BE USED ON ELECTROSURGICAL ELECTRODES TO REDUCE STICKING.: Brand: KEY SURGICAL ELECTRO LUBE

## (undated) DEVICE — GLOVE SURG SZ 8 L11.77IN FNGR THK9.8MIL STRW LTX POLYMER

## (undated) DEVICE — GUIDEWIRE ENDOSCP L150CM DIA0.035IN TIP 3CM PTFE NIT

## (undated) DEVICE — GAUZE,SPONGE,2"X2",8PLY,STERILE,LF,2'S: Brand: MEDLINE

## (undated) DEVICE — TIP COVER ACCESSORY

## (undated) DEVICE — Device

## (undated) DEVICE — 3M™ WARMING BLANKET, UPPER BODY, 10 PER CASE, 42268: Brand: BAIR HUGGER™

## (undated) DEVICE — AGENT HEMSTAT W2XL14IN OXIDIZED REGENERATED CELOS ABSRB FOR

## (undated) DEVICE — TOWEL,OR,DSP,ST,BLUE,DLX,4/PK,20PK/CS: Brand: MEDLINE

## (undated) DEVICE — GOWN,SIRUS,NON REINFRCD,LARGE,SET IN SL: Brand: MEDLINE

## (undated) DEVICE — CANNULA SEAL

## (undated) DEVICE — ARM DRAPE

## (undated) DEVICE — RELOAD STPL L55MM OPN H3.8MM CLS H1.5MM WIRE DIA0.2MM REG

## (undated) DEVICE — JELLY,LUBE,STERILE,FLIP TOP,TUBE,2-OZ: Brand: MEDLINE

## (undated) DEVICE — PREP SOL PVP IODINE 4%  4 OZ/BTL

## (undated) DEVICE — CATHETER,URETHRAL,REDRUBBER,STRL,16FR: Brand: MEDLINE

## (undated) DEVICE — SOLUTION IV IRRIG POUR BRL 0.9% SODIUM CHL 2F7124

## (undated) DEVICE — DRESSING TRNSPAR W2XL2.75IN FLM SHT SEMIPERMEABLE WIND

## (undated) DEVICE — STAPLER INT L60MM REG TISS BLU B FRM 8 FIRING 2 ROW AUTO

## (undated) DEVICE — 3M™ STERI-STRIP™ COMPOUND BENZOIN TINCTURE 40 BAGS/CARTON 4 CARTONS/CASE C1544: Brand: 3M™ STERI-STRIP™

## (undated) DEVICE — SOLUTION ANTIFOG VIS SYS CLEARIFY LAPSCP

## (undated) DEVICE — SUTURE VCRL SZ 0 L18IN ABSRB VLT SUTUPAK PRECUT W/O NDL J106T

## (undated) DEVICE — GAUZE,SPONGE,3"X3",12PLY,STERILE,LF,2'S: Brand: MEDLINE

## (undated) DEVICE — HF-RESECTION ELECTRODE PLASMALOOP LOOP, MEDIUM, 24 FR., 12°-30°, ESG TURIS: Brand: OLYMPUS

## (undated) DEVICE — SUTURE MCRYL SZ 4-0 L18IN ABSRB UD P-3 L13MM 3/8 CIR PRIM Y494G

## (undated) DEVICE — POLYETHYLENE CATHETER: Brand: COOK

## (undated) DEVICE — SYRINGE MED 10ML LUERLOCK TIP W/O SFTY DISP

## (undated) DEVICE — SUTURE NONABSORBABLE BRAIDED 4-0 SH 30 IN BLK PERMA HND K831H

## (undated) DEVICE — GAUZE,SPONGE,8"X4",12PLY,XRAY,STRL,LF: Brand: MEDLINE

## (undated) DEVICE — DRAPE,ROBOTICS,STERILE: Brand: MEDLINE

## (undated) DEVICE — COVER ARMBRD W13XL28.5IN IMPERV BLU FOR OP RM

## (undated) DEVICE — SOLUTION IV IRRIG WATER 1000ML POUR BRL 2F7114

## (undated) DEVICE — Z DISCONTINUED BY MEDLINE USE 2711682 TRAY SKIN PREP DRY W/ PREM GLV

## (undated) DEVICE — SUTURE SZ 0 27IN 5/8 CIR UR-6  TAPER PT VIOLET ABSRB VICRYL J603H

## (undated) DEVICE — SYRINGE CATH TIP 50ML

## (undated) DEVICE — SOLUTION IV 1000ML 0.9% SOD CHL PH 5 INJ USP VIAFLX PLAS

## (undated) DEVICE — SOLUTION IV 500ML 0.9% SOD CHL PH 5 INJ USP VIAFLX PLAS

## (undated) DEVICE — CHLORAPREP 26ML ORANGE

## (undated) DEVICE — PUMP SUC IRR TBNG L10FT W/ HNDPC ASSEMB STRYKEFLOW 2

## (undated) DEVICE — DRESSING TRNSPAR W5XL4.5IN FLM SHT SEMIPERMEABLE WIND

## (undated) DEVICE — AEGIS 1" DISK 4MM HOLE, PEEL OPEN: Brand: MEDLINE

## (undated) DEVICE — TUBING PRSS 36 M F

## (undated) DEVICE — SPONGE LAP W18XL18IN WHT COT 4 PLY FLD STRUNG RADPQ DISP ST

## (undated) DEVICE — CHLORAPREP 26ML CLEAR

## (undated) DEVICE — 1-PIECE UROSTOMY POUCH, FLEXTEND: Brand: PREMIER

## (undated) DEVICE — 500ML,PRESSURE INFUSER W/STOPCOCK: Brand: MEDLINE

## (undated) DEVICE — STAPLER INT L55MM CUT LN L53MM STPL LN L57MM BLU B FRM 8

## (undated) DEVICE — INTENDED FOR TISSUE SEPARATION, AND OTHER PROCEDURES THAT REQUIRE A SHARP SURGICAL BLADE TO PUNCTURE OR CUT.: Brand: BARD-PARKER ® CARBON RIB-BACK BLADES

## (undated) DEVICE — Z INACTIVE USE 2660664 SOLUTION IRRIG 3000ML 0.9% SOD CHL USP UROMATIC PLAS CONT

## (undated) DEVICE — SYRINGE MED 50ML LUERLOCK TIP

## (undated) DEVICE — GLOVE ORANGE PI 7 1/2   MSG9075

## (undated) DEVICE — Z CONVERTED USE 2715898 SWABSTICK MEDICATED W1.75XL6.5IN 1.6ML 3.15% CHG 70% ISO

## (undated) DEVICE — CONTAINER,SPECIMEN,PNEU TUBE,4OZ,OR STRL: Brand: MEDLINE